# Patient Record
Sex: MALE | Race: ASIAN | NOT HISPANIC OR LATINO | URBAN - METROPOLITAN AREA
[De-identification: names, ages, dates, MRNs, and addresses within clinical notes are randomized per-mention and may not be internally consistent; named-entity substitution may affect disease eponyms.]

---

## 2024-04-27 VITALS
RESPIRATION RATE: 16 BRPM | OXYGEN SATURATION: 99 % | DIASTOLIC BLOOD PRESSURE: 79 MMHG | SYSTOLIC BLOOD PRESSURE: 148 MMHG | HEART RATE: 76 BPM | TEMPERATURE: 98 F | WEIGHT: 154.98 LBS

## 2024-04-27 LAB
ACANTHOCYTES BLD QL SMEAR: SLIGHT — SIGNIFICANT CHANGE UP
ALBUMIN SERPL ELPH-MCNC: 3.6 G/DL — SIGNIFICANT CHANGE UP (ref 3.4–5)
ALP SERPL-CCNC: 64 U/L — SIGNIFICANT CHANGE UP (ref 40–120)
ALT FLD-CCNC: 18 U/L — SIGNIFICANT CHANGE UP (ref 12–42)
ANION GAP SERPL CALC-SCNC: 10 MMOL/L — SIGNIFICANT CHANGE UP (ref 9–16)
APPEARANCE UR: CLEAR — SIGNIFICANT CHANGE UP
APTT BLD: 32.5 SEC — SIGNIFICANT CHANGE UP (ref 24.5–35.6)
AST SERPL-CCNC: 27 U/L — SIGNIFICANT CHANGE UP (ref 15–37)
BASOPHILS # BLD AUTO: 0.02 K/UL — SIGNIFICANT CHANGE UP (ref 0–0.2)
BASOPHILS NFR BLD AUTO: 0.3 % — SIGNIFICANT CHANGE UP (ref 0–2)
BILIRUB SERPL-MCNC: 0.3 MG/DL — SIGNIFICANT CHANGE UP (ref 0.2–1.2)
BILIRUB UR-MCNC: NEGATIVE — SIGNIFICANT CHANGE UP
BUN SERPL-MCNC: 25 MG/DL — HIGH (ref 7–23)
CALCIUM SERPL-MCNC: 8.6 MG/DL — SIGNIFICANT CHANGE UP (ref 8.5–10.5)
CHLORIDE SERPL-SCNC: 99 MMOL/L — SIGNIFICANT CHANGE UP (ref 96–108)
CO2 SERPL-SCNC: 24 MMOL/L — SIGNIFICANT CHANGE UP (ref 22–31)
COLOR SPEC: YELLOW — SIGNIFICANT CHANGE UP
CREAT SERPL-MCNC: 1.31 MG/DL — HIGH (ref 0.5–1.3)
DACRYOCYTES BLD QL SMEAR: SLIGHT — SIGNIFICANT CHANGE UP
DIFF PNL FLD: NEGATIVE — SIGNIFICANT CHANGE UP
EGFR: 53 ML/MIN/1.73M2 — LOW
ELLIPTOCYTES BLD QL SMEAR: SLIGHT — SIGNIFICANT CHANGE UP
EOSINOPHIL # BLD AUTO: 0.29 K/UL — SIGNIFICANT CHANGE UP (ref 0–0.5)
EOSINOPHIL NFR BLD AUTO: 4.6 % — SIGNIFICANT CHANGE UP (ref 0–6)
FLUAV AG NPH QL: SIGNIFICANT CHANGE UP
FLUBV AG NPH QL: SIGNIFICANT CHANGE UP
GLUCOSE SERPL-MCNC: 142 MG/DL — HIGH (ref 70–99)
GLUCOSE UR QL: NEGATIVE MG/DL — SIGNIFICANT CHANGE UP
HCT VFR BLD CALC: 32.3 % — LOW (ref 39–50)
HGB BLD-MCNC: 10.8 G/DL — LOW (ref 13–17)
HYPOCHROMIA BLD QL: SLIGHT — SIGNIFICANT CHANGE UP
IMM GRANULOCYTES NFR BLD AUTO: 0.3 % — SIGNIFICANT CHANGE UP (ref 0–0.9)
INR BLD: 1 — SIGNIFICANT CHANGE UP (ref 0.85–1.18)
KETONES UR-MCNC: NEGATIVE MG/DL — SIGNIFICANT CHANGE UP
LEUKOCYTE ESTERASE UR-ACNC: NEGATIVE — SIGNIFICANT CHANGE UP
LYMPHOCYTES # BLD AUTO: 1.77 K/UL — SIGNIFICANT CHANGE UP (ref 1–3.3)
LYMPHOCYTES # BLD AUTO: 28.1 % — SIGNIFICANT CHANGE UP (ref 13–44)
MANUAL SMEAR VERIFICATION: SIGNIFICANT CHANGE UP
MCHC RBC-ENTMCNC: 23.8 PG — LOW (ref 27–34)
MCHC RBC-ENTMCNC: 33.4 GM/DL — SIGNIFICANT CHANGE UP (ref 32–36)
MCV RBC AUTO: 71.3 FL — LOW (ref 80–100)
MICROCYTES BLD QL: SLIGHT — SIGNIFICANT CHANGE UP
MONOCYTES # BLD AUTO: 0.69 K/UL — SIGNIFICANT CHANGE UP (ref 0–0.9)
MONOCYTES NFR BLD AUTO: 11 % — SIGNIFICANT CHANGE UP (ref 2–14)
NEUTROPHILS # BLD AUTO: 3.5 K/UL — SIGNIFICANT CHANGE UP (ref 1.8–7.4)
NEUTROPHILS NFR BLD AUTO: 55.7 % — SIGNIFICANT CHANGE UP (ref 43–77)
NITRITE UR-MCNC: NEGATIVE — SIGNIFICANT CHANGE UP
NRBC # BLD: 0 /100 WBCS — SIGNIFICANT CHANGE UP (ref 0–0)
PH UR: 7.5 — SIGNIFICANT CHANGE UP (ref 5–8)
PLAT MORPH BLD: NORMAL — SIGNIFICANT CHANGE UP
PLATELET # BLD AUTO: 181 K/UL — SIGNIFICANT CHANGE UP (ref 150–400)
POTASSIUM SERPL-MCNC: 4.4 MMOL/L — SIGNIFICANT CHANGE UP (ref 3.5–5.3)
POTASSIUM SERPL-SCNC: 4.4 MMOL/L — SIGNIFICANT CHANGE UP (ref 3.5–5.3)
PROT SERPL-MCNC: 7.6 G/DL — SIGNIFICANT CHANGE UP (ref 6.4–8.2)
PROT UR-MCNC: NEGATIVE MG/DL — SIGNIFICANT CHANGE UP
PROTHROM AB SERPL-ACNC: 11.3 SEC — SIGNIFICANT CHANGE UP (ref 9.5–13)
RBC # BLD: 4.53 M/UL — SIGNIFICANT CHANGE UP (ref 4.2–5.8)
RBC # FLD: 15.9 % — HIGH (ref 10.3–14.5)
RBC BLD AUTO: ABNORMAL
RSV RNA NPH QL NAA+NON-PROBE: SIGNIFICANT CHANGE UP
SARS-COV-2 RNA SPEC QL NAA+PROBE: SIGNIFICANT CHANGE UP
SODIUM SERPL-SCNC: 133 MMOL/L — SIGNIFICANT CHANGE UP (ref 132–145)
SP GR SPEC: 1.05 — HIGH (ref 1–1.03)
TARGETS BLD QL SMEAR: SLIGHT — SIGNIFICANT CHANGE UP
TROPONIN I, HIGH SENSITIVITY RESULT: 51.8 NG/L — SIGNIFICANT CHANGE UP
UROBILINOGEN FLD QL: 0.2 MG/DL — SIGNIFICANT CHANGE UP (ref 0.2–1)
WBC # BLD: 6.29 K/UL — SIGNIFICANT CHANGE UP (ref 3.8–10.5)
WBC # FLD AUTO: 6.29 K/UL — SIGNIFICANT CHANGE UP (ref 3.8–10.5)

## 2024-04-27 PROCEDURE — 99291 CRITICAL CARE FIRST HOUR: CPT

## 2024-04-27 PROCEDURE — 70498 CT ANGIOGRAPHY NECK: CPT | Mod: 26,MC

## 2024-04-27 PROCEDURE — 70496 CT ANGIOGRAPHY HEAD: CPT | Mod: 26,MC

## 2024-04-27 PROCEDURE — 0042T: CPT | Mod: MC

## 2024-04-27 RX ORDER — ASPIRIN/CALCIUM CARB/MAGNESIUM 324 MG
325 TABLET ORAL ONCE
Refills: 0 | Status: COMPLETED | OUTPATIENT
Start: 2024-04-27 | End: 2024-04-27

## 2024-04-27 RX ORDER — ASPIRIN/CALCIUM CARB/MAGNESIUM 324 MG
300 TABLET ORAL ONCE
Refills: 0 | Status: COMPLETED | OUTPATIENT
Start: 2024-04-27 | End: 2024-04-27

## 2024-04-27 RX ADMIN — Medication 300 MILLIGRAM(S): at 22:29

## 2024-04-27 NOTE — ED PROVIDER NOTE - NSICDXPASTMEDICALHX_GEN_ALL_CORE_FT
PAST MEDICAL HISTORY:  BPH (benign prostatic hyperplasia)     Diabetes     HLD (hyperlipidemia)     JUSTYNA (iron deficiency anemia)     Stroke

## 2024-04-27 NOTE — ED PROVIDER NOTE - CRITICAL CARE ATTENDING CONTRIBUTION TO CARE
The patient was seen immediately upon arrival due to a high probability of imminent or life-threatening deterioration secondary to code stroke, which required my direct attention, intervention, and personal management at the bedside. I have personally provided critical care time exclusive of time spent on separately billable procedures. Time includes review of laboratory data, radiology results, discussion with consultants, discussion with the patient's family, and monitoring for potential decompensation.

## 2024-04-27 NOTE — ED ADULT TRIAGE NOTE - CHIEF COMPLAINT QUOTE
H/o CVA, DM, Dementia, c/o unsteady gait and right side weakness since yesterday at 11pm last night H/o CVA, DM, Dementia, c/o unsteady gait and right side weakness since yesterday at 11pm last night, STROKE CODE CALL

## 2024-04-27 NOTE — ED ADULT NURSE NOTE - OBJECTIVE STATEMENT
87y/o male presents to ED c/o unsteady gait, facial droop, and right sided weakness. last well known was last midnight 00:00.  Code stroke activated. patient taken to CT immediately. Dr. Teague at bedside presently. patient disoriented, hard of hearing, alert and oriented x0 at this time.  [id number] Cantonese  utilized.  pt with bilateral hearing aids and dentures. fall with harm precautions maintained. pt's wife at bedside. Patient and spouse at bedside educated on BE FAST using stroke education packet, patient able to teach back BE FAST to RN. Patient also states understanding of calling 911 when having positive BE FAST symptoms.

## 2024-04-27 NOTE — ED PROVIDER NOTE - PHYSICAL EXAMINATION
Constitutional: awake and alert, in no acute distress  HEENT: head normocephalic and atraumatic. moist mucous membranes  Eyes: extraocular movements intact, normal conjunctiva  Neck: supple, normal ROM  Cardiovascular: regular rate   Pulmonary: no respiratory distress  Gastrointestinal: abdomen flat and nondistended  Skin: warm, dry, normal for ethnicity  Musculoskeletal: no edema, no deformity  Neurological: difficulty with finger-to-nose and heel-to-shin with LUE and LLE.  +pronator drift with LUE  Psychiatric: calm and cooperative

## 2024-04-27 NOTE — ED ADULT NURSE NOTE - CHIEF COMPLAINT QUOTE
H/o CVA, DM, Dementia, c/o unsteady gait and right side weakness since yesterday at 11pm last night, STROKE CODE CALL

## 2024-04-27 NOTE — ED ADULT NURSE NOTE - NSFALLHARMRISKINTERV_ED_ALL_ED
Assistance OOB with selected safe patient handling equipment if applicable/Assistance with ambulation/Communicate risk of Fall with Harm to all staff, patient, and family/Monitor gait and stability/Monitor for mental status changes and reorient to person, place, and time, as needed/Provide visual cue: red socks, yellow wristband, yellow gown, etc/Reinforce activity limits and safety measures with patient and family/Toileting schedule using arm’s reach rule for commode and bathroom/Use of alarms - bed, stretcher, chair and/or video monitoring/Bed in lowest position, wheels locked, appropriate side rails in place/Call bell, personal items and telephone in reach/Instruct patient to call for assistance before getting out of bed/chair/stretcher/Non-slip footwear applied when patient is off stretcher/[place] to call system/Physically safe environment - no spills, clutter or unnecessary equipment/Purposeful Proactive Rounding/Room/bathroom lighting operational, light cord in reach

## 2024-04-27 NOTE — ED PROVIDER NOTE - OBJECTIVE STATEMENT
89yo M hx of CVA 20yrs ago w residual R sided weakness, HTN, HLD (not on meds-- states cholesterol was approx 230 two weeks ago but PMD did not start him on meds), hx of glaucoma, hx of hearing loss, depression, "prostate problems", anemia, presents with new L sided weakness which started last night at 12am.  Pt's wife states that she and pt went to [facility] yesterday prior to onset of symptoms for general check up regarding "neck arteries" and stomach, he was given new medication for osteoporosis, and then he and wife returned home.  Wife states pt then seemed unwilling to get up and walk at midnight, so she rubbed oil on his legs, but noticed his left side seemed weaker than usual and he was unable to walk at all.  States at baseline, pt has had trouble walking without assistance over the past few years but usually just needs mild assistance.    Pt states she thinks pt used to take blood thinners but was taken off them approx 4yrs ago, but she is not sure.  Has not been on baby ASA for approx the past 4yrs either. 87yo M hx of CVA 20yrs ago w residual R sided weakness, HTN, HLD (not on meds-- states cholesterol was approx 230 two weeks ago but PMD did not start him on meds), hx of glaucoma, hx of hearing loss, depression, "prostate problems", anemia, presents with new L sided weakness which started last night at 12am.  Pt's wife states that she and pt went to [facility] yesterday prior to onset of symptoms for general check up regarding "neck arteries" and stomach, he was given new medication for osteoporosis, and then he and wife returned home.  Wife states pt then seemed unwilling to get up and walk at midnight, so she rubbed oil on his legs, but noticed his left side seemed weaker than usual and he was unable to walk at all.  States at baseline, pt has had trouble walking without assistance over the past few years but usually just needs mild assistance.    Wife states she thinks pt used to take blood thinners but was taken off them approx 4yrs ago, but she is not sure.  Has not been on baby ASA for approx the past 4yrs either.

## 2024-04-27 NOTE — ED PROVIDER NOTE - CLINICAL SUMMARY MEDICAL DECISION MAKING FREE TEXT BOX
87yo M hx of CVA w residual R sided weakness, HLD, DM, BPH, glaucoma, hearing loss, presents with L sided weakness starting at midnight.  On exam afebrile, VSS, noted to have dysmetria with LUE and LLE and L pronator drift on exam.  Code stroke called on arrival to ED.  Plan for stroke workup, telestroke consult, reassess.    Imaging shows no acute bleed, no LVO.  Discussed w telestroke neurologist.  Plan to admit to neuro stroke at [facility].

## 2024-04-28 ENCOUNTER — INPATIENT (INPATIENT)
Facility: HOSPITAL | Age: 89
LOS: 18 days | Discharge: NURSING, REHAB OR LONG-TERM CARE | End: 2024-05-17
Attending: HOSPITALIST | Admitting: HOSPITALIST
Payer: MEDICARE

## 2024-04-28 LAB
A1C WITH ESTIMATED AVERAGE GLUCOSE RESULT: 6.3 % — HIGH (ref 4–5.6)
ANION GAP SERPL CALC-SCNC: 10 MMOL/L — SIGNIFICANT CHANGE UP (ref 5–17)
BASOPHILS # BLD AUTO: 0.02 K/UL — SIGNIFICANT CHANGE UP (ref 0–0.2)
BASOPHILS NFR BLD AUTO: 0.3 % — SIGNIFICANT CHANGE UP (ref 0–2)
BUN SERPL-MCNC: 20 MG/DL — SIGNIFICANT CHANGE UP (ref 7–23)
CALCIUM SERPL-MCNC: 8.9 MG/DL — SIGNIFICANT CHANGE UP (ref 8.4–10.5)
CHLORIDE SERPL-SCNC: 98 MMOL/L — SIGNIFICANT CHANGE UP (ref 96–108)
CHOLEST SERPL-MCNC: 227 MG/DL — HIGH
CO2 SERPL-SCNC: 23 MMOL/L — SIGNIFICANT CHANGE UP (ref 22–31)
CREAT SERPL-MCNC: 0.91 MG/DL — SIGNIFICANT CHANGE UP (ref 0.5–1.3)
EGFR: 82 ML/MIN/1.73M2 — SIGNIFICANT CHANGE UP
EOSINOPHIL # BLD AUTO: 0.22 K/UL — SIGNIFICANT CHANGE UP (ref 0–0.5)
EOSINOPHIL NFR BLD AUTO: 3.8 % — SIGNIFICANT CHANGE UP (ref 0–6)
ESTIMATED AVERAGE GLUCOSE: 134 MG/DL — HIGH (ref 68–114)
FERRITIN SERPL-MCNC: 410 NG/ML — HIGH (ref 30–400)
GLUCOSE BLDC GLUCOMTR-MCNC: 127 MG/DL — HIGH (ref 70–99)
GLUCOSE BLDC GLUCOMTR-MCNC: 133 MG/DL — HIGH (ref 70–99)
GLUCOSE BLDC GLUCOMTR-MCNC: 135 MG/DL — HIGH (ref 70–99)
GLUCOSE BLDC GLUCOMTR-MCNC: 142 MG/DL — HIGH (ref 70–99)
GLUCOSE SERPL-MCNC: 145 MG/DL — HIGH (ref 70–99)
HCT VFR BLD CALC: 33.9 % — LOW (ref 39–50)
HCV AB S/CO SERPL IA: 0.06 S/CO — SIGNIFICANT CHANGE UP
HCV AB SERPL-IMP: SIGNIFICANT CHANGE UP
HDLC SERPL-MCNC: 38 MG/DL — LOW
HGB BLD-MCNC: 11.2 G/DL — LOW (ref 13–17)
IMM GRANULOCYTES NFR BLD AUTO: 0.3 % — SIGNIFICANT CHANGE UP (ref 0–0.9)
IRON SATN MFR SERPL: 33 % — SIGNIFICANT CHANGE UP (ref 16–55)
IRON SATN MFR SERPL: 83 UG/DL — SIGNIFICANT CHANGE UP (ref 45–165)
LIPID PNL WITH DIRECT LDL SERPL: 170 MG/DL — HIGH
LYMPHOCYTES # BLD AUTO: 1.14 K/UL — SIGNIFICANT CHANGE UP (ref 1–3.3)
LYMPHOCYTES # BLD AUTO: 19.6 % — SIGNIFICANT CHANGE UP (ref 13–44)
MAGNESIUM SERPL-MCNC: 2 MG/DL — SIGNIFICANT CHANGE UP (ref 1.6–2.6)
MCHC RBC-ENTMCNC: 23.3 PG — LOW (ref 27–34)
MCHC RBC-ENTMCNC: 33 GM/DL — SIGNIFICANT CHANGE UP (ref 32–36)
MCV RBC AUTO: 70.6 FL — LOW (ref 80–100)
MONOCYTES # BLD AUTO: 0.51 K/UL — SIGNIFICANT CHANGE UP (ref 0–0.9)
MONOCYTES NFR BLD AUTO: 8.7 % — SIGNIFICANT CHANGE UP (ref 2–14)
NEUTROPHILS # BLD AUTO: 3.92 K/UL — SIGNIFICANT CHANGE UP (ref 1.8–7.4)
NEUTROPHILS NFR BLD AUTO: 67.3 % — SIGNIFICANT CHANGE UP (ref 43–77)
NON HDL CHOLESTEROL: 189 MG/DL — HIGH
NRBC # BLD: 0 /100 WBCS — SIGNIFICANT CHANGE UP (ref 0–0)
PHOSPHATE SERPL-MCNC: 2.6 MG/DL — SIGNIFICANT CHANGE UP (ref 2.5–4.5)
PLATELET # BLD AUTO: 177 K/UL — SIGNIFICANT CHANGE UP (ref 150–400)
POTASSIUM SERPL-MCNC: 3.6 MMOL/L — SIGNIFICANT CHANGE UP (ref 3.5–5.3)
POTASSIUM SERPL-SCNC: 3.6 MMOL/L — SIGNIFICANT CHANGE UP (ref 3.5–5.3)
RBC # BLD: 4.8 M/UL — SIGNIFICANT CHANGE UP (ref 4.2–5.8)
RBC # FLD: 15.9 % — HIGH (ref 10.3–14.5)
SODIUM SERPL-SCNC: 131 MMOL/L — LOW (ref 135–145)
TIBC SERPL-MCNC: 255 UG/DL — SIGNIFICANT CHANGE UP (ref 220–430)
TRANSFERRIN SERPL-MCNC: 202 MG/DL — SIGNIFICANT CHANGE UP (ref 200–360)
TRIGL SERPL-MCNC: 106 MG/DL — SIGNIFICANT CHANGE UP
TSH SERPL-MCNC: 2.08 UIU/ML — SIGNIFICANT CHANGE UP (ref 0.27–4.2)
UIBC SERPL-MCNC: 172 UG/DL — SIGNIFICANT CHANGE UP (ref 110–370)
WBC # BLD: 5.83 K/UL — SIGNIFICANT CHANGE UP (ref 3.8–10.5)
WBC # FLD AUTO: 5.83 K/UL — SIGNIFICANT CHANGE UP (ref 3.8–10.5)

## 2024-04-28 PROCEDURE — 99233 SBSQ HOSP IP/OBS HIGH 50: CPT

## 2024-04-28 PROCEDURE — 0042T: CPT

## 2024-04-28 PROCEDURE — 70551 MRI BRAIN STEM W/O DYE: CPT | Mod: 26

## 2024-04-28 PROCEDURE — 99222 1ST HOSP IP/OBS MODERATE 55: CPT

## 2024-04-28 PROCEDURE — 70496 CT ANGIOGRAPHY HEAD: CPT | Mod: 26

## 2024-04-28 PROCEDURE — 70450 CT HEAD/BRAIN W/O DYE: CPT | Mod: 26,XU

## 2024-04-28 PROCEDURE — 70498 CT ANGIOGRAPHY NECK: CPT | Mod: 26

## 2024-04-28 RX ORDER — ASPIRIN/CALCIUM CARB/MAGNESIUM 324 MG
81 TABLET ORAL DAILY
Refills: 0 | Status: DISCONTINUED | OUTPATIENT
Start: 2024-04-28 | End: 2024-04-29

## 2024-04-28 RX ORDER — SODIUM CHLORIDE 9 MG/ML
1000 INJECTION INTRAMUSCULAR; INTRAVENOUS; SUBCUTANEOUS ONCE
Refills: 0 | Status: COMPLETED | OUTPATIENT
Start: 2024-04-28 | End: 2024-04-28

## 2024-04-28 RX ORDER — DEXTROSE 50 % IN WATER 50 %
25 SYRINGE (ML) INTRAVENOUS ONCE
Refills: 0 | Status: DISCONTINUED | OUTPATIENT
Start: 2024-04-28 | End: 2024-05-17

## 2024-04-28 RX ORDER — POLYETHYLENE GLYCOL 3350 17 G/17G
17 POWDER, FOR SOLUTION ORAL DAILY
Refills: 0 | Status: DISCONTINUED | OUTPATIENT
Start: 2024-04-28 | End: 2024-04-29

## 2024-04-28 RX ORDER — DEXTROSE 10 % IN WATER 10 %
125 INTRAVENOUS SOLUTION INTRAVENOUS ONCE
Refills: 0 | Status: DISCONTINUED | OUTPATIENT
Start: 2024-04-28 | End: 2024-05-17

## 2024-04-28 RX ORDER — FERROUS SULFATE 325(65) MG
325 TABLET ORAL DAILY
Refills: 0 | Status: DISCONTINUED | OUTPATIENT
Start: 2024-04-28 | End: 2024-04-29

## 2024-04-28 RX ORDER — DEXTROSE 50 % IN WATER 50 %
12.5 SYRINGE (ML) INTRAVENOUS ONCE
Refills: 0 | Status: DISCONTINUED | OUTPATIENT
Start: 2024-04-28 | End: 2024-05-17

## 2024-04-28 RX ORDER — CLOPIDOGREL BISULFATE 75 MG/1
75 TABLET, FILM COATED ORAL DAILY
Refills: 0 | Status: DISCONTINUED | OUTPATIENT
Start: 2024-04-28 | End: 2024-04-29

## 2024-04-28 RX ORDER — SODIUM CHLORIDE 9 MG/ML
1000 INJECTION, SOLUTION INTRAVENOUS
Refills: 0 | Status: DISCONTINUED | OUTPATIENT
Start: 2024-04-28 | End: 2024-05-17

## 2024-04-28 RX ORDER — METFORMIN HYDROCHLORIDE 850 MG/1
1 TABLET ORAL
Refills: 0 | DISCHARGE

## 2024-04-28 RX ORDER — GLUCAGON INJECTION, SOLUTION 0.5 MG/.1ML
1 INJECTION, SOLUTION SUBCUTANEOUS ONCE
Refills: 0 | Status: DISCONTINUED | OUTPATIENT
Start: 2024-04-28 | End: 2024-05-17

## 2024-04-28 RX ORDER — ENOXAPARIN SODIUM 100 MG/ML
40 INJECTION SUBCUTANEOUS EVERY 24 HOURS
Refills: 0 | Status: DISCONTINUED | OUTPATIENT
Start: 2024-04-28 | End: 2024-05-06

## 2024-04-28 RX ORDER — SITAGLIPTIN 50 MG/1
1 TABLET, FILM COATED ORAL
Refills: 0 | DISCHARGE

## 2024-04-28 RX ORDER — SODIUM CHLORIDE 9 MG/ML
1000 INJECTION INTRAMUSCULAR; INTRAVENOUS; SUBCUTANEOUS ONCE
Refills: 0 | Status: DISCONTINUED | OUTPATIENT
Start: 2024-04-28 | End: 2024-04-29

## 2024-04-28 RX ORDER — MAGNESIUM OXIDE 400 MG ORAL TABLET 241.3 MG
1 TABLET ORAL
Refills: 0 | DISCHARGE

## 2024-04-28 RX ORDER — SENNA PLUS 8.6 MG/1
2 TABLET ORAL AT BEDTIME
Refills: 0 | Status: DISCONTINUED | OUTPATIENT
Start: 2024-04-28 | End: 2024-04-29

## 2024-04-28 RX ORDER — MAGNESIUM OXIDE 400 MG ORAL TABLET 241.3 MG
400 TABLET ORAL DAILY
Refills: 0 | Status: DISCONTINUED | OUTPATIENT
Start: 2024-04-28 | End: 2024-04-29

## 2024-04-28 RX ORDER — ATORVASTATIN CALCIUM 80 MG/1
80 TABLET, FILM COATED ORAL AT BEDTIME
Refills: 0 | Status: DISCONTINUED | OUTPATIENT
Start: 2024-04-28 | End: 2024-04-29

## 2024-04-28 RX ORDER — FOLIC ACID/VIT B COMPLEX AND C 400 MCG
1 TABLET ORAL
Refills: 0 | DISCHARGE

## 2024-04-28 RX ORDER — DEXTROSE 50 % IN WATER 50 %
15 SYRINGE (ML) INTRAVENOUS ONCE
Refills: 0 | Status: DISCONTINUED | OUTPATIENT
Start: 2024-04-28 | End: 2024-05-17

## 2024-04-28 RX ORDER — INSULIN LISPRO 100/ML
VIAL (ML) SUBCUTANEOUS
Refills: 0 | Status: DISCONTINUED | OUTPATIENT
Start: 2024-04-28 | End: 2024-05-17

## 2024-04-28 RX ORDER — FERROUS SULFATE 325(65) MG
1 TABLET ORAL
Refills: 0 | DISCHARGE

## 2024-04-28 RX ORDER — FOLIC ACID 0.8 MG
1 TABLET ORAL DAILY
Refills: 0 | Status: DISCONTINUED | OUTPATIENT
Start: 2024-04-28 | End: 2024-04-29

## 2024-04-28 RX ADMIN — Medication 81 MILLIGRAM(S): at 11:38

## 2024-04-28 RX ADMIN — CLOPIDOGREL BISULFATE 75 MILLIGRAM(S): 75 TABLET, FILM COATED ORAL at 10:45

## 2024-04-28 RX ADMIN — Medication 1 MILLIGRAM(S): at 11:39

## 2024-04-28 RX ADMIN — ENOXAPARIN SODIUM 40 MILLIGRAM(S): 100 INJECTION SUBCUTANEOUS at 05:11

## 2024-04-28 RX ADMIN — POLYETHYLENE GLYCOL 3350 17 GRAM(S): 17 POWDER, FOR SOLUTION ORAL at 11:38

## 2024-04-28 RX ADMIN — SODIUM CHLORIDE 1000 MILLILITER(S): 9 INJECTION INTRAMUSCULAR; INTRAVENOUS; SUBCUTANEOUS at 12:45

## 2024-04-28 RX ADMIN — SODIUM CHLORIDE 1000 MILLILITER(S): 9 INJECTION INTRAMUSCULAR; INTRAVENOUS; SUBCUTANEOUS at 17:00

## 2024-04-28 RX ADMIN — SODIUM CHLORIDE 1000 MILLILITER(S): 9 INJECTION INTRAMUSCULAR; INTRAVENOUS; SUBCUTANEOUS at 10:00

## 2024-04-28 RX ADMIN — Medication 325 MILLIGRAM(S): at 11:39

## 2024-04-28 RX ADMIN — MAGNESIUM OXIDE 400 MG ORAL TABLET 400 MILLIGRAM(S): 241.3 TABLET ORAL at 11:38

## 2024-04-28 NOTE — PHYSICAL THERAPY INITIAL EVALUATION ADULT - PERTINENT HX OF CURRENT PROBLEM, REHAB EVAL
Patient is a 88 [facility] side arm and leg weakness starting 4/26 pm. Wife noticed that patient was leaning against the wall with the left side of his body to support himself to walk and was unable to hold himself upright when sitting, slumping over to the left. Patient felt that his left arm and leg were weak. Called PCP who recommened ED visit. CTH/CTP/CTA head and neck with no acute findings. Loaded with aspirin 300mg x1. Transferred to [facility] for further stroke w/u. Stroke code called 4/28 AM as patient difficult to arouse, impaired speech, L facial droop and unable to move LUE. CT neg.

## 2024-04-28 NOTE — PHYSICAL THERAPY INITIAL EVALUATION ADULT - IMPAIRMENTS FOUND, PT EVAL
aerobic capacity/endurance/decreased midline orientation/gait, locomotion, and balance/muscle strength

## 2024-04-28 NOTE — PHYSICAL THERAPY INITIAL EVALUATION ADULT - GENERAL OBSERVATIONS, REHAB EVAL
ROHITH Padilla and [facility] cleared for OOB. Patient received semi-supine in NAD with +ECG +heplock +[place]

## 2024-04-28 NOTE — CONSULT NOTE ADULT - SUBJECTIVE AND OBJECTIVE BOX
HPI: 88y M Cantonese speaking, R hand dominant, b/l  hearing impairment ( b/l hearing aids) with PMHx of DM, Thalassemia minor/JUSTYNA, HLD, hx stroke with right sided weakness in 2004 (has a cane and walker at home but pt refuses using assisted device, off ASA since 2020 for unclear reason), depression, glaucoma, BPH, [facility] with new left side arm and leg weakness starting 4/26 pm. Wife noticed that patient was leaning against the wall with the left side of his body to support himself to walk and was unable to hold himself upright when sitting, slumping over to the left. Patient felt that his left arm and leg were weak. Called PCP who recommened ED visit. CTH/CTP/CTA head and neck with no acute findings. Loaded with aspirin 300mg x1. Transferred to [facility] for further stroke w/u.    PAST MEDICAL & SURGICAL HISTORY:  Diabetes      HLD (hyperlipidemia)      BPH (benign prostatic hyperplasia)      JUSTYNA (iron deficiency anemia)      Stroke        Home Medications:  ferrous sulfate 325 mg (65 mg elemental iron) oral tablet: 1 tab(s) orally once a day (28 Apr 2024 02:11)  Folic Acid 1mg: Take 1 pill once a day (28 Apr 2024 02:11)  Januvia 50 mg oral tablet: 1 tab(s) orally once a day (28 Apr 2024 02:14)  magnesium oxide 400 mg oral tablet: 1 tab(s) orally once a day (28 Apr 2024 02:10)  metFORMIN 500 mg oral tablet: 1 tab(s) orally 3 times a day (28 Apr 2024 02:10)  vitamin A 10,000 intl units oral capsule: 1 cap(s) orally once a day (28 Apr 2024 02:10)    Allergies    No Known Allergies    Intolerances      FAMILY HISTORY:    Social History:  Patient lives with wife  Smoking status: no  Drinking: no  Drug Use: no (28 Apr 2024 01:42)      REVIEW OF SYSTEMS:  CONSTITUTIONAL: No fever, weight loss  EYES: No eye pain, or discharge  ENMT:  No tinnitus, vertigo  NECK: No pain or stiffness  RESPIRATORY: No cough, No dyspnea  CARDIOVASCULAR: No chest pain, or leg swelling  GASTROINTESTINAL: No abdominal pain. No diarrhea ;No melena or hematochezia.  GENITOURINARY: No dysuria, frequency, or hematuria  NEUROLOGICAL: No numbness, or tremors  SKIN: No itching, burning, rashes, or lesions   ENDOCRINE: No heat or cold intolerance;  MUSCULOSKELETAL: No joint pain or swelling;   PSYCHIATRIC: No mood swings, or difficulty sleeping  HEME/LYMPH: No easy bruising, or bleeding gums  ALLERGY AND IMMUNOLOGIC: No hives or eczema    Diet, Regular:   Consistent Carbohydrate No Snacks (CSTCHO)  DASH/TLC Sodium & Cholesterol Restricted (DASH) (04-28-24 @ 01:30) [Active]      CURRENT MEDICATIONS:   aspirin enteric coated 81 milliGRAM(s) Oral daily  atorvastatin 80 milliGRAM(s) Oral at bedtime  clopidogrel Tablet 75 milliGRAM(s) Oral daily  dextrose 10% Bolus 125 milliLiter(s) IV Bolus once  dextrose 5%. 1000 milliLiter(s) IV Continuous <Continuous>  dextrose 5%. 1000 milliLiter(s) IV Continuous <Continuous>  dextrose 50% Injectable 25 Gram(s) IV Push once  dextrose 50% Injectable 12.5 Gram(s) IV Push once  dextrose Oral Gel 15 Gram(s) Oral once PRN  enoxaparin Injectable 40 milliGRAM(s) SubCutaneous every 24 hours  ferrous    sulfate 325 milliGRAM(s) Oral daily  folic acid 1 milliGRAM(s) Oral daily  glucagon  Injectable 1 milliGRAM(s) IntraMuscular once  insulin lispro (ADMELOG) corrective regimen sliding scale   SubCutaneous three times a day before meals  magnesium oxide 400 milliGRAM(s) Oral daily  polyethylene glycol 3350 17 Gram(s) Oral daily  senna 2 Tablet(s) Oral at bedtime      VITAL SIGNS, INS/OUTS (last 24 hours):  Vital Signs Last 24 Hrs  T(C): 37.2 (28 Apr 2024 09:00), Max: 37.2 (28 Apr 2024 09:00)  T(F): 98.9 (28 Apr 2024 09:00), Max: 98.9 (28 Apr 2024 09:00)  HR: 72 (28 Apr 2024 13:40) (66 - 81)  BP: 156/87 (28 Apr 2024 13:40) (109/64 - 164/90)  BP(mean): 115 (28 Apr 2024 13:40) (82 - 115)  RR: 16 (28 Apr 2024 13:40) (16 - 18)  SpO2: 99% (28 Apr 2024 13:40) (94% - 100%)    Parameters below as of 28 Apr 2024 13:40  Patient On (Oxygen Delivery Method): room air      I&O's Summary    28 Apr 2024 07:01  -  28 Apr 2024 14:05  --------------------------------------------------------  IN: 2000 mL / OUT: 250 mL / NET: 1750 mL        PHYSICAL EXAM:  Gen: Reclining in bed at time of exam, appears stated age  HEENT: NCAT, MMM, clear OP  Neck: supple, trachea at midline  CV: RRR, +S1/S2  Pulm: adequate respiratory effort, no increase in work of breathing  Abd: soft, NTND  Skin: warm and dry,  Ext: WWP, no LE edema  Neuro: AOx3, LUE plegic, LLE +3/5  Psych: affect and behavior appropriate, pleasant at time of interview    BASIC LABS:                        11.2   5.83  )-----------( 177      ( 28 Apr 2024 05:30 )             33.9     04-28    131<L>  |  98  |  20  ----------------------------<  145<H>  3.6   |  23  |  0.91    Ca    8.9      28 Apr 2024 05:30  Phos  2.6     04-28  Mg     2.0     04-28    TPro  7.6  /  Alb  3.6  /  TBili  0.3  /  DBili  x   /  AST  27  /  ALT  18  /  AlkPhos  64  04-27    PT/INR - ( 27 Apr 2024 20:11 )   PT: 11.3 sec;   INR: 1.00          PTT - ( 27 Apr 2024 20:11 )  PTT:32.5 sec  Urinalysis Basic - ( 28 Apr 2024 05:30 )    Color: x / Appearance: x / SG: x / pH: x  Gluc: 145 mg/dL / Ketone: x  / Bili: x / Urobili: x   Blood: x / Protein: x / Nitrite: x   Leuk Esterase: x / RBC: x / WBC x   Sq Epi: x / Non Sq Epi: x / Bacteria: x      CAPILLARY BLOOD GLUCOSE      POCT Blood Glucose.: 135 mg/dL (28 Apr 2024 13:25)  POCT Blood Glucose.: 133 mg/dL (28 Apr 2024 08:52)  POCT Blood Glucose.: 142 mg/dL (28 Apr 2024 06:45)  POCT Blood Glucose.: 144 mg/dL (27 Apr 2024 20:11)      OTHER LABS:        MICRODATA:      IMAGING:    EKG:    #Diet - Diet, Regular:   Consistent Carbohydrate No Snacks (CSTCHO)  DASH/TLC Sodium & Cholesterol Restricted (DASH) (04-28-24 @ 01:30) [Active]        #DVT PPx - enoxaparin Injectable: [Ordered as LOVENOX]  40 milliGRAM(s), SubCutaneous, every 24 hours  Administration Instructions: This is a High Alert Medication.  Provider's Contact #: [phone] (04-28-24 @ 01:42)

## 2024-04-28 NOTE — H&P ADULT - NSHPREVIEWOFSYSTEMS_GEN_ALL_CORE
Constitutional: No fever, weight loss or fatigue  Eyes: No eye pain, visual disturbances, or discharge  ENMT:  No difficulty hearing, tinnitus, vertigo; No sinus or throat pain  Neck: No pain or stiffness  Respiratory: +cough  Cardiovascular: No chest pain, palpitations, shortness of breath, dizziness or leg swelling  Gastrointestinal: No abdominal pain. No nausea, vomiting or hematemesis; No diarrhea or constipation. Nohematochezia.  Genitourinary: No dysuria, frequency, hematuria or incontinence  Neurological: As per HPI No

## 2024-04-28 NOTE — PROGRESS NOTE ADULT - ASSESSMENT
88y Cantonese speaking Male with PMHx of JUSTYNA, stroke (2004, residual right sided weakness), HLD, glaucoma, DM, BPH, depression, b/l hearing loss presents with [facility] with new left side arm and leg weakness starting 4/26 pm. Wife also noted truncal ataxia (slumping over to the left when sitting) and gait ataxia. CT imaging with no acute pathology. Transferred to [facility] for further stroke w/u.    Neuro  #hx of stroke 2004 with residual right sided weakness   #CVA workup  Patient previously on aspirin, but was stopped by doctor in 2020, unclear reason why.   - s/p aspirin load x1  - continue aspirin 81mg   - START PLAVIX 75MG  - continue atorvastatin 80mg daily   - q4hr stroke neuro checks and vitals  - obtain MRI Brain without contrast  - Stroke Code HCT Results: negative for acute ischemia   - Stroke Code CTA Results: negative for acute pathology  - Stroke education    Cards  - permissive hypertension, Goal -180  - obtain TTE     #HLD  - high dose statin as above in CVA  - LDL results:     Pulm  - call provider if SPO2 < 94%    GI  #Nutrition/Fluids/Electrolytes   - replete K<4 and Mg <2  - Diet: DASH, consistent carbs  - IVF: none    #constipation  - c/w bowel regimen w/ senna+miralax    Endocrine  #DM  home meds: metformin 500mg TID, Januvia 50mg daily   - A1C results:   - ISS    - TSH results:    Hematology  #JUSTYNA  - c/w home ferrous sulfate 325mg daily  - f/u iron panel    Psych  #depression  Per wife, prior to COVID, patient very active with friends/community, played sports. Since pandemic and worsening eyesight due to glaucoma and hearing, patient with no interest in daily activities. Was started on escitalopram 10mg daily, but stopped taking due to constipation  - consider restarting based on patient's mood and improvement of bowel movement after starting bowel regimen      DVT Prophylaxis  - lovenox sq for DVT prophylaxis   - SCDs for DVT prophylaxis     Dispo: pending PT/OT      Discussed daily hospital plans and goals with patient and family at bedside.     Discussed with Neurology Attending Dr. Rangel

## 2024-04-28 NOTE — PHYSICAL THERAPY INITIAL EVALUATION ADULT - ADDITIONAL COMMENTS
Social history obtained from wife. Patient lives in elevator apartment with few steps to enter. PTA patient was independent with mobility and ADLs. Owns SC and wheelchair. Has tub shower with shower chair and grab bars.

## 2024-04-28 NOTE — PHYSICAL THERAPY INITIAL EVALUATION ADULT - MODALITIES TREATMENT COMMENTS
Cranial Nerves II - XII: II: PERRLA; visual fields are full to confrontation III, IV, VI: unable to assess vision 2/2 lethargy V: facial sensation intact to light touch V1-V3 b/l VII: L eye ptosis, L facial droop VIII: hearing intact to finger rub b/l however patient hard of hearing XI: head turning and shoulder shrug intact b/l XII: tongue protrusion midline, unable to deviate toward L

## 2024-04-28 NOTE — H&P ADULT - ASSESSMENT
88y Cantonese speaking Male with PMHx of JUSTYNA, stroke (2004, residual right sided weakness), HLD, glaucoma, DM, BPH, depression, b/l hearing loss presents with GV with new left side arm and leg weakness starting 4/26 pm. Wife also noted truncal ataxia (slumping over to the left when sitting) and gait ataxia. CT imaging with no acute pathology. Transferred to [facility] for further stroke w/u.    Neuro  #hx of stroke 2004 with residual right sided weakness   #CVA workup  Patient previously on aspirin, but was stopped by doctor in 2020, unclear reason why.   - s/p aspirin load x1  - continue aspirin 81mg and *****  - continue atorvastatin 80mg daily   - q4hr stroke neuro checks and vitals  - obtain MRI Brain without contrast  - Stroke Code HCT Results: negative for acute ischemia   - Stroke Code CTA Results: negative for acute pathology  - Stroke education    Cards  - permissive hypertension, Goal -180  - obtain TTE     #HLD  - high dose statin as above in CVA  - LDL results:     Pulm  - call provider if SPO2 < 94%    GI  #Nutrition/Fluids/Electrolytes   - replete K<4 and Mg <2  - Diet: DASH, consistent carbs  - IVF: none    #constipation  - c/w bowel regimen w/ senna+miralax    Endocrine  #DM  home meds: metformin 500mg TID, Januvia 50mg daily   - A1C results:   - ISS    - TSH results:    Hematology  #JUSTYNA  - c/w home ferrous sulfate 325mg daily  - f/u iron panel    Psych  #depression  Per wife, prior to COVID, patient very active with friends/community, played sports. Since pandemic and worsening eyesight due to glaucoma and hearing, patient with no interest in daily activities. Was started on escitalopram 10mg daily, but stopped taking due to constipation  - consider restarting based on patient's mood and improvement of bowel movement after starting bowel regimen      DVT Prophylaxis  - lovenox sq for DVT prophylaxis   - SCDs for DVT prophylaxis     Dispo: pending PT/OT      Discussed daily hospital plans and goals with patient and family at bedside.     Discussed with Neurology Attending Dr. Rangel 88y Cantonese speaking Male with PMHx of JUSTYNA, stroke (2004, residual right sided weakness), HLD, glaucoma, DM, BPH, depression, b/l hearing loss [facility] new left side arm and leg weakness starting 4/26 pm. Wife also noted truncal ataxia (slumping over to the left when sitting) and gait ataxia. CT imaging with no acute pathology. Transferred to [facility] for further stroke w/u.    Neuro  #hx of stroke 2004 with residual right sided weakness   #CVA workup  Patient previously on aspirin, but was stopped by doctor in 2020, unclear reason why.   - s/p aspirin load x1  - continue aspirin 81mg   - continue atorvastatin 80mg daily   - q4hr stroke neuro checks and vitals  - obtain MRI Brain without contrast  - Stroke Code HCT Results: negative for acute ischemia   - Stroke Code CTA Results: negative for acute pathology  - Stroke education    Cards  - permissive hypertension, Goal -180  - obtain TTE     #HLD  - high dose statin as above in CVA  - LDL results:     Pulm  - call provider if SPO2 < 94%    GI  #Nutrition/Fluids/Electrolytes   - replete K<4 and Mg <2  - Diet: DASH, consistent carbs  - IVF: none    #constipation  - c/w bowel regimen w/ senna+miralax    Endocrine  #DM  home meds: metformin 500mg TID, Januvia 50mg daily   - A1C results:   - ISS    - TSH results:    Hematology  #JUSTYNA  - c/w home ferrous sulfate 325mg daily  - f/u iron panel    Psych  #depression  Per wife, prior to COVID, patient very active with friends/community, played sports. Since pandemic and worsening eyesight due to glaucoma and hearing, patient with no interest in daily activities. Was started on escitalopram 10mg daily, but stopped taking due to constipation  - consider restarting based on patient's mood and improvement of bowel movement after starting bowel regimen      DVT Prophylaxis  - lovenox sq for DVT prophylaxis   - SCDs for DVT prophylaxis     Dispo: pending PT/OT      Discussed daily hospital plans and goals with patient and family at bedside.     Discussed with Neurology Attending Dr. Rangel

## 2024-04-28 NOTE — H&P ADULT - HISTORY OF PRESENT ILLNESS
**STROKE HPI***  Blake  # [id number]  History obtained via wife as patient with signficant b/l hearing loss, hearing aids in place    HPI: 88y Cantonese speaking Male with PMHx of stroke (2004, residual right sided weakness), HLD, glaucoma, DM, BPH, depression, b/l hearing loss presents with [facility] with new left side arm and leg weakness starting 4/26 pm. Wife noticed that patient was leaning against the wall with the left side of his body to support himself to walk and was unable to hold himself upright when sitting, slumping over to the left. Patient felt that his left arm and leg were weak. Called PCP who recommened ED visit. CTH/CTP/CTA head and neck with no acute findings. Loaded with aspirin 300mg x1. Transferred to [facility] for further stroke w/u.    Patient currently with left sided arm and leg weakness, unchanged compared to onset. No dysarthria, changes in vision per wife. Noted with left leg spasms that may be chronic. Wife thinks he has had spasms before, but has not noticed leg spasms recently.     Patient's prior stroke presents as right sided weakness with aphasia. Since then, weakness has improved, but patient at baseline does not converse much due to difficulty hearing.     At baseline, patient able to amulate on his own, although very slowly and carefully. Wife says patient should be using assistive device, but patient refuses. He is independent of ADLs, but performs them slowly. He has falled in the bathroom in the past. Patient has HHA services (3 days/week, 7 hours per day).     **STROKE HPI***  Blake  # [id number]  History obtained via wife as patient with signficant b/l hearing loss, hearing aids in place    HPI: 88y Cantonese speaking Male with PMHx of JUSTYNA, stroke (2004, residual right sided weakness), HLD, glaucoma, DM, BPH, depression, b/l hearing loss presents with [facility] with new left side arm and leg weakness starting 4/26 pm. Wife noticed that patient was leaning against the wall with the left side of his body to support himself to walk and was unable to hold himself upright when sitting, slumping over to the left. Patient felt that his left arm and leg were weak. Called PCP who recommened ED visit. CTH/CTP/CTA head and neck with no acute findings. Loaded with aspirin 300mg x1. Transferred to [facility] for further stroke w/u.    Patient currently with left sided arm and leg weakness, unchanged compared to onset. No dysarthria, changes in vision per wife. Noted with left leg spasms that may be chronic. Wife thinks he has had spasms before, but has not noticed leg spasms recently.     Patient's prior stroke presents as right sided weakness with aphasia. Since then, weakness has improved, but patient at baseline does not converse much due to difficulty hearing.     At baseline, patient able to amulate on his own, although very slowly and carefully. Wife says patient should be using assistive device, but patient refuses. He is independent of ADLs, but performs them slowly. He has falled in the bathroom in the past. Patient has HHA services (3 days/week, 7 hours per day).

## 2024-04-28 NOTE — CONSULT NOTE ADULT - ASSESSMENT
88y M Cantonese speaking, R hand dominant, b/l  hearing impairment ( b/l hearing aids) with PMHx of DM, Thalassemia minor/JUSTYNA, HLD, hx stroke with right sided weakness in 2004 (has a cane and walker at home but pt refuses using assisted device, off ASA since 2020 for unclear reason), depression, glaucoma, BPH, presented with [facility] ( 4/27) with new left side arm and leg weakness starting 4/26 pm. Wife noticed that patient was leaning against the wall with the left side of his body to support himself to walk and was unable to hold himself upright when sitting, slumping over to the left. Patient felt that his left arm and leg were weak. Called PCP who recommened ED visit. CTH/CTP/CTA head and neck with no acute findings. Loaded with aspirin 300mg x1. Transferred to [facility] for further stroke w/u.    # New Left sided weakness r/u new stroke   #hx of stroke with  residual right sided weakness ( 2004)   - 8LA telemetry monitoring   -  q4hr stroke neuro checks and vitals  - active plan per Stroke team d/w resident   - s/p aspirin load 300mg x1( 4/27)  - continue aspirin 81mg po daily ( 4/27-)  - start Clopidogrel 75mg po daily ( 4/28-)   - continue Atorvastatin 80mg po qHS  ( 4/27-)   - permissive hypertension, Goal -180  - IVF   - obtain MRI Brain without contrast  - obtain TTE   - PT/OT eval    #HLD  - high dose statin as above in CVA  - LDL results: 170    # Anemia hx Thal minor  - Ferritin 410, Tsat 33% adequate iron store  - Hgb 11.2%     #constipation  - c/w bowel regimen w/ senna+miralax    #DM  - A1C 6.3%   home meds: metformin 500mg TID, Januvia 50mg daily   - ISS, goal -180     # Hyponatremia  - trend Na 131    #DVT Prophylaxis  - lovenox 40mg sq for DVT prophylaxis   - SCDs for DVT prophylaxis     Dispo: pending PT/OT, will benefit inpatient Rehab      Contact:  PMD Dr. Aron Soares   Wife: Farshad Pelayo # [phone]    POC as d/w Stroke resident Dr.Edwards Dr. Karen Dillon covering 4/29-5/1/24

## 2024-04-28 NOTE — PATIENT PROFILE ADULT - FALL HARM RISK - HARM RISK INTERVENTIONS

## 2024-04-28 NOTE — H&P ADULT - NSHPLABSRESULTS_GEN_ALL_CORE
Fingerstick Blood Glucose: CAPILLARY BLOOD GLUCOSE      POCT Blood Glucose.: 144 mg/dL (2024 20:11)    LABS:                        10.8   6.29  )-----------( 181      ( 2024 20:11 )             32.3         133  |  99  |  25<H>  ----------------------------<  142<H>  4.4   |  24  |  1.31<H>    Ca    8.6      2024 20:11    TPro  7.6  /  Alb  3.6  /  TBili  0.3  /  DBili  x   /  AST    /  ALT  18  /  AlkPhos  64      PT/INR - ( 2024 20:11 )   PT: 11.3 sec;   INR: 1.00          PTT - ( 2024 20:11 )  PTT:32.5 sec      Urinalysis Basic - ( 2024 21:14 )    Color: Yellow / Appearance: Clear / S.046 / pH: x  Gluc: x / Ketone: Negative mg/dL  / Bili: Negative / Urobili: 0.2 mg/dL   Blood: x / Protein: Negative mg/dL / Nitrite: Negative   Leuk Esterase: Negative / RBC: x / WBC x   Sq Epi: x / Non Sq Epi: x / Bacteria: x        RADIOLOGY:  < from: CT Brain Stroke Protocol (24 @ 20:44) >    IMPRESSION:  No acute intracranial hemorrhage, mass effect, or midline shift.    < end of copied text >    < from: CT Angio Brain Stroke Protocol  w/ IV Cont (24 @ 20:46) >    IMPRESSION:    CT PERFUSION:  Technical limitations: None.    Core infarction: 0 ml  Penumbra / tissue at risk for active ischemia: 0 ml    CTA NECK:  No evidence of significant stenosis or occlusion.    CTA HEAD:  Patent intracranial circulation without flow limiting stenosis.  No evidence of aneurysm. Tiny aneurysms can be beyond the resolution of   CTA technique.    < end of copied text >

## 2024-04-28 NOTE — H&P ADULT - NSHPPHYSICALEXAM_GEN_ALL_CORE
General: No acute distress, awake and alert  Cardiovascular: Regular rate and rhythm on monitor  Pulmonary: No use of accessory muscles  GI: Abdomen soft, non-tender, non-distended    Neurologic:  -Mental status: Awake, alert, oriented to person, place, and time. Speech is minmial, one word response, but answers appropriately to questions. Able to name glasses, pen. Intact repetition and comprehension, no dysarthria. Follows commands. Attention/concentration intact.   -Cranial nerves:   II: Visual fields are full to confrontation.  III, IV, VI: Extraocular movements are intact without nystagmus. Pupils equally round and reactive to light  V:  Facial sensation V1-V3 equal and intact   VII: Face appears symmetric  VIII: Decreased hearing bilaterally, hearing aids in place. Left ear worse than right.   Motor: Normal bulk and tone. Mild pronator drift in the LUE, does not hit bed, with finger curl, strength 4/5. Strength RUE 4/5, bilateral lower extremities 4/5.  Sensation: Intact to light touch bilaterally. No neglect or extinction on double simultaneous testing.  Coordination: Dysmetria LUE on finger to nose    NIHSS: 2

## 2024-04-28 NOTE — PROGRESS NOTE ADULT - SUBJECTIVE AND OBJECTIVE BOX
Events: stroke code in the AM.         VITALS  Vital Signs Last 24 Hrs  T(C): 37.2 (28 Apr 2024 09:00), Max: 37.2 (28 Apr 2024 09:00)  T(F): 98.9 (28 Apr 2024 09:00), Max: 98.9 (28 Apr 2024 09:00)  HR: 72 (28 Apr 2024 09:40) (66 - 81)  BP: 148/79 (28 Apr 2024 09:40) (109/64 - 164/90)  BP(mean): 107 (28 Apr 2024 09:40) (82 - 107)  RR: 16 (28 Apr 2024 09:40) (16 - 18)  SpO2: 97% (28 Apr 2024 09:40) (94% - 100%)    Parameters below as of 28 Apr 2024 09:40  Patient On (Oxygen Delivery Method): room air        CAPILLARY BLOOD GLUCOSE      POCT Blood Glucose.: 133 mg/dL (28 Apr 2024 08:52)  POCT Blood Glucose.: 142 mg/dL (28 Apr 2024 06:45)  POCT Blood Glucose.: 144 mg/dL (27 Apr 2024 20:11)      PHYSICAL EXAM  Neurological:   -Mental status: AFTER STROKE CODE Awake, alert, oriented to person, place, and time. Speech is minimal one word response, but answers appropriately to questions. Able to name objects Intact repetition and comprehension, no dysarthria. Follows commands. Attention/concentration intact.   DURING STROKE CODE  -Cranial nerves:   II: Visual fields are full to confrontation.  III, IV, VI: Extraocular movements are intact without nystagmus. Pupils equally round and reactive to light  V:  Facial sensation V1-V3 equal and intact   VII: Face appears asymmetric with left sided droop  VIII: Decreased hearing bilaterally, hearing aids in place. Left ear worse than right.   Motor: Normal bulk and tone. hemiplegia w/ strength 1/5 LLE, 0/5 LUE, RUE 4/5, RLE 4/5.  Sensation: Intact to light touch bilaterally. No neglect or extinction on double simultaneous testing.  Coordination: Dysmetria LUE on finger to nose    MEDICATIONS  (STANDING):  aspirin enteric coated 81 milliGRAM(s) Oral daily  atorvastatin 80 milliGRAM(s) Oral at bedtime  clopidogrel Tablet 75 milliGRAM(s) Oral daily  dextrose 10% Bolus 125 milliLiter(s) IV Bolus once  dextrose 5%. 1000 milliLiter(s) (50 mL/Hr) IV Continuous <Continuous>  dextrose 5%. 1000 milliLiter(s) (100 mL/Hr) IV Continuous <Continuous>  dextrose 50% Injectable 25 Gram(s) IV Push once  dextrose 50% Injectable 12.5 Gram(s) IV Push once  enoxaparin Injectable 40 milliGRAM(s) SubCutaneous every 24 hours  ferrous    sulfate 325 milliGRAM(s) Oral daily  folic acid 1 milliGRAM(s) Oral daily  glucagon  Injectable 1 milliGRAM(s) IntraMuscular once  insulin lispro (ADMELOG) corrective regimen sliding scale   SubCutaneous three times a day before meals  magnesium oxide 400 milliGRAM(s) Oral daily  polyethylene glycol 3350 17 Gram(s) Oral daily  senna 2 Tablet(s) Oral at bedtime  sodium chloride 0.9% Bolus 1000 milliLiter(s) IV Bolus once    MEDICATIONS  (PRN):  dextrose Oral Gel 15 Gram(s) Oral once PRN Blood Glucose LESS THAN 70 milliGRAM(s)/deciliter      No Known Allergies      LABS                        11.2   5.83  )-----------( 177      ( 28 Apr 2024 05:30 )             33.9     04-28    131<L>  |  98  |  20  ----------------------------<  145<H>  3.6   |  23  |  0.91    Ca    8.9      28 Apr 2024 05:30  Phos  2.6     04-28  Mg     2.0     04-28    TPro  7.6  /  Alb  3.6  /  TBili  0.3  /  DBili  x   /  AST  27  /  ALT  18  /  AlkPhos  64  04-27    PT/INR - ( 27 Apr 2024 20:11 )   PT: 11.3 sec;   INR: 1.00          PTT - ( 27 Apr 2024 20:11 )  PTT:32.5 sec  Urinalysis Basic - ( 28 Apr 2024 05:30 )    Color: x / Appearance: x / SG: x / pH: x  Gluc: 145 mg/dL / Ketone: x  / Bili: x / Urobili: x   Blood: x / Protein: x / Nitrite: x   Leuk Esterase: x / RBC: x / WBC x   Sq Epi: x / Non Sq Epi: x / Bacteria: x              IMAGING/EKG/ETC

## 2024-04-29 LAB
ANION GAP SERPL CALC-SCNC: 13 MMOL/L — SIGNIFICANT CHANGE UP (ref 5–17)
BUN SERPL-MCNC: 17 MG/DL — SIGNIFICANT CHANGE UP (ref 7–23)
CALCIUM SERPL-MCNC: 8.4 MG/DL — SIGNIFICANT CHANGE UP (ref 8.4–10.5)
CHLORIDE SERPL-SCNC: 103 MMOL/L — SIGNIFICANT CHANGE UP (ref 96–108)
CO2 SERPL-SCNC: 19 MMOL/L — LOW (ref 22–31)
CREAT SERPL-MCNC: 0.85 MG/DL — SIGNIFICANT CHANGE UP (ref 0.5–1.3)
CULTURE RESULTS: SIGNIFICANT CHANGE UP
EGFR: 85 ML/MIN/1.73M2 — SIGNIFICANT CHANGE UP
GLUCOSE BLDC GLUCOMTR-MCNC: 106 MG/DL — HIGH (ref 70–99)
GLUCOSE BLDC GLUCOMTR-MCNC: 124 MG/DL — HIGH (ref 70–99)
GLUCOSE BLDC GLUCOMTR-MCNC: 132 MG/DL — HIGH (ref 70–99)
GLUCOSE SERPL-MCNC: 120 MG/DL — HIGH (ref 70–99)
HCT VFR BLD CALC: 32.8 % — LOW (ref 39–50)
HGB BLD-MCNC: 11.1 G/DL — LOW (ref 13–17)
MAGNESIUM SERPL-MCNC: 2.1 MG/DL — SIGNIFICANT CHANGE UP (ref 1.6–2.6)
MCHC RBC-ENTMCNC: 23.3 PG — LOW (ref 27–34)
MCHC RBC-ENTMCNC: 33.8 GM/DL — SIGNIFICANT CHANGE UP (ref 32–36)
MCV RBC AUTO: 68.9 FL — LOW (ref 80–100)
NRBC # BLD: 0 /100 WBCS — SIGNIFICANT CHANGE UP (ref 0–0)
PHOSPHATE SERPL-MCNC: 2.2 MG/DL — LOW (ref 2.5–4.5)
PLATELET # BLD AUTO: 181 K/UL — SIGNIFICANT CHANGE UP (ref 150–400)
POTASSIUM SERPL-MCNC: 3.5 MMOL/L — SIGNIFICANT CHANGE UP (ref 3.5–5.3)
POTASSIUM SERPL-SCNC: 3.5 MMOL/L — SIGNIFICANT CHANGE UP (ref 3.5–5.3)
RBC # BLD: 4.76 M/UL — SIGNIFICANT CHANGE UP (ref 4.2–5.8)
RBC # FLD: 15.7 % — HIGH (ref 10.3–14.5)
SODIUM SERPL-SCNC: 135 MMOL/L — SIGNIFICANT CHANGE UP (ref 135–145)
SPECIMEN SOURCE: SIGNIFICANT CHANGE UP
WBC # BLD: 6.67 K/UL — SIGNIFICANT CHANGE UP (ref 3.8–10.5)
WBC # FLD AUTO: 6.67 K/UL — SIGNIFICANT CHANGE UP (ref 3.8–10.5)

## 2024-04-29 PROCEDURE — 99233 SBSQ HOSP IP/OBS HIGH 50: CPT

## 2024-04-29 PROCEDURE — 71045 X-RAY EXAM CHEST 1 VIEW: CPT | Mod: 26

## 2024-04-29 PROCEDURE — 99232 SBSQ HOSP IP/OBS MODERATE 35: CPT

## 2024-04-29 RX ORDER — MAGNESIUM OXIDE 400 MG ORAL TABLET 241.3 MG
400 TABLET ORAL DAILY
Refills: 0 | Status: DISCONTINUED | OUTPATIENT
Start: 2024-04-30 | End: 2024-05-07

## 2024-04-29 RX ORDER — SODIUM CHLORIDE 9 MG/ML
500 INJECTION INTRAMUSCULAR; INTRAVENOUS; SUBCUTANEOUS ONCE
Refills: 0 | Status: COMPLETED | OUTPATIENT
Start: 2024-04-29 | End: 2024-04-29

## 2024-04-29 RX ORDER — POTASSIUM CHLORIDE 20 MEQ
40 PACKET (EA) ORAL ONCE
Refills: 0 | Status: COMPLETED | OUTPATIENT
Start: 2024-04-29 | End: 2024-04-29

## 2024-04-29 RX ORDER — FOLIC ACID 0.8 MG
1 TABLET ORAL DAILY
Refills: 0 | Status: DISCONTINUED | OUTPATIENT
Start: 2024-04-29 | End: 2024-05-07

## 2024-04-29 RX ORDER — ATORVASTATIN CALCIUM 80 MG/1
80 TABLET, FILM COATED ORAL AT BEDTIME
Refills: 0 | Status: DISCONTINUED | OUTPATIENT
Start: 2024-04-29 | End: 2024-05-07

## 2024-04-29 RX ORDER — ASPIRIN/CALCIUM CARB/MAGNESIUM 324 MG
81 TABLET ORAL DAILY
Refills: 0 | Status: DISCONTINUED | OUTPATIENT
Start: 2024-04-30 | End: 2024-05-03

## 2024-04-29 RX ORDER — CLOPIDOGREL BISULFATE 75 MG/1
75 TABLET, FILM COATED ORAL DAILY
Refills: 0 | Status: DISCONTINUED | OUTPATIENT
Start: 2024-04-30 | End: 2024-05-03

## 2024-04-29 RX ORDER — SODIUM CHLORIDE 9 MG/ML
1000 INJECTION INTRAMUSCULAR; INTRAVENOUS; SUBCUTANEOUS
Refills: 0 | Status: DISCONTINUED | OUTPATIENT
Start: 2024-04-29 | End: 2024-04-30

## 2024-04-29 RX ORDER — POLYETHYLENE GLYCOL 3350 17 G/17G
17 POWDER, FOR SOLUTION ORAL DAILY
Refills: 0 | Status: DISCONTINUED | OUTPATIENT
Start: 2024-04-29 | End: 2024-05-03

## 2024-04-29 RX ORDER — FERROUS SULFATE 325(65) MG
325 TABLET ORAL DAILY
Refills: 0 | Status: DISCONTINUED | OUTPATIENT
Start: 2024-04-30 | End: 2024-05-07

## 2024-04-29 RX ADMIN — Medication 81 MILLIGRAM(S): at 22:58

## 2024-04-29 RX ADMIN — ATORVASTATIN CALCIUM 80 MILLIGRAM(S): 80 TABLET, FILM COATED ORAL at 22:59

## 2024-04-29 RX ADMIN — POLYETHYLENE GLYCOL 3350 17 GRAM(S): 17 POWDER, FOR SOLUTION ORAL at 22:58

## 2024-04-29 RX ADMIN — SENNA PLUS 2 TABLET(S): 8.6 TABLET ORAL at 22:58

## 2024-04-29 RX ADMIN — Medication 1 MILLIGRAM(S): at 22:58

## 2024-04-29 RX ADMIN — CLOPIDOGREL BISULFATE 75 MILLIGRAM(S): 75 TABLET, FILM COATED ORAL at 22:58

## 2024-04-29 RX ADMIN — ENOXAPARIN SODIUM 40 MILLIGRAM(S): 100 INJECTION SUBCUTANEOUS at 05:13

## 2024-04-29 RX ADMIN — SODIUM CHLORIDE 1000 MILLILITER(S): 9 INJECTION INTRAMUSCULAR; INTRAVENOUS; SUBCUTANEOUS at 12:22

## 2024-04-29 RX ADMIN — SODIUM CHLORIDE 50 MILLILITER(S): 9 INJECTION INTRAMUSCULAR; INTRAVENOUS; SUBCUTANEOUS at 12:41

## 2024-04-29 NOTE — SWALLOW BEDSIDE ASSESSMENT ADULT - SWALLOW EVAL: DIAGNOSIS
Pt clinically p/w overt s/s of oropharyngeal dysphagia in the setting of lethargy and acute stroke like symptoms. There is left labial spill of thin liquids but not seen with puree. Oral bolus transport is prolonged for all trials tested. Laryngeal rise is palpated during the swallow, strongly suspect a delay. Reduced hyolaryngeal excursion and overall pharyngeal swallow phase weakness is strongly suspected given multiple swallows per bolus and wet cough in between swallows. Initiation of a PO diet is not yet recommended. SLP d/w Pt wife and son (over the phone) with [employer] .

## 2024-04-29 NOTE — OCCUPATIONAL THERAPY INITIAL EVALUATION ADULT - DIAGNOSIS, OT EVAL
Pt presents to [facility] with new onset LUE/LLE weakness, MRI indicating acute/subacute infarct of posterior limb R internal capsule with associated cytotoxic edema. OT consulted secondary to decreased balance and strength impacting ADL and functional mob.

## 2024-04-29 NOTE — OCCUPATIONAL THERAPY INITIAL EVALUATION ADULT - MODIFIED CLINICAL TEST OF SENSORY INTEGRATION IN BALANCE TEST
Pt tolerated seated EOB for approx 20 mins with max A to support upright posture; pt presents with strong R posterior lean.

## 2024-04-29 NOTE — SWALLOW BEDSIDE ASSESSMENT ADULT - SLP PERTINENT HISTORY OF CURRENT PROBLEM
88y M Cantonese speaking, R hand dominant, b/l  hearing impairment ( b/l hearing aids) with PMHx of DM, Thalassemia minor/JUSTYNA, HLD, hx stroke with right sided weakness in 2004 (has a cane and walker at home but pt refuses using assisted device, off ASA since 2020 for unclear reason), depression, glaucoma, BPH, presented with [facility] ( 4/27) with new left side arm and leg weakness starting 4/26 pm. Wife noticed that patient was leaning against the wall with the left side of his body to support himself to walk and was unable to hold himself upright when sitting, slumping over to the left. Patient felt that his left arm and leg were weak. Called PCP who recommened ED visit. CTH/CTP/CTA head and neck with no acute findings. Loaded with aspirin 300mg x1. Transferred to [facility] for further stroke w/u.

## 2024-04-29 NOTE — SWALLOW BEDSIDE ASSESSMENT ADULT - CONSISTENCIES ADMINISTERED
Tsp water x3, qtr tsp puree x2. Pt seen to grimace and out hand on abdomen after swallowing. Pt wife states he has not had a BM in days./thin liquid/pureed

## 2024-04-29 NOTE — OCCUPATIONAL THERAPY INITIAL EVALUATION ADULT - SENSORY TESTS
Cranial Nerves II - XII: II: PERRLA; visual fields are full to confrontation III, IV, VI: EOMI, no nystagmus appreciated V: facial sensation intact to light touch V1-V3 b/l VII: L ptosis, L facial droop, asymmetric eyebrow raise and closure VIII: hearing diminished b/l  XI: head turning intact; no L shrug activation b/l XII: tongue protrusion to R; Vision H-Test: bilateral tracking and smooth pursuit intact;  Vision Quadrant Test: not tested this date

## 2024-04-29 NOTE — SWALLOW BEDSIDE ASSESSMENT ADULT - SWALLOW EVAL: ORAL MUSCULATURE
Pt does not follow commands for full assessment but SLP notes a left facial droop and left tongue deviation

## 2024-04-29 NOTE — OCCUPATIONAL THERAPY INITIAL EVALUATION ADULT - PERTINENT HX OF CURRENT PROBLEM, REHAB EVAL
88y Cantonese speaking Male with PMHx of JUSTYNA, stroke (2004, residual right sided weakness), HLD, glaucoma, DM, BPH, depression, b/l hearing loss presents with [facility] with new left side arm and leg weakness starting 4/26 pm. Wife also noted truncal ataxia (slumping over to the left when sitting) and gait ataxia. CT imaging with no acute pathology. Transferred to [facility] for further stroke w/u.

## 2024-04-29 NOTE — SWALLOW BEDSIDE ASSESSMENT ADULT - SLP GENERAL OBSERVATIONS
Pt received sleeping in bed. Wife at bedside one facetime with her son. Justineonese  # [id number] assisted in this session via video chat.

## 2024-04-29 NOTE — PROGRESS NOTE ADULT - SUBJECTIVE AND OBJECTIVE BOX
Neurology Stroke Progress Note    INTERVAL HPI/OVERNIGHT EVENTS:  Patient seen and examined.  number ______ used.    MEDICATIONS  (STANDING):  aspirin enteric coated 81 milliGRAM(s) Oral daily  atorvastatin 80 milliGRAM(s) Oral at bedtime  clopidogrel Tablet 75 milliGRAM(s) Oral daily  dextrose 10% Bolus 125 milliLiter(s) IV Bolus once  dextrose 5%. 1000 milliLiter(s) (100 mL/Hr) IV Continuous <Continuous>  dextrose 5%. 1000 milliLiter(s) (50 mL/Hr) IV Continuous <Continuous>  dextrose 50% Injectable 25 Gram(s) IV Push once  dextrose 50% Injectable 12.5 Gram(s) IV Push once  enoxaparin Injectable 40 milliGRAM(s) SubCutaneous every 24 hours  ferrous    sulfate 325 milliGRAM(s) Oral daily  folic acid 1 milliGRAM(s) Oral daily  glucagon  Injectable 1 milliGRAM(s) IntraMuscular once  insulin lispro (ADMELOG) corrective regimen sliding scale   SubCutaneous every 6 hours  magnesium oxide 400 milliGRAM(s) Oral daily  polyethylene glycol 3350 17 Gram(s) Oral daily  potassium chloride   Solution 40 milliEquivalent(s) Oral once  senna 2 Tablet(s) Oral at bedtime  sodium chloride 0.9% Bolus 500 milliLiter(s) IV Bolus once  sodium chloride 0.9%. 1000 milliLiter(s) (50 mL/Hr) IV Continuous <Continuous>  sodium chloride 0.9%. 1000 milliLiter(s) (100 mL/Hr) IV Continuous <Continuous>    MEDICATIONS  (PRN):  dextrose Oral Gel 15 Gram(s) Oral once PRN Blood Glucose LESS THAN 70 milliGRAM(s)/deciliter      Allergies    No Known Allergies    Intolerances        Vital Signs Last 24 Hrs  T(C): 36.8 (29 Apr 2024 13:59), Max: 37.3 (29 Apr 2024 04:24)  T(F): 98.3 (29 Apr 2024 13:59), Max: 99.2 (29 Apr 2024 04:24)  HR: 68 (29 Apr 2024 12:08) (62 - 78)  BP: 156/75 (29 Apr 2024 12:08) (128/65 - 164/77)  BP(mean): 108 (29 Apr 2024 12:08) (89 - 115)  RR: 17 (29 Apr 2024 12:08) (16 - 18)  SpO2: 96% (29 Apr 2024 12:08) (95% - 100%)    Parameters below as of 29 Apr 2024 12:08  Patient On (Oxygen Delivery Method): room air        Physical exam:  General: lethargic  Eyes: Anicteric sclerae  Neck: trachea midline  Cardiovascular: Regular rate and rhythm  Pulmonary:No use of accessory muscles  GI: Abdomen soft, non-distended  Extremities: no edema    Neurological:   -Mental status: lethargic, states he is in the hospital, minimal verbal output. earlier in the morning, able to follow simple commands, when rounding with attending, was more lethargic after working with PT and not able to  -Cranial nerves:   II: BTT  III, IV, VI: right gaze preference, able to cross midline (followed examiner with his eyes), may have a component of eyelid apraxia  VII: left sided facial droop  Motor: Right upper and lower extremities 4/5, left upper and lower extremities hypotonic, 1/5 only when noxious stimuli applied. Does not move when asking patient to perform strength testing.  Sensation: intact on right side, minimal withdrawal when noxious stimuli applied to left upper and lower extremities  Coordination: Cannot follow command    LABS:                        11.1   6.67  )-----------( 181      ( 29 Apr 2024 05:30 )             32.8     04-29    135  |  103  |  17  ----------------------------<  120<H>  3.5   |  19<L>  |  0.85    Ca    8.4      29 Apr 2024 05:30  Phos  2.2     04-29  Mg     2.1     04-29    TPro  7.6  /  Alb  3.6  /  TBili  0.3  /  DBili  x   /  AST  27  /  ALT  18  /  AlkPhos  64  04-27    PT/INR - ( 27 Apr 2024 20:11 )   PT: 11.3 sec;   INR: 1.00          PTT - ( 27 Apr 2024 20:11 )  PTT:32.5 sec  Urinalysis Basic - ( 29 Apr 2024 05:30 )    Color: x / Appearance: x / SG: x / pH: x  Gluc: 120 mg/dL / Ketone: x  / Bili: x / Urobili: x   Blood: x / Protein: x / Nitrite: x   Leuk Esterase: x / RBC: x / WBC x   Sq Epi: x / Non Sq Epi: x / Bacteria: x        RADIOLOGY & ADDITIONAL TESTS:    < from: MR Head No Cont (04.28.24 @ 16:40) >    IMPRESSION: Acute/subacute infarction within the posterior limb of the   right internal capsule with associated cytotoxic edema.    Additional chronic infarcts and similar-appearing mild chronic   microvascular type changes, as discussed.    < end of copied text >   Neurology Stroke Progress Note    INTERVAL HPI/OVERNIGHT EVENTS:  Patient seen and examined.  number [id number]    MEDICATIONS  (STANDING):  aspirin enteric coated 81 milliGRAM(s) Oral daily  atorvastatin 80 milliGRAM(s) Oral at bedtime  clopidogrel Tablet 75 milliGRAM(s) Oral daily  dextrose 10% Bolus 125 milliLiter(s) IV Bolus once  dextrose 5%. 1000 milliLiter(s) (100 mL/Hr) IV Continuous <Continuous>  dextrose 5%. 1000 milliLiter(s) (50 mL/Hr) IV Continuous <Continuous>  dextrose 50% Injectable 25 Gram(s) IV Push once  dextrose 50% Injectable 12.5 Gram(s) IV Push once  enoxaparin Injectable 40 milliGRAM(s) SubCutaneous every 24 hours  ferrous    sulfate 325 milliGRAM(s) Oral daily  folic acid 1 milliGRAM(s) Oral daily  glucagon  Injectable 1 milliGRAM(s) IntraMuscular once  insulin lispro (ADMELOG) corrective regimen sliding scale   SubCutaneous every 6 hours  magnesium oxide 400 milliGRAM(s) Oral daily  polyethylene glycol 3350 17 Gram(s) Oral daily  potassium chloride   Solution 40 milliEquivalent(s) Oral once  senna 2 Tablet(s) Oral at bedtime  sodium chloride 0.9% Bolus 500 milliLiter(s) IV Bolus once  sodium chloride 0.9%. 1000 milliLiter(s) (50 mL/Hr) IV Continuous <Continuous>  sodium chloride 0.9%. 1000 milliLiter(s) (100 mL/Hr) IV Continuous <Continuous>    MEDICATIONS  (PRN):  dextrose Oral Gel 15 Gram(s) Oral once PRN Blood Glucose LESS THAN 70 milliGRAM(s)/deciliter      Allergies    No Known Allergies    Intolerances        Vital Signs Last 24 Hrs  T(C): 36.8 (29 Apr 2024 13:59), Max: 37.3 (29 Apr 2024 04:24)  T(F): 98.3 (29 Apr 2024 13:59), Max: 99.2 (29 Apr 2024 04:24)  HR: 68 (29 Apr 2024 12:08) (62 - 78)  BP: 156/75 (29 Apr 2024 12:08) (128/65 - 164/77)  BP(mean): 108 (29 Apr 2024 12:08) (89 - 115)  RR: 17 (29 Apr 2024 12:08) (16 - 18)  SpO2: 96% (29 Apr 2024 12:08) (95% - 100%)    Parameters below as of 29 Apr 2024 12:08  Patient On (Oxygen Delivery Method): room air        Physical exam:  General: lethargic  Eyes: Anicteric sclerae  Neck: trachea midline  Cardiovascular: Regular rate and rhythm  Pulmonary:No use of accessory muscles  GI: Abdomen soft, non-distended  Extremities: no edema    Neurological:   -Mental status: lethargic, states he is in the hospital, minimal verbal output. earlier in the morning, able to follow simple commands, when rounding with attending, was more lethargic after working with PT and not able to  -Cranial nerves:   II: BTT  III, IV, VI: right gaze preference, able to cross midline (followed examiner with his eyes), may have a component of eyelid apraxia  VII: left sided facial droop  Motor: Right upper and lower extremities 4/5, left upper and lower extremities hypotonic, 1/5 only when noxious stimuli applied. Does not move when asking patient to perform strength testing.  Sensation: intact on right side, minimal withdrawal when noxious stimuli applied to left upper and lower extremities  Coordination: Cannot follow command    LABS:                        11.1   6.67  )-----------( 181      ( 29 Apr 2024 05:30 )             32.8     04-29    135  |  103  |  17  ----------------------------<  120<H>  3.5   |  19<L>  |  0.85    Ca    8.4      29 Apr 2024 05:30  Phos  2.2     04-29  Mg     2.1     04-29    TPro  7.6  /  Alb  3.6  /  TBili  0.3  /  DBili  x   /  AST  27  /  ALT  18  /  AlkPhos  64  04-27    PT/INR - ( 27 Apr 2024 20:11 )   PT: 11.3 sec;   INR: 1.00          PTT - ( 27 Apr 2024 20:11 )  PTT:32.5 sec  Urinalysis Basic - ( 29 Apr 2024 05:30 )    Color: x / Appearance: x / SG: x / pH: x  Gluc: 120 mg/dL / Ketone: x  / Bili: x / Urobili: x   Blood: x / Protein: x / Nitrite: x   Leuk Esterase: x / RBC: x / WBC x   Sq Epi: x / Non Sq Epi: x / Bacteria: x        RADIOLOGY & ADDITIONAL TESTS:    < from: MR Head No Cont (04.28.24 @ 16:40) >    IMPRESSION: Acute/subacute infarction within the posterior limb of the   right internal capsule with associated cytotoxic edema.    Additional chronic infarcts and similar-appearing mild chronic   microvascular type changes, as discussed.    < end of copied text >

## 2024-04-29 NOTE — OCCUPATIONAL THERAPY INITIAL EVALUATION ADULT - ADDITIONAL COMMENTS
Pt r handed and does not wear glasses but has cataracts. Per wife, pt lives with wife in elevator accessible apartment with a few steps to enter. Pt has tub/shower combo with grab bars and shower chair. Pt independent at baseline.

## 2024-04-29 NOTE — OCCUPATIONAL THERAPY INITIAL EVALUATION ADULT - GENERAL OBSERVATIONS, REHAB EVAL
MRS 5. Pt cleared by ROHITH Moseley for OOB with OT. Pt received semisupine +hep lock +NAD +tele +wife at bedside +PT Selvin present +cantonese  utilized. Pt left as found with neurology team at bedside with all needs met and lines intact, RN aware.

## 2024-04-29 NOTE — PROGRESS NOTE ADULT - ASSESSMENT
88y M Cantonese speaking, R hand dominant, b/l  hearing impairment ( b/l hearing aids) with PMHx of DM, Thalassemia minor/JUSTYNA, HLD, hx stroke with right sided weakness in 2004 (has a cane and walker at home but pt refuses using assisted device, off ASA since 2020 for unclear reason), depression, glaucoma, BPH, presented with [facility] ( 4/27) with new left side arm and leg weakness starting 4/26 pm. Wife noticed that patient was leaning against the wall with the left side of his body to support himself to walk and was unable to hold himself upright when sitting, slumping over to the left. Patient felt that his left arm and leg were weak. Called PCP who recommened ED visit. CTH/CTP/CTA head and neck with no acute findings. Loaded with aspirin 300mg x1. Transferred to [facility] for further stroke w/u.    MRI brain -acute/sub-acute infarct in right IC ( posterior limb with Cytotoxic edema )  lethargic this am -NPO now, IVF for maintainence and to help with blood pressure.  SLP evaluated-     - 8LA telemetry monitoring   -  q4hr stroke neuro checks and vitals  - active plan per Stroke team -discussed.- plan for repeat MRI   - s/p aspirin load 300mg x1( 4/27)  - continue aspirin 81mg po daily ( 4/27-)  - start Clopidogrel 75mg po daily ( 4/28-)   - continue Atorvastatin 80mg po qHS  ( 4/27-) -not able to give.   - permissive hypertension,   - IVF   - - statin ( not able to give 4/28- pt not able to swallow )     - obtain TTE   - PT/OT eval    # Anemia hx Thal minor  - Ferritin 410, Tsat 33% adequate iron store  - Hgb 11.2%     #constipation  - c/w bowel regimen w/ senna+miralax    #DM  - A1C 6.3%   home meds: metformin 500mg TID, Januvia 50mg daily   - ISS, goal -180     # Hyponatremia  - trend Na 131    #DVT Prophylaxis  as per neurology.    Dispo: pending PT/OT, will benefit inpatient Rehab      Contact:  PMD Dr. Aron Soares   Wife: Farshad Pelayo # [phone]  poc dw stroke , wife

## 2024-04-29 NOTE — PROGRESS NOTE ADULT - SUBJECTIVE AND OBJECTIVE BOX
LARRY BURNS , [id number],  [facility] [id number]    Time of encounter : 9 am  seen with wife at bedside  lethargic, would open eyes from request from wife  not moving left side of body.      T(C): 36.4 (04-29-24 @ 17:56), Max: 37.3 (04-29-24 @ 04:24)  HR: 70 (04-29-24 @ 16:10) (62 - 74)  BP: 158/75 (04-29-24 @ 16:10) (128/65 - 164/77)  RR: 19 (04-29-24 @ 16:10) (16 - 19)  SpO2: 98% (04-29-24 @ 16:10) (95% - 98%)    aspirin enteric coated 81 milliGRAM(s) Oral daily  atorvastatin 80 milliGRAM(s) Oral at bedtime  clopidogrel Tablet 75 milliGRAM(s) Oral daily  dextrose 10% Bolus 125 milliLiter(s) IV Bolus once  dextrose 5%. 1000 milliLiter(s) IV Continuous <Continuous>  dextrose 5%. 1000 milliLiter(s) IV Continuous <Continuous>  dextrose 50% Injectable 25 Gram(s) IV Push once  dextrose 50% Injectable 12.5 Gram(s) IV Push once  dextrose Oral Gel 15 Gram(s) Oral once PRN  enoxaparin Injectable 40 milliGRAM(s) SubCutaneous every 24 hours  ferrous    sulfate 325 milliGRAM(s) Oral daily  folic acid 1 milliGRAM(s) Oral daily  glucagon  Injectable 1 milliGRAM(s) IntraMuscular once  insulin lispro (ADMELOG) corrective regimen sliding scale   SubCutaneous every 6 hours  magnesium oxide 400 milliGRAM(s) Oral daily  polyethylene glycol 3350 17 Gram(s) Oral daily  senna 2 Tablet(s) Oral at bedtime  sodium chloride 0.9%. 1000 milliLiter(s) IV Continuous <Continuous>  sodium chloride 0.9%. 1000 milliLiter(s) IV Continuous <Continuous>      Physical Exam :    General exam : lethargic, flattening of left naso-labial fold  RRR   moving good air bilaterally  bs present  left hemiplegic-   right -moving both upper and lower ext against gravity.      CBC Full  -  ( 29 Apr 2024 05:30 )  WBC Count : 6.67 K/uL  RBC Count : 4.76 M/uL  Hemoglobin : 11.1 g/dL  Hematocrit : 32.8 %  Platelet Count - Automated : 181 K/uL  Mean Cell Volume : 68.9 fl  Mean Cell Hemoglobin : 23.3 pg  Mean Cell Hemoglobin Concentration : 33.8 gm/dL  Auto Neutrophil # : x  Auto Lymphocyte # : x  Auto Monocyte # : x  Auto Eosinophil # : x  Auto Basophil # : x  Auto Neutrophil % : x  Auto Lymphocyte % : x  Auto Monocyte % : x  Auto Eosinophil % : x  Auto Basophil % : x        04-29    135  |  103  |  17  ----------------------------<  120<H>  3.5   |  19<L>  |  0.85    Ca    8.4      29 Apr 2024 05:30  Phos  2.2     04-29  Mg     2.1     04-29    TPro  7.6  /  Alb  3.6  /  TBili  0.3  /  DBili  x   /  AST  27  /  ALT  18  /  AlkPhos  64  04-27    Daily     Daily   CAPILLARY BLOOD GLUCOSE      POCT Blood Glucose.: 106 mg/dL (29 Apr 2024 17:20)      Culture - Urine (collected 27 Apr 2024 21:14)  Source: Clean Catch Clean Catch (Midstream)  Final Report (29 Apr 2024 07:03):    <10,000 CFU/mL Normal Urogenital Priya      Urinalysis Basic - ( 29 Apr 2024 05:30 )    Color: x / Appearance: x / SG: x / pH: x  Gluc: 120 mg/dL / Ketone: x  / Bili: x / Urobili: x   Blood: x / Protein: x / Nitrite: x   Leuk Esterase: x / RBC: x / WBC x   Sq Epi: x / Non Sq Epi: x / Bacteria: x        PT/INR - ( 27 Apr 2024 20:11 )   PT: 11.3 sec;   INR: 1.00          PTT - ( 27 Apr 2024 20:11 )  PTT:32.5 sec

## 2024-04-30 ENCOUNTER — RESULT REVIEW (OUTPATIENT)
Age: 89
End: 2024-04-30

## 2024-04-30 LAB
ANION GAP SERPL CALC-SCNC: 15 MMOL/L — SIGNIFICANT CHANGE UP (ref 5–17)
BUN SERPL-MCNC: 15 MG/DL — SIGNIFICANT CHANGE UP (ref 7–23)
CALCIUM SERPL-MCNC: 8.1 MG/DL — LOW (ref 8.4–10.5)
CHLORIDE SERPL-SCNC: 102 MMOL/L — SIGNIFICANT CHANGE UP (ref 96–108)
CO2 SERPL-SCNC: 16 MMOL/L — LOW (ref 22–31)
CREAT SERPL-MCNC: 0.72 MG/DL — SIGNIFICANT CHANGE UP (ref 0.5–1.3)
EGFR: 89 ML/MIN/1.73M2 — SIGNIFICANT CHANGE UP
GLUCOSE BLDC GLUCOMTR-MCNC: 113 MG/DL — HIGH (ref 70–99)
GLUCOSE BLDC GLUCOMTR-MCNC: 115 MG/DL — HIGH (ref 70–99)
GLUCOSE BLDC GLUCOMTR-MCNC: 119 MG/DL — HIGH (ref 70–99)
GLUCOSE BLDC GLUCOMTR-MCNC: 123 MG/DL — HIGH (ref 70–99)
GLUCOSE BLDC GLUCOMTR-MCNC: 130 MG/DL — HIGH (ref 70–99)
GLUCOSE SERPL-MCNC: 114 MG/DL — HIGH (ref 70–99)
HCT VFR BLD CALC: 35.2 % — LOW (ref 39–50)
HGB BLD-MCNC: 11.3 G/DL — LOW (ref 13–17)
MAGNESIUM SERPL-MCNC: 2 MG/DL — SIGNIFICANT CHANGE UP (ref 1.6–2.6)
MCHC RBC-ENTMCNC: 23.2 PG — LOW (ref 27–34)
MCHC RBC-ENTMCNC: 32.1 GM/DL — SIGNIFICANT CHANGE UP (ref 32–36)
MCV RBC AUTO: 72.3 FL — LOW (ref 80–100)
NRBC # BLD: 0 /100 WBCS — SIGNIFICANT CHANGE UP (ref 0–0)
PHOSPHATE SERPL-MCNC: 2.4 MG/DL — LOW (ref 2.5–4.5)
PLATELET # BLD AUTO: 165 K/UL — SIGNIFICANT CHANGE UP (ref 150–400)
POTASSIUM SERPL-MCNC: 3.6 MMOL/L — SIGNIFICANT CHANGE UP (ref 3.5–5.3)
POTASSIUM SERPL-SCNC: 3.6 MMOL/L — SIGNIFICANT CHANGE UP (ref 3.5–5.3)
RBC # BLD: 4.87 M/UL — SIGNIFICANT CHANGE UP (ref 4.2–5.8)
RBC # FLD: 16.1 % — HIGH (ref 10.3–14.5)
SODIUM SERPL-SCNC: 133 MMOL/L — LOW (ref 135–145)
WBC # BLD: 7.02 K/UL — SIGNIFICANT CHANGE UP (ref 3.8–10.5)
WBC # FLD AUTO: 7.02 K/UL — SIGNIFICANT CHANGE UP (ref 3.8–10.5)

## 2024-04-30 PROCEDURE — 99233 SBSQ HOSP IP/OBS HIGH 50: CPT

## 2024-04-30 PROCEDURE — 93970 EXTREMITY STUDY: CPT | Mod: 26

## 2024-04-30 PROCEDURE — 93306 TTE W/DOPPLER COMPLETE: CPT | Mod: 26

## 2024-04-30 RX ORDER — MIDODRINE HYDROCHLORIDE 2.5 MG/1
2.5 TABLET ORAL
Refills: 0 | Status: DISCONTINUED | OUTPATIENT
Start: 2024-04-30 | End: 2024-05-01

## 2024-04-30 RX ORDER — MIDODRINE HYDROCHLORIDE 2.5 MG/1
2.5 TABLET ORAL ONCE
Refills: 0 | Status: COMPLETED | OUTPATIENT
Start: 2024-04-30 | End: 2024-04-30

## 2024-04-30 RX ORDER — SODIUM CHLORIDE 9 MG/ML
1000 INJECTION INTRAMUSCULAR; INTRAVENOUS; SUBCUTANEOUS
Refills: 0 | Status: DISCONTINUED | OUTPATIENT
Start: 2024-04-30 | End: 2024-05-05

## 2024-04-30 RX ADMIN — MIDODRINE HYDROCHLORIDE 2.5 MILLIGRAM(S): 2.5 TABLET ORAL at 12:28

## 2024-04-30 RX ADMIN — Medication 325 MILLIGRAM(S): at 11:09

## 2024-04-30 RX ADMIN — ATORVASTATIN CALCIUM 80 MILLIGRAM(S): 80 TABLET, FILM COATED ORAL at 22:46

## 2024-04-30 RX ADMIN — POLYETHYLENE GLYCOL 3350 17 GRAM(S): 17 POWDER, FOR SOLUTION ORAL at 11:09

## 2024-04-30 RX ADMIN — MIDODRINE HYDROCHLORIDE 2.5 MILLIGRAM(S): 2.5 TABLET ORAL at 23:34

## 2024-04-30 RX ADMIN — SODIUM CHLORIDE 100 MILLILITER(S): 9 INJECTION INTRAMUSCULAR; INTRAVENOUS; SUBCUTANEOUS at 05:52

## 2024-04-30 RX ADMIN — Medication 81 MILLIGRAM(S): at 11:08

## 2024-04-30 RX ADMIN — CLOPIDOGREL BISULFATE 75 MILLIGRAM(S): 75 TABLET, FILM COATED ORAL at 11:09

## 2024-04-30 RX ADMIN — ENOXAPARIN SODIUM 40 MILLIGRAM(S): 100 INJECTION SUBCUTANEOUS at 05:38

## 2024-04-30 RX ADMIN — Medication 62.5 MILLIMOLE(S): at 11:09

## 2024-04-30 RX ADMIN — Medication 1 MILLIGRAM(S): at 11:09

## 2024-04-30 RX ADMIN — SODIUM CHLORIDE 100 MILLILITER(S): 9 INJECTION INTRAMUSCULAR; INTRAVENOUS; SUBCUTANEOUS at 17:16

## 2024-04-30 RX ADMIN — MAGNESIUM OXIDE 400 MG ORAL TABLET 400 MILLIGRAM(S): 241.3 TABLET ORAL at 11:09

## 2024-04-30 NOTE — DIETITIAN INITIAL EVALUATION ADULT - ADD RECOMMEND
1. NPO  **follow up with speech pathology team to align with their recommendations for safest mode of nutrition/feeding**  **As medically feasible and indicated, recommend considering enteral nutrition via feasible route: Glucerna 1.2 @ 73mL/hr x 18 hours (0044-3000). This provides: 1314mL total volume, 1577 calories, 79g protein, 1058mL free water. Meetin.4 kcal/kg actual body weight, 1.12g protein/kg based on actual body weight (70.3kg).  Defer fluids to team. Maintain aspiration precautions. Monitor signs/symptoms of intolerance; adjust enteral nutrition regimen as needed.  2. Monitor GI function, chemistry/labs, weights (weekly), skin integrity  3. Pain and bowel regimen per medical team  4. Align nutrition with goals of care at all times

## 2024-04-30 NOTE — DIETITIAN INITIAL EVALUATION ADULT - NUTRITIONGOAL OUTCOME1
Pt to consistently meet at least 75% of EEE via tolerated route that is consistent with goals of care during hospital stay  Pt's nutrition to be initiated within the next 24-48 hours

## 2024-04-30 NOTE — DIETITIAN INITIAL EVALUATION ADULT - COLLABORATION WITH OTHER PROVIDERS
RD has communicated with team and provided diet/enteral/parenteral nutrition recommendations, oral nutrition supplement/nourishment recommendations, pt's overall nutritional status.

## 2024-04-30 NOTE — PROGRESS NOTE ADULT - ASSESSMENT
88y Cantonese speaking Male with PMHx of JUSTYNA, stroke (2004, residual right sided weakness), HLD, glaucoma, DM, BPH, depression, b/l hearing loss [facility] left side arm and leg weakness starting 4/26 pm. Wife also noted truncal ataxia (slumping over to the left when sitting) and gait ataxia. CT imaging with no acute pathology. Transferred to [facility] for further stroke w/u.    Neuro  #hx of stroke 2004 with residual right sided weakness     #CVA workup  Patient previously on aspirin, but was stopped by doctor in 2020, unclear reason why.   - continue ASA 81mg daily and Plavix 75mg daily  - continue atorvastatin 80mg daily   - q4hr stroke neuro checks and vitals  - MRI brain on 4/28 with R internal capsule infarct  - pending MR NOVA  - Stroke Code HCT Results: negative for acute ischemia   - Stroke Code CTA Results: stenosis of proximal bilateral A2 segments and R M2  - Stroke education    Cards  #HTN  - permissive hypertension, Goal -160, keep HOB at 30 degrees or less  - s/p 500cc bolus, now on 100cc/hr  - obtain TTE   - holding of on WALLY due to inability to swallow and to avoid hypotension    #HLD  - high dose statin as above in CVA  - LDL results: 170    Pulm  - call provider if SPO2 < 94%    GI  #Nutrition/Fluids/Electrolytes   - replete K<4 and Mg <2  - Diet: NPO for SLP recs; pending NGT  - IVF: 100cc/hr    #constipation  - c/w bowel regimen w/ senna+miralax    Renal  -     Infectious Disease  - Stroke Code CXR results:     Endocrine  #DM  home meds: metformin 500mg TID, Januvia 50mg daily   - A1C results: 6.3%  - ISS    - TSH results: 2.080    Hematology  #JUSTYNA  - c/w home ferrous sulfate 325mg daily  - Ferritin: 410, total iron: 83, TIBC: 255, % saturation: 33    Psych  #depression  Per wife, prior to COVID, patient very active with friends/community, played sports. Since pandemic and worsening eyesight due to glaucoma and hearing, patient with no interest in daily activities. Was started on escitalopram 10mg daily, but stopped taking due to constipation  - consider restarting based on patient's mood and improvement of bowel movement after starting bowel regimen      DVT Prophylaxis  - lovenox sq for DVT prophylaxis   - SCDs for DVT prophylaxis       IDR Goals: Goals reviewed at interdisciplinary rounds with case management, social work, physical therapy, occupational therapy, and speech language pathology.   Please see specific therapy  notes for in depth goals.  Dispo: **********(Acute rehab - can tolerate 3 hours of therapy)     Discussed daily hospital plans and goals with patient and family at bedside. (Called and updated family.)    Discussed with Neurology Attending 88y Cantonese speaking Male with PMHx of JUSTYNA, stroke (2004, residual right sided weakness), HLD, glaucoma, DM, BPH, depression, b/l hearing loss [facility] side arm and leg weakness starting 4/26 pm. Wife also noted truncal ataxia (slumping over to the left when sitting) and gait ataxia. CT imaging with no acute pathology. Transferred [facility] for [facility] stroke w/u.    Neuro  #hx of stroke 2004 with residual right sided weakness     #CVA workup  Patient previously on aspirin, but was stopped by doctor in 2020, unclear reason why.   - continue ASA 81mg daily and Plavix 75mg daily  - continue atorvastatin 80mg daily   - q4hr stroke neuro checks and vitals  - MRI brain on 4/28 with R internal capsule infarct  - pending MR NOVA  - Stroke Code HCT Results: negative for acute ischemia   - Stroke Code CTA Results: stenosis of proximal bilateral A2 segments and R M2  - Stroke education    Cards  #HTN  - permissive hypertension, Goal -160, keep HOB at 30 degrees or less  - s/p 500cc bolus, now on 100cc/hr  - Begin midodrine 2.5mg BID as SBP below target goal this morning (130s)  - obtain WALLY to evaluate for cardiac etiologies of ischemic stroke (eg thromboembolism secondary to atrial fibrillation)  - holding of on WALLY due to inability to swallow and to avoid hypotension    #HLD  - high dose statin as above in CVA  - LDL results: 170    Pulm  - call provider if SPO2 < 94%    GI  #Nutrition/Fluids/Electrolytes   - replete K<4 and Mg <2  - Diet: Receiving nutrition through NG tube  - IVF: 100cc/hr    #constipation  - c/w bowel regimen w/ senna+miralax      Endocrine  #DM  home meds: metformin 500mg TID, Januvia 50mg daily   - A1C results: 6.3%  - ISS  - TSH results: 2.080    Hematology  #JUSTYNA  - c/w home ferrous sulfate 325mg daily  - Ferritin: 410, total iron: 83, TIBC: 255, % saturation: 33    Psych  #depression  Per wife, prior to COVID, patient very active with friends/community, played sports. Since pandemic and worsening eyesight due to glaucoma and hearing, patient with no interest in daily activities. Was started on escitalopram 10mg daily, but stopped taking due to constipation  - consider restarting based on patient's mood and improvement of bowel movement after starting bowel regimen      DVT Prophylaxis  - lovenox sq for DVT prophylaxis   - SCDs for DVT prophylaxis       IDR Goals: Goals reviewed at interdisciplinary rounds with case management, social work, physical therapy, occupational therapy, and speech language pathology.   Please see specific therapy  notes for in depth goals.  Dispo: **********(Acute rehab - can tolerate 3 hours of therapy)     Discussed daily hospital plans and goals with patient and family at bedside. (Called and updated family.)    Discussed with Neurology Attending 88y Cantonese speaking Male with PMHx of JUSTYNA, stroke (2004, residual right sided weakness), HLD, glaucoma, DM, BPH, depression, b/l hearing loss [facility] new left side arm and leg weakness starting 4/26. Wife also noted truncal ataxia (slumping over to the left when sitting) and gait ataxia. CT imaging with no acute pathology. Transferred to [facility] for further stroke w/u.    Neuro  #hx of stroke 2004 with residual right sided weakness     #CVA workup  Patient previously on aspirin, but was stopped by doctor in 2020, unclear reason why.   - continue ASA 81mg daily and Plavix 75mg daily  - continue atorvastatin 80mg daily   - q4hr stroke neuro checks and vitals  - MRI brain on 4/28 with R internal capsule infarct  - pending MR NOVA  - Stroke Code HCT Results: negative for acute ischemia   - Stroke Code CTA Results: stenosis of proximal bilateral A2 segments and R M2  - Stroke education    Cards  #HTN  - permissive hypertension, Goal -160, keep HOB at 30 degrees or less  - s/p 500cc bolus, now on 100cc/hr  - Begin midodrine 2.5mg BID as SBP below target goal this morning (130s)  - obtain TTE to evaluate for cardiac etiologies of ischemic stroke (eg thromboembolism secondary to atrial fibrillation)  - holding of on TTE due to inability to swallow and to avoid hypotension    #HLD  - high dose statin as above in CVA  - LDL results: 170    Pulm  - call provider if SPO2 < 94%    GI  #Nutrition/Fluids/Electrolytes   - replete K<4 and Mg <2  - Diet: Receiving nutrition through NG tube  - IVF: 100cc/hr    #constipation  - c/w bowel regimen w/ senna+miralax      Endocrine  #DM  home meds: metformin 500mg TID, Januvia 50mg daily   - A1C results: 6.3%  - ISS  - TSH results: 2.080    Hematology  #JUSTYNA  - c/w home ferrous sulfate 325mg daily  - Ferritin: 410, total iron: 83, TIBC: 255, % saturation: 33    Psych  #depression  Per wife, prior to COVID, patient very active with friends/community, played sports. Since pandemic and worsening eyesight due to glaucoma and hearing, patient with no interest in daily activities. Was started on escitalopram 10mg daily, but stopped taking due to constipation  - consider restarting based on patient's mood and improvement of bowel movement after starting bowel regimen      DVT Prophylaxis  - lovenox sq for DVT prophylaxis   - SCDs for DVT prophylaxis       IDR Goals: Goals reviewed at interdisciplinary rounds with case management, social work, physical therapy, occupational therapy, and speech language pathology.   Please see specific therapy  notes for in depth goals.  Dispo: **********(Acute rehab - can tolerate 3 hours of therapy)     Discussed daily hospital plans and goals with patient and family at bedside. (Called and updated family.)    Discussed with Neurology Attending 88y Cantonese speaking Male with PMHx of JUSTYNA, stroke (2004, residual right sided weakness), HLD, glaucoma, DM, BPH, depression, b/l hearing loss [facility] arm and leg weakness starting 4/26 pm. Wife also noted truncal ataxia (slumping over to the left when sitting) and gait ataxia. CT imaging with no acute pathology. Transferred to [facility] for further stroke w/u. Course c/b periods of waxing/waning left sided hemiparesis likely due to hypoperfusion, s/p IVF boluses, now started on midodrine 2.5mg BID.      Neuro   #hx of stroke 2004 with residual right sided weakness      #CVA workup   Patient previously on aspirin, but was stopped by doctor in 2020, unclear reason why.    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   - pending MR NOVA   - Stroke Code HCT Results: negative for acute ischemia    - Stroke Code CTA Results: stenosis of proximal bilateral A2 segments and R M2   - Stroke education     Cards   #HTN   - permissive hypertension, Goal -160, keep HOB at 30 degrees or less   - s/p 500cc bolus, now on 100cc/hr   - Start midodrine 2.5mg BID today as left LE weakness is waxing/waning despite stable BPs   - holding of on WALLY due to inability to swallow and to avoid hypotension   - TTE pending      #HLD   - high dose statin as above in CVA   - LDL results: 170     Pulm   - call provider if SPO2 < 94%     GI   #Nutrition/Fluids/Electrolytes    - replete K<4 and Mg <2   - Diet: NPO with NGT pending tube feeds (currently HOB flat)    - IVF: 100cc/hr    #dysphagia    - failed SLP evaluation   - pending re-eval once HOB flat is not required      #constipation   - c/w bowel regimen w/ senna+miralax     Endocrine   #DM   home meds: metformin 500mg TID, Januvia 50mg daily      - A1C results: 6.3%   - ISS     - TSH results: 2.080     Hematology   #JUSTYNA   - c/w home ferrous sulfate 325mg daily   - Ferritin: 410, total iron: 83, TIBC: 255, % saturation: 33     MSK   # leg  cramps   - LE dopplers obtained, negative for DVT bilaterally      Psych   #depression   Per wife, prior to COVID, patient very active with friends/community, played sports. Since pandemic and worsening eyesight due to glaucoma and hearing, patient with no interest in daily activities. Was started on escitalopram 10mg daily, but stopped taking due to constipation   - consider restarting based on patient's mood and improvement of bowel movement after starting bowel regimen       DVT Prophylaxis   - lovenox sq for DVT prophylaxis    - SCDs for DVT prophylaxis      IDR Goals: Goals reviewed at interdisciplinary rounds with case management, social work, physical therapy, occupational therapy, and speech language pathology.    Please see specific therapy  notes for in depth goals.     Dispo: pending discontinuation of HOB flat order, pending PT/OT     Discussed daily hospital plans and goals with patient and wife at bedside via      Discussed with Neurology Attending Dr. Gonzalez

## 2024-04-30 NOTE — DIETITIAN INITIAL EVALUATION ADULT - OTHER INFO
88y Cantonese speaking Male with past medical history of JUSTYNA, stroke (2004, residual right sided weakness), HLD, glaucoma, DM, BPH, depression, bilateral hearing loss presents with [facility] with new left side arm and leg weakness starting 4/26 in the evening. Wife noticed that patient was leaning against the wall with the left side of his body to support himself to walk and was unable to hold himself upright when sitting, slumping over to the left. Patient felt that his left arm and leg were weak. Called PCP who recommened ED visit. CTH/CTP/CTA head and neck with no acute findings. Transferred to [facility] for further stroke workup.    Pt seen in room for nutrition assessment. RD spoke to [employer] , [place], [place] [id number]. Pt's wife was present, provided additional information for subjective data. Pt noted with low appetite PTA and during hospital stay. As per diet recall PTA: pt noted with PO intakes of bread, rice, in small portions, eggs, oatmeal, milk, sweet potatoes, vegetables, meats, things easy to eat/swallow, as pt has had difficulties swallowing. Currently NPO status; per most recent speech pathologist note, "patient is known to our service from clinical swallow evaluation on 4/29/24. NPO recommended. MRI revealed R internal capsule infarct. Case discussion in multidisciplinary neuro round- TF held due to need for flat HOB at this time. Will defer clinical swallow follow up until clearance is received for HOB elevation." No cultural, Congregational, or ethnic food preferences noted. No known food allergies. Noted with some wt changes, wife reported some weight loss ~10 pounds over the past year, but it has stabilized per pt's wife. Dosing wt: 155 pounds, Ideal body weight: 154 pounds, pt is 101% of ideal body weight. Denies nausea, vomiting, diarrhea, noted with some constipation, rounded abdomen per nursing documentation, last BM on 4/27/24. No edema. Skin: intact, no pressure ulcers noted. Leonel: 14. No issues chewing or swallowing noted. Denies pain. Labs reviewed: elevated POCT glucose (106-132), serum Glucose (114), A1c (6.3), low sodium (133), calcium (8.1), Phosphorus (2.4), monitor and replete**; RD to continue to monitor trends. Nutritionally pertinent medications/supplements: IV fluids, insulin, ferrous sulfate, folic acid, MIRALAX. Observed no signs/symptoms of malnutrition, however pt was covered in blankets and had restraints on him, RD unable to complete full nutrition focused physical exam at the time with pt, RD to follow up. Currently, unable and not appropriate to diagnose pt with malnutrition at this time. Pt's wife amenable to education; RD provided education in regards to the importance of adequate macro and micronutrients, as well as hydration to support ADLs, maintain energy levels and overall functional/nutritional status. Potential plan for feeding after speech re-evaluates and "position- lay flat head in bed" order is re-evaluated. Pt's wife appeared overall receptive and verbalized understanding. No additional nutrition-related concerns. RD spoke to the medical team about recommendations. Will continue to follow. Additional nutrition recommendations below to follow.

## 2024-04-30 NOTE — PROGRESS NOTE ADULT - ASSESSMENT
88y M Cantonese speaking, R hand dominant, b/l  hearing impairment ( b/l hearing aids) with PMHx of DM, Thalassemia minor/JUSTYNA, HLD, hx stroke with right sided weakness in 2004 (has a cane and walker at home but pt refuses using assisted device, off ASA since 2020 for unclear reason), depression, glaucoma, BPH, [facility] ( [facility] with new left side arm and leg weakness starting 4/26 pm. Wife noticed that patient was leaning against the wall with the left side of his body to support himself to walk and was unable to hold himself upright when sitting, slumping over to the left. Patient felt that his left arm and leg were weak. Called PCP who recommened ED visit. CTH/CTP/CTA head and neck with no acute findings. Loaded with aspirin 300mg x1. Transferred to [facility] for further stroke w/u.    MRI brain -acute/sub-acute infarct in right IC ( posterior limb with Cytotoxic edema )  - 8LA telemetry monitoring   -  q4hr stroke neuro checks and vitals    - active plan per Stroke team -discussed.- plan for repeat MRI   - ivf for hydration  - on midodrine to help with blood pressure for perfusion.   - s/p aspirin load 300mg x1( 4/27)  - continue aspirin 81mg po daily ( 4/27-)  - start Clopidogrel 75mg po daily ( 4/28-)   - continue Atorvastatin 80mg po qHS  ( 4/27-) via NGT  - permissive hypertension,   - IVF   - - statin ( not able to give 4/28- pt not able to swallow )     - obtain TTE   - PT/OT eval    # Anemia hx Thal minor  - Ferritin 410, Tsat 33% adequate iron store  - Hgb 11.2%     #constipation  - c/w bowel regimen w/ senna+miralax    #DM  - A1C 6.3%   home meds: metformin 500mg TID, Januvia 50mg daily - held for now.   - ISS, goal -180     # Hyponatremia  - trend Na 131--> 135--> 133.     #DVT Prophylaxis  as per neurology.    Dispo: fu PT/OT, will benefit inpatient Rehab      Contact:  PMD Dr. Aron Soares   Wife: Jennifermikki Pierce # [phone]  poc dw stroke , wife    88y M Cantonese speaking, R hand dominant, b/l  hearing impairment ( b/l hearing aids) with PMHx of DM, Thalassemia minor/JUSTYNA, HLD, hx stroke with right sided weakness in 2004 (has a cane and walker at home but pt refuses using assisted device, off ASA since 2020 for unclear reason), depression, glaucoma, BPH, [facility] with new left side arm and leg weakness starting 4/26 pm. Wife noticed that patient was leaning against the wall with the left side of his body to support himself to walk and was unable to hold himself upright when sitting, slumping over to the left. Patient felt that his left arm and leg were weak. Called PCP who recommened ED visit. CTH/CTP/CTA head and neck with no acute findings. Loaded with aspirin 300mg x1. Transferred to [facility] for further stroke w/u.    MRI brain -acute/sub-acute infarct in right IC ( posterior limb with Cytotoxic edema )  - 8LA telemetry monitoring   -  q4hr stroke neuro checks and vitals    - active plan per Stroke team -discussed.- plan for repeat MRI   - ivf for hydration  - on midodrine to help with blood pressure for perfusion.   - s/p aspirin load 300mg x1( 4/27)  - continue aspirin 81mg po daily ( 4/27-)  - start Clopidogrel 75mg po daily ( 4/28-)   - continue Atorvastatin 80mg po qHS  ( 4/27-) via NGT  - permissive hypertension,   - IVF   - - statin ( not able to give 4/28- pt not able to swallow )     - obtain TTE   - PT/OT eval    # Anemia hx Thal minor  - Ferritin 410, Tsat 33% adequate iron store  - Hgb 11.2%     #constipation  - c/w bowel regimen w/ senna+miralax    #DM  - A1C 6.3%   home meds: metformin 500mg TID, Januvia 50mg daily - held for now.   - ISS, goal -180     # Hyponatremia  - trend Na 131--> 135--> 133.   - Hypophos - replaced.     #DVT Prophylaxis  as per neurology.    Dispo: fu PT/OT, will benefit inpatient Rehab      Contact:  PMD Dr. Aron Soares   Wife: Farshad Pierce # [phone]  poc dw stroke , wife

## 2024-04-30 NOTE — PROGRESS NOTE ADULT - SUBJECTIVE AND OBJECTIVE BOX
LARRY BURNS , [id number],  [facility] [id number]    Time of encounter :  4 pm  wife at bedside   he is more awake today, answering question ( more active when BP is high)  hard of hearing ,   can read Mandarin.  reported left leg cramp and pain last night  now moving left leg -intermittent jerky movement noted.  not able to move left arm.   now with NGT in place  - flattening of left naso-labial fold.  - RRR, moving good air.  - left upper ext 0/5, left lower ext 2-3/5   - right 5/5  - dysarthria present.     T(C): 37.1 (04-30-24 @ 13:00), Max: 37.1 (04-30-24 @ 13:00)  HR: 70 (04-30-24 @ 15:33) (58 - 72)  BP: 166/97 (04-30-24 @ 15:33) (132/69 - 166/97)  RR: 18 (04-30-24 @ 15:33) (16 - 18)  SpO2: 97% (04-30-24 @ 15:33) (94% - 98%)    aspirin  chewable 81 milliGRAM(s) Oral daily  atorvastatin 80 milliGRAM(s) Oral at bedtime  clopidogrel Tablet 75 milliGRAM(s) Enteral Tube daily  dextrose 10% Bolus 125 milliLiter(s) IV Bolus once  dextrose 5%. 1000 milliLiter(s) IV Continuous <Continuous>  dextrose 5%. 1000 milliLiter(s) IV Continuous <Continuous>  dextrose 50% Injectable 25 Gram(s) IV Push once  dextrose 50% Injectable 12.5 Gram(s) IV Push once  dextrose Oral Gel 15 Gram(s) Oral once PRN  enoxaparin Injectable 40 milliGRAM(s) SubCutaneous every 24 hours  ferrous    sulfate 325 milliGRAM(s) Oral daily  folic acid 1 milliGRAM(s) Oral daily  glucagon  Injectable 1 milliGRAM(s) IntraMuscular once  insulin lispro (ADMELOG) corrective regimen sliding scale   SubCutaneous every 6 hours  magnesium oxide 400 milliGRAM(s) Oral daily  midodrine. 2.5 milliGRAM(s) Oral <User Schedule>  polyethylene glycol 3350 17 Gram(s) Oral daily  sodium chloride 0.9%. 1000 milliLiter(s) IV Continuous <Continuous>        CBC Full  -  ( 30 Apr 2024 05:30 )  WBC Count : 7.02 K/uL  RBC Count : 4.87 M/uL  Hemoglobin : 11.3 g/dL  Hematocrit : 35.2 %  Platelet Count - Automated : 165 K/uL  Mean Cell Volume : 72.3 fl  Mean Cell Hemoglobin : 23.2 pg  Mean Cell Hemoglobin Concentration : 32.1 gm/dL  Auto Neutrophil # : x  Auto Lymphocyte # : x  Auto Monocyte # : x  Auto Eosinophil # : x  Auto Basophil # : x  Auto Neutrophil % : x  Auto Lymphocyte % : x  Auto Monocyte % : x  Auto Eosinophil % : x  Auto Basophil % : x        04-30    133<L>  |  102  |  15  ----------------------------<  114<H>  3.6   |  16<L>  |  0.72    Ca    8.1<L>      30 Apr 2024 05:30  Phos  2.4     04-30  Mg     2.0     04-30      Daily     Daily   CAPILLARY BLOOD GLUCOSE      POCT Blood Glucose.: 119 mg/dL (30 Apr 2024 17:13)      Culture - Urine (collected 27 Apr 2024 21:14)  Source: Clean Catch Clean Catch (Midstream)  Final Report (29 Apr 2024 07:03):    <10,000 CFU/mL Normal Urogenital Priya      Urinalysis Basic - ( 30 Apr 2024 05:30 )    Color: x / Appearance: x / SG: x / pH: x  Gluc: 114 mg/dL / Ketone: x  / Bili: x / Urobili: x   Blood: x / Protein: x / Nitrite: x   Leuk Esterase: x / RBC: x / WBC x   Sq Epi: x / Non Sq Epi: x / Bacteria: x

## 2024-04-30 NOTE — PROVIDER CONTACT NOTE (CHANGE IN STATUS NOTIFICATION) - SITUATION
08:21 neuro exam pt was able to move LLE. 11:13 neuro exam pt was unable to move LLE. Neuro exam remains unchanged otherwise. /75, wife states team told her new SBP goal was >160

## 2024-04-30 NOTE — PROGRESS NOTE ADULT - SUBJECTIVE AND OBJECTIVE BOX
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   Best contact # [phone]  Thank you Neurology Stroke Progress Note    INTERVAL HPI/OVERNIGHT EVENTS:  Patient seen and examined.  number ______ used.    MEDICATIONS  (STANDING):  aspirin  chewable 81 milliGRAM(s) Oral daily  atorvastatin 80 milliGRAM(s) Oral at bedtime  clopidogrel Tablet 75 milliGRAM(s) Enteral Tube daily  dextrose 10% Bolus 125 milliLiter(s) IV Bolus once  dextrose 5%. 1000 milliLiter(s) (50 mL/Hr) IV Continuous <Continuous>  dextrose 5%. 1000 milliLiter(s) (100 mL/Hr) IV Continuous <Continuous>  dextrose 50% Injectable 25 Gram(s) IV Push once  dextrose 50% Injectable 12.5 Gram(s) IV Push once  enoxaparin Injectable 40 milliGRAM(s) SubCutaneous every 24 hours  ferrous    sulfate 325 milliGRAM(s) Oral daily  folic acid 1 milliGRAM(s) Oral daily  glucagon  Injectable 1 milliGRAM(s) IntraMuscular once  insulin lispro (ADMELOG) corrective regimen sliding scale   SubCutaneous every 6 hours  magnesium oxide 400 milliGRAM(s) Oral daily  midodrine. 2.5 milliGRAM(s) Oral <User Schedule>  polyethylene glycol 3350 17 Gram(s) Oral daily  sodium chloride 0.9%. 1000 milliLiter(s) (50 mL/Hr) IV Continuous <Continuous>  sodium chloride 0.9%. 1000 milliLiter(s) (100 mL/Hr) IV Continuous <Continuous>    MEDICATIONS  (PRN):  dextrose Oral Gel 15 Gram(s) Oral once PRN Blood Glucose LESS THAN 70 milliGRAM(s)/deciliter      Allergies    No Known Allergies    Intolerances        Vital Signs Last 24 Hrs  T(C): 36.4 (30 Apr 2024 09:00), Max: 36.8 (29 Apr 2024 13:59)  T(F): 97.5 (30 Apr 2024 09:00), Max: 98.3 (29 Apr 2024 13:59)  HR: 72 (30 Apr 2024 11:13) (58 - 72)  BP: 145/75 (30 Apr 2024 11:13) (132/69 - 158/75)  BP(mean): 103 (30 Apr 2024 11:13) (92 - 108)  RR: 16 (30 Apr 2024 11:13) (16 - 19)  SpO2: 97% (30 Apr 2024 11:13) (94% - 98%)    Parameters below as of 30 Apr 2024 11:13  Patient On (Oxygen Delivery Method): room air        Physical exam:  General: No acute distress, awake and alert  Eyes: Anicteric sclerae, moist conjunctivae, see below for CNs  Neck: trachea midline, FROM, supple, no thyromegaly or lymphadenopathy  Cardiovascular: Regular rate and rhythm, no murmurs, rubs, or gallops. No carotid bruits.   Pulmonary: Anterior breath sounds clear bilaterally, no crackles or wheezing. No use of accessory muscles  GI: Abdomen soft, non-distended, non-tender  Extremities: Radial and DP pulses +2, no edema    Neurologic:  -Mental status: Awake, alert, oriented to person, place, and time. Speech is fluent with intact naming, repetition, and comprehension, no dysarthria. Recent and remote memory intact. Follows commands. Attention/concentration intact. Fund of knowledge appropriate.  -Cranial nerves:   II: Visual fields are full to confrontation.  III, IV, VI: Extraocular movements are intact without nystagmus. Pupils equally round and reactive to light  V:  Facial sensation V1-V3 equal and intact   VII: Face is symmetric with normal eye closure and smile  VIII: Hearing is bilaterally intact to finger rub  IX, X: Uvula is midline and soft palate rises symmetrically  XI: Head turning and shoulder shrug are intact.  XII: Tongue protrudes midline  Motor: Normal bulk and tone. No pronator drift. Strength bilateral upper extremity 5/5, bilateral lower extremities 5/5.  Rapid alternating movements intact and symmetric  Sensation: Intact to light touch bilaterally. No neglect or extinction on double simultaneous testing.  Coordination: No dysmetria on finger-to-nose and heel-to-shin bilaterally  Reflexes: Downgoing toes bilaterally   Gait: Narrow gait and steady    LABS:                        11.3   7.02  )-----------( 165      ( 30 Apr 2024 05:30 )             35.2     04-30    133<L>  |  102  |  15  ----------------------------<  114<H>  3.6   |  16<L>  |  0.72    Ca    8.1<L>      30 Apr 2024 05:30  Phos  2.4     04-30  Mg     2.0     04-30        Urinalysis Basic - ( 30 Apr 2024 05:30 )    Color: x / Appearance: x / SG: x / pH: x  Gluc: 114 mg/dL / Ketone: x  / Bili: x / Urobili: x   Blood: x / Protein: x / Nitrite: x   Leuk Esterase: x / RBC: x / WBC x   Sq Epi: x / Non Sq Epi: x / Bacteria: x        RADIOLOGY & ADDITIONAL TESTS:       Neurology Stroke Progress Note    INTERVAL HPI/OVERNIGHT EVENTS:  Patient seen and examined.  number ______ used. He is awake and conversational.      MEDICATIONS  (STANDING):  aspirin  chewable 81 milliGRAM(s) Oral daily  atorvastatin 80 milliGRAM(s) Oral at bedtime  clopidogrel Tablet 75 milliGRAM(s) Enteral Tube daily  dextrose 10% Bolus 125 milliLiter(s) IV Bolus once  dextrose 5%. 1000 milliLiter(s) (50 mL/Hr) IV Continuous <Continuous>  dextrose 5%. 1000 milliLiter(s) (100 mL/Hr) IV Continuous <Continuous>  dextrose 50% Injectable 25 Gram(s) IV Push once  dextrose 50% Injectable 12.5 Gram(s) IV Push once  enoxaparin Injectable 40 milliGRAM(s) SubCutaneous every 24 hours  ferrous    sulfate 325 milliGRAM(s) Oral daily  folic acid 1 milliGRAM(s) Oral daily  glucagon  Injectable 1 milliGRAM(s) IntraMuscular once  insulin lispro (ADMELOG) corrective regimen sliding scale   SubCutaneous every 6 hours  magnesium oxide 400 milliGRAM(s) Oral daily  midodrine. 2.5 milliGRAM(s) Oral <User Schedule>  polyethylene glycol 3350 17 Gram(s) Oral daily  sodium chloride 0.9%. 1000 milliLiter(s) (50 mL/Hr) IV Continuous <Continuous>  sodium chloride 0.9%. 1000 milliLiter(s) (100 mL/Hr) IV Continuous <Continuous>    MEDICATIONS  (PRN):  dextrose Oral Gel 15 Gram(s) Oral once PRN Blood Glucose LESS THAN 70 milliGRAM(s)/deciliter      Allergies    No Known Allergies    Intolerances        Vital Signs Last 24 Hrs  T(C): 36.4 (30 Apr 2024 09:00), Max: 36.8 (29 Apr 2024 13:59)  T(F): 97.5 (30 Apr 2024 09:00), Max: 98.3 (29 Apr 2024 13:59)  HR: 72 (30 Apr 2024 11:13) (58 - 72)  BP: 145/75 (30 Apr 2024 11:13) (132/69 - 158/75)  BP(mean): 103 (30 Apr 2024 11:13) (92 - 108)  RR: 16 (30 Apr 2024 11:13) (16 - 19)  SpO2: 97% (30 Apr 2024 11:13) (94% - 98%)    Parameters below as of 30 Apr 2024 11:13  Patient On (Oxygen Delivery Method): room air        Physical exam:  General: No acute distress, awake and alert  Eyes: Anicteric sclerae, moist conjunctivae, see below for CNs  Neck: trachea midline, FROM, supple, no thyromegaly or lymphadenopathy  Cardiovascular: Regular rate and rhythm, no murmurs, rubs, or gallops. No carotid bruits.   Pulmonary: Anterior breath sounds clear bilaterally, no crackles or wheezing. No use of accessory muscles  GI: Abdomen soft, non-distended, non-tender  Extremities: Radial and DP pulses +2, no edema    Neurologic:  -Mental status: Awake, alert, oriented to person, place, and time. Speech is fluent with intact naming, repetition, and comprehension, no dysarthria. Recent and remote memory intact. Follows commands. Attention/concentration intact. Fund of knowledge appropriate.  -Cranial nerves:   II: Visual fields are full to confrontation.  III, IV, VI: Extraocular movements are intact without nystagmus. Pupils equally round and reactive to light  V:  Facial sensation V1-V3 equal and intact   VII: Face is symmetric with normal eye closure and smile  VIII: Hearing is bilaterally intact to finger rub  IX, X: Uvula is midline and soft palate rises symmetrically  XI: Head turning and shoulder shrug are intact.  XII: Tongue protrudes midline  Motor: Normal bulk and tone. No pronator drift. Strength bilateral upper extremity 5/5, bilateral lower extremities 5/5.  Rapid alternating movements intact and symmetric  Sensation: Intact to light touch bilaterally. No neglect or extinction on double simultaneous testing.  Coordination: No dysmetria on finger-to-nose and heel-to-shin bilaterally  Reflexes: Downgoing toes bilaterally   Gait: Narrow gait and steady    LABS:                        11.3   7.02  )-----------( 165      ( 30 Apr 2024 05:30 )             35.2     04-30    133<L>  |  102  |  15  ----------------------------<  114<H>  3.6   |  16<L>  |  0.72    Ca    8.1<L>      30 Apr 2024 05:30  Phos  2.4     04-30  Mg     2.0     04-30        Urinalysis Basic - ( 30 Apr 2024 05:30 )    Color: x / Appearance: x / SG: x / pH: x  Gluc: 114 mg/dL / Ketone: x  / Bili: x / Urobili: x   Blood: x / Protein: x / Nitrite: x   Leuk Esterase: x / RBC: x / WBC x   Sq Epi: x / Non Sq Epi: x / Bacteria: x        RADIOLOGY & ADDITIONAL TESTS:       Neurology Stroke Progress Note    INTERVAL HPI/OVERNIGHT EVENTS:  NGT advanced overnight. Patient seen and examined. Accredible  number [id number] used. He is awake and conversational. Following commands. All questions answered.     MEDICATIONS  (STANDING):  aspirin  chewable 81 milliGRAM(s) Oral daily  atorvastatin 80 milliGRAM(s) Oral at bedtime  clopidogrel Tablet 75 milliGRAM(s) Enteral Tube daily  dextrose 10% Bolus 125 milliLiter(s) IV Bolus once  dextrose 5%. 1000 milliLiter(s) (50 mL/Hr) IV Continuous <Continuous>  dextrose 5%. 1000 milliLiter(s) (100 mL/Hr) IV Continuous <Continuous>  dextrose 50% Injectable 25 Gram(s) IV Push once  dextrose 50% Injectable 12.5 Gram(s) IV Push once  enoxaparin Injectable 40 milliGRAM(s) SubCutaneous every 24 hours  ferrous    sulfate 325 milliGRAM(s) Oral daily  folic acid 1 milliGRAM(s) Oral daily  glucagon  Injectable 1 milliGRAM(s) IntraMuscular once  insulin lispro (ADMELOG) corrective regimen sliding scale   SubCutaneous every 6 hours  magnesium oxide 400 milliGRAM(s) Oral daily  midodrine. 2.5 milliGRAM(s) Oral <User Schedule>  polyethylene glycol 3350 17 Gram(s) Oral daily  sodium chloride 0.9%. 1000 milliLiter(s) (50 mL/Hr) IV Continuous <Continuous>  sodium chloride 0.9%. 1000 milliLiter(s) (100 mL/Hr) IV Continuous <Continuous>    MEDICATIONS  (PRN):  dextrose Oral Gel 15 Gram(s) Oral once PRN Blood Glucose LESS THAN 70 milliGRAM(s)/deciliter      Allergies    No Known Allergies    Intolerances        Vital Signs Last 24 Hrs  T(C): 36.4 (30 Apr 2024 09:00), Max: 36.8 (29 Apr 2024 13:59)  T(F): 97.5 (30 Apr 2024 09:00), Max: 98.3 (29 Apr 2024 13:59)  HR: 72 (30 Apr 2024 11:13) (58 - 72)  BP: 145/75 (30 Apr 2024 11:13) (132/69 - 158/75)  BP(mean): 103 (30 Apr 2024 11:13) (92 - 108)  RR: 16 (30 Apr 2024 11:13) (16 - 19)  SpO2: 97% (30 Apr 2024 11:13) (94% - 98%)    Parameters below as of 30 Apr 2024 11:13  Patient On (Oxygen Delivery Method): room air    Physical exam:  General: Lethargic  Eyes: Anicteric sclerae  Neck: trachea midline  Cardiovascular: Regular rate and rhythm  Pulmonary:No use of accessory muscles  GI: Abdomen soft, non-distended  Extremities: no edema    Neurological:   -Mental status: Lethargic, states he is in the hospital, minimal verbal output. Pt able to follow simple commands.   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 Pt noted to lift left LE antigravity in AM after repeated prompting, during rounds did not lift (pressures stable)   Sensation: intact on right side, minimal withdrawal when noxious stimuli applied to left upper and lower extremities  Coordination: Cannot follow command    LABS:                        11.3   7.02  )-----------( 165      ( 30 Apr 2024 05:30 )             35.2     04-30    133<L>  |  102  |  15  ----------------------------<  114<H>  3.6   |  16<L>  |  0.72    Ca    8.1<L>      30 Apr 2024 05:30  Phos  2.4     04-30  Mg     2.0     04-30    Urinalysis Basic - ( 30 Apr 2024 05:30 )    Color: x / Appearance: x / SG: x / pH: x  Gluc: 114 mg/dL / Ketone: x  / Bili: x / Urobili: x   Blood: x / Protein: x / Nitrite: x   Leuk Esterase: x / RBC: x / WBC x   Sq Epi: x / Non Sq Epi: x / Bacteria: x      RADIOLOGY & ADDITIONAL TESTS:    < from: US Duplex Venous Lower Ext Complete, Bilateral (04.30.24 @ 12:13) >  IMPRESSION:  No evidence of deep venous thrombosis in either lower extremity.    < end of copied text >

## 2024-04-30 NOTE — DIETITIAN INITIAL EVALUATION ADULT - PERSON TAUGHT/METHOD
Pt's wife amenable to education; RD provided education in regards to the importance of adequate macro and micronutrients, as well as hydration to support ADLs, maintain energy levels and overall functional/nutritional status. Potential plan for feeding after speech re-evaluates and "position- lay flat head in bed" order is re-evaluated. Pt's wife appeared overall receptive and verbalized understanding. No additional nutrition-related concerns. RD spoke to the medical team about recommendations./verbal instruction/patient instructed/spouse instructed

## 2024-04-30 NOTE — DIETITIAN INITIAL EVALUATION ADULT - PERTINENT LABORATORY DATA
04-30    133<L>  |  102  |  15  ----------------------------<  114<H>  3.6   |  16<L>  |  0.72    Ca    8.1<L>      30 Apr 2024 05:30  Phos  2.4     04-30  Mg     2.0     04-30    POCT Blood Glucose.: 123 mg/dL (04-30-24 @ 11:43)  A1C with Estimated Average Glucose Result: 6.3 % (04-28-24 @ 05:30)

## 2024-04-30 NOTE — DIETITIAN INITIAL EVALUATION ADULT - OTHER CALCULATIONS
Pt within % ideal body weight, thus actual body weight used for all calculations. Needs adjusted for advanced age, clinical status/condition.

## 2024-05-01 LAB
ANION GAP SERPL CALC-SCNC: 14 MMOL/L — SIGNIFICANT CHANGE UP (ref 5–17)
BUN SERPL-MCNC: 11 MG/DL — SIGNIFICANT CHANGE UP (ref 7–23)
CALCIUM SERPL-MCNC: 8.2 MG/DL — LOW (ref 8.4–10.5)
CHLORIDE SERPL-SCNC: 102 MMOL/L — SIGNIFICANT CHANGE UP (ref 96–108)
CO2 SERPL-SCNC: 19 MMOL/L — LOW (ref 22–31)
CREAT SERPL-MCNC: 0.73 MG/DL — SIGNIFICANT CHANGE UP (ref 0.5–1.3)
EGFR: 89 ML/MIN/1.73M2 — SIGNIFICANT CHANGE UP
GLUCOSE BLDC GLUCOMTR-MCNC: 102 MG/DL — HIGH (ref 70–99)
GLUCOSE BLDC GLUCOMTR-MCNC: 109 MG/DL — HIGH (ref 70–99)
GLUCOSE BLDC GLUCOMTR-MCNC: 131 MG/DL — HIGH (ref 70–99)
GLUCOSE BLDC GLUCOMTR-MCNC: 95 MG/DL — SIGNIFICANT CHANGE UP (ref 70–99)
GLUCOSE SERPL-MCNC: 109 MG/DL — HIGH (ref 70–99)
HCT VFR BLD CALC: 34.3 % — LOW (ref 39–50)
HGB BLD-MCNC: 11.4 G/DL — LOW (ref 13–17)
MAGNESIUM SERPL-MCNC: 2.2 MG/DL — SIGNIFICANT CHANGE UP (ref 1.6–2.6)
MCHC RBC-ENTMCNC: 23.3 PG — LOW (ref 27–34)
MCHC RBC-ENTMCNC: 33.2 GM/DL — SIGNIFICANT CHANGE UP (ref 32–36)
MCV RBC AUTO: 70.1 FL — LOW (ref 80–100)
NRBC # BLD: 0 /100 WBCS — SIGNIFICANT CHANGE UP (ref 0–0)
PHOSPHATE SERPL-MCNC: 2.2 MG/DL — LOW (ref 2.5–4.5)
PLATELET # BLD AUTO: 160 K/UL — SIGNIFICANT CHANGE UP (ref 150–400)
POTASSIUM SERPL-MCNC: 3.5 MMOL/L — SIGNIFICANT CHANGE UP (ref 3.5–5.3)
POTASSIUM SERPL-SCNC: 3.5 MMOL/L — SIGNIFICANT CHANGE UP (ref 3.5–5.3)
RBC # BLD: 4.89 M/UL — SIGNIFICANT CHANGE UP (ref 4.2–5.8)
RBC # FLD: 16.4 % — HIGH (ref 10.3–14.5)
SODIUM SERPL-SCNC: 135 MMOL/L — SIGNIFICANT CHANGE UP (ref 135–145)
WBC # BLD: 6.34 K/UL — SIGNIFICANT CHANGE UP (ref 3.8–10.5)
WBC # FLD AUTO: 6.34 K/UL — SIGNIFICANT CHANGE UP (ref 3.8–10.5)

## 2024-05-01 PROCEDURE — 99233 SBSQ HOSP IP/OBS HIGH 50: CPT

## 2024-05-01 RX ORDER — MIDODRINE HYDROCHLORIDE 2.5 MG/1
5 TABLET ORAL
Refills: 0 | Status: DISCONTINUED | OUTPATIENT
Start: 2024-05-01 | End: 2024-05-07

## 2024-05-01 RX ORDER — POTASSIUM PHOSPHATE, MONOBASIC POTASSIUM PHOSPHATE, DIBASIC 236; 224 MG/ML; MG/ML
30 INJECTION, SOLUTION INTRAVENOUS ONCE
Refills: 0 | Status: COMPLETED | OUTPATIENT
Start: 2024-05-01 | End: 2024-05-01

## 2024-05-01 RX ADMIN — POTASSIUM PHOSPHATE, MONOBASIC POTASSIUM PHOSPHATE, DIBASIC 83.33 MILLIMOLE(S): 236; 224 INJECTION, SOLUTION INTRAVENOUS at 17:12

## 2024-05-01 RX ADMIN — Medication 325 MILLIGRAM(S): at 12:43

## 2024-05-01 RX ADMIN — MIDODRINE HYDROCHLORIDE 2.5 MILLIGRAM(S): 2.5 TABLET ORAL at 12:43

## 2024-05-01 RX ADMIN — ENOXAPARIN SODIUM 40 MILLIGRAM(S): 100 INJECTION SUBCUTANEOUS at 06:35

## 2024-05-01 RX ADMIN — Medication 1 MILLIGRAM(S): at 12:43

## 2024-05-01 RX ADMIN — Medication 81 MILLIGRAM(S): at 12:43

## 2024-05-01 RX ADMIN — POLYETHYLENE GLYCOL 3350 17 GRAM(S): 17 POWDER, FOR SOLUTION ORAL at 12:44

## 2024-05-01 RX ADMIN — MAGNESIUM OXIDE 400 MG ORAL TABLET 400 MILLIGRAM(S): 241.3 TABLET ORAL at 12:43

## 2024-05-01 RX ADMIN — SODIUM CHLORIDE 100 MILLILITER(S): 9 INJECTION INTRAMUSCULAR; INTRAVENOUS; SUBCUTANEOUS at 17:11

## 2024-05-01 RX ADMIN — ATORVASTATIN CALCIUM 80 MILLIGRAM(S): 80 TABLET, FILM COATED ORAL at 23:09

## 2024-05-01 RX ADMIN — CLOPIDOGREL BISULFATE 75 MILLIGRAM(S): 75 TABLET, FILM COATED ORAL at 12:44

## 2024-05-01 RX ADMIN — MIDODRINE HYDROCHLORIDE 5 MILLIGRAM(S): 2.5 TABLET ORAL at 23:22

## 2024-05-01 NOTE — PROGRESS NOTE ADULT - ASSESSMENT
88y Cantonese speaking Male with PMHx of JUSTYNA, stroke (2004, residual right sided weakness), HLD, glaucoma, DM, BPH, depression, b/l hearing loss [facility] new left side arm and leg weakness starting 4/26 pm. Wife also noted truncal ataxia (slumping over to the left when sitting) and gait ataxia. CT imaging with no acute pathology. Transferred to [facility] for further stroke w/u. Course c/b periods of waxing/waning left sided hemiparesis likely due to hypoperfusion, s/p IVF boluses, now started on midodrine 2.5mg BID. Pending MRA NOVA    Neuro   #hx of stroke 2004 with residual right sided weakness    #CVA workup   Patient previously on aspirin, but was stopped by doctor in 2020, unclear reason why.    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   - pending MR NOVA   - Stroke Code HCT Results: negative for acute ischemia    - Stroke Code CTA Results: stenosis of proximal bilateral A2 segments and R M2   - Stroke education     Cards   #HTN   - permissive hypertension, Goal -160, okay to sit up in bed today 5/1  - s/p 500cc bolus, now on 100cc/hr   - Start midodrine 2.5mg BID on 4/30 as left LE weakness is waxing/waning despite stable BPs   - holding of on WALLY due to inability to swallow and to avoid hypotension   - wife refusing WALLY/ILR until she speaks to son in [facility] tomorrow 5/2  - TTE: EF 56%     #HLD   - high dose statin as above in CVA   - LDL results: 170     Pulm   - call provider if SPO2 < 94%     GI   #Nutrition/Fluids/Electrolytes    - replete K<4 and Mg <2   - Diet: NPO with NGT pending tube feeds, okay to sit up today 5/1, nutrition consult placed for tube feeds  - IVF: 100cc/hr    #dysphagia    - failed SLP evaluation   - pending repeat swallow eval 5/1    #constipation   - c/w bowel regimen w/ senna+miralax     Endocrine   #DM   home meds: metformin 500mg TID, Januvia 50mg daily      - A1C results: 6.3%   - ISS     - TSH results: 2.080     Hematology   #JUSTYNA   - c/w home ferrous sulfate 325mg daily   - Ferritin: 410, total iron: 83, TIBC: 255, % saturation: 33     MSK   # leg cramps   - LE dopplers obtained, negative for DVT bilaterally      Psych   #depression   Per wife, prior to COVID, patient very active with friends/community, played sports. Since pandemic and worsening eyesight due to glaucoma and hearing, patient with no interest in daily activities. Was started on escitalopram 10mg daily, but stopped taking due to constipation   - consider restarting based on patient's mood and improvement of bowel movement after starting bowel regimen       DVT Prophylaxis   - lovenox sq for DVT prophylaxis    - SCDs for DVT prophylaxis      IDR Goals: Goals reviewed at interdisciplinary rounds with case management, social work, physical therapy, occupational therapy, and speech language pathology.    Please see specific therapy  notes for in depth goals.     Dispo: okay for in-bed PT/OT today    Discussed daily hospital plans and goals with patient and wife at bedside via .     Discussed with Dr. Payton   88y Cantonese speaking Male with PMHx of JUSTYNA, stroke (2004, residual right sided weakness), HLD, glaucoma, DM, BPH, depression, b/l hearing loss [facility] new left side arm and leg weakness starting 4/26 pm. Wife also noted truncal ataxia (slumping over to the left when sitting) and gait ataxia. CT imaging with no acute pathology. Transferred to [facility] for [facility] stroke w/u. Course c/b periods of waxing/waning left sided hemiparesis likely due to hypoperfusion, s/p IVF boluses, now started on midodrine 2.5mg BID. Pending MRA NOVA    Neuro   #hx of stroke 2004 with residual right sided weakness    #CVA workup   Patient previously on aspirin, but was stopped by doctor in 2020, unclear reason why.    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   - pending MR NOVA   - Stroke Code HCT Results: negative for acute ischemia    - Stroke Code CTA Results: stenosis of proximal bilateral A2 segments and R M2   - Stroke education     Cards   #HTN   - permissive hypertension, Goal -160, okay to sit up in bed today 5/1  - s/p 500cc bolus, now on 100cc/hr   - Start midodrine 2.5mg BID on 4/30 as left LE weakness is waxing/waning despite stable BPs   - holding of on WALLY due to inability to swallow and to avoid hypotension   - wife refusing WALLY/ILR until she speaks to son in [facility] tomorrow 5/2  - TTE: EF 56%     #HLD   - high dose statin as above in CVA   - LDL results: 170     Pulm   - call provider if SPO2 < 94%     GI   #Nutrition/Fluids/Electrolytes    - replete K<4 and Mg <2   - Diet: NPO with NGT, okay to sit up today 5/1, started tube feeds per nutrition  - IVF: 100cc/hr    #dysphagia    - failed SLP evaluation   - pending repeat swallow eval 5/1    #constipation   - c/w bowel regimen w/ senna+miralax     Endocrine   #DM   home meds: metformin 500mg TID, Januvia 50mg daily      - A1C results: 6.3%   - ISS     - TSH results: 2.080     Hematology   #JUSTYNA   - c/w home ferrous sulfate 325mg daily   - Ferritin: 410, total iron: 83, TIBC: 255, % saturation: 33     MSK   # leg cramps   - LE dopplers obtained, negative for DVT bilaterally      Psych   #depression   Per wife, prior to COVID, patient very active with friends/community, played sports. Since pandemic and worsening eyesight due to glaucoma and hearing, patient with no interest in daily activities. Was started on escitalopram 10mg daily, but stopped taking due to constipation   - consider restarting based on patient's mood and improvement of bowel movement after starting bowel regimen       DVT Prophylaxis   - lovenox sq for DVT prophylaxis    - SCDs for DVT prophylaxis      IDR Goals: Goals reviewed at interdisciplinary rounds with case management, social work, physical therapy, occupational therapy, and speech language pathology.    Please see specific therapy  notes for in depth goals.     Dispo: okay for in-bed PT/OT today    Discussed daily hospital plans and goals with patient and wife at bedside via .     Discussed with Dr. Payton   88y Cantonese speaking Male with PMHx of JUSTYNA, stroke (2004, residual right sided weakness), HLD, glaucoma, DM, BPH, depression, b/l hearing loss [facility] side arm and leg weakness starting 4/26 pm. Wife also noted truncal ataxia (slumping over to the left when sitting) and gait ataxia. CT imaging with no acute pathology. Transferred to [facility] for further stroke w/u. Course c/b periods of waxing/waning left sided hemiparesis likely due to hypoperfusion, s/p IVF boluses, now started on midodrine 2.5mg BID. Pending MRA NOVA    Neuro   #hx of stroke 2004 with residual right sided weakness    #CVA workup   Patient previously on aspirin, but was stopped by doctor in 2020, unclear reason why.    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   - pending MR NOVA   - Stroke Code HCT Results: negative for acute ischemia    - Stroke Code CTA Results: stenosis of proximal bilateral A2 segments and R M2   - Stroke education     Cards   #HTN   - permissive hypertension, Goal -160, okay to sit up in bed today 5/1  - s/p 500cc bolus, now on 100cc/hr   - Start midodrine 2.5mg BID on 4/30 as left LE weakness is waxing/waning despite stable BPs --> midodrine increased to 5mg BID on 5/1  - holding off on WALLY due to inability to swallow and to avoid hypotension   - wife refusing WALLY/ILR until she speaks to son in [facility] tomorrow 5/2  - TTE: EF 56%     #HLD   - high dose statin as above in CVA   - LDL results: 170     Pulm   - call provider if SPO2 < 94%     GI   #Nutrition/Fluids/Electrolytes    - replete K<4 and Mg <2   - Diet: NPO with NGT, okay to sit up today 5/1, started tube feeds per nutrition  - IVF: 100cc/hr    #dysphagia    - failed SLP evaluation   - pending repeat swallow eval 5/1    #constipation   - c/w bowel regimen w/ senna+miralax     Endocrine   #DM   home meds: metformin 500mg TID, Januvia 50mg daily      - A1C results: 6.3%   - ISS     - TSH results: 2.080     Hematology   #JUSTYNA   - c/w home ferrous sulfate 325mg daily   - Ferritin: 410, total iron: 83, TIBC: 255, % saturation: 33     MSK   # leg cramps   - LE dopplers obtained, negative for DVT bilaterally      Psych   #depression   Per wife, prior to COVID, patient very active with friends/community, played sports. Since pandemic and worsening eyesight due to glaucoma and hearing, patient with no interest in daily activities. Was started on escitalopram 10mg daily, but stopped taking due to constipation   - consider restarting based on patient's mood and improvement of bowel movement after starting bowel regimen       DVT Prophylaxis   - lovenox sq for DVT prophylaxis    - SCDs for DVT prophylaxis      IDR Goals: Goals reviewed at interdisciplinary rounds with case management, social work, physical therapy, occupational therapy, and speech language pathology.    Please see specific therapy  notes for in depth goals.     Dispo: okay for in-bed PT/OT today    Discussed daily hospital plans and goals with patient and wife at bedside via .     Discussed with Dr. Payton   88y Cantonese speaking Male with PMHx of JUSTYNA, stroke (2004, residual right sided weakness), HLD, glaucoma, DM, BPH, depression, b/l hearing loss [facility] side arm and leg weakness starting 4/26 pm. Wife also noted truncal ataxia (slumping over to the left when sitting) and gait ataxia. CT imaging with no acute pathology. Transferred to [facility] for further stroke w/u. Course c/b periods of waxing/waning left sided hemiparesis likely due to hypoperfusion, s/p IVF boluses, now started on midodrine 2.5mg BID. Pending MRA NOVA    Neuro   #hx of stroke 2004 with residual right sided weakness    #CVA workup   Patient previously on aspirin, but was stopped by doctor in 2020, unclear reason why.    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   - pending MR NOVA   - Stroke Code HCT Results: negative for acute ischemia    - Stroke Code CTA Results: stenosis of proximal bilateral A2 segments and R M2   - Stroke education     Cards   #HTN   - permissive hypertension, Goal -160, okay to sit up in bed today 5/1  - s/p 500cc bolus, now on 100cc/hr   - Start midodrine 2.5mg BID on 4/30 as left LE weakness is waxing/waning despite stable BPs --> midodrine increased to 5mg BID on 5/1  - holding off on WALLY due to inability to swallow and to avoid hypotension   - wife refusing WALLY/ILR until she speaks to son in [facility] tomorrow 5/2  - TTE: EF 56%     #HLD   - high dose statin as above in CVA   - LDL results: 170     Pulm   - call provider if SPO2 < 94%     GI   #Nutrition/Fluids/Electrolytes    - replete K<4 and Mg <2   - Diet: NPO with NGT, okay to sit up today 5/1, started tube feeds per nutrition  - IVF: 100cc/hr    #dysphagia    - failed SLP evaluation   - repeat swallow eval- FEES 5/2    #constipation   - c/w bowel regimen w/ senna+miralax     Endocrine   #DM   home meds: metformin 500mg TID, Januvia 50mg daily      - A1C results: 6.3%   - ISS     - TSH results: 2.080     Hematology   #JUSTYNA   - c/w home ferrous sulfate 325mg daily   - Ferritin: 410, total iron: 83, TIBC: 255, % saturation: 33     MSK   # leg cramps   - LE dopplers obtained, negative for DVT bilaterally      Psych   #depression   Per wife, prior to COVID, patient very active with friends/community, played sports. Since pandemic and worsening eyesight due to glaucoma and hearing, patient with no interest in daily activities. Was started on escitalopram 10mg daily, but stopped taking due to constipation   - consider restarting based on patient's mood and improvement of bowel movement after starting bowel regimen       DVT Prophylaxis   - lovenox sq for DVT prophylaxis    - SCDs for DVT prophylaxis      IDR Goals: Goals reviewed at interdisciplinary rounds with case management, social work, physical therapy, occupational therapy, and speech language pathology.    Please see specific therapy  notes for in depth goals.     Dispo: okay for in-bed PT/OT today    Discussed daily hospital plans and goals with patient and wife at bedside via .     Discussed with Dr. Payton   88y Cantonese speaking Male with PMHx of JUSTYNA, stroke (2004, residual right sided weakness), HLD, glaucoma, DM, BPH, depression, b/l hearing loss [facility] new left side arm and leg weakness starting 4/26 pm. Wife also noted truncal ataxia (slumping over to the left when sitting) and gait ataxia. CT imaging with no acute pathology. Transferred to [facility] for further stroke w/u. Course c/b periods of waxing/waning left sided hemiparesis likely due to hypoperfusion, s/p IVF boluses, now started on midodrine 2.5mg BID. Pending MRA NOVA    Neuro   #hx of stroke 2004 with residual right sided weakness    #CVA workup   Patient previously on aspirin, but was stopped by doctor in 2020, unclear reason why.    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   - pending MR NOVA   - Stroke Code HCT Results: negative for acute ischemia    - Stroke Code CTA Results: stenosis of proximal bilateral A2 segments and R M2   - Stroke education     Cards   #HTN   - permissive hypertension, Goal -160, okay to sit up in bed today 5/1  - s/p 500cc bolus, now on 100cc/hr   - Start midodrine 2.5mg BID on 4/30 as left LE weakness is waxing/waning despite stable BPs --> midodrine increased to 5mg BID on 5/1  - holding off on WALLY due to inability to swallow and to avoid hypotension   - wife refusing WALLY/ILR until she speaks to son in [facility] tomorrow 5/2  - TTE: EF 56%     #HLD   - high dose statin as above in CVA   - LDL results: 170     Pulm   - call provider if SPO2 < 94%     GI   #Nutrition/Fluids/Electrolytes    - replete K<4 and Mg <2   - Diet: NPO with NGT, okay to sit up today 5/1, started tube feeds per nutrition  - IVF: 100cc/hr    #dysphagia    - failed SLP evaluation   - repeat swallow eval- FEES 5/2 @ bedside    #constipation   - c/w bowel regimen w/ senna+miralax     Endocrine   #DM   home meds: metformin 500mg TID, Januvia 50mg daily      - A1C results: 6.3%   - ISS     - TSH results: 2.080     Hematology   #JUSTYNA   - c/w home ferrous sulfate 325mg daily   - Ferritin: 410, total iron: 83, TIBC: 255, % saturation: 33     MSK   # leg cramps   - LE dopplers obtained, negative for DVT bilaterally      Psych   #depression   Per wife, prior to COVID, patient very active with friends/community, played sports. Since pandemic and worsening eyesight due to glaucoma and hearing, patient with no interest in daily activities. Was started on escitalopram 10mg daily, but stopped taking due to constipation   - consider restarting based on patient's mood and improvement of bowel movement after starting bowel regimen       DVT Prophylaxis   - lovenox sq for DVT prophylaxis    - SCDs for DVT prophylaxis      IDR Goals: Goals reviewed at interdisciplinary rounds with case management, social work, physical therapy, occupational therapy, and speech language pathology.    Please see specific therapy  notes for in depth goals.     Dispo: okay for in-bed PT/OT today    Discussed daily hospital plans and goals with patient and wife at bedside via .     Discussed with Dr. Payton   88y Cantonese speaking Male with PMHx of JUSTYNA, stroke (2004, residual right sided weakness), HLD, glaucoma, DM, BPH, depression, b/l hearing loss [facility] side arm and leg weakness starting 4/26 pm. Wife also noted truncal ataxia (slumping over to the left when sitting) and gait ataxia. CT imaging with no acute pathology. Transferred to [facility] for further stroke w/u. Course c/b periods of waxing/waning left sided hemiparesis likely due to hypoperfusion, s/p IVF boluses, now started on midodrine. Pending MRA NOVA    Neuro   #hx of stroke 2004 with residual right sided weakness    #CVA workup   Patient previously on aspirin, but was stopped by doctor in 2020, unclear reason why.    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   - pending MR NOVA   - Stroke Code HCT Results: negative for acute ischemia    - Stroke Code CTA Results: stenosis of proximal bilateral A2 segments and R M2   - Stroke education     Cards   #HTN   - permissive hypertension, Goal -160, okay to sit up in bed today 5/1  - s/p 500cc bolus, now on 100cc/hr   - Start midodrine 2.5mg BID on 4/30 as left LE weakness is waxing/waning despite stable BPs --> midodrine increased to 5mg BID on 5/1  - holding off on WALLY due to inability to swallow and to avoid hypotension   - wife refusing WALLY/ILR until she speaks to son in [facility] tomorrow 5/2  - TTE: EF 56%     #HLD   - high dose statin as above in CVA   - LDL results: 170     Pulm   - call provider if SPO2 < 94%     GI   #Nutrition/Fluids/Electrolytes    - replete K<4 and Mg <2   - Diet: NPO with NGT, okay to sit up today 5/1, started tube feeds per nutrition  - IVF: 100cc/hr    #dysphagia    - failed SLP evaluation   - repeat swallow eval- FEES 5/2 @ bedside    #constipation   - c/w bowel regimen w/ senna+miralax     Endocrine   #DM   home meds: metformin 500mg TID, Januvia 50mg daily      - A1C results: 6.3%   - ISS     - TSH results: 2.080     Hematology   #JUSTYNA   - c/w home ferrous sulfate 325mg daily   - Ferritin: 410, total iron: 83, TIBC: 255, % saturation: 33     MSK   # leg cramps   - LE dopplers obtained, negative for DVT bilaterally      Psych   #depression   Per wife, prior to COVID, patient very active with friends/community, played sports. Since pandemic and worsening eyesight due to glaucoma and hearing, patient with no interest in daily activities. Was started on escitalopram 10mg daily, but stopped taking due to constipation   - consider restarting based on patient's mood and improvement of bowel movement after starting bowel regimen       DVT Prophylaxis   - lovenox sq for DVT prophylaxis    - SCDs for DVT prophylaxis      IDR Goals: Goals reviewed at interdisciplinary rounds with case management, social work, physical therapy, occupational therapy, and speech language pathology.    Please see specific therapy  notes for in depth goals.     Dispo: okay for in-bed PT/OT today    Discussed daily hospital plans and goals with patient and wife at bedside via .     Discussed with Dr. Payton

## 2024-05-01 NOTE — PROGRESS NOTE ADULT - ASSESSMENT
88y M Cantonese speaking, R hand dominant, b/l  hearing impairment ( b/l hearing aids) with PMHx of DM, Thalassemia minor/JUSTYNA, HLD, hx stroke with right sided weakness in 2004 (has a cane and walker at home but pt refuses using assisted device, off ASA since 2020 for unclear reason), depression, glaucoma, BPH, [facility] ( [facility]) with new left side arm and leg weakness starting 4/26 pm. Wife noticed that patient was leaning against the wall with the left side of his body to support himself to walk and was unable to hold himself upright when sitting, slumping over to the left. Patient felt that his left arm and leg were weak. Called PCP who recommened ED visit. CTH/CTP/CTA head and neck with no acute findings. Loaded with aspirin 300mg x1. Transferred to [facility] for further stroke w/u.    MRI brain -acute/sub-acute infarct in right IC ( posterior limb with Cytotoxic edema )  - 8LA telemetry monitoring   -  q4hr stroke neuro checks and vitals    - active plan per Stroke team -discussed.-  - on midodrine to help with blood pressure for perfusion.   - plan to start nutrition via NGT.  - TTE reviewed, so far sinus - neuro rec for LINQ and WALLY to evaluate further per neuro wife was hesistant -  extensive discussion with wife at bedside, aim of the study and procedure explained ( showed her the picture of LINQ -minimally invasive, she expressed concern whether pt will be able to tolerate those- explained team concern afib /thrombus in heart , since he has prior stroke before, linq to detect proximal afib ( if present is equally risk as permanent afib for stroke and would determine need for AC )- WALLY to evaluate thrombus -being done by cardiology and will access pt to see his tolerance - medically he has no contraindication for now - after extensive discussion she reported she would discuss with their son in [facility] ( to follow up tomorrow )     - s/p aspirin load 300mg x1( 4/27)  - continue aspirin 81mg po daily ( 4/27-)  - start Clopidogrel 75mg po daily ( 4/28-)   - continue Atorvastatin 80mg po qHS  ( 4/27-) via NGT  - permissive hypertension,   - IVF   - - statin ( not able to give 4/28- pt not able to swallow )   - nutrition to start today as per neuro.   - PT/OT eval    # Anemia hx Thal minor  - Ferritin 410, Tsat 33% adequate iron store  - Hgb 11.2%     #constipation  - c/w bowel regimen w/ senna+miralax    #DM  - A1C 6.3%   home meds: metformin 500mg TID, Januvia 50mg daily - held for now.   - ISS, goal -180     # Hyponatremia- resolved.   - trend Na 131--> 135--> 133. --> 135   - Hypophos - replaced.     #DVT Prophylaxis  as per neurology.    Dispo: fu PT/OT, will benefit inpatient Rehab      Contact:  PMD Dr. Aron Soares   Wife: Farshad Pelayo # [phone]  poc dw stroke , wife     Thank you for allowing medicine to participate in the care  Dr. Rajesh Villalba covering 5/2-5/8/24 88y M Cantonese speaking, R hand dominant, b/l  hearing impairment ( b/l hearing aids) with PMHx of DM, Thalassemia minor/JUSTYNA, HLD, hx stroke with right sided weakness in 2004 (has a cane and walker at home but pt refuses using assisted device, off ASA since 2020 for unclear reason), depression, glaucoma, BPH, [facility] with new left side arm and leg weakness starting 4/26 pm. Wife noticed that patient was leaning against the wall with the left side of his body to support himself to walk and was unable to hold himself upright when sitting, slumping over to the left. Patient felt that his left arm and leg were weak. Called PCP who recommened ED visit. CTH/CTP/CTA head and neck with no acute findings. Loaded with aspirin 300mg x1. Transferred to [facility] for further stroke w/u.    MRI brain -acute/sub-acute infarct in right IC ( posterior limb with Cytotoxic edema )  - 8LA telemetry monitoring   -  q4hr stroke neuro checks and vitals    - active plan per Stroke team -discussed.-  - on midodrine to help with blood pressure for perfusion.   - plan to start nutrition via NGT.  - TTE reviewed, so far sinus - neuro rec for LINQ and WALLY to evaluate further per neuro wife was hesistant -  extensive discussion with wife at bedside, aim of the study and procedure explained ( showed her the picture of LINQ -minimally invasive, she expressed concern whether pt will be able to tolerate those- explained team concern afib /thrombus in heart , since he has prior stroke before, linq to detect proximal afib ( if present is equally risk as permanent afib for stroke and would determine need for AC )- WALLY to evaluate thrombus -being done by cardiology and will access pt to see his tolerance - medically he has no contraindication for now - after extensive discussion she reported she would discuss with their son in [facility] ( to follow up tomorrow )   - LE duplex, negative for DVT  - fu MRI with NOVA -ordered.     - s/p aspirin load 300mg x1( 4/27)  - continue aspirin 81mg po daily ( 4/27-)  - start Clopidogrel 75mg po daily ( 4/28-)   - continue Atorvastatin 80mg po qHS  ( 4/27-) via NGT  - permissive hypertension,   - IVF   - - statin ( not able to give 4/28- pt not able to swallow )   - nutrition to start today as per neuro.   - PT/OT eval    # Anemia hx Thal minor  - Ferritin 410, Tsat 33% adequate iron store  - Hgb 11.2%     #constipation  - c/w bowel regimen w/ senna+miralax    #DM  - A1C 6.3%   home meds: metformin 500mg TID, Januvia 50mg daily - held for now.   - ISS, goal -180     # Hyponatremia- resolved.   - trend Na 131--> 135--> 133. --> 135   - Hypophos - replaced.     #DVT Prophylaxis  as per neurology.    Dispo: fu PT/OT, will benefit inpatient Rehab      Contact:  PMD Dr. Aron Soares   Wife: Farshad Pierce # [phone]  poc dw stroke , wife     Thank you for allowing medicine to participate in the care  Dr. Rajesh Villalba covering 5/2-5/8/24

## 2024-05-01 NOTE — PROGRESS NOTE ADULT - SUBJECTIVE AND OBJECTIVE BOX
Neurology Stroke Progress Note    INTERVAL HPI/OVERNIGHT EVENTS:  Patient seen and examined. Lethargic, unable to raise LUE/LLE off bed. SBP 160s.    MEDICATIONS  (STANDING):  aspirin  chewable 81 milliGRAM(s) Oral daily  atorvastatin 80 milliGRAM(s) Oral at bedtime  clopidogrel Tablet 75 milliGRAM(s) Enteral Tube daily  dextrose 10% Bolus 125 milliLiter(s) IV Bolus once  dextrose 5%. 1000 milliLiter(s) (100 mL/Hr) IV Continuous <Continuous>  dextrose 5%. 1000 milliLiter(s) (50 mL/Hr) IV Continuous <Continuous>  dextrose 50% Injectable 25 Gram(s) IV Push once  dextrose 50% Injectable 12.5 Gram(s) IV Push once  enoxaparin Injectable 40 milliGRAM(s) SubCutaneous every 24 hours  ferrous    sulfate 325 milliGRAM(s) Oral daily  folic acid 1 milliGRAM(s) Oral daily  glucagon  Injectable 1 milliGRAM(s) IntraMuscular once  insulin lispro (ADMELOG) corrective regimen sliding scale   SubCutaneous every 6 hours  magnesium oxide 400 milliGRAM(s) Oral daily  midodrine. 2.5 milliGRAM(s) Oral <User Schedule>  polyethylene glycol 3350 17 Gram(s) Oral daily  sodium chloride 0.9%. 1000 milliLiter(s) (100 mL/Hr) IV Continuous <Continuous>    MEDICATIONS  (PRN):  dextrose Oral Gel 15 Gram(s) Oral once PRN Blood Glucose LESS THAN 70 milliGRAM(s)/deciliter      Allergies    No Known Allergies    Intolerances        Vital Signs Last 24 Hrs  T(C): 36.7 (01 May 2024 09:26), Max: 37.2 (01 May 2024 04:43)  T(F): 98 (01 May 2024 09:26), Max: 99 (01 May 2024 04:43)  HR: 66 (01 May 2024 09:00) (58 - 74)  BP: 163/76 (01 May 2024 09:00) (148/72 - 166/97)  BP(mean): 106 (01 May 2024 09:00) (101 - 123)  RR: 17 (01 May 2024 09:00) (17 - 18)  SpO2: 98% (01 May 2024 09:00) (96% - 99%)    Parameters below as of 01 May 2024 09:00  Patient On (Oxygen Delivery Method): room air      Neurological:   -Mental status: Lethargic, able to say name, states he is in the hospital, and year. Minimal verbal output. Pt able to follow simple commands.   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    Sensation: intact on right side, minimal withdrawal when noxious stimuli applied to left upper and lower extremities  Coordination: Cannot follow command    LABS:                        11.4   6.34  )-----------( 160      ( 01 May 2024 05:30 )             34.3     05-01    135  |  102  |  11  ----------------------------<  109<H>  3.5   |  19<L>  |  0.73    Ca    8.2<L>      01 May 2024 05:30  Phos  2.2     05-01  Mg     2.2     05-01        Urinalysis Basic - ( 01 May 2024 05:30 )    Color: x / Appearance: x / SG: x / pH: x  Gluc: 109 mg/dL / Ketone: x  / Bili: x / Urobili: x   Blood: x / Protein: x / Nitrite: x   Leuk Esterase: x / RBC: x / WBC x   Sq Epi: x / Non Sq Epi: x / Bacteria: x        RADIOLOGY & ADDITIONAL TESTS:  reviewed

## 2024-05-01 NOTE — PROGRESS NOTE ADULT - SUBJECTIVE AND OBJECTIVE BOX
LARRY BURNS , [id number],  [facility] [id number]    Time of encounter :  11 am   wife at bedside  he is more awake, alert, yet at time sleepy, listening to the conversation  stated he was aware about his BP high and worried about that ( explained that he is being given medication to elevate blood pressure to help with cerebral perfusion  wife reported -he tend to have low blood pressure, would be more awake during night and sleep during day        T(C): 37 (05-01-24 @ 14:07), Max: 37.2 (05-01-24 @ 04:43)  HR: 66 (05-01-24 @ 16:07) (58 - 74)  BP: 143/75 (05-01-24 @ 16:07) (143/75 - 163/76)  RR: 17 (05-01-24 @ 09:00) (17 - 18)  SpO2: 96% (05-01-24 @ 16:07) (96% - 99%)    aspirin  chewable 81 milliGRAM(s) Oral daily  atorvastatin 80 milliGRAM(s) Oral at bedtime  clopidogrel Tablet 75 milliGRAM(s) Enteral Tube daily  dextrose 10% Bolus 125 milliLiter(s) IV Bolus once  dextrose 5%. 1000 milliLiter(s) IV Continuous <Continuous>  dextrose 5%. 1000 milliLiter(s) IV Continuous <Continuous>  dextrose 50% Injectable 25 Gram(s) IV Push once  dextrose 50% Injectable 12.5 Gram(s) IV Push once  dextrose Oral Gel 15 Gram(s) Oral once PRN  enoxaparin Injectable 40 milliGRAM(s) SubCutaneous every 24 hours  ferrous    sulfate 325 milliGRAM(s) Oral daily  folic acid 1 milliGRAM(s) Oral daily  glucagon  Injectable 1 milliGRAM(s) IntraMuscular once  insulin lispro (ADMELOG) corrective regimen sliding scale   SubCutaneous every 6 hours  magnesium oxide 400 milliGRAM(s) Oral daily  midodrine. 5 milliGRAM(s) Oral <User Schedule>  polyethylene glycol 3350 17 Gram(s) Oral daily  sodium chloride 0.9%. 1000 milliLiter(s) IV Continuous <Continuous>      Physical Exam :    General exam :  awake, follow rec, verbalizing, less dysarthria.  RRR  moving good air   NGT in place   flattening of left naso-labial fold.  left upper ext 0/5 , left lower 3/5    moving right arm and leg with good strength  scd in place.         CBC Full  -  ( 01 May 2024 05:30 )  WBC Count : 6.34 K/uL  RBC Count : 4.89 M/uL  Hemoglobin : 11.4 g/dL  Hematocrit : 34.3 %  Platelet Count - Automated : 160 K/uL  Mean Cell Volume : 70.1 fl  Mean Cell Hemoglobin : 23.3 pg  Mean Cell Hemoglobin Concentration : 33.2 gm/dL  Auto Neutrophil # : x  Auto Lymphocyte # : x  Auto Monocyte # : x  Auto Eosinophil # : x  Auto Basophil # : x  Auto Neutrophil % : x  Auto Lymphocyte % : x  Auto Monocyte % : x  Auto Eosinophil % : x  Auto Basophil % : x        05-01    135  |  102  |  11  ----------------------------<  109<H>  3.5   |  19<L>  |  0.73    Ca    8.2<L>      01 May 2024 05:30  Phos  2.2     05-01  Mg     2.2     05-01      Daily     Daily   CAPILLARY BLOOD GLUCOSE      POCT Blood Glucose.: 102 mg/dL (01 May 2024 17:17)      Urinalysis Basic - ( 01 May 2024 05:30 )    Color: x / Appearance: x / SG: x / pH: x  Gluc: 109 mg/dL / Ketone: x  / Bili: x / Urobili: x   Blood: x / Protein: x / Nitrite: x   Leuk Esterase: x / RBC: x / WBC x   Sq Epi: x / Non Sq Epi: x / Bacteria: x

## 2024-05-02 LAB
ANION GAP SERPL CALC-SCNC: 13 MMOL/L — SIGNIFICANT CHANGE UP (ref 5–17)
BUN SERPL-MCNC: 13 MG/DL — SIGNIFICANT CHANGE UP (ref 7–23)
CALCIUM SERPL-MCNC: 7.7 MG/DL — LOW (ref 8.4–10.5)
CHLORIDE SERPL-SCNC: 102 MMOL/L — SIGNIFICANT CHANGE UP (ref 96–108)
CO2 SERPL-SCNC: 19 MMOL/L — LOW (ref 22–31)
CREAT SERPL-MCNC: 0.73 MG/DL — SIGNIFICANT CHANGE UP (ref 0.5–1.3)
EGFR: 89 ML/MIN/1.73M2 — SIGNIFICANT CHANGE UP
GLUCOSE BLDC GLUCOMTR-MCNC: 146 MG/DL — HIGH (ref 70–99)
GLUCOSE BLDC GLUCOMTR-MCNC: 176 MG/DL — HIGH (ref 70–99)
GLUCOSE BLDC GLUCOMTR-MCNC: 188 MG/DL — HIGH (ref 70–99)
GLUCOSE SERPL-MCNC: 172 MG/DL — HIGH (ref 70–99)
HCT VFR BLD CALC: 32.1 % — LOW (ref 39–50)
HGB BLD-MCNC: 11.1 G/DL — LOW (ref 13–17)
MAGNESIUM SERPL-MCNC: 2 MG/DL — SIGNIFICANT CHANGE UP (ref 1.6–2.6)
MCHC RBC-ENTMCNC: 23.5 PG — LOW (ref 27–34)
MCHC RBC-ENTMCNC: 34.6 GM/DL — SIGNIFICANT CHANGE UP (ref 32–36)
MCV RBC AUTO: 68 FL — LOW (ref 80–100)
NRBC # BLD: 0 /100 WBCS — SIGNIFICANT CHANGE UP (ref 0–0)
PHOSPHATE SERPL-MCNC: 1.7 MG/DL — LOW (ref 2.5–4.5)
PLATELET # BLD AUTO: 176 K/UL — SIGNIFICANT CHANGE UP (ref 150–400)
POTASSIUM SERPL-MCNC: 3.4 MMOL/L — LOW (ref 3.5–5.3)
POTASSIUM SERPL-SCNC: 3.4 MMOL/L — LOW (ref 3.5–5.3)
RBC # BLD: 4.72 M/UL — SIGNIFICANT CHANGE UP (ref 4.2–5.8)
RBC # FLD: 15.9 % — HIGH (ref 10.3–14.5)
SODIUM SERPL-SCNC: 134 MMOL/L — LOW (ref 135–145)
WBC # BLD: 8.35 K/UL — SIGNIFICANT CHANGE UP (ref 3.8–10.5)
WBC # FLD AUTO: 8.35 K/UL — SIGNIFICANT CHANGE UP (ref 3.8–10.5)

## 2024-05-02 PROCEDURE — 99233 SBSQ HOSP IP/OBS HIGH 50: CPT

## 2024-05-02 PROCEDURE — 75572 CT HRT W/3D IMAGE: CPT | Mod: 26

## 2024-05-02 RX ORDER — SODIUM,POTASSIUM PHOSPHATES 278-250MG
1 POWDER IN PACKET (EA) ORAL ONCE
Refills: 0 | Status: COMPLETED | OUTPATIENT
Start: 2024-05-02 | End: 2024-05-02

## 2024-05-02 RX ORDER — POTASSIUM CHLORIDE 20 MEQ
40 PACKET (EA) ORAL ONCE
Refills: 0 | Status: COMPLETED | OUTPATIENT
Start: 2024-05-02 | End: 2024-05-02

## 2024-05-02 RX ADMIN — Medication 40 MILLIEQUIVALENT(S): at 10:28

## 2024-05-02 RX ADMIN — ENOXAPARIN SODIUM 40 MILLIGRAM(S): 100 INJECTION SUBCUTANEOUS at 06:35

## 2024-05-02 RX ADMIN — ATORVASTATIN CALCIUM 80 MILLIGRAM(S): 80 TABLET, FILM COATED ORAL at 22:33

## 2024-05-02 RX ADMIN — MIDODRINE HYDROCHLORIDE 5 MILLIGRAM(S): 2.5 TABLET ORAL at 23:08

## 2024-05-02 RX ADMIN — Medication 1 PACKET(S): at 10:28

## 2024-05-02 RX ADMIN — Medication 1: at 06:33

## 2024-05-02 NOTE — PROGRESS NOTE ADULT - SUBJECTIVE AND OBJECTIVE BOX
Patient is a 88y old  Male who presents with a chief complaint of left sided weakness (01 May 2024 17:26)    INTERVAL EVENTS: NAEON    SUBJECTIVE:  Patient was seen and examined at bedside. Resting in bed. AAOX3. NGT in place on TF @ 40ml/hr. L sided hemiplegia.     Review of systems: No fever, chills, dizziness, HA, Changes in vision, CP, dyspnea, nausea or vomiting, dysuria, changes in bowel movements, LE edema. Rest of 12 point Review of systems negative unless otherwise documented elsewhere in note.     Diet, NPO with Tube Feed:   Tube Feeding Modality: Nasogastric  Glucerna 1.2 Nikolai (GLUCERNARTH)  Continuous  Starting Tube Feed Rate mL per Hour: 20  Increase Tube Feed Rate by (mL): 10     Every 6 hours  Until Goal Tube Feed Rate (mL per Hour): 73  Tube Feed Duration (in Hours): 24  Tube Feed Start Time: 11:00  Tube Feed Stop Time: 05:00 (05-01-24 @ 12:48) [Active]      MEDICATIONS:  MEDICATIONS  (STANDING):  aspirin  chewable 81 milliGRAM(s) Oral daily  atorvastatin 80 milliGRAM(s) Oral at bedtime  clopidogrel Tablet 75 milliGRAM(s) Enteral Tube daily  dextrose 10% Bolus 125 milliLiter(s) IV Bolus once  dextrose 5%. 1000 milliLiter(s) (50 mL/Hr) IV Continuous <Continuous>  dextrose 5%. 1000 milliLiter(s) (100 mL/Hr) IV Continuous <Continuous>  dextrose 50% Injectable 25 Gram(s) IV Push once  dextrose 50% Injectable 12.5 Gram(s) IV Push once  enoxaparin Injectable 40 milliGRAM(s) SubCutaneous every 24 hours  ferrous    sulfate 325 milliGRAM(s) Oral daily  folic acid 1 milliGRAM(s) Oral daily  glucagon  Injectable 1 milliGRAM(s) IntraMuscular once  insulin lispro (ADMELOG) corrective regimen sliding scale   SubCutaneous every 6 hours  magnesium oxide 400 milliGRAM(s) Oral daily  midodrine. 5 milliGRAM(s) Oral <User Schedule>  polyethylene glycol 3350 17 Gram(s) Oral daily  sodium chloride 0.9%. 1000 milliLiter(s) (100 mL/Hr) IV Continuous <Continuous>    MEDICATIONS  (PRN):  dextrose Oral Gel 15 Gram(s) Oral once PRN Blood Glucose LESS THAN 70 milliGRAM(s)/deciliter      Allergies    No Known Allergies    Intolerances        OBJECTIVE:  Vital Signs Last 24 Hrs  T(C): 36.7 (02 May 2024 09:15), Max: 37.7 (02 May 2024 05:07)  T(F): 98.1 (02 May 2024 09:15), Max: 99.8 (02 May 2024 05:07)  HR: 80 (02 May 2024 08:40) (66 - 80)  BP: 158/70 (02 May 2024 08:40) (122/60 - 160/86)  BP(mean): 101 (02 May 2024 08:40) (85 - 117)  RR: 17 (02 May 2024 08:40) (17 - 19)  SpO2: 94% (02 May 2024 08:40) (94% - 99%)    Parameters below as of 02 May 2024 08:40  Patient On (Oxygen Delivery Method): room air      I&O's Summary    01 May 2024 07:01  -  02 May 2024 07:00  --------------------------------------------------------  IN: 2200 mL / OUT: 1675 mL / NET: 525 mL    02 May 2024 07:01  -  02 May 2024 11:52  --------------------------------------------------------  IN: 300 mL / OUT: 850 mL / NET: -550 mL        PHYSICAL EXAM:  Gen: Reclining in bed at time of exam, appears stated age, NGT in place   HEENT: NCAT, MMM, clear OP  Neck: supple, trachea at midline  CV: RRR, +S1/S2  Pulm: adequate respiratory effort, no increase in work of breathing  Abd: soft, NTND  Skin: warm and dry,   Ext: WWP, no LE edema  Neuro: AOx3, LUE/LLE hemiplegia  Psych: affect and behavior appropriate, pleasant at time of interview  :     LABS:                        11.1   8.35  )-----------( 176      ( 02 May 2024 05:30 )             32.1     05-02    134<L>  |  102  |  13  ----------------------------<  172<H>  3.4<L>   |  19<L>  |  0.73    Ca    7.7<L>      02 May 2024 05:30  Phos  1.7     05-02  Mg     2.0     05-02          CAPILLARY BLOOD GLUCOSE      POCT Blood Glucose.: 176 mg/dL (02 May 2024 11:37)  POCT Blood Glucose.: 188 mg/dL (02 May 2024 06:22)  POCT Blood Glucose.: 131 mg/dL (01 May 2024 23:57)  POCT Blood Glucose.: 102 mg/dL (01 May 2024 17:17)  POCT Blood Glucose.: 95 mg/dL (01 May 2024 12:10)    Urinalysis Basic - ( 02 May 2024 05:30 )    Color: x / Appearance: x / SG: x / pH: x  Gluc: 172 mg/dL / Ketone: x  / Bili: x / Urobili: x   Blood: x / Protein: x / Nitrite: x   Leuk Esterase: x / RBC: x / WBC x   Sq Epi: x / Non Sq Epi: x / Bacteria: x        MICRODATA:      RADIOLOGY/OTHER STUDIES:

## 2024-05-02 NOTE — SWALLOW FEES ASSESSMENT ADULT - DIAGNOSTIC IMPRESSIONS
Severe pharyngeal dysphagia with impact to efficiency and safety of the swallow. Aspiration was primarily SILENT and resulted primarily from hypopharyngeal residual spillage through the interarytenoid space into the subglottis with thin and mildly thick liquids. Aspiration was deemed >trace, not gross. Cough was noted x 1 in response to aspiration and deemed weak. Prompted cough attempted, though patient did not complete; cognitive deficits likely contributed to following directives. Severe pharyngeal residual was most notable with increased viscosity; purees >66% and reduced with liquid wash. Reduced immediate aspiration visualized with purees and moderately thick liquids, though continued concern for increased aspiration risk due to severity of pharyngeal retention.  Swallow function c/w R hemisphere involvement. Anticipate longer term rehabilitation of swallow function given area of neurological involvement and age. Patient may benefit from LT alternate means of nutrition/hydration to allow for continued swallow rehabilitation/improvement in functional status. Encourage goal of care discussion to better delineate plan of care. Mild to moderate oral phase impairment characterized by reduced oral containment, prolonged bolus manipulation, and reduced oral clearance. Severe pharyngeal dysphagia with impact to efficiency and safety of the swallow. Aspiration was primarily SILENT and resulted primarily from hypopharyngeal residual spillage through the interarytenoid space into the subglottis with thin and mildly thick liquids. Aspiration was deemed >trace, not gross. Cough was noted x 1 in response to aspiration and deemed weak. Prompted cough attempted, though patient did not complete; cognitive deficits likely contributed to following directives. Severe pharyngeal residual was most notable with increased viscosity; purees >66% and reduced with liquid wash. No kristal aspiration visualized with purees and moderately thick liquids, though continued concern for increased aspiration risk due to severity of pharyngeal retention.  Swallow function c/w R hemisphere involvement. Anticipate longer term rehabilitation of swallow function given area of neurological involvement and age. Patient may benefit from LT alternate means of nutrition/hydration to allow for continued swallow rehabilitation/improvement in functional status. Encourage goal of care discussion to better delineate plan of care.

## 2024-05-02 NOTE — PROGRESS NOTE ADULT - ASSESSMENT
88y M Cantonese speaking, R hand dominant, b/l  hearing impairment ( b/l hearing aids) with PMHx of DM, Thalassemia minor/JUSTYNA, HLD, hx stroke with right sided weakness in 2004 (has a cane and walker at home but pt refuses using assisted device, off ASA since 2020 for unclear reason), depression, glaucoma, BPH, presented with [facility] ( 4/27) with new left side arm and leg weakness starting 4/26 pm. Wife noticed that patient was leaning against the wall with the left side of his body to support himself to walk and was unable to hold himself upright when sitting, slumping over to the left. Patient felt that his left arm and leg were weak. Called PCP who recommended ED visit. CTH/CTP/CTA head and neck with no acute findings. Loaded with aspirin 300mg x1. Transferred to [facility] for further stroke w/u.    # R internal capsule infarct  # hx CVA w/ residual R sided weakness   - 8LA telemetry monitoring   - q4hr stroke neuro checks and vitals  - active plan per Stroke team   - Permissive HTN goal -160  - midodrine 2.5mg bid ( 4/30)-> increased to 5mg bid( 5/1-)  to help with blood pressure for perfusion.   - IVF @100ml/hr   - TF via NGT @ 40ml/hr, goal rate 75ml/hr   - f/u wife decision on LINQ ( wife wants to d/w son from [facility])   - MRI with NOVA -ordered.   - s/p aspirin load 300mg x1( 4/27)  - continue aspirin 81mg po daily ( 4/27-)  - started Clopidogrel 75mg po daily ( 4/28-)   - continue Atorvastatin 80mg po qHS  ( 4/27-) via NGT  - - statin ( not able to give 4/28- pt not able to swallow )   - PT/OT eval    # Anemia hx Thal minor  - Ferritin 410, Tsat 33% adequate iron store  - Hgb 11.2%     #constipation  - c/w bowel regimen w/ senna+miralax    #DM  - A1C 6.3%   home meds: metformin 500mg TID, Januvia 50mg daily - held for now.   - ISS, goal -180     # Hyponatremia- resolved.   - trend Na 131--> 135--> 133. --> 135   - Hypophos - replaced.     #DVT Prophylaxis: Lovenox 40mg sq daily     Dispo: fu PT/OT, will benefit inpatient Rehab      Contact:  PMD Dr. Aron Soares   Wife: Farshad Pierce # [phone]    Thank you for allowing medicine to participate in the care

## 2024-05-02 NOTE — SWALLOW FEES ASSESSMENT ADULT - SLP PERTINENT HISTORY OF CURRENT PROBLEM
PMHx of JUSTYNA, stroke (2004, residual right sided weakness), HLD, glaucoma, DM, BPH, depression, b/l hearing loss presents with [facility] with new left side arm and leg weakness starting 4/26 pm. Wife also noted truncal ataxia (slumping over to the left when sitting) and gait ataxia. CT imaging with no acute pathology. Transferred to [facility] for further stroke w/u. Course c/b periods of waxing/waning left sided hemiparesis likely due to hypoperfusion. MR Head on 4/28/24: "Acute/subacute infarction within the posterior limb of the right internal capsule with associated cytotoxic edema. Additional chronic infarcts and similar-appearing mild chronic microvascular type changes, as discussed."

## 2024-05-02 NOTE — SWALLOW FEES ASSESSMENT ADULT - COMMENTS
Risks/benefits/alternatives of FEES were explained to the patient/patient's wife who provided verbal consent to participate. He tolerated the passing and presence of the scope without difficulty. Endoscope passed through the R nare without difficulty. This exam was performed in coordination with Amber White, SLP, who provided feeding assistance. Unable to complete phonation tasks- patient hard of hearing and did not consistently follow simple directives  Thick secretions visualized in the subglottis>hypopharynx/UES region- secretions were expectorated with prompted cough and suctioning via yankauer to facilitate retrieval   Bilateral VF mobility visualized, bilateral arytenoid edema visualized Risks/benefits/alternatives of FEES were explained to the patient/patient's wife who provided verbal consent to participate. He tolerated the passing and presence of the scope without difficulty. Endoscope passed through the R nare without difficulty. This exam was performed in coordination with Jennifer White, SLP, who provided feeding assistance.

## 2024-05-02 NOTE — SWALLOW FEES ASSESSMENT ADULT - CONSISTENCIES ADMINISTERED
thin liquid/moderately thick/mildly thick/pureed ice chips x 2, thin tsp x 1, thin liquids via cup x 2, thin liquids via straw x 2, mildly thick liquids via cup x 2, purees x 4, moderately thick liquids via tsp x 4/thin liquid/moderately thick/mildly thick/pureed

## 2024-05-02 NOTE — SWALLOW FEES ASSESSMENT ADULT - ORAL PHASE COMMENTS
Clinically, reduced oral containment with left sided labial loss, prolonged bolus manipulation with suspected reduced bolus cohesion, with left sided oral pocketing noted. Suctioning completed via yankauer at completion of assessment.

## 2024-05-02 NOTE — SWALLOW FEES ASSESSMENT ADULT - RECOMMENDED CONSISTENCY
NPO with alternate means of nutrition/hydration, allow ice chips as tolerated OR if oral intake is pursued, consider purees and moderately thick liquids via tsp as tolerated with compensatory strategies to maximize nutrition/hydration   **Diet recommendations are highly restrictive and will not eliminate risk of aspiration. Additionally, given nature of diet restrictions and severity of dysphagia, patient will likely still be at increased risk for malnutrition/dehydration. Trial of oral intake should be considered in accordance to medical status and full understanding of potential pulmonary complications. Encourage goals of care discussion between patient/family and medical team.

## 2024-05-02 NOTE — PROGRESS NOTE ADULT - SUBJECTIVE AND OBJECTIVE BOX
Neurology Stroke Progress Note    INTERVAL HPI/OVERNIGHT EVENTS:  Patient seen and examined.  number [id number] and [id number] used. Dr. Villalba at bedside this afternoon and had extensive conversation with wife regarding further testing (i.e. WALLY and ILR). She is resistant on sharing her son's information for us to explain the current medial situation and plan moving forward     MEDICATIONS  (STANDING):  aspirin  chewable 81 milliGRAM(s) Oral daily  atorvastatin 80 milliGRAM(s) Oral at bedtime  clopidogrel Tablet 75 milliGRAM(s) Enteral Tube daily  dextrose 10% Bolus 125 milliLiter(s) IV Bolus once  dextrose 5%. 1000 milliLiter(s) (50 mL/Hr) IV Continuous <Continuous>  dextrose 5%. 1000 milliLiter(s) (100 mL/Hr) IV Continuous <Continuous>  dextrose 50% Injectable 25 Gram(s) IV Push once  dextrose 50% Injectable 12.5 Gram(s) IV Push once  enoxaparin Injectable 40 milliGRAM(s) SubCutaneous every 24 hours  ferrous    sulfate 325 milliGRAM(s) Oral daily  folic acid 1 milliGRAM(s) Oral daily  glucagon  Injectable 1 milliGRAM(s) IntraMuscular once  insulin lispro (ADMELOG) corrective regimen sliding scale   SubCutaneous every 6 hours  magnesium oxide 400 milliGRAM(s) Oral daily  midodrine. 5 milliGRAM(s) Oral <User Schedule>  polyethylene glycol 3350 17 Gram(s) Oral daily  sodium chloride 0.9%. 1000 milliLiter(s) (100 mL/Hr) IV Continuous <Continuous>    MEDICATIONS  (PRN):  dextrose Oral Gel 15 Gram(s) Oral once PRN Blood Glucose LESS THAN 70 milliGRAM(s)/deciliter      Allergies    No Known Allergies    Intolerances        Vital Signs Last 24 Hrs  T(C): 36.7 (02 May 2024 09:15), Max: 37.7 (02 May 2024 05:07)  T(F): 98.1 (02 May 2024 09:15), Max: 99.8 (02 May 2024 05:07)  HR: 80 (02 May 2024 08:40) (66 - 80)  BP: 158/70 (02 May 2024 08:40) (122/60 - 160/86)  BP(mean): 101 (02 May 2024 08:40) (85 - 117)  RR: 17 (02 May 2024 08:40) (17 - 19)  SpO2: 94% (02 May 2024 08:40) (94% - 99%)    Parameters below as of 02 May 2024 08:40  Patient On (Oxygen Delivery Method): room air        Neurological:   -Mental status: Lethargic, able to say name, states he is in the hospital, and year. Minimal verbal output. Pt able to follow simple commands.   -Cranial nerves:   II: BTT  III, IV, VI: right gaze preference, able to cross midline (followed examiner with his eyes)  VII: Left sided facial droop  Motor: Right upper and lower extremities 4/5, left upper extremity 0/5, left lower extremity able to spontaneously lift antigravity distally but patient has difficulty doing this to command  Sensation: intact on right side, minimal withdrawal when noxious stimuli applied to left upper and lower extremities  Coordination: Cannot follow command    LABS:                        11.1   8.35  )-----------( 176      ( 02 May 2024 05:30 )             32.1     05-02    134<L>  |  102  |  13  ----------------------------<  172<H>  3.4<L>   |  19<L>  |  0.73    Ca    7.7<L>      02 May 2024 05:30  Phos  1.7     05-02  Mg     2.0     05-02        Urinalysis Basic - ( 02 May 2024 05:30 )    Color: x / Appearance: x / SG: x / pH: x  Gluc: 172 mg/dL / Ketone: x  / Bili: x / Urobili: x   Blood: x / Protein: x / Nitrite: x   Leuk Esterase: x / RBC: x / WBC x   Sq Epi: x / Non Sq Epi: x / Bacteria: x        RADIOLOGY & ADDITIONAL TESTS:

## 2024-05-02 NOTE — SWALLOW FEES ASSESSMENT ADULT - SUCCESSFUL STRATEGIES TRIALED DURING PROCEDURE
patient required frequent prompting to attend to task, occasional prompting to initiate the swallow, and flat affect

## 2024-05-02 NOTE — PROGRESS NOTE ADULT - ASSESSMENT
88y Cantonese speaking Male with PMHx of JUSTYNA, stroke (2004, residual right sided weakness), HLD, glaucoma, DM, BPH, depression, b/l hearing loss [facility] with new left side arm and leg weakness starting 4/26 pm. Wife also noted truncal ataxia (slumping over to the left when sitting) and gait ataxia. CT imaging with no acute pathology. Transferred to [facility] for further stroke w/u. Course c/b periods of waxing/waning left sided hemiparesis likely due to hypoperfusion, s/p IVF boluses, now started on midodrine. Pending MRA NOVA    Neuro   #hx of stroke 2004 with residual right sided weakness    #CVA workup   Patient previously on aspirin, but was stopped by doctor in 2020, unclear reason why.    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   - Stroke Code HCT Results: negative for acute ischemia    - Stroke Code CTA Results: stenosis of proximal bilateral A2 segments and R M2   - Stroke education     Cards   #HTN   - permissive hypertension, Goal -160, okay to sit up in bed with PT  - s/p 500cc bolus, now on 100cc/hr   - Start midodrine 2.5mg BID on 4/30 as left LE weakness is waxing/waning despite stable BPs --> midodrine increased to 5mg BID on 5/1  - wife refusing WALLY/ILR  - ordered for cardiac CT to rule out SANDRO thrombus  - TTE: EF 56%     #HLD   - high dose statin as above in CVA   - LDL results: 170     Pulm   - call provider if SPO2 < 94%     GI   #Nutrition/Fluids/Electrolytes    - replete K<4 and Mg <2   - Diet: continue tube feeds  - IVF: 100cc/hr    #dysphagia    - failed SLP evaluation   - FEES completed on 5/2 - recommended for PEG  - will give patient more time over the weekend to improve from a swallow standpoint and reeval early next week  - GI consulted for potential PEG - will f/u on when Plavix needs to be discontinued    #constipation   - c/w bowel regimen w/ senna+miralax     Endocrine   #DM   home meds: metformin 500mg TID, Januvia 50mg daily      - A1C results: 6.3%   - ISS     - TSH results: 2.080     Hematology   #JUSTYNA   - c/w home ferrous sulfate 325mg daily   - Ferritin: 410, total iron: 83, TIBC: 255, % saturation: 33     MSK   # leg cramps   - LE dopplers obtained, negative for DVT bilaterally      Psych   #depression   Per wife, prior to COVID, patient very active with friends/community, played sports. Since pandemic and worsening eyesight due to glaucoma and hearing, patient with no interest in daily activities. Was started on escitalopram 10mg daily, but stopped taking due to constipation   - consider restarting based on patient's mood and improvement of bowel movement after starting bowel regimen       DVT Prophylaxis   - lovenox sq for DVT prophylaxis    - SCDs for DVT prophylaxis      IDR Goals: Goals reviewed at interdisciplinary rounds with case management, social work, physical therapy, occupational therapy, and speech language pathology.    Please see specific therapy  notes for in depth goals.     Dispo: [place]    Discussed daily hospital plans and goals with patient and wife at bedside via .     Discussed with Dr. Payton who will discuss with Dr. Gonzalez

## 2024-05-02 NOTE — SWALLOW FEES ASSESSMENT ADULT - SLP GENERAL OBSERVATIONS
Patient was seen fully awake and alert, HOB fully elevated, on room air. DHT in place. Patient's wife at bedside. Jooce video  utilized #: [id number].

## 2024-05-02 NOTE — SWALLOW FEES ASSESSMENT ADULT - PHARYNGEAL PHASE COMMENTS
Detail Level: Zone Hide Additional Notes?: No Initiation of the pharyngeal swallow visualized primarily in the hypopharynx with ice chips and across liquid consistencies. Reduced hyolaryngeal complex movement with partial epiglottic inversion and delayed and incomplete laryngeal vestibule closure marked by incomplete white out resulted in infrequent trace shallow penetration, inferred to have occurred during the swallow, with thin and mildly thick liquids. However, severity of efficiency was primary contributor to aspiration. Increased viscosity was retained the most. Inferred reduced BOT retraction with partial epiglottic inversion and reduced pharyngeal stripping resulted in 10-33% pharyngeal retention across liquid consistencies and ice chips, which resulted in spillage of material through the interarytenoid space with subsequent aspiration (>trace, not gross) with thin and mildly thick liquids. Aspiration was primarily SILENT, though a delayed cough noted with aspiration of thin liquids. Cough was deemed weak. Similarly, deficits also resulted in moderate to severe (>66%) vallecular>posterior pharyngeal wall/hypopharynx retention with purees. Additionally, pharyngeal residual was also visualized on the epiglottic rim with purees and moderately thick liquids. Pharyngeal residual with purees was most effectively reduced with liquid wash (moderately thick liquids utilized). Similar retention imaged in the hypopharynx/post cricoid region with moderately thick liquids with trace spillage through the interarytenoid space visualized, though less than with thin and mildly thick liquids.

## 2024-05-03 LAB
ANION GAP SERPL CALC-SCNC: 10 MMOL/L — SIGNIFICANT CHANGE UP (ref 5–17)
BUN SERPL-MCNC: 13 MG/DL — SIGNIFICANT CHANGE UP (ref 7–23)
CALCIUM SERPL-MCNC: 8.1 MG/DL — LOW (ref 8.4–10.5)
CHLORIDE SERPL-SCNC: 104 MMOL/L — SIGNIFICANT CHANGE UP (ref 96–108)
CO2 SERPL-SCNC: 21 MMOL/L — LOW (ref 22–31)
CREAT SERPL-MCNC: 0.68 MG/DL — SIGNIFICANT CHANGE UP (ref 0.5–1.3)
EGFR: 91 ML/MIN/1.73M2 — SIGNIFICANT CHANGE UP
GLUCOSE BLDC GLUCOMTR-MCNC: 132 MG/DL — HIGH (ref 70–99)
GLUCOSE BLDC GLUCOMTR-MCNC: 159 MG/DL — HIGH (ref 70–99)
GLUCOSE BLDC GLUCOMTR-MCNC: 175 MG/DL — HIGH (ref 70–99)
GLUCOSE BLDC GLUCOMTR-MCNC: 196 MG/DL — HIGH (ref 70–99)
GLUCOSE SERPL-MCNC: 192 MG/DL — HIGH (ref 70–99)
HCT VFR BLD CALC: 32.1 % — LOW (ref 39–50)
HGB BLD-MCNC: 11.2 G/DL — LOW (ref 13–17)
MAGNESIUM SERPL-MCNC: 1.9 MG/DL — SIGNIFICANT CHANGE UP (ref 1.6–2.6)
MCHC RBC-ENTMCNC: 23.6 PG — LOW (ref 27–34)
MCHC RBC-ENTMCNC: 34.9 GM/DL — SIGNIFICANT CHANGE UP (ref 32–36)
MCV RBC AUTO: 67.7 FL — LOW (ref 80–100)
NRBC # BLD: 0 /100 WBCS — SIGNIFICANT CHANGE UP (ref 0–0)
PHOSPHATE SERPL-MCNC: 1.8 MG/DL — LOW (ref 2.5–4.5)
PLATELET # BLD AUTO: 179 K/UL — SIGNIFICANT CHANGE UP (ref 150–400)
POTASSIUM SERPL-MCNC: 3.8 MMOL/L — SIGNIFICANT CHANGE UP (ref 3.5–5.3)
POTASSIUM SERPL-SCNC: 3.8 MMOL/L — SIGNIFICANT CHANGE UP (ref 3.5–5.3)
RBC # BLD: 4.74 M/UL — SIGNIFICANT CHANGE UP (ref 4.2–5.8)
RBC # FLD: 16 % — HIGH (ref 10.3–14.5)
SODIUM SERPL-SCNC: 135 MMOL/L — SIGNIFICANT CHANGE UP (ref 135–145)
WBC # BLD: 6.26 K/UL — SIGNIFICANT CHANGE UP (ref 3.8–10.5)
WBC # FLD AUTO: 6.26 K/UL — SIGNIFICANT CHANGE UP (ref 3.8–10.5)

## 2024-05-03 PROCEDURE — 71045 X-RAY EXAM CHEST 1 VIEW: CPT | Mod: 26

## 2024-05-03 PROCEDURE — 71275 CT ANGIOGRAPHY CHEST: CPT | Mod: 26

## 2024-05-03 PROCEDURE — 99233 SBSQ HOSP IP/OBS HIGH 50: CPT

## 2024-05-03 PROCEDURE — 99222 1ST HOSP IP/OBS MODERATE 55: CPT

## 2024-05-03 RX ORDER — SODIUM CHLORIDE 9 MG/ML
500 INJECTION INTRAMUSCULAR; INTRAVENOUS; SUBCUTANEOUS ONCE
Refills: 0 | Status: COMPLETED | OUTPATIENT
Start: 2024-05-03 | End: 2024-05-03

## 2024-05-03 RX ORDER — ASPIRIN/CALCIUM CARB/MAGNESIUM 324 MG
300 TABLET ORAL DAILY
Refills: 0 | Status: DISCONTINUED | OUTPATIENT
Start: 2024-05-03 | End: 2024-05-03

## 2024-05-03 RX ORDER — ASPIRIN/CALCIUM CARB/MAGNESIUM 324 MG
81 TABLET ORAL DAILY
Refills: 0 | Status: DISCONTINUED | OUTPATIENT
Start: 2024-05-03 | End: 2024-05-17

## 2024-05-03 RX ORDER — POTASSIUM PHOSPHATE, MONOBASIC POTASSIUM PHOSPHATE, DIBASIC 236; 224 MG/ML; MG/ML
30 INJECTION, SOLUTION INTRAVENOUS ONCE
Refills: 0 | Status: COMPLETED | OUTPATIENT
Start: 2024-05-03 | End: 2024-05-03

## 2024-05-03 RX ADMIN — Medication 325 MILLIGRAM(S): at 14:33

## 2024-05-03 RX ADMIN — MIDODRINE HYDROCHLORIDE 5 MILLIGRAM(S): 2.5 TABLET ORAL at 23:01

## 2024-05-03 RX ADMIN — MAGNESIUM OXIDE 400 MG ORAL TABLET 400 MILLIGRAM(S): 241.3 TABLET ORAL at 14:32

## 2024-05-03 RX ADMIN — ENOXAPARIN SODIUM 40 MILLIGRAM(S): 100 INJECTION SUBCUTANEOUS at 06:34

## 2024-05-03 RX ADMIN — MIDODRINE HYDROCHLORIDE 5 MILLIGRAM(S): 2.5 TABLET ORAL at 14:33

## 2024-05-03 RX ADMIN — Medication 1: at 17:25

## 2024-05-03 RX ADMIN — Medication 1 MILLIGRAM(S): at 14:33

## 2024-05-03 RX ADMIN — Medication 81 MILLIGRAM(S): at 14:38

## 2024-05-03 RX ADMIN — Medication 1: at 07:17

## 2024-05-03 RX ADMIN — SODIUM CHLORIDE 2000 MILLILITER(S): 9 INJECTION INTRAMUSCULAR; INTRAVENOUS; SUBCUTANEOUS at 21:17

## 2024-05-03 RX ADMIN — Medication 1: at 00:44

## 2024-05-03 RX ADMIN — ATORVASTATIN CALCIUM 80 MILLIGRAM(S): 80 TABLET, FILM COATED ORAL at 22:36

## 2024-05-03 RX ADMIN — POTASSIUM PHOSPHATE, MONOBASIC POTASSIUM PHOSPHATE, DIBASIC 83.33 MILLIMOLE(S): 236; 224 INJECTION, SOLUTION INTRAVENOUS at 09:50

## 2024-05-03 NOTE — PROVIDER CONTACT NOTE (OTHER) - SITUATION
Pt is obtunded and difficult to arouse, unable to speak/garbled speech, unable to move left upper and lower extremities, left facial droop and left eye ptosis, unable to follow commands
Pt is retaining urine and had some bloody discharge upon straight catheterization

## 2024-05-03 NOTE — CHART NOTE - NSCHARTNOTEFT_GEN_A_CORE
On sign out, patient with 800cc bladder scan with 950cc output on straight cath with lowering of BP to SBP 110s (day BP range 150-160s).     During evening rounds, patient . Patient lethargic, difficult to arouse to both voice and noxious stimuli. Eyes closed, squeezing fingers and wiggles toes to noxious, no verbal output, no movement to command. 500cc NS bolus ordered in addition to current continuous IVF.     S/p 500cc NS bolus:  BP improved to 126/59. At first, exam remained unchanged compared to earlier in the evening. However, patient began to awaken a bit more.   Opens eyes spontaneously. Tracked examiner on both sides, no forced gaze deviation. Mimic (showed two fingers).   Follows commands (showed 3 fingers, lifted right leg, squeeze fingers)  No verbal output    A/P:  Patient know to have waxing/waning mental status, initially attributed to hypoperfusion, however, patient also with borderline thalamic infarcts on MRI that can also contribute to waxing/waning mental status  -Will continue with continuous fluids for -160. Avoid further hypotension  -Continue with bladder scan q6  -will plan for stability CTH in the moring On sign out, patient with 800cc bladder scan with 950cc output on straight cath with lowering of BP to SBP 110s (day BP range 150-160s).     During evening rounds, patient . Patient lethargic, difficult to arouse to both voice and noxious stimuli. Eyes closed, squeezing fingers and wiggles toes to noxious, no verbal output, no movement to command. 500cc NS bolus ordered in addition to current continuous IVF.     S/p 500cc NS bolus:  BP improved to 126/59. At first, exam remained unchanged compared to earlier in the evening. However, patient began to awaken a bit more.   Opens eyes spontaneously. Tracked examiner on both sides, no forced gaze deviation. Mimic (showed two fingers).   Follows commands (showed 3 fingers, lifted right leg, squeeze fingers)  No verbal output    A/P:  Patient know to have waxing/waning mental status, initially attributed to hypoperfusion, however, patient also with borderline thalamic infarcts on MRI that can also contribute to waxing/waning mental status  - c/w aspirin 81mg daily only for planned PEG 5/7 (last dose of plavix 5/2).   -Will continue with continuous fluids for -160. Avoid further hypotension  -Continue with bladder scan q6  -will plan for stability CTH in the moring On sign out, patient with 800cc bladder scan with 950cc output on straight cath with lowering of BP to SBP 110s (day BP range 150-160s).     During evening rounds, patient . Patient lethargic, difficult to arouse to both voice and noxious stimuli. Eyes closed, squeezing fingers and wiggles toes to noxious, no verbal output, no movement to command. 500cc NS bolus ordered in addition to current continuous IVF.     S/p 500cc NS bolus:  BP improved to 126/59. At first, exam remained unchanged compared to earlier in the evening. However, patient began to awaken a bit more.   Opens eyes spontaneously. Tracked examiner on both sides, no forced gaze deviation. Mimic (showed two fingers).   Follows commands (showed 3 fingers, lifted right leg, squeeze fingers)  Right arm and leg antigravity  No verbal output    A/P:  Patient know to have waxing/waning mental status, initially attributed to hypoperfusion, however, patient also with borderline thalamic infarcts on MRI that can also contribute to waxing/waning mental status  - c/w aspirin 81mg daily only for planned PEG 5/7 (last dose of plavix 5/2).   -Will continue with continuous fluids for -160. Avoid further hypotension  -Continue with bladder scan q6  -will plan for stability CTH in the morning On sign out, patient with 800cc bladder scan with 950cc output on straight cath with lowering of BP to SBP 110s (day BP range 150-160s).     During evening rounds, patient . Patient lethargic, difficult to arouse to both voice and noxious stimuli. Eyes closed, squeezing fingers and wiggles toes to noxious, no verbal output, no movement to command. 500cc NS bolus ordered in addition to current continuous IVF.     S/p 500cc NS bolus:  BP improved to 126/59. At first, exam remained unchanged compared to earlier in the evening. However, patient began to awaken a bit more.   Opens eyes spontaneously. Tracked examiner on both sides, no forced gaze deviation. Mimic (showed two fingers).   Follows commands (showed 3 fingers, lifted right leg, squeeze fingers)  Right arm and leg antigravity  No verbal output    A/P:  Patient know to have waxing/waning mental status, initially attributed to hypoperfusion, however, patient also with infarcts along border of thalamus as seen on MRI that can also contribute to waxing/waning mental status  - c/w aspirin 81mg daily only for planned PEG 5/7 (last dose of plavix 5/2).   -Will continue with continuous fluids for -160. Avoid further hypotension  -Continue with bladder scan q6  -will plan for stability CTH in the morning

## 2024-05-03 NOTE — CONSULT NOTE ADULT - ASSESSMENT
88 Cantonese-speaking Male with PMH of JUSTYNA, stroke (2004, residual right sided weakness), HLD, glaucoma, DM, BPH, depression, b/l hearing loss, who first presented to [facility] with new left side arm and leg weakness, found to have a stroke in the right internal capsule. Evaluated by speech and swallow and failed FEES, so GI consulted for peg tube placement.     Recommendations:   - discussed risks/benefits of peg tube placement with wife, including infection, bleeding, perforation, or a missed abnormality   - wife ultimately amenable to peg tube placement   - plan for peg tube placement next week, possibly Tuesday   - patient will need to be off of Plavix for at least 5 days   - okay to continue aspirin while off of Plavix     Case discussed with Dr. Lerma. GI Team will continue to follow.     Iman Curry D.O.   Gastroenterology Fellow  Weekday 7am-5pm Pager: [phone]  Weeknights/Weekend/Holiday Coverage: Please call the  for contact info

## 2024-05-03 NOTE — PROVIDER CONTACT NOTE (OTHER) - BACKGROUND
Change from baseline neurological status, was told on report patient is A&O X3 and only has left sided facial droop and ataxia of left upper arm
Pt admitted for stroke like symptoms and has a thalamic stroke

## 2024-05-03 NOTE — PROGRESS NOTE ADULT - SUBJECTIVE AND OBJECTIVE BOX
Neurology Stroke Progress Note    INTERVAL HPI/OVERNIGHT EVENTS:  Patient seen and examined.  number ______ used.    MEDICATIONS  (STANDING):  aspirin  chewable 81 milliGRAM(s) Oral daily  atorvastatin 80 milliGRAM(s) Oral at bedtime  dextrose 10% Bolus 125 milliLiter(s) IV Bolus once  dextrose 5%. 1000 milliLiter(s) (100 mL/Hr) IV Continuous <Continuous>  dextrose 5%. 1000 milliLiter(s) (50 mL/Hr) IV Continuous <Continuous>  dextrose 50% Injectable 25 Gram(s) IV Push once  dextrose 50% Injectable 12.5 Gram(s) IV Push once  enoxaparin Injectable 40 milliGRAM(s) SubCutaneous every 24 hours  ferrous    sulfate 325 milliGRAM(s) Oral daily  folic acid 1 milliGRAM(s) Oral daily  glucagon  Injectable 1 milliGRAM(s) IntraMuscular once  insulin lispro (ADMELOG) corrective regimen sliding scale   SubCutaneous every 6 hours  magnesium oxide 400 milliGRAM(s) Oral daily  midodrine. 5 milliGRAM(s) Oral <User Schedule>  polyethylene glycol 3350 17 Gram(s) Oral daily  sodium chloride 0.9%. 1000 milliLiter(s) (100 mL/Hr) IV Continuous <Continuous>    MEDICATIONS  (PRN):  dextrose Oral Gel 15 Gram(s) Oral once PRN Blood Glucose LESS THAN 70 milliGRAM(s)/deciliter      Allergies    No Known Allergies    Intolerances        Vital Signs Last 24 Hrs  T(C): 37.2 (03 May 2024 14:00), Max: 37.2 (03 May 2024 14:00)  T(F): 98.9 (03 May 2024 14:00), Max: 98.9 (03 May 2024 14:00)  HR: 80 (03 May 2024 12:33) (66 - 94)  BP: 159/85 (03 May 2024 12:33) (140/71 - 160/73)  BP(mean): 114 (03 May 2024 12:33) (100 - 114)  RR: 16 (03 May 2024 12:33) (16 - 18)  SpO2: 97% (03 May 2024 12:33) (94% - 97%)    Parameters below as of 03 May 2024 12:33  Patient On (Oxygen Delivery Method): room air        Physical exam:  General: No acute distress, awake and alert  Eyes: Anicteric sclerae, moist conjunctivae, see below for CNs  Neck: trachea midline, FROM, supple, no thyromegaly or lymphadenopathy  Cardiovascular: Regular rate and rhythm, no murmurs, rubs, or gallops. No carotid bruits.   Pulmonary: Anterior breath sounds clear bilaterally, no crackles or wheezing. No use of accessory muscles  GI: Abdomen soft, non-distended, non-tender  Extremities: Radial and DP pulses +2, no edema    Neurologic:  -Mental status: Awake, alert, oriented to person, place, and time. Speech is fluent with intact naming, repetition, and comprehension, no dysarthria. Recent and remote memory intact. Follows commands. Attention/concentration intact. Fund of knowledge appropriate.  -Cranial nerves:   II: Visual fields are full to confrontation.  III, IV, VI: Extraocular movements are intact without nystagmus. Pupils equally round and reactive to light  V:  Facial sensation V1-V3 equal and intact   VII: Face is symmetric with normal eye closure and smile  VIII: Hearing is bilaterally intact to finger rub  IX, X: Uvula is midline and soft palate rises symmetrically  XI: Head turning and shoulder shrug are intact.  XII: Tongue protrudes midline  Motor: Normal bulk and tone. No pronator drift. Strength bilateral upper extremity 5/5, bilateral lower extremities 5/5.  Rapid alternating movements intact and symmetric  Sensation: Intact to light touch bilaterally. No neglect or extinction on double simultaneous testing.  Coordination: No dysmetria on finger-to-nose and heel-to-shin bilaterally  Reflexes: Downgoing toes bilaterally   Gait: Narrow gait and steady    LABS:                        11.2   6.26  )-----------( 179      ( 03 May 2024 07:00 )             32.1     05-03    135  |  104  |  13  ----------------------------<  192<H>  3.8   |  21<L>  |  0.68    Ca    8.1<L>      03 May 2024 07:00  Phos  1.8     05-03  Mg     1.9     05-03        Urinalysis Basic - ( 03 May 2024 07:00 )    Color: x / Appearance: x / SG: x / pH: x  Gluc: 192 mg/dL / Ketone: x  / Bili: x / Urobili: x   Blood: x / Protein: x / Nitrite: x   Leuk Esterase: x / RBC: x / WBC x   Sq Epi: x / Non Sq Epi: x / Bacteria: x        RADIOLOGY & ADDITIONAL TESTS:    < from: CT Heart Left Atrium w/ IV Cont (05.02.24 @ 16:20) >  IMPRESSION:    Cardiac:  1.  There are 4 pulmonary veins; 2 on the left and 2 on the right.  2.  No left atrial appendage or left atrialthrombus.      Non cardiac:    Apparent filling defects in segmental branches of the right lower lobe   pulmonary artery. Recommend dedicated CT angiogram to differentiate   artifact from pulmonary embolism.    < end of copied text >    < from: CT Angio Chest PE Protocol w/ IV Cont (05.03.24 @ 11:00) >  IMPRESSION:  No pulmonary embolism. No filling defect in the right lower lobe seen on   the prior coronary CTA are not present, likely having represented motion   artifact.    < end of copied text >       Neurology Stroke Progress Note    INTERVAL HPI/OVERNIGHT EVENTS:  Patient seen and examined.  number [id number] used. NG tube began to slip out this morning and unsuccessful attempt at readvancing. New NG tube placed today without difficulty and patient tolerated well. Otherwise, patient denies any acute neurological events overnight.    MEDICATIONS  (STANDING):  aspirin  chewable 81 milliGRAM(s) Oral daily  atorvastatin 80 milliGRAM(s) Oral at bedtime  dextrose 10% Bolus 125 milliLiter(s) IV Bolus once  dextrose 5%. 1000 milliLiter(s) (100 mL/Hr) IV Continuous <Continuous>  dextrose 5%. 1000 milliLiter(s) (50 mL/Hr) IV Continuous <Continuous>  dextrose 50% Injectable 25 Gram(s) IV Push once  dextrose 50% Injectable 12.5 Gram(s) IV Push once  enoxaparin Injectable 40 milliGRAM(s) SubCutaneous every 24 hours  ferrous    sulfate 325 milliGRAM(s) Oral daily  folic acid 1 milliGRAM(s) Oral daily  glucagon  Injectable 1 milliGRAM(s) IntraMuscular once  insulin lispro (ADMELOG) corrective regimen sliding scale   SubCutaneous every 6 hours  magnesium oxide 400 milliGRAM(s) Oral daily  midodrine. 5 milliGRAM(s) Oral <User Schedule>  polyethylene glycol 3350 17 Gram(s) Oral daily  sodium chloride 0.9%. 1000 milliLiter(s) (100 mL/Hr) IV Continuous <Continuous>    MEDICATIONS  (PRN):  dextrose Oral Gel 15 Gram(s) Oral once PRN Blood Glucose LESS THAN 70 milliGRAM(s)/deciliter      Allergies    No Known Allergies    Intolerances        Vital Signs Last 24 Hrs  T(C): 37.2 (03 May 2024 14:00), Max: 37.2 (03 May 2024 14:00)  T(F): 98.9 (03 May 2024 14:00), Max: 98.9 (03 May 2024 14:00)  HR: 80 (03 May 2024 12:33) (66 - 94)  BP: 159/85 (03 May 2024 12:33) (140/71 - 160/73)  BP(mean): 114 (03 May 2024 12:33) (100 - 114)  RR: 16 (03 May 2024 12:33) (16 - 18)  SpO2: 97% (03 May 2024 12:33) (94% - 97%)    Parameters below as of 03 May 2024 12:33  Patient On (Oxygen Delivery Method): room air        Physical exam:  General: No acute distress, awake and alert  Eyes: Anicteric sclerae, moist conjunctivae, see below for CNs  Neck: trachea midline, FROM, supple, no thyromegaly or lymphadenopathy  Cardiovascular: Regular rate and rhythm, no murmurs, rubs, or gallops. No carotid bruits.   Pulmonary: Anterior breath sounds clear bilaterally, no crackles or wheezing. No use of accessory muscles  GI: Abdomen soft, non-distended, non-tender  Extremities: Radial and DP pulses +2, no edema    Neurologic:  -Mental status: Awake, alert, oriented to person, place, and time. Minimal verbal output. Follows commands. Attention/concentration intact.  -Cranial nerves:   II: Peripheral vision in tact but medial blink to threat inconsistent.  III, IV, VI: Right gaze preference but able to cross midline. Pupils equally round and reactive to light  V:  Facial sensation V1-V3 equal and intact   VII: Left facial droop  VIII: Hearing is intact  Motor: Right upper and lower extremities 4/5. Left upper extremity 0/5 and left lower extremity able to spontaneously lift antigravity but difficulty doing to command.  Sensation: Intact to light touch bilaterally. No neglect or extinction on double simultaneous testing.    LABS:                        11.2   6.26  )-----------( 179      ( 03 May 2024 07:00 )             32.1     05-03    135  |  104  |  13  ----------------------------<  192<H>  3.8   |  21<L>  |  0.68    Ca    8.1<L>      03 May 2024 07:00  Phos  1.8     05-03  Mg     1.9     05-03        Urinalysis Basic - ( 03 May 2024 07:00 )    Color: x / Appearance: x / SG: x / pH: x  Gluc: 192 mg/dL / Ketone: x  / Bili: x / Urobili: x   Blood: x / Protein: x / Nitrite: x   Leuk Esterase: x / RBC: x / WBC x   Sq Epi: x / Non Sq Epi: x / Bacteria: x        RADIOLOGY & ADDITIONAL TESTS:    < from: CT Heart Left Atrium w/ IV Cont (05.02.24 @ 16:20) >  IMPRESSION:    Cardiac:  1.  There are 4 pulmonary veins; 2 on the left and 2 on the right.  2.  No left atrial appendage or left atrialthrombus.      Non cardiac:    Apparent filling defects in segmental branches of the right lower lobe   pulmonary artery. Recommend dedicated CT angiogram to differentiate   artifact from pulmonary embolism.    < end of copied text >    < from: CT Angio Chest PE Protocol w/ IV Cont (05.03.24 @ 11:00) >  IMPRESSION:  No pulmonary embolism. No filling defect in the right lower lobe seen on   the prior coronary CTA are not present, likely having represented motion   artifact.    < end of copied text >       Neurology Stroke Progress Note    INTERVAL HPI/OVERNIGHT EVENTS:  Patient seen and examined.  number [id number] used. NG tube began to slip out this morning and unsuccessful attempt at readvancing. New NG tube placed today without difficulty and patient tolerated well. Otherwise, patient denies any acute neurological events overnight.    MEDICATIONS  (STANDING):  aspirin  chewable 81 milliGRAM(s) Oral daily  atorvastatin 80 milliGRAM(s) Oral at bedtime  dextrose 10% Bolus 125 milliLiter(s) IV Bolus once  dextrose 5%. 1000 milliLiter(s) (100 mL/Hr) IV Continuous <Continuous>  dextrose 5%. 1000 milliLiter(s) (50 mL/Hr) IV Continuous <Continuous>  dextrose 50% Injectable 25 Gram(s) IV Push once  dextrose 50% Injectable 12.5 Gram(s) IV Push once  enoxaparin Injectable 40 milliGRAM(s) SubCutaneous every 24 hours  ferrous    sulfate 325 milliGRAM(s) Oral daily  folic acid 1 milliGRAM(s) Oral daily  glucagon  Injectable 1 milliGRAM(s) IntraMuscular once  insulin lispro (ADMELOG) corrective regimen sliding scale   SubCutaneous every 6 hours  magnesium oxide 400 milliGRAM(s) Oral daily  midodrine. 5 milliGRAM(s) Oral <User Schedule>  polyethylene glycol 3350 17 Gram(s) Oral daily  sodium chloride 0.9%. 1000 milliLiter(s) (100 mL/Hr) IV Continuous <Continuous>    MEDICATIONS  (PRN):  dextrose Oral Gel 15 Gram(s) Oral once PRN Blood Glucose LESS THAN 70 milliGRAM(s)/deciliter      Allergies    No Known Allergies    Intolerances        Vital Signs Last 24 Hrs  T(C): 37.2 (03 May 2024 14:00), Max: 37.2 (03 May 2024 14:00)  T(F): 98.9 (03 May 2024 14:00), Max: 98.9 (03 May 2024 14:00)  HR: 80 (03 May 2024 12:33) (66 - 94)  BP: 159/85 (03 May 2024 12:33) (140/71 - 160/73)  BP(mean): 114 (03 May 2024 12:33) (100 - 114)  RR: 16 (03 May 2024 12:33) (16 - 18)  SpO2: 97% (03 May 2024 12:33) (94% - 97%)    Parameters below as of 03 May 2024 12:33  Patient On (Oxygen Delivery Method): room air        Physical exam:  General: No acute distress, awake and alert  Eyes: Anicteric sclerae, moist conjunctivae, see below for CNs  Neck: trachea midline, FROM, supple, no thyromegaly or lymphadenopathy  Cardiovascular: Regular rate and rhythm, no murmurs, rubs, or gallops. No carotid bruits.   Pulmonary: Anterior breath sounds clear bilaterally, no crackles or wheezing. No use of accessory muscles  GI: Abdomen soft, non-distended, non-tender  Extremities: Radial and DP pulses +2, no edema    Neurologic:  -Mental status: Awake, alert, oriented to person, place, and time. Minimal verbal output. Follows commands. Attention/concentration intact.  -Cranial nerves:   II: Peripheral vision in tact but medial blink to threat inconsistent.  III, IV, VI: Right gaze preference but able to cross midline. Pupils equally round and reactive to light  V:  Facial sensation V1-V3 equal and intact   VII: Left facial droop  VIII: Hearing is intact  Motor: Right upper and lower extremities 4/5. Left upper extremity 0/5 and left lower extremity able to spontaneously lift antigravity but difficulty doing to command.  Sensation: Intact to light touch bilaterally. No neglect or extinction on double simultaneous testing.    LABS:                        11.2   6.26  )-----------( 179      ( 03 May 2024 07:00 )             32.1     05-03    135  |  104  |  13  ----------------------------<  192<H>  3.8   |  21<L>  |  0.68    Ca    8.1<L>      03 May 2024 07:00  Phos  1.8     05-03  Mg     1.9     05-03        Urinalysis Basic - ( 03 May 2024 07:00 )    Color: x / Appearance: x / SG: x / pH: x  Gluc: 192 mg/dL / Ketone: x  / Bili: x / Urobili: x   Blood: x / Protein: x / Nitrite: x   Leuk Esterase: x / RBC: x / WBC x   Sq Epi: x / Non Sq Epi: x / Bacteria: x        RADIOLOGY & ADDITIONAL TESTS:    < from: CT Heart Left Atrium w/ IV Cont (05.02.24 @ 16:20) >  IMPRESSION:    Cardiac:  1.  There are 4 pulmonary veins; 2 on the left and 2 on the right.  2.  No left atrial appendage or left atrialthrombus.      Non cardiac:    Apparent filling defects in segmental branches of the right lower lobe   pulmonary artery. Recommend dedicated CT angiogram to differentiate   artifact from pulmonary embolism.    < end of copied text >    < from: CT Angio Chest PE Protocol w/ IV Cont (05.03.24 @ 11:00) >  IMPRESSION:  No pulmonary embolism. No filling defect in the right lower lobe seen on   the prior coronary CTA are not present, likely having represented motion   artifact.    < end of copied text >      < from: Xray Chest 1 View- PORTABLE-Routine (Xray Chest 1 View- PORTABLE-Routine .) (05.03.24 @ 12:32) >  IMPRESSION:    Nasogastric tube at distal stomach/proximal duodenum. Contrast noted in   kidneys and ureters. Possible small left pleural effusion. Only lung   bases included on this exam.    < end of copied text >   Neurology Stroke Progress Note    INTERVAL HPI/OVERNIGHT EVENTS:  Patient seen and examined.  number [id number] used. NG tube began to slip out this morning and unsuccessful attempt at readvancing. New NG tube placed today without difficulty and patient tolerated well. Otherwise, patient denies any acute neurological events overnight.    MEDICATIONS  (STANDING):  aspirin  chewable 81 milliGRAM(s) Oral daily  atorvastatin 80 milliGRAM(s) Oral at bedtime  dextrose 10% Bolus 125 milliLiter(s) IV Bolus once  dextrose 5%. 1000 milliLiter(s) (100 mL/Hr) IV Continuous <Continuous>  dextrose 5%. 1000 milliLiter(s) (50 mL/Hr) IV Continuous <Continuous>  dextrose 50% Injectable 25 Gram(s) IV Push once  dextrose 50% Injectable 12.5 Gram(s) IV Push once  enoxaparin Injectable 40 milliGRAM(s) SubCutaneous every 24 hours  ferrous    sulfate 325 milliGRAM(s) Oral daily  folic acid 1 milliGRAM(s) Oral daily  glucagon  Injectable 1 milliGRAM(s) IntraMuscular once  insulin lispro (ADMELOG) corrective regimen sliding scale   SubCutaneous every 6 hours  magnesium oxide 400 milliGRAM(s) Oral daily  midodrine. 5 milliGRAM(s) Oral <User Schedule>  polyethylene glycol 3350 17 Gram(s) Oral daily  sodium chloride 0.9%. 1000 milliLiter(s) (100 mL/Hr) IV Continuous <Continuous>    MEDICATIONS  (PRN):  dextrose Oral Gel 15 Gram(s) Oral once PRN Blood Glucose LESS THAN 70 milliGRAM(s)/deciliter      Allergies    No Known Allergies    Intolerances        Vital Signs Last 24 Hrs  T(C): 37.2 (03 May 2024 14:00), Max: 37.2 (03 May 2024 14:00)  T(F): 98.9 (03 May 2024 14:00), Max: 98.9 (03 May 2024 14:00)  HR: 80 (03 May 2024 12:33) (66 - 94)  BP: 159/85 (03 May 2024 12:33) (140/71 - 160/73)  BP(mean): 114 (03 May 2024 12:33) (100 - 114)  RR: 16 (03 May 2024 12:33) (16 - 18)  SpO2: 97% (03 May 2024 12:33) (94% - 97%)    Parameters below as of 03 May 2024 12:33  Patient On (Oxygen Delivery Method): room air        Physical exam:  General: No acute distress, awake and alert  Eyes: Anicteric sclerae, moist conjunctivae, see below for CNs  Neck: trachea midline, FROM, supple, no thyromegaly or lymphadenopathy  Cardiovascular: Regular rate and rhythm, no murmurs, rubs, or gallops. No carotid bruits.   Pulmonary: Anterior breath sounds clear bilaterally, no crackles or wheezing. No use of accessory muscles  GI: Abdomen soft, non-distended, non-tender  Extremities: Radial and DP pulses +2, no edema    Neurologic:  -Mental status: Awake, alert, oriented to person, place, and time. Minimal verbal output. Follows commands. Attention/concentration intact.  -Cranial nerves:   II: Peripheral vision in tact but medial blink to threat inconsistent.  III, IV, VI: Right gaze preference but able to cross midline. Pupils equally round and reactive to light  V:  Facial sensation V1-V3 equal and intact   VII: Left facial droop  VIII: Hearing is intact  Motor: Right upper and lower extremities 4/5. Left upper extremity 0/5 and left lower extremity able to spontaneously lift antigravity but difficulty doing to command.  Sensation: Intact to light touch bilaterally. No neglect or extinction on double simultaneous testing.  Coordination: Intact finger to nose on the right. Unable to assess on the left.    LABS:                        11.2   6.26  )-----------( 179      ( 03 May 2024 07:00 )             32.1     05-03    135  |  104  |  13  ----------------------------<  192<H>  3.8   |  21<L>  |  0.68    Ca    8.1<L>      03 May 2024 07:00  Phos  1.8     05-03  Mg     1.9     05-03        Urinalysis Basic - ( 03 May 2024 07:00 )    Color: x / Appearance: x / SG: x / pH: x  Gluc: 192 mg/dL / Ketone: x  / Bili: x / Urobili: x   Blood: x / Protein: x / Nitrite: x   Leuk Esterase: x / RBC: x / WBC x   Sq Epi: x / Non Sq Epi: x / Bacteria: x        RADIOLOGY & ADDITIONAL TESTS:    < from: CT Heart Left Atrium w/ IV Cont (05.02.24 @ 16:20) >  IMPRESSION:    Cardiac:  1.  There are 4 pulmonary veins; 2 on the left and 2 on the right.  2.  No left atrial appendage or left atrialthrombus.      Non cardiac:    Apparent filling defects in segmental branches of the right lower lobe   pulmonary artery. Recommend dedicated CT angiogram to differentiate   artifact from pulmonary embolism.    < end of copied text >    < from: CT Angio Chest PE Protocol w/ IV Cont (05.03.24 @ 11:00) >  IMPRESSION:  No pulmonary embolism. No filling defect in the right lower lobe seen on   the prior coronary CTA are not present, likely having represented motion   artifact.    < end of copied text >      < from: Xray Chest 1 View- PORTABLE-Routine (Xray Chest 1 View- PORTABLE-Routine .) (05.03.24 @ 12:32) >  IMPRESSION:    Nasogastric tube at distal stomach/proximal duodenum. Contrast noted in   kidneys and ureters. Possible small left pleural effusion. Only lung   bases included on this exam.    < end of copied text >

## 2024-05-03 NOTE — PROGRESS NOTE ADULT - ASSESSMENT
88y M Cantonese speaking, R hand dominant, b/l  hearing impairment ( b/l hearing aids) with PMHx of DM, Thalassemia minor/JUSTYNA, HLD, hx stroke with right sided weakness in 2004 (has a cane and walker at home but pt refuses using assisted device, off ASA since 2020 for unclear reason), depression, glaucoma, BPH, [facility] with new left side arm and leg weakness starting 4/26 pm. Wife noticed that patient was leaning against the wall with the left side of his body to support himself to walk and was unable to hold himself upright when sitting, slumping over to the left. Patient felt that his left arm and leg were weak. Called PCP who recommended ED visit. CTH/CTP/CTA head and neck with no acute findings. Loaded with aspirin 300mg x1. Transferred to [facility] for further stroke w/u.    # R internal capsule infarct  # hx CVA w/ residual R sided weakness   # Dysphagia  - 8LA telemetry monitoring   - q4hr stroke neuro checks and vitals  - active plan per Stroke team   - Permissive HTN goal -160  - midodrine 2.5mg bid ( 4/30)-> increased to 5mg bid( 5/1-)  to help with blood pressure for perfusion.   - IVF @100ml/hr   - TF via NGT @ 40ml/hr, goal rate 75ml/hr   - failed FEES ( 5/2), pt an dwife agreeable for PEG  - GI consult appreciated, as d/w GI fellow Dr. Curry, plan for PEG next Tues 5/7 ( need to hold Plavix for 5 days, last dose 5/2)   - f/u wife's decision on LINQ ( wife wants to d/w son from [facility])   - f/u cardiac CT result   - s/p aspirin load 300mg x1( 4/27)  - continue aspirin 81mg po daily ( 4/27-)  - started Clopidogrel 75mg po daily ( 4/28-5/2) hold for PEG placement   - continue Atorvastatin 80mg po qHS  ( 4/27-) via NGT  - - statin ( not able to give 4/28- pt not able to swallow )   - PT/OT eval    # Anemia hx Thal minor  - Ferritin 410, Tsat 33% adequate iron store  - Hgb 11.2%     #constipation  - c/w bowel regimen w/ senna+miralax    #DM  - A1C 6.3%   home meds: metformin 500mg TID, Januvia 50mg daily - held for now.   - ISS, goal -180     # Hyponatremia- resolved.   - trend Na 131--> 135--> 133. --> 135   - Hypophos - replace    #DVT Prophylaxis: Lovenox 40mg sq daily     Dispo: fu PT/OT, will benefit inpatient Rehab      Contact:  PMD Dr. Aron Soares   Wife: Farshad Pelayo # [phone]    POC as d/w Stroke ACP     Thank you for allowing medicine to participate in the care

## 2024-05-03 NOTE — PROGRESS NOTE ADULT - SUBJECTIVE AND OBJECTIVE BOX
Patient is a 88y old  Male who presents with a chief complaint of left sided weakness (03 May 2024 10:34)    INTERVAL EVENTS: NAEON     SUBJECTIVE:  Patient was seen and examined at bedside. Wife at bedside. As d/w pt and wife, now they're agreeable for PEG placement next week with holding Plavix for at least 5 days as d/w GI fellow Dr. Curry.    Review of systems: No fever, chills, dizziness, HA, Changes in vision, CP, dyspnea, nausea or vomiting, dysuria, changes in bowel movements, LE edema. Rest of 12 point Review of systems negative unless otherwise documented elsewhere in note.     Diet, NPO with Tube Feed:   Tube Feeding Modality: Nasogastric  Glucerna 1.2 Nikolai (GLUCERNARTH)  Continuous  Starting Tube Feed Rate mL per Hour: 20  Increase Tube Feed Rate by (mL): 10     Every 6 hours  Until Goal Tube Feed Rate (mL per Hour): 73  Tube Feed Duration (in Hours): 24  Tube Feed Start Time: 11:00  Tube Feed Stop Time: 05:00 (05-01-24 @ 12:48) [Active]      MEDICATIONS:  MEDICATIONS  (STANDING):  aspirin Suppository 300 milliGRAM(s) Rectal daily  atorvastatin 80 milliGRAM(s) Oral at bedtime  dextrose 10% Bolus 125 milliLiter(s) IV Bolus once  dextrose 5%. 1000 milliLiter(s) (50 mL/Hr) IV Continuous <Continuous>  dextrose 5%. 1000 milliLiter(s) (100 mL/Hr) IV Continuous <Continuous>  dextrose 50% Injectable 25 Gram(s) IV Push once  dextrose 50% Injectable 12.5 Gram(s) IV Push once  enoxaparin Injectable 40 milliGRAM(s) SubCutaneous every 24 hours  ferrous    sulfate 325 milliGRAM(s) Oral daily  folic acid 1 milliGRAM(s) Oral daily  glucagon  Injectable 1 milliGRAM(s) IntraMuscular once  insulin lispro (ADMELOG) corrective regimen sliding scale   SubCutaneous every 6 hours  magnesium oxide 400 milliGRAM(s) Oral daily  midodrine. 5 milliGRAM(s) Oral <User Schedule>  polyethylene glycol 3350 17 Gram(s) Oral daily  sodium chloride 0.9%. 1000 milliLiter(s) (100 mL/Hr) IV Continuous <Continuous>    MEDICATIONS  (PRN):  dextrose Oral Gel 15 Gram(s) Oral once PRN Blood Glucose LESS THAN 70 milliGRAM(s)/deciliter      Allergies    No Known Allergies    Intolerances        OBJECTIVE:  Vital Signs Last 24 Hrs  T(C): 36.9 (03 May 2024 10:00), Max: 37.3 (02 May 2024 14:08)  T(F): 98.4 (03 May 2024 10:00), Max: 99.2 (02 May 2024 14:08)  HR: 80 (03 May 2024 08:28) (66 - 94)  BP: 154/78 (03 May 2024 08:28) (122/66 - 160/73)  BP(mean): 104 (03 May 2024 08:28) (87 - 105)  RR: 16 (03 May 2024 08:28) (16 - 18)  SpO2: 95% (03 May 2024 08:28) (94% - 97%)    Parameters below as of 03 May 2024 08:28  Patient On (Oxygen Delivery Method): room air      I&O's Summary    02 May 2024 07:01  -  03 May 2024 07:00  --------------------------------------------------------  IN: 2100 mL / OUT: 2400 mL / NET: -300 mL    03 May 2024 07:01  -  03 May 2024 12:58  --------------------------------------------------------  IN: 200 mL / OUT: 0 mL / NET: 200 mL        PHYSICAL EXAM:  Gen: Reclining in bed at time of exam, appears stated age  HEENT: NCAT, MMM, clear OP  Neck: supple, trachea at midline  CV: RRR, +S1/S2  Pulm: adequate respiratory effort, no increase in work of breathing  Abd: soft, NTND  Skin: warm and dry,   Ext: WWP, no LE edema  Neuro: AOx3, L HP   Psych: affect and behavior appropriate, pleasant at time of interview  :     LABS:                        11.2   6.26  )-----------( 179      ( 03 May 2024 07:00 )             32.1     05-03    135  |  104  |  13  ----------------------------<  192<H>  3.8   |  21<L>  |  0.68    Ca    8.1<L>      03 May 2024 07:00  Phos  1.8     05-03  Mg     1.9     05-03          CAPILLARY BLOOD GLUCOSE      POCT Blood Glucose.: 132 mg/dL (03 May 2024 11:20)  POCT Blood Glucose.: 175 mg/dL (03 May 2024 07:00)  POCT Blood Glucose.: 159 mg/dL (03 May 2024 00:09)  POCT Blood Glucose.: 146 mg/dL (02 May 2024 17:22)    Urinalysis Basic - ( 03 May 2024 07:00 )    Color: x / Appearance: x / SG: x / pH: x  Gluc: 192 mg/dL / Ketone: x  / Bili: x / Urobili: x   Blood: x / Protein: x / Nitrite: x   Leuk Esterase: x / RBC: x / WBC x   Sq Epi: x / Non Sq Epi: x / Bacteria: x        MICRODATA:      RADIOLOGY/OTHER STUDIES:    PCP  Pharmacy:   Emergency contact:

## 2024-05-03 NOTE — CONSULT NOTE ADULT - SUBJECTIVE AND OBJECTIVE BOX
Initial GI Consult Note:     HPI:   88 Cantonese-speaking Male with PMH of JUSYTNA, stroke (2004, residual right sided weakness), HLD, glaucoma, DM, BPH, depression, b/l hearing loss, who first presented to [facility] with new left side arm and leg weakness starting 4/26 pm. Wife also noted truncal ataxia (slumping over to the left when sitting) and gait ataxia. CT imaging with no acute pathology. Transferred to [facility] for further stroke w/u. Course c/b periods of waxing/waning left sided hemiparesis likely due to hypoperfusion, s/p IVF boluses, now started on midodrine. Brain MRI revealing acute/subacute infarction within the posterior limb of the right internal capsule with associated cytotoxic edema as well as additional chronic infarcts and similar-appearing mild chronic microvascular type changes. Patient was started on aspirin and Plavix due to recent stroke. Was evaluated by speech and swallowed, who performed a FEES, which patient failed so recommended a peg tube placement GI consulted for peg tube placement. Patient seen and examined at bedside using [employer]  (Mike ID: [id number] [employer]). Discussed plan for possible peg tube placement with patient's wife, who while initially a bit hesitant does wish to move forward with peg tub placement given the nose trauma she noted form repeated NG tube placements. Discussed risks/benefits of procedure and need to be off of Plavix for peg tube placement next week.       VITAL SIGNS:  Vital Signs Last 24 Hrs  T(C): 36.4 (03 May 2024 04:49), Max: 37.3 (02 May 2024 14:08)  T(F): 97.6 (03 May 2024 04:49), Max: 99.2 (02 May 2024 14:08)  HR: 80 (03 May 2024 08:28) (66 - 94)  BP: 154/78 (03 May 2024 08:28) (122/66 - 160/73)  BP(mean): 104 (03 May 2024 08:28) (87 - 105)  RR: 16 (03 May 2024 08:28) (16 - 18)  SpO2: 95% (03 May 2024 08:28) (94% - 97%)    Parameters below as of 03 May 2024 08:28  Patient On (Oxygen Delivery Method): room air        05-02-24 @ 07:01  -  05-03-24 @ 07:00  --------------------------------------------------------  IN: 2100 mL / OUT: 2400 mL / NET: -300 mL    05-03-24 @ 07:01  - 05-03-24 @ 10:40  --------------------------------------------------------  IN: 200 mL / OUT: 0 mL / NET: 200 mL      PHYSICAL EXAM:  General: No acute distress, thin, elderly   Lungs: Normal respiratory effort and no intercostal retractions  Cardiovascular: RRR  Abdomen: Soft, non-tender, non-distended  Neurological: Lethargic, not participating in conversation   Skin: Warm and dry. No obvious rash      MEDICATIONS:  MEDICATIONS  (STANDING):  aspirin  chewable 81 milliGRAM(s) Oral daily  atorvastatin 80 milliGRAM(s) Oral at bedtime  clopidogrel Tablet 75 milliGRAM(s) Enteral Tube daily  dextrose 10% Bolus 125 milliLiter(s) IV Bolus once  dextrose 5%. 1000 milliLiter(s) (100 mL/Hr) IV Continuous <Continuous>  dextrose 5%. 1000 milliLiter(s) (50 mL/Hr) IV Continuous <Continuous>  dextrose 50% Injectable 25 Gram(s) IV Push once  dextrose 50% Injectable 12.5 Gram(s) IV Push once  enoxaparin Injectable 40 milliGRAM(s) SubCutaneous every 24 hours  ferrous    sulfate 325 milliGRAM(s) Oral daily  folic acid 1 milliGRAM(s) Oral daily  glucagon  Injectable 1 milliGRAM(s) IntraMuscular once  insulin lispro (ADMELOG) corrective regimen sliding scale   SubCutaneous every 6 hours  magnesium oxide 400 milliGRAM(s) Oral daily  midodrine. 5 milliGRAM(s) Oral <User Schedule>  polyethylene glycol 3350 17 Gram(s) Oral daily  sodium chloride 0.9%. 1000 milliLiter(s) (100 mL/Hr) IV Continuous <Continuous>    MEDICATIONS  (PRN):  dextrose Oral Gel 15 Gram(s) Oral once PRN Blood Glucose LESS THAN 70 milliGRAM(s)/deciliter      ALLERGIES:  Allergies    No Known Allergies    Intolerances        LABS:                        11.2   6.26  )-----------( 179      ( 03 May 2024 07:00 )             32.1     05-03    135  |  104  |  13  ----------------------------<  192<H>  3.8   |  21<L>  |  0.68    Ca    8.1<L>      03 May 2024 07:00  Phos  1.8     05-03  Mg     1.9     05-03        Urinalysis Basic - ( 03 May 2024 07:00 )    Color: x / Appearance: x / SG: x / pH: x  Gluc: 192 mg/dL / Ketone: x  / Bili: x / Urobili: x   Blood: x / Protein: x / Nitrite: x   Leuk Esterase: x / RBC: x / WBC x   Sq Epi: x / Non Sq Epi: x / Bacteria: x      CAPILLARY BLOOD GLUCOSE      POCT Blood Glucose.: 175 mg/dL (03 May 2024 07:00)  RADIOLOGY & ADDITIONAL TESTS: Reviewed.

## 2024-05-03 NOTE — PROGRESS NOTE ADULT - ASSESSMENT
88y Cantonese speaking Male with PMHx of JUSTYNA, stroke (2004, residual right sided weakness), HLD, glaucoma, DM, BPH, depression, b/l hearing loss [facility] new left side arm and leg weakness starting 4/26 pm. Wife also noted truncal ataxia (slumping over to the left when sitting) and gait ataxia. CT imaging with no acute pathology. Transferred to [facility] for further stroke w/u. Course c/b periods of waxing/waning left sided hemiparesis likely due to hypoperfusion, s/p IVF boluses, now started on midodrine.    Neuro   #hx of stroke 2004 with residual right sided weakness    #CVA workup   Patient previously on aspirin, but was stopped by doctor in 2020, unclear reason why.    - continue ASA 81mg daily  - hold Plavix 75mg daily for the next 5 days for PEG placement   - continue atorvastatin 80mg daily    - q4hr stroke neuro checks and vitals   - MRI brain on 4/28 with R internal capsule infarct   - Stroke Code HCT Results: negative for acute ischemia    - Stroke Code CTA Results: stenosis of proximal bilateral A2 segments and R M2   - Stroke education     Cards   #HTN   - permissive hypertension, Goal -160, okay to sit up in bed with PT  - s/p 500cc bolus, now on 100cc/hr   - Start midodrine 2.5mg BID on 4/30 as left LE weakness is waxing/waning despite stable BPs --> midodrine increased to 5mg BID on 5/1  - wife refusing WALLY/ILR  - Cardiac CT to rule out SANDRO thrombus: No SANDRO or left atrial thrombus. Non cardiac: Apparent filling defects in segmental branches of the right lower lobe pulmonary artery.  - TTE: EF 56%     #HLD   - high dose statin as above in CVA   - LDL results: 170     Pulm   - call provider if SPO2 < 94%   - CT Angio Chest PE Protocol: No pulmonary embolism. No filling defect in the right lower lobe as seen on prior coronary CTA, likely motion artifact.    GI   #Nutrition/Fluids/Electrolytes    - replete K<4 and Mg <2   - Diet: continue tube feeds  - IVF: 100cc/hr    #dysphagia    - failed SLP evaluation   - FEES completed on 5/2 - recommended for PEG  - will give patient more time over the weekend to improve from a swallow standpoint and reeval early next week  - Wife agreeable to PEG. GI recommending Plavix being held for 5 days and PEG tube placement on Tuesday.     #constipation   - c/w bowel regimen w/ senna+miralax     Endocrine   #DM   home meds: metformin 500mg TID, Januvia 50mg daily      - A1C results: 6.3%   - ISS     - TSH results: 2.080     Hematology   #JUSTYNA   - c/w home ferrous sulfate 325mg daily   - Ferritin: 410, total iron: 83, TIBC: 255, % saturation: 33     MSK   # leg cramps   - LE dopplers obtained, negative for DVT bilaterally      Psych   #depression   Per wife, prior to COVID, patient very active with friends/community, played sports. Since pandemic and worsening eyesight due to glaucoma and hearing, patient with no interest in daily activities. Was started on escitalopram 10mg daily, but stopped taking due to constipation   - consider restarting based on patient's mood and improvement of bowel movement after starting bowel regimen       DVT Prophylaxis   - lovenox sq for DVT prophylaxis    - SCDs for DVT prophylaxis      IDR Goals: Goals reviewed at interdisciplinary rounds with case management, social work, physical therapy, occupational therapy, and speech language pathology.    Please see specific therapy notes for in depth goals.     Dispo: [place]    Discussed daily hospital plans and goals with patient and wife at bedside via .     Discussed with Dr. Payton who will discuss with Dr. Gonzalez

## 2024-05-03 NOTE — CHART NOTE - NSCHARTNOTEFT_GEN_A_CORE
Admitting Diagnosis:   Patient is a 88y old  Male who presents with a chief complaint of left sided weakness (03 May 2024 15:38)  PAST MEDICAL & SURGICAL HISTORY:  Diabetes  HLD (hyperlipidemia)  BPH (benign prostatic hyperplasia)  JUSTYNA (iron deficiency anemia)  Stroke    Current Nutrition Order: NPO with tube feeding via nasogastric tube: Glucerna 1.2 goal of 73mL/hour x 24 hours, providing 1752mL total volume, 2102 calories, 105g protein, 1410mL free water     PO Intake: Good (%) [   ]  Fair (50-75%) [   ] Poor (<25%) [   ] *NPO with tube feeding [ x ]*    GI Issues: no nausea or emesis; distended abdomen per nursing; loose BM noted; BM on 24;     Pain: no pain/discomfort noted per nursing    Skin Integrity: no pressure ulcers noted; no edema; Leonel: 14    Labs:       135  |  104  |  13  ----------------------------<  192<H>  3.8   |  21<L>  |  0.68    Ca    8.1<L>      03 May 2024 07:00  Phos  1.8     05-03  Mg     1.9     05-03    CAPILLARY BLOOD GLUCOSE    POCT Blood Glucose.: 132 mg/dL (03 May 2024 11:20)  POCT Blood Glucose.: 175 mg/dL (03 May 2024 07:00)  POCT Blood Glucose.: 159 mg/dL (03 May 2024 00:09)  POCT Blood Glucose.: 146 mg/dL (02 May 2024 17:22)    Medications:  MEDICATIONS  (STANDING):  aspirin  chewable 81 milliGRAM(s) Oral daily  atorvastatin 80 milliGRAM(s) Oral at bedtime  dextrose 10% Bolus 125 milliLiter(s) IV Bolus once  dextrose 5%. 1000 milliLiter(s) (50 mL/Hr) IV Continuous <Continuous>  dextrose 5%. 1000 milliLiter(s) (100 mL/Hr) IV Continuous <Continuous>  dextrose 50% Injectable 25 Gram(s) IV Push once  dextrose 50% Injectable 12.5 Gram(s) IV Push once  enoxaparin Injectable 40 milliGRAM(s) SubCutaneous every 24 hours  ferrous    sulfate 325 milliGRAM(s) Oral daily  folic acid 1 milliGRAM(s) Oral daily  glucagon  Injectable 1 milliGRAM(s) IntraMuscular once  insulin lispro (ADMELOG) corrective regimen sliding scale   SubCutaneous every 6 hours  magnesium oxide 400 milliGRAM(s) Oral daily  midodrine. 5 milliGRAM(s) Oral <User Schedule>  polyethylene glycol 3350 17 Gram(s) Oral daily  sodium chloride 0.9%. 1000 milliLiter(s) (100 mL/Hr) IV Continuous <Continuous>    MEDICATIONS  (PRN):  dextrose Oral Gel 15 Gram(s) Oral once PRN Blood Glucose LESS THAN 70 milliGRAM(s)/deciliter    Dosing Anthropmetrics:   Height for BMI (FEET)	5 Feet  Height for BMI (INCHES)	8 Inch(s)  Height for BMI (CENTIMETERS)	172.72 Centimeter(s)  Weight for BMI (lbs)	155 lb  Weight for BMI (kg)	70.3 kg  Body Mass Index	23.5  ideal body weight: 154 pounds, pt is ~101% of ideal body weight    Weight Change: no new weights noted in electronic medical records; recommend that nursing obtain updated weights weekly; RD to monitor trends.     Nutrition Focused Physical Exam: pt out of the room when RD attempted to visit pt for nutrition follow up; RD to follow up prn.     Estimated energy needs:   Weight used for calculations	current weight  Estimated Energy Needs Weight (lbs)	155 lb  Estimated Energy Needs Weight (kg)	70.3 kg  Estimated Energy Needs From (latanya/kg)	20  Estimated Energy Needs To (latanya/kg)	25  Estimated Energy Needs Calculated From (latanya/kg)	1406  Estimated Energy Needs Calculated To (latanya/kg)	1757  Weight used for calculations	current weight  Estimated Protein Needs Weight (lbs)	155 lb  Estimated Protein Needs Weight (kg)	70.3 kg  Estimated Protein Needs From (g/kg)	1.1  Estimated Protein Needs To (g/kg)	1.3  Estimated Protein Needs Calculated From (g/kg)	77.33  Estimated Protein Needs Calculated To (g/kg)	91.39  Estimated Fluid Needs Weight (lbs)	155 lb  Estimated Fluid Needs Weight (kg)	70.3 kg  Estimated Fluid Needs From (ml/kg)	30  Estimated Fluid Needs To (ml/kg)	35  Estimated Fluid Needs Calculated From (ml/kg)	2109  Estimated Fluid Needs Calculated To (ml/kg)	2460  Other Calculations	Pt within % ideal body weight, thus actual body weight used for all calculations. Needs adjusted for advanced age, clinical status/condition.    Subjective: 88 year old Cantonese speaking Male with past medical history of JUSTYNA, stroke (2004, residual right sided weakness), HLD, glaucoma, DM, BPH, depression, bilateral hearing loss presents with LHGV with new left side arm and leg weakness starting  in the evening. Wife noticed that patient was leaning against the wall with the left side of his body to support himself to walk and was unable to hold himself upright when sitting, slumping over to the left. Patient felt that his left arm and leg were weak. Called PCP who recommened ED visit. CTH/CTP/CTA head and neck with no acute findings. Transferred to [facility] for further stroke workup.    RD attempted to visit pt 2x, pt was not in his room. RD spoke to nursing, medical team, referred to electronic medical records/documentation/chart information. Pt noted with no nausea or emesis; distended abdomen per nursing; loose BM noted; BM on 24. Tube was pulled by patient, per RN feeds were restarted at 2pm today (5/3/24), no pain/discomfort noted per nursing. No pressure ulcers noted; no edema. Labs reviewed: elevated serum Glucose (192), low Phosphorus (1.8), please replete**, elevated POCT glucose (146-188 range), RD to monitor trends. Education deferred at this time. RD to remain available. Please see recommendations below.     Previous Nutrition Diagnosis: Inadequate Energy Intake related to pt's inability to meet nutritional demands via PO as evidenced by NPO status    Active [   ]  Resolved [ x ]    If resolved, new PES: Inadequate oral intakes related to pt's inability to meet nutritional demands via PO as evidenced by NPO with tube feeding status     Goal: Pt to consistently meet at least 75% of EEE via tolerated route that is consistent with goals of care during hospital stay    Nutrition Recommendations:  1. NPO  **As medically feasible and indicated, considering continuing enteral nutrition via feasible route: Glucerna 1.2 @ 73mL/hr x 18 hours (7434-7539). This provides: 1314mL total volume, 1577 calories, 79g protein, 1058mL free water. Meetin.4 kcal/kg actual body weight, 1.12g protein/kg based on actual body weight (70.3kg). **provide Banatrol TF 2x/day to promote fecal bulk**  **IF GI upset continues, consider Vital 1.5 with goal of 60mL/hour x 18 hours (3030-5980). This provides: 1080mL total volume, 1620 calories, 73g protein, 825mL free water. Meetin.99kcal/kg actual body weight, 1.04g protein/kg based on actual body weight (70.3kg).**  Defer fluids to team. Maintain aspiration precautions. Monitor signs/symptoms of intolerance; adjust enteral nutrition regimen as needed.  2. Monitor GI function, chemistry/labs, weights (weekly), skin integrity  3. Continue insulin regimen to promote euglycemia; please replete phosphorus levels**  4. Pain and bowel regimen per medical team  5. Align nutrition with goals of care at all times  **paged stroke ACP team 2x, RD to follow up**    Education: deferred at this time.     Risk Level: High [ x ] Moderate [   ] Low [   ] Admitting Diagnosis:   Patient is a 88y old  Male who presents with a chief complaint of left sided weakness (03 May 2024 15:38)  PAST MEDICAL & SURGICAL HISTORY:  Diabetes  HLD (hyperlipidemia)  BPH (benign prostatic hyperplasia)  JUSTYNA (iron deficiency anemia)  Stroke    Current Nutrition Order: NPO with tube feeding via nasogastric tube: Glucerna 1.2 goal of 73mL/hour x 24 hours, providing 1752mL total volume, 2102 calories, 105g protein, 1410mL free water     PO Intake: Good (%) [   ]  Fair (50-75%) [   ] Poor (<25%) [   ] *NPO with tube feeding [ x ]*    GI Issues: no nausea or emesis; distended abdomen per nursing; loose BM noted; BM on 24;     Pain: no pain/discomfort noted per nursing    Skin Integrity: no pressure ulcers noted; no edema; Leonel: 14    Labs:       135  |  104  |  13  ----------------------------<  192<H>  3.8   |  21<L>  |  0.68    Ca    8.1<L>      03 May 2024 07:00  Phos  1.8     05-03  Mg     1.9     05-03    CAPILLARY BLOOD GLUCOSE    POCT Blood Glucose.: 132 mg/dL (03 May 2024 11:20)  POCT Blood Glucose.: 175 mg/dL (03 May 2024 07:00)  POCT Blood Glucose.: 159 mg/dL (03 May 2024 00:09)  POCT Blood Glucose.: 146 mg/dL (02 May 2024 17:22)    Medications:  MEDICATIONS  (STANDING):  aspirin  chewable 81 milliGRAM(s) Oral daily  atorvastatin 80 milliGRAM(s) Oral at bedtime  dextrose 10% Bolus 125 milliLiter(s) IV Bolus once  dextrose 5%. 1000 milliLiter(s) (50 mL/Hr) IV Continuous <Continuous>  dextrose 5%. 1000 milliLiter(s) (100 mL/Hr) IV Continuous <Continuous>  dextrose 50% Injectable 25 Gram(s) IV Push once  dextrose 50% Injectable 12.5 Gram(s) IV Push once  enoxaparin Injectable 40 milliGRAM(s) SubCutaneous every 24 hours  ferrous    sulfate 325 milliGRAM(s) Oral daily  folic acid 1 milliGRAM(s) Oral daily  glucagon  Injectable 1 milliGRAM(s) IntraMuscular once  insulin lispro (ADMELOG) corrective regimen sliding scale   SubCutaneous every 6 hours  magnesium oxide 400 milliGRAM(s) Oral daily  midodrine. 5 milliGRAM(s) Oral <User Schedule>  polyethylene glycol 3350 17 Gram(s) Oral daily  sodium chloride 0.9%. 1000 milliLiter(s) (100 mL/Hr) IV Continuous <Continuous>    MEDICATIONS  (PRN):  dextrose Oral Gel 15 Gram(s) Oral once PRN Blood Glucose LESS THAN 70 milliGRAM(s)/deciliter    Dosing Anthropmetrics:   Height for BMI (FEET)	5 Feet  Height for BMI (INCHES)	8 Inch(s)  Height for BMI (CENTIMETERS)	172.72 Centimeter(s)  Weight for BMI (lbs)	155 lb  Weight for BMI (kg)	70.3 kg  Body Mass Index	23.5  ideal body weight: 154 pounds, pt is ~101% of ideal body weight    Weight Change: no new weights noted in electronic medical records; recommend that nursing obtain updated weights weekly; RD to monitor trends.     Nutrition Focused Physical Exam: pt out of the room when RD attempted to visit pt for nutrition follow up; RD to follow up prn.     Estimated energy needs:   Weight used for calculations	current weight  Estimated Energy Needs Weight (lbs)	155 lb  Estimated Energy Needs Weight (kg)	70.3 kg  Estimated Energy Needs From (latanya/kg)	20  Estimated Energy Needs To (latanya/kg)	25  Estimated Energy Needs Calculated From (latanya/kg)	1406  Estimated Energy Needs Calculated To (latanya/kg)	1757  Weight used for calculations	current weight  Estimated Protein Needs Weight (lbs)	155 lb  Estimated Protein Needs Weight (kg)	70.3 kg  Estimated Protein Needs From (g/kg)	1.1  Estimated Protein Needs To (g/kg)	1.3  Estimated Protein Needs Calculated From (g/kg)	77.33  Estimated Protein Needs Calculated To (g/kg)	91.39  Estimated Fluid Needs Weight (lbs)	155 lb  Estimated Fluid Needs Weight (kg)	70.3 kg  Estimated Fluid Needs From (ml/kg)	30  Estimated Fluid Needs To (ml/kg)	35  Estimated Fluid Needs Calculated From (ml/kg)	2109  Estimated Fluid Needs Calculated To (ml/kg)	2460  Other Calculations	Pt within % ideal body weight, thus actual body weight used for all calculations. Needs adjusted for advanced age, clinical status/condition.    Subjective: 88 year old Cantonese speaking Male with past medical history of JUSTYNA, stroke (2004, residual right sided weakness), HLD, glaucoma, DM, BPH, depression, bilateral hearing loss presents with LHGV with new left side arm and leg weakness starting  in the evening. Wife noticed that patient was leaning against the wall with the left side of his body to support himself to walk and was unable to hold himself upright when sitting, slumping over to the left. Patient felt that his left arm and leg were weak. Called PCP who recommened ED visit. CTH/CTP/CTA head and neck with no acute findings. Transferred to [facility] for further stroke workup.    RD attempted to visit pt 2x, pt was not in his room. RD spoke to nursing, medical team, referred to electronic medical records/documentation/chart information. Pt noted with no nausea or emesis; distended abdomen per nursing; loose BM noted; BM on 24. Tube was pulled by patient, per RN feeds were restarted at 2pm today (5/3/24), no pain/discomfort noted per nursing. No pressure ulcers noted; no edema. Labs reviewed: elevated serum Glucose (192), low Phosphorus (1.8), please replete**, elevated POCT glucose (146-188 range), RD to monitor trends. Education deferred at this time. RD to remain available. Please see recommendations below.     Previous Nutrition Diagnosis: Inadequate Energy Intake related to pt's inability to meet nutritional demands via PO as evidenced by NPO status    Active [   ]  Resolved [ x ]    If resolved, new PES: Inadequate oral intakes related to pt's inability to meet nutritional demands via PO as evidenced by NPO with tube feeding status     Goal: Pt to consistently meet at least 75% of EEE via tolerated route that is consistent with goals of care during hospital stay    Nutrition Recommendations:  1. NPO  **As medically feasible and indicated, considering continuing enteral nutrition via feasible route: Glucerna 1.2 @ 73mL/hr x 18 hours (2924-8946). This provides: 1314mL total volume, 1577 calories, 79g protein, 1058mL free water. Meetin.4 kcal/kg actual body weight, 1.12g protein/kg based on actual body weight (70.3kg). **provide Banatrol TF 2x/day to promote fecal bulk**  **IF GI upset continues, consider Vital 1.5 with goal of 60mL/hour x 18 hours (6642-5176). This provides: 1080mL total volume, 1620 calories, 73g protein, 825mL free water. Meetin.99kcal/kg actual body weight, 1.04g protein/kg based on actual body weight (70.3kg).**  Defer fluids to team. Maintain aspiration precautions. Monitor signs/symptoms of intolerance; adjust enteral nutrition regimen as needed.  2. Monitor GI function, chemistry/labs, weights (weekly), skin integrity  3. Continue insulin regimen to promote euglycemia; please replete phosphorus levels**  4. Pain and bowel regimen per medical team  5. Align nutrition with goals of care at all times  **paged stroke ACP team, provided recommendations**    Education: deferred at this time.     Risk Level: High [ x ] Moderate [   ] Low [   ]

## 2024-05-04 LAB
ANION GAP SERPL CALC-SCNC: 9 MMOL/L — SIGNIFICANT CHANGE UP (ref 5–17)
APPEARANCE UR: ABNORMAL
BACTERIA # UR AUTO: NEGATIVE /HPF — SIGNIFICANT CHANGE UP
BILIRUB UR-MCNC: NEGATIVE — SIGNIFICANT CHANGE UP
BUN SERPL-MCNC: 20 MG/DL — SIGNIFICANT CHANGE UP (ref 7–23)
CALCIUM SERPL-MCNC: 8.2 MG/DL — LOW (ref 8.4–10.5)
CAST: 1 /LPF — SIGNIFICANT CHANGE UP (ref 0–4)
CHLORIDE SERPL-SCNC: 108 MMOL/L — SIGNIFICANT CHANGE UP (ref 96–108)
CO2 SERPL-SCNC: 22 MMOL/L — SIGNIFICANT CHANGE UP (ref 22–31)
COLOR SPEC: YELLOW — SIGNIFICANT CHANGE UP
CREAT SERPL-MCNC: 0.78 MG/DL — SIGNIFICANT CHANGE UP (ref 0.5–1.3)
DIFF PNL FLD: ABNORMAL
EGFR: 87 ML/MIN/1.73M2 — SIGNIFICANT CHANGE UP
GLUCOSE BLDC GLUCOMTR-MCNC: 132 MG/DL — HIGH (ref 70–99)
GLUCOSE BLDC GLUCOMTR-MCNC: 169 MG/DL — HIGH (ref 70–99)
GLUCOSE BLDC GLUCOMTR-MCNC: 174 MG/DL — HIGH (ref 70–99)
GLUCOSE BLDC GLUCOMTR-MCNC: 188 MG/DL — HIGH (ref 70–99)
GLUCOSE SERPL-MCNC: 175 MG/DL — HIGH (ref 70–99)
GLUCOSE UR QL: NEGATIVE MG/DL — SIGNIFICANT CHANGE UP
HCT VFR BLD CALC: 28.8 % — LOW (ref 39–50)
HGB BLD-MCNC: 9.7 G/DL — LOW (ref 13–17)
KETONES UR-MCNC: NEGATIVE MG/DL — SIGNIFICANT CHANGE UP
LEUKOCYTE ESTERASE UR-ACNC: ABNORMAL
MAGNESIUM SERPL-MCNC: 2 MG/DL — SIGNIFICANT CHANGE UP (ref 1.6–2.6)
MCHC RBC-ENTMCNC: 23.6 PG — LOW (ref 27–34)
MCHC RBC-ENTMCNC: 33.7 GM/DL — SIGNIFICANT CHANGE UP (ref 32–36)
MCV RBC AUTO: 70.1 FL — LOW (ref 80–100)
NITRITE UR-MCNC: NEGATIVE — SIGNIFICANT CHANGE UP
NRBC # BLD: 0 /100 WBCS — SIGNIFICANT CHANGE UP (ref 0–0)
PH UR: 7.5 — SIGNIFICANT CHANGE UP (ref 5–8)
PHOSPHATE SERPL-MCNC: 2.6 MG/DL — SIGNIFICANT CHANGE UP (ref 2.5–4.5)
PLATELET # BLD AUTO: 164 K/UL — SIGNIFICANT CHANGE UP (ref 150–400)
POTASSIUM SERPL-MCNC: 3.7 MMOL/L — SIGNIFICANT CHANGE UP (ref 3.5–5.3)
POTASSIUM SERPL-SCNC: 3.7 MMOL/L — SIGNIFICANT CHANGE UP (ref 3.5–5.3)
PROT UR-MCNC: SIGNIFICANT CHANGE UP MG/DL
RBC # BLD: 4.11 M/UL — LOW (ref 4.2–5.8)
RBC # FLD: 16.5 % — HIGH (ref 10.3–14.5)
RBC CASTS # UR COMP ASSIST: 1095 /HPF — HIGH (ref 0–4)
SODIUM SERPL-SCNC: 139 MMOL/L — SIGNIFICANT CHANGE UP (ref 135–145)
SP GR SPEC: 1.01 — SIGNIFICANT CHANGE UP (ref 1–1.03)
SQUAMOUS # UR AUTO: 1 /HPF — SIGNIFICANT CHANGE UP (ref 0–5)
UROBILINOGEN FLD QL: 1 MG/DL — SIGNIFICANT CHANGE UP (ref 0.2–1)
WBC # BLD: 5.97 K/UL — SIGNIFICANT CHANGE UP (ref 3.8–10.5)
WBC # FLD AUTO: 5.97 K/UL — SIGNIFICANT CHANGE UP (ref 3.8–10.5)
WBC UR QL: 7 /HPF — HIGH (ref 0–5)

## 2024-05-04 PROCEDURE — 99233 SBSQ HOSP IP/OBS HIGH 50: CPT

## 2024-05-04 PROCEDURE — 99223 1ST HOSP IP/OBS HIGH 75: CPT

## 2024-05-04 PROCEDURE — 70450 CT HEAD/BRAIN W/O DYE: CPT | Mod: 26

## 2024-05-04 RX ORDER — DOXAZOSIN MESYLATE 4 MG
1 TABLET ORAL AT BEDTIME
Refills: 0 | Status: DISCONTINUED | OUTPATIENT
Start: 2024-05-04 | End: 2024-05-07

## 2024-05-04 RX ORDER — TAMSULOSIN HYDROCHLORIDE 0.4 MG/1
0.4 CAPSULE ORAL AT BEDTIME
Refills: 0 | Status: DISCONTINUED | OUTPATIENT
Start: 2024-05-04 | End: 2024-05-04

## 2024-05-04 RX ADMIN — Medication 1: at 12:15

## 2024-05-04 RX ADMIN — ATORVASTATIN CALCIUM 80 MILLIGRAM(S): 80 TABLET, FILM COATED ORAL at 22:32

## 2024-05-04 RX ADMIN — Medication 1: at 00:19

## 2024-05-04 RX ADMIN — MAGNESIUM OXIDE 400 MG ORAL TABLET 400 MILLIGRAM(S): 241.3 TABLET ORAL at 11:51

## 2024-05-04 RX ADMIN — MIDODRINE HYDROCHLORIDE 5 MILLIGRAM(S): 2.5 TABLET ORAL at 11:52

## 2024-05-04 RX ADMIN — Medication 1 MILLIGRAM(S): at 11:52

## 2024-05-04 RX ADMIN — Medication 1: at 07:23

## 2024-05-04 RX ADMIN — MIDODRINE HYDROCHLORIDE 5 MILLIGRAM(S): 2.5 TABLET ORAL at 23:56

## 2024-05-04 RX ADMIN — ENOXAPARIN SODIUM 40 MILLIGRAM(S): 100 INJECTION SUBCUTANEOUS at 05:53

## 2024-05-04 RX ADMIN — Medication 325 MILLIGRAM(S): at 11:52

## 2024-05-04 RX ADMIN — Medication 1 MILLIGRAM(S): at 22:32

## 2024-05-04 RX ADMIN — Medication 81 MILLIGRAM(S): at 11:52

## 2024-05-04 RX ADMIN — SODIUM CHLORIDE 100 MILLILITER(S): 9 INJECTION INTRAMUSCULAR; INTRAVENOUS; SUBCUTANEOUS at 18:06

## 2024-05-04 NOTE — PROGRESS NOTE ADULT - ASSESSMENT
88y M Cantonese speaking, R hand dominant, b/l  hearing impairment ( b/l hearing aids) with PMHx of DM, Thalassemia minor/JUSTYNA, HLD, hx stroke with right sided weakness in 2004 (has a cane and walker at home but pt refuses using assisted device, off ASA since 2020 for unclear reason), depression, glaucoma, BPH, [facility] with new left side arm and leg weakness starting 4/26 pm. Wife noticed that patient was leaning against the wall with the left side of his body to support himself to walk and was unable to hold himself upright when sitting, slumping over to the left. Patient felt that his left arm and leg were weak. Called PCP who recommended ED visit. CTH/CTP/CTA head and neck with no acute findings. Loaded with aspirin 300mg x1. Transferred to [facility] for further stroke w/u.    # R internal capsule infarct  # hx CVA w/ residual R sided weakness   # Dysphagia  - 8LA telemetry monitoring   - q4hr stroke neuro checks and vitals  - active plan per Stroke team   - Permissive HTN goal -160  - midodrine 2.5mg bid ( 4/30)-> increased to 5mg bid( 5/1-)  to help with blood pressure for perfusion. /58 this am   - IVF @100ml/hr   - TF via NGT @ 40ml/hr, goal rate 75ml/hr   - failed FEES ( 5/2), pt and wife agreeable for PEG  - GI consult appreciated, as d/w GI fellow Dr. Curry, plan for PEG next Tues 5/7 ( need to hold Plavix for 5 days, last dose Plavix on 5/2 @1pm) and to hold Lovenox 24hr prior PEG,last dose on Monday 5/6    - f/u wife's decision on LINQ ( wife wants to d/w son from [facility])   - Cardiac CT: reviewed, no SANDRO taylor   - CT Chest: no PE   - s/p aspirin load 300mg x1( 4/27)  - continue aspirin 81mg po daily ( 4/27-)  - Clopidogrel 75mg po daily ( 4/28-5/2) hold for PEG placement , will resume when cleared by GI after PEG placement, pt will need Plavix for 21 days   - continue Atorvastatin 80mg po qHS  ( 4/27-) via NGT  - - statin ( not able to give 4/28- pt not able to swallow )   - PT/OT eval    # Acute urinary retention  # hx BPH  - resume tamsulosin 0.4mg via NGT qHS   - Bladder scan q6hr , straight cath as needed for PVR>250-300  - make sure pt has regular BMs ( + BMs on Fri 5/3)     # Anemia hx Thal minor  - Ferritin 410, Tsat 33% adequate iron store  - Hgb 11.2%     #constipation  - c/w bowel regimen w/ senna+miralax    #DM  - A1C 6.3%   home meds: metformin 500mg TID, Januvia 50mg daily - held for now.   - ISS, goal -180     # Hyponatremia- resolved.   - trend Na 131--> 135--> 133. --> 135   - Hypophos - repleted     #DVT Prophylaxis: SCDs and Lovenox 40mg sq daily     Dispo: fu PT/OT, will benefit inpatient Rehab      Contact:  PMD Dr. Aron Soares   Wife: Farshad Pelayo # [phone]    POC as d/w Stroke team     Thank you for allowing medicine to participate in the care

## 2024-05-04 NOTE — PROGRESS NOTE ADULT - SUBJECTIVE AND OBJECTIVE BOX
Patient is a 88y old  Male who presents with a chief complaint of left sided weakness (04 May 2024 07:14)    INTERVAL EVENTS: Pt was lethargic last night, 's given NS 500ml. repeat CTH no changes. Pt developed acute urinary retention, s/p straight cath w/ 1600ml urine this am. + BMs as d/w RN    SUBJECTIVE:  Patient was seen and examined at bedside Wife at bedside.Reports pt has urinary hesitency. He has hx BPH and stopped taking Flomax few months ago, + BMs yesterday.  AAOx3, on TF.     Review of systems: No fever, chills, dizziness, HA, Changes in vision, CP, dyspnea, nausea or vomiting, dysuria, changes in bowel movements, LE edema. Rest of 12 point Review of systems negative unless otherwise documented elsewhere in note.     Diet, NPO with Tube Feed:   Tube Feeding Modality: Nasogastric  Glucerna 1.2 Nikolai (GLUCERNARTH)  Continuous  Starting Tube Feed Rate mL per Hour: 20  Increase Tube Feed Rate by (mL): 10     Every 6 hours  Until Goal Tube Feed Rate (mL per Hour): 73  Tube Feed Duration (in Hours): 24  Tube Feed Start Time: 11:00  Tube Feed Stop Time: 05:00  Banatrol TF     Qty per Day:  2 (05-03-24 @ 18:24) [Active]      MEDICATIONS:  MEDICATIONS  (STANDING):  aspirin  chewable 81 milliGRAM(s) Oral daily  atorvastatin 80 milliGRAM(s) Oral at bedtime  dextrose 10% Bolus 125 milliLiter(s) IV Bolus once  dextrose 5%. 1000 milliLiter(s) (50 mL/Hr) IV Continuous <Continuous>  dextrose 5%. 1000 milliLiter(s) (100 mL/Hr) IV Continuous <Continuous>  dextrose 50% Injectable 25 Gram(s) IV Push once  dextrose 50% Injectable 12.5 Gram(s) IV Push once  enoxaparin Injectable 40 milliGRAM(s) SubCutaneous every 24 hours  ferrous    sulfate 325 milliGRAM(s) Oral daily  folic acid 1 milliGRAM(s) Oral daily  glucagon  Injectable 1 milliGRAM(s) IntraMuscular once  insulin lispro (ADMELOG) corrective regimen sliding scale   SubCutaneous every 6 hours  magnesium oxide 400 milliGRAM(s) Oral daily  midodrine. 5 milliGRAM(s) Oral <User Schedule>  sodium chloride 0.9%. 1000 milliLiter(s) (100 mL/Hr) IV Continuous <Continuous>    MEDICATIONS  (PRN):  dextrose Oral Gel 15 Gram(s) Oral once PRN Blood Glucose LESS THAN 70 milliGRAM(s)/deciliter      Allergies    No Known Allergies    Intolerances        OBJECTIVE:  Vital Signs Last 24 Hrs  T(C): 36.8 (04 May 2024 09:04), Max: 37.6 (04 May 2024 05:55)  T(F): 98.2 (04 May 2024 09:04), Max: 99.6 (04 May 2024 05:55)  HR: 72 (04 May 2024 09:15) (68 - 80)  BP: 127/58 (04 May 2024 09:15) (117/58 - 159/85)  BP(mean): 84 (04 May 2024 09:15) (80 - 114)  RR: 19 (04 May 2024 09:15) (16 - 19)  SpO2: 94% (04 May 2024 09:15) (92% - 97%)    Parameters below as of 04 May 2024 09:15  Patient On (Oxygen Delivery Method): room air      I&O's Summary    03 May 2024 07:01  -  04 May 2024 07:00  --------------------------------------------------------  IN: 3622 mL / OUT: 1875 mL / NET: 1747 mL    04 May 2024 07:01  -  04 May 2024 10:40  --------------------------------------------------------  IN: 519 mL / OUT: 0 mL / NET: 519 mL        PHYSICAL EXAM:  Gen: Reclining in bed at time of exam, appears stated age, NGT in place   HEENT: NCAT, MMM, clear OP  Neck: supple, trachea at midline  CV: RRR, +S1/S2  Pulm: adequate respiratory effort, no increase in work of breathing  Abd: soft, NTND  Skin: warm and dry,   Ext: WWP, no LE edema  Neuro: AOx3, L HP   Psych: affect and behavior appropriate, pleasant at time of interview  :     LABS:                        9.7    5.97  )-----------( 164      ( 04 May 2024 05:30 )             28.8     05-04    139  |  108  |  20  ----------------------------<  175<H>  3.7   |  22  |  0.78    Ca    8.2<L>      04 May 2024 05:30  Phos  2.6     05-04  Mg     2.0     05-04          CAPILLARY BLOOD GLUCOSE      POCT Blood Glucose.: 174 mg/dL (04 May 2024 07:19)  POCT Blood Glucose.: 188 mg/dL (04 May 2024 00:07)  POCT Blood Glucose.: 196 mg/dL (03 May 2024 17:17)  POCT Blood Glucose.: 132 mg/dL (03 May 2024 11:20)    Urinalysis Basic - ( 04 May 2024 05:30 )    Color: x / Appearance: x / SG: x / pH: x  Gluc: 175 mg/dL / Ketone: x  / Bili: x / Urobili: x   Blood: x / Protein: x / Nitrite: x   Leuk Esterase: x / RBC: x / WBC x   Sq Epi: x / Non Sq Epi: x / Bacteria: x        MICRODATA:      RADIOLOGY/OTHER STUDIES:

## 2024-05-04 NOTE — PROGRESS NOTE ADULT - SUBJECTIVE AND OBJECTIVE BOX
Neurology Stroke Progress Note    INTERVAL HPI/OVERNIGHT EVENTS:  9pm lethargic, , 500cc bolus, improved  with more alert exam. CTH stable.     MEDICATIONS  (STANDING):  aspirin  chewable 81 milliGRAM(s) Oral daily  atorvastatin 80 milliGRAM(s) Oral at bedtime  dextrose 10% Bolus 125 milliLiter(s) IV Bolus once  dextrose 5%. 1000 milliLiter(s) (100 mL/Hr) IV Continuous <Continuous>  dextrose 5%. 1000 milliLiter(s) (50 mL/Hr) IV Continuous <Continuous>  dextrose 50% Injectable 25 Gram(s) IV Push once  dextrose 50% Injectable 12.5 Gram(s) IV Push once  enoxaparin Injectable 40 milliGRAM(s) SubCutaneous every 24 hours  ferrous    sulfate 325 milliGRAM(s) Oral daily  folic acid 1 milliGRAM(s) Oral daily  glucagon  Injectable 1 milliGRAM(s) IntraMuscular once  insulin lispro (ADMELOG) corrective regimen sliding scale   SubCutaneous every 6 hours  magnesium oxide 400 milliGRAM(s) Oral daily  midodrine. 5 milliGRAM(s) Oral <User Schedule>  sodium chloride 0.9%. 1000 milliLiter(s) (100 mL/Hr) IV Continuous <Continuous>    MEDICATIONS  (PRN):  dextrose Oral Gel 15 Gram(s) Oral once PRN Blood Glucose LESS THAN 70 milliGRAM(s)/deciliter    Allergies  No Known Allergies    Vital Signs Last 24 Hrs  T(C): 37.6 (04 May 2024 05:55), Max: 37.6 (04 May 2024 05:55)  T(F): 99.6 (04 May 2024 05:55), Max: 99.6 (04 May 2024 05:55)  HR: 76 (04 May 2024 03:50) (68 - 80)  BP: 118/58 (04 May 2024 03:50) (117/58 - 159/85)  BP(mean): 84 (04 May 2024 03:50) (80 - 114)  RR: 17 (04 May 2024 03:50) (16 - 19)  SpO2: 92% (04 May 2024 03:50) (92% - 97%)    Parameters below as of 04 May 2024 03:50  Patient On (Oxygen Delivery Method): room air    Physical exam:  General: No acute distress, awake and alert  Eyes: Anicteric sclerae, moist conjunctivae, see below for CNs  Neck: trachea midline, FROM, supple, no thyromegaly or lymphadenopathy  Cardiovascular: Regular rate and rhythm, no murmurs, rubs, or gallops. No carotid bruits.   Pulmonary: Anterior breath sounds clear bilaterally, no crackles or wheezing. No use of accessory muscles  GI: Abdomen soft, non-distended, non-tender  Extremities: Radial and DP pulses +2, no edema  Neurologic:  -Mental status: Awake, alert, oriented to person, place, and time. Minimal verbal output. Follows commands. Attention/concentration intact.  -Cranial nerves:   II: Peripheral vision in tact but medial blink to threat inconsistent.  III, IV, VI: Right gaze preference but able to cross midline. Pupils equally round and reactive to light  V:  Facial sensation V1-V3 equal and intact   VII: Left facial droop  VIII: Hearing is intact  Motor: Right upper and lower extremities 4/5. Left upper extremity 0/5 and left lower extremity able to spontaneously lift antigravity but difficulty doing to command.  Sensation: Intact to light touch bilaterally. No neglect or extinction on double simultaneous testing.  Coordination: Intact finger to nose on the right. Unable to assess on the left.    LABS:                        9.7    5.97  )-----------( 164      ( 04 May 2024 05:30 )             28.8     05-04    139  |  108  |  20  ----------------------------<  175<H>  3.7   |  22  |  0.78    Ca    8.2<L>      04 May 2024 05:30  Phos  2.6     05-04  Mg     2.0     05-04    Urinalysis Basic - ( 04 May 2024 05:30 )    Color: x / Appearance: x / SG: x / pH: x  Gluc: 175 mg/dL / Ketone: x  / Bili: x / Urobili: x   Blood: x / Protein: x / Nitrite: x   Leuk Esterase: x / RBC: x / WBC x   Sq Epi: x / Non Sq Epi: x / Bacteria: x       Neurology Stroke Progress Note    INTERVAL HPI/OVERNIGHT EVENTS:  9pm lethargic, , 500cc bolus, improved  with more alert exam. He got consecutive 2 blader scans showing 900 cc and 300 cc of urinary retention with two traumatic straight caths. Some blood in the towel apparently after straight cath. CTH stable. Hospitalist contacted for BPH management with tamsulosin.     MEDICATIONS  (STANDING):  aspirin  chewable 81 milliGRAM(s) Oral daily  atorvastatin 80 milliGRAM(s) Oral at bedtime  dextrose 10% Bolus 125 milliLiter(s) IV Bolus once  dextrose 5%. 1000 milliLiter(s) (100 mL/Hr) IV Continuous <Continuous>  dextrose 5%. 1000 milliLiter(s) (50 mL/Hr) IV Continuous <Continuous>  dextrose 50% Injectable 25 Gram(s) IV Push once  dextrose 50% Injectable 12.5 Gram(s) IV Push once  enoxaparin Injectable 40 milliGRAM(s) SubCutaneous every 24 hours  ferrous    sulfate 325 milliGRAM(s) Oral daily  folic acid 1 milliGRAM(s) Oral daily  glucagon  Injectable 1 milliGRAM(s) IntraMuscular once  insulin lispro (ADMELOG) corrective regimen sliding scale   SubCutaneous every 6 hours  magnesium oxide 400 milliGRAM(s) Oral daily  midodrine. 5 milliGRAM(s) Oral <User Schedule>  sodium chloride 0.9%. 1000 milliLiter(s) (100 mL/Hr) IV Continuous <Continuous>    MEDICATIONS  (PRN):  dextrose Oral Gel 15 Gram(s) Oral once PRN Blood Glucose LESS THAN 70 milliGRAM(s)/deciliter    Allergies  No Known Allergies    Vital Signs Last 24 Hrs  T(C): 37.6 (04 May 2024 05:55), Max: 37.6 (04 May 2024 05:55)  T(F): 99.6 (04 May 2024 05:55), Max: 99.6 (04 May 2024 05:55)  HR: 76 (04 May 2024 03:50) (68 - 80)  BP: 118/58 (04 May 2024 03:50) (117/58 - 159/85)  BP(mean): 84 (04 May 2024 03:50) (80 - 114)  RR: 17 (04 May 2024 03:50) (16 - 19)  SpO2: 92% (04 May 2024 03:50) (92% - 97%)    Parameters below as of 04 May 2024 03:50  Patient On (Oxygen Delivery Method): room air    Physical exam:  General: No acute distress, awake and alert  Eyes: Anicteric sclerae, moist conjunctivae, see below for CNs  Neck: trachea midline, FROM, supple, no thyromegaly or lymphadenopathy  Cardiovascular: Regular rate and rhythm, no murmurs, rubs, or gallops. No carotid bruits.   Pulmonary: Anterior breath sounds clear bilaterally, no crackles or wheezing. No use of accessory muscles  GI: Abdomen soft, non-distended, non-tender  Extremities: Radial and DP pulses +2, no edema  Neurologic:  -Mental status: Somnolent, alert, oriented to person, place, and time. Minimal verbal output. Follows commands. Attention/concentration intact.  -Cranial nerves:   II: Peripheral vision in tact but medial blink to threat inconsistent.  III, IV, VI: Right gaze preference but able to cross midline. Pupils equally round and reactive to light  V:  Facial sensation V1-V3 equal and intact   VII: Left facial droop  VIII: Hearing is intact  Motor: Right upper and lower extremities 4/5. Left upper extremity 0/5 and left lower extremity able to spontaneously lift antigravity but difficulty doing to command.  Sensation: Intact to light touch bilaterally. No neglect or extinction on double simultaneous testing.  Coordination: Intact finger to nose on the right. Unable to assess on the left.    LABS:                        9.7    5.97  )-----------( 164      ( 04 May 2024 05:30 )             28.8     05-04    139  |  108  |  20  ----------------------------<  175<H>  3.7   |  22  |  0.78    Ca    8.2<L>      04 May 2024 05:30  Phos  2.6     05-04  Mg     2.0     05-04    Urinalysis Basic - ( 04 May 2024 05:30 )    Color: x / Appearance: x / SG: x / pH: x  Gluc: 175 mg/dL / Ketone: x  / Bili: x / Urobili: x   Blood: x / Protein: x / Nitrite: x   Leuk Esterase: x / RBC: x / WBC x   Sq Epi: x / Non Sq Epi: x / Bacteria: x

## 2024-05-04 NOTE — PROGRESS NOTE ADULT - ASSESSMENT
88y Cantonese speaking Male with PMHx of JUSTYNA, stroke (2004, residual right sided weakness), HLD, glaucoma, DM, BPH, depression, b/l hearing loss [facility] with new left side arm and leg weakness starting 4/26 pm. Wife also noted truncal ataxia (slumping over to the left when sitting) and gait ataxia. CT imaging with no acute pathology. Transferred to [facility] for further stroke w/u. Course c/b periods of waxing/waning left sided hemiparesis likely due to hypoperfusion, s/p IVF boluses, now started on midodrine.    Neuro   #hx of stroke 2004 with residual right sided weakness    #CVA workup   Patient previously on aspirin, but was stopped by doctor in 2020, unclear reason why.    - continue ASA 81mg daily  - hold Plavix 75mg daily for the next 5 days for PEG placement   - continue atorvastatin 80mg daily    - q4hr stroke neuro checks and vitals   - MRI brain on 4/28 with R internal capsule infarct   - Stroke Code HCT Results: negative for acute ischemia    - Stroke Code CTA Results: stenosis of proximal bilateral A2 segments and R M2   - Stroke education     Cards   #HTN   - permissive hypertension, Goal -160, okay to sit up in bed with PT  - s/p 500cc bolus, now on 100cc/hr   - Start midodrine 2.5mg BID on 4/30 as left LE weakness is waxing/waning despite stable BPs --> midodrine increased to 5mg BID on 5/1  - wife refusing WALLY/ILR  - Cardiac CT to rule out SANDRO thrombus: No SANDRO or left atrial thrombus. Non cardiac: Apparent filling defects in segmental branches of the right lower lobe pulmonary artery.  - TTE: EF 56%     #HLD   - high dose statin as above in CVA   - LDL results: 170     Pulm   - call provider if SPO2 < 94%   - CT Angio Chest PE Protocol: No pulmonary embolism. No filling defect in the right lower lobe as seen on prior coronary CTA, likely motion artifact.    GI   #Nutrition/Fluids/Electrolytes    - replete K<4 and Mg <2   - Diet: continue tube feeds  - IVF: 100cc/hr    #dysphagia    - failed SLP evaluation   - FEES completed on 5/2 - recommended for PEG  - will give patient more time over the weekend to improve from a swallow standpoint and reeval early next week  - Wife agreeable to PEG. GI recommending Plavix being held for 5 days and PEG tube placement on Tuesday.     #constipation   - c/w bowel regimen w/ senna+miralax     Endocrine   #DM   home meds: metformin 500mg TID, Januvia 50mg daily      - A1C results: 6.3%   - ISS     - TSH results: 2.080     Hematology   #JUSTYNA   - c/w home ferrous sulfate 325mg daily   - Ferritin: 410, total iron: 83, TIBC: 255, % saturation: 33     MSK   # leg cramps   - LE dopplers obtained, negative for DVT bilaterally      Psych   #depression   Per wife, prior to COVID, patient very active with friends/community, played sports. Since pandemic and worsening eyesight due to glaucoma and hearing, patient with no interest in daily activities. Was started on escitalopram 10mg daily, but stopped taking due to constipation   - consider restarting based on patient's mood and improvement of bowel movement after starting bowel regimen       DVT Prophylaxis   - lovenox sq for DVT prophylaxis    - SCDs for DVT prophylaxis      IDR Goals: Goals reviewed at interdisciplinary rounds with case management, social work, physical therapy, occupational therapy, and speech language pathology.    Please see specific therapy notes for in depth goals.     Dispo: [place]    Discussed daily hospital plans and goals with patient and wife at bedside via .     Discussed with Dr. Payton who will discuss with Dr. Gonzalez 88y Cantonese speaking Male with PMHx of JUSTYNA, stroke (2004, residual right sided weakness), HLD, glaucoma, DM, BPH, depression, b/l hearing loss [facility] new left side arm and leg weakness starting 4/26 pm. Wife also noted truncal ataxia (slumping over to the left when sitting) and gait ataxia. CT imaging with no acute pathology. Transferred to [facility] for further stroke w/u. Course c/b periods of waxing/waning left sided hemiparesis likely due to hypoperfusion, s/p IVF boluses, now started on midodrine. Patient with urinary retention with postraumatic hematuria after straight cath in pt with BPH. Pending PEG tube on 5/7.    Neuro   #hx of stroke 2004 with residual right sided weakness    #CVA workup   Patient previously on aspirin, but was stopped by doctor in 2020, unclear reason why.    - continue ASA 81mg daily  - hold Plavix 75mg daily for the next 5 days for PEG placement   - hold Lovenox after AM dose on 5/6 for PEG placement  - continue atorvastatin 80mg daily    - q4hr stroke neuro checks and vitals   - MRI brain on 4/28 with R internal capsule infarct   - Stroke Code HCT Results: negative for acute ischemia    - Stroke Code CTA Results: stenosis of proximal bilateral A2 segments and R M2   - Stroke education     Cards   #HTN   - permissive hypertension, Goal -160, okay to sit up in bed with PT  - s/p 500cc bolus, now on 100cc/hr   - Start midodrine 2.5mg BID on 4/30 as left LE weakness is waxing/waning despite stable BPs --> midodrine increased to 5mg BID on 5/1  - wife refusing WALLY/ILR  - Cardiac CT to rule out SANDRO thrombus: No SANDRO or left atrial thrombus. Non cardiac: Apparent filling defects in segmental branches of the right lower lobe pulmonary artery.  - TTE: EF 56%     #HLD   - high dose statin as above in CVA   - LDL results: 170     Pulm   - call provider if SPO2 < 94%   - CT Angio Chest PE Protocol: No pulmonary embolism. No filling defect in the right lower lobe as seen on prior coronary CTA, likely motion artifact.    GI   #Nutrition/Fluids/Electrolytes    - replete K<4 and Mg <2   - Diet: continue tube feeds  - IVF: 100cc/hr    #dysphagia    - failed SLP evaluation   - FEES completed on 5/2 - recommended for PEG  - will give patient more time over the weekend to improve from a swallow standpoint and reeval early next week  - Wife agreeable to PEG. GI recommending Plavix being held for 5 days and Lovenox after AM dose on 5/6 and PEG tube placement on Tuesday.     #constipation   - c/w bowel regimen w/ senna+miralax     Renal  #BPH  #Urinary retention with postraumatic hematuria after straight cath  On 5/4, 900 cc and 350cc in BS with postraumatic hematuria  - Started on tamsulosin 0.4 mg QHS  - Bladder scan q6h  - If new urinary retention in BS, order Urology consult to manage hematuria and consider a Galindo cath placement    Endocrine   #DM   home meds: metformin 500mg TID, Januvia 50mg daily      - A1C results: 6.3%   - ISS     - TSH results: 2.080     Hematology   #JUSTYNA   - c/w home ferrous sulfate 325mg daily   - Ferritin: 410, total iron: 83, TIBC: 255, % saturation: 33     MSK   # leg cramps   - LE dopplers obtained, negative for DVT bilaterally      Psych   #depression   Per wife, prior to COVID, patient very active with friends/community, played sports. Since pandemic and worsening eyesight due to glaucoma and hearing, patient with no interest in daily activities. Was started on escitalopram 10mg daily, but stopped taking due to constipation   - consider restarting based on patient's mood and improvement of bowel movement after starting bowel regimen       DVT Prophylaxis   - lovenox sq for DVT prophylaxis    - SCDs for DVT prophylaxis      IDR Goals: Goals reviewed at interdisciplinary rounds with case management, social work, physical therapy, occupational therapy, and speech language pathology.    Please see specific therapy notes for in depth goals.     Dispo: [place]    Discussed daily hospital plans and goals with patient and wife at bedside via .     Discussed with Dr. Payton who will discuss with Dr. Gonzalez

## 2024-05-05 DIAGNOSIS — R31.9 HEMATURIA, UNSPECIFIED: ICD-10-CM

## 2024-05-05 DIAGNOSIS — R33.9 RETENTION OF URINE, UNSPECIFIED: ICD-10-CM

## 2024-05-05 LAB
ANION GAP SERPL CALC-SCNC: 6 MMOL/L — SIGNIFICANT CHANGE UP (ref 5–17)
BUN SERPL-MCNC: 16 MG/DL — SIGNIFICANT CHANGE UP (ref 7–23)
CALCIUM SERPL-MCNC: 7.9 MG/DL — LOW (ref 8.4–10.5)
CHLORIDE SERPL-SCNC: 104 MMOL/L — SIGNIFICANT CHANGE UP (ref 96–108)
CO2 SERPL-SCNC: 26 MMOL/L — SIGNIFICANT CHANGE UP (ref 22–31)
CREAT SERPL-MCNC: 0.71 MG/DL — SIGNIFICANT CHANGE UP (ref 0.5–1.3)
EGFR: 89 ML/MIN/1.73M2 — SIGNIFICANT CHANGE UP
GLUCOSE BLDC GLUCOMTR-MCNC: 141 MG/DL — HIGH (ref 70–99)
GLUCOSE BLDC GLUCOMTR-MCNC: 141 MG/DL — HIGH (ref 70–99)
GLUCOSE BLDC GLUCOMTR-MCNC: 149 MG/DL — HIGH (ref 70–99)
GLUCOSE BLDC GLUCOMTR-MCNC: 150 MG/DL — HIGH (ref 70–99)
GLUCOSE BLDC GLUCOMTR-MCNC: 201 MG/DL — HIGH (ref 70–99)
GLUCOSE SERPL-MCNC: 130 MG/DL — HIGH (ref 70–99)
HCT VFR BLD CALC: 26.4 % — LOW (ref 39–50)
HGB BLD-MCNC: 9 G/DL — LOW (ref 13–17)
MAGNESIUM SERPL-MCNC: 1.9 MG/DL — SIGNIFICANT CHANGE UP (ref 1.6–2.6)
MCHC RBC-ENTMCNC: 23.4 PG — LOW (ref 27–34)
MCHC RBC-ENTMCNC: 34.1 GM/DL — SIGNIFICANT CHANGE UP (ref 32–36)
MCV RBC AUTO: 68.6 FL — LOW (ref 80–100)
NRBC # BLD: 0 /100 WBCS — SIGNIFICANT CHANGE UP (ref 0–0)
PHOSPHATE SERPL-MCNC: 3.4 MG/DL — SIGNIFICANT CHANGE UP (ref 2.5–4.5)
PLATELET # BLD AUTO: 143 K/UL — LOW (ref 150–400)
POTASSIUM SERPL-MCNC: 3.5 MMOL/L — SIGNIFICANT CHANGE UP (ref 3.5–5.3)
POTASSIUM SERPL-SCNC: 3.5 MMOL/L — SIGNIFICANT CHANGE UP (ref 3.5–5.3)
RBC # BLD: 3.85 M/UL — LOW (ref 4.2–5.8)
RBC # FLD: 15.8 % — HIGH (ref 10.3–14.5)
SODIUM SERPL-SCNC: 136 MMOL/L — SIGNIFICANT CHANGE UP (ref 135–145)
WBC # BLD: 5.94 K/UL — SIGNIFICANT CHANGE UP (ref 3.8–10.5)
WBC # FLD AUTO: 5.94 K/UL — SIGNIFICANT CHANGE UP (ref 3.8–10.5)

## 2024-05-05 PROCEDURE — 70450 CT HEAD/BRAIN W/O DYE: CPT | Mod: 26

## 2024-05-05 PROCEDURE — 99232 SBSQ HOSP IP/OBS MODERATE 35: CPT

## 2024-05-05 PROCEDURE — 99233 SBSQ HOSP IP/OBS HIGH 50: CPT

## 2024-05-05 PROCEDURE — 51703 INSERT BLADDER CATH COMPLEX: CPT

## 2024-05-05 RX ORDER — ACETAMINOPHEN 500 MG
650 TABLET ORAL EVERY 6 HOURS
Refills: 0 | Status: DISCONTINUED | OUTPATIENT
Start: 2024-05-05 | End: 2024-05-07

## 2024-05-05 RX ORDER — SODIUM CHLORIDE 9 MG/ML
1000 INJECTION INTRAMUSCULAR; INTRAVENOUS; SUBCUTANEOUS
Refills: 0 | Status: DISCONTINUED | OUTPATIENT
Start: 2024-05-05 | End: 2024-05-11

## 2024-05-05 RX ORDER — ACETAMINOPHEN 500 MG
1000 TABLET ORAL ONCE
Refills: 0 | Status: COMPLETED | OUTPATIENT
Start: 2024-05-05 | End: 2024-05-05

## 2024-05-05 RX ADMIN — Medication 1 MILLIGRAM(S): at 10:43

## 2024-05-05 RX ADMIN — Medication 2: at 16:34

## 2024-05-05 RX ADMIN — Medication 81 MILLIGRAM(S): at 10:43

## 2024-05-05 RX ADMIN — MIDODRINE HYDROCHLORIDE 5 MILLIGRAM(S): 2.5 TABLET ORAL at 10:43

## 2024-05-05 RX ADMIN — Medication 1000 MILLIGRAM(S): at 10:59

## 2024-05-05 RX ADMIN — ENOXAPARIN SODIUM 40 MILLIGRAM(S): 100 INJECTION SUBCUTANEOUS at 06:12

## 2024-05-05 RX ADMIN — Medication 1 MILLIGRAM(S): at 23:04

## 2024-05-05 RX ADMIN — ATORVASTATIN CALCIUM 80 MILLIGRAM(S): 80 TABLET, FILM COATED ORAL at 23:04

## 2024-05-05 RX ADMIN — MAGNESIUM OXIDE 400 MG ORAL TABLET 400 MILLIGRAM(S): 241.3 TABLET ORAL at 10:43

## 2024-05-05 RX ADMIN — MIDODRINE HYDROCHLORIDE 5 MILLIGRAM(S): 2.5 TABLET ORAL at 23:52

## 2024-05-05 RX ADMIN — SODIUM CHLORIDE 75 MILLILITER(S): 9 INJECTION INTRAMUSCULAR; INTRAVENOUS; SUBCUTANEOUS at 10:44

## 2024-05-05 RX ADMIN — Medication 650 MILLIGRAM(S): at 01:11

## 2024-05-05 RX ADMIN — Medication 400 MILLIGRAM(S): at 10:44

## 2024-05-05 RX ADMIN — Medication 325 MILLIGRAM(S): at 10:43

## 2024-05-05 NOTE — CONSULT NOTE ADULT - SUBJECTIVE AND OBJECTIVE BOX
HPI:   **STROKE HPI***  Blake  # [id number]  History obtained via wife as patient with signficant b/l hearing loss, hearing aids in place    HPI: 88y Cantonese speaking Male with PMHx of JUSTYNA, stroke (2004, residual right sided weakness), HLD, glaucoma, DM, BPH, depression, b/l hearing loss presents with [facility] with new left side arm and leg weakness starting 4/26 pm. Wife noticed that patient was leaning against the wall with the left side of his body to support himself to walk and was unable to hold himself upright when sitting, slumping over to the left. Patient felt that his left arm and leg were weak. Called PCP who recommened ED visit. CTH/CTP/CTA head and neck with no acute findings. Loaded with aspirin 300mg x1. Transferred to [facility] for further stroke w/u.    Patient currently with left sided arm and leg weakness, unchanged compared to onset. No dysarthria, changes in vision per wife. Noted with left leg spasms that may be chronic. Wife thinks he has had spasms before, but has not noticed leg spasms recently.     Patient's prior stroke presents as right sided weakness with aphasia. Since then, weakness has improved, but patient at baseline does not converse much due to difficulty hearing.     At baseline, patient able to amulate on his own, although very slowly and carefully. Wife says patient should be using assistive device, but patient refuses. He is independent of ADLs, but performs them slowly. He has falled in the bathroom in the past. Patient has HHA services (3 days/week, 7 hours per day).    (28 Apr 2024 01:42)    : above noted. Called to evaluate hematuria. Patient had hudson placed overnight for retention and removed this am. Noted with +heme.       PAST MEDICAL & SURGICAL HISTORY:  Diabetes      HLD (hyperlipidemia)      BPH (benign prostatic hyperplasia)      JUSTYNA (iron deficiency anemia)      Stroke          MEDICATIONS  (STANDING):  acetaminophen   IVPB .. 1000 milliGRAM(s) IV Intermittent once  aspirin  chewable 81 milliGRAM(s) Oral daily  atorvastatin 80 milliGRAM(s) Oral at bedtime  dextrose 10% Bolus 125 milliLiter(s) IV Bolus once  dextrose 5%. 1000 milliLiter(s) (50 mL/Hr) IV Continuous <Continuous>  dextrose 5%. 1000 milliLiter(s) (100 mL/Hr) IV Continuous <Continuous>  dextrose 50% Injectable 25 Gram(s) IV Push once  dextrose 50% Injectable 12.5 Gram(s) IV Push once  doxazosin 1 milliGRAM(s) Oral at bedtime  enoxaparin Injectable 40 milliGRAM(s) SubCutaneous every 24 hours  ferrous    sulfate 325 milliGRAM(s) Oral daily  folic acid 1 milliGRAM(s) Oral daily  glucagon  Injectable 1 milliGRAM(s) IntraMuscular once  insulin lispro (ADMELOG) corrective regimen sliding scale   SubCutaneous every 6 hours  magnesium oxide 400 milliGRAM(s) Oral daily  midodrine. 5 milliGRAM(s) Oral <User Schedule>  sodium chloride 0.9%. 1000 milliLiter(s) (75 mL/Hr) IV Continuous <Continuous>    MEDICATIONS  (PRN):  acetaminophen     Tablet .. 650 milliGRAM(s) Oral every 6 hours PRN Temp greater or equal to 38C (100.4F), Mild Pain (1 - 3), Moderate Pain (4 - 6)  dextrose Oral Gel 15 Gram(s) Oral once PRN Blood Glucose LESS THAN 70 milliGRAM(s)/deciliter      Allergies    No Known Allergies    Intolerances        SOCIAL HISTORY:    FAMILY HISTORY:      Vital Signs Last 24 Hrs  T(C): 36.7 (05 May 2024 09:08), Max: 37.1 (04 May 2024 21:53)  T(F): 98.1 (05 May 2024 09:08), Max: 98.8 (05 May 2024 05:01)  HR: 69 (05 May 2024 09:05) (62 - 82)  BP: 152/72 (05 May 2024 09:05) (108/59 - 152/72)  BP(mean): 103 (05 May 2024 09:05) (77 - 103)  RR: 20 (05 May 2024 09:05) (18 - 22)  SpO2: 96% (05 May 2024 09:05) (94% - 97%)    Parameters below as of 05 May 2024 09:05  Patient On (Oxygen Delivery Method): room air        On PE:  General:  Abdomen:  :  EXT:    LABS:                        9.0    5.94  )-----------( 143      ( 05 May 2024 05:30 )             26.4     05-05    136  |  104  |  16  ----------------------------<  130<H>  3.5   |  26  |  0.71    Ca    7.9<L>      05 May 2024 05:30  Phos  3.4     05-05  Mg     1.9     05-05        Urinalysis Basic - ( 05 May 2024 05:30 )    Color: x / Appearance: x / SG: x / pH: x  Gluc: 130 mg/dL / Ketone: x  / Bili: x / Urobili: x   Blood: x / Protein: x / Nitrite: x   Leuk Esterase: x / RBC: x / WBC x   Sq Epi: x / Non Sq Epi: x / Bacteria: x        RADIOLOGY & ADDITIONAL STUDIES: HPI:   **STROKE HPI***  Blake  # [id number]  History obtained via wife as patient with signficant b/l hearing loss, hearing aids in place    HPI: 88y Cantonese speaking Male with PMHx of JUSTYNA, stroke (2004, residual right sided weakness), HLD, glaucoma, DM, BPH, depression, b/l hearing loss [facility] with new left side arm and leg weakness starting 4/26 pm. Wife noticed that patient was leaning against the wall with the left side of his body to support himself to walk and was unable to hold himself upright when sitting, slumping over to the left. Patient felt that his left arm and leg were weak. Called PCP who recommened ED visit. CTH/CTP/CTA head and neck with no acute findings. Loaded with aspirin 300mg x1. Transferred to [facility] for further stroke w/u.    Patient currently with left sided arm and leg weakness, unchanged compared to onset. No dysarthria, changes in vision per wife. Noted with left leg spasms that may be chronic. Wife thinks he has had spasms before, but has not noticed leg spasms recently.     Patient's prior stroke presents as right sided weakness with aphasia. Since then, weakness has improved, but patient at baseline does not converse much due to difficulty hearing.     At baseline, patient able to amulate on his own, although very slowly and carefully. Wife says patient should be using assistive device, but patient refuses. He is independent of ADLs, but performs them slowly. He has falled in the bathroom in the past. Patient has HHA services (3 days/week, 7 hours per day).    (28 Apr 2024 01:42)    : above noted. Paitent with h/o CVA and admitted for R sided weakness with aphasia. Called to evaluate hematuria. Patient had hudson placed overnight for retention and removed this am. Noted with +heme. Since admission patient has been retaining urine high amounts requiring straight catheterization and had hudson placed last night which was removed this morning as patient c/o discomfort tip penis. Noted with hudson that urine was dark punch color.  used for obtaining history with patien'ts wife at bedside as well. Patient with h/o BPH and [place] urologist [place]. Patient was placed on medication (?name) to help with symptoms but patient stopped taking as it was not helping him. Last saw urologist couple months ago. Prior to hospitalization patient with urinary frequency, decrease stream of urine with occasional dribbling urine and nocturia. No dysuria/hematuria. Since hospitalization patient noted difficulty voiding and after straight catheterization noted some blood. +BM's. Since hudson removed patient says pain tip penis has resolved.       PAST MEDICAL & SURGICAL HISTORY:  Diabetes      HLD (hyperlipidemia)      BPH (benign prostatic hyperplasia)      JUSTYNA (iron deficiency anemia)      Stroke          MEDICATIONS  (STANDING):  acetaminophen   IVPB .. 1000 milliGRAM(s) IV Intermittent once  aspirin  chewable 81 milliGRAM(s) Oral daily  atorvastatin 80 milliGRAM(s) Oral at bedtime  dextrose 10% Bolus 125 milliLiter(s) IV Bolus once  dextrose 5%. 1000 milliLiter(s) (50 mL/Hr) IV Continuous <Continuous>  dextrose 5%. 1000 milliLiter(s) (100 mL/Hr) IV Continuous <Continuous>  dextrose 50% Injectable 25 Gram(s) IV Push once  dextrose 50% Injectable 12.5 Gram(s) IV Push once  doxazosin 1 milliGRAM(s) Oral at bedtime  enoxaparin Injectable 40 milliGRAM(s) SubCutaneous every 24 hours  ferrous    sulfate 325 milliGRAM(s) Oral daily  folic acid 1 milliGRAM(s) Oral daily  glucagon  Injectable 1 milliGRAM(s) IntraMuscular once  insulin lispro (ADMELOG) corrective regimen sliding scale   SubCutaneous every 6 hours  magnesium oxide 400 milliGRAM(s) Oral daily  midodrine. 5 milliGRAM(s) Oral <User Schedule>  sodium chloride 0.9%. 1000 milliLiter(s) (75 mL/Hr) IV Continuous <Continuous>    MEDICATIONS  (PRN):  acetaminophen     Tablet .. 650 milliGRAM(s) Oral every 6 hours PRN Temp greater or equal to 38C (100.4F), Mild Pain (1 - 3), Moderate Pain (4 - 6)  dextrose Oral Gel 15 Gram(s) Oral once PRN Blood Glucose LESS THAN 70 milliGRAM(s)/deciliter      Allergies    No Known Allergies    Intolerances        SOCIAL HISTORY:    FAMILY HISTORY:      Vital Signs Last 24 Hrs  T(C): 36.7 (05 May 2024 09:08), Max: 37.1 (04 May 2024 21:53)  T(F): 98.1 (05 May 2024 09:08), Max: 98.8 (05 May 2024 05:01)  HR: 69 (05 May 2024 09:05) (62 - 82)  BP: 152/72 (05 May 2024 09:05) (108/59 - 152/72)  BP(mean): 103 (05 May 2024 09:05) (77 - 103)  RR: 20 (05 May 2024 09:05) (18 - 22)  SpO2: 96% (05 May 2024 09:05) (94% - 97%)    Parameters below as of 05 May 2024 09:05  Patient On (Oxygen Delivery Method): room air        On PE:  General: awake and responsive.   Abdomen: soft, NT, ND  : circumcised phallus, bilat testes descended, NT  EXT: no calf tenderness    LABS:                        9.0    5.94  )-----------( 143      ( 05 May 2024 05:30 )             26.4     05-05    136  |  104  |  16  ----------------------------<  130<H>  3.5   |  26  |  0.71    Ca    7.9<L>      05 May 2024 05:30  Phos  3.4     05-05  Mg     1.9     05-05        Urinalysis Basic - ( 05 May 2024 05:30 )    Color: x / Appearance: x / SG: x / pH: x  Gluc: 130 mg/dL / Ketone: x  / Bili: x / Urobili: x   Blood: x / Protein: x / Nitrite: x   Leuk Esterase: x / RBC: x / WBC x   Sq Epi: x / Non Sq Epi: x / Bacteria: x        RADIOLOGY & ADDITIONAL STUDIES: HPI:   **STROKE HPI***  Blake  # [id number]  History obtained via wife as patient with signficant b/l hearing loss, hearing aids in place    HPI: 88y Cantonese speaking Male with PMHx of JUSTYNA, stroke (2004, residual right sided weakness), HLD, glaucoma, DM, BPH, depression, b/l hearing loss [facility] new left side arm and leg weakness starting 4/26 pm. Wife noticed that patient was leaning against the wall with the left side of his body to support himself to walk and was unable to hold himself upright when sitting, slumping over to the left. Patient felt that his left arm and leg were weak. Called PCP who recommened ED visit. CTH/CTP/CTA head and neck with no acute findings. Loaded with aspirin 300mg x1. Transferred to [facility] for further stroke w/u.    Patient currently with left sided arm and leg weakness, unchanged compared to onset. No dysarthria, changes in vision per wife. Noted with left leg spasms that may be chronic. Wife thinks he has had spasms before, but has not noticed leg spasms recently.     Patient's prior stroke presents as right sided weakness with aphasia. Since then, weakness has improved, but patient at baseline does not converse much due to difficulty hearing.     At baseline, patient able to amulate on his own, although very slowly and carefully. Wife says patient should be using assistive device, but patient refuses. He is independent of ADLs, but performs them slowly. He has fallen in the bathroom in the past. Patient has HHA services (3 days/week, 7 hours per day).    (28 Apr 2024 01:42)    : above noted. Paitent with h/o CVA and admitted for R sided weakness with aphasia. Called to evaluate hematuria. Patient had hudson placed overnight for retention and removed this am. Noted with +heme. Since admission patient has been retaining urine high amounts requiring straight catheterization and had hudson placed last night which was removed this morning as patient c/o discomfort tip penis. Noted with hudson that urine was dark punch color.  used for obtaining history with patien'ts wife at bedside as well. Patient with h/o BPH and [place] urologist [place]. Patient was placed on medication (?name) to help with symptoms but patient stopped taking as it was not helping him. Last saw urologist couple months ago. Prior to hospitalization patient with urinary frequency, decrease stream of urine with occasional dribbling urine and nocturia. No dysuria/hematuria. Since hospitalization patient noted difficulty voiding and after straight catheterization noted some blood. +BM's. Since hudson removed patient says pain tip penis has resolved.       PAST MEDICAL & SURGICAL HISTORY:  Diabetes      HLD (hyperlipidemia)      BPH (benign prostatic hyperplasia)      JUSTYNA (iron deficiency anemia)      Stroke          MEDICATIONS  (STANDING):  acetaminophen   IVPB .. 1000 milliGRAM(s) IV Intermittent once  aspirin  chewable 81 milliGRAM(s) Oral daily  atorvastatin 80 milliGRAM(s) Oral at bedtime  dextrose 10% Bolus 125 milliLiter(s) IV Bolus once  dextrose 5%. 1000 milliLiter(s) (50 mL/Hr) IV Continuous <Continuous>  dextrose 5%. 1000 milliLiter(s) (100 mL/Hr) IV Continuous <Continuous>  dextrose 50% Injectable 25 Gram(s) IV Push once  dextrose 50% Injectable 12.5 Gram(s) IV Push once  doxazosin 1 milliGRAM(s) Oral at bedtime  enoxaparin Injectable 40 milliGRAM(s) SubCutaneous every 24 hours  ferrous    sulfate 325 milliGRAM(s) Oral daily  folic acid 1 milliGRAM(s) Oral daily  glucagon  Injectable 1 milliGRAM(s) IntraMuscular once  insulin lispro (ADMELOG) corrective regimen sliding scale   SubCutaneous every 6 hours  magnesium oxide 400 milliGRAM(s) Oral daily  midodrine. 5 milliGRAM(s) Oral <User Schedule>  sodium chloride 0.9%. 1000 milliLiter(s) (75 mL/Hr) IV Continuous <Continuous>    MEDICATIONS  (PRN):  acetaminophen     Tablet .. 650 milliGRAM(s) Oral every 6 hours PRN Temp greater or equal to 38C (100.4F), Mild Pain (1 - 3), Moderate Pain (4 - 6)  dextrose Oral Gel 15 Gram(s) Oral once PRN Blood Glucose LESS THAN 70 milliGRAM(s)/deciliter      Allergies    No Known Allergies    Intolerances        SOCIAL HISTORY:    FAMILY HISTORY:      Vital Signs Last 24 Hrs  T(C): 36.7 (05 May 2024 09:08), Max: 37.1 (04 May 2024 21:53)  T(F): 98.1 (05 May 2024 09:08), Max: 98.8 (05 May 2024 05:01)  HR: 69 (05 May 2024 09:05) (62 - 82)  BP: 152/72 (05 May 2024 09:05) (108/59 - 152/72)  BP(mean): 103 (05 May 2024 09:05) (77 - 103)  RR: 20 (05 May 2024 09:05) (18 - 22)  SpO2: 96% (05 May 2024 09:05) (94% - 97%)    Parameters below as of 05 May 2024 09:05  Patient On (Oxygen Delivery Method): room air        On PE:  General: awake and responsive.   Abdomen: soft, NT, ND  : circumcised phallus, bilat testes descended, NT  EXT: no calf tenderness    LABS:                        9.0    5.94  )-----------( 143      ( 05 May 2024 05:30 )             26.4     05-05    136  |  104  |  16  ----------------------------<  130<H>  3.5   |  26  |  0.71    Ca    7.9<L>      05 May 2024 05:30  Phos  3.4     05-05  Mg     1.9     05-05        Urinalysis Basic - ( 05 May 2024 05:30 )    Color: x / Appearance: x / SG: x / pH: x  Gluc: 130 mg/dL / Ketone: x  / Bili: x / Urobili: x   Blood: x / Protein: x / Nitrite: x   Leuk Esterase: x / RBC: x / WBC x   Sq Epi: x / Non Sq Epi: x / Bacteria: x        RADIOLOGY & ADDITIONAL STUDIES:

## 2024-05-05 NOTE — CONSULT NOTE ADULT - PROBLEM SELECTOR RECOMMENDATION 9
-#20fr 6 eye catheter placed with 300cc urine output- punch color with 1cc clots irrigated till light punch and then #22fr 3 way hudson placed. Urine light pink  -continue hudson and monitor urine color. Irrigate as needed  -start flomax when able to take po.  -will follow -#20fr 6 eye catheter placed with 300cc urine output- punch color with 1cc clots irrigated till light punch and then #22fr 3 way hudson placed. Urine remains light pink after 1 hour observation  -continue hudson and monitor urine color. Irrigate PRN  -start flomax when able to take po  -Recommend maintain Hudson to gravity throughout admission until patient ambulatory, having regular bowel movements, and taking tamsulosin 0.4mg QD; if unable to reach goal recommend patient follow-up with prior urologist in outpatient setting

## 2024-05-05 NOTE — PROGRESS NOTE ADULT - SUBJECTIVE AND OBJECTIVE BOX
Neurology Progress Note  LARRY BURNS  MRN-[id number]  [id number]    Vital Signs Last 24 Hrs  T(C): 37.1 (05 May 2024 05:01), Max: 37.1 (04 May 2024 21:53)  T(F): 98.8 (05 May 2024 05:01), Max: 98.8 (05 May 2024 05:01)  HR: 62 (05 May 2024 06:13) (62 - 82)  BP: 130/60 (05 May 2024 06:13) (108/59 - 151/71)  BP(mean): 85 (05 May 2024 06:13) (77 - 102)  RR: 20 (05 May 2024 06:13) (18 - 22)  SpO2: 95% (05 May 2024 06:13) (94% - 97%)    Parameters below as of 05 May 2024 06:13  Patient On (Oxygen Delivery Method): room air        VIDEO/EEG MONITORING LAST 24HRS:     BACKGROUND:    INTERICTAL ACTIVITY:    SEIZURES:    EVENTS:    AED LEVELS:    Physical exam:  General: No acute distress, awake and alert  Eyes: Anicteric sclerae, moist conjunctivae, see below for CNs  Neck: trachea midline, FROM, supple, no thyromegaly or lymphadenopathy  Cardiovascular: Regular rate and rhythm, no murmurs, rubs, or gallops. No carotid bruits.   Pulmonary: Anterior breath sounds clear bilaterally, no crackles or wheezing. No use of accessory muscles  GI: Abdomen soft, non-distended, non-tender  Extremities: Radial and DP pulses +2, no edema  Neurologic:  -Mental status: Somnolent, alert, oriented to person, place, and time. Minimal verbal output. Follows commands. Attention/concentration intact.  -Cranial nerves:   II: Peripheral vision in tact but medial blink to threat inconsistent.  III, IV, VI: Right gaze preference but able to cross midline. Pupils equally round and reactive to light  V:  Facial sensation V1-V3 equal and intact   VII: Left facial droop  VIII: Hearing is intact  Motor: Right upper and lower extremities 4/5. Left upper extremity 0/5 and left lower extremity able to spontaneously lift antigravity but difficulty doing to command.  Sensation: Intact to light touch bilaterally. No neglect or extinction on double simultaneous testing.  Coordination: Intact finger to nose on the right. Unable to assess on the left.    Medications:      Labs:  05-05    136  |  104  |  16  ----------------------------<  130<H>  3.5   |  26  |  0.71    Ca    7.9<L>      05 May 2024 05:30  Phos  3.4     05-05  Mg     1.9     05-05          Urinalysis Basic - ( 05 May 2024 05:30 )    Color: x / Appearance: x / SG: x / pH: x  Gluc: 130 mg/dL / Ketone: x  / Bili: x / Urobili: x   Blood: x / Protein: x / Nitrite: x   Leuk Esterase: x / RBC: x / WBC x   Sq Epi: x / Non Sq Epi: x / Bacteria: x

## 2024-05-05 NOTE — PROCEDURE NOTE - NSICDXPROBLEMMLMBOX_GEN
IPSTART~^~1~^~[id number]~^~~^~IPEND  PKSTART~^~[id number]~^~PKEND  IPSTART~^~2~^~[id number]~^~~^~IPEND  PKSTART~^~[id number]~^~PKEND

## 2024-05-05 NOTE — CONSULT NOTE ADULT - ASSESSMENT
89 yo male with urinary retention and hematuria s/p hudson catheter insertion and requiring straight catheterizations prior;  h/o BPH- not taking medications.  87 yo male with PMHx of JUSTYNA, stroke (2004, residual right sided weakness), HLD, glaucoma, DM, BPH, depression, b/l hearing loss currently admitted for CVA (R internal capsular). Urology consulted for urinary retention (requiring multiple straight catheterizations throughout admission - prior history of BPH) and hematuria. Of note, patient is non-ambulatory 2/2 CVA and current clinical status.     Plan:

## 2024-05-05 NOTE — CONSULT NOTE ADULT - ATTENDING COMMENTS
Recommendations:   - discussed risks/benefits of peg tube placement with wife, including infection, bleeding, perforation, or a missed abnormality   - wife ultimately amenable to peg tube placement   - plan for peg tube placement next week, possibly Tuesday   - patient will need to be off of Plavix for at least 5 days   - okay to continue aspirin while off of Plavix
89 yo M likely traumatic hematuria    hudson to remain in place at least 3 days  if functionally improved can TOV prior to discharge, otherwise follow-up with his urologist on discharge for optimization and TOV  please call with further questions

## 2024-05-05 NOTE — CONSULT NOTE ADULT - PROBLEM SELECTOR RECOMMENDATION 2
-stable  -s/p insertion #22fr 3 way hudson  -continue to monitor color -likely 2/2 traumatic catheterization either from initial Galindo insertion or prior straight catheterization  -Low concern for further intervention needed, current Galindo in place and draining clear pink urine

## 2024-05-05 NOTE — PROGRESS NOTE ADULT - ASSESSMENT
88y Cantonese speaking Male with PMHx of JUSTYNA, stroke (2004, residual right sided weakness), HLD, glaucoma, DM, BPH, depression, b/l hearing loss [facility] new left side arm and leg weakness starting 4/26 pm. Wife also noted truncal ataxia (slumping over to the left when sitting) and gait ataxia. CT imaging with no acute pathology. Transferred to [facility] for further stroke w/u. Course c/b periods of waxing/waning left sided hemiparesis likely due to hypoperfusion, s/p IVF boluses, now started on midodrine. Patient with urinary retention with postraumatic hematuria after straight cath in pt with BPH. Pending PEG tube on 5/7.    Neuro   #hx of stroke 2004 with residual right sided weakness    #CVA workup   Patient previously on aspirin, but was stopped by doctor in 2020, unclear reason why.    - continue ASA 81mg daily  - hold Plavix 75mg daily for the next 5 days for PEG placement   - hold Lovenox after AM dose on 5/6 for PEG placement  - continue atorvastatin 80mg daily    - q4hr stroke neuro checks and vitals   - MRI brain on 4/28 with R internal capsule infarct   - Stroke Code HCT Results: negative for acute ischemia    - Stroke Code CTA Results: stenosis of proximal bilateral A2 segments and R M2   - Stroke education     Cards   #HTN   - permissive hypertension, Goal -160, okay to sit up in bed with PT  - s/p 500cc bolus, now on 100cc/hr   - Start midodrine 2.5mg BID on 4/30 as left LE weakness is waxing/waning despite stable BPs --> midodrine increased to 5mg BID on 5/1  - wife refusing WALLY/ILR  - Cardiac CT to rule out SANDRO thrombus: No SANDRO or left atrial thrombus. Non cardiac: Apparent filling defects in segmental branches of the right lower lobe pulmonary artery.  - TTE: EF 56%     #HLD   - high dose statin as above in CVA   - LDL results: 170     Pulm   - call provider if SPO2 < 94%   - CT Angio Chest PE Protocol: No pulmonary embolism. No filling defect in the right lower lobe as seen on prior coronary CTA, likely motion artifact.    GI   #Nutrition/Fluids/Electrolytes    - replete K<4 and Mg <2   - Diet: continue tube feeds  - IVF: 100cc/hr    #dysphagia    - failed SLP evaluation   - FEES completed on 5/2 - recommended for PEG  - will give patient more time over the weekend to improve from a swallow standpoint and reeval early next week  - Wife agreeable to PEG. GI recommending Plavix being held for 5 days and Lovenox after AM dose on 5/6 and PEG tube placement on Tuesday.     #constipation   - c/w bowel regimen w/ senna+miralax     Renal  #BPH  #Urinary retention with postraumatic hematuria after straight cath  On 5/4, 900 cc and 350cc in BS with postraumatic hematuria  - Started on doxazosin 1mg/night (tamsulosin cannot be administered through PEG tube)     Endocrine   #DM   home meds: metformin 500mg TID, Januvia 50mg daily      - A1C results: 6.3%   - ISS     - TSH results: 2.080     Hematology   #JUSTYNA   - c/w home ferrous sulfate 325mg daily   - Ferritin: 410, total iron: 83, TIBC: 255, % saturation: 33     MSK   # leg cramps   - LE dopplers obtained, negative for DVT bilaterally      Psych   #depression   Per wife, prior to COVID, patient very active with friends/community, played sports. Since pandemic and worsening eyesight due to glaucoma and hearing, patient with no interest in daily activities. Was started on escitalopram 10mg daily, but stopped taking due to constipation   - consider restarting based on patient's mood and improvement of bowel movement after starting bowel regimen       DVT Prophylaxis   - lovenox sq for DVT prophylaxis    - SCDs for DVT prophylaxis      IDR Goals: Goals reviewed at interdisciplinary rounds with case management, social work, physical therapy, occupational therapy, and speech language pathology.    Please see specific therapy notes for in depth goals.     Dispo: [place]    Discussed daily hospital plans and goals with patient and wife at bedside via .     Discussed with Dr. Payton who will discuss with Dr. Gonzalez 88y Cantonese speaking Male with PMHx of JUSTYNA, stroke (2004, residual right sided weakness), HLD, glaucoma, DM, BPH, depression, b/l hearing loss [facility] new left side arm and leg weakness starting 4/26 pm. Wife also noted truncal ataxia (slumping over to the left when sitting) and gait ataxia. CT imaging with no acute pathology. Transferred to [facility] for further stroke w/u. Course c/b periods of waxing/waning left sided hemiparesis likely due to hypoperfusion, s/p IVF boluses, now started on midodrine. Patient with urinary retention with postraumatic hematuria after straight cath in pt with BPH. Pending PEG tube on 5/7.    Neuro   #hx of stroke 2004 with residual right sided weakness    #CVA workup   Patient previously on aspirin, but was stopped by doctor in 2020, unclear reason why.    - continue ASA 81mg daily  - hold Plavix 75mg daily for the next 5 days for PEG placement   - hold Lovenox after AM dose on 5/6 for PEG placement  - continue atorvastatin 80mg daily    - q4hr stroke neuro checks and vitals   - MRI brain on 4/28 with R internal capsule infarct   - Stroke Code HCT Results: negative for acute ischemia    - Stroke Code CTA Results: stenosis of proximal bilateral A2 segments and R M2   - Stroke education   - follow repeat CTH, place EEG    Cards   #HTN   - permissive hypertension, Goal -160, okay to sit up in bed with PT  - s/p 500cc bolus, now on 75cc/hr   - Start midodrine 2.5mg BID on 4/30 as left LE weakness is waxing/waning despite stable BPs --> midodrine increased to 5mg BID on 5/1  - wife refusing WALLY/ILR  - Cardiac CT to rule out SANDRO thrombus: No SANDRO or left atrial thrombus. Non cardiac: Apparent filling defects in segmental branches of the right lower lobe pulmonary artery.  - TTE: EF 56%     #HLD   - high dose statin as above in CVA   - LDL results: 170     Pulm   - call provider if SPO2 < 94%   - CT Angio Chest PE Protocol: No pulmonary embolism. No filling defect in the right lower lobe as seen on prior coronary CTA, likely motion artifact.    GI   #Nutrition/Fluids/Electrolytes    - replete K<4 and Mg <2   - Diet: continue tube feeds  - IVF: 100cc/hr    #dysphagia    - failed SLP evaluation   - FEES completed on 5/2 - recommended for PEG  - will give patient more time over the weekend to improve from a swallow standpoint and reeval early next week  - Wife agreeable to PEG. GI recommending Plavix being held for 5 days and Lovenox after AM dose on 5/6 and PEG tube placement on Tuesday.     #constipation   - c/w bowel regimen w/ senna+miralax     Renal  #BPH  #Urinary retention with postraumatic hematuria after straight cath  On 5/4, 900 cc and 350cc in BS with postraumatic hematuria  - Started on doxazosin 1mg/night (tamsulosin cannot be administered through PEG tube)   - Urology consulted, follow recs (after hudson was removed)     Endocrine   #DM   home meds: metformin 500mg TID, Januvia 50mg daily      - A1C results: 6.3%   - ISS     - TSH results: 2.080     Hematology   #JUSTYNA   - c/w home ferrous sulfate 325mg daily   - Ferritin: 410, total iron: 83, TIBC: 255, % saturation: 33     MSK   # leg cramps   - LE dopplers obtained, negative for DVT bilaterally      Psych   #depression   Per wife, prior to COVID, patient very active with friends/community, played sports. Since pandemic and worsening eyesight due to glaucoma and hearing, patient with no interest in daily activities. Was started on escitalopram 10mg daily, but stopped taking due to constipation   - consider restarting based on patient's mood and improvement of bowel movement after starting bowel regimen       DVT Prophylaxis   - lovenox sq for DVT prophylaxis    - SCDs for DVT prophylaxis      IDR Goals: Goals reviewed at interdisciplinary rounds with case management, social work, physical therapy, occupational therapy, and speech language pathology.    Please see specific therapy notes for in depth goals.     Dispo: [place]    Discussed daily hospital plans and goals with patient and wife at bedside via .     Discussed with Dr. Payton who will discuss with Dr. Gonzalez

## 2024-05-05 NOTE — PROGRESS NOTE ADULT - ASSESSMENT
88y M Cantonese speaking, R hand dominant, b/l  hearing impairment ( b/l hearing aids) with PMHx of DM, Thalassemia minor/JUSTYNA, HLD, hx stroke with right sided weakness in 2004 (has a cane and walker at home but pt refuses using assisted device, off ASA since 2020 for unclear reason), depression, glaucoma, BPH, [facility] with new left side arm and leg weakness starting 4/26 pm. Wife noticed that patient was leaning against the wall with the left side of his body to support himself to walk and was unable to hold himself upright when sitting, slumping over to the left. Patient felt that his left arm and leg were weak. Called PCP who recommended ED visit. CTH/CTP/CTA head and neck with no acute findings. Loaded with aspirin 300mg x1. Transferred to [facility] for [facility] stroke w/u.    # R internal capsule infarct  # hx CVA w/ residual R sided weakness   # Dysphagia  - 8LA telemetry monitoring   - q4hr stroke neuro checks and vitals  - active plan per Stroke team   - Permissive HTN goal -160  - midodrine 2.5mg bid ( 4/30)-> increased to 5mg bid( 5/1-)  to help with blood pressure for perfusion.   - IVF w/ NS @100ml/hr   - TF via NGT with Glucerna 1.2 @ 73ml/hr ( 11:00am-5:00pm)   - failed FEES ( 5/2), pt and wife agreeable for PEG  - GI consult appreciated, as d/w GI fellow Dr. Curry, plan for PEG on Tues 5/7 ( need to hold Plavix for 5 days, last dose Plavix on 5/2 @1pm) and to hold Lovenox 24hr prior PEG,last dose on Monday 5/6    - f/u wife's decision on LINQ ( wife spoke with son from [place] and wants to hold off LINQ)   - Cardiac CT: reviewed, anastacia taylor   - CT Chest: no PE   - s/p aspirin load 300mg x1( 4/27)  - continue aspirin 81mg po daily ( 4/27-)  - Clopidogrel 75mg po daily ( 4/28-5/2) hold for PEG placement , will resume when cleared by GI after PEG placement, pt will need Plavix for 21 days   - continue Atorvastatin 80mg po qHS  ( 4/27-) via NGT  - - statin ( not able to give 4/28- pt not able to swallow )   - PT/OT eval    # Acute urinary retention  # hx BPH  - resumed tamsulosin 0.4mg via NGT qHS ( 5/4-) , consider TOV after 3 doses of Tamsulosin, , may consider removing hudson at [place] on Tues 5/7, and /fu TOV in am Wed   -- make sure pt has regular BMs ( + BMs on Fri 5/3)     # Anemia hx Thal minor  - Ferritin 410, Tsat 33% adequate iron store  - Hgb 11.2%     #constipation  - c/w bowel regimen w/ senna+miralax    #DM  - A1C 6.3%   home meds: metformin 500mg TID, Januvia 50mg daily - held for now.   - ISS, goal -180     # Hyponatremia- resolved.   - trend Na 131--> 135--> 133. --> 135   - Hypophos - repleted     #DVT Prophylaxis: SCDs and Lovenox 40mg sq daily     Dispo: fu PT/OT, will benefit inpatient Rehab      Contact:  PMD Dr. Aron Soares   Wife: Farshad Pelayo # [phone]    POC as d/w Stroke team     Thank you for allowing medicine to participate in the care    Rajesh Villalba M.D. cellphone [phone]   Services

## 2024-05-05 NOTE — PROCEDURE NOTE - NSICDXPROCEDURE_GEN_ALL_CORE_FT
PROCEDURES:  Insertion, Galindo catheter, complex 05-May-2024 12:36:31  Bharti Brown  Bladder irrigation 05-May-2024 12:36:37  Bharti Brown

## 2024-05-05 NOTE — PROGRESS NOTE ADULT - SUBJECTIVE AND OBJECTIVE BOX
Patient is a 88y old  Male who presents with a chief complaint of left sided weakness (05 May 2024 10:30)    INTERVAL EVENTS: s/p hudson cath placement for acute urinary retention w/ hx BPH     SUBJECTIVE:  Patient was seen and examined at bedside.Wife at bedside. no new complaints. Feels better with resolution of urinary retention since hudson cath placement     Review of systems: No fever, chills, dizziness, HA, Changes in vision, CP, dyspnea, nausea or vomiting, dysuria, changes in bowel movements, LE edema. Rest of 12 point Review of systems negative unless otherwise documented elsewhere in note.     Diet, NPO with Tube Feed:   Tube Feeding Modality: Nasogastric  Glucerna 1.2 Nikolai (GLUCERNARTH)  Continuous  Starting Tube Feed Rate mL per Hour: 20  Increase Tube Feed Rate by (mL): 10     Every 6 hours  Until Goal Tube Feed Rate (mL per Hour): 73  Tube Feed Duration (in Hours): 24  Tube Feed Start Time: 11:00  Tube Feed Stop Time: 05:00  Banatrol TF     Qty per Day:  2 (05-03-24 @ 18:24) [Active]      MEDICATIONS:  MEDICATIONS  (STANDING):  aspirin  chewable 81 milliGRAM(s) Oral daily  atorvastatin 80 milliGRAM(s) Oral at bedtime  dextrose 10% Bolus 125 milliLiter(s) IV Bolus once  dextrose 5%. 1000 milliLiter(s) (100 mL/Hr) IV Continuous <Continuous>  dextrose 5%. 1000 milliLiter(s) (50 mL/Hr) IV Continuous <Continuous>  dextrose 50% Injectable 25 Gram(s) IV Push once  dextrose 50% Injectable 12.5 Gram(s) IV Push once  doxazosin 1 milliGRAM(s) Oral at bedtime  enoxaparin Injectable 40 milliGRAM(s) SubCutaneous every 24 hours  ferrous    sulfate 325 milliGRAM(s) Oral daily  folic acid 1 milliGRAM(s) Oral daily  glucagon  Injectable 1 milliGRAM(s) IntraMuscular once  insulin lispro (ADMELOG) corrective regimen sliding scale   SubCutaneous every 6 hours  magnesium oxide 400 milliGRAM(s) Oral daily  midodrine. 5 milliGRAM(s) Oral <User Schedule>  sodium chloride 0.9%. 1000 milliLiter(s) (75 mL/Hr) IV Continuous <Continuous>    MEDICATIONS  (PRN):  acetaminophen     Tablet .. 650 milliGRAM(s) Oral every 6 hours PRN Temp greater or equal to 38C (100.4F), Mild Pain (1 - 3), Moderate Pain (4 - 6)  dextrose Oral Gel 15 Gram(s) Oral once PRN Blood Glucose LESS THAN 70 milliGRAM(s)/deciliter      Allergies    No Known Allergies    Intolerances        OBJECTIVE:  Vital Signs Last 24 Hrs  T(C): 36.7 (05 May 2024 09:08), Max: 37.1 (04 May 2024 21:53)  T(F): 98.1 (05 May 2024 09:08), Max: 98.8 (05 May 2024 05:01)  HR: 69 (05 May 2024 09:05) (62 - 82)  BP: 152/72 (05 May 2024 09:05) (108/59 - 152/72)  BP(mean): 103 (05 May 2024 09:05) (77 - 103)  RR: 20 (05 May 2024 09:05) (18 - 22)  SpO2: 96% (05 May 2024 09:05) (94% - 97%)    Parameters below as of 05 May 2024 09:05  Patient On (Oxygen Delivery Method): room air      I&O's Summary    04 May 2024 07:01  -  05 May 2024 07:00  --------------------------------------------------------  IN: 1930 mL / OUT: 1800 mL / NET: 130 mL        PHYSICAL EXAM:  Gen: Reclining in bed at time of exam, appears stated age  HEENT: NCAT, MMM, clear OP  Neck: supple, trachea at midline  CV: RRR, +S1/S2  Pulm: adequate respiratory effort, no increase in work of breathing  Abd: soft, NTND  Skin: warm and dry,   Ext: WWP, no LE edema  Neuro: AOx3, L HP   Psych: affect and behavior appropriate, pleasant at time of interview  : becca in place     LABS:                        9.0    5.94  )-----------( 143      ( 05 May 2024 05:30 )             26.4     05-05    136  |  104  |  16  ----------------------------<  130<H>  3.5   |  26  |  0.71    Ca    7.9<L>      05 May 2024 05:30  Phos  3.4     05-05  Mg     1.9     05-05          CAPILLARY BLOOD GLUCOSE      POCT Blood Glucose.: 141 mg/dL (05 May 2024 06:53)  POCT Blood Glucose.: 150 mg/dL (05 May 2024 00:08)  POCT Blood Glucose.: 132 mg/dL (04 May 2024 18:00)  POCT Blood Glucose.: 169 mg/dL (04 May 2024 11:48)    Urinalysis Basic - ( 05 May 2024 05:30 )    Color: x / Appearance: x / SG: x / pH: x  Gluc: 130 mg/dL / Ketone: x  / Bili: x / Urobili: x   Blood: x / Protein: x / Nitrite: x   Leuk Esterase: x / RBC: x / WBC x   Sq Epi: x / Non Sq Epi: x / Bacteria: x        MICRODATA:    Urinalysis with Rflx Culture (collected 04 May 2024 20:50)        RADIOLOGY/OTHER STUDIES:

## 2024-05-06 LAB
ALBUMIN SERPL ELPH-MCNC: 3.2 G/DL — LOW (ref 3.3–5)
ALP SERPL-CCNC: 77 U/L — SIGNIFICANT CHANGE UP (ref 40–120)
ALT FLD-CCNC: 42 U/L — SIGNIFICANT CHANGE UP (ref 10–45)
ANION GAP SERPL CALC-SCNC: 11 MMOL/L — SIGNIFICANT CHANGE UP (ref 5–17)
ANISOCYTOSIS BLD QL: SLIGHT — SIGNIFICANT CHANGE UP
AST SERPL-CCNC: 45 U/L — HIGH (ref 10–40)
BASOPHILS # BLD AUTO: 0 K/UL — SIGNIFICANT CHANGE UP (ref 0–0.2)
BASOPHILS NFR BLD AUTO: 0 % — SIGNIFICANT CHANGE UP (ref 0–2)
BILIRUB SERPL-MCNC: 0.7 MG/DL — SIGNIFICANT CHANGE UP (ref 0.2–1.2)
BUN SERPL-MCNC: 13 MG/DL — SIGNIFICANT CHANGE UP (ref 7–23)
BURR CELLS BLD QL SMEAR: PRESENT — SIGNIFICANT CHANGE UP
CALCIUM SERPL-MCNC: 8.9 MG/DL — SIGNIFICANT CHANGE UP (ref 8.4–10.5)
CHLORIDE SERPL-SCNC: 102 MMOL/L — SIGNIFICANT CHANGE UP (ref 96–108)
CO2 SERPL-SCNC: 22 MMOL/L — SIGNIFICANT CHANGE UP (ref 22–31)
CREAT SERPL-MCNC: 0.7 MG/DL — SIGNIFICANT CHANGE UP (ref 0.5–1.3)
EGFR: 90 ML/MIN/1.73M2 — SIGNIFICANT CHANGE UP
EOSINOPHIL # BLD AUTO: 0.11 K/UL — SIGNIFICANT CHANGE UP (ref 0–0.5)
EOSINOPHIL NFR BLD AUTO: 1.8 % — SIGNIFICANT CHANGE UP (ref 0–6)
GLUCOSE BLDC GLUCOMTR-MCNC: 133 MG/DL — HIGH (ref 70–99)
GLUCOSE BLDC GLUCOMTR-MCNC: 146 MG/DL — HIGH (ref 70–99)
GLUCOSE BLDC GLUCOMTR-MCNC: 147 MG/DL — HIGH (ref 70–99)
GLUCOSE BLDC GLUCOMTR-MCNC: 176 MG/DL — HIGH (ref 70–99)
GLUCOSE SERPL-MCNC: 132 MG/DL — HIGH (ref 70–99)
HCT VFR BLD CALC: 31.9 % — LOW (ref 39–50)
HGB BLD-MCNC: 10.5 G/DL — LOW (ref 13–17)
HYPOCHROMIA BLD QL: SIGNIFICANT CHANGE UP
LYMPHOCYTES # BLD AUTO: 1.18 K/UL — SIGNIFICANT CHANGE UP (ref 1–3.3)
LYMPHOCYTES # BLD AUTO: 18.6 % — SIGNIFICANT CHANGE UP (ref 13–44)
MACROCYTES BLD QL: SLIGHT — SIGNIFICANT CHANGE UP
MAGNESIUM SERPL-MCNC: 1.8 MG/DL — SIGNIFICANT CHANGE UP (ref 1.6–2.6)
MANUAL SMEAR VERIFICATION: SIGNIFICANT CHANGE UP
MCHC RBC-ENTMCNC: 23.1 PG — LOW (ref 27–34)
MCHC RBC-ENTMCNC: 32.9 GM/DL — SIGNIFICANT CHANGE UP (ref 32–36)
MCV RBC AUTO: 70.1 FL — LOW (ref 80–100)
MICROCYTES BLD QL: SLIGHT — SIGNIFICANT CHANGE UP
MONOCYTES # BLD AUTO: 0.34 K/UL — SIGNIFICANT CHANGE UP (ref 0–0.9)
MONOCYTES NFR BLD AUTO: 5.3 % — SIGNIFICANT CHANGE UP (ref 2–14)
NEUTROPHILS # BLD AUTO: 4.7 K/UL — SIGNIFICANT CHANGE UP (ref 1.8–7.4)
NEUTROPHILS NFR BLD AUTO: 74.3 % — SIGNIFICANT CHANGE UP (ref 43–77)
OVALOCYTES BLD QL SMEAR: SLIGHT — SIGNIFICANT CHANGE UP
PHOSPHATE SERPL-MCNC: 3.5 MG/DL — SIGNIFICANT CHANGE UP (ref 2.5–4.5)
PLAT MORPH BLD: ABNORMAL
PLATELET # BLD AUTO: 156 K/UL — SIGNIFICANT CHANGE UP (ref 150–400)
POIKILOCYTOSIS BLD QL AUTO: SIGNIFICANT CHANGE UP
POLYCHROMASIA BLD QL SMEAR: SLIGHT — SIGNIFICANT CHANGE UP
POTASSIUM SERPL-MCNC: 3.7 MMOL/L — SIGNIFICANT CHANGE UP (ref 3.5–5.3)
POTASSIUM SERPL-SCNC: 3.7 MMOL/L — SIGNIFICANT CHANGE UP (ref 3.5–5.3)
PROT SERPL-MCNC: 6.5 G/DL — SIGNIFICANT CHANGE UP (ref 6–8.3)
RBC # BLD: 4.55 M/UL — SIGNIFICANT CHANGE UP (ref 4.2–5.8)
RBC # FLD: 15.9 % — HIGH (ref 10.3–14.5)
RBC BLD AUTO: ABNORMAL
SCHISTOCYTES BLD QL AUTO: SLIGHT — SIGNIFICANT CHANGE UP
SMUDGE CELLS # BLD: PRESENT — SIGNIFICANT CHANGE UP
SODIUM SERPL-SCNC: 135 MMOL/L — SIGNIFICANT CHANGE UP (ref 135–145)
SPHEROCYTES BLD QL SMEAR: SLIGHT — SIGNIFICANT CHANGE UP
TARGETS BLD QL SMEAR: SLIGHT — SIGNIFICANT CHANGE UP
WBC # BLD: 6.33 K/UL — SIGNIFICANT CHANGE UP (ref 3.8–10.5)
WBC # FLD AUTO: 6.33 K/UL — SIGNIFICANT CHANGE UP (ref 3.8–10.5)

## 2024-05-06 PROCEDURE — 99233 SBSQ HOSP IP/OBS HIGH 50: CPT

## 2024-05-06 PROCEDURE — 95720 EEG PHY/QHP EA INCR W/VEEG: CPT

## 2024-05-06 PROCEDURE — 99232 SBSQ HOSP IP/OBS MODERATE 35: CPT | Mod: GC

## 2024-05-06 PROCEDURE — 71045 X-RAY EXAM CHEST 1 VIEW: CPT | Mod: 26

## 2024-05-06 RX ADMIN — Medication 81 MILLIGRAM(S): at 11:34

## 2024-05-06 RX ADMIN — MAGNESIUM OXIDE 400 MG ORAL TABLET 400 MILLIGRAM(S): 241.3 TABLET ORAL at 11:34

## 2024-05-06 RX ADMIN — Medication 1: at 17:14

## 2024-05-06 RX ADMIN — ENOXAPARIN SODIUM 40 MILLIGRAM(S): 100 INJECTION SUBCUTANEOUS at 06:32

## 2024-05-06 RX ADMIN — MIDODRINE HYDROCHLORIDE 5 MILLIGRAM(S): 2.5 TABLET ORAL at 11:34

## 2024-05-06 RX ADMIN — Medication 1 MILLIGRAM(S): at 11:34

## 2024-05-06 RX ADMIN — Medication 325 MILLIGRAM(S): at 11:34

## 2024-05-06 RX ADMIN — ATORVASTATIN CALCIUM 80 MILLIGRAM(S): 80 TABLET, FILM COATED ORAL at 21:37

## 2024-05-06 RX ADMIN — Medication 1 MILLIGRAM(S): at 21:36

## 2024-05-06 NOTE — PROGRESS NOTE ADULT - SUBJECTIVE AND OBJECTIVE BOX
GI Consult Progress Note:     OVERNIGHT EVENTS: PEDRO.    SUBJECTIVE / INTERVAL HPI:   Patient seen and examined at bedside. Planned for peg tube tomorrow.     VITAL SIGNS:  Vital Signs Last 24 Hrs  T(C): 37.2 (06 May 2024 05:03), Max: 37.2 (06 May 2024 05:03)  T(F): 98.9 (06 May 2024 05:03), Max: 98.9 (06 May 2024 05:03)  HR: 71 (06 May 2024 08:24) (71 - 81)  BP: 119/59 (06 May 2024 08:24) (119/59 - 165/72)  BP(mean): 82 (06 May 2024 08:24) (82 - 106)  RR: 20 (06 May 2024 08:24) (20 - 20)  SpO2: 96% (06 May 2024 08:24) (96% - 96%)    Parameters below as of 06 May 2024 08:24  Patient On (Oxygen Delivery Method): room air        05-05-24 @ 07:01  -  05-06-24 @ 07:00  --------------------------------------------------------  IN: 0 mL / OUT: 1300 mL / NET: -1300 mL      PHYSICAL EXAM:  General: No acute distress, thin, elderly   Lungs: Normal respiratory effort and no intercostal retractions  Cardiovascular: RRR  Abdomen: Soft, non-tender, non-distended  Skin: Warm and dry. No obvious rash      MEDICATIONS:  MEDICATIONS  (STANDING):  aspirin  chewable 81 milliGRAM(s) Oral daily  atorvastatin 80 milliGRAM(s) Oral at bedtime  dextrose 10% Bolus 125 milliLiter(s) IV Bolus once  dextrose 5%. 1000 milliLiter(s) (50 mL/Hr) IV Continuous <Continuous>  dextrose 5%. 1000 milliLiter(s) (100 mL/Hr) IV Continuous <Continuous>  dextrose 50% Injectable 25 Gram(s) IV Push once  dextrose 50% Injectable 12.5 Gram(s) IV Push once  doxazosin 1 milliGRAM(s) Oral at bedtime  ferrous    sulfate 325 milliGRAM(s) Oral daily  folic acid 1 milliGRAM(s) Oral daily  glucagon  Injectable 1 milliGRAM(s) IntraMuscular once  insulin lispro (ADMELOG) corrective regimen sliding scale   SubCutaneous every 6 hours  magnesium oxide 400 milliGRAM(s) Oral daily  midodrine. 5 milliGRAM(s) Oral <User Schedule>  sodium chloride 0.9%. 1000 milliLiter(s) (75 mL/Hr) IV Continuous <Continuous>    MEDICATIONS  (PRN):  acetaminophen     Tablet .. 650 milliGRAM(s) Oral every 6 hours PRN Temp greater or equal to 38C (100.4F), Mild Pain (1 - 3), Moderate Pain (4 - 6)  dextrose Oral Gel 15 Gram(s) Oral once PRN Blood Glucose LESS THAN 70 milliGRAM(s)/deciliter      ALLERGIES:  Allergies    No Known Allergies    Intolerances        LABS:                        10.5   6.33  )-----------( 156      ( 06 May 2024 05:30 )             31.9     05-06    135  |  102  |  13  ----------------------------<  132<H>  3.7   |  22  |  0.70    Ca    8.9      06 May 2024 05:30  Phos  3.5     05-06  Mg     1.8     05-06    TPro  6.5  /  Alb  3.2<L>  /  TBili  0.7  /  DBili  x   /  AST  45<H>  /  ALT  42  /  AlkPhos  77  05-06      Urinalysis Basic - ( 06 May 2024 05:30 )    Color: x / Appearance: x / SG: x / pH: x  Gluc: 132 mg/dL / Ketone: x  / Bili: x / Urobili: x   Blood: x / Protein: x / Nitrite: x   Leuk Esterase: x / RBC: x / WBC x   Sq Epi: x / Non Sq Epi: x / Bacteria: x      CAPILLARY BLOOD GLUCOSE      POCT Blood Glucose.: 146 mg/dL (06 May 2024 11:33)        RADIOLOGY & ADDITIONAL TESTS: Reviewed.

## 2024-05-06 NOTE — EEG REPORT - NS EEG TEXT BOX
[facility] Department of Neurology  Inpatient Continuous video-Electroencephalogram    Patient Name:	LARRY BURNS    :	1935  MRN:	[id number]    Study Start Date/Time:  2024, 11:06:01 AM  Study End Date/Time: in progress    Referred by: Dr. Griggs    Brief Clinical History:  LARRY BURNS is a 88 year old Male with left sided weakness; study performed to investigate for seizures or markers of epilepsy.    Diagnosis Code:   R56.9 convulsions/seizure  The live video was: unmonitored.    Pertinent Medications:  n/a    Acquisition Details:  Electroencephalography was acquired using a minimum of 21 channels on an [employer] system v 9.3.1 with electrode placement according to the standard International 10-20 system following ACNS (American Clinical Neurophysiology Society) guidelines for Long-Term Video EEG monitoring.  Anterior temporal T1 and T2 electrodes were utilized whenever possible.   The [employer] automated spike & seizure detections were all reviewed in detail, in addition to extensive portions of raw EEG.      Day 1: 2024 @ 11:06:01 AM to next morning @ 07:00 am  Background:  continuous, with predominantly alpha and beta frequencies.  Symmetry:  Frequent (10-49%)right frontotemporal theta-delta slowing.   Posterior Dominant Rhythm:  8 hz,  symmetric, well-organized, and well-modulated.  Organization: Appropriate anterior-posterior gradient.  Voltage:  Normal (20+ uV)  Variability: Yes. 		Reactivity: Yes.  N2 sleep: Symmetric, synchronous spindles and K complexes.  Spontaneous Activity:  No epileptiform discharges.  Periodic/rhythmic activity:  None  Events:  No electrographic seizures. Two button presses at 12:22 and 13:17 for right leg movements did not correlate with seizure activity.  Provocations:  Hyperventilation and Photic stimulation: was not performed.    Daily Summary:    Frequent focal slowing suggestive of a similar degree of focal cerebral dysfunction in the right frontotemporal area.   No epileptiform activity and no seizures occurred.  2 button presses for right leg movement did not correlate with seizure activity.    Reviewed above with Dr. Gonzalez, plan to D/C VEEG today.     Edilia Durán DO  Attending Neurologist, [facility]

## 2024-05-06 NOTE — CHART NOTE - NSCHARTNOTEFT_GEN_A_CORE
Electrophysiology PA note:    Pt is a 87 y/o M presented with stroke. EPS is asked to talk to him/wife about ILR / MCOT.  The wife is the HCP and after long discussion with her, she decides to not do ILR or MCOT at this time. I gave her EPS department information in case she would like him to have either ILR or MCOT in the future.   Spoke with neuro team.

## 2024-05-06 NOTE — PROGRESS NOTE ADULT - ASSESSMENT
88y M Cantonese speaking, R hand dominant, b/l  hearing impairment ( b/l hearing aids) with PMHx of DM, Thalassemia minor/JUSTYNA, HLD, hx stroke with right sided weakness in 2004 (has a cane and walker at home but pt refuses using assisted device, off ASA since 2020 for unclear reason), depression, glaucoma, BPH, [facility] with new left side arm and leg weakness starting 4/26 pm. Wife noticed that patient was leaning against the wall with the left side of his body to support himself to walk and was unable to hold himself upright when sitting, slumping over to the left. Patient felt that his left arm and leg were weak. Called PCP who recommended ED visit. CTH/CTP/CTA head and neck with no acute findings. Loaded with aspirin 300mg x1. Transferred to [facility] for further stroke w/u.    # R internal capsule infarct  # hx CVA w/ residual R sided weakness   # Dysphagia  - 8LA telemetry monitoring   - q4hr stroke neuro checks and vitals  - vEEG in progress   - active plan per Stroke team   - Permissive HTN goal -160  - midodrine 2.5mg bid ( 4/30)-> increased to 5mg bid( 5/1-)  to help with blood pressure for perfusion.   - IVF w/ NS @100ml/hr   - TF via NGT with Glucerna 1.2 @ 73ml/hr ( 11:00am-5:00pm), hold feeds after MN   - failed FEES ( 5/2), pt and wife agreeable for PEG  - GI f/u appreciated, as d/w GI fellow Dr. Curry, plan for PEG on Tues 5/7 ( need to hold Plavix for 5 days, last dose Plavix on 5/2 @1pm) and to hold Lovenox 24hr(last dose 5/6 @6am)  prior PEG  - f/u wife's decision on LINQ ( wife spoke with son from [place] and wants to hold off LINQ)   - Cardiac CT: reviewed, no SANDRO taylor   - CT Chest: no PE   - s/p aspirin load 300mg x1( 4/27)  - continue aspirin 81mg po daily ( 4/27-)  - Clopidogrel 75mg po daily ( 4/28-5/2) hold for PEG placement , will resume when cleared by GI after PEG placement, pt will need Plavix for 21 days   - continue Atorvastatin 80mg po qHS  ( 4/27-) via NGT  - - statin ( not able to give 4/28- pt not able to swallow )   - PT/OT eval    # Acute urinary retention  # Gross hematuria  # hx BPH  - resumed tamsulosin 0.4mg via NGT qHS ( 5/4-)   -  consult appreciated re: gross hematuria, placed 22Fr hudson cath, TOV only when pt is ambulatory  - make sure pt has regular BMs ( + BMs on Fri 5/3)     # Anemia hx Thallassemia minor  - Ferritin 410, Tsat 33% adequate iron store  - Hgb 11.2%     #constipation  - c/w bowel regimen w/ senna+miralax    #DM  - A1C 6.3%   home meds: metformin 500mg TID, Januvia 50mg daily - held for now.   - ISS, goal -180     # Hyponatremia- resolved.   - trend Na 131--> 135--> 133. --> 135   - Hypophos - repleted     #DVT Prophylaxis: SCDs and Lovenox 40mg sq daily ( held after dose @ 6am 5/6)     Dispo: fu PT/OT, will benefit inpatient Rehab      Contact:  PMD Dr. Aron Soares   Wife: Farshad Pelayo # [phone]    POC as d/w Stroke team this am     Thank you for allowing medicine to participate in the care    Rajesh Villalba M.D. cellphone [phone]   Services

## 2024-05-06 NOTE — PROGRESS NOTE ADULT - ASSESSMENT
88 Cantonese-speaking Male with PMH of JUSTYNA, stroke (2004, residual right sided weakness), HLD, glaucoma, DM, BPH, depression, b/l hearing loss, who first presented to [facility] with new left side arm and leg weakness, found to have a stroke in the right internal capsule. Evaluated by speech and swallow and failed FEES, so GI consulted for peg tube placement.     Recommendations:   - discussed risks/benefits of peg tube placement with wife, including infection, bleeding, perforation, or a missed abnormality   - wife ultimately amenable to peg tube placement   - plan for peg tube placement tomorrow  - hold tube feeds at midnight so patient is NPO except medications after midnight tonight   - patient will need to be off of Plavix for at least 5 days   - okay to continue aspirin while off of Plavix     Case discussed with Dr. King. GI Team will continue to follow.     Iman Curry D.O.   Gastroenterology Fellow  Weekday 7am-5pm Pager: [phone]  Weeknights/Weekend/Holiday Coverage: Please call the  for contact info

## 2024-05-06 NOTE — PROGRESS NOTE ADULT - SUBJECTIVE AND OBJECTIVE BOX
Neurology Progress Note    Interval History: Reported abnormal movements on the Rt side, but there was no correlation on EEG.     HPI:   **STROKE HPI***  Flowere  # [id number]  History obtained via wife as patient with signficant b/l hearing loss, hearing aids in place    HPI: 88y Cantonese speaking Male with PMHx of JUSTYNA, stroke (2004, residual right sided weakness), HLD, glaucoma, DM, BPH, depression, b/l hearing loss [facility] new left side arm and leg weakness starting 4/26 pm. Wife noticed that patient was leaning against the wall with the left side of his body to support himself to walk and was unable to hold himself upright when sitting, slumping over to the left. Patient felt that his left arm and leg were weak. Called PCP who recommened ED visit. CTH/CTP/CTA head and neck with no acute findings. Loaded with aspirin 300mg x1. Transferred to [facility] for further stroke w/u.    Patient currently with left sided arm and leg weakness, unchanged compared to onset. No dysarthria, changes in vision per wife. Noted with left leg spasms that may be chronic. Wife thinks he has had spasms before, but has not noticed leg spasms recently.     Patient's prior stroke presents as right sided weakness with aphasia. Since then, weakness has improved, but patient at baseline does not converse much due to difficulty hearing.     At baseline, patient able to amulate on his own, although very slowly and carefully. Wife says patient should be using assistive device, but patient refuses. He is independent of ADLs, but performs them slowly. He has falled in the bathroom in the past. Patient has HHA services (3 days/week, 7 hours per day).    (28 Apr 2024 01:42)      PAST MEDICAL & SURGICAL HISTORY:  Diabetes    HLD (hyperlipidemia)    BPH (benign prostatic hyperplasia)    JUSTYNA (iron deficiency anemia)    Stroke            Medications:  acetaminophen     Tablet .. 650 milliGRAM(s) Oral every 6 hours PRN  aspirin  chewable 81 milliGRAM(s) Oral daily  atorvastatin 80 milliGRAM(s) Oral at bedtime  dextrose 10% Bolus 125 milliLiter(s) IV Bolus once  dextrose 5%. 1000 milliLiter(s) IV Continuous <Continuous>  dextrose 5%. 1000 milliLiter(s) IV Continuous <Continuous>  dextrose 50% Injectable 25 Gram(s) IV Push once  dextrose 50% Injectable 12.5 Gram(s) IV Push once  dextrose Oral Gel 15 Gram(s) Oral once PRN  doxazosin 1 milliGRAM(s) Oral at bedtime  ferrous    sulfate 325 milliGRAM(s) Oral daily  folic acid 1 milliGRAM(s) Oral daily  glucagon  Injectable 1 milliGRAM(s) IntraMuscular once  insulin lispro (ADMELOG) corrective regimen sliding scale   SubCutaneous every 6 hours  magnesium oxide 400 milliGRAM(s) Oral daily  midodrine. 5 milliGRAM(s) Oral <User Schedule>  sodium chloride 0.9%. 1000 milliLiter(s) IV Continuous <Continuous>      Vital Signs Last 24 Hrs  T(C): 36.9 (06 May 2024 12:16), Max: 37.2 (06 May 2024 05:03)  T(F): 98.5 (06 May 2024 12:16), Max: 98.9 (06 May 2024 05:03)  HR: 76 (06 May 2024 12:44) (71 - 81)  BP: 141/80 (06 May 2024 12:44) (119/59 - 165/72)  BP(mean): 104 (06 May 2024 12:05) (82 - 106)  RR: 20 (06 May 2024 12:44) (20 - 20)  SpO2: 96% (06 May 2024 12:44) (96% - 96%)    Parameters below as of 06 May 2024 12:44  Patient On (Oxygen Delivery Method): room air        Neurological Exam:   Mental status: Awake, alert and oriented to self, and time.  Recent and remote memory intact. Sever dysarthria, no aphasia. He was able to answer simple questions, and follow simple commands.   Cranial nerves: Pupils equally round and reactive to light, visual fields full, no nystagmus, extraocular muscles intact, V1 through V3 intact bilaterally and symmetric, face symmetric, hearing intact to finger rub, palate elevation symmetric, tongue was midline.  Motor: No drift on the Rt side, 0/5 on the Lt side. No abnormal movements.    Sensation: Intact to light touch, proprioception, and pinprick.   Coordination: No dysmetria on finger-to-nose and heel-to-shin.  No dysdiadokinesia.  Reflexes: 2+ in bilateral UE/LE, downgoing toes bilaterally.   Gait: deferred     Labs:  CBC Full  -  ( 06 May 2024 05:30 )  WBC Count : 6.33 K/uL  RBC Count : 4.55 M/uL  Hemoglobin : 10.5 g/dL  Hematocrit : 31.9 %  Platelet Count - Automated : 156 K/uL  Mean Cell Volume : 70.1 fl  Mean Cell Hemoglobin : 23.1 pg  Mean Cell Hemoglobin Concentration : 32.9 gm/dL  Auto Neutrophil # : 4.70 K/uL  Auto Lymphocyte # : 1.18 K/uL  Auto Monocyte # : 0.34 K/uL  Auto Eosinophil # : 0.11 K/uL  Auto Basophil # : 0.00 K/uL  Auto Neutrophil % : 74.3 %  Auto Lymphocyte % : 18.6 %  Auto Monocyte % : 5.3 %  Auto Eosinophil % : 1.8 %  Auto Basophil % : 0.0 %    05-06    135  |  102  |  13  ----------------------------<  132<H>  3.7   |  22  |  0.70    Ca    8.9      06 May 2024 05:30  Phos  3.5     05-06  Mg     1.8     05-06    TPro  6.5  /  Alb  3.2<L>  /  TBili  0.7  /  DBili  x   /  AST  45<H>  /  ALT  42  /  AlkPhos  77  05-06    LIVER FUNCTIONS - ( 06 May 2024 05:30 )  Alb: 3.2 g/dL / Pro: 6.5 g/dL / ALK PHOS: 77 U/L / ALT: 42 U/L / AST: 45 U/L / GGT: x             Urinalysis Basic - ( 06 May 2024 05:30 )    Color: x / Appearance: x / SG: x / pH: x  Gluc: 132 mg/dL / Ketone: x  / Bili: x / Urobili: x   Blood: x / Protein: x / Nitrite: x   Leuk Esterase: x / RBC: x / WBC x   Sq Epi: x / Non Sq Epi: x / Bacteria: x        NIHSS:    1A: LOC 0   1B: LOC Questions 0   1C: Performs Tasks 0   2: Horizontal EOMs 1  3: Visual Fields 2  4: Facial Palsy 2  5A: LUE Drift 4  5B: RUE Drift 0   6A: LLE Drift 4  6B: RLE Drift 0   7: Limb Ataxia (heel-shin, FNF) 0   8: Sensation 0   9: Language/Aphasia 0   10: Dysarthria 2  11: Extinction/Inattention 0     NIHSS Total: 15

## 2024-05-06 NOTE — PROGRESS NOTE ADULT - SUBJECTIVE AND OBJECTIVE BOX
Patient is a 88y old  Male who presents with a chief complaint of left sided weakness (06 May 2024 11:51)    INTERVAL EVENTS: event noted re: gross hematuria.  consulted and 22Fr hudson placed last night    SUBJECTIVE:  Patient was seen and examined at bedside w. neuro resident. Wife at bedside. Reports pt did not sleep all night 2/2 gross hematuria and final went to sleep at 7:30am. PT arousable, oriented to place, month, and age. Follows commands. L sided hemiplegia and L sided visual field deficits. vEEG in place.     Review of systems: No fever, chills, dizziness, HA, Changes in vision, CP, dyspnea, nausea or vomiting, dysuria, changes in bowel movements, LE edema. Rest of 12 point Review of systems negative unless otherwise documented elsewhere in note.     Diet, NPO with Tube Feed:   Tube Feeding Modality: Nasogastric  Glucerna 1.2 Nikolai (GLUCERNARTH)  Continuous  Starting Tube Feed Rate mL per Hour: 20  Increase Tube Feed Rate by (mL): 10     Every 6 hours  Until Goal Tube Feed Rate (mL per Hour): 73  Tube Feed Duration (in Hours): 24  Tube Feed Start Time: 11:00  Tube Feed Stop Time: 05:00  Banatrol TF     Qty per Day:  2 (05-03-24 @ 18:24) [Active]      MEDICATIONS:  MEDICATIONS  (STANDING):  aspirin  chewable 81 milliGRAM(s) Oral daily  atorvastatin 80 milliGRAM(s) Oral at bedtime  dextrose 10% Bolus 125 milliLiter(s) IV Bolus once  dextrose 5%. 1000 milliLiter(s) (50 mL/Hr) IV Continuous <Continuous>  dextrose 5%. 1000 milliLiter(s) (100 mL/Hr) IV Continuous <Continuous>  dextrose 50% Injectable 25 Gram(s) IV Push once  dextrose 50% Injectable 12.5 Gram(s) IV Push once  doxazosin 1 milliGRAM(s) Oral at bedtime  ferrous    sulfate 325 milliGRAM(s) Oral daily  folic acid 1 milliGRAM(s) Oral daily  glucagon  Injectable 1 milliGRAM(s) IntraMuscular once  insulin lispro (ADMELOG) corrective regimen sliding scale   SubCutaneous every 6 hours  magnesium oxide 400 milliGRAM(s) Oral daily  midodrine. 5 milliGRAM(s) Oral <User Schedule>  sodium chloride 0.9%. 1000 milliLiter(s) (75 mL/Hr) IV Continuous <Continuous>    MEDICATIONS  (PRN):  acetaminophen     Tablet .. 650 milliGRAM(s) Oral every 6 hours PRN Temp greater or equal to 38C (100.4F), Mild Pain (1 - 3), Moderate Pain (4 - 6)  dextrose Oral Gel 15 Gram(s) Oral once PRN Blood Glucose LESS THAN 70 milliGRAM(s)/deciliter      Allergies    No Known Allergies    Intolerances        OBJECTIVE:  Vital Signs Last 24 Hrs  T(C): 36.9 (06 May 2024 12:16), Max: 37.2 (06 May 2024 05:03)  T(F): 98.5 (06 May 2024 12:16), Max: 98.9 (06 May 2024 05:03)  HR: 76 (06 May 2024 12:44) (71 - 81)  BP: 141/80 (06 May 2024 12:44) (119/59 - 165/72)  BP(mean): 82 (06 May 2024 08:24) (82 - 106)  RR: 20 (06 May 2024 12:44) (20 - 20)  SpO2: 96% (06 May 2024 12:44) (96% - 96%)    Parameters below as of 06 May 2024 12:44  Patient On (Oxygen Delivery Method): room air      I&O's Summary    05 May 2024 07:01  -  06 May 2024 07:00  --------------------------------------------------------  IN: 0 mL / OUT: 1300 mL / NET: -1300 mL    06 May 2024 07:01  -  06 May 2024 13:04  --------------------------------------------------------  IN: 0 mL / OUT: 300 mL / NET: -300 mL        PHYSICAL EXAM:  Gen: Reclining in bed at time of exam, appears stated age  HEENT: NCAT, MMM, clear OP  Neck: supple, trachea at midline  CV: RRR, +S1/S2  Pulm: adequate respiratory effort, no increase in work of breathing  Abd: soft, NTND  Skin: warm and dry,   Ext: WWP, no LE edema  Neuro: AOx3, L HP  Psych: affect and behavior appropriate, pleasant at time of interview  :     LABS:                        10.5   6.33  )-----------( 156      ( 06 May 2024 05:30 )             31.9     05-06    135  |  102  |  13  ----------------------------<  132<H>  3.7   |  22  |  0.70    Ca    8.9      06 May 2024 05:30  Phos  3.5     05-06  Mg     1.8     05-06    TPro  6.5  /  Alb  3.2<L>  /  TBili  0.7  /  DBili  x   /  AST  45<H>  /  ALT  42  /  AlkPhos  77  05-06    LIVER FUNCTIONS - ( 06 May 2024 05:30 )  Alb: 3.2 g/dL / Pro: 6.5 g/dL / ALK PHOS: 77 U/L / ALT: 42 U/L / AST: 45 U/L / GGT: x             CAPILLARY BLOOD GLUCOSE      POCT Blood Glucose.: 146 mg/dL (06 May 2024 11:33)  POCT Blood Glucose.: 133 mg/dL (06 May 2024 06:30)  POCT Blood Glucose.: 147 mg/dL (06 May 2024 00:12)  POCT Blood Glucose.: 141 mg/dL (05 May 2024 21:47)  POCT Blood Glucose.: 201 mg/dL (05 May 2024 16:21)    Urinalysis Basic - ( 06 May 2024 05:30 )    Color: x / Appearance: x / SG: x / pH: x  Gluc: 132 mg/dL / Ketone: x  / Bili: x / Urobili: x   Blood: x / Protein: x / Nitrite: x   Leuk Esterase: x / RBC: x / WBC x   Sq Epi: x / Non Sq Epi: x / Bacteria: x        MICRODATA:    Urinalysis with Rflx Culture (collected 04 May 2024 20:50)        RADIOLOGY/OTHER STUDIES:

## 2024-05-07 ENCOUNTER — TRANSCRIPTION ENCOUNTER (OUTPATIENT)
Age: 89
End: 2024-05-07

## 2024-05-07 DIAGNOSIS — K59.00 CONSTIPATION, UNSPECIFIED: ICD-10-CM

## 2024-05-07 DIAGNOSIS — R25.2 CRAMP AND SPASM: ICD-10-CM

## 2024-05-07 DIAGNOSIS — E11.9 TYPE 2 DIABETES MELLITUS WITHOUT COMPLICATIONS: ICD-10-CM

## 2024-05-07 DIAGNOSIS — E78.5 HYPERLIPIDEMIA, UNSPECIFIED: ICD-10-CM

## 2024-05-07 DIAGNOSIS — I10 ESSENTIAL (PRIMARY) HYPERTENSION: ICD-10-CM

## 2024-05-07 DIAGNOSIS — I63.9 CEREBRAL INFARCTION, UNSPECIFIED: ICD-10-CM

## 2024-05-07 DIAGNOSIS — D50.9 IRON DEFICIENCY ANEMIA, UNSPECIFIED: ICD-10-CM

## 2024-05-07 DIAGNOSIS — F32.9 MAJOR DEPRESSIVE DISORDER, SINGLE EPISODE, UNSPECIFIED: ICD-10-CM

## 2024-05-07 DIAGNOSIS — R13.10 DYSPHAGIA, UNSPECIFIED: ICD-10-CM

## 2024-05-07 DIAGNOSIS — N40.0 BENIGN PROSTATIC HYPERPLASIA WITHOUT LOWER URINARY TRACT SYMPTOMS: ICD-10-CM

## 2024-05-07 DIAGNOSIS — Z29.9 ENCOUNTER FOR PROPHYLACTIC MEASURES, UNSPECIFIED: ICD-10-CM

## 2024-05-07 LAB
ALBUMIN SERPL ELPH-MCNC: 3.3 G/DL — SIGNIFICANT CHANGE UP (ref 3.3–5)
ALP SERPL-CCNC: 71 U/L — SIGNIFICANT CHANGE UP (ref 40–120)
ALT FLD-CCNC: 39 U/L — SIGNIFICANT CHANGE UP (ref 10–45)
ANION GAP SERPL CALC-SCNC: 12 MMOL/L — SIGNIFICANT CHANGE UP (ref 5–17)
AST SERPL-CCNC: 40 U/L — SIGNIFICANT CHANGE UP (ref 10–40)
BILIRUB SERPL-MCNC: 0.7 MG/DL — SIGNIFICANT CHANGE UP (ref 0.2–1.2)
BUN SERPL-MCNC: 15 MG/DL — SIGNIFICANT CHANGE UP (ref 7–23)
CALCIUM SERPL-MCNC: 8.6 MG/DL — SIGNIFICANT CHANGE UP (ref 8.4–10.5)
CHLORIDE SERPL-SCNC: 100 MMOL/L — SIGNIFICANT CHANGE UP (ref 96–108)
CO2 SERPL-SCNC: 22 MMOL/L — SIGNIFICANT CHANGE UP (ref 22–31)
CREAT SERPL-MCNC: 0.72 MG/DL — SIGNIFICANT CHANGE UP (ref 0.5–1.3)
EGFR: 89 ML/MIN/1.73M2 — SIGNIFICANT CHANGE UP
GLUCOSE BLDC GLUCOMTR-MCNC: 139 MG/DL — HIGH (ref 70–99)
GLUCOSE BLDC GLUCOMTR-MCNC: 142 MG/DL — HIGH (ref 70–99)
GLUCOSE BLDC GLUCOMTR-MCNC: 145 MG/DL — HIGH (ref 70–99)
GLUCOSE BLDC GLUCOMTR-MCNC: 147 MG/DL — HIGH (ref 70–99)
GLUCOSE SERPL-MCNC: 143 MG/DL — HIGH (ref 70–99)
HCT VFR BLD CALC: 30.8 % — LOW (ref 39–50)
HGB BLD-MCNC: 10.3 G/DL — LOW (ref 13–17)
MAGNESIUM SERPL-MCNC: 1.8 MG/DL — SIGNIFICANT CHANGE UP (ref 1.6–2.6)
MCHC RBC-ENTMCNC: 23.5 PG — LOW (ref 27–34)
MCHC RBC-ENTMCNC: 33.4 GM/DL — SIGNIFICANT CHANGE UP (ref 32–36)
MCV RBC AUTO: 70.2 FL — LOW (ref 80–100)
NRBC # BLD: 0 /100 WBCS — SIGNIFICANT CHANGE UP (ref 0–0)
PHOSPHATE SERPL-MCNC: 3.2 MG/DL — SIGNIFICANT CHANGE UP (ref 2.5–4.5)
PLATELET # BLD AUTO: 177 K/UL — SIGNIFICANT CHANGE UP (ref 150–400)
POTASSIUM SERPL-MCNC: 3.3 MMOL/L — LOW (ref 3.5–5.3)
POTASSIUM SERPL-SCNC: 3.3 MMOL/L — LOW (ref 3.5–5.3)
PROT SERPL-MCNC: 6.3 G/DL — SIGNIFICANT CHANGE UP (ref 6–8.3)
RBC # BLD: 4.39 M/UL — SIGNIFICANT CHANGE UP (ref 4.2–5.8)
RBC # FLD: 15.6 % — HIGH (ref 10.3–14.5)
SODIUM SERPL-SCNC: 134 MMOL/L — LOW (ref 135–145)
WBC # BLD: 6.53 K/UL — SIGNIFICANT CHANGE UP (ref 3.8–10.5)
WBC # FLD AUTO: 6.53 K/UL — SIGNIFICANT CHANGE UP (ref 3.8–10.5)

## 2024-05-07 PROCEDURE — 99233 SBSQ HOSP IP/OBS HIGH 50: CPT

## 2024-05-07 PROCEDURE — 43246 EGD PLACE GASTROSTOMY TUBE: CPT | Mod: GC

## 2024-05-07 DEVICE — PEG KT SAFETY 20FR: Type: IMPLANTABLE DEVICE | Status: FUNCTIONAL

## 2024-05-07 RX ORDER — POTASSIUM CHLORIDE 20 MEQ
10 PACKET (EA) ORAL
Refills: 0 | Status: COMPLETED | OUTPATIENT
Start: 2024-05-07 | End: 2024-05-07

## 2024-05-07 RX ORDER — DOXAZOSIN MESYLATE 4 MG
1 TABLET ORAL AT BEDTIME
Refills: 0 | Status: DISCONTINUED | OUTPATIENT
Start: 2024-05-07 | End: 2024-05-17

## 2024-05-07 RX ORDER — ATORVASTATIN CALCIUM 80 MG/1
80 TABLET, FILM COATED ORAL AT BEDTIME
Refills: 0 | Status: DISCONTINUED | OUTPATIENT
Start: 2024-05-07 | End: 2024-05-17

## 2024-05-07 RX ORDER — ACETAMINOPHEN 500 MG
1000 TABLET ORAL ONCE
Refills: 0 | Status: COMPLETED | OUTPATIENT
Start: 2024-05-07 | End: 2024-05-07

## 2024-05-07 RX ORDER — MAGNESIUM OXIDE 400 MG ORAL TABLET 241.3 MG
400 TABLET ORAL DAILY
Refills: 0 | Status: DISCONTINUED | OUTPATIENT
Start: 2024-05-07 | End: 2024-05-17

## 2024-05-07 RX ORDER — MIDODRINE HYDROCHLORIDE 2.5 MG/1
5 TABLET ORAL
Refills: 0 | Status: DISCONTINUED | OUTPATIENT
Start: 2024-05-07 | End: 2024-05-17

## 2024-05-07 RX ORDER — FERROUS SULFATE 325(65) MG
325 TABLET ORAL DAILY
Refills: 0 | Status: DISCONTINUED | OUTPATIENT
Start: 2024-05-07 | End: 2024-05-17

## 2024-05-07 RX ORDER — FOLIC ACID 0.8 MG
1 TABLET ORAL DAILY
Refills: 0 | Status: DISCONTINUED | OUTPATIENT
Start: 2024-05-07 | End: 2024-05-17

## 2024-05-07 RX ADMIN — Medication 400 MILLIGRAM(S): at 15:08

## 2024-05-07 RX ADMIN — Medication 100 MILLIEQUIVALENT(S): at 08:41

## 2024-05-07 RX ADMIN — MIDODRINE HYDROCHLORIDE 5 MILLIGRAM(S): 2.5 TABLET ORAL at 16:56

## 2024-05-07 RX ADMIN — MIDODRINE HYDROCHLORIDE 5 MILLIGRAM(S): 2.5 TABLET ORAL at 01:03

## 2024-05-07 RX ADMIN — Medication 1000 MILLIGRAM(S): at 15:23

## 2024-05-07 RX ADMIN — ATORVASTATIN CALCIUM 80 MILLIGRAM(S): 80 TABLET, FILM COATED ORAL at 22:13

## 2024-05-07 RX ADMIN — Medication 1 MILLIGRAM(S): at 16:25

## 2024-05-07 RX ADMIN — Medication 100 MILLIEQUIVALENT(S): at 14:51

## 2024-05-07 RX ADMIN — Medication 100 MILLIEQUIVALENT(S): at 13:49

## 2024-05-07 RX ADMIN — Medication 1 MILLIGRAM(S): at 22:17

## 2024-05-07 RX ADMIN — Medication 325 MILLIGRAM(S): at 16:25

## 2024-05-07 RX ADMIN — MAGNESIUM OXIDE 400 MG ORAL TABLET 400 MILLIGRAM(S): 241.3 TABLET ORAL at 16:25

## 2024-05-07 NOTE — PROGRESS NOTE ADULT - ASSESSMENT
88y Cantonese speaking Male with PMHx of JUSTYNA, stroke (2004, residual right sided weakness), HLD, glaucoma, DM, BPH, depression, b/l hearing loss [facility] new left side arm and leg weakness starting 4/26 pm. Wife also noted truncal ataxia (slumping over to the left when sitting) and gait ataxia. CT imaging with no acute pathology. Transferred to [facility] for further stroke w/u. Course c/b periods of waxing/waning left sided hemiparesis likely due to hypoperfusion, s/p IVF boluses, now started on midodrine. Patient with urinary retention with postraumatic hematuria after straight cath in pt with BPH. Pending PEG tube on 5/7.    Neuro   #hx of stroke 2004 with residual right sided weakness    #CVA workup   Patient previously on aspirin, but was stopped by doctor in 2020, unclear reason why.    - continue ASA 81mg daily  - OK to restart plavix on 5/9 (needs to be help for 48 hours after PEG tube placement)  - continue atorvastatin 80mg daily    - q8h general neuro checks and vitals   - MRI brain on 4/28 with R internal capsule infarct   - Stroke Code HCT Results: negative for acute ischemia    - Stroke Code CTA Results: stenosis of proximal bilateral A2 segments and R M2   - Stroke education   - repeat HCT on 5/5 - subacute infarct centered in the genu and posterior limb of the right internal capsule is stable.  A chronic transcortical infarction in the left precentral gyrus and a small chronic infarct in theright cerebellar hemisphere are stable.      Cards   #HTN   - Goal -160, okay to work with PT  - Continue Midodrine 2.5mg BID on 4/30 as left LE weakness is waxing/waning despite stable BPs --> midodrine increased to 5mg BID on 5/1  - wife refusing WALLY/ILR, however she agreeable for MCOT, but after talking to EP team, she agreed to follow up with them after going to [facility] to consider monitoring.   - Cardiac CT to rule out SANDRO thrombus: No SANDRO or left atrial thrombus. Non cardiac: Apparent filling defects in segmental branches of the right lower lobe pulmonary artery.  - TTE: EF 56%     #HLD   - high dose statin as above in CVA   - LDL results: 170     Pulm   - call provider if SPO2 < 94%   - CT Angio Chest PE Protocol: No pulmonary embolism. No filling defect in the right lower lobe as seen on prior coronary CTA, likely motion artifact.    GI   #Nutrition/Fluids/Electrolytes    - replete K<4 and Mg <2   - Diet: can restart tube feeds on 5/8  - IVF: 75cc/hr while not receiving feeds    #dysphagia    - failed SLP evaluation   - FEES completed on 5/2 - recommended for PEG  - s/p PEG on 5/7    #constipation   - c/w bowel regimen w/ senna+miralax     Renal  #BPH  #Urinary retention with postraumatic hematuria after straight cath  On 5/4, 900 cc and 350cc in BS with postraumatic hematuria  - Started on doxazosin 1mg/night (tamsulosin cannot be administered through PEG tube)   - urology consulted  - hudson to remain in place until patient is ambulatory; should follow up with his outpatient urologist  - can attempt TOV on 5/8 if functional status improves, however, will likely require hudson to stay in place at discharge    Endocrine   #DM   home meds: metformin 500mg TID, Januvia 50mg daily      - A1C results: 6.3%   - ISS     - TSH results: 2.080     Hematology   #JUSTYNA   - c/w home ferrous sulfate 325mg daily   - Ferritin: 410, total iron: 83, TIBC: 255, % saturation: 33     MSK   # leg cramps   - LE dopplers obtained, negative for DVT bilaterally      Psych   #depression   Per wife, prior to COVID, patient very active with friends/community, played sports. Since pandemic and worsening eyesight due to glaucoma and hearing, patient with no interest in daily activities. Was started on escitalopram 10mg daily, but stopped taking due to constipation   - consider restarting based on patient's mood and improvement of bowel movement after starting bowel regimen       DVT Prophylaxis   - OK to restart lovenox for DVT ppx tomorrow (5/8 AM)  - SCDs for DVT prophylaxis      IDR Goals: Goals reviewed at interdisciplinary rounds with case management, social work, physical therapy, occupational therapy, and speech language pathology.    Please see specific therapy notes for in depth goals.     Dispo: [place]    Discussed daily hospital plans and goals with patient and wife at bedside via .     Discussed with Dr. Payton who will discuss with Dr. Griggs

## 2024-05-07 NOTE — CHART NOTE - NSCHARTNOTEFT_GEN_A_CORE
PEG placed in endo suite for dysphagia.    Impressions:  - Normal esophagus.  - Normal duodenum.  - Erythema in the stomach body compatible with non-erosive gastritis.    Plan:	  - Can give meds and flushes in 4 hours  - Can start tube feeds tomorrow  - OK to resume DVT ppx tomorrow AM  - Can continue ASA but hold Plavix for 48 hrs    New Tube Care:  1) OK to shower 24 hours after tube placement.  2) To remove drainage, crusts, or blood from the skin around the tube, use a solution of half hydrogen peroxide- half water. Swab once a day and as needed, followed by antibacterial soap (unless sensitive) and water.  3) Cleanse in a spiral pattern, beginning at the tube and moving outward. Rinse and pat dry.  4) Some drainage around the site is normal. It is important to keep your skin around the tube clean and dry.  5)Remove dressing 24 hours after surgery.    **Please contact GI fellow on call immediately or go to the emergency room if PEG tube falls out within the first 4 weeks after placement.**    Khadar Keller MD ([place])  Gastroenterology Fellow  Pager (M-F 7a-5p): [phone]  Pager (after hours): Please call  for on-call fellow

## 2024-05-07 NOTE — PROGRESS NOTE ADULT - SUBJECTIVE AND OBJECTIVE BOX
Patient is a 88y old  Male who presents with a chief complaint of left sided weakness (07 May 2024 15:25)    INTERVAL EVENTS: s/p PEG placement by GI this am     SUBJECTIVE:  Patient was seen and examined at bedside. Wife at bedside. aware of plan for acute rehab.    Review of systems: No fever, chills, dizziness, HA, Changes in vision, CP, dyspnea, nausea or vomiting, dysuria, changes in bowel movements, LE edema. Rest of 12 point Review of systems negative unless otherwise documented elsewhere in note.     Diet, NPO:   Except Medications (05-07-24 @ 15:01) [Active]      MEDICATIONS:  MEDICATIONS  (STANDING):  aspirin  chewable 81 milliGRAM(s) Oral daily  atorvastatin 80 milliGRAM(s) Oral at bedtime  dextrose 10% Bolus 125 milliLiter(s) IV Bolus once  dextrose 5%. 1000 milliLiter(s) (50 mL/Hr) IV Continuous <Continuous>  dextrose 5%. 1000 milliLiter(s) (100 mL/Hr) IV Continuous <Continuous>  dextrose 50% Injectable 25 Gram(s) IV Push once  dextrose 50% Injectable 12.5 Gram(s) IV Push once  doxazosin 1 milliGRAM(s) Oral at bedtime  ferrous    sulfate 325 milliGRAM(s) Oral daily  folic acid 1 milliGRAM(s) Oral daily  glucagon  Injectable 1 milliGRAM(s) IntraMuscular once  insulin lispro (ADMELOG) corrective regimen sliding scale   SubCutaneous every 6 hours  magnesium oxide 400 milliGRAM(s) Oral daily  midodrine. 5 milliGRAM(s) Oral <User Schedule>  sodium chloride 0.9%. 1000 milliLiter(s) (75 mL/Hr) IV Continuous <Continuous>    MEDICATIONS  (PRN):  dextrose Oral Gel 15 Gram(s) Oral once PRN Blood Glucose LESS THAN 70 milliGRAM(s)/deciliter      Allergies    No Known Allergies    Intolerances        OBJECTIVE:  Vital Signs Last 24 Hrs  T(C): 36.8 (07 May 2024 13:45), Max: 37.1 (07 May 2024 09:49)  T(F): 98.3 (07 May 2024 13:45), Max: 98.8 (07 May 2024 09:49)  HR: 70 (07 May 2024 12:25) (69 - 84)  BP: 142/70 (07 May 2024 12:25) (137/79 - 152/72)  BP(mean): 96 (07 May 2024 12:25) (96 - 104)  RR: 24 (07 May 2024 12:25) (20 - 24)  SpO2: 94% (07 May 2024 12:25) (93% - 96%)    Parameters below as of 07 May 2024 12:25  Patient On (Oxygen Delivery Method): room air      I&O's Summary    06 May 2024 07:01  -  07 May 2024 07:00  --------------------------------------------------------  IN: 825 mL / OUT: 1800 mL / NET: -975 mL    07 May 2024 07:01  -  07 May 2024 15:42  --------------------------------------------------------  IN: 0 mL / OUT: 920 mL / NET: -920 mL        PHYSICAL EXAM:  Gen: Reclining in bed at time of exam, appears stated age  HEENT: NCAT, MMM, clear OP  Neck: supple, trachea at midline  CV: RRR, +S1/S2  Pulm: adequate respiratory effort, no increase in work of breathing  Abd: soft, NTND, PEG in place   Skin: warm and dry,   Ext: WWP, no LE edema  Neuro: AOx3, L HP   Psych: affect and behavior appropriate, pleasant at time of interview  :     LABS:                        10.3   6.53  )-----------( 177      ( 07 May 2024 05:30 )             30.8     05-07    134<L>  |  100  |  15  ----------------------------<  143<H>  3.3<L>   |  22  |  0.72    Ca    8.6      07 May 2024 05:30  Phos  3.2     05-07  Mg     1.8     05-07    TPro  6.3  /  Alb  3.3  /  TBili  0.7  /  DBili  x   /  AST  40  /  ALT  39  /  AlkPhos  71  05-07    LIVER FUNCTIONS - ( 07 May 2024 05:30 )  Alb: 3.3 g/dL / Pro: 6.3 g/dL / ALK PHOS: 71 U/L / ALT: 39 U/L / AST: 40 U/L / GGT: x             CAPILLARY BLOOD GLUCOSE      POCT Blood Glucose.: 139 mg/dL (07 May 2024 12:38)  POCT Blood Glucose.: 142 mg/dL (07 May 2024 06:20)  POCT Blood Glucose.: 145 mg/dL (07 May 2024 00:25)  POCT Blood Glucose.: 176 mg/dL (06 May 2024 17:10)    Urinalysis Basic - ( 07 May 2024 05:30 )    Color: x / Appearance: x / SG: x / pH: x  Gluc: 143 mg/dL / Ketone: x  / Bili: x / Urobili: x   Blood: x / Protein: x / Nitrite: x   Leuk Esterase: x / RBC: x / WBC x   Sq Epi: x / Non Sq Epi: x / Bacteria: x        MICRODATA:    Urinalysis with Rflx Culture (collected 04 May 2024 20:50)        RADIOLOGY/OTHER STUDIES:

## 2024-05-07 NOTE — PRE-ANESTHESIA EVALUATION ADULT - NSANTHPMHFT_GEN_ALL_CORE
88y Cantonese speaking Male with PMHx of JUSTYNA, stroke (2004, residual right sided weakness), HLD, glaucoma, DM, BPH, depression, b/l hearing loss presents with [facility] with new left side arm and leg weakness starting 4/26 pm.

## 2024-05-07 NOTE — PROGRESS NOTE ADULT - NS ATTEND AMEND GEN_ALL_CORE FT
88M cantonese speaking, JUSTYNA, ischemic stroke (2004 w/ residual RHP), HLD presented w/ left sided weakness in setting of R. posterior limb of IC infarction.     On exam, fluent, follows crossed commands, R. gaze deviation able to overcome volitionally, ?decreased BTT on the left, moderate-severe dysarthria, moderate L. facial droop, LUE no movement, LLE no movement.     MR brain acute infarction in the R. posterior limb of IC, R. BG and ?R. thalamus. Chronic L. posterior frontal transcortical infarction and R. cerebellum.   CTA h/n R. A2 and ?M2 stenosis  TTE neg  Cardiac CT neg    Imp: R. gaze deviation, dysarthria, and L. hemiplegia secondary to R. posterior limb of IC/BG/thalamic infarction. Mechanism likely . Cannot rule out a central embolic source (particularly given the chronic R. cerebellar and L. cerebral infarction).     Plan:   ASA 81mg; Continue Plavix 75mg for 3 weeks from initial stroke once cleared by GI   -160   s/p PEG   Continue midodrine 5mg BID   Wife refusing ILR; Ameneable for MCOT  PT - BRENDA     50 minutes spent on total encounter. The necessity of the time spent during the encounter on this date of service was due to:     Review of imaging and chart; obtaining history; examination of pt; discussion and coordination of care, and discussion of lifestyle modification and risk factor control.

## 2024-05-07 NOTE — PRE-ANESTHESIA EVALUATION ADULT - NSANTHADDINFOFT_GEN_ALL_CORE
Risks, benefits and alternatives discussed; including but not limited to headache, nausea, bleeding, infection and local trauma/positioning related injury. All questions answered. The patient & HCP agree to proceed.

## 2024-05-07 NOTE — PROGRESS NOTE ADULT - PROBLEM SELECTOR PLAN 9
- Started on doxazosin 1mg/night (tamsulosin cannot be administered through PEG tube)   - urology consulted

## 2024-05-07 NOTE — PROGRESS NOTE ADULT - PROBLEM SELECTOR PLAN 11
Per wife, prior to COVID, patient very active with friends/community, played sports. Since pandemic and worsening eyesight due to glaucoma and hearing, patient with no interest in daily activities. Was started on escitalopram 10mg daily, but stopped taking due to constipation   - consider restarting based on patient's mood and improvement of bowel movement after starting bowel regimen

## 2024-05-07 NOTE — PROGRESS NOTE ADULT - PROBLEM SELECTOR PLAN 5
- failed SLP evaluation   - FEES completed on 5/2 - recommended for PEG  - s/p PEG on 5/7; can start tube feeds on 5/8

## 2024-05-07 NOTE — PROGRESS NOTE ADULT - PROBLEM SELECTOR PLAN 8
Home meds: metformin 500mg TID, Januvia 50mg daily    Plan:  - A1C results: 6.3%   - c/w mISS   - Tube feeds  - FSG q6 hours

## 2024-05-07 NOTE — PROGRESS NOTE ADULT - PROBLEM SELECTOR PLAN 3
Goal -160, okay to work with PT  - Continue Midodrine 2.5mg BID on 4/30 as left LE weakness is waxing/waning despite stable BPs --> midodrine increased to 5mg BID on 5/1  - wife refusing WALLY/ILR, however she agreeable for MCOT, but after talking to EP team, she agreed to follow up with them after going to [facility] to consider monitoring.   - Cardiac CT to rule out SANDRO thrombus: No SANDRO or left atrial thrombus. Non cardiac: Apparent filling defects in segmental branches of the right lower lobe pulmonary artery.  - TTE: EF 56%

## 2024-05-07 NOTE — PROGRESS NOTE ADULT - PROBLEM SELECTOR PLAN 12
F: 75cc/hr while not receiving feeds  E: Replete PRN  K<4.0, Mg<2.0, Phos<2.5  N: Can restart tube feeds on 5/8  GI ppx: None  DVT PPx: SCDs for now; OK to restart lovenox for DVT ppx tomorrow (5/8 AM)

## 2024-05-07 NOTE — PROGRESS NOTE ADULT - PROBLEM SELECTOR PLAN 1
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 Patient previously on aspirin, but was stopped by doctor in 2020, unclear reason why.    - continue ASA 81mg daily  - OK to restart plavix on 5/9 (needs to be help for 48 hours after PEG tube placement)  - continue atorvastatin 80mg daily    - q8h general neuro checks and vitals   - MRI brain on 4/28 with R internal capsule infarct   - Stroke Code HCT Results: negative for acute ischemia    - Stroke Code CTA Results: stenosis of proximal bilateral A2 segments and R M2   - Stroke education   - repeat HCT on 5/5 - subacute infarct centered in the genu and posterior limb of the right internal capsule is stable.  A chronic transcortical infarction in the left precentral gyrus and a small chronic infarct in the right cerebellar hemisphere are stable.

## 2024-05-07 NOTE — PRE-ANESTHESIA EVALUATION ADULT - WEIGHT IN KG
The patient is Stable - Low risk of patient condition declining or worsening    Shift Goals  Clinical Goals: monitor oxygen saturation, maintain oxygen saturation above 90%  Patient Goals: walking    Progress made toward(s) clinical / shift goals: Patient decreased his need for oxygen while ambulating compared to yesterday. Today only needed 6-8L compared to yesterdays 11-12L. Patient reports decreased SOB while ambulating and showed he could ambulate a longer distance.    Patient is not progressing towards the following goals: Encouraged oral fluids as has one episode of dizziness when family visit.        70.3

## 2024-05-07 NOTE — PROGRESS NOTE ADULT - ASSESSMENT
88y Cantonese speaking Male with PMHx of JUSTYNA, stroke (2004, residual right sided weakness), HLD, glaucoma, DM, BPH, depression, b/l hearing loss [facility] side arm and leg weakness starting 4/26 pm. Wife also noted truncal ataxia (slumping over to the left when sitting) and gait ataxia. CT imaging with no acute pathology. Transferred to [facility] for further stroke w/u. Course c/b periods of waxing/waning left sided hemiparesis likely due to hypoperfusion, s/p IVF boluses, now started on midodrine. Patient with urinary retention with postraumatic hematuria after straight cath in pt with BPH. Pending PEG tube on 5/7.    Neuro   #hx of stroke 2004 with residual right sided weakness    #CVA workup   Patient previously on aspirin, but was stopped by doctor in 2020, unclear reason why.    - continue ASA 81mg daily  - OK to restart plavix on 5/9 (needs to be help for 48 hours after PEG tube placement)  - continue atorvastatin 80mg daily    - q8h general neuro checks and vitals   - MRI brain on 4/28 with R internal capsule infarct   - Stroke Code HCT Results: negative for acute ischemia    - Stroke Code CTA Results: stenosis of proximal bilateral A2 segments and R M2   - Stroke education   - repeat HCT on 5/5 - subacute infarct centered in the genu and posterior limb of the right internal capsule is stable.  A chronic transcortical infarction in the left precentral gyrus and a small chronic infarct in theright cerebellar hemisphere are stable.      Cards   #HTN   - Goal -160, okay to work with PT  - Continue Midodrine 2.5mg BID on 4/30 as left LE weakness is waxing/waning despite stable BPs --> midodrine increased to 5mg BID on 5/1  - wife refusing WALLY/ILR, however she agreeable for MCOT, but after talking to EP team, she agreed to follow up with them after going to [facility] to consider monitoring.   - Cardiac CT to rule out SANDRO thrombus: No SANDRO or left atrial thrombus. Non cardiac: Apparent filling defects in segmental branches of the right lower lobe pulmonary artery.  - TTE: EF 56%     #HLD   - high dose statin as above in CVA   - LDL results: 170     Pulm   - call provider if SPO2 < 94%   - CT Angio Chest PE Protocol: No pulmonary embolism. No filling defect in the right lower lobe as seen on prior coronary CTA, likely motion artifact.    GI   #Nutrition/Fluids/Electrolytes    - replete K<4 and Mg <2   - Diet: can restart tube feeds on 5/8  - IVF: 75cc/hr while not receiving feeds    #dysphagia    - failed SLP evaluation   - FEES completed on 5/2 - recommended for PEG  - s/p PEG on 5/7    #constipation   - c/w bowel regimen w/ senna+miralax     Renal  #BPH  #Urinary retention with postraumatic hematuria after straight cath  On 5/4, 900 cc and 350cc in BS with postraumatic hematuria  - Started on doxazosin 1mg/night (tamsulosin cannot be administered through PEG tube)   - urology consulted  - hudson to remain in place until patient is ambulatory; should follow up with his outpatient urologist  - can attempt TOV on 5/8 if functional status improves, however, will likely require hudson to stay in place at discharge    Endocrine   #DM   home meds: metformin 500mg TID, Januvia 50mg daily      - A1C results: 6.3%   - ISS     - TSH results: 2.080     Hematology   #JUSTYNA   - c/w home ferrous sulfate 325mg daily   - Ferritin: 410, total iron: 83, TIBC: 255, % saturation: 33     MSK   # leg cramps   - LE dopplers obtained, negative for DVT bilaterally      Psych   #depression   Per wife, prior to COVID, patient very active with friends/community, played sports. Since pandemic and worsening eyesight due to glaucoma and hearing, patient with no interest in daily activities. Was started on escitalopram 10mg daily, but stopped taking due to constipation   - consider restarting based on patient's mood and improvement of bowel movement after starting bowel regimen       DVT Prophylaxis   - OK to restart lovenox for DVT ppx tomorrow (5/8 AM)  - SCDs for DVT prophylaxis      IDR Goals: Goals reviewed at interdisciplinary rounds with case management, social work, physical therapy, occupational therapy, and speech language pathology.    Please see specific therapy notes for in depth goals.     Dispo: AR    Discussed daily hospital plans and goals with patient and wife at bedside via .     Discussed with Dr. Payton who will discuss with Dr. Griggs    Mr. Bruce is an 88y Cantonese speaking Male with PMHx of JUSTYNA, stroke (2004, residual right sided weakness), HLD, glaucoma, DM, BPH, depression, and b/l hearing loss, who present to [facility] with new left-sided arm and leg weakness starting 4/26 pm, transferred to [facility] for further stroke w/u. Course c/b periods of waxing/waning left sided hemiparesis, likely due to hypoperfusion, s/p IVF boluses, now started on midodrine. Patient with urinary retention with postraumatic hematuria after straight cath in pt with BPH. Now s/p PEG placement on 5/7 and appropriate for transfer to [facility] while awaiting AR placement.

## 2024-05-07 NOTE — PROGRESS NOTE ADULT - SUBJECTIVE AND OBJECTIVE BOX
OVERNIGHT EVENTS:    SUBJECTIVE / INTERVAL HPI: Patient seen and examined at bedside.     ROS: negative unless otherwise stated above.    VITAL SIGNS:  Vital Signs Last 24 Hrs  T(C): 36.8 (07 May 2024 17:55), Max: 37.1 (07 May 2024 09:49)  T(F): 98.3 (07 May 2024 17:55), Max: 98.8 (07 May 2024 09:49)  HR: 67 (07 May 2024 17:55) (67 - 78)  BP: 110/61 (07 May 2024 17:55) (110/61 - 152/72)  BP(mean): 78 (07 May 2024 17:55) (78 - 104)  RR: 19 (07 May 2024 17:55) (19 - 24)  SpO2: 95% (07 May 2024 17:55) (93% - 95%)    Parameters below as of 07 May 2024 17:55  Patient On (Oxygen Delivery Method): room air        PHYSICAL EXAM:    General: In no apparent distress  HEENT: NC/AT; PERRL, anicteric sclera; MMM  Neck: supple  Cardiovascular: +S1/S2; RRR  Respiratory: CTA B/L; no W/R/R  Gastrointestinal: soft, NT/ND; +BSx4  Extremities: WWP; no edema, clubbing or cyanosis  Vascular: 2+ radial, DP/PT pulses B/L  Neurological: AAOx3; no focal deficits    MEDICATIONS:  MEDICATIONS  (STANDING):  aspirin  chewable 81 milliGRAM(s) Oral daily  atorvastatin 80 milliGRAM(s) Oral at bedtime  dextrose 10% Bolus 125 milliLiter(s) IV Bolus once  dextrose 5%. 1000 milliLiter(s) (50 mL/Hr) IV Continuous <Continuous>  dextrose 5%. 1000 milliLiter(s) (100 mL/Hr) IV Continuous <Continuous>  dextrose 50% Injectable 25 Gram(s) IV Push once  dextrose 50% Injectable 12.5 Gram(s) IV Push once  doxazosin 1 milliGRAM(s) Oral at bedtime  ferrous    sulfate 325 milliGRAM(s) Oral daily  folic acid 1 milliGRAM(s) Oral daily  glucagon  Injectable 1 milliGRAM(s) IntraMuscular once  insulin lispro (ADMELOG) corrective regimen sliding scale   SubCutaneous every 6 hours  magnesium oxide 400 milliGRAM(s) Oral daily  midodrine. 5 milliGRAM(s) Oral <User Schedule>  sodium chloride 0.9%. 1000 milliLiter(s) (75 mL/Hr) IV Continuous <Continuous>    MEDICATIONS  (PRN):  dextrose Oral Gel 15 Gram(s) Oral once PRN Blood Glucose LESS THAN 70 milliGRAM(s)/deciliter      ALLERGIES:  Allergies    No Known Allergies    Intolerances        LABS:                        10.3   6.53  )-----------( 177      ( 07 May 2024 05:30 )             30.8     05-07    134<L>  |  100  |  15  ----------------------------<  143<H>  3.3<L>   |  22  |  0.72    Ca    8.6      07 May 2024 05:30  Phos  3.2     05-07  Mg     1.8     05-07    TPro  6.3  /  Alb  3.3  /  TBili  0.7  /  DBili  x   /  AST  40  /  ALT  39  /  AlkPhos  71  05-07      Urinalysis Basic - ( 07 May 2024 05:30 )    Color: x / Appearance: x / SG: x / pH: x  Gluc: 143 mg/dL / Ketone: x  / Bili: x / Urobili: x   Blood: x / Protein: x / Nitrite: x   Leuk Esterase: x / RBC: x / WBC x   Sq Epi: x / Non Sq Epi: x / Bacteria: x      CAPILLARY BLOOD GLUCOSE      POCT Blood Glucose.: 145 mg/dL (07 May 2024 18:00)      RADIOLOGY & ADDITIONAL TESTS: Reviewed. ****** TRANSFER ACCEPTANCE STROKE/TELE TO [facility] ******    Hospital course:  Mr. Bruce is an 88y Cantonese speaking Male with PMHx of JUSTYNA, stroke (2004, residual right sided weakness), HLD, glaucoma, DM, BPH, depression, and b/l hearing loss, who present to [facility] with new left-sided arm and leg weakness starting 4/26 pm. His wife also noted truncal ataxia (slumping over to the left when sitting) and gait ataxia. CT imaging with no acute pathology. Transferred to [facility] for further stroke w/u. Course c/b periods of waxing/waning left sided hemiparesis, likely due to hypoperfusion, s/p IVF boluses, now started on midodrine. Patient with urinary retention with postraumatic hematuria after straight cath in pt with BPH. Now s/p PEG placement on 5/7 and awaiting AR placement. The patient is hemodynamically stable and appropriate for transfer to [facility].     SUBJECTIVE / INTERVAL HPI: Patient seen and examined at bedside.     ROS: negative unless otherwise stated above.    VITAL SIGNS:  Vital Signs Last 24 Hrs  T(C): 36.8 (07 May 2024 17:55), Max: 37.1 (07 May 2024 09:49)  T(F): 98.3 (07 May 2024 17:55), Max: 98.8 (07 May 2024 09:49)  HR: 67 (07 May 2024 17:55) (67 - 78)  BP: 110/61 (07 May 2024 17:55) (110/61 - 152/72)  BP(mean): 78 (07 May 2024 17:55) (78 - 104)  RR: 19 (07 May 2024 17:55) (19 - 24)  SpO2: 95% (07 May 2024 17:55) (93% - 95%)    Parameters below as of 07 May 2024 17:55  Patient On (Oxygen Delivery Method): room air        PHYSICAL EXAM:    General: In no apparent distress  HEENT: NC/AT; PERRL, anicteric sclera; MMM  Neck: supple  Cardiovascular: +S1/S2; RRR  Respiratory: CTA B/L; no W/R/R  Gastrointestinal: soft, NT/ND; +BSx4  Extremities: WWP; no edema, clubbing or cyanosis  Vascular: 2+ radial, DP/PT pulses B/L  Neurological: AAOx3; no focal deficits    MEDICATIONS:  MEDICATIONS  (STANDING):  aspirin  chewable 81 milliGRAM(s) Oral daily  atorvastatin 80 milliGRAM(s) Oral at bedtime  dextrose 10% Bolus 125 milliLiter(s) IV Bolus once  dextrose 5%. 1000 milliLiter(s) (50 mL/Hr) IV Continuous <Continuous>  dextrose 5%. 1000 milliLiter(s) (100 mL/Hr) IV Continuous <Continuous>  dextrose 50% Injectable 25 Gram(s) IV Push once  dextrose 50% Injectable 12.5 Gram(s) IV Push once  doxazosin 1 milliGRAM(s) Oral at bedtime  ferrous    sulfate 325 milliGRAM(s) Oral daily  folic acid 1 milliGRAM(s) Oral daily  glucagon  Injectable 1 milliGRAM(s) IntraMuscular once  insulin lispro (ADMELOG) corrective regimen sliding scale   SubCutaneous every 6 hours  magnesium oxide 400 milliGRAM(s) Oral daily  midodrine. 5 milliGRAM(s) Oral <User Schedule>  sodium chloride 0.9%. 1000 milliLiter(s) (75 mL/Hr) IV Continuous <Continuous>    MEDICATIONS  (PRN):  dextrose Oral Gel 15 Gram(s) Oral once PRN Blood Glucose LESS THAN 70 milliGRAM(s)/deciliter      ALLERGIES:  Allergies    No Known Allergies    Intolerances        LABS:                        10.3   6.53  )-----------( 177      ( 07 May 2024 05:30 )             30.8     05-07    134<L>  |  100  |  15  ----------------------------<  143<H>  3.3<L>   |  22  |  0.72    Ca    8.6      07 May 2024 05:30  Phos  3.2     05-07  Mg     1.8     05-07    TPro  6.3  /  Alb  3.3  /  TBili  0.7  /  DBili  x   /  AST  40  /  ALT  39  /  AlkPhos  71  05-07      Urinalysis Basic - ( 07 May 2024 05:30 )    Color: x / Appearance: x / SG: x / pH: x  Gluc: 143 mg/dL / Ketone: x  / Bili: x / Urobili: x   Blood: x / Protein: x / Nitrite: x   Leuk Esterase: x / RBC: x / WBC x   Sq Epi: x / Non Sq Epi: x / Bacteria: x      CAPILLARY BLOOD GLUCOSE      POCT Blood Glucose.: 145 mg/dL (07 May 2024 18:00)      RADIOLOGY & ADDITIONAL TESTS: Reviewed. ****** TRANSFER ACCEPTANCE STROKE/TELE TO [facility] ******    Hospital course:  Mr. Bruce is an 88y Cantonese speaking Male with PMHx of JUSTNYA, stroke (2004, residual right sided weakness), HLD, glaucoma, DM, BPH, depression, and b/l hearing loss, who present to [facility] with new left-sided arm and leg weakness starting 4/26 pm. His wife also noted truncal ataxia (slumping over to the left when sitting) and gait ataxia. CT imaging with no acute pathology. Transferred to [facility] for further stroke w/u. Course c/b periods of waxing/waning left sided hemiparesis, likely due to hypoperfusion, s/p IVF boluses, now started on midodrine. Patient with urinary retention with postraumatic hematuria after straight cath in pt with BPH. Now s/p PEG placement on 5/7 and awaiting AR placement. The patient is hemodynamically stable and appropriate for transfer to [facility].     SUBJECTIVE / INTERVAL HPI: The patient was encountered lying in bed, AAOx3, in no acute distress. He was resting comfortably, and interview facilitated by Cantonese . The pt denied n/v/d, and he denied SOB, chest pain, and palpitations. The pt was pleasant and cooperative and answered questions appropriately.    ROS: negative unless otherwise stated above.    VITAL SIGNS:  Vital Signs Last 24 Hrs  T(C): 36.8 (07 May 2024 17:55), Max: 37.1 (07 May 2024 09:49)  T(F): 98.3 (07 May 2024 17:55), Max: 98.8 (07 May 2024 09:49)  HR: 67 (07 May 2024 17:55) (67 - 78)  BP: 110/61 (07 May 2024 17:55) (110/61 - 152/72)  BP(mean): 78 (07 May 2024 17:55) (78 - 104)  RR: 19 (07 May 2024 17:55) (19 - 24)  SpO2: 95% (07 May 2024 17:55) (93% - 95%)    Parameters below as of 07 May 2024 17:55  Patient On (Oxygen Delivery Method): room air        PHYSICAL EXAM:  General: Older gentleman, resting comfortably in bed, NAD  HEENT: NCAT, MMM  Neck: supple, trachea at midline  Cardio: RRR, +S1/S2, no M/R/G  Pulmonary: No WRR; CTA b/l  Abd: soft, NTND, PEG in place   Skin: warm and dry,   Ext: WWP, no LE edema, no cyanosis or clubbing  Neuro: AAOx3; CNII-XII grossly intact; left hemiparesis   Psych: affect and behavior appropriate    MEDICATIONS:  MEDICATIONS  (STANDING):  aspirin  chewable 81 milliGRAM(s) Oral daily  atorvastatin 80 milliGRAM(s) Oral at bedtime  dextrose 10% Bolus 125 milliLiter(s) IV Bolus once  dextrose 5%. 1000 milliLiter(s) (50 mL/Hr) IV Continuous <Continuous>  dextrose 5%. 1000 milliLiter(s) (100 mL/Hr) IV Continuous <Continuous>  dextrose 50% Injectable 25 Gram(s) IV Push once  dextrose 50% Injectable 12.5 Gram(s) IV Push once  doxazosin 1 milliGRAM(s) Oral at bedtime  ferrous    sulfate 325 milliGRAM(s) Oral daily  folic acid 1 milliGRAM(s) Oral daily  glucagon  Injectable 1 milliGRAM(s) IntraMuscular once  insulin lispro (ADMELOG) corrective regimen sliding scale   SubCutaneous every 6 hours  magnesium oxide 400 milliGRAM(s) Oral daily  midodrine. 5 milliGRAM(s) Oral <User Schedule>  sodium chloride 0.9%. 1000 milliLiter(s) (75 mL/Hr) IV Continuous <Continuous>    MEDICATIONS  (PRN):  dextrose Oral Gel 15 Gram(s) Oral once PRN Blood Glucose LESS THAN 70 milliGRAM(s)/deciliter      ALLERGIES:  Allergies    No Known Allergies    Intolerances        LABS:                        10.3   6.53  )-----------( 177      ( 07 May 2024 05:30 )             30.8     05-07    134<L>  |  100  |  15  ----------------------------<  143<H>  3.3<L>   |  22  |  0.72    Ca    8.6      07 May 2024 05:30  Phos  3.2     05-07  Mg     1.8     05-07    TPro  6.3  /  Alb  3.3  /  TBili  0.7  /  DBili  x   /  AST  40  /  ALT  39  /  AlkPhos  71  05-07      Urinalysis Basic - ( 07 May 2024 05:30 )    Color: x / Appearance: x / SG: x / pH: x  Gluc: 143 mg/dL / Ketone: x  / Bili: x / Urobili: x   Blood: x / Protein: x / Nitrite: x   Leuk Esterase: x / RBC: x / WBC x   Sq Epi: x / Non Sq Epi: x / Bacteria: x      CAPILLARY BLOOD GLUCOSE      POCT Blood Glucose.: 145 mg/dL (07 May 2024 18:00)      RADIOLOGY & ADDITIONAL TESTS: Reviewed.

## 2024-05-07 NOTE — PROGRESS NOTE ADULT - ASSESSMENT
88y M Cantonese speaking, R hand dominant, b/l  hearing impairment ( b/l hearing aids) with PMHx of DM, Thalassemia minor/JUSTYNA, HLD, hx stroke with right sided weakness in 2004 (has a cane and walker at home but pt refuses using assisted device, off ASA since 2020 for unclear reason), depression, glaucoma, BPH, [facility] with new left side arm and leg weakness starting 4/26 pm. Wife noticed that patient was leaning against the wall with the left side of his body to support himself to walk and was unable to hold himself upright when sitting, slumping over to the left. Patient felt that his left arm and leg were weak. Called PCP who recommended ED visit. CTH/CTP/CTA head and neck with no acute findings. Loaded with aspirin 300mg x1. Transferred to [facility] for further stroke w/u.    # R internal capsule infarct  # hx CVA w/ residual R sided weakness   # Dysphagia  - 8LA telemetry monitoring   - q4hr stroke neuro checks and vitals  - vEEG in progress   - active plan per Stroke team   - Permissive HTN goal -160  - midodrine 2.5mg bid ( 4/30)-> increased to 5mg bid( 5/1-)  to help with blood pressure for perfusion.   - IVF w/ NS @100ml/hr   - failed FEES ( 5/2), pt and wife agreeable for PEG  - GI f/u appreciated, GI fellow Dr. Curry,s/p PEG on Tues 5/7, ok ASA, resume plavix 48hr after PEG, VTE ppx in am, to start TF tomorrow   - f/u wife's decision on LINQ ( wife spoke with son from [place] and wants to hold off LINQ)   - Cardiac CT: reviewed, no SANDRO taylor   - CT Chest: no PE   - s/p aspirin load 300mg x1( 4/27)  - continue aspirin 81mg po daily ( 4/27-)  - Clopidogrel 75mg po daily ( 4/28-5/2) hold until 48hr (5/9) after PEG placement(5/7), will need Clopidogrel for 21 days then stop   - continue Atorvastatin 80mg po qHS  ( 4/27-) via NGT  - - statin ( not able to give 4/28- pt not able to swallow )   - PT/OT eval    # Acute urinary retention  # Gross hematuria  # hx BPH  - resumed tamsulosin 0.4mg via NGT qHS ( 5/4-)   -  consult appreciated re: gross hematuria, placed 22Fr hudson cath, TOV only when pt is ambulatory  - make sure pt has regular BMs ( + BMs on Fri 5/3)     # Anemia hx Thallassemia minor  - Ferritin 410, Tsat 33% adequate iron store  - Hgb 11.2%     #constipation  - c/w bowel regimen w/ senna+miralax    #DM  - A1C 6.3%   home meds: metformin 500mg TID, Januvia 50mg daily - held for now.   - ISS, goal -180     # Hyponatremia- resolved.   - trend Na 131--> 135--> 133. --> 135   - Hypophos - repleted     #DVT Prophylaxis: SCDs and Lovenox 40mg sq daily ( held after dose @ 6am 5/6) , to resume in am 5/8    Dispo: fu PT/OT, will benefit inpatient Rehab ( Wife chose [facility] or [facility])      Contact:  PMD Dr. Aron Soares   Wife: Farshad Pelayo # [phone]    POC as d/w Stroke team this am     Thank you for allowing medicine to participate in the care    Rajesh Villalba M.D. cellphone [phone]   Services

## 2024-05-08 ENCOUNTER — TRANSCRIPTION ENCOUNTER (OUTPATIENT)
Age: 89
End: 2024-05-08

## 2024-05-08 LAB
ANION GAP SERPL CALC-SCNC: 11 MMOL/L — SIGNIFICANT CHANGE UP (ref 5–17)
BUN SERPL-MCNC: 22 MG/DL — SIGNIFICANT CHANGE UP (ref 7–23)
CALCIUM SERPL-MCNC: 8.4 MG/DL — SIGNIFICANT CHANGE UP (ref 8.4–10.5)
CHLORIDE SERPL-SCNC: 102 MMOL/L — SIGNIFICANT CHANGE UP (ref 96–108)
CO2 SERPL-SCNC: 22 MMOL/L — SIGNIFICANT CHANGE UP (ref 22–31)
CREAT SERPL-MCNC: 0.69 MG/DL — SIGNIFICANT CHANGE UP (ref 0.5–1.3)
EGFR: 90 ML/MIN/1.73M2 — SIGNIFICANT CHANGE UP
GLUCOSE BLDC GLUCOMTR-MCNC: 126 MG/DL — HIGH (ref 70–99)
GLUCOSE BLDC GLUCOMTR-MCNC: 128 MG/DL — HIGH (ref 70–99)
GLUCOSE BLDC GLUCOMTR-MCNC: 139 MG/DL — HIGH (ref 70–99)
GLUCOSE BLDC GLUCOMTR-MCNC: 143 MG/DL — HIGH (ref 70–99)
GLUCOSE SERPL-MCNC: 137 MG/DL — HIGH (ref 70–99)
HCT VFR BLD CALC: 32 % — LOW (ref 39–50)
HGB BLD-MCNC: 10.5 G/DL — LOW (ref 13–17)
MAGNESIUM SERPL-MCNC: 2 MG/DL — SIGNIFICANT CHANGE UP (ref 1.6–2.6)
MCHC RBC-ENTMCNC: 23.2 PG — LOW (ref 27–34)
MCHC RBC-ENTMCNC: 32.8 GM/DL — SIGNIFICANT CHANGE UP (ref 32–36)
MCV RBC AUTO: 70.8 FL — LOW (ref 80–100)
NRBC # BLD: 0 /100 WBCS — SIGNIFICANT CHANGE UP (ref 0–0)
PHOSPHATE SERPL-MCNC: 2.9 MG/DL — SIGNIFICANT CHANGE UP (ref 2.5–4.5)
PLATELET # BLD AUTO: 181 K/UL — SIGNIFICANT CHANGE UP (ref 150–400)
POTASSIUM SERPL-MCNC: 3.9 MMOL/L — SIGNIFICANT CHANGE UP (ref 3.5–5.3)
POTASSIUM SERPL-SCNC: 3.9 MMOL/L — SIGNIFICANT CHANGE UP (ref 3.5–5.3)
RBC # BLD: 4.52 M/UL — SIGNIFICANT CHANGE UP (ref 4.2–5.8)
RBC # FLD: 15.6 % — HIGH (ref 10.3–14.5)
SODIUM SERPL-SCNC: 135 MMOL/L — SIGNIFICANT CHANGE UP (ref 135–145)
WBC # BLD: 8.09 K/UL — SIGNIFICANT CHANGE UP (ref 3.8–10.5)
WBC # FLD AUTO: 8.09 K/UL — SIGNIFICANT CHANGE UP (ref 3.8–10.5)

## 2024-05-08 PROCEDURE — 99233 SBSQ HOSP IP/OBS HIGH 50: CPT

## 2024-05-08 PROCEDURE — 99232 SBSQ HOSP IP/OBS MODERATE 35: CPT | Mod: GC

## 2024-05-08 RX ORDER — ACETAMINOPHEN 500 MG
650 TABLET ORAL EVERY 6 HOURS
Refills: 0 | Status: DISCONTINUED | OUTPATIENT
Start: 2024-05-08 | End: 2024-05-17

## 2024-05-08 RX ORDER — ENOXAPARIN SODIUM 100 MG/ML
40 INJECTION SUBCUTANEOUS EVERY 24 HOURS
Refills: 0 | Status: DISCONTINUED | OUTPATIENT
Start: 2024-05-08 | End: 2024-05-17

## 2024-05-08 RX ADMIN — MIDODRINE HYDROCHLORIDE 5 MILLIGRAM(S): 2.5 TABLET ORAL at 12:49

## 2024-05-08 RX ADMIN — Medication 650 MILLIGRAM(S): at 09:50

## 2024-05-08 RX ADMIN — Medication 1 MILLIGRAM(S): at 12:49

## 2024-05-08 RX ADMIN — Medication 81 MILLIGRAM(S): at 12:49

## 2024-05-08 RX ADMIN — SODIUM CHLORIDE 75 MILLILITER(S): 9 INJECTION INTRAMUSCULAR; INTRAVENOUS; SUBCUTANEOUS at 00:02

## 2024-05-08 RX ADMIN — MIDODRINE HYDROCHLORIDE 5 MILLIGRAM(S): 2.5 TABLET ORAL at 23:46

## 2024-05-08 RX ADMIN — Medication 650 MILLIGRAM(S): at 10:50

## 2024-05-08 RX ADMIN — MIDODRINE HYDROCHLORIDE 5 MILLIGRAM(S): 2.5 TABLET ORAL at 01:06

## 2024-05-08 RX ADMIN — ATORVASTATIN CALCIUM 80 MILLIGRAM(S): 80 TABLET, FILM COATED ORAL at 21:35

## 2024-05-08 RX ADMIN — MAGNESIUM OXIDE 400 MG ORAL TABLET 400 MILLIGRAM(S): 241.3 TABLET ORAL at 12:49

## 2024-05-08 RX ADMIN — Medication 1 MILLIGRAM(S): at 21:50

## 2024-05-08 RX ADMIN — Medication 325 MILLIGRAM(S): at 12:49

## 2024-05-08 NOTE — PROGRESS NOTE ADULT - PROBLEM SELECTOR PLAN 9
- Started on doxazosin 1mg/night (tamsulosin cannot be administered through PEG tube)   - urology consulted, f/u recs

## 2024-05-08 NOTE — DISCHARGE NOTE PROVIDER - CARE PROVIDERS DIRECT ADDRESSES
,DirectAddress_Unknown,DirectAddress_Unknown ,DirectAddress_Unknown,[email or web address] ,[email or web address],DirectAddress_Unknown,[email or web address] ,[email or web address] ,[email or web address],DirectAddress_Unknown

## 2024-05-08 NOTE — PROGRESS NOTE ADULT - ASSESSMENT
Mr. Bruce is an 88y Cantonese speaking Male with PMHx of JUSTYNA, stroke (2004, residual right sided weakness), HLD, glaucoma, DM, BPH, depression, and b/l hearing loss, who present to [facility] with new left-sided arm and leg weakness starting 4/26 pm, transferred to [facility] for further stroke w/u. Course c/b periods of waxing/waning left sided hemiparesis, likely due to hypoperfusion, s/p IVF boluses, now started on midodrine. Patient with urinary retention with postraumatic hematuria after straight cath in pt with BPH. Now s/p PEG placement on 5/7 and stepped down from stroke tele to [facility] while awaiting AR placement.

## 2024-05-08 NOTE — PROGRESS NOTE ADULT - PROBLEM SELECTOR PLAN 3
Goal -160, okay to work with PT  - Continue Midodrine 2.5mg BID on 4/30 as left LE weakness is waxing/waning despite stable BPs --> midodrine increased to 5mg BID on 5/1  - wife refusing WALLY/ILR, however she agreeable for MCOT, but after talking to EP team, she agreed to follow up with them after going to acute rehab to consider monitoring.   - Cardiac CT to rule out SANDRO thrombus: No SANDRO or left atrial thrombus. Non cardiac: Apparent filling defects in segmental branches of the right lower lobe pulmonary artery.  - TTE: EF 56%

## 2024-05-08 NOTE — PROGRESS NOTE ADULT - SUBJECTIVE AND OBJECTIVE BOX
OVERNIGHT EVENTS/INTERVAL HPI: Stepped down from Stroke Tele to [facility].     SUBJECTIVE: Patient seen and examined at bedside using Cantonese . Much of history obtained from speaking with wife at bedside. Wife described presenting symptoms and largely left-sided arm and leg weakness. Patient is asleep in bed resting comfortably. Per wife, he is finally able to get some rest following the PEG tube procedure and had some difficulty sleeping before that. Patient wears bilateral hearing aids. This morning too drowsy to participate in answering many questions but able to follow commands.     OBJECTIVE:  Vital Signs Last 24 Hrs  T(C): 37 (08 May 2024 13:25), Max: 37 (08 May 2024 13:25)  T(F): 98.6 (08 May 2024 13:25), Max: 98.6 (08 May 2024 13:25)  HR: 65 (08 May 2024 13:25) (65 - 75)  BP: 112/64 (08 May 2024 13:25) (110/61 - 144/75)  BP(mean): 78 (07 May 2024 17:55) (78 - 94)  RR: 17 (08 May 2024 13:25) (17 - 20)  SpO2: 93% (08 May 2024 13:25) (93% - 95%)    Parameters below as of 08 May 2024 13:25  Patient On (Oxygen Delivery Method): room air      I&O's Detail    07 May 2024 07:01  -  08 May 2024 07:00  --------------------------------------------------------  IN:    IV PiggyBack: 300 mL    sodium chloride 0.9%: 450 mL  Total IN: 750 mL    OUT:    Indwelling Catheter - Urethral (mL): 1270 mL  Total OUT: 1270 mL    Total NET: -520 mL    Physical Exam:  General: Older gentleman, resting comfortably in bed, NAD  HEENT: NCAT, MMM  Neck: supple, trachea at midline  Cardio: RRR, +S1/S2, no M/R/G  Pulmonary: No WRR; CTA b/l  Abd: soft, NTND, PEG in place   Skin: warm and dry,   Ext: WWP, no LE edema, no cyanosis or clubbing  Neuro: AAOx3; CNII-XII grossly intact; left hemiparesis   Psych: affect and behavior appropriate      Medications:  MEDICATIONS  (STANDING):  aspirin  chewable 81 milliGRAM(s) Oral daily  atorvastatin 80 milliGRAM(s) Oral at bedtime  dextrose 10% Bolus 125 milliLiter(s) IV Bolus once  dextrose 5%. 1000 milliLiter(s) (100 mL/Hr) IV Continuous <Continuous>  dextrose 5%. 1000 milliLiter(s) (50 mL/Hr) IV Continuous <Continuous>  dextrose 50% Injectable 25 Gram(s) IV Push once  dextrose 50% Injectable 12.5 Gram(s) IV Push once  doxazosin 1 milliGRAM(s) Oral at bedtime  enoxaparin Injectable 40 milliGRAM(s) SubCutaneous every 24 hours  ferrous    sulfate 325 milliGRAM(s) Oral daily  folic acid 1 milliGRAM(s) Oral daily  glucagon  Injectable 1 milliGRAM(s) IntraMuscular once  insulin lispro (ADMELOG) corrective regimen sliding scale   SubCutaneous every 6 hours  magnesium oxide 400 milliGRAM(s) Oral daily  midodrine. 5 milliGRAM(s) Oral <User Schedule>  sodium chloride 0.9%. 1000 milliLiter(s) (75 mL/Hr) IV Continuous <Continuous>    MEDICATIONS  (PRN):  acetaminophen     Tablet .. 650 milliGRAM(s) Oral every 6 hours PRN Temp greater or equal to 38C (100.4F), Mild Pain (1 - 3), Moderate Pain (4 - 6)  dextrose Oral Gel 15 Gram(s) Oral once PRN Blood Glucose LESS THAN 70 milliGRAM(s)/deciliter      Labs:                        10.5   8.09  )-----------( 181      ( 08 May 2024 05:30 )             32.0     05-08    135  |  102  |  22  ----------------------------<  137<H>  3.9   |  22  |  0.69    Ca    8.4      08 May 2024 05:30  Phos  2.9     05-08  Mg     2.0     05-08    TPro  6.3  /  Alb  3.3  /  TBili  0.7  /  DBili  x   /  AST  40  /  ALT  39  /  AlkPhos  71  05-07        Urinalysis Basic - ( 08 May 2024 05:30 )    Color: x / Appearance: x / SG: x / pH: x  Gluc: 137 mg/dL / Ketone: x  / Bili: x / Urobili: x   Blood: x / Protein: x / Nitrite: x   Leuk Esterase: x / RBC: x / WBC x   Sq Epi: x / Non Sq Epi: x / Bacteria: x          Radiology: Reviewed

## 2024-05-08 NOTE — DISCHARGE NOTE PROVIDER - NSDCCPCAREPLAN_GEN_ALL_CORE_FT
PRINCIPAL DISCHARGE DIAGNOSIS  Diagnosis: Stroke  Assessment and Plan of Treatment: You were noted to have a stroke, also known as a cerebrovascular accident (CVA). This was found based on your symptom profile, and findings noted on imaging (MRI) of your brain. Please continue to take aspirin and clopidogrel 75mg daily as prescribed. You will need to take the clopidogrel (Plavix) for three weeks from 05/09-05/30. Please continue to take atorvastatin 80 mg daily. Please follow-up with your primary care physician, and with neurology, you have an appointment scheduled with Dr. Gonzalez noted on the disharge paperwork. Please continue to work with physical therapy if needed. Symptom improvement varies greatly, varying from minimal improvement to even possibly returning back to baseline with rehabilitation therapy. Should you start to experience symptoms such as but not limited to: changes in vision, changes in hearing, severe weakness/dizziness, fainting spells, or difficulties moving your eyelids or mouth, please return to the emergency department immediately for interval evaluation.      SECONDARY DISCHARGE DIAGNOSES  Diagnosis: Status post insertion of percutaneous endoscopic gastrostomy (PEG) tube  Assessment and Plan of Treatment:     Diagnosis: Galindo catheter in place  Assessment and Plan of Treatment: It’s important to thoroughly wash your hands with soap and water before and after handling a Galindo catheter and collection bag. Washing your hands helps prevent the spread of germs that can cause an infection. You should also clean the catheter tube at least twice a day with soapy water and a wet paper towel or washcloth. Gently pat the tube dry.  Empty your collection bag every two to three hours. If you have a larger collection bag, empty it every eight hours.  To empty the bag:  Wash your hands.  Remove the stopper or open the clamp that keeps your collection bag shut.  Empty the collection bag into a toilet. A healthcare provider may give you a container to measure how much pee you remove from your collection bag. If you have this container, empty your collection bag into the container, record the amount and empty the container into a toilet.  Clean the drainage port with soap and water. Wipe away (down) from the drainage port to push germs away.  Pat the drainage port dry.  Replace the stopper or clamp.  Wash your hands again.  To change the bag:  Wash your hands.  Empty the collection bag. If you have a measuring container, measure the amount of pee first.  Use soap and water to clean the connection between your catheter and the collection bag. Wipe away to push germs away.  Pinch the catheter tubing with your fingers just above the connection.  Disconnect the bag from the catheter.  Connect the new bag and release the tube.  If your Galindo catheter is going to be in longer than a week or if it smells, it’s a good idea to wash the used bag with soap and water. You can also rinse the bag with a solution of 1-and-a-quarter cups of white vinegar in 2 quarts of water to help reduce odor and prevent infection.     PRINCIPAL DISCHARGE DIAGNOSIS  Diagnosis: Stroke  Assessment and Plan of Treatment: You were noted to have a stroke, also known as a cerebrovascular accident (CVA). This was found based on your symptom profile, and findings noted on imaging (MRI) of your brain. Please continue to take aspirin and clopidogrel 75mg daily as prescribed. You will need to take the clopidogrel (Plavix) for three weeks from 05/12-06/01. Please continue to take atorvastatin 80 mg daily. You were also noted to have seizure-like activity when you had a test called EEG done. For this reason, please continue taking your medication to prevent seizures- Keppra 750 mg every 12 hours daily. Please follow-up with your primary care physician, and with neurology, you have an appointment scheduled with Dr. Gonzalez noted on the disharge paperwork. Please continue to work with physical therapy if needed. Symptom improvement varies greatly, varying from minimal improvement to even possibly returning back to baseline with rehabilitation therapy. Should you start to experience symptoms such as but not limited to: changes in vision, changes in hearing, severe weakness/dizziness, fainting spells, or difficulties moving your eyelids or mouth, please return to the emergency department immediately for interval evaluation.      SECONDARY DISCHARGE DIAGNOSES  Diagnosis: Cellulitis  Assessment and Plan of Treatment: You were found to have a skin infection around your PEG tube site called cellulitis. You were treated with intravenous antibiotics during your stay at [facility] and completed a 5 day course with improvement in your symptoms. Please follow-up with your primary care physician. Should start to notice symptoms such as but not limited to: high persisting fevers (>104 F or lasting more than 3 days), severe pain, increased swelling and/or skin color changes after finishing your antibiotics, please return to the emergency department for interval evaluation.    Diagnosis: Status post insertion of percutaneous endoscopic gastrostomy (PEG) tube  Assessment and Plan of Treatment: You had a PEG tube placed to help with providing you nutrition on May 7th. Your acute rehab facility will continue your feeds but will change them to cyclic timing so that you have a period during the day when you're off feeds to participate in rehab activities. You will need to continue taking your medication pantoprazole 40mg daily through the PEG tube for one month to protect your stomach lining. Please take the medication daily until 06/11.    Diagnosis: Galindo catheter in place  Assessment and Plan of Treatment: It’s important to thoroughly wash your hands with soap and water before and after handling a Galindo catheter and collection bag. Washing your hands helps prevent the spread of germs that can cause an infection. You should also clean the catheter tube at least twice a day with soapy water and a wet paper towel or washcloth. Gently pat the tube dry.  Empty your collection bag every two to three hours. If you have a larger collection bag, empty it every eight hours.  To empty the bag:  Wash your hands.  Remove the stopper or open the clamp that keeps your collection bag shut.  Empty the collection bag into a toilet. A healthcare provider may give you a container to measure how much pee you remove from your collection bag. If you have this container, empty your collection bag into the container, record the amount and empty the container into a toilet.  Clean the drainage port with soap and water. Wipe away (down) from the drainage port to push germs away.  Pat the drainage port dry.  Replace the stopper or clamp.  Wash your hands again.  To change the bag:  Wash your hands.  Empty the collection bag. If you have a measuring container, measure the amount of pee first.  Use soap and water to clean the connection between your catheter and the collection bag. Wipe away to push germs away.  Pinch the catheter tubing with your fingers just above the connection.  Disconnect the bag from the catheter.  Connect the new bag and release the tube.  If your Galindo catheter is going to be in longer than a week or if it smells, it’s a good idea to wash the used bag with soap and water. You can also rinse the bag with a solution of 1-and-a-quarter cups of white vinegar in 2 quarts of water to help reduce odor and prevent infection.

## 2024-05-08 NOTE — DISCHARGE NOTE PROVIDER - HOSPITAL COURSE
#Discharge: do not delete    LARRY BURNS is an 88 year-old Cantonese speaking Male with PMHx of JUSTYNA, stroke (2004, residual right sided weakness), HLD, glaucoma, DM, BPH, depression, and b/l hearing loss, who present to [facility] with new left-sided arm and leg weakness starting 4/26 pm, transferred to [facility] for further stroke workup. Course complicated periods of waxing/waning left sided hemiparesis, likely due to hypoperfusion, s/p IVF boluses, now started on midodrine. Patient with urinary retention with postraumatic hematuria after straight cath in pt with BPH. Now s/p PEG placement on 5/7 and planned for discharge to acute rehab facility.      Hospital course (by problem):     #Stroke  #Right internal capsule infarct   Pt with a hx of stroke 2004 with residual right sided weakness. Presenting with left sided arm and leg weakness at home. Patient previously on aspirin, but was stopped by doctor in 2020, unclear reason why. Stroke code HCT negative for acute ischemia. Stroke code CTA with stenosis of proximal bilateral A2 segments and R M2. MRI brain 04/28 with R internal capsule infarct. Repeat head CT 5/5 with a subacute infarct centered in the genu and posterior limb of the right internal capsule is stable.  A chronic transcortical infarction in the left precentral gyrus and a small chronic infarct in the right cerebellar hemisphere are stable.  - Continue ASA 81mg daily  - Continue plavix 75mg QD x3 weeks (05/09-05/30)  - Cotinue atorvastatin 80mg daily    - Stroke education   - Follow up with neuro outpatient     #Urinary retention  Urinary retention with postraumatic hematuria after straight cath. On 5/4, 900 cc and 350cc in BS with postraumatic hematuria. Urology consulted. Galindo in place draining clear yellow urine. Episode of fruit-punch colored hematuria on 05/10, evaluated by urology attributed to movement of Galindo with no further intervention required. S/p PEG placement and started on doxazosin 1mg/night (as tamsulosin cannot be administered through PEG tube)   - Continue doxazosin 1mg/night  - Galindo to remain in place until patient is ambulatory; should follow up with his outpatient urologist    #Orthostatic hypotension   Goal -160. Initiated midodrine 2.5mg BID to help with adequate perfusion. Uptitrated to 5mg BID. TTE with EF 56%. Cardiac CT to rule out SANDRO thrombus: No SANDRO or left atrial thrombus. Non cardiac: Apparent filling defects in segmental branches of the right lower lobe pulmonary artery.   - Continue Midodrine 5mg BID  - Wife declining WALLY/ILR, however she agreeable for [facility]OT, but after talking to EP team, she agreed to follow up with them after going to acute rehab to consider monitoring.     #HLD (hyperlipidemia)  Started on dose statin as above in CVA. LDL results: 170.  - Continue atorvastatin 80mg daily     #Dysphagia  Failed SLP evaluation. FEES completed on 5/2 - recommended for PEG. s/p PEG on 5/7. Nutrition consulted. Started continuous tube feeds on 05/08- Nimble Glucose Support 1.2 @10mL/hr x24hr and increase as tolerated to goal rate 63mL/hr x24hr to provide 1512mL total volume, 1814kcal (26kcal/kg dosing wt 70.3kg), 97g protein (1.4g/kg dosing wt 70.3kg), and 1149mL free water. Reached goal rate 05/09.   - Continue with tube feeds as above, currently at goal rate of 63mL/hr  - Continue with 100 cc free water flushes q4h   - Maintain aspiration precautions   - If pt starts having frequent loose BMs, add Banatrol x2/day to promote fecal bulk.     #Constipation  Not currently on a standing bowel regimen. Patient's wife states previously when given laxatives he had 6-7 episodes of bowel movements right after.   - Senna as needed     #JUSTYNA (iron deficiency anemia)  Ferritin: 410, total iron: 83, TIBC: 255, % saturation: 33   - Continue home ferrous sulfate 325mg daily.    #Diabetes  Home meds: metformin 500mg TID, Januvia 50mg daily. HbA1C results: 6.3%. Managed with corrective insulin sliding scale while inpatient.   - Continue with home medications     #BPH (benign prostatic hyperplasia)  - Started on doxazosin 1mg/night (tamsulosin cannot be administered through PEG tube)   - Follow-up with urology outpatient     #Leg cramps  Patient complaining of bilateral LE cramping. LE dopplers obtained, negative for DVT bilaterally.  - No further intervention     #Depression, major  Per wife, prior to COVID, patient very active with friends/community, played sports. Since pandemic and worsening eyesight due to glaucoma and hearing, patient with no interest in daily activities. Was started on escitalopram 10mg daily, but stopped taking due to constipation   - Consider restarting based on patient's mood and improvement of bowel movement after starting bowel regimen  - Follow-up with PCP     Patient was discharged to: Acute rehab     New medications: atorvastatin 80mg QD, midodrine 5mg BID, doxazosin 1mg QHS  Changes to old medications:  Medications that were stopped: tamsulosin     Items to follow up as outpatient: f/u neuro, f/u PCP, f/u GI, f/u urology     Physical exam at the time of discharge:  General: No acute distress  Eyes: Anicteric sclerae, moist conjunctivae, see below for CNs  Neck: trachea midline  Cardiovascular: Regular rate and rhythm  Pulmonary: No use of accessory muscles  GI: Abdomen soft  Extremities:no edema  Neuro: fluent, follows crossed commands, R. gaze deviation able to overcome volitionally, ?decreased BTT on the left, moderate-severe dysarthria, moderate L. facial droop, LUE no movement, LLE no movement.     #Discharge: do not delete    LARRY BURNS is an 88 year-old Cantonese speaking Male with PMHx of JUSTYNA, stroke (2004, residual right sided weakness), HLD, glaucoma, DM, BPH, depression, and b/l hearing loss, who present to [facility] with new left-sided arm and leg weakness starting 4/26 pm, transferred to [facility] for further stroke workup. Course complicated periods of waxing/waning left sided hemiparesis, likely due to hypoperfusion, s/p IVF boluses, now started on midodrine. Patient with urinary retention with postraumatic hematuria after straight cath in pt with BPH. Now s/p PEG placement on 5/7 course c/b sepsis secondary to abdominal cellulitis, now resolved, and planned for discharge to acute rehab facility.      Hospital course (by problem):     #Stroke  #Right internal capsule infarct   Pt with a hx of stroke 2004 with residual right sided weakness. Presenting with left sided arm and leg weakness at home. Patient previously on aspirin, but was stopped by doctor in 2020, unclear reason why. Stroke code HCT negative for acute ischemia. Stroke code CTA with stenosis of proximal bilateral A2 segments and R M2. MRI brain 04/28 with R internal capsule infarct. Repeat head CT 5/5 with a subacute infarct centered in the genu and posterior limb of the right internal capsule is stable.  A chronic transcortical infarction in the left precentral gyrus and a small chronic infarct in the right cerebellar hemisphere are stable.  - Continue ASA 81mg daily  - Continue Plavix 75mg QD x3 weeks (05/12-06/01)  - Cotinue atorvastatin 80mg daily    - Stroke education   - Follow up with neuro outpatient     #Seizure-like activity  vEEG placed on 05/11 to observe for seizure-like activity. Initial read with GRDA w/ R sided LRDA w/ fast activity and right sided spikes without evolution. S/p Keppra 2g on 05/11. 05/12 read with potentially epileptogenic foci over the left frontal and right frontal/frontotemporal regions; also evidence for moderate diffuse and R>L frontal focal cerebral dysfunction which is nonspecific in etiology. Started on Keppra 750mg BID.   - Continue Keppra 750mg BID PO via PEG tube     #Sepsis 2/2 Cellulitis of abdominal wall   Patient with abdominal pain s/p PEG tube placement 05/07 and on exam with erythema, induration, and mild purulence around PEG site. Meeting sepsis criteria on 05/12. Tmax 102 (rectal). Blood cultures, CXR, abdominal XR non-revealing.  Would culture sent from mild purulence, growing K. pneumoniae, E. cloacae, and E. faecalis, CTAP 05/13 with cellulitis around PEG site and surrounding soft tissue gas, partial erosion of gastrostomy balloon into abdominal wall. Gastroenterology following. PEG study performed, no extravasation. The bowel gas pattern is otherwise normal and no free air. Cellulitis may be due to patient moving around PEG tube. Completed 5 day course of IV vancomycin (dosed by trough) and IV zosyn (initially pseudomonal dosing later de-escalated to non-pseudomonal dosing). Surveillance cultures with NGTD. Patient afebrile for x5 days prior to discharge.   - Sepsis resolved  - Cellulitis resolving     #Orthostatic hypotension   Goal -160. Initiated midodrine 2.5mg BID to help with adequate perfusion. Uptitrated to 5mg BID. during admission. TTE with EF 56%. Cardiac CT to rule out SANDRO thrombus: No SANDRO or left atrial thrombus. Non cardiac: Apparent filling defects in segmental branches of the right lower lobe pulmonary artery. SBPs in 160s on 05/17, downtitrated to midodrine 2.5mg BID.   - Continue Midodrine 2.5mg BID x 2 days, then discontinue   - Wife declining WALLY/ILR, however she agreeable for [facility]OT, but after talking to EP team, she agreed to follow up with them after going to acute rehab to consider monitoring.     #Urinary retention  Urinary retention with postraumatic hematuria after straight cath. On 5/4, 900 cc and 350cc in BS with postraumatic hematuria. Urology consulted. Galindo in place draining clear yellow urine. Episode of fruit-punch colored hematuria on 05/10, evaluated by urology attributed to movement of Galindo with no further intervention required. S/p PEG placement and started on doxazosin 1mg/night (as tamsulosin cannot be administered through PEG tube)   - Continue doxazosin 1mg/night  - Galindo to remain in place until patient is ambulatory  - Follow up with outpatient urology     #HLD (hyperlipidemia)  Started on dose statin as above in CVA. LDL results: 170.  - Continue atorvastatin 80mg daily     #Dysphagia  Failed SLP evaluation. FEES completed on 5/2 - recommended for PEG. s/p PEG on 5/7. Nutrition consulted. Started continuous tube feeds on 05/08- Moondo Glucose Support 1.2 @10mL/hr x24hr and increase as tolerated to goal rate 63mL/hr x24hr to provide 1512mL total volume, 1814kcal (26kcal/kg dosing wt 70.3kg), 97g protein (1.4g/kg dosing wt 70.3kg), and 1149mL free water. Reached goal rate 05/09. However, course c/b by sepsis secondary to cellulitis around the PEG site, so feeds held for a period of time while patient underwent PEG study. PEG study revealed no extravasation of contrast and feeds resumed on 05/15.   - Upon discharge, transition to cyclic feeds: Cori Fanmode Glucose Support 1.2 @95mL/hr x16hr to provide 1520mL total volume, 1824kcal (26kcal/kg dosing wt 70.3kg), 97g protein (1.4g/kg dosing wt 70.3kg), and 1155mL free water. Run feeds from 17:00-09:00 and hold feeds from 09:00-17:00 so patient can move throughout the day.   - If tolerating, consider transitioning to bolus feeds at acute rehab   - Maintain aspiration precautions   - If pt starts having frequent loose BMs, add Banatrol x2/day to promote fecal bulk.   - Continue pantoprazole 40 mg PO QD until 06/11 to complete 4 weeks of PPI     #JUSTYNA (iron deficiency anemia)  Ferritin: 410, total iron: 83, TIBC: 255, % saturation: 33   - Continue home ferrous sulfate 325mg daily.    #Diabetes  Home meds: metformin 500mg TID, Januvia 50mg daily. HbA1C results: 6.3%. Managed with lantus 4 units at bedtime and corrective insulin sliding scale while inpatient.   - Continue with home medications     #BPH (benign prostatic hyperplasia)  - Started on doxazosin 1mg/night (tamsulosin cannot be administered through PEG tube)   - Follow-up with urology outpatient     Patient was discharged to: Acute rehab     New medications: atorvastatin 80mg QD, midodrine 2.5mg BID, doxazosin 1mg QHS, plavix 75mg qd   Changes to old medications: n/a   Medications that were stopped: tamsulosin     Items to follow up as outpatient: f/u neuro, f/u PCP, f/u urology     Physical exam at the time of discharge:  General: No acute distress  Eyes: Anicteric sclerae, moist conjunctivae, see below for CNs  Neck: trachea midline  Cardiovascular: Regular rate and rhythm  Pulmonary: No use of accessory muscles  GI: Abdomen soft; area of cellulitis around PEG tube site markedly improved with decreased erythema and swelling   : Galindo in place draining clear yellow urine   Extremities: no lower extremity edema  Neuro: fluent, follows crossed commands, R. gaze deviation able to overcome volitionally, ?decreased BTT on the left, moderate-severe dysarthria, moderate L. facial droop, LUE no movement, LLE no movement.     #Discharge: do not delete    LARRY BURNS is an 88 year-old Cantonese speaking Male with PMHx of JUSTYNA, stroke (2004, residual right sided weakness), HLD, glaucoma, DM, BPH, depression, and b/l hearing loss, who present to [facility] with new left-sided arm and leg weakness starting 4/26 pm, transferred to [facility] for further stroke workup. Course complicated periods of waxing/waning left sided hemiparesis, likely due to hypoperfusion, s/p IVF boluses, now started on midodrine. Patient with urinary retention with postraumatic hematuria after straight cath in pt with BPH. Now s/p PEG placement on 5/7 course c/b sepsis secondary to abdominal cellulitis, now resolved, and planned for discharge to acute rehab facility.      Hospital course (by problem):     #Stroke  #Right internal capsule infarct   Pt with a hx of stroke 2004 with residual right sided weakness. Presenting with left sided arm and leg weakness at home. Patient previously on aspirin, but was stopped by doctor in 2020, unclear reason why. Stroke code HCT negative for acute ischemia. Stroke code CTA with stenosis of proximal bilateral A2 segments and R M2. MRI brain 04/28 with R internal capsule infarct. Repeat head CT 5/5 with a subacute infarct centered in the genu and posterior limb of the right internal capsule is stable.  A chronic transcortical infarction in the left precentral gyrus and a small chronic infarct in the right cerebellar hemisphere are stable.  - Continue ASA 81mg daily  - Continue Plavix 75mg QD x3 weeks (05/12-06/01)  - Cotinue atorvastatin 80mg daily    - Stroke education   - Follow up with neuro outpatient   -discharge NIHSS 15    #Seizure-like activity  vEEG placed on 05/11 to observe for seizure-like activity. Initial read with GRDA w/ R sided LRDA w/ fast activity and right sided spikes without evolution. S/p Keppra 2g on 05/11. 05/12 read with potentially epileptogenic foci over the left frontal and right frontal/frontotemporal regions; also evidence for moderate diffuse and R>L frontal focal cerebral dysfunction which is nonspecific in etiology. Started on Keppra 750mg BID.   - Continue Keppra 750mg BID PO via PEG tube     #Sepsis 2/2 Cellulitis of abdominal wall   Patient with abdominal pain s/p PEG tube placement 05/07 and on exam with erythema, induration, and mild purulence around PEG site. Meeting sepsis criteria on 05/12. Tmax 102 (rectal). Blood cultures, CXR, abdominal XR non-revealing.  Would culture sent from mild purulence, growing K. pneumoniae, E. cloacae, and E. faecalis, CTAP 05/13 with cellulitis around PEG site and surrounding soft tissue gas, partial erosion of gastrostomy balloon into abdominal wall. Gastroenterology following. PEG study performed, no extravasation. The bowel gas pattern is otherwise normal and no free air. Cellulitis may be due to patient moving around PEG tube. Completed 5 day course of IV vancomycin (dosed by trough) and IV zosyn (initially pseudomonal dosing later de-escalated to non-pseudomonal dosing). Surveillance cultures with NGTD. Patient afebrile for x5 days prior to discharge.   - Sepsis resolved  - Cellulitis resolving     #Orthostatic hypotension   Goal -160. Initiated midodrine 2.5mg BID to help with adequate perfusion. Uptitrated to 5mg BID. during admission. TTE with EF 56%. Cardiac CT to rule out SANDRO thrombus: No SANDRO or left atrial thrombus. Non cardiac: Apparent filling defects in segmental branches of the right lower lobe pulmonary artery. SBPs in 160s on 05/17, downtitrated to midodrine 2.5mg BID.   - Continue Midodrine 2.5mg BID x 2 days, then discontinue   - Wife declining WALLY/ILR, however she agreeable for [facility]OT, but after talking to EP team, she agreed to follow up with them after going to acute rehab to consider monitoring.     #Urinary retention  Urinary retention with postraumatic hematuria after straight cath. On 5/4, 900 cc and 350cc in BS with postraumatic hematuria. Urology consulted. Galindo in place draining clear yellow urine. Episode of fruit-punch colored hematuria on 05/10, evaluated by urology attributed to movement of Galindo with no further intervention required. S/p PEG placement and started on doxazosin 1mg/night (as tamsulosin cannot be administered through PEG tube)   - Continue doxazosin 1mg/night  - Galindo to remain in place until patient is ambulatory  - Follow up with outpatient urology     #HLD (hyperlipidemia)  Started on dose statin as above in CVA. LDL results: 170.  - Continue atorvastatin 80mg daily     #Dysphagia  Failed SLP evaluation. FEES completed on 5/2 - recommended for PEG. s/p PEG on 5/7. Nutrition consulted. Started continuous tube feeds on 05/08- BO.LT Glucose Support 1.2 @10mL/hr x24hr and increase as tolerated to goal rate 63mL/hr x24hr to provide 1512mL total volume, 1814kcal (26kcal/kg dosing wt 70.3kg), 97g protein (1.4g/kg dosing wt 70.3kg), and 1149mL free water. Reached goal rate 05/09. However, course c/b by sepsis secondary to cellulitis around the PEG site, so feeds held for a period of time while patient underwent PEG study. PEG study revealed no extravasation of contrast and feeds resumed on 05/15.   - Upon discharge, transition to cyclic feeds: BO.LT Glucose Support 1.2 @95mL/hr x16hr to provide 1520mL total volume, 1824kcal (26kcal/kg dosing wt 70.3kg), 97g protein (1.4g/kg dosing wt 70.3kg), and 1155mL free water. Run feeds from 17:00-09:00 and hold feeds from 09:00-17:00 so patient can move throughout the day.   - If tolerating, consider transitioning to bolus feeds at acute rehab   - Maintain aspiration precautions   - If pt starts having frequent loose BMs, add Banatrol x2/day to promote fecal bulk.   - Continue pantoprazole 40 mg PO QD until 06/11 to complete 4 weeks of PPI     #JUSTYNA (iron deficiency anemia)  Ferritin: 410, total iron: 83, TIBC: 255, % saturation: 33   - Continue home ferrous sulfate 325mg daily.    #Diabetes  Home meds: metformin 500mg TID, Januvia 50mg daily. HbA1C results: 6.3%. Managed with lantus 4 units at bedtime and corrective insulin sliding scale while inpatient.   - Continue with home medications     #BPH (benign prostatic hyperplasia)  - Started on doxazosin 1mg/night (tamsulosin cannot be administered through PEG tube)   - Follow-up with urology outpatient     Patient was discharged to: Acute rehab     New medications: atorvastatin 80mg QD, midodrine 2.5mg BID, doxazosin 1mg QHS, plavix 75mg qd   Changes to old medications: n/a   Medications that were stopped: tamsulosin     Items to follow up as outpatient: f/u neuro, f/u PCP, f/u urology     Physical exam at the time of discharge:  General: No acute distress  Eyes: Anicteric sclerae, moist conjunctivae, see below for CNs  Neck: trachea midline  Cardiovascular: Regular rate and rhythm  Pulmonary: No use of accessory muscles  GI: Abdomen soft; area of cellulitis around PEG tube site markedly improved with decreased erythema and swelling   : Galindo in place draining clear yellow urine   Extremities: no lower extremity edema  Neuro: fluent, follows crossed commands, R. gaze deviation able to overcome volitionally, ?decreased BTT on the left, moderate-severe dysarthria, moderate L. facial droop, LUE no movement, LLE no movement.

## 2024-05-08 NOTE — DISCHARGE NOTE PROVIDER - NSDCCPTREATMENT_GEN_ALL_CORE_FT
PRINCIPAL PROCEDURE  Procedure: MRI head  Findings and Treatment: INTERPRETATION:  .  CLINICAL INFORMATION: Left upper extremity and left lower extremity   weakness with ataxic gait. Subtle ataxia.  < end of copied text >  TECHNIQUE: Gated stroke protocol MRI study was performed with axial   diffusion and axial T2 FLAIR, axial ADC maps.  COMPARISON: Prior CT code stroke series dated 4/20/2024.  FINDINGS: Restricted diffusion is seen within the posterior limb of the   right internal capsule with associated T2/FLAIR hyperintense signal,   compatible with cytotoxic edema.  A chronic transcortical infarct is seen within the high left posterior   frontal lobe. An additional chronic infarct is seen within the right   cerebellar hemisphere.  Multiple additional nonspecific T2/FLAIR hyperintense signal changes are   noted throughout the bihemispheric white matter without associated mass   effect or restricted diffusion.  No hydrocephalus is noted. Flow-voids are noted throughout the major   intracranial vessels, on the T2 weighted images, consistent with their   patency.  Scattered mucosal thickening is seen throughout the paranasal sinuses.   The bilateral tympanomastoid cavities are clear. The calvarium appears   intact. There is evidence of bilateral cataract removal.  IMPRESSION: Acute/subacute infarction within the posterior limb of the   right internal capsule with associated cytotoxic edema.  Additional chronic infarcts and similar-appearing mild chronic   microvascular type changes, as discussed.  --- End of Report ---< from: MR Head No Cont (04.28.24 @ 16:40) >        SECONDARY PROCEDURE  Procedure: CT angiogram head and neck vessels w contrast  Findings and Treatment:   < end of copied text >  IMPRESSION:  CT PERFUSION:  Technical limitations: None.  Core infarction: 0 ml  Penumbra / tissue at risk for active ischemia: 0 ml  CTA NECK:  No evidence of significant stenosis or occlusion.  CTA HEAD:  Patent intracranial circulation without flow limiting stenosis.  No evidence of aneurysm. Tiny aneurysms can be beyond the resolution of   CTA technique.< from: CT Angio Neck Stroke Protocol w/ IV Cont (04.27.24 @ 20:47) >      Procedure: US venous duplex scan extremity lower bilateral  Findings and Treatment:   < end of copied text >  PROCEDURE DATE:  04/30/2024    INTERPRETATION:  CLINICAL INFORMATION: Screening for deep vein thrombosis  COMPARISON: None available.  TECHNIQUE: Duplex sonography of the BILATERAL LOWER extremity veins with   color and spectral Doppler, with and without compression.  FINDINGS:  RIGHT:  Normal compressibility of the RIGHT common femoral, femoral and popliteal   veins.  Doppler examination shows normal spontaneous and phasic flow.  No RIGHT calf vein thrombosis is detected.  LEFT:  Normal compressibility of the LEFT common femoral, femoral and popliteal   veins.  Doppler examination shows normal spontaneous and phasic flow.  No LEFT calf vein thrombosis is detected.  IMPRESSION:  No evidence of deep venous thrombosis in either lower extremity.< from: US Duplex Venous Lower Ext Complete, Bilateral (04.30.24 @ 12:13) >      Procedure: CT heart wo calcium score  Findings and Treatment:   < end of copied text >  VI. Results:  Pulmonary Veins:  Left Upper PV:  Max 20.3 mm and Min 17.2 mm  Left Lower PV:  Max 17 mm and Min 15.4 mm  Right Upper PV:  Max 16.7 mm and Min 16.1 mm  Right Lower PV:  Max 16.7 mm and Min 8.4 mm  Accessory Pulmonary Vein: N  Left atrial thrombus: N  Left atrial appendage thrombus: N  Normal atrial septum: Yes  Left atrium is dilated  Coronary calcification noted.  Non Cardiac Findings:  PRIOR STUDIES: None.  FINDINGS:  Cardiovascular: Aortic root ectatic 4.5 cm. Ascending aorta 3.9 cm.   Apparent filling defect in a couple branches of the pulmonary artery to   the lower lobe on arterial phase of the study could be artifactual due to   respiratory motion. It is not seen on more delayed images.  Lungs: The visualized portions of the lungs are clear.  Pleural effusion: Trace pleural effusions bilaterally.  Pericardial effusion: No.  Lymphadenopathy: No enlarged nodes visualized.  Upper abdomen: Gastric tube extends to the stomach.  Soft tissues: Normal.  Bones: Degenerative changes.  IMPRESSION:  Cardiac:  1.  There are 4 pulmonary veins; 2 on the left and 2 on the right.  2.  No left atrial appendage or left atrialthrombus.  Non cardiac:  Apparent filling defects in segmental branches of the right lower lobe   pulmonary artery. Recommend dedicated CT angiogram to differentiate   artifact from pulmonary embolism.  Findings were reported to Tavo rodriguez NP at 4:52 PM 5/2/2024.  Cardiac findings independently dictated by the Cardiology Attending.  Non-cardiac findings independently dictated by the Radiology Attending.< from: CT Heart Left Atrium w/ IV Cont (05.02.24 @ 16:20) >      Procedure: CT head  Findings and Treatment:   < end of copied text >  PROCEDURE DATE:  05/05/2024    INTERPRETATION:  CLINICAL INFORMATION: Right-sided jerking movements.  COMPARISON STUDY: CT head from 05/04/2024.  MRI brain from 04/28/2024.  TECHNIQUE:  Noncontrast axial CT images were acquired through the head.  Sagittal and coronal reformats were performed.  FINDINGS:  A subacute infarct centered in the genu and posterior limb of the right   internal capsule with possible involvementof adjacent right basal   ganglia and right thalamus is stable in size from MRI of 04/28/2024, when   it was acute.  There is no CT evidence of acute intracranial hemorrhage, mass effect or   midline shift.  There is mild to moderate generalized cerebral volume loss and patchy   periventricular white matter hypoattenuation compatible with chronic   ischemic disease. There is a small to moderate chronic transcortical   infarct centered in the left precentral gyrus, within the left middle   cerebral artery vascular territory. There is a small to moderate chronic   infarct in the posterior inferior aspect of the right cerebellar   hemisphere, probably within the right posterior inferior cerebellar   artery vascular territory.  The visualized paranasal sinuses and the mastoids are grossly clear.  The patient is status post intraocular lens replacement bilaterally.  The calvarium and skull base appear within normal limits.  A nasogastric tube is partially visualized in the right nasal cavity and   nasopharynx.  IMPRESSION:  A subacute infarct centered in the genu and posterior limb of the right   internal capsule is stable.  A chronic transcortical infarction in the   left precentral gyrus and a small chronic infarct in theright cerebellar   hemisphere are stable.  No new intracranial findings< from: CT Head No Cont (05.05.24 @ 10:32) >

## 2024-05-08 NOTE — DISCHARGE NOTE PROVIDER - NSDCMRMEDTOKEN_GEN_ALL_CORE_FT
ferrous sulfate 325 mg (65 mg elemental iron) oral tablet: 1 tab(s) orally once a day  Folic Acid 1mg: Take 1 pill once a day  Januvia 50 mg oral tablet: 1 tab(s) orally once a day  magnesium oxide 400 mg oral tablet: 1 tab(s) orally once a day  metFORMIN 500 mg oral tablet: 1 tab(s) orally 3 times a day  vitamin A 10,000 intl units oral capsule: 1 cap(s) orally once a day   acetaminophen 325 mg oral tablet: 2 tab(s) orally every 6 hours As needed Temp greater or equal to 38C (100.4F), Mild Pain (1 - 3), Moderate Pain (4 - 6)  aspirin 81 mg oral tablet, chewable: 1 tab(s) orally once a day  atorvastatin 80 mg oral tablet: 1 tab(s) orally once a day (at bedtime)  clopidogrel 75 mg oral tablet: 1 tab(s) orally once a day  doxazosin 1 mg oral tablet: 1 tab(s) orally once a day (at bedtime)  ferrous sulfate 325 mg (65 mg elemental iron) oral tablet: 1 tab(s) orally once a day  folic acid 1 mg oral tablet: 1 tab(s) orally once a day  guaiFENesin 100 mg/5 mL oral liquid: 5 milliliter(s) orally every 6 hours As needed Cough  Januvia 50 mg oral tablet: 1 tab(s) orally once a day  magnesium oxide 400 mg oral tablet: 1 tab(s) orally once a day  metFORMIN 500 mg oral tablet: 1 tab(s) orally 3 times a day  midodrine 5 mg oral tablet: 1 tab(s) orally  senna leaf extract oral tablet: 2 tab(s) orally once a day (at bedtime) as needed for  constipation  vitamin A 10,000 intl units oral capsule: 1 cap(s) orally once a day   acetaminophen 325 mg oral tablet: 2 tab(s) orally every 6 hours As needed Temp greater or equal to 38C (100.4F), Mild Pain (1 - 3), Moderate Pain (4 - 6)  aspirin 81 mg oral tablet, chewable: 1 tab(s) orally once a day  atorvastatin 80 mg oral tablet: 1 tab(s) orally once a day (at bedtime)  clopidogrel 75 mg oral tablet: 1 tab(s) orally once a day  doxazosin 1 mg oral tablet: 1 tab(s) orally once a day (at bedtime)  ferrous sulfate 325 mg (65 mg elemental iron) oral tablet: 1 tab(s) orally once a day  folic acid 1 mg oral tablet: 1 tab(s) orally once a day  guaiFENesin 100 mg/5 mL oral liquid: 5 milliliter(s) orally every 6 hours As needed Cough  Januvia 50 mg oral tablet: 1 tab(s) orally once a day  levETIRAcetam 750 mg oral tablet: 1 tab(s) orally every 12 hours  magnesium oxide 400 mg oral tablet: 1 tab(s) orally once a day  metFORMIN 500 mg oral tablet: 1 tab(s) orally 3 times a day  midodrine 5 mg oral tablet: 0.5 tab(s) orally every 12 hours  pantoprazole 40 mg oral granule, delayed release: orally once a day  senna leaf extract oral tablet: 2 tab(s) orally once a day (at bedtime) as needed for  constipation  vitamin A 10,000 intl units oral capsule: 1 cap(s) orally once a day  zinc oxide 20% topical ointment: 1 Apply topically to affected area every 24 hours

## 2024-05-08 NOTE — DISCHARGE NOTE PROVIDER - NPI NUMBER (FOR SYSAD[employer] USE ONLY) :
[[id number]],[[id number]] [[id number]],[UNKNOWN] [UNKNOWN],[[id number]] [[id number]],[UNKNOWN],[[id number]] [[id number]],[[id number]] [[id number]],[[id number]],[UNKNOWN]

## 2024-05-08 NOTE — DISCHARGE NOTE PROVIDER - CARE PROVIDER_API CALL
Aron Soares  [address] [place] [address]  Phone: [phone]  Fax: (   )    -  Follow Up Time: 2 weeks    Johana Griggs  Neurology  [address] [address]  Phone: [phone]  Fax: [phone]  Follow Up Time: 2 weeks   Johana Griggs  Neurology  [address] [address]  Phone: [phone]  Fax: [phone]  Follow Up Time: 2 weeks    Aron Soares  Internal Medicine  [address] [place] [address]  Phone: [phone]  Fax: (   )    -  Established Patient  Scheduled Appointment: 05/17/2024 02:45 PM   Aron Soares  Internal Medicine  [address] [facility], [address]  [place] [address]  Phone: [phone]  Fax: (   )    -  Scheduled Appointment: 05/17/2024 02:45 PM    Kari Gonzalez  Neurology  [address] [address]  Phone: [phone]  Fax: [phone]  Scheduled Appointment: 08/09/2024 02:40 PM   Kari Gonzalez  Neurology  [address] [address]  Phone: [phone]  Fax: [phone]  Scheduled Appointment: 08/09/2024 02:40 PM    Aron Soares  Internal Medicine  [address] [facility], [address]  [place] [address]  Phone: [phone]  Fax: (   )    -  Scheduled Appointment: 05/17/2024 02:45 PM    Shannan Fan  NP in Family Health  [address]  [place] [address]  Phone: [phone]  Fax: [phone]  Scheduled Appointment: 05/30/2024 03:00 PM   Kari Gonzalez  Neurology  [address] [address]  Phone: [phone]  Fax: [phone]  Scheduled Appointment: 08/09/2024 02:40 PM    Shannan Fan  NP in Family Health  [address]  [place] [address]  Phone: [phone]  Fax: [phone]  Scheduled Appointment: 05/30/2024 03:00 PM   Kari Gonzalez  Neurology  [address] [address]  Phone: [phone]  Fax: [phone]  Scheduled Appointment: 08/09/2024 02:40 PM    Shannan Fan  NP in Family Health  [address]  [place] [address]  Phone: [phone]  Fax: [phone]  Scheduled Appointment: 05/30/2024 03:00 PM    Fabrizio Ascencio  [address]  [place], [place]  Phone: (   )    -  Fax: (   )    -  Scheduled Appointment: 05/30/2024 11:00 AM

## 2024-05-08 NOTE — PROGRESS NOTE ADULT - SUBJECTIVE AND OBJECTIVE BOX
GASTROENTEROLOGY PROGRESS NOTE    INTERVAL/SUBJECTIVE:  Pt s/p PEG placement, examined as below.    Allergies  No Known Allergies    Intolerances      MEDICATIONS:  MEDICATIONS  (STANDING):  aspirin  chewable 81 milliGRAM(s) Oral daily  atorvastatin 80 milliGRAM(s) Oral at bedtime  dextrose 10% Bolus 125 milliLiter(s) IV Bolus once  dextrose 5%. 1000 milliLiter(s) (50 mL/Hr) IV Continuous <Continuous>  dextrose 5%. 1000 milliLiter(s) (100 mL/Hr) IV Continuous <Continuous>  dextrose 50% Injectable 25 Gram(s) IV Push once  dextrose 50% Injectable 12.5 Gram(s) IV Push once  doxazosin 1 milliGRAM(s) Oral at bedtime  enoxaparin Injectable 40 milliGRAM(s) SubCutaneous every 24 hours  ferrous    sulfate 325 milliGRAM(s) Oral daily  folic acid 1 milliGRAM(s) Oral daily  glucagon  Injectable 1 milliGRAM(s) IntraMuscular once  insulin lispro (ADMELOG) corrective regimen sliding scale   SubCutaneous every 6 hours  magnesium oxide 400 milliGRAM(s) Oral daily  midodrine. 5 milliGRAM(s) Oral <User Schedule>  sodium chloride 0.9%. 1000 milliLiter(s) (75 mL/Hr) IV Continuous <Continuous>    MEDICATIONS  (PRN):  acetaminophen     Tablet .. 650 milliGRAM(s) Oral every 6 hours PRN Temp greater or equal to 38C (100.4F), Mild Pain (1 - 3), Moderate Pain (4 - 6)  dextrose Oral Gel 15 Gram(s) Oral once PRN Blood Glucose LESS THAN 70 milliGRAM(s)/deciliter    Vital Signs Last 24 Hrs  T(C): 37 (08 May 2024 13:25), Max: 37 (08 May 2024 13:25)  T(F): 98.6 (08 May 2024 13:25), Max: 98.6 (08 May 2024 13:25)  HR: 65 (08 May 2024 13:25) (65 - 75)  BP: 112/64 (08 May 2024 13:25) (110/61 - 144/75)  BP(mean): 78 (07 May 2024 17:55) (78 - 94)  RR: 17 (08 May 2024 13:25) (17 - 20)  SpO2: 93% (08 May 2024 13:25) (93% - 95%)    Parameters below as of 08 May 2024 13:25  Patient On (Oxygen Delivery Method): room air        05-07 @ 07:01  -  05-08 @ 07:00  --------------------------------------------------------  IN: 750 mL / OUT: 1270 mL / NET: -520 mL      PHYSICAL EXAM:  General: Alert, no acute distress  Lungs: Normal respiratory effort  Abdomen: Soft, nondistended, mildly tender diffusely, PEG dressing with small amounts of old clotted blood  Extremities: No edema  Neurological: Moving all extremities spontaneously    LABS:                        10.5   8.09  )-----------( 181      ( 08 May 2024 05:30 )             32.0     05-08    135  |  102  |  22  ----------------------------<  137<H>  3.9   |  22  |  0.69    Ca    8.4      08 May 2024 05:30  Phos  2.9     05-08  Mg     2.0     05-08    TPro  6.3  /  Alb  3.3  /  TBili  0.7  /  DBili  x   /  AST  40  /  ALT  39  /  AlkPhos  71  05-07        RADIOLOGY & ADDITIONAL STUDIES: Reviewed

## 2024-05-08 NOTE — PROGRESS NOTE ADULT - PROBLEM SELECTOR PLAN 7
Ferritin: 410, total iron: 83, TIBC: 255, % saturation: 33   - c/w home ferrous sulfate 325mg daily Opioid Counseling: I discussed with the patient the potential side effects of opioids including but not limited to addiction, altered mental status, and depression. I stressed avoiding alcohol, benzodiazepines, muscle relaxants and sleep aids unless specifically okayed by a physician. The patient verbalized understanding of the proper use and possible adverse effects of opioids. All of the patient's questions and concerns were addressed. They were instructed to flush the remaining pills down the toilet if they did not need them for pain.

## 2024-05-08 NOTE — CHART NOTE - NSCHARTNOTEFT_GEN_A_CORE
Admitting Diagnosis:   Patient is a 88y old  Male who presents with a chief complaint of left sided weakness (08 May 2024 07:09)    PAST MEDICAL & SURGICAL HISTORY:  Diabetes  HLD (hyperlipidemia)  BPH (benign prostatic hyperplasia)  JUSTYNA (iron deficiency anemia)  Stroke    Current Nutrition Order:  NPO    PO Intake: Good (%) [   ]  Fair (50-75%) [   ] Poor (<25%) [   ] - N/A NPO    GI Issues:   Wife denies pt with nausea/vomiting  Endorses pt with diarrhea during admission, last BM today   Abdominal distension noted  +PEG    Pain:  Mild surgical site pain reported - RD notified RN, pain regimen ordered    Skin Integrity:  No edema documented  Sacral stage I pressure injury noted  Leonel score 12    Labs:       135  |  102  |  22  ----------------------------<  137<H>  3.9   |  22  |  0.69    Ca    8.4      08 May 2024 05:30  Phos  2.9     -  Mg     2.0         TPro  6.3  /  Alb  3.3  /  TBili  0.7  /  DBili  x   /  AST  40  /  ALT  39  /  AlkPhos  71  -    CAPILLARY BLOOD GLUCOSE  POCT Blood Glucose.: 143 mg/dL (08 May 2024 08:49)  POCT Blood Glucose.: 145 mg/dL (07 May 2024 22:17)  POCT Blood Glucose.: 145 mg/dL (07 May 2024 18:00)  POCT Blood Glucose.: 147 mg/dL (07 May 2024 16:18)  POCT Blood Glucose.: 139 mg/dL (07 May 2024 12:38)    Medications:  MEDICATIONS  (STANDING):  aspirin  chewable 81 milliGRAM(s) Oral daily  atorvastatin 80 milliGRAM(s) Oral at bedtime  dextrose 10% Bolus 125 milliLiter(s) IV Bolus once  dextrose 5%. 1000 milliLiter(s) (100 mL/Hr) IV Continuous <Continuous>  dextrose 5%. 1000 milliLiter(s) (50 mL/Hr) IV Continuous <Continuous>  dextrose 50% Injectable 25 Gram(s) IV Push once  dextrose 50% Injectable 12.5 Gram(s) IV Push once  doxazosin 1 milliGRAM(s) Oral at bedtime  enoxaparin Injectable 40 milliGRAM(s) SubCutaneous every 24 hours  ferrous    sulfate 325 milliGRAM(s) Oral daily  folic acid 1 milliGRAM(s) Oral daily  glucagon  Injectable 1 milliGRAM(s) IntraMuscular once  insulin lispro (ADMELOG) corrective regimen sliding scale   SubCutaneous every 6 hours  magnesium oxide 400 milliGRAM(s) Oral daily  midodrine. 5 milliGRAM(s) Oral <User Schedule>  sodium chloride 0.9%. 1000 milliLiter(s) (75 mL/Hr) IV Continuous <Continuous>    MEDICATIONS  (PRN):  acetaminophen     Tablet .. 650 milliGRAM(s) Oral every 6 hours PRN Temp greater or equal to 38C (100.4F), Mild Pain (1 - 3), Moderate Pain (4 - 6)  dextrose Oral Gel 15 Gram(s) Oral once PRN Blood Glucose LESS THAN 70 milliGRAM(s)/deciliter    Anthropometrics:  Height: 5'8"  Weight: 155lb/70.3kg  BMI: 23.5  IBW: 154lb/70kg     101% IBW    Weight Change:   No new weights obtained since admission. Recommend nursing to trend weekly weights. RD to continue monitoring weights as able.     Estimated energy needs:   Calories: 25-30kcal/k-2109kcal/d  Protein: 1.2-1.5g/k-105g/d  Fluid: 30-35mL/k-2460mL/d  Estimated needs based on dosing wt as within % IBW. Needs adjusted for age, wound healing, and clinical status.     Subjective:   88M with PMH of bilateral hearing impairment with bilateral hearing aids, DM, thalassemia minor/JUSTYNA, HLD, CVA with right sided weakness (), depression, glaucoma, and BPH who presented with left sided weakness, admitted for stroke work-up. Course complicated by waxing/waning left sided hemiparesis likely secondary to hypoperfusion, status post IVF boluses, now on midodrine. Failed FEES (), now status post PEG placement ().     Pt seen on  for follow-up. Labs and medication orders reviewed. Ordered for IVF, ferrous sulfate, folic acid. Electrolytes WNL, POC blood glucose (-) 139-147, HgbA1c () 6.3%. NPO status post PEG placement yesterday, pending start of tube feeds today. Pt previously on Glucerna 1.2, abdominal distension and frequent loose BMs noted - recommend change to Wikia Glucose Support to promote GI tolerance. See nutrition recommendations. RD to remain available.     Previous Nutrition Diagnosis:  Inadequate Energy Intake related to pt's inability to meet nutritional demands via PO as evidenced by NPO status.    Active [ x ]  Resolved [   ]    Goal:  Pt to consistently meet >75% nutrient needs via most appropriate route for nutrition.     Recommendations:  1. As medically feasible, recommend initiate tube feeds via PEG: Wikia Glucose Support 1.2 @10mL/hr x24hr and increase as tolerated to goal rate 63mL/hr x24hr to provide 1512mL total volume, 1814kcal (26kcal/kg dosing wt 70.3kg), 97g protein (1.4g/kg dosing wt 70.3kg), and 1149mL free water.   >>Defer free water flushes to team.  >>Monitor GI tolerance & maintain aspiration precautions. RD to remain available to adjust EN regimen prn.   >>Pending GI tolerance, consider transition to cyclic feeds for discharge: Wikia Glucose Support 1.2 @95mL/hr x16hr.   2. Monitor weight trends, labs, skin integrity, & hydration status.  3. Pain and bowel regimens per team.  >>If pt frequent loose BMs, recommend add Banatrol x2/day to promote fecal bulk.   4. RD remains available for dietary education/intervention prn.     Education:   Educated on signs/symptoms of tube feed tolerance with pt's wife. RD answered all questions, pt's wife aware RD remains available for questions/concerns prn.     Risk Level: High [ x ] Moderate [   ] Low [   ]

## 2024-05-08 NOTE — DISCHARGE NOTE PROVIDER - NSDCFUADDAPPT_GEN_ALL_CORE_FT
Please bring your Insurance card, Photo ID and Discharge paperwork to the following appointment:    (1) Please follow up with your Primary Care Provider, Dr. Aron Soares at [facility] [place] [address] on 5/17/2024 at 2:45pm.    Appointment was scheduled by Ms. KANCHAN Eugene, Referral Coordinator.   Please bring your Insurance card, Photo ID and Discharge paperwork to the following appointments:    (1) Please follow up with your Primary Care Provider, Dr. Aron Soares at [address] [place] [address] on 5/17/2024 at 2:45pm.    Appointment was scheduled by Ms. KANCHAN Eugene, Referral Coordinator.    (2) Please follow up with your Neurology Provider, Dr. Kari Gonzalez at [address] [place] [address] on 8/9/2024 at 2:40pm.     Please note that the office will contact you for an earlier appointment once available.    Appointment was scheduled by Ms. KANCHAN Eugene, Referral Coordinator.   Please bring your Insurance card, Photo ID and Discharge paperwork to the following appointments:    (1) Please follow up with your Primary Care Provider, Dr. Aron Soares at [address] [place] [address] on 5/17/2024 at 2:45pm.    Appointment was scheduled by Ms. KANCHAN Eugene, Referral Coordinator.    (2) Please follow up with Neurology NP, Shannan Fan at [address] [place] [address] on 5/30/2024 at 3:00pm.     (3) Please follow up with your Neurology Provider, Dr. Kari Gonzalze at [address] [place] [address] on 8/9/2024 at 2:40pm.     Please note that the office will contact you for an earlier appointment once available.    Appointment was scheduled by Ms. KANCHAN Eugene, Referral Coordinator.   Please bring your Insurance card, Photo ID and Discharge paperwork to the following appointments:    (1) Please follow up with Neurology NP, Shannan Fan at [address] [place] [address] on 5/30/2024 at 3:00pm.     (2) Please follow up with your Neurology Provider, Dr. Kari Gonzalez at [address] [place] [address] on 8/9/2024 at 2:40pm.     Please note that the office will contact you for an earlier appointment once available.    Appointment was scheduled by Ms. KANCHAN Eugene, Referral Coordinator.    Additionally, please call your PCP Dr. Soares at [phone] to schedule a follow-up appointment once you leave your acute rehab facility.    Please bring your Insurance card, Photo ID and Discharge paperwork to the following appointments:    (1) Please follow up with Neurology NP, Shannan Fan at [address] [place] [address] on 5/30/2024 at 3:00pm.     (2) Please follow up with your Neurology Provider, Dr. Kari Gonzalez at [address] [place] [address] on 8/9/2024 at 2:40pm.    (3) Please follow up with your Urology Provider, Dr. Ascencio on May 30th, 2024 at 11:00am.      Please note that the office will contact you for an earlier appointment once available.    Appointment was scheduled by Ms. KANCHAN Eugene, Referral Coordinator.    Additionally, please call your PCP Dr. Soares at [phone] to schedule a follow-up appointment once you leave your acute rehab facility.

## 2024-05-08 NOTE — DISCHARGE NOTE PROVIDER - PROVIDER TOKENS
FREE:[LAST:[Soares],FIRST:[Aron],PHONE:[phone]:[(   )    -],ADDRESS:[[address] [place] [address]],FOLLOWUP:[2 weeks]],PROVIDER:[TOKEN:[[id number]],FOLLOWUP:[2 weeks]] PROVIDER:[TOKEN:[[id number]],FOLLOWUP:[2 weeks]],FREE:[LAST:[Fany],FIRST:[Aron],PHONE:[phone]:[(   )    -],ADDRESS:[Internal Medicine  [address] [place] [address]],SCHEDULEDAPPT:[05/17/2024],SCHEDULEDAPPTTIME:[02:45 PM],ESTABLISHEDPATIENT:[T]] FREE:[LAST:[Soares],FIRST:[Aron],PHONE:[phone]:[(   )    -],ADDRESS:[Internal Medicine  [address] [place] [address]],SCHEDULEDAPPT:[05/17/2024],SCHEDULEDAPPTTIME:[02:45 PM]],PROVIDER:[TOKEN:[[id number]],SCHEDULEDAPPT:[08/09/2024],SCHEDULEDAPPTTIME:[02:40 PM]] PROVIDER:[TOKEN:[[id number]],SCHEDULEDAPPT:[08/09/2024],SCHEDULEDAPPTTIME:[02:40 PM]],FREE:[LAST:[Fany],FIRST:[Aron],PHONE:[phone]:[(   )    -],ADDRESS:[Internal Medicine  [address] [place] [address]],SCHEDULEDAPPT:[05/17/2024],SCHEDULEDAPPTTIME:[02:45 PM]],PROVIDER:[[id number]],SCHEDULEDAPPT:[05/30/2024],SCHEDULEDAPPTTIME:[03:00 PM]] PROVIDER:[TOKEN:[[id number]],SCHEDULEDAPPT:[08/09/2024],SCHEDULEDAPPTTIME:[02:40 PM]],PROVIDER:[TOKEN:[[id number]],SCHEDULEDAPPT:[05/30/2024],SCHEDULEDAPPTTIME:[03:00 PM]] PROVIDER:[TOKEN:[[id number]],SCHEDULEDAPPT:[08/09/2024],SCHEDULEDAPPTTIME:[02:40 PM]],PROVIDER:[TOKEN:[[id number]],SCHEDULEDAPPT:[05/30/2024],SCHEDULEDAPPTTIME:[03:00 PM]],FREE:[LAST:[Silva],FIRST:[Fabrizio],PHONE:[(   )    -],FAX:[(   )    -],ADDRESS:[[address] [place] [address], [address] [place] [address]],SCHEDULEDAPPT:[05/30/2024],SCHEDULEDAPPTTIME:[11:00 AM]]

## 2024-05-08 NOTE — PROGRESS NOTE ADULT - PROBLEM SELECTOR PLAN 5
Failed SLP evaluation. FEES completed on 5/2 - recommended for PEG. s/p PEG on 5/7. Nutrition consulted. Started continuous tube feeds on 05/08- [employer] Glucose Support 1.2 @10mL/hr x24hr and increase as tolerated to goal rate 63mL/hr x24hr to provide 1512mL total volume, 1814kcal (26kcal/kg dosing wt 70.3kg), 97g protein (1.4g/kg dosing wt 70.3kg), and 1149mL free water.   - Continue with tube feeds as above  - Continue with 100 cc free water flushes q4h   - Maintain aspiration precautions   - If pt starts having frequent loose BMs, add Banatrol x2/day to promote fecal bulk.   - F/u nutrition recs

## 2024-05-08 NOTE — PROGRESS NOTE ADULT - PROBLEM SELECTOR PLAN 1
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 Patient previously on aspirin, but was stopped by doctor in 2020, unclear reason why.    - continue ASA 81mg daily  - plan to restart plavix on 5/9 (needs to be held for 48 hours after PEG tube placement)  - continue atorvastatin 80mg daily    - q8h general neuro checks and vitals   - MRI brain on 4/28 with R internal capsule infarct   - Stroke Code HCT Results: negative for acute ischemia    - Stroke Code CTA Results: stenosis of proximal bilateral A2 segments and R M2   - Stroke education   - repeat HCT on 5/5 - subacute infarct centered in the genu and posterior limb of the right internal capsule is stable.  A chronic transcortical infarction in the left precentral gyrus and a small chronic infarct in the right cerebellar hemisphere are stable.

## 2024-05-08 NOTE — PROGRESS NOTE ADULT - SUBJECTIVE AND OBJECTIVE BOX
Patient is a 88y old  Male who presents with a chief complaint of left sided weakness (08 May 2024 07:09)    INTERVAL EVENTS: NAEON    SUBJECTIVE:  Patient was seen and examined at bedside w. Neuro team     Review of systems: No fever, chills, dizziness, HA, Changes in vision, CP, dyspnea, nausea or vomiting, dysuria, changes in bowel movements, LE edema. Rest of 12 point Review of systems negative unless otherwise documented elsewhere in note.     Diet, NPO with Tube Feed:   Tube Feeding Modality: Gastrostomy  [employer] Glucose Support 1.2 ([id number])  Continuous  Starting Tube Feed Rate mL per Hour: 10  Until Goal Tube Feed Rate (mL per Hour): 63  Tube Feed Duration (in Hours): 24  Tube Feed Start Time: 13:30  Free Water Flush   Total Volume per Flush (mL): 100   Frequency: Every 4 Hours (05-08-24 @ 13:17) [Active]      MEDICATIONS:  MEDICATIONS  (STANDING):  aspirin  chewable 81 milliGRAM(s) Oral daily  atorvastatin 80 milliGRAM(s) Oral at bedtime  dextrose 10% Bolus 125 milliLiter(s) IV Bolus once  dextrose 5%. 1000 milliLiter(s) (50 mL/Hr) IV Continuous <Continuous>  dextrose 5%. 1000 milliLiter(s) (100 mL/Hr) IV Continuous <Continuous>  dextrose 50% Injectable 25 Gram(s) IV Push once  dextrose 50% Injectable 12.5 Gram(s) IV Push once  doxazosin 1 milliGRAM(s) Oral at bedtime  enoxaparin Injectable 40 milliGRAM(s) SubCutaneous every 24 hours  ferrous    sulfate 325 milliGRAM(s) Oral daily  folic acid 1 milliGRAM(s) Oral daily  glucagon  Injectable 1 milliGRAM(s) IntraMuscular once  insulin lispro (ADMELOG) corrective regimen sliding scale   SubCutaneous every 6 hours  magnesium oxide 400 milliGRAM(s) Oral daily  midodrine. 5 milliGRAM(s) Oral <User Schedule>  sodium chloride 0.9%. 1000 milliLiter(s) (75 mL/Hr) IV Continuous <Continuous>    MEDICATIONS  (PRN):  acetaminophen     Tablet .. 650 milliGRAM(s) Oral every 6 hours PRN Temp greater or equal to 38C (100.4F), Mild Pain (1 - 3), Moderate Pain (4 - 6)  dextrose Oral Gel 15 Gram(s) Oral once PRN Blood Glucose LESS THAN 70 milliGRAM(s)/deciliter      Allergies    No Known Allergies    Intolerances        OBJECTIVE:  Vital Signs Last 24 Hrs  T(C): 37 (08 May 2024 13:25), Max: 37 (08 May 2024 13:25)  T(F): 98.6 (08 May 2024 13:25), Max: 98.6 (08 May 2024 13:25)  HR: 65 (08 May 2024 13:25) (65 - 75)  BP: 112/64 (08 May 2024 13:25) (110/61 - 144/75)  BP(mean): 78 (07 May 2024 17:55) (78 - 94)  RR: 17 (08 May 2024 13:25) (17 - 20)  SpO2: 93% (08 May 2024 13:25) (93% - 95%)    Parameters below as of 08 May 2024 13:25  Patient On (Oxygen Delivery Method): room air      I&O's Summary    07 May 2024 07:01  -  08 May 2024 07:00  --------------------------------------------------------  IN: 750 mL / OUT: 1270 mL / NET: -520 mL        PHYSICAL EXAM:  Gen: Reclining in bed at time of exam, appears stated age  HEENT: NCAT, MMM, clear OP  Neck: supple, trachea at midline  CV: RRR, +S1/S2  Pulm: adequate respiratory effort, no increase in work of breathing  Abd: soft, NTND, PEG in place   Skin: warm and dry,   Ext: WWP, no LE edema  Neuro: AOx3, L HP, some movement LLE today   Psych: affect and behavior appropriate, pleasant at time of interview    LABS:                        10.5   8.09  )-----------( 181      ( 08 May 2024 05:30 )             32.0     05-08    135  |  102  |  22  ----------------------------<  137<H>  3.9   |  22  |  0.69    Ca    8.4      08 May 2024 05:30  Phos  2.9     05-08  Mg     2.0     05-08    TPro  6.3  /  Alb  3.3  /  TBili  0.7  /  DBili  x   /  AST  40  /  ALT  39  /  AlkPhos  71  05-07    LIVER FUNCTIONS - ( 07 May 2024 05:30 )  Alb: 3.3 g/dL / Pro: 6.3 g/dL / ALK PHOS: 71 U/L / ALT: 39 U/L / AST: 40 U/L / GGT: x             CAPILLARY BLOOD GLUCOSE      POCT Blood Glucose.: 139 mg/dL (08 May 2024 12:26)  POCT Blood Glucose.: 143 mg/dL (08 May 2024 08:49)  POCT Blood Glucose.: 145 mg/dL (07 May 2024 22:17)  POCT Blood Glucose.: 145 mg/dL (07 May 2024 18:00)  POCT Blood Glucose.: 147 mg/dL (07 May 2024 16:18)    Urinalysis Basic - ( 08 May 2024 05:30 )    Color: x / Appearance: x / SG: x / pH: x  Gluc: 137 mg/dL / Ketone: x  / Bili: x / Urobili: x   Blood: x / Protein: x / Nitrite: x   Leuk Esterase: x / RBC: x / WBC x   Sq Epi: x / Non Sq Epi: x / Bacteria: x        MICRODATA:      RADIOLOGY/OTHER STUDIES:    PCP  Pharmacy:   Emergency contact:

## 2024-05-08 NOTE — PROGRESS NOTE ADULT - PROBLEM SELECTOR PLAN 12
F: 100 cc free water flushes via PEG tube q4h   E: Replete PRN  K<4.0, Mg<2.0, Phos<2.5  N[facility]2   GI ppx: None  DVT PPx: Lovenox

## 2024-05-08 NOTE — PROGRESS NOTE ADULT - PROBLEM SELECTOR PLAN 2
Urinary retention with postraumatic hematuria after straight cath. On 5/4, 900 cc and 350cc in BS with postraumatic hematuria.  - Started on doxazosin 1mg/night (tamsulosin cannot be administered through PEG tube)   - urology consulted  - hudson to remain in place until patient is ambulatory; should follow up with his outpatient urologist  - can attempt TOV on 5/8 if functional status improves, however, will likely require hudson to stay in place at discharge

## 2024-05-08 NOTE — PROGRESS NOTE ADULT - ASSESSMENT
88 Cantonese-speaking Male with PMH of JUSTYNA, stroke (2004, residual right sided weakness), HLD, glaucoma, DM, BPH, depression, b/l hearing loss, who first presented to [facility] with new left side arm and leg weakness, found to have a stroke in the right internal capsule. Evaluated by speech and swallow and failed FEES, so GI consulted for peg tube placement.     PEG examined at bedside. Freely rotates/slides and dressing c/d/i. Outer bumper retracted to 4cm. OK to use for tube feeds now and can start Plavix 5/9 AM.    GI will sign off. Please call GI immediately if PEG falls out within the first 4 weeks.    Khadar (Adrienne) MD Krishna  Gastroenterology Fellow  Pager (M-F 7a-5p): [phone]  Pager (after hours): Please call  for on-call fellow

## 2024-05-08 NOTE — PROGRESS NOTE ADULT - ASSESSMENT
88y M Cantonese speaking, R hand dominant, b/l  hearing impairment ( b/l hearing aids) with PMHx of DM, Thalassemia minor/JUSTYNA, HLD, hx stroke with right sided weakness in 2004 (has a cane and walker at home but pt refuses using assisted device, off ASA since 2020 for unclear reason), depression, glaucoma, BPH, [facility] with new left side arm and leg weakness starting 4/26 pm. Wife noticed that patient was leaning against the wall with the left side of his body to support himself to walk and was unable to hold himself upright when sitting, slumping over to the left. Patient felt that his left arm and leg were weak. Called PCP who recommended ED visit. CTH/CTP/CTA head and neck with no acute findings. Loaded with aspirin 300mg x1. Transferred to [facility] for further stroke w/u.    # R internal capsule infarct  # hx CVA w/ residual R sided weakness   # Dysphagia  - 8LA telemetry monitoring   - q4hr stroke neuro checks and vitals  - vEEG in progress   - active plan per Stroke team   - Permissive HTN goal -160  - midodrine 2.5mg bid ( 4/30)-> increased to 5mg bid( 5/1-)  to help with blood pressure for perfusion.   - IVF w/ NS @100ml/hr   - failed FEES ( 5/2), pt and wife agreeable for PEG  - GI f/u appreciated, GI fellow Dr. Curry, s/p PEG on Tues 5/7, ok ASA, resume plavix 48hr after PEG ( 5/9), VTE ppx in am 5/8, to start TF today  - f/u wife's decision on LINQ ( wife spoke with son from [place] and wants to hold off LINQ)   - Cardiac CT: reviewed, no SANDRO taylor   - CT Chest: no PE   - s/p aspirin load 300mg x1( 4/27)  - continue aspirin 81mg po daily ( 4/27-)  - Clopidogrel 75mg po daily ( 4/28-5/2) hold until 48hr (5/9) after PEG placement(5/7), will need Clopidogrel for 21 days then stop   - continue Atorvastatin 80mg po qHS  ( 4/27-) via NGT  - - statin ( not able to give 4/28- pt not able to swallow )   - PT/OT eval    # Acute urinary retention  # Gross hematuria  # hx BPH  - resumed tamsulosin 0.4mg via NGT qHS ( 5/4-)   -  consult appreciated re: gross hematuria, placed 22Fr hudson cath, TOV only when pt is ambulatory  - make sure pt has regular BMs ( + BMs on Fri 5/3)     # Anemia hx Thallassemia minor  - Ferritin 410, Tsat 33% adequate iron store  - Hgb 11.2%     #constipation  - c/w bowel regimen w/ senna+miralax    #DM  - A1C 6.3%   home meds: metformin 500mg TID, Januvia 50mg daily - held for now.   - ISS, goal -180     # Hyponatremia- resolved.   - trend Na 131--> 135--> 133. --> 135   - Hypophos - repleted     #DVT Prophylaxis: SCDs and Lovenox 40mg sq daily ( held after dose @ 6am 5/6) , to resume in am 5/8    Dispo: fu PT/OT, will benefit inpatient Rehab ( Wife chose [facility] or [place])      Contact:  PMD Dr. Aron Soares   Wife: Farshad Pierce # [phone]    POC as d/w Neuro team    Thank you for allowing medicine to participate in the care    Rajesh Villalba M.D. cellphone [phone]  [facility]    88y M Cantonese speaking, R hand dominant, b/l  hearing impairment ( b/l hearing aids) with PMHx of DM, Thalassemia minor/JUSTYNA, HLD, hx stroke with right sided weakness in 2004 (has a cane and walker at home but pt refuses using assisted device, off ASA since 2020 for unclear reason), depression, glaucoma, BPH, [facility] with new left side arm and leg weakness starting 4/26 pm. Wife noticed that patient was leaning against the wall with the left side of his body to support himself to walk and was unable to hold himself upright when sitting, slumping over to the left. Patient felt that his left arm and leg were weak. Called PCP who recommended ED visit. CTH/CTP/CTA head and neck with no acute findings. Loaded with aspirin 300mg x1. Transferred to [facility] for further stroke w/u.    # R internal capsule infarct  # hx CVA w/ residual R sided weakness   # Dysphagia  - 8LA telemetry monitoring   - q4hr stroke neuro checks and vitals  - vEEG in progress   - active plan per Stroke team   - Permissive HTN goal -160  - midodrine 2.5mg bid ( 4/30)-> increased to 5mg bid( 5/1-)  to help with blood pressure for perfusion.   - IVF w/ NS @100ml/hr   - failed FEES ( 5/2), pt and wife agreeable for PEG  - GI f/u appreciated, GI fellow Dr. Curry, s/p PEG on Tues 5/7, ok ASA, resume plavix 48hr after PEG ( 5/9), VTE ppx in am 5/8, to start TF today  - f/u wife's decision on LINQ ( wife spoke with son from [place] and wants to hold off LINQ)   - Cardiac CT: reviewed, no SANDRO taylor   - CT Chest: no PE   - s/p aspirin load 300mg x1( 4/27)  - continue aspirin 81mg po daily ( 4/27-)  - Clopidogrel 75mg po daily ( 4/28-5/2) hold until 48hr (5/9) after PEG placement(5/7), will need Clopidogrel for 21 days then stop   - continue Atorvastatin 80mg po qHS  ( 4/27-) via NGT  - - statin ( not able to give 4/28- pt not able to swallow )   - PT/OT eval    # Acute urinary retention  # Gross hematuria  # hx BPH  - resumed tamsulosin 0.4mg via NGT qHS ( 5/4-)   -  consult appreciated re: gross hematuria, placed 22Fr hudson cath, TOV only when pt is ambulatory  - make sure pt has regular BMs ( + BMs on Fri 5/3)     # Anemia hx Thallassemia minor  - Ferritin 410, Tsat 33% adequate iron store  - Hgb 11.2%     #constipation  - c/w bowel regimen w/ senna+miralax    #DM  - A1C 6.3%   home meds: metformin 500mg TID, Januvia 50mg daily - held for now.   - ISS, goal -180     # Hyponatremia- resolved.   - trend Na 131--> 135--> 133. --> 135   - Hypophos - repleted     #DVT Prophylaxis: SCDs and Lovenox 40mg sq daily ( held after dose @ 6am 5/6) , to resume in am 5/8    Dispo: fu PT/OT, will benefit inpatient Rehab ( Wife chose [facility] or [place])      Contact:  PMD Dr. Aron Soares   Wife: Farshad Pelayo # [phone]    POC as d/w Neuro team    Thank you for allowing medicine to participate in the care    Dr.Thida Dillon covering 5/9-5/15/24   Services

## 2024-05-08 NOTE — DISCHARGE NOTE PROVIDER - NSDCFUSCHEDAPPT_GEN_ALL_CORE_FT
Kari Gonzalez  [facility]  NEUROLOGY [address]  Scheduled Appointment: 08/09/2024     Shannan Fan  [facility]  NEUROLOGY [address]  Scheduled Appointment: 05/30/2024    [facility]  NEUROLOGY [address]  Scheduled Appointment: 05/30/2024    [facility]  NEUROLOGY [address]  Scheduled Appointment: 05/30/2024    Kari Gonzalez  [facility]  NEUROLOGY [address]  Scheduled Appointment: 08/09/2024     Fabrizio Ascencio  [facility]  UROLOGY [address]  Scheduled Appointment: 05/30/2024    Shannan Fan  [facility]  NEUROLOGY [address]  Scheduled Appointment: 05/30/2024    [facility]  NEUROLOGY [address]  Scheduled Appointment: 05/30/2024    [facility]  NEUROLOGY [address]  Scheduled Appointment: 05/30/2024    Kari Gonzalez  [facility]  NEUROLOGY [address]  Scheduled Appointment: 08/09/2024

## 2024-05-09 PROBLEM — N40.0 BENIGN PROSTATIC HYPERPLASIA WITHOUT LOWER URINARY TRACT SYMPTOMS: Chronic | Status: ACTIVE | Noted: 2024-04-28

## 2024-05-09 PROBLEM — E78.5 HYPERLIPIDEMIA, UNSPECIFIED: Chronic | Status: ACTIVE | Noted: 2024-04-28

## 2024-05-09 PROBLEM — E11.9 TYPE 2 DIABETES MELLITUS WITHOUT COMPLICATIONS: Chronic | Status: ACTIVE | Noted: 2024-04-28

## 2024-05-09 PROBLEM — D50.9 IRON DEFICIENCY ANEMIA, UNSPECIFIED: Chronic | Status: ACTIVE | Noted: 2024-04-28

## 2024-05-09 PROBLEM — I63.9 CEREBRAL INFARCTION, UNSPECIFIED: Chronic | Status: ACTIVE | Noted: 2024-04-28

## 2024-05-09 LAB
ANION GAP SERPL CALC-SCNC: 13 MMOL/L — SIGNIFICANT CHANGE UP (ref 5–17)
BLD GP AB SCN SERPL QL: NEGATIVE — SIGNIFICANT CHANGE UP
BUN SERPL-MCNC: 18 MG/DL — SIGNIFICANT CHANGE UP (ref 7–23)
CALCIUM SERPL-MCNC: 8.3 MG/DL — LOW (ref 8.4–10.5)
CHLORIDE SERPL-SCNC: 101 MMOL/L — SIGNIFICANT CHANGE UP (ref 96–108)
CO2 SERPL-SCNC: 22 MMOL/L — SIGNIFICANT CHANGE UP (ref 22–31)
CREAT SERPL-MCNC: 0.64 MG/DL — SIGNIFICANT CHANGE UP (ref 0.5–1.3)
EGFR: 92 ML/MIN/1.73M2 — SIGNIFICANT CHANGE UP
GLUCOSE BLDC GLUCOMTR-MCNC: 142 MG/DL — HIGH (ref 70–99)
GLUCOSE BLDC GLUCOMTR-MCNC: 145 MG/DL — HIGH (ref 70–99)
GLUCOSE BLDC GLUCOMTR-MCNC: 162 MG/DL — HIGH (ref 70–99)
GLUCOSE BLDC GLUCOMTR-MCNC: 184 MG/DL — HIGH (ref 70–99)
GLUCOSE SERPL-MCNC: 156 MG/DL — HIGH (ref 70–99)
HCT VFR BLD CALC: 31.5 % — LOW (ref 39–50)
HGB BLD-MCNC: 10.4 G/DL — LOW (ref 13–17)
MAGNESIUM SERPL-MCNC: 2.1 MG/DL — SIGNIFICANT CHANGE UP (ref 1.6–2.6)
MCHC RBC-ENTMCNC: 23.5 PG — LOW (ref 27–34)
MCHC RBC-ENTMCNC: 33 GM/DL — SIGNIFICANT CHANGE UP (ref 32–36)
MCV RBC AUTO: 71.1 FL — LOW (ref 80–100)
NRBC # BLD: 0 /100 WBCS — SIGNIFICANT CHANGE UP (ref 0–0)
PHOSPHATE SERPL-MCNC: 2 MG/DL — LOW (ref 2.5–4.5)
PLATELET # BLD AUTO: 183 K/UL — SIGNIFICANT CHANGE UP (ref 150–400)
POTASSIUM SERPL-MCNC: 3.5 MMOL/L — SIGNIFICANT CHANGE UP (ref 3.5–5.3)
POTASSIUM SERPL-SCNC: 3.5 MMOL/L — SIGNIFICANT CHANGE UP (ref 3.5–5.3)
RBC # BLD: 4.43 M/UL — SIGNIFICANT CHANGE UP (ref 4.2–5.8)
RBC # FLD: 15.7 % — HIGH (ref 10.3–14.5)
RH IG SCN BLD-IMP: POSITIVE — SIGNIFICANT CHANGE UP
SODIUM SERPL-SCNC: 136 MMOL/L — SIGNIFICANT CHANGE UP (ref 135–145)
WBC # BLD: 7.35 K/UL — SIGNIFICANT CHANGE UP (ref 3.8–10.5)
WBC # FLD AUTO: 7.35 K/UL — SIGNIFICANT CHANGE UP (ref 3.8–10.5)

## 2024-05-09 PROCEDURE — 99232 SBSQ HOSP IP/OBS MODERATE 35: CPT

## 2024-05-09 RX ORDER — CLOPIDOGREL BISULFATE 75 MG/1
75 TABLET, FILM COATED ORAL DAILY
Refills: 0 | Status: DISCONTINUED | OUTPATIENT
Start: 2024-05-09 | End: 2024-05-17

## 2024-05-09 RX ORDER — POTASSIUM PHOSPHATE, MONOBASIC POTASSIUM PHOSPHATE, DIBASIC 236; 224 MG/ML; MG/ML
30 INJECTION, SOLUTION INTRAVENOUS ONCE
Refills: 0 | Status: COMPLETED | OUTPATIENT
Start: 2024-05-09 | End: 2024-05-09

## 2024-05-09 RX ADMIN — POTASSIUM PHOSPHATE, MONOBASIC POTASSIUM PHOSPHATE, DIBASIC 83.33 MILLIMOLE(S): 236; 224 INJECTION, SOLUTION INTRAVENOUS at 14:19

## 2024-05-09 RX ADMIN — Medication 650 MILLIGRAM(S): at 11:54

## 2024-05-09 RX ADMIN — Medication 1: at 18:58

## 2024-05-09 RX ADMIN — Medication 1 MILLIGRAM(S): at 21:15

## 2024-05-09 RX ADMIN — Medication 325 MILLIGRAM(S): at 11:48

## 2024-05-09 RX ADMIN — MAGNESIUM OXIDE 400 MG ORAL TABLET 400 MILLIGRAM(S): 241.3 TABLET ORAL at 11:48

## 2024-05-09 RX ADMIN — ENOXAPARIN SODIUM 40 MILLIGRAM(S): 100 INJECTION SUBCUTANEOUS at 11:47

## 2024-05-09 RX ADMIN — SODIUM CHLORIDE 75 MILLILITER(S): 9 INJECTION INTRAMUSCULAR; INTRAVENOUS; SUBCUTANEOUS at 05:32

## 2024-05-09 RX ADMIN — Medication 81 MILLIGRAM(S): at 11:48

## 2024-05-09 RX ADMIN — Medication 1 MILLIGRAM(S): at 11:48

## 2024-05-09 RX ADMIN — CLOPIDOGREL BISULFATE 75 MILLIGRAM(S): 75 TABLET, FILM COATED ORAL at 11:48

## 2024-05-09 RX ADMIN — MIDODRINE HYDROCHLORIDE 5 MILLIGRAM(S): 2.5 TABLET ORAL at 23:50

## 2024-05-09 RX ADMIN — ATORVASTATIN CALCIUM 80 MILLIGRAM(S): 80 TABLET, FILM COATED ORAL at 21:15

## 2024-05-09 RX ADMIN — MIDODRINE HYDROCHLORIDE 5 MILLIGRAM(S): 2.5 TABLET ORAL at 11:48

## 2024-05-09 NOTE — PROGRESS NOTE ADULT - PROBLEM SELECTOR PLAN 1
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 Patient previously on aspirin, but was stopped by doctor in 2020, unclear reason why.    - continue ASA 81mg daily  - continue plavix 75mg QD x3 weeks   - continue atorvastatin 80mg daily    - q8h general neuro checks and vitals   - MRI brain on 4/28 with R internal capsule infarct   - Stroke Code HCT Results: negative for acute ischemia    - Stroke Code CTA Results: stenosis of proximal bilateral A2 segments and R M2   - Stroke education   - repeat HCT on 5/5 - subacute infarct centered in the genu and posterior limb of the right internal capsule is stable.  A chronic transcortical infarction in the left precentral gyrus and a small chronic infarct in the right cerebellar hemisphere are stable.

## 2024-05-09 NOTE — PROGRESS NOTE ADULT - SUBJECTIVE AND OBJECTIVE BOX
OVERNIGHT EVENTS/INTERVAL HPI: NAEON     SUBJECTIVE: Patient seen and examined at bedside. Sleeping comfortably in bed. No acute complaints.     OBJECTIVE:  Vital Signs Last 24 Hrs  T(C): 36.4 (09 May 2024 12:00), Max: 37.1 (09 May 2024 06:13)  T(F): 97.6 (09 May 2024 12:00), Max: 98.7 (09 May 2024 06:13)  HR: 70 (09 May 2024 12:00) (70 - 77)  BP: 145/69 (09 May 2024 12:00) (139/73 - 147/68)  BP(mean): --  RR: 17 (09 May 2024 12:00) (17 - 18)  SpO2: 95% (09 May 2024 12:00) (95% - 95%)    Parameters below as of 09 May 2024 12:00  Patient On (Oxygen Delivery Method): room air      I&O's Detail    08 May 2024 07:01  -  09 May 2024 07:00  --------------------------------------------------------  IN:  Total IN: 0 mL    OUT:    Indwelling Catheter - Urethral (mL): 2100 mL  Total OUT: 2100 mL    Total NET: -2100 mL    Physical Exam:  General: Older gentleman, resting comfortably in bed, NAD  HEENT: NCAT, MMM  Neck: supple, trachea at midline  Cardio: RRR, +S1/S2, no M/R/G  Pulmonary: No WRR; CTA b/l  Abd: soft, NTND, PEG in place   Skin: warm and dry,   Ext: WWP, no LE edema, no cyanosis or clubbing  Neuro: AAOx3; CNII-XII grossly intact; left hemiparesis   Psych: affect and behavior appropriate      Medications:  MEDICATIONS  (STANDING):  aspirin  chewable 81 milliGRAM(s) Oral daily  atorvastatin 80 milliGRAM(s) Oral at bedtime  clopidogrel Tablet 75 milliGRAM(s) Oral daily  dextrose 10% Bolus 125 milliLiter(s) IV Bolus once  dextrose 5%. 1000 milliLiter(s) (50 mL/Hr) IV Continuous <Continuous>  dextrose 5%. 1000 milliLiter(s) (100 mL/Hr) IV Continuous <Continuous>  dextrose 50% Injectable 25 Gram(s) IV Push once  dextrose 50% Injectable 12.5 Gram(s) IV Push once  doxazosin 1 milliGRAM(s) Oral at bedtime  enoxaparin Injectable 40 milliGRAM(s) SubCutaneous every 24 hours  ferrous    sulfate 325 milliGRAM(s) Oral daily  folic acid 1 milliGRAM(s) Oral daily  glucagon  Injectable 1 milliGRAM(s) IntraMuscular once  insulin lispro (ADMELOG) corrective regimen sliding scale   SubCutaneous every 6 hours  magnesium oxide 400 milliGRAM(s) Oral daily  midodrine. 5 milliGRAM(s) Oral <User Schedule>  potassium phosphate IVPB 30 milliMole(s) IV Intermittent once  sodium chloride 0.9%. 1000 milliLiter(s) (75 mL/Hr) IV Continuous <Continuous>    MEDICATIONS  (PRN):  acetaminophen     Tablet .. 650 milliGRAM(s) Oral every 6 hours PRN Temp greater or equal to 38C (100.4F), Mild Pain (1 - 3), Moderate Pain (4 - 6)  dextrose Oral Gel 15 Gram(s) Oral once PRN Blood Glucose LESS THAN 70 milliGRAM(s)/deciliter      Labs:                        10.4   7.35  )-----------( 183      ( 09 May 2024 05:30 )             31.5     05-09    136  |  101  |  18  ----------------------------<  156<H>  3.5   |  22  |  0.64    Ca    8.3<L>      09 May 2024 05:30  Phos  2.0     05-09  Mg     2.1     05-09          Urinalysis Basic - ( 09 May 2024 05:30 )    Color: x / Appearance: x / SG: x / pH: x  Gluc: 156 mg/dL / Ketone: x  / Bili: x / Urobili: x   Blood: x / Protein: x / Nitrite: x   Leuk Esterase: x / RBC: x / WBC x   Sq Epi: x / Non Sq Epi: x / Bacteria: x          Radiology: Reviewed

## 2024-05-09 NOTE — PROGRESS NOTE ADULT - SUBJECTIVE AND OBJECTIVE BOX
LARRY BURNS , [id number],  [facility] 07UR [id number]    Time of encounter : 12:30 pm  care giver at bedside   sleeping, stated feeling tired.  peg in place- tolerating feeding  reported he tolerate physical therapy   moving right side of body, not moving left side.       T(C): 36.4 (05-09-24 @ 12:00), Max: 37.1 (05-09-24 @ 06:13)  HR: 70 (05-09-24 @ 12:00) (70 - 77)  BP: 145/69 (05-09-24 @ 12:00) (139/73 - 147/68)  RR: 17 (05-09-24 @ 12:00) (17 - 18)  SpO2: 95% (05-09-24 @ 12:00) (95% - 95%)    acetaminophen     Tablet .. 650 milliGRAM(s) Oral every 6 hours PRN  aspirin  chewable 81 milliGRAM(s) Oral daily  atorvastatin 80 milliGRAM(s) Oral at bedtime  clopidogrel Tablet 75 milliGRAM(s) Oral daily  dextrose 10% Bolus 125 milliLiter(s) IV Bolus once  dextrose 5%. 1000 milliLiter(s) IV Continuous <Continuous>  dextrose 5%. 1000 milliLiter(s) IV Continuous <Continuous>  dextrose 50% Injectable 25 Gram(s) IV Push once  dextrose 50% Injectable 12.5 Gram(s) IV Push once  dextrose Oral Gel 15 Gram(s) Oral once PRN  doxazosin 1 milliGRAM(s) Oral at bedtime  enoxaparin Injectable 40 milliGRAM(s) SubCutaneous every 24 hours  ferrous    sulfate 325 milliGRAM(s) Oral daily  folic acid 1 milliGRAM(s) Oral daily  glucagon  Injectable 1 milliGRAM(s) IntraMuscular once  insulin lispro (ADMELOG) corrective regimen sliding scale   SubCutaneous every 6 hours  magnesium oxide 400 milliGRAM(s) Oral daily  midodrine. 5 milliGRAM(s) Oral <User Schedule>  sodium chloride 0.9%. 1000 milliLiter(s) IV Continuous <Continuous>      Physical Exam :    General exam :  resting, saturating well on room air.  moving right side of body, not left side  peg in place with nutrition on going.     CBC Full  -  ( 09 May 2024 05:30 )  WBC Count : 7.35 K/uL  RBC Count : 4.43 M/uL  Hemoglobin : 10.4 g/dL  Hematocrit : 31.5 %  Platelet Count - Automated : 183 K/uL  Mean Cell Volume : 71.1 fl  Mean Cell Hemoglobin : 23.5 pg  Mean Cell Hemoglobin Concentration : 33.0 gm/dL  Auto Neutrophil # : x  Auto Lymphocyte # : x  Auto Monocyte # : x  Auto Eosinophil # : x  Auto Basophil # : x  Auto Neutrophil % : x  Auto Lymphocyte % : x  Auto Monocyte % : x  Auto Eosinophil % : x  Auto Basophil % : x        05-09    136  |  101  |  18  ----------------------------<  156<H>  3.5   |  22  |  0.64    Ca    8.3<L>      09 May 2024 05:30  Phos  2.0     05-09  Mg     2.1     05-09      Daily     Daily   CAPILLARY BLOOD GLUCOSE      POCT Blood Glucose.: 142 mg/dL (09 May 2024 12:39)      Urinalysis Basic - ( 09 May 2024 05:30 )    Color: x / Appearance: x / SG: x / pH: x  Gluc: 156 mg/dL / Ketone: x  / Bili: x / Urobili: x   Blood: x / Protein: x / Nitrite: x   Leuk Esterase: x / RBC: x / WBC x   Sq Epi: x / Non Sq Epi: x / Bacteria: x

## 2024-05-09 NOTE — PROGRESS NOTE ADULT - PROBLEM SELECTOR PLAN 3
Goal -160, okay to work with PT  - Continue Midodrine 5mg BID  - wife refusing WALLY/ILR, however she agreeable for MCOT, but after talking to EP team, she agreed to follow up with them after going to acute rehab to consider monitoring.   - Cardiac CT to rule out SANDRO thrombus: No SANDRO or left atrial thrombus. Non cardiac: Apparent filling defects in segmental branches of the right lower lobe pulmonary artery.  - TTE: EF 56%

## 2024-05-09 NOTE — PROGRESS NOTE ADULT - PROBLEM SELECTOR PLAN 12
F: 100 cc free water flushes via PEG tube q4h   E: Replete PRN  K<4.0, Mg<2.0, Phos<2.5  [facility]   GI ppx: None  DVT PPx: Lovenox

## 2024-05-09 NOTE — PROGRESS NOTE ADULT - NS ATTEND AMEND GEN_ALL_CORE FT
88M cantonese speaking, JUSTYNA, ischemic stroke (2004 w/ residual RHP), HLD presented w/ left sided weakness in setting of R. posterior limb of IC infarction.     On exam, fluent, follows crossed commands, R. gaze deviation able to overcome volitionally, ?decreased BTT on the left, moderate-severe dysarthria, moderate L. facial droop, LUE no movement, LLE no movement.     MR brain acute infarction in the R. posterior limb of IC, R. BG and ?R. thalamus. Chronic L. posterior frontal transcortical infarction and R. cerebellum.   CTA h/n R. A2 and ?M2 stenosis  TTE neg  Cardiac CT neg    Imp: R. gaze deviation, dysarthria, and L. hemiplegia secondary to R. posterior limb of IC/BG/thalamic infarction. Mechanism likely . Cannot rule out a central embolic source (particularly given the chronic R. cerebellar and L. cerebral infarction).     Plan:   ASA 81mg; Continue Plavix 75mg for 3 weeks from initial stroke once cleared by GI   -160   s/p PEG   Continue midodrine 5mg BID   Wife refusing ILR; Ameneable for MCOT  PT - BRENDA     40 minutes spent on total encounter. The necessity of the time spent during the encounter on this date of service was due to:     Review of imaging and chart; obtaining history; examination of pt; discussion and coordination of care, and discussion of lifestyle modification and risk factor control.

## 2024-05-09 NOTE — PROGRESS NOTE ADULT - PROBLEM SELECTOR PLAN 5
Failed SLP evaluation. FEES completed on 5/2 - recommended for PEG. s/p PEG on 5/7. Nutrition consulted. Started continuous tube feeds on 05/08- [employer] Glucose Support 1.2 @10mL/hr x24hr and increase as tolerated to goal rate 63mL/hr x24hr to provide 1512mL total volume, 1814kcal (26kcal/kg dosing wt 70.3kg), 97g protein (1.4g/kg dosing wt 70.3kg), and 1149mL free water.   - Plan to reach goal rate tonight  - Continue with tube feeds as above  - Continue with 100 cc free water flushes q4h   - Maintain aspiration precautions   - If pt starts having frequent loose BMs, add Banatrol x2/day to promote fecal bulk.   - F/u nutrition recs

## 2024-05-09 NOTE — PROGRESS NOTE ADULT - ASSESSMENT
Mr. Bruce is an 88y Cantonese speaking Male with PMHx of JUSTYNA, stroke (2004, residual right sided weakness), HLD, glaucoma, DM, BPH, depression, and b/l hearing loss, who present to [facility] with new left-sided arm and leg weakness starting 4/26 pm, transferred to [facility] for further stroke w/u. Course c/b periods of waxing/waning left sided hemiparesis, likely due to hypoperfusion, s/p IVF boluses, now started on midodrine. Patient with urinary retention with postraumatic hematuria after straight cath in pt with BPH. Now s/p PEG placement on 5/7 and stepped down from stroke tele to [facility] while awaiting AR placement.

## 2024-05-09 NOTE — PROGRESS NOTE ADULT - ASSESSMENT
88y M Cantonese speaking, R hand dominant, b/l  hearing impairment ( b/l hearing aids) with PMHx of DM, Thalassemia minor/JUSTYNA, HLD, hx stroke with right sided weakness in 2004 (has a cane and walker at home but pt refuses using assisted device, off ASA since 2020 for unclear reason), depression, glaucoma, BPH, [facility] with new left side arm and leg weakness starting 4/26 pm. Wife noticed that patient was leaning against the wall with the left side of his body to support himself to walk and was unable to hold himself upright when sitting, slumping over to the left. Patient felt that his left arm and leg were weak. Called PCP who recommended ED visit. CTH/CTP/CTA head and neck with no acute findings. Loaded with aspirin 300mg x1. Transferred to [facility] for further stroke w/u.    # R internal capsule infarct  # hx CVA w/ residual R sided weakness   # Dysphagia    - midodrine 2.5mg bid ( 4/30)-> increased to 5mg bid( 5/1-)  to help with blood pressure for perfusion.    s/p PEG on Tues 5/7, ok ASA, resume plavix 48hr after PEG ( 5/9), VTE ppx in am 5/8, tolerating tube feed   - f/u wife's decision on LINQ ( wife spoke with son from [place] and wants to hold off LINQ)   - Cardiac CT: reviewed, no SANDRO taylor   - CT Chest: no PE   - s/p aspirin load 300mg x1( 4/27)  - continue aspirin 81mg po daily ( 4/27-)  - Clopidogrel 75mg po daily ( 4/28-5/2) hold until 48hr (5/9) after PEG placement(5/7), will need Clopidogrel for 21 days then stop   - continue Atorvastatin 80mg po qHS  ( 4/27-) via NGT  - - cw statin.   - PT/OT eval    # Acute urinary retention  # Gross hematuria  # hx BPH  - resumed tamsulosin 0.4mg  -  consult appreciated re: gross hematuria, placed 22Fr hudson cath, TOV only when pt is ambulatory  - make sure pt has regular BMs ( + BMs on Fri 5/3)     # Anemia hx Thallassemia minor  - Ferritin 410, Tsat 33% adequate iron store  - Hgb 11.2%     #constipation  - c/w bowel regimen w/ senna+miralax    #DM  - A1C 6.3%   home meds: metformin 500mg TID, Januvia 50mg daily - held for now.   - ISS, goal -180     #DVT Prophylaxis: Lovenox 40mg sq daily     Dispo: fu PT/OT, will benefit inpatient Rehab ( Wife chose [facility] or [place])      Contact:  PMD Dr. Aron Soares   Wife: Farshad Pelayo # [phone]    POC as d/w Neuro team    Thank you for allowing medicine to participate in the care

## 2024-05-10 ENCOUNTER — TRANSCRIPTION ENCOUNTER (OUTPATIENT)
Age: 89
End: 2024-05-10

## 2024-05-10 LAB
ANION GAP SERPL CALC-SCNC: 10 MMOL/L — SIGNIFICANT CHANGE UP (ref 5–17)
BUN SERPL-MCNC: 18 MG/DL — SIGNIFICANT CHANGE UP (ref 7–23)
CALCIUM SERPL-MCNC: 7.9 MG/DL — LOW (ref 8.4–10.5)
CHLORIDE SERPL-SCNC: 102 MMOL/L — SIGNIFICANT CHANGE UP (ref 96–108)
CO2 SERPL-SCNC: 21 MMOL/L — LOW (ref 22–31)
CREAT SERPL-MCNC: 0.68 MG/DL — SIGNIFICANT CHANGE UP (ref 0.5–1.3)
EGFR: 91 ML/MIN/1.73M2 — SIGNIFICANT CHANGE UP
GLUCOSE BLDC GLUCOMTR-MCNC: 174 MG/DL — HIGH (ref 70–99)
GLUCOSE BLDC GLUCOMTR-MCNC: 177 MG/DL — HIGH (ref 70–99)
GLUCOSE BLDC GLUCOMTR-MCNC: 190 MG/DL — HIGH (ref 70–99)
GLUCOSE BLDC GLUCOMTR-MCNC: 200 MG/DL — HIGH (ref 70–99)
GLUCOSE BLDC GLUCOMTR-MCNC: 207 MG/DL — HIGH (ref 70–99)
GLUCOSE BLDC GLUCOMTR-MCNC: 211 MG/DL — HIGH (ref 70–99)
GLUCOSE SERPL-MCNC: 215 MG/DL — HIGH (ref 70–99)
HCT VFR BLD CALC: 31.4 % — LOW (ref 39–50)
HGB BLD-MCNC: 10.5 G/DL — LOW (ref 13–17)
MAGNESIUM SERPL-MCNC: 1.9 MG/DL — SIGNIFICANT CHANGE UP (ref 1.6–2.6)
MCHC RBC-ENTMCNC: 23.5 PG — LOW (ref 27–34)
MCHC RBC-ENTMCNC: 33.4 GM/DL — SIGNIFICANT CHANGE UP (ref 32–36)
MCV RBC AUTO: 70.2 FL — LOW (ref 80–100)
NRBC # BLD: 0 /100 WBCS — SIGNIFICANT CHANGE UP (ref 0–0)
PHOSPHATE SERPL-MCNC: 1.7 MG/DL — LOW (ref 2.5–4.5)
PLATELET # BLD AUTO: 191 K/UL — SIGNIFICANT CHANGE UP (ref 150–400)
POTASSIUM SERPL-MCNC: 3.7 MMOL/L — SIGNIFICANT CHANGE UP (ref 3.5–5.3)
POTASSIUM SERPL-SCNC: 3.7 MMOL/L — SIGNIFICANT CHANGE UP (ref 3.5–5.3)
RBC # BLD: 4.47 M/UL — SIGNIFICANT CHANGE UP (ref 4.2–5.8)
RBC # FLD: 15.4 % — HIGH (ref 10.3–14.5)
SODIUM SERPL-SCNC: 133 MMOL/L — LOW (ref 135–145)
WBC # BLD: 8.05 K/UL — SIGNIFICANT CHANGE UP (ref 3.8–10.5)
WBC # FLD AUTO: 8.05 K/UL — SIGNIFICANT CHANGE UP (ref 3.8–10.5)

## 2024-05-10 PROCEDURE — 71045 X-RAY EXAM CHEST 1 VIEW: CPT | Mod: 26

## 2024-05-10 PROCEDURE — 99233 SBSQ HOSP IP/OBS HIGH 50: CPT

## 2024-05-10 RX ORDER — SENNA PLUS 8.6 MG/1
1 TABLET ORAL ONCE
Refills: 0 | Status: COMPLETED | OUTPATIENT
Start: 2024-05-10 | End: 2024-05-10

## 2024-05-10 RX ORDER — POTASSIUM CHLORIDE 20 MEQ
10 PACKET (EA) ORAL
Refills: 0 | Status: DISCONTINUED | OUTPATIENT
Start: 2024-05-10 | End: 2024-05-14

## 2024-05-10 RX ORDER — MAGNESIUM OXIDE 400 MG ORAL TABLET 241.3 MG
400 TABLET ORAL ONCE
Refills: 0 | Status: DISCONTINUED | OUTPATIENT
Start: 2024-05-10 | End: 2024-05-10

## 2024-05-10 RX ADMIN — Medication 85 MILLIMOLE(S): at 10:14

## 2024-05-10 RX ADMIN — Medication 1 MILLIGRAM(S): at 13:33

## 2024-05-10 RX ADMIN — ATORVASTATIN CALCIUM 80 MILLIGRAM(S): 80 TABLET, FILM COATED ORAL at 21:35

## 2024-05-10 RX ADMIN — MAGNESIUM OXIDE 400 MG ORAL TABLET 400 MILLIGRAM(S): 241.3 TABLET ORAL at 13:22

## 2024-05-10 RX ADMIN — Medication 1 MILLIGRAM(S): at 21:35

## 2024-05-10 RX ADMIN — Medication 100 MILLIEQUIVALENT(S): at 14:36

## 2024-05-10 RX ADMIN — SODIUM CHLORIDE 75 MILLILITER(S): 9 INJECTION INTRAMUSCULAR; INTRAVENOUS; SUBCUTANEOUS at 20:45

## 2024-05-10 RX ADMIN — Medication 2: at 13:12

## 2024-05-10 RX ADMIN — Medication 2: at 06:24

## 2024-05-10 RX ADMIN — CLOPIDOGREL BISULFATE 75 MILLIGRAM(S): 75 TABLET, FILM COATED ORAL at 13:23

## 2024-05-10 RX ADMIN — Medication 325 MILLIGRAM(S): at 13:24

## 2024-05-10 RX ADMIN — Medication 1: at 00:16

## 2024-05-10 RX ADMIN — MIDODRINE HYDROCHLORIDE 5 MILLIGRAM(S): 2.5 TABLET ORAL at 13:23

## 2024-05-10 RX ADMIN — Medication 1: at 18:51

## 2024-05-10 RX ADMIN — SENNA PLUS 1 TABLET(S): 8.6 TABLET ORAL at 13:23

## 2024-05-10 RX ADMIN — ENOXAPARIN SODIUM 40 MILLIGRAM(S): 100 INJECTION SUBCUTANEOUS at 13:24

## 2024-05-10 RX ADMIN — Medication 81 MILLIGRAM(S): at 13:23

## 2024-05-10 NOTE — PROGRESS NOTE ADULT - PROBLEM SELECTOR PLAN 12
F: 100 cc free water flushes via PEG tube q4h   E: Replete PRN  K<4.0, Mg<2.0, Phos<2.5  [facility]   GI ppx: None  DVT PPx: Lovenox F: 100 cc free water flushes via PEG tube q4h   E: Replete PRN  K<4.0, Mg<2.0, Phos<2.5  N: Tube feeds- [facility] Glucose Controlled 1.2 63 mL/hr   GI ppx: None  DVT PPx: Lovenox

## 2024-05-10 NOTE — PROGRESS NOTE ADULT - PROBLEM SELECTOR PLAN 2
Urinary retention with postraumatic hematuria after straight cath. On 5/4, 900 cc and 350cc in BS with postraumatic hematuria.  - Started on doxazosin 1mg/night (tamsulosin cannot be administered through PEG tube)   - urology consulted  - hudson to remain in place until patient is ambulatory; should follow up with his outpatient urologist  - can attempt TOV on 5/8 if functional status improves, however, will likely require hudson to stay in place at discharge Urinary retention with postraumatic hematuria after straight cath. On 5/4, 900 cc and 350cc in BS with postraumatic hematuria. Urology consulted. Hudson in place draining clear yellow urine.   - Started on doxazosin 1mg/night (tamsulosin cannot be administered through PEG tube)   - hudson to remain in place until patient is ambulatory; should follow up with his outpatient urologist  - can attempt TOV on 5/8 if functional status improves, however, will likely require hudson to stay in place at discharge

## 2024-05-10 NOTE — PROGRESS NOTE ADULT - ASSESSMENT
88y M Cantonese speaking, R hand dominant, b/l  hearing impairment ( b/l hearing aids) with PMHx of DM, Thalassemia minor/JUSTYNA, HLD, hx stroke with right sided weakness in 2004 (has a cane and walker at home but pt refuses using assisted device, off ASA since 2020 for unclear reason), depression, glaucoma, BPH, [facility] with new left side arm and leg weakness starting 4/26 pm. Wife noticed that patient was leaning against the wall with the left side of his body to support himself to walk and was unable to hold himself upright when sitting, slumping over to the left. Patient felt that his left arm and leg were weak. Called PCP who recommended ED visit. CTH/CTP/CTA head and neck with no acute findings. Loaded with aspirin 300mg x1. Transferred to [facility] for further stroke w/u.    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  # hx CVA w/ residual R sided weakness   # Dysphagia    - midodrine 2.5mg bid ( 4/30)-> increased to 5mg bid( 5/1-)  to help with blood pressure for perfusion.    s/p PEG on Tues 5/7, ok ASA, resume plavix 48hr after PEG ( 5/9), VTE ppx in am 5/8, tolerating tube feed   - f/u wife's decision on LINQ ( wife spoke with son from [place] and wants to hold off LINQ)   - Cardiac CT: reviewed, no SANDRO taylor   - CT Chest: no PE   - s/p aspirin load 300mg x1( 4/27)  - continue aspirin 81mg po daily ( 4/27-)  - Clopidogrel 75mg po daily ( 4/28-5/2) hold until 48hr (5/9) after PEG placement(5/7), will need Clopidogrel for 21 days then stop   - continue Atorvastatin 80mg po qHS  ( 4/27-) via NGT  - - cw statin.   - PT/OT eval    # Acute urinary retention  # Gross hematuria- resolved,   # hx BPH  - resumed tamsulosin 0.4mg  -  consult appreciated re: gross hematuria, placed 22Fr hudson cath, TOV only when pt is ambulatory  punch color urine likely from pulling hudson ( encouraged not to pull )  - make sure pt has regular BMs ( + BMs on Fri 5/3)     # Anemia hx Thallassemia minor  - Ferritin 410, Tsat 33% adequate iron store  - Hgb 11.2%     #constipation  - c/w bowel regimen w/ senna+miralax    #DM  - A1C 6.3%   home meds: metformin 500mg TID, Januvia 50mg daily - held for now.   - ISS, goal -180     #DVT Prophylaxis: Lovenox 40mg sq daily     # cough at time, chest x ray look ok  can give robitussin liquid formula 5 mg tid via peg to help.     Dispo: fu PT/OT, will benefit inpatient Rehab ( Wife chose [facility] or [place])      Contact:  PMD Dr. Aron Soares   Wife: Farshad Pelayo # [phone]    POC as d/w Neuro team    Thank you for allowing medicine to participate in the care, questions from wife answered.

## 2024-05-10 NOTE — PROGRESS NOTE ADULT - PROBLEM SELECTOR PLAN 5
Failed SLP evaluation. FEES completed on 5/2 - recommended for PEG. s/p PEG on 5/7. Nutrition consulted. Started continuous tube feeds on 05/08- [employer] Glucose Support 1.2 @10mL/hr x24hr and increase as tolerated to goal rate 63mL/hr x24hr to provide 1512mL total volume, 1814kcal (26kcal/kg dosing wt 70.3kg), 97g protein (1.4g/kg dosing wt 70.3kg), and 1149mL free water.   - Continue with tube feeds as above  - Continue with 100 cc free water flushes q4h   - Maintain aspiration precautions   - If pt starts having frequent loose BMs, add Banatrol x2/day to promote fecal bulk.   - F/u nutrition recs Failed SLP evaluation. FEES completed on 5/2 - recommended for PEG. s/p PEG on 5/7. Nutrition consulted. Started continuous tube feeds on 05/08- [employer] Glucose Support 1.2 @10mL/hr x24hr and increase as tolerated to goal rate 63mL/hr x24hr to provide 1512mL total volume, 1814kcal (26kcal/kg dosing wt 70.3kg), 97g protein (1.4g/kg dosing wt 70.3kg), and 1149mL free water.   - Continue with tube feeds as above, currently at goal rate of 63mL/hr  - Continue with 100 cc free water flushes q4h   - Maintain aspiration precautions   - If pt starts having frequent loose BMs, add Banatrol x2/day to promote fecal bulk.   - F/u nutrition recs

## 2024-05-10 NOTE — PROGRESS NOTE ADULT - PROBLEM SELECTOR PLAN 3
Goal -160, okay to work with PT  - Continue Midodrine 5mg BID  - wife refusing WALLY/ILR, however she agreeable for MCOT, but after talking to EP team, she agreed to follow up with them after going to acute rehab to consider monitoring.   - Cardiac CT to rule out SANDRO thrombus: No SANDRO or left atrial thrombus. Non cardiac: Apparent filling defects in segmental branches of the right lower lobe pulmonary artery.  - TTE: EF 56% Goal -160, okay to work with PT  - Continue Midodrine 5mg BID to maintain adequate perfusion   - wife refusing WALLY/ILR, however she agreeable for MCOT, but after talking to EP team, she agreed to follow up with them after going to acute rehab to consider monitoring.   - Cardiac CT to rule out SANDRO thrombus: No SANDRO or left atrial thrombus. Non cardiac: Apparent filling defects in segmental branches of the right lower lobe pulmonary artery.  - TTE: EF 56%

## 2024-05-10 NOTE — PROGRESS NOTE ADULT - ASSESSMENT
Mr. Bruce is an 88y Cantonese speaking Male with PMHx of JUSTYNA, stroke (2004, residual right sided weakness), HLD, glaucoma, DM, BPH, depression, and b/l hearing loss, who present to [facility] with new left-sided arm and leg weakness starting 4/26 pm, transferred to [facility] for further stroke w/u. Course c/b periods of waxing/waning left sided hemiparesis, likely due to hypoperfusion, s/p IVF boluses, now started on midodrine. Patient with urinary retention with postraumatic hematuria after straight cath in pt with BPH. Now s/p PEG placement on 5/7 and stepped down from stroke tele to [facility] while awaiting AR placement.

## 2024-05-10 NOTE — PROGRESS NOTE ADULT - SUBJECTIVE AND OBJECTIVE BOX
******INCOMPLETE******    OVERNIGHT EVENTS/INTERVAL HPI: NAEON.     SUBJECTIVE: Patient seen and examined at bedside.     OBJECTIVE:  Vital Signs Last 24 Hrs  T(C): 36.4 (09 May 2024 20:15), Max: 37.1 (09 May 2024 06:13)  T(F): 97.6 (09 May 2024 20:15), Max: 98.7 (09 May 2024 06:13)  HR: 74 (09 May 2024 20:15) (70 - 77)  BP: 144/78 (09 May 2024 20:15) (144/78 - 147/68)  BP(mean): --  RR: 17 (09 May 2024 20:15) (17 - 18)  SpO2: 96% (09 May 2024 20:15) (95% - 96%)    Parameters below as of 09 May 2024 20:15  Patient On (Oxygen Delivery Method): room air      I&O's Detail    08 May 2024 07:01  -  09 May 2024 07:00  --------------------------------------------------------  IN:  Total IN: 0 mL    OUT:    Indwelling Catheter - Urethral (mL): 2100 mL  Total OUT: 2100 mL    Total NET: -2100 mL      09 May 2024 07:01  -  10 May 2024 06:05  --------------------------------------------------------  IN:  Total IN: 0 mL    OUT:    Indwelling Catheter - Urethral (mL): 1000 mL  Total OUT: 1000 mL    Total NET: -1000 mL    Physical Exam:  General: No acute distress  Eyes: Anicteric sclerae, moist conjunctivae, see below for CNs  Neck: trachea midline  Cardiovascular: Regular rate and rhythm  Pulmonary: No use of accessory muscles  GI: Abdomen soft  Extremities:no edema    Neurological Exam:   Mental status: Awake, alert and oriented to self, and place. Severe dysarthria, minimal verbal output. Able to follow simple commands  Cranial nerves: Pupils equally round and reactive to light, visual fields full, no nystagmus, extraocular muscles intact, V1 through V3 intact bilaterally and symmetric, Left facial droop, tongue was midline.  Motor: Right upper and lower extremity 4/5 without drift. Left upper extremity 0/5, left lower extremity with trace movement, 1/5    Sensation: Grossly intact, inconsistent response when testing sensation of bilateral lower extremities  Coordination: No dysmetria appreciated with right upper extremity    Medications:  MEDICATIONS  (STANDING):  aspirin  chewable 81 milliGRAM(s) Oral daily  atorvastatin 80 milliGRAM(s) Oral at bedtime  clopidogrel Tablet 75 milliGRAM(s) Oral daily  dextrose 10% Bolus 125 milliLiter(s) IV Bolus once  dextrose 5%. 1000 milliLiter(s) (100 mL/Hr) IV Continuous <Continuous>  dextrose 5%. 1000 milliLiter(s) (50 mL/Hr) IV Continuous <Continuous>  dextrose 50% Injectable 25 Gram(s) IV Push once  dextrose 50% Injectable 12.5 Gram(s) IV Push once  doxazosin 1 milliGRAM(s) Oral at bedtime  enoxaparin Injectable 40 milliGRAM(s) SubCutaneous every 24 hours  ferrous    sulfate 325 milliGRAM(s) Oral daily  folic acid 1 milliGRAM(s) Oral daily  glucagon  Injectable 1 milliGRAM(s) IntraMuscular once  insulin lispro (ADMELOG) corrective regimen sliding scale   SubCutaneous every 6 hours  magnesium oxide 400 milliGRAM(s) Oral daily  midodrine. 5 milliGRAM(s) Oral <User Schedule>  sodium chloride 0.9%. 1000 milliLiter(s) (75 mL/Hr) IV Continuous <Continuous>    MEDICATIONS  (PRN):  acetaminophen     Tablet .. 650 milliGRAM(s) Oral every 6 hours PRN Temp greater or equal to 38C (100.4F), Mild Pain (1 - 3), Moderate Pain (4 - 6)  dextrose Oral Gel 15 Gram(s) Oral once PRN Blood Glucose LESS THAN 70 milliGRAM(s)/deciliter      Labs:              OVERNIGHT EVENTS/INTERVAL HPI: NAEON.     SUBJECTIVE: Patient seen and examined at bedside. Spoke to patient and wife using Cantonese . Wife states patient had some difficulty sleeping overnight and complained of mild abdominal pain near PEG site responsive to tylenol. No pain since last night. He has no had any bowel movements since before the surgery. His feeds are at the goal rate of 63 mL/hr. Unable to assess further ROS from patient as he has difficulty speaking. He was clearly able to state his name.     OBJECTIVE:  Vital Signs Last 24 Hrs  T(C): 36.4 (09 May 2024 20:15), Max: 37.1 (09 May 2024 06:13)  T(F): 97.6 (09 May 2024 20:15), Max: 98.7 (09 May 2024 06:13)  HR: 74 (09 May 2024 20:15) (70 - 77)  BP: 144/78 (09 May 2024 20:15) (144/78 - 147/68)  BP(mean): --  RR: 17 (09 May 2024 20:15) (17 - 18)  SpO2: 96% (09 May 2024 20:15) (95% - 96%)    Parameters below as of 09 May 2024 20:15  Patient On (Oxygen Delivery Method): room air      I&O's Detail    08 May 2024 07:01  -  09 May 2024 07:00  --------------------------------------------------------  IN:  Total IN: 0 mL    OUT:    Indwelling Catheter - Urethral (mL): 2100 mL  Total OUT: 2100 mL    Total NET: -2100 mL      09 May 2024 07:01  -  10 May 2024 06:05  --------------------------------------------------------  IN:  Total IN: 0 mL    OUT:    Indwelling Catheter - Urethral (mL): 1000 mL  Total OUT: 1000 mL    Total NET: -1000 mL    Physical Exam:  General: No acute distress  Eyes: Anicteric sclerae, moist conjunctivae, see below for CNs  Neck: trachea midline  Cardiovascular: Regular rate and rhythm  Pulmonary: No use of accessory muscles  GI: Abdomen soft  Extremities:no edema    Neurological Exam:   Mental status: Awake, alert and oriented to self, and place. Severe dysarthria, minimal verbal output. Able to follow simple commands  Cranial nerves: Pupils equally round and reactive to light, visual fields full, no nystagmus, extraocular muscles intact, V1 through V3 intact bilaterally and symmetric, Left facial droop, tongue was midline.  Motor: Right upper and lower extremity 4/5 without drift. Left upper extremity 0/5, left lower extremity with trace movement, 1/5    Sensation: Grossly intact, inconsistent response when testing sensation of bilateral lower extremities  Coordination: No dysmetria appreciated with right upper extremity    Medications:  MEDICATIONS  (STANDING):  aspirin  chewable 81 milliGRAM(s) Oral daily  atorvastatin 80 milliGRAM(s) Oral at bedtime  clopidogrel Tablet 75 milliGRAM(s) Oral daily  dextrose 10% Bolus 125 milliLiter(s) IV Bolus once  dextrose 5%. 1000 milliLiter(s) (100 mL/Hr) IV Continuous <Continuous>  dextrose 5%. 1000 milliLiter(s) (50 mL/Hr) IV Continuous <Continuous>  dextrose 50% Injectable 25 Gram(s) IV Push once  dextrose 50% Injectable 12.5 Gram(s) IV Push once  doxazosin 1 milliGRAM(s) Oral at bedtime  enoxaparin Injectable 40 milliGRAM(s) SubCutaneous every 24 hours  ferrous    sulfate 325 milliGRAM(s) Oral daily  folic acid 1 milliGRAM(s) Oral daily  glucagon  Injectable 1 milliGRAM(s) IntraMuscular once  insulin lispro (ADMELOG) corrective regimen sliding scale   SubCutaneous every 6 hours  magnesium oxide 400 milliGRAM(s) Oral daily  midodrine. 5 milliGRAM(s) Oral <User Schedule>  sodium chloride 0.9%. 1000 milliLiter(s) (75 mL/Hr) IV Continuous <Continuous>    MEDICATIONS  (PRN):  acetaminophen     Tablet .. 650 milliGRAM(s) Oral every 6 hours PRN Temp greater or equal to 38C (100.4F), Mild Pain (1 - 3), Moderate Pain (4 - 6)  dextrose Oral Gel 15 Gram(s) Oral once PRN Blood Glucose LESS THAN 70 milliGRAM(s)/deciliter      Labs:                          10.5   8.05  )-----------( 191      ( 10 May 2024 07:14 )             31.4     05-10    133<L>  |  102  |  18  ----------------------------<  215<H>  3.7   |  21<L>  |  0.68    Ca    7.9<L>      10 May 2024 07:14  Phos  1.7     05-10  Mg     1.9     05-10        Urinalysis Basic - ( 10 May 2024 07:14 )    Color: x / Appearance: x / SG: x / pH: x  Gluc: 215 mg/dL / Ketone: x  / Bili: x / Urobili: x   Blood: x / Protein: x / Nitrite: x   Leuk Esterase: x / RBC: x / WBC x   Sq Epi: x / Non Sq Epi: x / Bacteria: x        RADIOLOGY, EKG & ADDITIONAL TESTS: Reviewed.

## 2024-05-10 NOTE — PROGRESS NOTE ADULT - SUBJECTIVE AND OBJECTIVE BOX
LARRY BURNS , [id number],  [facility] [id number] [id number]    Time of encounter : 11 am   wife at bedside   reported did not sleep until early am  urine is slightly pinkish ( he is pulling on hudson at times  cough   tolerating tube feeding.     T(C): 36.9 (05-10-24 @ 06:12), Max: 36.9 (05-10-24 @ 06:12)  HR: 96 (05-10-24 @ 06:12) (74 - 96)  BP: 135/67 (05-10-24 @ 06:12) (135/67 - 144/78)  RR: 18 (05-10-24 @ 06:12) (17 - 18)  SpO2: 94% (05-10-24 @ 06:12) (94% - 96%)    acetaminophen     Tablet .. 650 milliGRAM(s) Oral every 6 hours PRN  aspirin  chewable 81 milliGRAM(s) Oral daily  atorvastatin 80 milliGRAM(s) Oral at bedtime  clopidogrel Tablet 75 milliGRAM(s) Oral daily  dextrose 10% Bolus 125 milliLiter(s) IV Bolus once  dextrose 5%. 1000 milliLiter(s) IV Continuous <Continuous>  dextrose 5%. 1000 milliLiter(s) IV Continuous <Continuous>  dextrose 50% Injectable 25 Gram(s) IV Push once  dextrose 50% Injectable 12.5 Gram(s) IV Push once  dextrose Oral Gel 15 Gram(s) Oral once PRN  doxazosin 1 milliGRAM(s) Oral at bedtime  enoxaparin Injectable 40 milliGRAM(s) SubCutaneous every 24 hours  ferrous    sulfate 325 milliGRAM(s) Oral daily  folic acid 1 milliGRAM(s) Oral daily  glucagon  Injectable 1 milliGRAM(s) IntraMuscular once  guaiFENesin Oral Liquid (Sugar-Free) 100 milliGRAM(s) Oral every 6 hours PRN  insulin lispro (ADMELOG) corrective regimen sliding scale   SubCutaneous every 6 hours  magnesium oxide 400 milliGRAM(s) Oral daily  midodrine. 5 milliGRAM(s) Oral <User Schedule>  potassium chloride  10 mEq/100 mL IVPB 10 milliEquivalent(s) IV Intermittent every 1 hour  sodium chloride 0.9%. 1000 milliLiter(s) IV Continuous <Continuous>      Physical Exam :    General exam : awake, dysphona and slurr speech  RRR moving air  intermittent cough  peg in place  moving right upper and right lower leg  not moving left side of body ( left hong )  hudson in place, punch color  no edema noted.     CBC Full  -  ( 10 May 2024 07:14 )  WBC Count : 8.05 K/uL  RBC Count : 4.47 M/uL  Hemoglobin : 10.5 g/dL  Hematocrit : 31.4 %  Platelet Count - Automated : 191 K/uL  Mean Cell Volume : 70.2 fl  Mean Cell Hemoglobin : 23.5 pg  Mean Cell Hemoglobin Concentration : 33.4 gm/dL  Auto Neutrophil # : x  Auto Lymphocyte # : x  Auto Monocyte # : x  Auto Eosinophil # : x  Auto Basophil # : x  Auto Neutrophil % : x  Auto Lymphocyte % : x  Auto Monocyte % : x  Auto Eosinophil % : x  Auto Basophil % : x        05-10    133<L>  |  102  |  18  ----------------------------<  215<H>  3.7   |  21<L>  |  0.68    Ca    7.9<L>      10 May 2024 07:14  Phos  1.7     05-10  Mg     1.9     05-10      Daily     Daily   CAPILLARY BLOOD GLUCOSE      POCT Blood Glucose.: 207 mg/dL (10 May 2024 12:08)      Urinalysis Basic - ( 10 May 2024 07:14 )    Color: x / Appearance: x / SG: x / pH: x  Gluc: 215 mg/dL / Ketone: x  / Bili: x / Urobili: x   Blood: x / Protein: x / Nitrite: x   Leuk Esterase: x / RBC: x / WBC x   Sq Epi: x / Non Sq Epi: x / Bacteria: x

## 2024-05-10 NOTE — PROGRESS NOTE ADULT - PROBLEM SELECTOR PLAN 6
- c/w bowel regimen w/ senna+miralax Not currently on a standing bowel regimen. Patient's wife states previously when given laxatives he had 6-7 episodes of bowel movements right after.   - Give senna 1 tablet once and assess response; can give additional if needed

## 2024-05-10 NOTE — DISCHARGE NOTE NURSING/CASE MANAGEMENT/SOCIAL WORK - PATIENT PORTAL LINK FT
You can access the FollowMyHealth Patient Portal offered by [facility] by registering at the following website: [email or web address]. By joining [facility]’s FollowMyHealth portal, you will also be able to view your health information using other applications (apps) compatible with our system.

## 2024-05-10 NOTE — DISCHARGE NOTE NURSING/CASE MANAGEMENT/SOCIAL WORK - NSDCFUADDAPPT_GEN_ALL_CORE_FT
Please bring your Insurance card, Photo ID and Discharge paperwork to the following appointments:    (1) Please follow up with your Primary Care Provider, Dr. Aron Soares at [phone] [place] [address] on 5/17/2024 at 2:45pm.    Appointment was scheduled by Ms. KANCHAN Eugene, Referral Coordinator.    (2) Please follow up with your Neurology Provider, Dr. Kari Gonzalez at [address] [place] [address] on 8/9/2024 at 2:40pm.     Please note that the office will contact you for an earlier appointment once available.    Appointment was scheduled by Ms. KANCHAN Eugene, Referral Coordinator.

## 2024-05-11 LAB
ANION GAP SERPL CALC-SCNC: 11 MMOL/L — SIGNIFICANT CHANGE UP (ref 5–17)
BUN SERPL-MCNC: 16 MG/DL — SIGNIFICANT CHANGE UP (ref 7–23)
CALCIUM SERPL-MCNC: 8.2 MG/DL — LOW (ref 8.4–10.5)
CHLORIDE SERPL-SCNC: 102 MMOL/L — SIGNIFICANT CHANGE UP (ref 96–108)
CO2 SERPL-SCNC: 20 MMOL/L — LOW (ref 22–31)
CREAT ?TM UR-MCNC: 52 MG/DL — SIGNIFICANT CHANGE UP
CREAT SERPL-MCNC: 0.68 MG/DL — SIGNIFICANT CHANGE UP (ref 0.5–1.3)
EGFR: 91 ML/MIN/1.73M2 — SIGNIFICANT CHANGE UP
GLUCOSE BLDC GLUCOMTR-MCNC: 181 MG/DL — HIGH (ref 70–99)
GLUCOSE BLDC GLUCOMTR-MCNC: 206 MG/DL — HIGH (ref 70–99)
GLUCOSE BLDC GLUCOMTR-MCNC: 209 MG/DL — HIGH (ref 70–99)
GLUCOSE BLDC GLUCOMTR-MCNC: 218 MG/DL — HIGH (ref 70–99)
GLUCOSE BLDC GLUCOMTR-MCNC: 237 MG/DL — HIGH (ref 70–99)
GLUCOSE BLDC GLUCOMTR-MCNC: 252 MG/DL — HIGH (ref 70–99)
GLUCOSE SERPL-MCNC: 211 MG/DL — HIGH (ref 70–99)
HCT VFR BLD CALC: 31 % — LOW (ref 39–50)
HGB BLD-MCNC: 10.3 G/DL — LOW (ref 13–17)
MAGNESIUM SERPL-MCNC: 2 MG/DL — SIGNIFICANT CHANGE UP (ref 1.6–2.6)
MCHC RBC-ENTMCNC: 23.5 PG — LOW (ref 27–34)
MCHC RBC-ENTMCNC: 33.2 GM/DL — SIGNIFICANT CHANGE UP (ref 32–36)
MCV RBC AUTO: 70.6 FL — LOW (ref 80–100)
NRBC # BLD: 0 /100 WBCS — SIGNIFICANT CHANGE UP (ref 0–0)
OSMOLALITY UR: 545 MOSM/KG — SIGNIFICANT CHANGE UP (ref 300–900)
PHOSPHATE SERPL-MCNC: 2.3 MG/DL — LOW (ref 2.5–4.5)
PLATELET # BLD AUTO: 202 K/UL — SIGNIFICANT CHANGE UP (ref 150–400)
POTASSIUM SERPL-MCNC: 3.6 MMOL/L — SIGNIFICANT CHANGE UP (ref 3.5–5.3)
POTASSIUM SERPL-SCNC: 3.6 MMOL/L — SIGNIFICANT CHANGE UP (ref 3.5–5.3)
POTASSIUM UR-SCNC: 26 MMOL/L — SIGNIFICANT CHANGE UP
PROT ?TM UR-MCNC: 43 MG/DL — HIGH (ref 0–12)
PROT/CREAT UR-RTO: 0.8 RATIO — HIGH (ref 0–0.2)
RBC # BLD: 4.39 M/UL — SIGNIFICANT CHANGE UP (ref 4.2–5.8)
RBC # FLD: 15.3 % — HIGH (ref 10.3–14.5)
SODIUM SERPL-SCNC: 133 MMOL/L — LOW (ref 135–145)
SODIUM UR-SCNC: 141 MMOL/L — SIGNIFICANT CHANGE UP
UUN UR-MCNC: 544 MG/DL — SIGNIFICANT CHANGE UP
WBC # BLD: 9.43 K/UL — SIGNIFICANT CHANGE UP (ref 3.8–10.5)
WBC # FLD AUTO: 9.43 K/UL — SIGNIFICANT CHANGE UP (ref 3.8–10.5)

## 2024-05-11 PROCEDURE — 99233 SBSQ HOSP IP/OBS HIGH 50: CPT

## 2024-05-11 PROCEDURE — 99232 SBSQ HOSP IP/OBS MODERATE 35: CPT

## 2024-05-11 PROCEDURE — 70450 CT HEAD/BRAIN W/O DYE: CPT | Mod: 26

## 2024-05-11 RX ORDER — SENNA PLUS 8.6 MG/1
2 TABLET ORAL
Qty: 0 | Refills: 0 | DISCHARGE
Start: 2024-05-11

## 2024-05-11 RX ORDER — FOLIC ACID 0.8 MG
1 TABLET ORAL
Qty: 0 | Refills: 0 | DISCHARGE
Start: 2024-05-11

## 2024-05-11 RX ORDER — LEVETIRACETAM 250 MG/1
2000 TABLET, FILM COATED ORAL ONCE
Refills: 0 | Status: DISCONTINUED | OUTPATIENT
Start: 2024-05-11 | End: 2024-05-11

## 2024-05-11 RX ORDER — ASPIRIN/CALCIUM CARB/MAGNESIUM 324 MG
1 TABLET ORAL
Qty: 0 | Refills: 0 | DISCHARGE
Start: 2024-05-11

## 2024-05-11 RX ORDER — INSULIN GLARGINE 100 [IU]/ML
4 INJECTION, SOLUTION SUBCUTANEOUS AT BEDTIME
Refills: 0 | Status: DISCONTINUED | OUTPATIENT
Start: 2024-05-11 | End: 2024-05-14

## 2024-05-11 RX ORDER — LEVETIRACETAM 250 MG/1
750 TABLET, FILM COATED ORAL EVERY 12 HOURS
Refills: 0 | Status: DISCONTINUED | OUTPATIENT
Start: 2024-05-12 | End: 2024-05-12

## 2024-05-11 RX ORDER — FOLIC ACID 0.8 MG
0 TABLET ORAL
Refills: 0 | DISCHARGE

## 2024-05-11 RX ORDER — POLYETHYLENE GLYCOL 3350 17 G/17G
17 POWDER, FOR SOLUTION ORAL EVERY 24 HOURS
Refills: 0 | Status: DISCONTINUED | OUTPATIENT
Start: 2024-05-11 | End: 2024-05-12

## 2024-05-11 RX ORDER — DOXAZOSIN MESYLATE 4 MG
1 TABLET ORAL
Qty: 0 | Refills: 0 | DISCHARGE
Start: 2024-05-11

## 2024-05-11 RX ORDER — ACETAMINOPHEN 500 MG
2 TABLET ORAL
Qty: 0 | Refills: 0 | DISCHARGE
Start: 2024-05-11

## 2024-05-11 RX ORDER — ATORVASTATIN CALCIUM 80 MG/1
1 TABLET, FILM COATED ORAL
Qty: 0 | Refills: 0 | DISCHARGE
Start: 2024-05-11

## 2024-05-11 RX ORDER — SENNA PLUS 8.6 MG/1
2 TABLET ORAL AT BEDTIME
Refills: 0 | Status: DISCONTINUED | OUTPATIENT
Start: 2024-05-11 | End: 2024-05-17

## 2024-05-11 RX ORDER — POTASSIUM CHLORIDE 20 MEQ
20 PACKET (EA) ORAL EVERY 4 HOURS
Refills: 0 | Status: COMPLETED | OUTPATIENT
Start: 2024-05-11 | End: 2024-05-11

## 2024-05-11 RX ADMIN — Medication 1 MILLIGRAM(S): at 11:36

## 2024-05-11 RX ADMIN — MIDODRINE HYDROCHLORIDE 5 MILLIGRAM(S): 2.5 TABLET ORAL at 11:36

## 2024-05-11 RX ADMIN — MIDODRINE HYDROCHLORIDE 5 MILLIGRAM(S): 2.5 TABLET ORAL at 00:08

## 2024-05-11 RX ADMIN — ATORVASTATIN CALCIUM 80 MILLIGRAM(S): 80 TABLET, FILM COATED ORAL at 21:09

## 2024-05-11 RX ADMIN — Medication 85 MILLIMOLE(S): at 16:30

## 2024-05-11 RX ADMIN — Medication 1: at 00:08

## 2024-05-11 RX ADMIN — Medication 325 MILLIGRAM(S): at 11:36

## 2024-05-11 RX ADMIN — Medication 2: at 07:16

## 2024-05-11 RX ADMIN — Medication 650 MILLIGRAM(S): at 22:30

## 2024-05-11 RX ADMIN — Medication 50 MILLIEQUIVALENT(S): at 13:02

## 2024-05-11 RX ADMIN — CLOPIDOGREL BISULFATE 75 MILLIGRAM(S): 75 TABLET, FILM COATED ORAL at 11:36

## 2024-05-11 RX ADMIN — Medication 1 MILLIGRAM(S): at 21:09

## 2024-05-11 RX ADMIN — Medication 50 MILLIEQUIVALENT(S): at 17:27

## 2024-05-11 RX ADMIN — SENNA PLUS 2 TABLET(S): 8.6 TABLET ORAL at 21:09

## 2024-05-11 RX ADMIN — Medication 2: at 13:09

## 2024-05-11 RX ADMIN — MAGNESIUM OXIDE 400 MG ORAL TABLET 400 MILLIGRAM(S): 241.3 TABLET ORAL at 11:36

## 2024-05-11 RX ADMIN — LEVETIRACETAM 2000 MILLIGRAM(S): 250 TABLET, FILM COATED ORAL at 21:08

## 2024-05-11 RX ADMIN — ENOXAPARIN SODIUM 40 MILLIGRAM(S): 100 INJECTION SUBCUTANEOUS at 11:37

## 2024-05-11 RX ADMIN — Medication 81 MILLIGRAM(S): at 11:36

## 2024-05-11 RX ADMIN — MIDODRINE HYDROCHLORIDE 5 MILLIGRAM(S): 2.5 TABLET ORAL at 23:17

## 2024-05-11 RX ADMIN — Medication 2: at 18:30

## 2024-05-11 RX ADMIN — Medication 3: at 23:16

## 2024-05-11 RX ADMIN — POLYETHYLENE GLYCOL 3350 17 GRAM(S): 17 POWDER, FOR SOLUTION ORAL at 12:19

## 2024-05-11 RX ADMIN — INSULIN GLARGINE 4 UNIT(S): 100 INJECTION, SOLUTION SUBCUTANEOUS at 23:16

## 2024-05-11 NOTE — PROGRESS NOTE ADULT - PROBLEM SELECTOR PLAN 2
Urinary retention with postraumatic hematuria after straight cath. On 5/4, 900 cc and 350cc in BS with postraumatic hematuria. Urology consulted. Hudson in place draining clear yellow urine.   - Started on doxazosin 1mg/night (tamsulosin cannot be administered through PEG tube)   - hudson to remain in place until patient is ambulatory; should follow up with his outpatient urologist  - can attempt TOV on 5/8 if functional status improves, however, will likely require hudson to stay in place at discharge Urinary retention with postraumatic hematuria after straight cath. On 5/4, 900 cc and 350cc in BS with postraumatic hematuria. Urology consulted. Hudson in place draining clear yellow urine.   - Started on doxazosin 1mg/night (tamsulosin cannot be administered through PEG tube)   - hudson to remain in place until patient is ambulatory; should follow up with his outpatient urologist for TOV when patient is more ambulatory and having regular BM's

## 2024-05-11 NOTE — PROGRESS NOTE ADULT - PROBLEM SELECTOR PLAN 5
Failed SLP evaluation. FEES completed on 5/2 - recommended for PEG. s/p PEG on 5/7. Nutrition consulted. Started continuous tube feeds on 05/08- [facility] Glucose Support 1.2 @10mL/hr x24hr and increase as tolerated to goal rate 63mL/hr x24hr to provide 1512mL total volume, 1814kcal (26kcal/kg dosing wt 70.3kg), 97g protein (1.4g/kg dosing wt 70.3kg), and 1149mL free water.   - Continue with tube feeds as above, currently at goal rate of 63mL/hr  - Continue with 100 cc free water flushes q4h   - Maintain aspiration precautions   - If pt starts having frequent loose BMs, add Banatrol x2/day to promote fecal bulk.   - F/u nutrition recs Failed SLP evaluation. FEES completed on 5/2 - recommended for PEG. s/p PEG on 5/7. Nutrition consulted. Started continuous tube feeds on 05/08- [employer] Glucose Support 1.2 @10mL/hr x24hr and increase as tolerated to goal rate 63mL/hr x24hr to provide 1512mL total volume, 1814kcal (26kcal/kg dosing wt 70.3kg), 97g protein (1.4g/kg dosing wt 70.3kg), and 1149mL free water.   - Adjusted tube feeds to cyclic feeds on 5/11- [employer] at 95mL/hr for 16 hours    - Decreased FWF to 100cc every 6 hours    - Started lantus 4u qhs due to elevated FGS in the 200's   - Maintain aspiration precautions   - If pt starts having frequent loose BMs, add Banatrol x2/day to promote fecal bulk.   - F/u nutrition recs  - high dose statin as above in CVA   - LDL results: 170

## 2024-05-11 NOTE — PROGRESS NOTE ADULT - SUBJECTIVE AND OBJECTIVE BOX
incomplete...........    O/N Events:    Subjective/ROS: Patient seen and examined at bedside.     Denies Fever/Chills, HA, CP, SOB, n/v, changes in bowel/urinary habits.  12pt ROS otherwise negative.    VITALS  Vital Signs Last 24 Hrs  T(C): 36.3 (11 May 2024 06:17), Max: 36.8 (10 May 2024 20:38)  T(F): 97.3 (11 May 2024 06:17), Max: 98.3 (10 May 2024 20:38)  HR: 96 (11 May 2024 06:17) (80 - 96)  BP: 134/73 (11 May 2024 06:17) (110/63 - 134/73)  BP(mean): 90 (10 May 2024 21:33) (90 - 90)  RR: 19 (11 May 2024 06:17) (16 - 19)  SpO2: 94% (11 May 2024 06:17) (93% - 94%)    Parameters below as of 11 May 2024 06:17  Patient On (Oxygen Delivery Method): room air        CAPILLARY BLOOD GLUCOSE      POCT Blood Glucose.: 218 mg/dL (11 May 2024 12:33)  POCT Blood Glucose.: 181 mg/dL (11 May 2024 08:36)  POCT Blood Glucose.: 209 mg/dL (11 May 2024 07:11)  POCT Blood Glucose.: 206 mg/dL (11 May 2024 06:02)  POCT Blood Glucose.: 190 mg/dL (10 May 2024 23:54)  POCT Blood Glucose.: 174 mg/dL (10 May 2024 18:09)      PHYSICAL EXAM  General: NAD  Head: NC/AT; MMM; PERRL; EOMI;  Neck: Supple; no JVD  Respiratory: CTAB; no wheezes/rales/rhonchi  Cardiovascular: Regular rhythm/rate; S1/S2+, no murmurs, rubs gallops   Gastrointestinal: Soft; NTND; bowel sounds normal and present  Extremities: WWP; no edema/cyanosis  Neurological: A&Ox3, CNII-XII grossly intact; no obvious focal deficits    MEDICATIONS  (STANDING):  aspirin  chewable 81 milliGRAM(s) Oral daily  atorvastatin 80 milliGRAM(s) Oral at bedtime  clopidogrel Tablet 75 milliGRAM(s) Oral daily  dextrose 10% Bolus 125 milliLiter(s) IV Bolus once  dextrose 5%. 1000 milliLiter(s) (100 mL/Hr) IV Continuous <Continuous>  dextrose 5%. 1000 milliLiter(s) (50 mL/Hr) IV Continuous <Continuous>  dextrose 50% Injectable 25 Gram(s) IV Push once  dextrose 50% Injectable 12.5 Gram(s) IV Push once  doxazosin 1 milliGRAM(s) Oral at bedtime  enoxaparin Injectable 40 milliGRAM(s) SubCutaneous every 24 hours  ferrous    sulfate 325 milliGRAM(s) Oral daily  folic acid 1 milliGRAM(s) Oral daily  glucagon  Injectable 1 milliGRAM(s) IntraMuscular once  insulin lispro (ADMELOG) corrective regimen sliding scale   SubCutaneous every 6 hours  magnesium oxide 400 milliGRAM(s) Oral daily  midodrine. 5 milliGRAM(s) Oral <User Schedule>  polyethylene glycol 3350 17 Gram(s) Oral every 24 hours  potassium chloride  10 mEq/100 mL IVPB 10 milliEquivalent(s) IV Intermittent every 1 hour  potassium chloride  20 mEq/100 mL IVPB 20 milliEquivalent(s) IV Intermittent every 4 hours  senna 2 Tablet(s) Oral at bedtime  sodium phosphate 30 milliMole(s)/500 mL IVPB 30 milliMole(s) IV Intermittent once    MEDICATIONS  (PRN):  acetaminophen     Tablet .. 650 milliGRAM(s) Oral every 6 hours PRN Temp greater or equal to 38C (100.4F), Mild Pain (1 - 3), Moderate Pain (4 - 6)  dextrose Oral Gel 15 Gram(s) Oral once PRN Blood Glucose LESS THAN 70 milliGRAM(s)/deciliter  guaiFENesin Oral Liquid (Sugar-Free) 100 milliGRAM(s) Oral every 6 hours PRN Cough      No Known Allergies      LABS                        10.3   9.43  )-----------( 202      ( 11 May 2024 06:44 )             31.0     05-11    133<L>  |  102  |  16  ----------------------------<  211<H>  3.6   |  20<L>  |  0.68    Ca    8.2<L>      11 May 2024 06:44  Phos  2.3     05-11  Mg     2.0     05-11          Urinalysis Basic - ( 11 May 2024 06:44 )    Color: x / Appearance: x / SG: x / pH: x  Gluc: 211 mg/dL / Ketone: x  / Bili: x / Urobili: x   Blood: x / Protein: x / Nitrite: x   Leuk Esterase: x / RBC: x / WBC x   Sq Epi: x / Non Sq Epi: x / Bacteria: x              Urinalysis Basic - ( 11 May 2024 06:44 )    Color: x / Appearance: x / SG: x / pH: x  Gluc: 211 mg/dL / Ketone: x  / Bili: x / Urobili: x   Blood: x / Protein: x / Nitrite: x   Leuk Esterase: x / RBC: x / WBC x   Sq Epi: x / Non Sq Epi: x / Bacteria: x          IMAGING/EKG/ETC   O/N Events: As per wife patient did not have any BM's.     Subjective/ROS: Patient seen and examined at bedside.     Denies Fever/Chills, HA, CP, SOB, n/v, changes in bowel/urinary habits.  12pt ROS otherwise negative.    VITALS  Vital Signs Last 24 Hrs  T(C): 36.3 (11 May 2024 06:17), Max: 36.8 (10 May 2024 20:38)  T(F): 97.3 (11 May 2024 06:17), Max: 98.3 (10 May 2024 20:38)  HR: 96 (11 May 2024 06:17) (80 - 96)  BP: 134/73 (11 May 2024 06:17) (110/63 - 134/73)  BP(mean): 90 (10 May 2024 21:33) (90 - 90)  RR: 19 (11 May 2024 06:17) (16 - 19)  SpO2: 94% (11 May 2024 06:17) (93% - 94%)    Parameters below as of 11 May 2024 06:17  Patient On (Oxygen Delivery Method): room air    CAPILLARY BLOOD GLUCOSE    POCT Blood Glucose.: 218 mg/dL (11 May 2024 12:33)  POCT Blood Glucose.: 181 mg/dL (11 May 2024 08:36)  POCT Blood Glucose.: 209 mg/dL (11 May 2024 07:11)  POCT Blood Glucose.: 206 mg/dL (11 May 2024 06:02)  POCT Blood Glucose.: 190 mg/dL (10 May 2024 23:54)  POCT Blood Glucose.: 174 mg/dL (10 May 2024 18:09)    PHYSICAL EXAM  General: NAD  Head: NC/AT; MMM; PERRL; EOMI;  Neck: Supple; no JVD  Respiratory: CTAB; no wheezes/rales/rhonchi  Cardiovascular: Regular rhythm/rate; S1/S2+, no murmurs, rubs gallops   Gastrointestinal: Soft; NTND; bowel sounds normal and present  Extremities: WWP; no edema/cyanosis  Neurological: A&Ox3, CNII-XII grossly intact; no obvious focal deficits    MEDICATIONS  (STANDING):  aspirin  chewable 81 milliGRAM(s) Oral daily  atorvastatin 80 milliGRAM(s) Oral at bedtime  clopidogrel Tablet 75 milliGRAM(s) Oral daily  dextrose 10% Bolus 125 milliLiter(s) IV Bolus once  dextrose 5%. 1000 milliLiter(s) (100 mL/Hr) IV Continuous <Continuous>  dextrose 5%. 1000 milliLiter(s) (50 mL/Hr) IV Continuous <Continuous>  dextrose 50% Injectable 25 Gram(s) IV Push once  dextrose 50% Injectable 12.5 Gram(s) IV Push once  doxazosin 1 milliGRAM(s) Oral at bedtime  enoxaparin Injectable 40 milliGRAM(s) SubCutaneous every 24 hours  ferrous    sulfate 325 milliGRAM(s) Oral daily  folic acid 1 milliGRAM(s) Oral daily  glucagon  Injectable 1 milliGRAM(s) IntraMuscular once  insulin lispro (ADMELOG) corrective regimen sliding scale   SubCutaneous every 6 hours  magnesium oxide 400 milliGRAM(s) Oral daily  midodrine. 5 milliGRAM(s) Oral <User Schedule>  polyethylene glycol 3350 17 Gram(s) Oral every 24 hours  potassium chloride  10 mEq/100 mL IVPB 10 milliEquivalent(s) IV Intermittent every 1 hour  potassium chloride  20 mEq/100 mL IVPB 20 milliEquivalent(s) IV Intermittent every 4 hours  senna 2 Tablet(s) Oral at bedtime  sodium phosphate 30 milliMole(s)/500 mL IVPB 30 milliMole(s) IV Intermittent once    MEDICATIONS  (PRN):  acetaminophen     Tablet .. 650 milliGRAM(s) Oral every 6 hours PRN Temp greater or equal to 38C (100.4F), Mild Pain (1 - 3), Moderate Pain (4 - 6)  dextrose Oral Gel 15 Gram(s) Oral once PRN Blood Glucose LESS THAN 70 milliGRAM(s)/deciliter  guaiFENesin Oral Liquid (Sugar-Free) 100 milliGRAM(s) Oral every 6 hours PRN Cough      No Known Allergies      LABS                        10.3   9.43  )-----------( 202      ( 11 May 2024 06:44 )             31.0     05-11    133<L>  |  102  |  16  ----------------------------<  211<H>  3.6   |  20<L>  |  0.68    Ca    8.2<L>      11 May 2024 06:44  Phos  2.3     05-11  Mg     2.0     05-11          Urinalysis Basic - ( 11 May 2024 06:44 )    Color: x / Appearance: x / SG: x / pH: x  Gluc: 211 mg/dL / Ketone: x  / Bili: x / Urobili: x   Blood: x / Protein: x / Nitrite: x   Leuk Esterase: x / RBC: x / WBC x   Sq Epi: x / Non Sq Epi: x / Bacteria: x              Urinalysis Basic - ( 11 May 2024 06:44 )    Color: x / Appearance: x / SG: x / pH: x  Gluc: 211 mg/dL / Ketone: x  / Bili: x / Urobili: x   Blood: x / Protein: x / Nitrite: x   Leuk Esterase: x / RBC: x / WBC x   Sq Epi: x / Non Sq Epi: x / Bacteria: x          IMAGING/EKG/ETC   O/N Events: As per wife patient did not have any BM's.     Subjective/ROS: Patient seen and examined at bedside.     Denies Fever/Chills, HA, CP, SOB, n/v, changes in bowel/urinary habits.  12pt ROS otherwise negative.    VITALS  Vital Signs Last 24 Hrs  T(C): 36.3 (11 May 2024 06:17), Max: 36.8 (10 May 2024 20:38)  T(F): 97.3 (11 May 2024 06:17), Max: 98.3 (10 May 2024 20:38)  HR: 96 (11 May 2024 06:17) (80 - 96)  BP: 134/73 (11 May 2024 06:17) (110/63 - 134/73)  BP(mean): 90 (10 May 2024 21:33) (90 - 90)  RR: 19 (11 May 2024 06:17) (16 - 19)  SpO2: 94% (11 May 2024 06:17) (93% - 94%)    Parameters below as of 11 May 2024 06:17  Patient On (Oxygen Delivery Method): room air    CAPILLARY BLOOD GLUCOSE    POCT Blood Glucose.: 218 mg/dL (11 May 2024 12:33)  POCT Blood Glucose.: 181 mg/dL (11 May 2024 08:36)  POCT Blood Glucose.: 209 mg/dL (11 May 2024 07:11)  POCT Blood Glucose.: 206 mg/dL (11 May 2024 06:02)  POCT Blood Glucose.: 190 mg/dL (10 May 2024 23:54)  POCT Blood Glucose.: 174 mg/dL (10 May 2024 18:09)    Physical Exam:  General: NAD  Eyes: Anicteric sclerae, moist conjunctivae   Cardiovascular: RRR  Pulmonary: CTAB, no increased WOB  GI: Abdomen soft but distended   : hudson draining clear urine   Extremities: no edema    Neurological Exam:   Mental status: Awake, alert and oriented to self, and place. Severe dysarthria, minimal verbal output. Able to follow simple commands  Cranial nerves: Pupils equally round and reactive to light, visual fields full, no nystagmus, extraocular muscles intact, V1 through V3 intact bilaterally and symmetric, Left facial droop, tongue was midline.  Motor: Right upper and lower extremity 4/5 without drift. Left upper extremity 0/5, left lower extremity with trace movement, 1/5    Sensation: Grossly intact, inconsistent response when testing sensation of bilateral lower extremities  Coordination: No dysmetria appreciated with right upper extremity    MEDICATIONS  (STANDING):  aspirin  chewable 81 milliGRAM(s) Oral daily  atorvastatin 80 milliGRAM(s) Oral at bedtime  clopidogrel Tablet 75 milliGRAM(s) Oral daily  dextrose 10% Bolus 125 milliLiter(s) IV Bolus once  dextrose 5%. 1000 milliLiter(s) (100 mL/Hr) IV Continuous <Continuous>  dextrose 5%. 1000 milliLiter(s) (50 mL/Hr) IV Continuous <Continuous>  dextrose 50% Injectable 25 Gram(s) IV Push once  dextrose 50% Injectable 12.5 Gram(s) IV Push once  doxazosin 1 milliGRAM(s) Oral at bedtime  enoxaparin Injectable 40 milliGRAM(s) SubCutaneous every 24 hours  ferrous    sulfate 325 milliGRAM(s) Oral daily  folic acid 1 milliGRAM(s) Oral daily  glucagon  Injectable 1 milliGRAM(s) IntraMuscular once  insulin lispro (ADMELOG) corrective regimen sliding scale   SubCutaneous every 6 hours  magnesium oxide 400 milliGRAM(s) Oral daily  midodrine. 5 milliGRAM(s) Oral <User Schedule>  polyethylene glycol 3350 17 Gram(s) Oral every 24 hours  potassium chloride  10 mEq/100 mL IVPB 10 milliEquivalent(s) IV Intermittent every 1 hour  potassium chloride  20 mEq/100 mL IVPB 20 milliEquivalent(s) IV Intermittent every 4 hours  senna 2 Tablet(s) Oral at bedtime  sodium phosphate 30 milliMole(s)/500 mL IVPB 30 milliMole(s) IV Intermittent once    MEDICATIONS  (PRN):  acetaminophen     Tablet .. 650 milliGRAM(s) Oral every 6 hours PRN Temp greater or equal to 38C (100.4F), Mild Pain (1 - 3), Moderate Pain (4 - 6)  dextrose Oral Gel 15 Gram(s) Oral once PRN Blood Glucose LESS THAN 70 milliGRAM(s)/deciliter  guaiFENesin Oral Liquid (Sugar-Free) 100 milliGRAM(s) Oral every 6 hours PRN Cough      No Known Allergies      LABS                        10.3   9.43  )-----------( 202      ( 11 May 2024 06:44 )             31.0     05-11    133<L>  |  102  |  16  ----------------------------<  211<H>  3.6   |  20<L>  |  0.68    Ca    8.2<L>      11 May 2024 06:44  Phos  2.3     05-11  Mg     2.0     05-11    Urinalysis Basic - ( 11 May 2024 06:44 )    Color: x / Appearance: x / SG: x / pH: x  Gluc: 211 mg/dL / Ketone: x  / Bili: x / Urobili: x   Blood: x / Protein: x / Nitrite: x   Leuk Esterase: x / RBC: x / WBC x   Sq Epi: x / Non Sq Epi: x / Bacteria: x    Urinalysis Basic - ( 11 May 2024 06:44 )    Color: x / Appearance: x / SG: x / pH: x  Gluc: 211 mg/dL / Ketone: x  / Bili: x / Urobili: x   Blood: x / Protein: x / Nitrite: x   Leuk Esterase: x / RBC: x / WBC x   Sq Epi: x / Non Sq Epi: x / Bacteria: x    IMAGING/EKG/ETC

## 2024-05-11 NOTE — PROGRESS NOTE ADULT - PROBLEM SELECTOR PLAN 8
Home meds: metformin 500mg TID, Januvia 50mg daily    Plan:  - A1C results: 6.3%   - c/w mISS   - Tube feeds  - FSG q6 hours Home meds: metformin 500mg TID, Januvia 50mg daily    Plan:  - A1C results: 6.3%   - c/w mISS   - Tube feeds  - FSG q6 hours  - started Lantus 4u qhs due to elevated FGS in the 200's

## 2024-05-11 NOTE — PROGRESS NOTE ADULT - PROBLEM SELECTOR PLAN 3
Goal -160, okay to work with PT  - Continue Midodrine 5mg BID to maintain adequate perfusion   - wife refusing WALLY/ILR, however she agreeable for MCOT, but after talking to EP team, she agreed to follow up with them after going to acute rehab to consider monitoring.   - Cardiac CT to rule out SANDRO thrombus: No SANDRO or left atrial thrombus. Non cardiac: Apparent filling defects in segmental branches of the right lower lobe pulmonary artery.  - TTE: EF 56%

## 2024-05-11 NOTE — PROGRESS NOTE ADULT - ASSESSMENT
Mr. Bruce is an 88y Cantonese speaking Male with PMHx of JUSTYNA, stroke (2004, residual right sided weakness), HLD, glaucoma, DM, BPH, depression, and b/l hearing loss, who present to [facility] with new left-sided arm and leg weakness starting 4/26 pm, transferred to [facility] for further stroke w/u. Course c/b periods of waxing/waning left sided hemiparesis, likely due to hypoperfusion, s/p IVF boluses, now started on midodrine. Patient with urinary retention with postraumatic hematuria after straight cath in pt with BPH. Now s/p PEG placement on 5/7 and stepped down from stroke tele to [facility] while awaiting AR placement.

## 2024-05-11 NOTE — PROGRESS NOTE ADULT - PROBLEM SELECTOR PLAN 1
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 Patient previously on aspirin, but was stopped by doctor in 2020, unclear reason why.    - continue ASA 81mg daily  - continue plavix 75mg QD x3 weeks   - continue atorvastatin 80mg daily    - q8h general neuro checks and vitals   - MRI brain on 4/28 with R internal capsule infarct   - Stroke Code HCT Results: negative for acute ischemia    - Stroke Code CTA Results: stenosis of proximal bilateral A2 segments and R M2   - Stroke education   - repeat HCT on 5/5 - subacute infarct centered in the genu and posterior limb of the right internal capsule is stable.  A chronic transcortical infarction in the left precentral gyrus and a small chronic infarct in the right cerebellar hemisphere are stable. 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 Patient previously on aspirin, but was stopped by doctor in 2020, unclear reason why.    - continue ASA 81mg daily  - continue plavix 75mg QD x3 weeks   - continue atorvastatin 80mg daily    - q8h general neuro checks and vitals   - MRI brain on 4/28 with R internal capsule infarct   - Stroke Code HCT Results: negative for acute ischemia    - Stroke Code CTA Results: stenosis of proximal bilateral A2 segments and R M2   - Stroke education   - repeat HCT on 5/5 - subacute infarct centered in the genu and posterior limb of the right internal capsule is stable.  A chronic transcortical infarction in the left precentral gyrus and a small chronic infarct in the right cerebellar hemisphere are stable.  - repeat HCT on 5/11 - stable follow up CT, showing redemonstration of an evolving subacute infarct involving the posterior limb and the right internal capsule. There is no hemorrhagic transformation.  - vEEG placed for 4 hours to rule out seizure activity, f/u vEEG read

## 2024-05-11 NOTE — PROGRESS NOTE ADULT - PROBLEM SELECTOR PLAN 12
F: 100 cc free water flushes via PEG tube q4h   E: Replete PRN  K<4.0, Mg<2.0, Phos<2.5  N: Tube feeds- [facility] Glucose Controlled 1.2 63 mL/hr   GI ppx: None  DVT PPx: Lovenox F: 100 cc free water flushes via PEG tube q6h   E: Replete PRN  K<4.0, Mg<2.0, Phos<2.5  N: Tube feeds- [facility] Glucose Controlled 1.2 95mL/hr for 16 hours   GI ppx: None  DVT PPx: Lovenox

## 2024-05-11 NOTE — PROGRESS NOTE ADULT - SUBJECTIVE AND OBJECTIVE BOX
LARRY BURNS , [id number],  [facility] [id number]    Time of encounter :  seen with wife at bedside and neuro-resident Dr. Malika Stone   pt would move right side of body, not moving left  he did not communicate much, wife communicate with team- reported no bm   sleeping a lot.   during exam -he is awake.   neuro team would like to repeat CTH and video EEG for 4 hours ( wife reported eeg is sticky to hair, and would need help to shampoo afterwards )  tolerating tube feed  urine is clear now.    T(C): 36.8 (05-11-24 @ 14:27), Max: 36.8 (05-10-24 @ 20:38)  HR: 85 (05-11-24 @ 14:27) (80 - 96)  BP: 136/68 (05-11-24 @ 14:27) (110/63 - 136/68)  RR: 18 (05-11-24 @ 14:27) (16 - 19)  SpO2: 96% (05-11-24 @ 14:27) (93% - 96%)    acetaminophen     Tablet .. 650 milliGRAM(s) Oral every 6 hours PRN  aspirin  chewable 81 milliGRAM(s) Oral daily  atorvastatin 80 milliGRAM(s) Oral at bedtime  clopidogrel Tablet 75 milliGRAM(s) Oral daily  dextrose 10% Bolus 125 milliLiter(s) IV Bolus once  dextrose 5%. 1000 milliLiter(s) IV Continuous <Continuous>  dextrose 5%. 1000 milliLiter(s) IV Continuous <Continuous>  dextrose 50% Injectable 25 Gram(s) IV Push once  dextrose 50% Injectable 12.5 Gram(s) IV Push once  dextrose Oral Gel 15 Gram(s) Oral once PRN  doxazosin 1 milliGRAM(s) Oral at bedtime  enoxaparin Injectable 40 milliGRAM(s) SubCutaneous every 24 hours  ferrous    sulfate 325 milliGRAM(s) Oral daily  folic acid 1 milliGRAM(s) Oral daily  glucagon  Injectable 1 milliGRAM(s) IntraMuscular once  guaiFENesin Oral Liquid (Sugar-Free) 100 milliGRAM(s) Oral every 6 hours PRN  insulin glargine Injectable (LANTUS) 4 Unit(s) SubCutaneous at bedtime  insulin lispro (ADMELOG) corrective regimen sliding scale   SubCutaneous every 6 hours  magnesium oxide 400 milliGRAM(s) Oral daily  midodrine. 5 milliGRAM(s) Oral <User Schedule>  polyethylene glycol 3350 17 Gram(s) Oral every 24 hours  potassium chloride  10 mEq/100 mL IVPB 10 milliEquivalent(s) IV Intermittent every 1 hour  potassium chloride  20 mEq/100 mL IVPB 20 milliEquivalent(s) IV Intermittent every 4 hours  senna 2 Tablet(s) Oral at bedtime      Physical Exam :    General exam :lying in bed, awake, open eyes prefer right gaze, flattening of left naso-labial fold.  RRR   moving good air.  peg in place  left hemiparesis  right -moving against gravity.       CBC Full  -  ( 11 May 2024 06:44 )  WBC Count : 9.43 K/uL  RBC Count : 4.39 M/uL  Hemoglobin : 10.3 g/dL  Hematocrit : 31.0 %  Platelet Count - Automated : 202 K/uL  Mean Cell Volume : 70.6 fl  Mean Cell Hemoglobin : 23.5 pg  Mean Cell Hemoglobin Concentration : 33.2 gm/dL  Auto Neutrophil # : x  Auto Lymphocyte # : x  Auto Monocyte # : x  Auto Eosinophil # : x  Auto Basophil # : x  Auto Neutrophil % : x  Auto Lymphocyte % : x  Auto Monocyte % : x  Auto Eosinophil % : x  Auto Basophil % : x        05-11    133<L>  |  102  |  16  ----------------------------<  211<H>  3.6   |  20<L>  |  0.68    Ca    8.2<L>      11 May 2024 06:44  Phos  2.3     05-11  Mg     2.0     05-11      Daily     Daily   CAPILLARY BLOOD GLUCOSE      POCT Blood Glucose.: 218 mg/dL (11 May 2024 12:33)      Urinalysis Basic - ( 11 May 2024 06:44 )    Color: x / Appearance: x / SG: x / pH: x  Gluc: 211 mg/dL / Ketone: x  / Bili: x / Urobili: x   Blood: x / Protein: x / Nitrite: x   Leuk Esterase: x / RBC: x / WBC x   Sq Epi: x / Non Sq Epi: x / Bacteria: x

## 2024-05-11 NOTE — PROGRESS NOTE ADULT - PROBLEM SELECTOR PLAN 9
- Started on doxazosin 1mg/night (tamsulosin cannot be administered through PEG tube)   - urology consulted, f/u recs - Started on doxazosin 1mg/night (tamsulosin cannot be administered through PEG tube)   - urology consulted, f/u recs  - hudson was draining blood-tinged urine, however urology not concerned and attributed to pulling on hudson. Urine on 5/11 improved and more clear.

## 2024-05-11 NOTE — PROGRESS NOTE ADULT - PROBLEM SELECTOR PLAN 6
Not currently on a standing bowel regimen. Patient's wife states previously when given laxatives he had 6-7 episodes of bowel movements right after.   - Give senna 1 tablet once and assess response; can give additional if needed Not currently on a standing bowel regimen. Patient's wife states previously when given laxatives he had 6-7 episodes of bowel movements right after.   - patient still without any BM's  - started standing senna and miralax via PEG  - f/u stool count

## 2024-05-11 NOTE — CHART NOTE - NSCHARTNOTEFT_GEN_A_CORE
EEG reviewed by Dr. White. Prelim read with GRDA w/ R sided LRDA w/ fast activity and right sided spikes without evolution. Pt seen and examined at bedside. History per Cantonese  and wife at bedside. No complaints. Wife at bedside has not noticed any shaking episodes, says he has been sleepy since the PEG tube. On exam he is lethargic but arousable to voice, answering orientation questions (name, hospital, date per wife says May 6 2024), following simple commands. Dysarthric with L hemiplegia and R gaze deviation (overcomes spontaneously).    Recommendations:  - Give Keppra 2g now  - Start Keppra 750mg BID tomorrow  - Continue vEEG for now  - Ativan 2mg IV for GTCs > 2 min only  - Neurological assessment Q8hrs  - Seizure & Fall precautions  - Maintain Mg> 2 mmol/l    Recommendations communicated to primary team

## 2024-05-11 NOTE — PROGRESS NOTE ADULT - ASSESSMENT
88y M Cantonese speaking, R hand dominant, b/l  hearing impairment ( b/l hearing aids) with PMHx of DM, Thalassemia minor/JUSTYNA, HLD, hx stroke with right sided weakness in 2004 (has a cane and walker at home but pt refuses using assisted device, off ASA since 2020 for unclear reason), depression, glaucoma, BPH, [facility] ( [facility] with new left side arm and leg weakness starting 4/26 pm. Wife noticed that patient was leaning against the wall with the left side of his body to support himself to walk and was unable to hold himself upright when sitting, slumping over to the left. Patient felt that his left arm and leg were weak. Called PCP who recommended ED visit. CTH/CTP/CTA head and neck with no acute findings. Loaded with aspirin 300mg x1. Transferred to [facility] for further stroke w/u.    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  # hx CVA w/ residual R sided weakness   # Dysphagia    - midodrine 2.5mg bid ( 4/30)-> increased to 5mg bid( 5/1-)  to help with blood pressure for perfusion.   - now that he is tolerating peg feeding - can stop IVF   s/p PEG on Tues 5/7, ok ASA, resume plavix 48hr after PEG ( 5/9), VTE ppx in am 5/8,  - f/u wife's decision on LINQ ( wife spoke with son from [place] and wants to hold off LINQ)   - Cardiac CT: reviewed, no SANDRO taylor   - CT Chest: no PE   - s/p aspirin load 300mg x1( 4/27)  - continue aspirin 81mg po daily ( 4/27-)  - Clopidogrel 75mg po daily ( 4/28-5/2) hold until 48hr (5/9) after PEG placement(5/7), will need Clopidogrel for 21 days then stop   - continue Atorvastatin 80mg po qHS  ( 4/27-) via NGT  - - cw statin.   - PT/OT eval    # Acute urinary retention- hudson in place.  # Gross hematuria- resolved,   # hx BPH  - resumed tamsulosin 0.4mg  -  consult appreciated re: gross hematuria, placed 22Fr hudson cath, TOV only when pt is ambulatory  punch color urine likely from pulling hudson ( encouraged not to pull ) on 5/10- urology checked hudson in place, clear yellow urine on 5/11-     # Anemia hx Thallassemia minor  - Ferritin 410, Tsat 33% adequate iron store  - Hgb 11.2%     #constipation  - c/w bowel regimen w/ senna+miralax    #DM  - A1C 6.3%   home meds: metformin 500mg TID, Januvia 50mg daily - held for now.   - ISS, goal -180   - can give lantus 4-6 units per day ( now that he is tolerating tube feeding well )    #DVT Prophylaxis: Lovenox 40mg sq daily     # cough at time, chest x ray look ok  can give robitussin liquid formula 5 mg tid via peg to help.     Dispo: fu PT/OT, will benefit inpatient Rehab ( Wife chose [facility] or [facility])      Contact:  PMD Dr. Aron Soares   Wife: Farshad Pelayo # [phone]    POC as d/w Neuro team  - fu CTH and video EEG     Thank you for allowing medicine to participate in the care, questions from wife answered.

## 2024-05-12 DIAGNOSIS — A41.9 SEPSIS, UNSPECIFIED ORGANISM: ICD-10-CM

## 2024-05-12 DIAGNOSIS — R56.9 UNSPECIFIED CONVULSIONS: ICD-10-CM

## 2024-05-12 LAB
ADD ON TEST-SPECIMEN IN LAB: SIGNIFICANT CHANGE UP
ANION GAP SERPL CALC-SCNC: 11 MMOL/L — SIGNIFICANT CHANGE UP (ref 5–17)
ANISOCYTOSIS BLD QL: SLIGHT — SIGNIFICANT CHANGE UP
BASOPHILS # BLD AUTO: 0 K/UL — SIGNIFICANT CHANGE UP (ref 0–0.2)
BASOPHILS NFR BLD AUTO: 0 % — SIGNIFICANT CHANGE UP (ref 0–2)
BUN SERPL-MCNC: 20 MG/DL — SIGNIFICANT CHANGE UP (ref 7–23)
BURR CELLS BLD QL SMEAR: PRESENT — SIGNIFICANT CHANGE UP
CALCIUM SERPL-MCNC: 8.4 MG/DL — SIGNIFICANT CHANGE UP (ref 8.4–10.5)
CHLORIDE SERPL-SCNC: 101 MMOL/L — SIGNIFICANT CHANGE UP (ref 96–108)
CO2 SERPL-SCNC: 22 MMOL/L — SIGNIFICANT CHANGE UP (ref 22–31)
CREAT SERPL-MCNC: 0.65 MG/DL — SIGNIFICANT CHANGE UP (ref 0.5–1.3)
DACRYOCYTES BLD QL SMEAR: SLIGHT — SIGNIFICANT CHANGE UP
EGFR: 92 ML/MIN/1.73M2 — SIGNIFICANT CHANGE UP
EOSINOPHIL # BLD AUTO: 0.3 K/UL — SIGNIFICANT CHANGE UP (ref 0–0.5)
EOSINOPHIL NFR BLD AUTO: 2.6 % — SIGNIFICANT CHANGE UP (ref 0–6)
GLUCOSE BLDC GLUCOMTR-MCNC: 153 MG/DL — HIGH (ref 70–99)
GLUCOSE BLDC GLUCOMTR-MCNC: 181 MG/DL — HIGH (ref 70–99)
GLUCOSE BLDC GLUCOMTR-MCNC: 194 MG/DL — HIGH (ref 70–99)
GLUCOSE BLDC GLUCOMTR-MCNC: 232 MG/DL — HIGH (ref 70–99)
GLUCOSE BLDC GLUCOMTR-MCNC: 248 MG/DL — HIGH (ref 70–99)
GLUCOSE SERPL-MCNC: 247 MG/DL — HIGH (ref 70–99)
HCT VFR BLD CALC: 30.4 % — LOW (ref 39–50)
HGB BLD-MCNC: 10.5 G/DL — LOW (ref 13–17)
HYPOCHROMIA BLD QL: SLIGHT — SIGNIFICANT CHANGE UP
LYMPHOCYTES # BLD AUTO: 1.12 K/UL — SIGNIFICANT CHANGE UP (ref 1–3.3)
LYMPHOCYTES # BLD AUTO: 9.6 % — LOW (ref 13–44)
MACROCYTES BLD QL: SLIGHT — SIGNIFICANT CHANGE UP
MAGNESIUM SERPL-MCNC: 2 MG/DL — SIGNIFICANT CHANGE UP (ref 1.6–2.6)
MANUAL SMEAR VERIFICATION: SIGNIFICANT CHANGE UP
MCHC RBC-ENTMCNC: 23.4 PG — LOW (ref 27–34)
MCHC RBC-ENTMCNC: 34.5 GM/DL — SIGNIFICANT CHANGE UP (ref 32–36)
MCV RBC AUTO: 67.7 FL — LOW (ref 80–100)
MICROCYTES BLD QL: SLIGHT — SIGNIFICANT CHANGE UP
MONOCYTES # BLD AUTO: 0.2 K/UL — SIGNIFICANT CHANGE UP (ref 0–0.9)
MONOCYTES NFR BLD AUTO: 1.7 % — LOW (ref 2–14)
MRSA PCR RESULT.: NEGATIVE — SIGNIFICANT CHANGE UP
NEUTROPHILS # BLD AUTO: 10.05 K/UL — HIGH (ref 1.8–7.4)
NEUTROPHILS NFR BLD AUTO: 86.1 % — HIGH (ref 43–77)
OVALOCYTES BLD QL SMEAR: SLIGHT — SIGNIFICANT CHANGE UP
PHOSPHATE SERPL-MCNC: 2.8 MG/DL — SIGNIFICANT CHANGE UP (ref 2.5–4.5)
PLAT MORPH BLD: ABNORMAL
PLATELET # BLD AUTO: 234 K/UL — SIGNIFICANT CHANGE UP (ref 150–400)
POIKILOCYTOSIS BLD QL AUTO: SIGNIFICANT CHANGE UP
POLYCHROMASIA BLD QL SMEAR: SLIGHT — SIGNIFICANT CHANGE UP
POTASSIUM SERPL-MCNC: 3.9 MMOL/L — SIGNIFICANT CHANGE UP (ref 3.5–5.3)
POTASSIUM SERPL-SCNC: 3.9 MMOL/L — SIGNIFICANT CHANGE UP (ref 3.5–5.3)
PROCALCITONIN SERPL-MCNC: 0.15 NG/ML — HIGH (ref 0.02–0.1)
RAPID RVP RESULT: SIGNIFICANT CHANGE UP
RBC # BLD: 4.49 M/UL — SIGNIFICANT CHANGE UP (ref 4.2–5.8)
RBC # FLD: 15.6 % — HIGH (ref 10.3–14.5)
RBC BLD AUTO: ABNORMAL
S AUREUS DNA NOSE QL NAA+PROBE: NEGATIVE — SIGNIFICANT CHANGE UP
SARS-COV-2 RNA SPEC QL NAA+PROBE: SIGNIFICANT CHANGE UP
SCHISTOCYTES BLD QL AUTO: SLIGHT — SIGNIFICANT CHANGE UP
SMUDGE CELLS # BLD: PRESENT — SIGNIFICANT CHANGE UP
SODIUM SERPL-SCNC: 134 MMOL/L — LOW (ref 135–145)
SPHEROCYTES BLD QL SMEAR: SLIGHT — SIGNIFICANT CHANGE UP
TARGETS BLD QL SMEAR: SLIGHT — SIGNIFICANT CHANGE UP
WBC # BLD: 11.67 K/UL — HIGH (ref 3.8–10.5)
WBC # FLD AUTO: 11.67 K/UL — HIGH (ref 3.8–10.5)

## 2024-05-12 PROCEDURE — 93010 ELECTROCARDIOGRAM REPORT: CPT

## 2024-05-12 PROCEDURE — 99233 SBSQ HOSP IP/OBS HIGH 50: CPT

## 2024-05-12 PROCEDURE — 71045 X-RAY EXAM CHEST 1 VIEW: CPT | Mod: 26

## 2024-05-12 PROCEDURE — 95720 EEG PHY/QHP EA INCR W/VEEG: CPT

## 2024-05-12 PROCEDURE — 74018 RADEX ABDOMEN 1 VIEW: CPT | Mod: 26

## 2024-05-12 RX ORDER — PIPERACILLIN AND TAZOBACTAM 4; .5 G/20ML; G/20ML
4.5 INJECTION, POWDER, LYOPHILIZED, FOR SOLUTION INTRAVENOUS EVERY 8 HOURS
Refills: 0 | Status: DISCONTINUED | OUTPATIENT
Start: 2024-05-12 | End: 2024-05-16

## 2024-05-12 RX ORDER — LEVETIRACETAM 250 MG/1
750 TABLET, FILM COATED ORAL EVERY 12 HOURS
Refills: 0 | Status: ACTIVE | OUTPATIENT
Start: 2024-05-12 | End: 2025-04-10

## 2024-05-12 RX ORDER — POLYETHYLENE GLYCOL 3350 17 G/17G
17 POWDER, FOR SOLUTION ORAL EVERY 12 HOURS
Refills: 0 | Status: DISCONTINUED | OUTPATIENT
Start: 2024-05-12 | End: 2024-05-17

## 2024-05-12 RX ORDER — SODIUM CHLORIDE 9 MG/ML
500 INJECTION, SOLUTION INTRAVENOUS ONCE
Refills: 0 | Status: COMPLETED | OUTPATIENT
Start: 2024-05-12 | End: 2024-05-12

## 2024-05-12 RX ORDER — METOCLOPRAMIDE HCL 10 MG
5 TABLET ORAL EVERY 8 HOURS
Refills: 0 | Status: COMPLETED | OUTPATIENT
Start: 2024-05-12 | End: 2024-05-13

## 2024-05-12 RX ORDER — SODIUM CHLORIDE 9 MG/ML
500 INJECTION INTRAMUSCULAR; INTRAVENOUS; SUBCUTANEOUS
Refills: 0 | Status: DISCONTINUED | OUTPATIENT
Start: 2024-05-12 | End: 2024-05-13

## 2024-05-12 RX ORDER — CLOPIDOGREL BISULFATE 75 MG/1
1 TABLET, FILM COATED ORAL
Qty: 0 | Refills: 0 | DISCHARGE
Start: 2024-05-12 | End: 2024-06-01

## 2024-05-12 RX ORDER — PIPERACILLIN AND TAZOBACTAM 4; .5 G/20ML; G/20ML
4.5 INJECTION, POWDER, LYOPHILIZED, FOR SOLUTION INTRAVENOUS ONCE
Refills: 0 | Status: COMPLETED | OUTPATIENT
Start: 2024-05-12 | End: 2024-05-12

## 2024-05-12 RX ADMIN — Medication 650 MILLIGRAM(S): at 07:18

## 2024-05-12 RX ADMIN — LEVETIRACETAM 750 MILLIGRAM(S): 250 TABLET, FILM COATED ORAL at 07:18

## 2024-05-12 RX ADMIN — Medication 1 MILLIGRAM(S): at 11:15

## 2024-05-12 RX ADMIN — CLOPIDOGREL BISULFATE 75 MILLIGRAM(S): 75 TABLET, FILM COATED ORAL at 11:15

## 2024-05-12 RX ADMIN — Medication 1: at 18:38

## 2024-05-12 RX ADMIN — Medication 2: at 06:52

## 2024-05-12 RX ADMIN — LEVETIRACETAM 750 MILLIGRAM(S): 250 TABLET, FILM COATED ORAL at 19:09

## 2024-05-12 RX ADMIN — POLYETHYLENE GLYCOL 3350 17 GRAM(S): 17 POWDER, FOR SOLUTION ORAL at 21:34

## 2024-05-12 RX ADMIN — ATORVASTATIN CALCIUM 80 MILLIGRAM(S): 80 TABLET, FILM COATED ORAL at 21:33

## 2024-05-12 RX ADMIN — Medication 5 MILLIGRAM(S): at 13:30

## 2024-05-12 RX ADMIN — Medication 5 MILLIGRAM(S): at 21:33

## 2024-05-12 RX ADMIN — Medication 650 MILLIGRAM(S): at 21:33

## 2024-05-12 RX ADMIN — INSULIN GLARGINE 4 UNIT(S): 100 INJECTION, SOLUTION SUBCUTANEOUS at 21:33

## 2024-05-12 RX ADMIN — SENNA PLUS 2 TABLET(S): 8.6 TABLET ORAL at 21:33

## 2024-05-12 RX ADMIN — PIPERACILLIN AND TAZOBACTAM 25 GRAM(S): 4; .5 INJECTION, POWDER, LYOPHILIZED, FOR SOLUTION INTRAVENOUS at 21:32

## 2024-05-12 RX ADMIN — PIPERACILLIN AND TAZOBACTAM 200 GRAM(S): 4; .5 INJECTION, POWDER, LYOPHILIZED, FOR SOLUTION INTRAVENOUS at 09:09

## 2024-05-12 RX ADMIN — Medication 650 MILLIGRAM(S): at 07:48

## 2024-05-12 RX ADMIN — Medication 325 MILLIGRAM(S): at 11:15

## 2024-05-12 RX ADMIN — Medication 1: at 12:47

## 2024-05-12 RX ADMIN — ENOXAPARIN SODIUM 40 MILLIGRAM(S): 100 INJECTION SUBCUTANEOUS at 11:14

## 2024-05-12 RX ADMIN — SODIUM CHLORIDE 500 MILLILITER(S): 9 INJECTION, SOLUTION INTRAVENOUS at 06:56

## 2024-05-12 RX ADMIN — PIPERACILLIN AND TAZOBACTAM 25 GRAM(S): 4; .5 INJECTION, POWDER, LYOPHILIZED, FOR SOLUTION INTRAVENOUS at 12:50

## 2024-05-12 RX ADMIN — POLYETHYLENE GLYCOL 3350 17 GRAM(S): 17 POWDER, FOR SOLUTION ORAL at 09:11

## 2024-05-12 RX ADMIN — MIDODRINE HYDROCHLORIDE 5 MILLIGRAM(S): 2.5 TABLET ORAL at 11:14

## 2024-05-12 RX ADMIN — Medication 5 MILLIGRAM(S): at 09:11

## 2024-05-12 RX ADMIN — Medication 81 MILLIGRAM(S): at 11:15

## 2024-05-12 RX ADMIN — MAGNESIUM OXIDE 400 MG ORAL TABLET 400 MILLIGRAM(S): 241.3 TABLET ORAL at 11:16

## 2024-05-12 RX ADMIN — SODIUM CHLORIDE 200 MILLILITER(S): 9 INJECTION INTRAMUSCULAR; INTRAVENOUS; SUBCUTANEOUS at 11:13

## 2024-05-12 RX ADMIN — Medication 1 MILLIGRAM(S): at 21:33

## 2024-05-12 NOTE — PROGRESS NOTE ADULT - PROBLEM SELECTOR PLAN 1
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 Patient previously on aspirin, but was stopped by doctor in 2020, unclear reason why.    - continue ASA 81mg daily  - continue plavix 75mg QD x3 weeks   - continue atorvastatin 80mg daily    - q8h general neuro checks and vitals   - MRI brain on 4/28 with R internal capsule infarct   - Stroke Code HCT Results: negative for acute ischemia    - Stroke Code CTA Results: stenosis of proximal bilateral A2 segments and R M2   - Stroke education   - repeat HCT on 5/5 - subacute infarct centered in the genu and posterior limb of the right internal capsule is stable.  A chronic transcortical infarction in the left precentral gyrus and a small chronic infarct in the right cerebellar hemisphere are stable.  - repeat HCT on 5/11 - stable follow up CT, showing redemonstration of an evolving subacute infarct involving the posterior limb and the right internal capsule. There is no hemorrhagic transformation.  - vEEG placed for 4 hours to rule out seizure activity, f/u vEEG read Patient meeting 2/4 SIRS criteria today (HR 98, T 102) and increasing leukocytosis (9k -> 11k). Blood cultures drawn. CXR with left-sided effusion.   - Start empiric IV zosyn 4.5g q8h   - F/u RVP  - F/u MRSA  - F/u abdominal x-ray  - F/u blood cultures

## 2024-05-12 NOTE — PROGRESS NOTE ADULT - PROBLEM SELECTOR PLAN 12
Per wife, prior to COVID, patient very active with friends/community, played sports. Since pandemic and worsening eyesight due to glaucoma and hearing, patient with no interest in daily activities. Was started on escitalopram 10mg daily, but stopped taking due to constipation   - consider restarting based on patient's mood and improvement of bowel movement after starting bowel regimen Pt c/o b/l LE cramping.  - LE dopplers obtained, negative for DVT bilaterally

## 2024-05-12 NOTE — PROGRESS NOTE ADULT - PROBLEM SELECTOR PLAN 7
Not currently on a standing bowel regimen. Patient's wife states previously when given laxatives he had 6-7 episodes of bowel movements right after.   - patient still without any BM's  - started standing senna and miralax via PEG  - f/u stool count Failed SLP evaluation. FEES completed on 5/2 - recommended for PEG. s/p PEG on 5/7. Nutrition consulted. Started continuous tube feeds on 05/08- [employer] Glucose Support 1.2 @10mL/hr x24hr and increase as tolerated to goal rate 63mL/hr x24hr to provide 1512mL total volume, 1814kcal (26kcal/kg dosing wt 70.3kg), 97g protein (1.4g/kg dosing wt 70.3kg), and 1149mL free water.   - Adjusted tube feeds to cyclic feeds on 5/11- [employer] at 95mL/hr for 16 hours -> high residuals today, hold feeds   - Obtain abxominal xray  - Continue FWF to 100cc every 6 hours    - Started lantus 4u qhs due to elevated FGS in the 200's   - Maintain aspiration precautions   - If pt starts having frequent loose BMs, add Banatrol x2/day to promote fecal bulk.   - F/u nutrition recs  - high dose statin as above in CVA   - LDL results: 170

## 2024-05-12 NOTE — PROGRESS NOTE ADULT - PROBLEM SELECTOR PLAN 4
Goal -160, okay to work with PT  - Continue Midodrine 5mg BID to maintain adequate perfusion   - wife refusing WALLY/ILR, however she agreeable for [facility]OT, but after talking to EP team, she agreed to follow up with them after going to acute rehab to consider monitoring.   - Cardiac CT to rule out SANDRO thrombus: No SANDRO or left atrial thrombus. Non cardiac: Apparent filling defects in segmental branches of the right lower lobe pulmonary artery.  - TTE: EF 56% Urinary retention with postraumatic hematuria after straight cath. On 5/4, 900 cc and 350cc in BS with postraumatic hematuria. Urology consulted. Hudson in place draining clear yellow urine.   - Started on doxazosin 1mg/night (tamsulosin cannot be administered through PEG tube)   - hudson to remain in place until patient is ambulatory; should follow up with his outpatient urologist for TOV when patient is more ambulatory and having regular BM's

## 2024-05-12 NOTE — PROGRESS NOTE ADULT - SUBJECTIVE AND OBJECTIVE BOX
******INCOMPLETE******    OVERNIGHT EVENTS/INTERVAL HPI:    SUBJECTIVE:     OBJECTIVE:  Vital Signs Last 24 Hrs  T(C): 37.7 (11 May 2024 20:38), Max: 37.7 (11 May 2024 20:38)  T(F): 99.8 (11 May 2024 20:38), Max: 99.8 (11 May 2024 20:38)  HR: 96 (11 May 2024 20:38) (85 - 96)  BP: 138/76 (11 May 2024 20:38) (134/73 - 138/76)  BP(mean): --  RR: 18 (11 May 2024 20:38) (18 - 19)  SpO2: 94% (11 May 2024 20:38) (94% - 96%)    Parameters below as of 11 May 2024 20:38  Patient On (Oxygen Delivery Method): room air      I&O's Detail    10 May 2024 07:01  -  11 May 2024 07:00  --------------------------------------------------------  IN:  Total IN: 0 mL    OUT:    Indwelling Catheter - Urethral (mL): 1700 mL  Total OUT: 1700 mL    Total NET: -1700 mL        Physical Exam:  GENERAL: Awake, alert and interactive, no acute distress, appears comfortable  NEURO: A&Ox4, no focal deficits, 5/5 strength in all ext, reflexes 2+ throughout, CN 2-12 intact  HEENT: Normocephalic, atraumatic, no conjunctivitis or scleral icterus, oral mucosa moist, no oral lesions noted  NECK: Supple, no LAD, no JVD, thyroid not palpable  CARDIAC: Regular rate and rhythm, +S1/S2, no murmurs/rubs/gallops  PULM: Breathing comfortably on RA, clear to auscultation bilaterally, no wheezes/rales/rhonchi  ABDOMEN: Soft, nontender, nondistended, +bs, no hepatosplenomegaly, no rebound tenderness or fluid wave, no CVA tenderness  : Deferred  MSK: Range of motion grossly intact, no back tenderness  SKIN: Warm and dry, no rashes, lesions  VASC: Cap refil < 2 sec, 2+ peripheral pulses, no edema, no LE tenderness  Psych: Appropriate affect    Medications:  MEDICATIONS  (STANDING):  aspirin  chewable 81 milliGRAM(s) Oral daily  atorvastatin 80 milliGRAM(s) Oral at bedtime  clopidogrel Tablet 75 milliGRAM(s) Oral daily  dextrose 10% Bolus 125 milliLiter(s) IV Bolus once  dextrose 5%. 1000 milliLiter(s) (100 mL/Hr) IV Continuous <Continuous>  dextrose 5%. 1000 milliLiter(s) (50 mL/Hr) IV Continuous <Continuous>  dextrose 50% Injectable 25 Gram(s) IV Push once  dextrose 50% Injectable 12.5 Gram(s) IV Push once  doxazosin 1 milliGRAM(s) Oral at bedtime  enoxaparin Injectable 40 milliGRAM(s) SubCutaneous every 24 hours  ferrous    sulfate 325 milliGRAM(s) Oral daily  folic acid 1 milliGRAM(s) Oral daily  glucagon  Injectable 1 milliGRAM(s) IntraMuscular once  insulin glargine Injectable (LANTUS) 4 Unit(s) SubCutaneous at bedtime  insulin lispro (ADMELOG) corrective regimen sliding scale   SubCutaneous every 6 hours  levETIRAcetam  Solution 750 milliGRAM(s) Oral every 12 hours  magnesium oxide 400 milliGRAM(s) Oral daily  midodrine. 5 milliGRAM(s) Oral <User Schedule>  polyethylene glycol 3350 17 Gram(s) Oral every 24 hours  potassium chloride  10 mEq/100 mL IVPB 10 milliEquivalent(s) IV Intermittent every 1 hour  senna 2 Tablet(s) Oral at bedtime    MEDICATIONS  (PRN):  acetaminophen     Tablet .. 650 milliGRAM(s) Oral every 6 hours PRN Temp greater or equal to 38C (100.4F), Mild Pain (1 - 3), Moderate Pain (4 - 6)  dextrose Oral Gel 15 Gram(s) Oral once PRN Blood Glucose LESS THAN 70 milliGRAM(s)/deciliter  guaiFENesin Oral Liquid (Sugar-Free) 100 milliGRAM(s) Oral every 6 hours PRN Cough      Labs:                        10.3   9.43  )-----------( 202      ( 11 May 2024 06:44 )             31.0     05-11    133<L>  |  102  |  16  ----------------------------<  211<H>  3.6   |  20<L>  |  0.68    Ca    8.2<L>      11 May 2024 06:44  Phos  2.3     05-11  Mg     2.0     05-11      Urinalysis Basic - ( 11 May 2024 06:44 )    Color: x / Appearance: x / SG: x / pH: x  Gluc: 211 mg/dL / Ketone: x  / Bili: x / Urobili: x   Blood: x / Protein: x / Nitrite: x   Leuk Esterase: x / RBC: x / WBC x   Sq Epi: x / Non Sq Epi: x / Bacteria: x          Radiology: Reviewed ******INCOMPLETE******    OVERNIGHT EVENTS/INTERVAL HPI:    SUBJECTIVE:     OBJECTIVE:  Vital Signs Last 24 Hrs  T(C): 37.7 (11 May 2024 20:38), Max: 37.7 (11 May 2024 20:38)  T(F): 99.8 (11 May 2024 20:38), Max: 99.8 (11 May 2024 20:38)  HR: 96 (11 May 2024 20:38) (85 - 96)  BP: 138/76 (11 May 2024 20:38) (134/73 - 138/76)  BP(mean): --  RR: 18 (11 May 2024 20:38) (18 - 19)  SpO2: 94% (11 May 2024 20:38) (94% - 96%)    Parameters below as of 11 May 2024 20:38  Patient On (Oxygen Delivery Method): room air      I&O's Detail    10 May 2024 07:01  -  11 May 2024 07:00  --------------------------------------------------------  IN:  Total IN: 0 mL    OUT:    Indwelling Catheter - Urethral (mL): 1700 mL  Total OUT: 1700 mL    Total NET: -1700 mL    Physical Exam:  General: NAD  Eyes: Anicteric sclerae, moist conjunctivae   Cardiovascular: RRR  Pulmonary: CTAB, no increased WOB  GI: Abdomen soft but distended   : hudson draining clear urine   Extremities: no edema    Neurological Exam:   Mental status: Awake, alert and oriented to self, and place. Severe dysarthria, minimal verbal output. Able to follow simple commands  Cranial nerves: Pupils equally round and reactive to light, visual fields full, no nystagmus, extraocular muscles intact, V1 through V3 intact bilaterally and symmetric, Left facial droop, tongue was midline.  Motor: Right upper and lower extremity 4/5 without drift. Left upper extremity 0/5, left lower extremity with trace movement, 1/5    Sensation: Grossly intact, inconsistent response when testing sensation of bilateral lower extremities  Coordination: No dysmetria appreciated with right upper extremity    Medications:  MEDICATIONS  (STANDING):  aspirin  chewable 81 milliGRAM(s) Oral daily  atorvastatin 80 milliGRAM(s) Oral at bedtime  clopidogrel Tablet 75 milliGRAM(s) Oral daily  dextrose 10% Bolus 125 milliLiter(s) IV Bolus once  dextrose 5%. 1000 milliLiter(s) (100 mL/Hr) IV Continuous <Continuous>  dextrose 5%. 1000 milliLiter(s) (50 mL/Hr) IV Continuous <Continuous>  dextrose 50% Injectable 25 Gram(s) IV Push once  dextrose 50% Injectable 12.5 Gram(s) IV Push once  doxazosin 1 milliGRAM(s) Oral at bedtime  enoxaparin Injectable 40 milliGRAM(s) SubCutaneous every 24 hours  ferrous    sulfate 325 milliGRAM(s) Oral daily  folic acid 1 milliGRAM(s) Oral daily  glucagon  Injectable 1 milliGRAM(s) IntraMuscular once  insulin glargine Injectable (LANTUS) 4 Unit(s) SubCutaneous at bedtime  insulin lispro (ADMELOG) corrective regimen sliding scale   SubCutaneous every 6 hours  levETIRAcetam  Solution 750 milliGRAM(s) Oral every 12 hours  magnesium oxide 400 milliGRAM(s) Oral daily  midodrine. 5 milliGRAM(s) Oral <User Schedule>  polyethylene glycol 3350 17 Gram(s) Oral every 24 hours  potassium chloride  10 mEq/100 mL IVPB 10 milliEquivalent(s) IV Intermittent every 1 hour  senna 2 Tablet(s) Oral at bedtime    MEDICATIONS  (PRN):  acetaminophen     Tablet .. 650 milliGRAM(s) Oral every 6 hours PRN Temp greater or equal to 38C (100.4F), Mild Pain (1 - 3), Moderate Pain (4 - 6)  dextrose Oral Gel 15 Gram(s) Oral once PRN Blood Glucose LESS THAN 70 milliGRAM(s)/deciliter  guaiFENesin Oral Liquid (Sugar-Free) 100 milliGRAM(s) Oral every 6 hours PRN Cough      Labs:              OVERNIGHT EVENTS/INTERVAL HPI: Tachycardic to , EKG sinus tach, QTc 400. One ep of dark green BM, unlikely melena. Urine clear but wife reports decreased UOP. 500cc LR bolus given. Patient felt warm, ordered a rectal temp this morning with T 102. Rajiv blood cultures, ordered CXR which revealed left sided effusion (noted on previous reports). Sent MRSA swab, RVP. Started IV zosyn. GOC had at bedside with wife, patient is full code. Held tube feeds. Ordered abdominal x-ray.     SUBJECTIVE: Patient seen and examined at bedside. Appears lethargic, unable to assess ROS. vEEG on. Infectious work-up performed as above.     OBJECTIVE:  Vital Signs Last 24 Hrs  T(C): 37.7 (11 May 2024 20:38), Max: 37.7 (11 May 2024 20:38)  T(F): 99.8 (11 May 2024 20:38), Max: 99.8 (11 May 2024 20:38)  HR: 96 (11 May 2024 20:38) (85 - 96)  BP: 138/76 (11 May 2024 20:38) (134/73 - 138/76)  BP(mean): --  RR: 18 (11 May 2024 20:38) (18 - 19)  SpO2: 94% (11 May 2024 20:38) (94% - 96%)    Parameters below as of 11 May 2024 20:38  Patient On (Oxygen Delivery Method): room air      I&O's Detail    10 May 2024 07:01  -  11 May 2024 07:00  --------------------------------------------------------  IN:  Total IN: 0 mL    OUT:    Indwelling Catheter - Urethral (mL): 1700 mL  Total OUT: 1700 mL    Total NET: -1700 mL    Physical Exam:  General: NAD; lethargic;   Head: vEEG in place   Eyes: Anicteric sclerae, moist conjunctivae   Cardiovascular: RRR  Pulmonary: CTAB, no increased WOB  GI: Abdomen soft but distended   : hudson draining clear yellow urine   Extremities: no edema    Neurological Exam:   Mental status: Severe dysarthria, minimal verbal output. Able to follow simple commands  Cranial nerves: Pupils equally round and reactive to light, visual fields full, no nystagmus, extraocular muscles intact, V1 through V3 intact bilaterally and symmetric, Left facial droop, tongue was midline.  Motor: Right upper and lower extremity 4/5 without drift. Left upper extremity 0/5, left lower extremity with trace movement, 1/5    Sensation: Grossly intact, inconsistent response when testing sensation of bilateral lower extremities  Coordination: No dysmetria appreciated with right upper extremity    Medications:  MEDICATIONS  (STANDING):  aspirin  chewable 81 milliGRAM(s) Oral daily  atorvastatin 80 milliGRAM(s) Oral at bedtime  clopidogrel Tablet 75 milliGRAM(s) Oral daily  dextrose 10% Bolus 125 milliLiter(s) IV Bolus once  dextrose 5%. 1000 milliLiter(s) (100 mL/Hr) IV Continuous <Continuous>  dextrose 5%. 1000 milliLiter(s) (50 mL/Hr) IV Continuous <Continuous>  dextrose 50% Injectable 25 Gram(s) IV Push once  dextrose 50% Injectable 12.5 Gram(s) IV Push once  doxazosin 1 milliGRAM(s) Oral at bedtime  enoxaparin Injectable 40 milliGRAM(s) SubCutaneous every 24 hours  ferrous    sulfate 325 milliGRAM(s) Oral daily  folic acid 1 milliGRAM(s) Oral daily  glucagon  Injectable 1 milliGRAM(s) IntraMuscular once  insulin glargine Injectable (LANTUS) 4 Unit(s) SubCutaneous at bedtime  insulin lispro (ADMELOG) corrective regimen sliding scale   SubCutaneous every 6 hours  levETIRAcetam  Solution 750 milliGRAM(s) Oral every 12 hours  magnesium oxide 400 milliGRAM(s) Oral daily  midodrine. 5 milliGRAM(s) Oral <User Schedule>  polyethylene glycol 3350 17 Gram(s) Oral every 24 hours  potassium chloride  10 mEq/100 mL IVPB 10 milliEquivalent(s) IV Intermittent every 1 hour  senna 2 Tablet(s) Oral at bedtime    MEDICATIONS  (PRN):  acetaminophen     Tablet .. 650 milliGRAM(s) Oral every 6 hours PRN Temp greater or equal to 38C (100.4F), Mild Pain (1 - 3), Moderate Pain (4 - 6)  dextrose Oral Gel 15 Gram(s) Oral once PRN Blood Glucose LESS THAN 70 milliGRAM(s)/deciliter  guaiFENesin Oral Liquid (Sugar-Free) 100 milliGRAM(s) Oral every 6 hours PRN Cough    LABS:                         10.5   11.67 )-----------( 234      ( 12 May 2024 05:30 )             30.4     05-12    134<L>  |  101  |  20  ----------------------------<  247<H>  3.9   |  22  |  0.65    Ca    8.4      12 May 2024 05:30  Phos  2.8     05-12  Mg     2.0     05-12        Urinalysis Basic - ( 12 May 2024 05:30 )    Color: x / Appearance: x / SG: x / pH: x  Gluc: 247 mg/dL / Ketone: x  / Bili: x / Urobili: x   Blood: x / Protein: x / Nitrite: x   Leuk Esterase: x / RBC: x / WBC x   Sq Epi: x / Non Sq Epi: x / Bacteria: x                RADIOLOGY, EKG & ADDITIONAL TESTS: Reviewed.

## 2024-05-12 NOTE — PROGRESS NOTE ADULT - PROBLEM SELECTOR PLAN 6
Failed SLP evaluation. FEES completed on 5/2 - recommended for PEG. s/p PEG on 5/7. Nutrition consulted. Started continuous tube feeds on 05/08- [employer] Glucose Support 1.2 @10mL/hr x24hr and increase as tolerated to goal rate 63mL/hr x24hr to provide 1512mL total volume, 1814kcal (26kcal/kg dosing wt 70.3kg), 97g protein (1.4g/kg dosing wt 70.3kg), and 1149mL free water.   - Adjusted tube feeds to cyclic feeds on 5/11- [employer] at 95mL/hr for 16 hours    - Continue FWF to 100cc every 6 hours    - Started lantus 4u qhs due to elevated FGS in the 200's   - Maintain aspiration precautions   - If pt starts having frequent loose BMs, add Banatrol x2/day to promote fecal bulk.   - F/u nutrition recs  - high dose statin as above in CVA   - LDL results: 170 - high dose statin as above in CVA   - LDL results: 170

## 2024-05-12 NOTE — PROGRESS NOTE ADULT - PROBLEM SELECTOR PLAN 8
Ferritin: 410, total iron: 83, TIBC: 255, % saturation: 33   - c/w home ferrous sulfate 325mg daily Not currently on a standing bowel regimen. Patient's wife states previously when given laxatives he had 6-7 episodes of bowel movements right after. Patient had one bowel movement overnight, dark green in appearance.   - continue standing senna and miralax via PEG  - f/u stool count

## 2024-05-12 NOTE — PROGRESS NOTE ADULT - PROBLEM SELECTOR PLAN 9
Home meds: metformin 500mg TID, Januvia 50mg daily    Plan:  - A1C results: 6.3%   - c/w mISS   - Tube feeds  - FSG q6 hours  - started Lantus 4u qhs due to elevated FGS in the 200's Ferritin: 410, total iron: 83, TIBC: 255, % saturation: 33   - c/w home ferrous sulfate 325mg daily

## 2024-05-12 NOTE — PROGRESS NOTE ADULT - ASSESSMENT
Mr. Bruce is an 88y Cantonese speaking Male with PMHx of JUSTYNA, stroke (2004, residual right sided weakness), HLD, glaucoma, DM, BPH, depression, and b/l hearing loss, who present to [facility] with new left-sided arm and leg weakness starting 4/26 pm, transferred to [facility] for further stroke w/u. Course c/b periods of waxing/waning left sided hemiparesis, likely due to hypoperfusion, s/p IVF boluses, now started on midodrine. Patient with urinary retention with postraumatic hematuria after straight cath in pt with BPH. Now s/p PEG placement on 5/7 and stepped down from stroke tele to [facility] while awaiting AR placement.

## 2024-05-12 NOTE — PROGRESS NOTE ADULT - PROBLEM SELECTOR PLAN 10
- Started on doxazosin 1mg/night (tamsulosin cannot be administered through PEG tube)   - urology consulted, f/u recs  - hudson was draining blood-tinged urine, however urology not concerned and attributed to pulling on hudson. Urine on 5/11 improved and more clear. Home meds: metformin 500mg TID, Januvia 50mg daily    Plan:  - A1C results: 6.3%   - c/w mISS   - Tube feeds  - FSG q6 hours  - started Lantus 4u qhs due to elevated FGS in the 200's

## 2024-05-12 NOTE — PROGRESS NOTE ADULT - SUBJECTIVE AND OBJECTIVE BOX
LARRY BURNS , [id number],  [facility] [id number]    Time of encounter : 12 pm  wife at bedside  intervally - fever/leucocytosis,   video EEG on going =concern seizure- keppra started 5/11-  high residual thus feeding held.      T(C): 37.3 (05-12-24 @ 08:55), Max: 38.9 (05-12-24 @ 07:00)  HR: 98 (05-12-24 @ 08:55) (96 - 110)  BP: 138/76 (05-12-24 @ 06:09) (138/76 - 138/76)  RR: 18 (05-12-24 @ 06:09) (18 - 18)  SpO2: 94% (05-12-24 @ 06:09) (94% - 94%)    acetaminophen     Tablet .. 650 milliGRAM(s) Oral every 6 hours PRN  aspirin  chewable 81 milliGRAM(s) Oral daily  atorvastatin 80 milliGRAM(s) Oral at bedtime  clopidogrel Tablet 75 milliGRAM(s) Oral daily  dextrose 10% Bolus 125 milliLiter(s) IV Bolus once  dextrose 5%. 1000 milliLiter(s) IV Continuous <Continuous>  dextrose 5%. 1000 milliLiter(s) IV Continuous <Continuous>  dextrose 50% Injectable 25 Gram(s) IV Push once  dextrose 50% Injectable 12.5 Gram(s) IV Push once  dextrose Oral Gel 15 Gram(s) Oral once PRN  doxazosin 1 milliGRAM(s) Oral at bedtime  enoxaparin Injectable 40 milliGRAM(s) SubCutaneous every 24 hours  ferrous    sulfate 325 milliGRAM(s) Oral daily  folic acid 1 milliGRAM(s) Oral daily  glucagon  Injectable 1 milliGRAM(s) IntraMuscular once  guaiFENesin Oral Liquid (Sugar-Free) 100 milliGRAM(s) Oral every 6 hours PRN  insulin glargine Injectable (LANTUS) 4 Unit(s) SubCutaneous at bedtime  insulin lispro (ADMELOG) corrective regimen sliding scale   SubCutaneous every 6 hours  levETIRAcetam   Injectable 750 milliGRAM(s) IV Push every 12 hours  magnesium oxide 400 milliGRAM(s) Oral daily  metoclopramide Injectable 5 milliGRAM(s) IV Push every 8 hours  midodrine. 5 milliGRAM(s) Oral <User Schedule>  piperacillin/tazobactam IVPB.. 4.5 Gram(s) IV Intermittent every 8 hours  polyethylene glycol 3350 17 Gram(s) Oral every 12 hours  potassium chloride  10 mEq/100 mL IVPB 10 milliEquivalent(s) IV Intermittent every 1 hour  senna 2 Tablet(s) Oral at bedtime  sodium chloride 0.9%. 500 milliLiter(s) IV Continuous <Continuous>      Physical Exam :    General exam : lethargic, prefer right gaze though at time could move eyes to midline.  RRR   moving good air  peg in place  no tenderness in supra-pubic area  hudson in place with clear urine.  neuro - not verbalizing, moving right hand ( would hold on to his wife ) at times would move right leg  not moving left side       CBC Full  -  ( 12 May 2024 05:30 )  WBC Count : 11.67 K/uL  RBC Count : 4.49 M/uL  Hemoglobin : 10.5 g/dL  Hematocrit : 30.4 %  Platelet Count - Automated : 234 K/uL  Mean Cell Volume : 67.7 fl  Mean Cell Hemoglobin : 23.4 pg  Mean Cell Hemoglobin Concentration : 34.5 gm/dL  Auto Neutrophil # : 10.05 K/uL  Auto Lymphocyte # : 1.12 K/uL  Auto Monocyte # : 0.20 K/uL  Auto Eosinophil # : 0.30 K/uL  Auto Basophil # : 0.00 K/uL  Auto Neutrophil % : 86.1 %  Auto Lymphocyte % : 9.6 %  Auto Monocyte % : 1.7 %  Auto Eosinophil % : 2.6 %  Auto Basophil % : 0.0 %        05-12    134<L>  |  101  |  20  ----------------------------<  247<H>  3.9   |  22  |  0.65    Ca    8.4      12 May 2024 05:30  Phos  2.8     05-12  Mg     2.0     05-12      Daily     Daily   CAPILLARY BLOOD GLUCOSE      POCT Blood Glucose.: 194 mg/dL (12 May 2024 12:21)      Urinalysis Basic - ( 12 May 2024 05:30 )    Color: x / Appearance: x / SG: x / pH: x  Gluc: 247 mg/dL / Ketone: x  / Bili: x / Urobili: x   Blood: x / Protein: x / Nitrite: x   Leuk Esterase: x / RBC: x / WBC x   Sq Epi: x / Non Sq Epi: x / Bacteria: x

## 2024-05-12 NOTE — EEG REPORT - NS EEG TEXT BOX
[facility] Department of Neurology  Inpatient Continuous video-Electroencephalography Report    Acquisition Details:  Electroencephalography was acquired using a minimum of 21 channels on an [employer] Neurology system v 9.3.1 with electrode placement according to the standard International 10-20 system following ACNS (American Clinical Neurophysiology Society) guidelines for Long-Term Video EEG monitoring.  Anterior temporal T1 and T2 electrodes were utilized whenever possible.   The [employer] automated spike & seizure detections were all reviewed in detail, in addition to extensive portions of raw EEG.      Day 1:  5/11/2024, 3:29:02 PM to next morning at 07:00 AM   Background:  continuous, with predominantly sharply contoured alpha/theta with some intermittent diffuse delta frequencies.  Generalized Slowing:  Mild diffuse slowing. Initially some occasional generalized rhythmic delta activity with overriding fast activity, rarely with some embedded r frontal spikes, resolving by end of study.   Symmetry/Focality: Frequent right frontotemporal polymorphic delta slowing, initially rarely rhythmic (LRDA) then resolving.   Voltage:  Normal (20+ uV)  Organization:  Appropriate anterior-posterior gradient  Posterior Dominant Rhythm:  9 Hz symmetric, well-organized, and well-modulated  Sleep:  Symmetric, synchronous spindles and K complexes.  Variability:   Yes		Reactivity:  Yes    Spontaneous Activity:    1.	Rare left frontal (F3) spikes/sharp waves  2.	Occasional right frontal/frontotemporal (F4>F8/T8) spikes/sharp waves  3.	Rare generalized slow waves with triphasic morphology  Events:  •	No electrographic seizures or significant clinical events occurred during this study.  Provocations:  •	Hyperventilation: was not performed.  •	Photic stimulation: was not performed.  Daily Summary:    •	These findings are indicative of potentially epileptogenic foci over the left frontal and right frontal/frontotemporal regions.   •	There is also evidence for moderate diffuse and R>L frontal focal cerebral dysfunction which is nonspecific in etiology.       Angela White DO  Attending Neurologist, [facility] Epilepsy Program

## 2024-05-12 NOTE — PROGRESS NOTE ADULT - PROBLEM SELECTOR PLAN 2
vEEG placed on 05/11 to observe for seizure-like activity. Prelim read with GRDA w/ R sided LRDA w/ fast activity and right sided spikes without evolution. S/p Keppra 2g on 05/11.   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 Patient previously on aspirin, but was stopped by doctor in 2020, unclear reason why.    - continue ASA 81mg daily  - continue plavix 75mg QD x3 weeks   - continue atorvastatin 80mg daily    - q8h general neuro checks and vitals   - MRI brain on 4/28 with R internal capsule infarct   - Stroke Code HCT Results: negative for acute ischemia    - Stroke Code CTA Results: stenosis of proximal bilateral A2 segments and R M2   - Stroke education   - repeat HCT on 5/5 - subacute infarct centered in the genu and posterior limb of the right internal capsule is stable.  A chronic transcortical infarction in the left precentral gyrus and a small chronic infarct in the right cerebellar hemisphere are stable.  - repeat HCT on 5/11 - stable follow up CT, showing redemonstration of an evolving subacute infarct involving the posterior limb and the right internal capsule. There is no hemorrhagic transformation.  - vEEG placed for 4 hours to rule out seizure activity, f/u vEEG read

## 2024-05-12 NOTE — PROGRESS NOTE ADULT - PROBLEM SELECTOR PLAN 3
Urinary retention with postraumatic hematuria after straight cath. On 5/4, 900 cc and 350cc in BS with postraumatic hematuria. Urology consulted. Hudson in place draining clear yellow urine.   - Started on doxazosin 1mg/night (tamsulosin cannot be administered through PEG tube)   - hudson to remain in place until patient is ambulatory; should follow up with his outpatient urologist for TOV when patient is more ambulatory and having regular BM's vEEG placed on 05/11 to observe for seizure-like activity. Prelim read with GRDA w/ R sided LRDA w/ fast activity and right sided spikes without evolution. S/p Keppra 2g on 05/11. 05/12 read with potentially epileptogenic foci over the left frontal and right frontal/frontotemporal regions; also evidence for moderate diffuse and R>L frontal focal cerebral dysfunction which is nonspecific in etiology.   - Start Keppra 750mg BID today 05/12   - Continue vEEG for now  - Ativan 2mg IV for GTCs > 2 min only  - Neurological assessment Q8hrs  - Seizure & Fall precautions  - Maintain Mg> 2 mmol/l

## 2024-05-12 NOTE — PROGRESS NOTE ADULT - PROBLEM SELECTOR PLAN 11
Pt c/o b/l LE cramping.  - LE dopplers obtained, negative for DVT bilaterally - Started on doxazosin 1mg/night (tamsulosin cannot be administered through PEG tube)   - urology consulted, f/u recs  - hudosn was draining blood-tinged urine, however urology not concerned and attributed to pulling on hudson. Urine on 5/11 improved and more clear.

## 2024-05-12 NOTE — PROGRESS NOTE ADULT - PROBLEM SELECTOR PLAN 5
- high dose statin as above in CVA   - LDL results: 170 Goal -160, okay to work with PT  - Continue Midodrine 5mg BID to maintain adequate perfusion   - wife refusing WALLY/ILR, however she agreeable for MCOT, but after talking to EP team, she agreed to follow up with them after going to acute rehab to consider monitoring.   - Cardiac CT to rule out SANDRO thrombus: No SANDRO or left atrial thrombus. Non cardiac: Apparent filling defects in segmental branches of the right lower lobe pulmonary artery.  - TTE: EF 56%

## 2024-05-12 NOTE — GOALS OF CARE CONVERSATION - ADVANCED CARE PLANNING - CONVERSATION DETAILS
Spent extensive time (40+ minutes) at the bedside with wife and patient using Cantonese  Spent extensive time (40+ minutes) at the bedside with wife and patient using Cantonese . Explained to patients wife about his stroke and neurological deficits and that they are extensive. Explained that unfortunately there are no medications that can reverse his stroke and that the treatment would be extensive physical and occupational rehabilitation. Given that his deficits are extensive and his advanced age, I explained that this would be a long process and that she should expect that he will be in a semi-similar state to what he is in currently. We also spoke about complications of stroke that we have already began to see in her husbands case such as aspiration and infections. Patient failed multiple feeding trials earlier in his course and is s/p PEG on 5/7. Since then he has increased abdominal distention, constipation, and now fevers and worsening mental status c/f aspiration PNA. Explained that we are going to treat these individual problems with bowel rest, aggressive bowel regimen and antibiotics. However, I explained that given he will continue to be in a similar neurological state for sometime that these complications will likely continue to arise and may result in more severe issues in the future. Broached the concept of code status to the wife. She feels overwhelmed by this whole situation. Currently its just the two of them and they rely heavily on each other. They have a son in [place] who cannot travel here to the  and she wants her son to be able to see her . Patient to remain full code at this time.    For constipation and likely aspiration PNA: Zosyn, MRSA and RVP swabs, Abdominal XR, IV reglan and aggressive bowel regimen ordered. Patient to remain NPO for high residuals at this time and will reevaluate. Palliative care consulted Spent extensive time (40+ minutes) at the bedside with wife and patient using Cantonese . Explained to patients wife about his stroke and neurological deficits and that they are extensive. Explained that unfortunately there are no medications that can reverse his stroke and that the treatment would be extensive physical and occupational rehabilitation. Given that his deficits are extensive and his advanced age, I explained that this would be a long process and that she should expect that he will be in a semi-similar state to what he is in currently for some time. We also spoke about complications of stroke that we have already began to see in her husbands case such as aspiration and infections. Patient failed multiple feeding trials earlier in his course and is s/p PEG on 5/7. Since then he has increased abdominal distention, constipation, and now fevers and worsening mental status c/f aspiration PNA. Explained that we are going to treat these individual problems with bowel rest, aggressive bowel regimen and antibiotics. However, I explained that given he will continue to be in a similar neurological state for sometime that these complications will likely continue to arise and may result in more severe issues in the future. Broached the concept of code status to the wife. She feels overwhelmed by this whole situation. Currently its just the two of them and they rely heavily on each other. They have a son in [place] who cannot travel here to the  and she wants her son to be able to see her . Patient to remain full code at this time.    For constipation and likely aspiration PNA: Zosyn, MRSA and RVP swabs, Abdominal XR, IV reglan and aggressive bowel regimen ordered. Patient to remain NPO for high residuals at this time and will reevaluate. Palliative care consulted. Will provide letter to wife in attempts to allow her son to visit

## 2024-05-12 NOTE — PROGRESS NOTE ADULT - ASSESSMENT
88y M Cantonese speaking, R hand dominant, b/l  hearing impairment ( b/l hearing aids) with PMHx of DM, Thalassemia minor/JUSTYNA, HLD, hx stroke with right sided weakness in 2004 (has a cane and walker at home but pt refuses using assisted device, off ASA since 2020 for unclear reason), depression, glaucoma, BPH, [facility] with new left side arm and leg weakness starting 4/26 pm. Wife noticed that patient was leaning against the wall with the left side of his body to support himself to walk and was unable to hold himself upright when sitting, slumping over to the left. Patient felt that his left arm and leg were weak. Called PCP who recommended ED visit. CTH/CTP/CTA head and neck with no acute findings. Loaded with aspirin 300mg x1. Transferred to [facility] for further stroke w/u.    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  # hx CVA w/ residual R sided weakness   # Dysphagia    - midodrine 2.5mg bid ( 4/30)-> increased to 5mg bid( 5/1-)  to help with blood pressure for perfusion.   - fever/leucocytosis- concern aspiration pneumonia =- on zosyn, fu chest x ray, cultures. has hudson in place, no tenderness in supra-pubic area ( zosyn should cover -currently no sign of infection in urine  - high residual -thus peg feeding held, was started on reglan   - stool softener.   - video EEG = started Keppra by neuro team 5/11- due to concern for seizure, repeat CTH stable- now that peg feeding held= to give keppra via IV route ( eqivalent dose )   s/p PEG on Tues 5/7, ok ASA, resume plavix 48hr after PEG ( 5/9), VTE ppx in am 5/8,  - f/u wife's decision on LINQ ( wife spoke with son from [place] and wants to hold off LINQ)   - Cardiac CT: reviewed, no SANDRO taylor   - CT Chest: no PE   - s/p aspirin load 300mg x1( 4/27)  - continue aspirin 81mg po daily ( 4/27-)  - Clopidogrel 75mg po daily ( 4/28-5/2) hold until 48hr (5/9) after PEG placement(5/7), will need Clopidogrel for 21 days then stop   - continue Atorvastatin 80mg po qHS  ( 4/27-) via peg  - - cw statin.   - PT/OT eval    # Acute urinary retention- hudson in place.  # Gross hematuria- resolved,   # hx BPH  - resumed tamsulosin 0.4mg  -  consult appreciated re: gross hematuria, placed 22Fr hudson cath, TOV only when pt is ambulatory  punch color urine likely from pulling hudson ( encouraged not to pull ) on 5/10- urology checked hudson in place, clear yellow urine on 5/11-     # Anemia hx Thallassemia minor  - Ferritin 410, Tsat 33% adequate iron store    #constipation  - c/w bowel regimen w/ senna+miralax    #DM  - A1C 6.3%   home meds: metformin 500mg TID, Januvia 50mg daily - held for now.   - ISS, goal -180   - can give lantus 4-6 units per day ( hypoglycemia precaution )- would not go up on lantus when peg feeding is held,     #DVT Prophylaxis: Lovenox 40mg sq daily     Dispo: fu PT/OT, will benefit inpatient Rehab ( Wife chose [facility] or [facility])      Contact:  PMD Dr. Aron Soares   Wife: Farshad Pelayo # [phone]    POC as d/w Neuro team     Thank you for allowing medicine to participate in the care, questions from wife answered.

## 2024-05-13 LAB
ADD ON TEST-SPECIMEN IN LAB: SIGNIFICANT CHANGE UP
ANION GAP SERPL CALC-SCNC: 13 MMOL/L — SIGNIFICANT CHANGE UP (ref 5–17)
ANISOCYTOSIS BLD QL: SLIGHT — SIGNIFICANT CHANGE UP
BASOPHILS # BLD AUTO: 0 K/UL — SIGNIFICANT CHANGE UP (ref 0–0.2)
BASOPHILS NFR BLD AUTO: 0 % — SIGNIFICANT CHANGE UP (ref 0–2)
BLD GP AB SCN SERPL QL: NEGATIVE — SIGNIFICANT CHANGE UP
BUN SERPL-MCNC: 22 MG/DL — SIGNIFICANT CHANGE UP (ref 7–23)
BURR CELLS BLD QL SMEAR: PRESENT — SIGNIFICANT CHANGE UP
CALCIUM SERPL-MCNC: 8.8 MG/DL — SIGNIFICANT CHANGE UP (ref 8.4–10.5)
CHLORIDE SERPL-SCNC: 100 MMOL/L — SIGNIFICANT CHANGE UP (ref 96–108)
CO2 SERPL-SCNC: 22 MMOL/L — SIGNIFICANT CHANGE UP (ref 22–31)
CREAT SERPL-MCNC: 0.79 MG/DL — SIGNIFICANT CHANGE UP (ref 0.5–1.3)
DACRYOCYTES BLD QL SMEAR: SLIGHT — SIGNIFICANT CHANGE UP
EGFR: 87 ML/MIN/1.73M2 — SIGNIFICANT CHANGE UP
ELLIPTOCYTES BLD QL SMEAR: SLIGHT — SIGNIFICANT CHANGE UP
EOSINOPHIL # BLD AUTO: 0.23 K/UL — SIGNIFICANT CHANGE UP (ref 0–0.5)
EOSINOPHIL NFR BLD AUTO: 1.7 % — SIGNIFICANT CHANGE UP (ref 0–6)
GIANT PLATELETS BLD QL SMEAR: PRESENT — SIGNIFICANT CHANGE UP
GLUCOSE BLDC GLUCOMTR-MCNC: 127 MG/DL — HIGH (ref 70–99)
GLUCOSE BLDC GLUCOMTR-MCNC: 158 MG/DL — HIGH (ref 70–99)
GLUCOSE BLDC GLUCOMTR-MCNC: 168 MG/DL — HIGH (ref 70–99)
GLUCOSE BLDC GLUCOMTR-MCNC: 179 MG/DL — HIGH (ref 70–99)
GLUCOSE SERPL-MCNC: 148 MG/DL — HIGH (ref 70–99)
HCT VFR BLD CALC: 34.3 % — LOW (ref 39–50)
HGB BLD-MCNC: 11.2 G/DL — LOW (ref 13–17)
HYPOCHROMIA BLD QL: SLIGHT — SIGNIFICANT CHANGE UP
LYMPHOCYTES # BLD AUTO: 1.16 K/UL — SIGNIFICANT CHANGE UP (ref 1–3.3)
LYMPHOCYTES # BLD AUTO: 8.6 % — LOW (ref 13–44)
MAGNESIUM SERPL-MCNC: 2.2 MG/DL — SIGNIFICANT CHANGE UP (ref 1.6–2.6)
MANUAL SMEAR VERIFICATION: SIGNIFICANT CHANGE UP
MCHC RBC-ENTMCNC: 22.9 PG — LOW (ref 27–34)
MCHC RBC-ENTMCNC: 32.7 GM/DL — SIGNIFICANT CHANGE UP (ref 32–36)
MCV RBC AUTO: 70.1 FL — LOW (ref 80–100)
MICROCYTES BLD QL: SLIGHT — SIGNIFICANT CHANGE UP
MONOCYTES # BLD AUTO: 1.05 K/UL — HIGH (ref 0–0.9)
MONOCYTES NFR BLD AUTO: 7.8 % — SIGNIFICANT CHANGE UP (ref 2–14)
NEUTROPHILS # BLD AUTO: 11.02 K/UL — HIGH (ref 1.8–7.4)
NEUTROPHILS NFR BLD AUTO: 81.9 % — HIGH (ref 43–77)
OVALOCYTES BLD QL SMEAR: SIGNIFICANT CHANGE UP
PHOSPHATE SERPL-MCNC: 4.1 MG/DL — SIGNIFICANT CHANGE UP (ref 2.5–4.5)
PLAT MORPH BLD: ABNORMAL
PLATELET # BLD AUTO: 245 K/UL — SIGNIFICANT CHANGE UP (ref 150–400)
POIKILOCYTOSIS BLD QL AUTO: SIGNIFICANT CHANGE UP
POLYCHROMASIA BLD QL SMEAR: SLIGHT — SIGNIFICANT CHANGE UP
POTASSIUM SERPL-MCNC: 4.1 MMOL/L — SIGNIFICANT CHANGE UP (ref 3.5–5.3)
POTASSIUM SERPL-SCNC: 4.1 MMOL/L — SIGNIFICANT CHANGE UP (ref 3.5–5.3)
RBC # BLD: 4.89 M/UL — SIGNIFICANT CHANGE UP (ref 4.2–5.8)
RBC # FLD: 15.6 % — HIGH (ref 10.3–14.5)
RBC BLD AUTO: ABNORMAL
RH IG SCN BLD-IMP: POSITIVE — SIGNIFICANT CHANGE UP
SODIUM SERPL-SCNC: 135 MMOL/L — SIGNIFICANT CHANGE UP (ref 135–145)
SPHEROCYTES BLD QL SMEAR: SLIGHT — SIGNIFICANT CHANGE UP
TARGETS BLD QL SMEAR: SLIGHT — SIGNIFICANT CHANGE UP
WBC # BLD: 13.46 K/UL — HIGH (ref 3.8–10.5)
WBC # FLD AUTO: 13.46 K/UL — HIGH (ref 3.8–10.5)

## 2024-05-13 PROCEDURE — 99233 SBSQ HOSP IP/OBS HIGH 50: CPT

## 2024-05-13 PROCEDURE — 95720 EEG PHY/QHP EA INCR W/VEEG: CPT

## 2024-05-13 PROCEDURE — 74177 CT ABD & PELVIS W/CONTRAST: CPT | Mod: 26

## 2024-05-13 PROCEDURE — 71260 CT THORAX DX C+: CPT | Mod: 26

## 2024-05-13 RX ORDER — VANCOMYCIN HCL 1 G
1000 VIAL (EA) INTRAVENOUS EVERY 12 HOURS
Refills: 0 | Status: COMPLETED | OUTPATIENT
Start: 2024-05-13 | End: 2024-05-13

## 2024-05-13 RX ORDER — IOHEXOL 300 MG/ML
30 INJECTION, SOLUTION INTRAVENOUS ONCE
Refills: 0 | Status: COMPLETED | OUTPATIENT
Start: 2024-05-13 | End: 2024-05-13

## 2024-05-13 RX ORDER — SODIUM CHLORIDE 9 MG/ML
1000 INJECTION INTRAMUSCULAR; INTRAVENOUS; SUBCUTANEOUS
Refills: 0 | Status: DISCONTINUED | OUTPATIENT
Start: 2024-05-13 | End: 2024-05-17

## 2024-05-13 RX ADMIN — Medication 1 MILLIGRAM(S): at 21:39

## 2024-05-13 RX ADMIN — Medication 325 MILLIGRAM(S): at 11:18

## 2024-05-13 RX ADMIN — ENOXAPARIN SODIUM 40 MILLIGRAM(S): 100 INJECTION SUBCUTANEOUS at 11:16

## 2024-05-13 RX ADMIN — CLOPIDOGREL BISULFATE 75 MILLIGRAM(S): 75 TABLET, FILM COATED ORAL at 11:18

## 2024-05-13 RX ADMIN — PIPERACILLIN AND TAZOBACTAM 25 GRAM(S): 4; .5 INJECTION, POWDER, LYOPHILIZED, FOR SOLUTION INTRAVENOUS at 06:26

## 2024-05-13 RX ADMIN — MIDODRINE HYDROCHLORIDE 5 MILLIGRAM(S): 2.5 TABLET ORAL at 11:18

## 2024-05-13 RX ADMIN — PIPERACILLIN AND TAZOBACTAM 25 GRAM(S): 4; .5 INJECTION, POWDER, LYOPHILIZED, FOR SOLUTION INTRAVENOUS at 21:46

## 2024-05-13 RX ADMIN — Medication 250 MILLIGRAM(S): at 12:44

## 2024-05-13 RX ADMIN — Medication 1: at 00:37

## 2024-05-13 RX ADMIN — ATORVASTATIN CALCIUM 80 MILLIGRAM(S): 80 TABLET, FILM COATED ORAL at 21:39

## 2024-05-13 RX ADMIN — MAGNESIUM OXIDE 400 MG ORAL TABLET 400 MILLIGRAM(S): 241.3 TABLET ORAL at 11:19

## 2024-05-13 RX ADMIN — MIDODRINE HYDROCHLORIDE 5 MILLIGRAM(S): 2.5 TABLET ORAL at 00:37

## 2024-05-13 RX ADMIN — Medication 1 MILLIGRAM(S): at 11:19

## 2024-05-13 RX ADMIN — LEVETIRACETAM 750 MILLIGRAM(S): 250 TABLET, FILM COATED ORAL at 19:20

## 2024-05-13 RX ADMIN — IOHEXOL 30 MILLILITER(S): 300 INJECTION, SOLUTION INTRAVENOUS at 14:51

## 2024-05-13 RX ADMIN — PIPERACILLIN AND TAZOBACTAM 25 GRAM(S): 4; .5 INJECTION, POWDER, LYOPHILIZED, FOR SOLUTION INTRAVENOUS at 13:15

## 2024-05-13 RX ADMIN — SODIUM CHLORIDE 50 MILLILITER(S): 9 INJECTION INTRAMUSCULAR; INTRAVENOUS; SUBCUTANEOUS at 20:30

## 2024-05-13 RX ADMIN — LEVETIRACETAM 750 MILLIGRAM(S): 250 TABLET, FILM COATED ORAL at 08:43

## 2024-05-13 RX ADMIN — POLYETHYLENE GLYCOL 3350 17 GRAM(S): 17 POWDER, FOR SOLUTION ORAL at 08:45

## 2024-05-13 RX ADMIN — Medication 81 MILLIGRAM(S): at 11:18

## 2024-05-13 RX ADMIN — Medication 1: at 18:31

## 2024-05-13 RX ADMIN — SODIUM CHLORIDE 50 MILLILITER(S): 9 INJECTION INTRAMUSCULAR; INTRAVENOUS; SUBCUTANEOUS at 12:44

## 2024-05-13 RX ADMIN — INSULIN GLARGINE 4 UNIT(S): 100 INJECTION, SOLUTION SUBCUTANEOUS at 23:28

## 2024-05-13 RX ADMIN — Medication 5 MILLIGRAM(S): at 06:26

## 2024-05-13 RX ADMIN — Medication 5 MILLIGRAM(S): at 13:14

## 2024-05-13 RX ADMIN — Medication 5 MILLIGRAM(S): at 21:39

## 2024-05-13 NOTE — PROGRESS NOTE ADULT - SUBJECTIVE AND OBJECTIVE BOX
Neurology Stroke Progress Note    INTERVAL HPI/OVERNIGHT EVENTS:  Patient seen and examined.     MEDICATIONS  (STANDING):  aspirin  chewable 81 milliGRAM(s) Oral daily  atorvastatin 80 milliGRAM(s) Oral at bedtime  clopidogrel Tablet 75 milliGRAM(s) Oral daily  dextrose 10% Bolus 125 milliLiter(s) IV Bolus once  dextrose 5%. 1000 milliLiter(s) (100 mL/Hr) IV Continuous <Continuous>  dextrose 5%. 1000 milliLiter(s) (50 mL/Hr) IV Continuous <Continuous>  dextrose 50% Injectable 25 Gram(s) IV Push once  dextrose 50% Injectable 12.5 Gram(s) IV Push once  doxazosin 1 milliGRAM(s) Oral at bedtime  enoxaparin Injectable 40 milliGRAM(s) SubCutaneous every 24 hours  ferrous    sulfate 325 milliGRAM(s) Oral daily  folic acid 1 milliGRAM(s) Oral daily  glucagon  Injectable 1 milliGRAM(s) IntraMuscular once  insulin glargine Injectable (LANTUS) 4 Unit(s) SubCutaneous at bedtime  insulin lispro (ADMELOG) corrective regimen sliding scale   SubCutaneous every 6 hours  levETIRAcetam   Injectable 750 milliGRAM(s) IV Push every 12 hours  magnesium oxide 400 milliGRAM(s) Oral daily  metoclopramide Injectable 5 milliGRAM(s) IV Push every 8 hours  midodrine. 5 milliGRAM(s) Oral <User Schedule>  piperacillin/tazobactam IVPB.. 4.5 Gram(s) IV Intermittent every 8 hours  polyethylene glycol 3350 17 Gram(s) Oral every 12 hours  potassium chloride  10 mEq/100 mL IVPB 10 milliEquivalent(s) IV Intermittent every 1 hour  senna 2 Tablet(s) Oral at bedtime    MEDICATIONS  (PRN):  acetaminophen     Tablet .. 650 milliGRAM(s) Oral every 6 hours PRN Temp greater or equal to 38C (100.4F), Mild Pain (1 - 3), Moderate Pain (4 - 6)  dextrose Oral Gel 15 Gram(s) Oral once PRN Blood Glucose LESS THAN 70 milliGRAM(s)/deciliter  guaiFENesin Oral Liquid (Sugar-Free) 100 milliGRAM(s) Oral every 6 hours PRN Cough      Allergies    No Known Allergies    Intolerances        Vital Signs Last 24 Hrs  T(C): 36.8 (13 May 2024 06:00), Max: 38.9 (12 May 2024 21:11)  T(F): 98.2 (13 May 2024 06:00), Max: 102.1 (12 May 2024 21:11)  HR: 87 (13 May 2024 06:00) (87 - 99)  BP: 114/63 (13 May 2024 06:00) (114/63 - 133/74)  BP(mean): --  RR: 18 (13 May 2024 06:00) (17 - 18)  SpO2: 96% (13 May 2024 06:00) (95% - 96%)    Parameters below as of 13 May 2024 06:00  Patient On (Oxygen Delivery Method): room air        Physical exam:  General: No acute distress, awake and alert  Eyes: Anicteric sclerae, moist conjunctivae, see below for CNs  Neck: FROM  Cardiovascular: Regular rate and rhythm  Pulmonary: No use of accessory muscles  Extremities: no edema    ******Neurologic:  -Mental status: Awake, alert, oriented to person and place but not time. Severe dysarthria, minimal verbal output. Able to follow simple commands.   -Cranial nerves:   II: Visual fields are full to confrontation.  III, IV, VI: Extraocular movements are intact without nystagmus. Pupils equally round and reactive to light  V:  Facial sensation V1-V3 equal and intact   VII: LT facial droop   VIII: Hearing is bilaterally intact to finger rub  Motor: Normal bulk and tone.  Right upper and lower extremity 4/5 without drift. Left upper extremity 0/5, left lower extremity with trace movement, 1/5    Sensation: Grossly intact, inconsistent response when testing sensation of bilateral lower extremities (will say examiner is touching right side when examiner is only touching left, which is the affected side of the stroke)  Coordination: No dysmetria on finger-to-nose on RUE.         LABS:                        11.2   13.46 )-----------( 245      ( 13 May 2024 05:30 )             34.3     05-13    135  |  100  |  22  ----------------------------<  148<H>  4.1   |  22  |  0.79    Ca    8.8      13 May 2024 05:30  Phos  4.1     05-13  Mg     2.2     05-13        Urinalysis Basic - ( 13 May 2024 05:30 )    Color: x / Appearance: x / SG: x / pH: x  Gluc: 148 mg/dL / Ketone: x  / Bili: x / Urobili: x   Blood: x / Protein: x / Nitrite: x   Leuk Esterase: x / RBC: x / WBC x   Sq Epi: x / Non Sq Epi: x / Bacteria: x        RADIOLOGY & ADDITIONAL TESTS:

## 2024-05-13 NOTE — PROGRESS NOTE ADULT - ASSESSMENT
88y M Cantonese speaking, R hand dominant, b/l  hearing impairment ( b/l hearing aids) with PMHx of DM, Thalassemia minor/JUSTYNA, HLD, hx stroke with right sided weakness in 2004 (has a cane and walker at home but pt refuses using assisted device, off ASA since 2020 for unclear reason), depression, glaucoma, BPH, [facility] with new left side arm and leg weakness starting 4/26 pm. Wife noticed that patient was leaning against the wall with the left side of his body to support himself to walk and was unable to hold himself upright when sitting, slumping over to the left. Patient felt that his left arm and leg were weak. Called PCP who recommended ED visit. CTH/CTP/CTA head and neck with no acute findings. Loaded with aspirin 300mg x1. Transferred to [facility] for further stroke w/u.    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  # hx CVA w/ residual R sided weakness   # Dysphagia  # fever       - fever/leucocytosis- concern aspiration pneumonia =- on zosyn, fu chest x ray,- non revealing , blood culture- thus far negative  s. has hudson in place, no tenderness in supra-pubic area ( zosyn should cover -currently no sign of infection in urine  tmax 38.9- would give a dose of vancomycin ( mrsa swab negative  - aspiration precaution  - low residual reported - plan to start trickle feeding  - to give ivf for maintainence since npo and feeding held -could give at rate 50 cc per hour, to use isontonic fluid.  - currently on reglan Q 8 hourly .  - stool softener.   - midodrine 2.5mg bid ( 4/30)-> increased to 5mg bid( 5/1-)  to help with blood pressure for perfusion.   - video EEG = started Keppra by neuro team 5/11- due to concern for seizure, repeat CTH stable- now that peg feeding held= to give keppra via IV route ( eqivalent dose )   s/p PEG on Tues 5/7, ok ASA, resume plavix 48hr after PEG ( 5/9), VTE ppx in am 5/8,  - f/u wife's decision on LINQ ( wife spoke with son from [place] and wants to hold off LINQ)   - Cardiac CT: reviewed, no SANDRO taylor   - CT Chest: no PE   - s/p aspirin load 300mg x1( 4/27)  - continue aspirin 81mg po daily ( 4/27-)  - Clopidogrel 75mg po daily ( 4/28-5/2) hold until 48hr (5/9) after PEG placement(5/7), will need Clopidogrel for 21 days then stop   - continue Atorvastatin 80mg po qHS  ( 4/27-) via peg  - - cw statin.   - PT/OT eval    # Acute urinary retention- hudson in place.  # Gross hematuria- resolved,   # hx BPH  - resumed tamsulosin 0.4mg  -  consult appreciated re: gross hematuria, placed 22Fr hudson cath, TOV only when pt is ambulatory  punch color urine likely from pulling hudson ( encouraged not to pull ) on 5/10- urology checked hudson in place, clear yellow urine on 5/11-     # Anemia hx Thallassemia minor  - Ferritin 410, Tsat 33% adequate iron store    #constipation  - c/w bowel regimen w/ senna+miralax    #DM  - A1C 6.3%   home meds: metformin 500mg TID, Januvia 50mg daily - held for now.   - ISS, goal -180   - can give lantus 4-6 units per day ( hypoglycemia precaution )- would not go up on lantus when peg feeding is held,     #DVT Prophylaxis: Lovenox 40mg sq daily     Dispo: fu PT/OT, will benefit inpatient Rehab ( Wife chose [facility] or [place])      Contact:  PMD Dr. Aron Soares   Wife: Farshad Pierce # [phone]    POC as d/w Neuro resident.   agree with CT C/a?p for source of fever ( pt could not provide history, RVP negative )     Thank you for allowing medicine to participate in the care,

## 2024-05-13 NOTE — PROGRESS NOTE ADULT - PROBLEM SELECTOR PLAN 8
Not currently on a standing bowel regimen. Patient's wife states previously when given laxatives he had 6-7 episodes of bowel movements right after. Patient had one bowel movement overnight, dark green in appearance.   - continue standing senna and miralax via PEG  - f/u stool count

## 2024-05-13 NOTE — PROGRESS NOTE ADULT - SUBJECTIVE AND OBJECTIVE BOX
******INCOMPLETE******    OVERNIGHT EVENTS/INTERVAL HPI:    SUBJECTIVE:     OBJECTIVE:  Vital Signs Last 24 Hrs  T(C): 36.8 (13 May 2024 06:00), Max: 38.9 (12 May 2024 21:11)  T(F): 98.2 (13 May 2024 06:00), Max: 102.1 (12 May 2024 21:11)  HR: 87 (13 May 2024 06:00) (87 - 99)  BP: 114/63 (13 May 2024 06:00) (114/63 - 133/74)  BP(mean): --  RR: 18 (13 May 2024 06:00) (17 - 18)  SpO2: 96% (13 May 2024 06:00) (95% - 96%)    Parameters below as of 13 May 2024 06:00  Patient On (Oxygen Delivery Method): room air      I&O's Detail    12 May 2024 07:01  -  13 May 2024 07:00  --------------------------------------------------------  IN:  Total IN: 0 mL    OUT:    Indwelling Catheter - Urethral (mL): 900 mL  Total OUT: 900 mL    Total NET: -900 mL    Physical Exam:      Medications:  MEDICATIONS  (STANDING):  aspirin  chewable 81 milliGRAM(s) Oral daily  atorvastatin 80 milliGRAM(s) Oral at bedtime  clopidogrel Tablet 75 milliGRAM(s) Oral daily  dextrose 10% Bolus 125 milliLiter(s) IV Bolus once  dextrose 5%. 1000 milliLiter(s) (100 mL/Hr) IV Continuous <Continuous>  dextrose 5%. 1000 milliLiter(s) (50 mL/Hr) IV Continuous <Continuous>  dextrose 50% Injectable 25 Gram(s) IV Push once  dextrose 50% Injectable 12.5 Gram(s) IV Push once  doxazosin 1 milliGRAM(s) Oral at bedtime  enoxaparin Injectable 40 milliGRAM(s) SubCutaneous every 24 hours  ferrous    sulfate 325 milliGRAM(s) Oral daily  folic acid 1 milliGRAM(s) Oral daily  glucagon  Injectable 1 milliGRAM(s) IntraMuscular once  insulin glargine Injectable (LANTUS) 4 Unit(s) SubCutaneous at bedtime  insulin lispro (ADMELOG) corrective regimen sliding scale   SubCutaneous every 6 hours  levETIRAcetam   Injectable 750 milliGRAM(s) IV Push every 12 hours  magnesium oxide 400 milliGRAM(s) Oral daily  metoclopramide Injectable 5 milliGRAM(s) IV Push every 8 hours  midodrine. 5 milliGRAM(s) Oral <User Schedule>  piperacillin/tazobactam IVPB.. 4.5 Gram(s) IV Intermittent every 8 hours  polyethylene glycol 3350 17 Gram(s) Oral every 12 hours  potassium chloride  10 mEq/100 mL IVPB 10 milliEquivalent(s) IV Intermittent every 1 hour  senna 2 Tablet(s) Oral at bedtime    MEDICATIONS  (PRN):  acetaminophen     Tablet .. 650 milliGRAM(s) Oral every 6 hours PRN Temp greater or equal to 38C (100.4F), Mild Pain (1 - 3), Moderate Pain (4 - 6)  dextrose Oral Gel 15 Gram(s) Oral once PRN Blood Glucose LESS THAN 70 milliGRAM(s)/deciliter  guaiFENesin Oral Liquid (Sugar-Free) 100 milliGRAM(s) Oral every 6 hours PRN Cough      Labs:

## 2024-05-13 NOTE — PROVIDER CONTACT NOTE (OTHER) - ASSESSMENT
Upon assessing patients Hudson catheter Bright red blood noted in hudson bag. MD Gregory made aware and awaiting MD to assess patient.
Pt is obtunded and difficult to arouse, unable to speak/garbled speech, unable to move left upper and lower extremities, left facial droop and left eye ptosis, unable to follow commands. Neuro assessment charted in flow sheet.
Bladder scan 878cc urine, called provider for straight cath order, non traumatic entry, urine started coming out clear yellow but turned sanguinous about 15-30 seconds into catheterization but then cleared up again to clear yellow. 950cc emptied from bladder.

## 2024-05-13 NOTE — PROGRESS NOTE ADULT - PROBLEM SELECTOR PLAN 1
Patient meeting 2/4 SIRS criteria 05/12 (HR 98, T 102) and increasing leukocytosis (9k -> 11k). Blood cultures drawn. CXR with left-sided effusion. Unclear source of infection. MRSA swab negative. Abdominal x-ray without obstruction.   - Continue empiric IV zosyn 4.5g q8h and start IV vancomycin   - Obtain CT chest/abdomen/pelvis with contrast   - F/u blood cultures Patient meeting 2/4 SIRS criteria 05/12 (HR 98, T 102) and increasing leukocytosis (11k -> 13k today). Blood cultures drawn. CXR with left-sided effusion. Unclear source of infection. MRSA swab negative. Abdominal x-ray without obstruction.   - Trend CBC + diff   - Continue empiric IV zosyn 4.5g q8h and start IV vancomycin   - Obtain CT chest/abdomen/pelvis with contrast   - F/u blood cultures

## 2024-05-13 NOTE — PROGRESS NOTE ADULT - SUBJECTIVE AND OBJECTIVE BOX
OVERNIGHT EVENTS/INTERVAL HPI: Axillary temp 100.3, rectal 102.1. RN c/f lethargy, able to follow commands (squeeze R hand), wife says tired looking but not far from baseline. . Tylenol. Fruit punch hematuria, but hudson still draining. Mittens bc patient pulling at EEG.     SUBJECTIVE: Patient seen and examined at bedside. Still lethargic this morning. Occasionally following commands, moving arm. Per wife at bedside this is how he was overnight as well.     OBJECTIVE:  Vital Signs Last 24 Hrs  T(C): 36.8 (13 May 2024 06:00), Max: 38.9 (12 May 2024 21:11)  T(F): 98.2 (13 May 2024 06:00), Max: 102.1 (12 May 2024 21:11)  HR: 87 (13 May 2024 06:00) (87 - 99)  BP: 114/63 (13 May 2024 06:00) (114/63 - 128/71)  BP(mean): --  RR: 18 (13 May 2024 06:00) (17 - 18)  SpO2: 96% (13 May 2024 06:00) (95% - 96%)    Parameters below as of 13 May 2024 06:00  Patient On (Oxygen Delivery Method): room air      I&O's Detail    12 May 2024 07:01  -  13 May 2024 07:00  --------------------------------------------------------  IN:  Total IN: 0 mL    OUT:    Indwelling Catheter - Urethral (mL): 900 mL  Total OUT: 900 mL    Total NET: -900 mL    Physical Exam:  General: NAD; lethargic;   Head: vEEG in place   Eyes: Anicteric sclerae, moist conjunctivae   Cardiovascular: RRR  Pulmonary: CTAB, no increased WOB  GI: Abdomen soft but distended   : hudson draining fruit punch urine   Extremities: no edema    Neurological Exam:   Mental status: Severe dysarthria, minimal verbal output. Able to follow simple commands  Cranial nerves: Pupils equally round and reactive to light, visual fields full, no nystagmus, extraocular muscles intact, V1 through V3 intact bilaterally and symmetric, Left facial droop, tongue was midline.  Motor: Right upper and lower extremity 4/5 without drift. Left upper extremity 0/5, left lower extremity with trace movement, 1/5    Sensation: Grossly intact, inconsistent response when testing sensation of bilateral lower extremities  Coordination: No dysmetria appreciated with right upper extremity      Medications:  MEDICATIONS  (STANDING):  aspirin  chewable 81 milliGRAM(s) Oral daily  atorvastatin 80 milliGRAM(s) Oral at bedtime  clopidogrel Tablet 75 milliGRAM(s) Oral daily  dextrose 10% Bolus 125 milliLiter(s) IV Bolus once  dextrose 5%. 1000 milliLiter(s) (100 mL/Hr) IV Continuous <Continuous>  dextrose 5%. 1000 milliLiter(s) (50 mL/Hr) IV Continuous <Continuous>  dextrose 50% Injectable 25 Gram(s) IV Push once  dextrose 50% Injectable 12.5 Gram(s) IV Push once  doxazosin 1 milliGRAM(s) Oral at bedtime  enoxaparin Injectable 40 milliGRAM(s) SubCutaneous every 24 hours  ferrous    sulfate 325 milliGRAM(s) Oral daily  folic acid 1 milliGRAM(s) Oral daily  glucagon  Injectable 1 milliGRAM(s) IntraMuscular once  insulin glargine Injectable (LANTUS) 4 Unit(s) SubCutaneous at bedtime  insulin lispro (ADMELOG) corrective regimen sliding scale   SubCutaneous every 6 hours  levETIRAcetam   Injectable 750 milliGRAM(s) IV Push every 12 hours  magnesium oxide 400 milliGRAM(s) Oral daily  metoclopramide Injectable 5 milliGRAM(s) IV Push every 8 hours  midodrine. 5 milliGRAM(s) Oral <User Schedule>  piperacillin/tazobactam IVPB.. 4.5 Gram(s) IV Intermittent every 8 hours  polyethylene glycol 3350 17 Gram(s) Oral every 12 hours  potassium chloride  10 mEq/100 mL IVPB 10 milliEquivalent(s) IV Intermittent every 1 hour  senna 2 Tablet(s) Oral at bedtime  sodium chloride 0.9%. 1000 milliLiter(s) (50 mL/Hr) IV Continuous <Continuous>    MEDICATIONS  (PRN):  acetaminophen     Tablet .. 650 milliGRAM(s) Oral every 6 hours PRN Temp greater or equal to 38C (100.4F), Mild Pain (1 - 3), Moderate Pain (4 - 6)  dextrose Oral Gel 15 Gram(s) Oral once PRN Blood Glucose LESS THAN 70 milliGRAM(s)/deciliter  guaiFENesin Oral Liquid (Sugar-Free) 100 milliGRAM(s) Oral every 6 hours PRN Cough      Labs:                        11.2   13.46 )-----------( 245      ( 13 May 2024 05:30 )             34.3     05-13    135  |  100  |  22  ----------------------------<  148<H>  4.1   |  22  |  0.79    Ca    8.8      13 May 2024 05:30  Phos  4.1     05-13  Mg     2.2     05-13          Urinalysis Basic - ( 13 May 2024 05:30 )    Color: x / Appearance: x / SG: x / pH: x  Gluc: 148 mg/dL / Ketone: x  / Bili: x / Urobili: x   Blood: x / Protein: x / Nitrite: x   Leuk Esterase: x / RBC: x / WBC x   Sq Epi: x / Non Sq Epi: x / Bacteria: x      SARS-CoV-2: NotDetec (12 May 2024 07:55)      Radiology: Reviewed

## 2024-05-13 NOTE — PROGRESS NOTE ADULT - PROBLEM SELECTOR PLAN 4
Urinary retention with postraumatic hematuria after straight cath. On 5/4, 900 cc and 350cc in BS with postraumatic hematuria. Urology consulted. Hudson in place draining clear yellow urine.   - Started on doxazosin 1mg/night (tamsulosin cannot be administered through PEG tube)   - hudson to remain in place until patient is ambulatory; should follow up with his outpatient urologist for TOV when patient is more ambulatory and having regular BM's

## 2024-05-13 NOTE — PROGRESS NOTE ADULT - PROBLEM SELECTOR PLAN 10
Home meds: metformin 500mg TID, Januvia 50mg daily    Plan:  - A1C results: 6.3%   - c/w mISS   - Tube feeds  - FSG q6 hours  - started Lantus 4u qhs due to elevated FGS in the 200's

## 2024-05-13 NOTE — PROGRESS NOTE ADULT - ASSESSMENT
Mr. Bruce is an 88y Cantonese speaking Male with PMHx of JUSTYNA, stroke (2004, residual right sided weakness), HLD, glaucoma, DM, BPH, depression, and b/l hearing loss, who present to [facility] with new left-sided arm and leg weakness starting 4/26 pm, transferred to [facility] for further stroke w/u. Course c/b periods of waxing/waning left sided hemiparesis, likely due to hypoperfusion, s/p IVF boluses, now started on midodrine. Patient with urinary retention with postraumatic hematuria after straight cath in pt with BPH. Now s/p PEG placement on 5/7 and stepped down from stroke tele to [facility] while awaiting AR placement.

## 2024-05-13 NOTE — PROVIDER CONTACT NOTE (OTHER) - REASON
Pt is retaining urine and had some bloody discharge upon straight catheterization
Bucky red blood noted in Galindo catheter
Change in neurological status

## 2024-05-13 NOTE — PROGRESS NOTE ADULT - PROBLEM SELECTOR PLAN 2
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 Patient previously on aspirin, but was stopped by doctor in 2020, unclear reason why.    - continue ASA 81mg daily  - continue plavix 75mg QD x3 weeks   - continue atorvastatin 80mg daily    - q8h general neuro checks and vitals   - MRI brain on 4/28 with R internal capsule infarct   - Stroke Code HCT Results: negative for acute ischemia    - Stroke Code CTA Results: stenosis of proximal bilateral A2 segments and R M2   - Stroke education   - repeat HCT on 5/5 - subacute infarct centered in the genu and posterior limb of the right internal capsule is stable.  A chronic transcortical infarction in the left precentral gyrus and a small chronic infarct in the right cerebellar hemisphere are stable.  - repeat HCT on 5/11 - stable follow up CT, showing redemonstration of an evolving subacute infarct involving the posterior limb and the right internal capsule. There is no hemorrhagic transformation.  - vEEG placed for 4 hours to rule out seizure activity, f/u vEEG read

## 2024-05-13 NOTE — CHART NOTE - NSCHARTNOTEFT_GEN_A_CORE
Admitting Diagnosis:   Patient is a 88y old  Male who presents with a chief complaint of left sided weakness (13 May 2024 07:29)    PAST MEDICAL & SURGICAL HISTORY:  Diabetes  HLD (hyperlipidemia)  BPH (benign prostatic hyperplasia)  JUSTYNA (iron deficiency anemia)  Stroke    Current Nutrition Order:  NPO    PO Intake: Good (%) [   ]  Fair (50-75%) [   ] Poor (<25%) [   ] - N/A NPO    GI Issues:   Pt's wife denies pt with nausea/vomiting  Per wife and RN pt with x1 BM yesterday, no constipation/diarrhea noted  Abdominal distension ongoing  +PEG    Pain:  No pain reported or nonverbal indicators noted    Skin Integrity:  No edema documented  Stage I sacral pressure injury noted  Leonel score 12    Labs:       135  |  100  |  22  ----------------------------<  148<H>  4.1   |  22  |  0.79    Ca    8.8      13 May 2024 05:30  Phos  4.1       Mg     2.2         CAPILLARY BLOOD GLUCOSE  POCT Blood Glucose.: 127 mg/dL (13 May 2024 06:19)  POCT Blood Glucose.: 168 mg/dL (13 May 2024 00:32)  POCT Blood Glucose.: 153 mg/dL (12 May 2024 21:16)  POCT Blood Glucose.: 181 mg/dL (12 May 2024 18:22)  POCT Blood Glucose.: 194 mg/dL (12 May 2024 12:21)    Medications:  MEDICATIONS  (STANDING):  aspirin  chewable 81 milliGRAM(s) Oral daily  atorvastatin 80 milliGRAM(s) Oral at bedtime  clopidogrel Tablet 75 milliGRAM(s) Oral daily  dextrose 10% Bolus 125 milliLiter(s) IV Bolus once  dextrose 5%. 1000 milliLiter(s) (100 mL/Hr) IV Continuous <Continuous>  dextrose 5%. 1000 milliLiter(s) (50 mL/Hr) IV Continuous <Continuous>  dextrose 50% Injectable 25 Gram(s) IV Push once  dextrose 50% Injectable 12.5 Gram(s) IV Push once  doxazosin 1 milliGRAM(s) Oral at bedtime  enoxaparin Injectable 40 milliGRAM(s) SubCutaneous every 24 hours  ferrous    sulfate 325 milliGRAM(s) Oral daily  folic acid 1 milliGRAM(s) Oral daily  glucagon  Injectable 1 milliGRAM(s) IntraMuscular once  insulin glargine Injectable (LANTUS) 4 Unit(s) SubCutaneous at bedtime  insulin lispro (ADMELOG) corrective regimen sliding scale   SubCutaneous every 6 hours  levETIRAcetam   Injectable 750 milliGRAM(s) IV Push every 12 hours  magnesium oxide 400 milliGRAM(s) Oral daily  metoclopramide Injectable 5 milliGRAM(s) IV Push every 8 hours  midodrine. 5 milliGRAM(s) Oral <User Schedule>  piperacillin/tazobactam IVPB.. 4.5 Gram(s) IV Intermittent every 8 hours  polyethylene glycol 3350 17 Gram(s) Oral every 12 hours  potassium chloride  10 mEq/100 mL IVPB 10 milliEquivalent(s) IV Intermittent every 1 hour  senna 2 Tablet(s) Oral at bedtime    MEDICATIONS  (PRN):  acetaminophen     Tablet .. 650 milliGRAM(s) Oral every 6 hours PRN Temp greater or equal to 38C (100.4F), Mild Pain (1 - 3), Moderate Pain (4 - 6)  dextrose Oral Gel 15 Gram(s) Oral once PRN Blood Glucose LESS THAN 70 milliGRAM(s)/deciliter  guaiFENesin Oral Liquid (Sugar-Free) 100 milliGRAM(s) Oral every 6 hours PRN Cough    Anthropometrics:  Height: 5'8"  Weight: 155lb/70.3kg  BMI: 23.6  IBW: 154lb/70kg     101% IBW    Weight Change:   No new weights obtained since admission. Recommend nursing to trend weekly weights. RD to continue monitoring weights as able.     Estimated energy needs:   Calories: 25-30kcal/k-2109kcal/d  Protein: 1.2-1.5g/k-105g/d  Fluid: 30-35mL/k-2460mL/d  Estimated needs based on dosing wt as within % IBW. Needs adjusted for age, wound healing, and clinical status.     Subjective:   88M with PMH of bilateral hearing impairment with bilateral hearing aids, DM, thalassemia minor/JUSTYNA, HLD, CVA with right sided weakness (), depression, glaucoma, and BPH who presented with left sided weakness, admitted for stroke work-up. Course complicated by waxing/waning left sided hemiparesis likely secondary to hypoperfusion, status post IVF boluses, now on midodrine. Failed FEES (), now status post PEG placement (). Started on vEEG () to ruled out seizure activity. Course now complicated by sepsis (), started on antibiotics, abdominal x-ray with nonspecific gas pattern/no evidence of obstruction. Feeds held  due to "high residuals," started on reglan.     Pt seen on  for follow-up. Labs and medication orders reviewed. Ordered for 4U lantus, potassium chloride, ferous sulfate, folic acid, magnesium oxide. Electrolytes WNL, POC blood glucose (-) 127-248. MAPs >70, TMax 102.1F. Pt resting comfortably in bed this AM, pt's spouse at bedside and speaks Cantonese,  used (#[id number]). Pt NPO in setting of reported "high residuals," per RN total gastric residuals = 10ccs - note per ASPEN guidelines, tube feeds not to be held for GRV <500mL. Pt's spouse reports pt with x1 BM  and x1 BM , consistent with RN report. Spouse denies pt with discomfort or nausea/vomiting over the past few days. Recommend resume EN trickle feeds, continuous feeds with strict aspiration precautions. See nutrition recommendations. RD to remain available.     Previous Nutrition Diagnosis:  Inadequate Energy Intake related to pt's inability to meet nutritional demands via PO as evidenced by NPO status.    Active [ x ]  Resolved [   ]    Goal:  Pt to consistently meet >75% nutrient needs via most appropriate route for nutrition.     Recommendations:  1. As medically feasible, recommend resume PEG tricks feeds: [employer] Glucose 1.2 @10mL/hr x24hr. Pending GI tolerance and hemodynamic stability, recommend increase as tolerated to goal rate    >>Defer free water flushes to team.  >>Recommend continuous feeds at this time in setting of ongoing abdominal distension and aspiration risk. Monitor GI tolerance & maintain aspiration precautions (HOB >/=45 degrees). RD to remain available to adjust EN regimen prn.   2. Monitor weight trends, labs, skin integrity, & hydration status.  3. Pain and bowel regimens per team.  4. RD remains available for dietary education/intervention prn.     Education:   Discussed with pt's wife indication for hold on tube feeds at this time per team and RD answered all questions. Pt's aware RD remains available for additional questions/concerns.     Risk Level: High [ x ] Moderate [   ] Low [   ] Admitting Diagnosis:   Patient is a 88y old  Male who presents with a chief complaint of left sided weakness (13 May 2024 07:29)    PAST MEDICAL & SURGICAL HISTORY:  Diabetes  HLD (hyperlipidemia)  BPH (benign prostatic hyperplasia)  JUSTYNA (iron deficiency anemia)  Stroke    Current Nutrition Order:  NPO    PO Intake: Good (%) [   ]  Fair (50-75%) [   ] Poor (<25%) [   ] - N/A NPO    GI Issues:   Pt's wife denies pt with nausea/vomiting  Per wife and RN pt with x1 BM yesterday, no constipation/diarrhea noted  Abdominal distension ongoing  +PEG    Pain:  No pain reported or nonverbal indicators noted    Skin Integrity:  No edema documented  Stage I sacral pressure injury noted  Leonel score 12    Labs:       135  |  100  |  22  ----------------------------<  148<H>  4.1   |  22  |  0.79    Ca    8.8      13 May 2024 05:30  Phos  4.1       Mg     2.2         CAPILLARY BLOOD GLUCOSE  POCT Blood Glucose.: 127 mg/dL (13 May 2024 06:19)  POCT Blood Glucose.: 168 mg/dL (13 May 2024 00:32)  POCT Blood Glucose.: 153 mg/dL (12 May 2024 21:16)  POCT Blood Glucose.: 181 mg/dL (12 May 2024 18:22)  POCT Blood Glucose.: 194 mg/dL (12 May 2024 12:21)    Medications:  MEDICATIONS  (STANDING):  aspirin  chewable 81 milliGRAM(s) Oral daily  atorvastatin 80 milliGRAM(s) Oral at bedtime  clopidogrel Tablet 75 milliGRAM(s) Oral daily  dextrose 10% Bolus 125 milliLiter(s) IV Bolus once  dextrose 5%. 1000 milliLiter(s) (100 mL/Hr) IV Continuous <Continuous>  dextrose 5%. 1000 milliLiter(s) (50 mL/Hr) IV Continuous <Continuous>  dextrose 50% Injectable 25 Gram(s) IV Push once  dextrose 50% Injectable 12.5 Gram(s) IV Push once  doxazosin 1 milliGRAM(s) Oral at bedtime  enoxaparin Injectable 40 milliGRAM(s) SubCutaneous every 24 hours  ferrous    sulfate 325 milliGRAM(s) Oral daily  folic acid 1 milliGRAM(s) Oral daily  glucagon  Injectable 1 milliGRAM(s) IntraMuscular once  insulin glargine Injectable (LANTUS) 4 Unit(s) SubCutaneous at bedtime  insulin lispro (ADMELOG) corrective regimen sliding scale   SubCutaneous every 6 hours  levETIRAcetam   Injectable 750 milliGRAM(s) IV Push every 12 hours  magnesium oxide 400 milliGRAM(s) Oral daily  metoclopramide Injectable 5 milliGRAM(s) IV Push every 8 hours  midodrine. 5 milliGRAM(s) Oral <User Schedule>  piperacillin/tazobactam IVPB.. 4.5 Gram(s) IV Intermittent every 8 hours  polyethylene glycol 3350 17 Gram(s) Oral every 12 hours  potassium chloride  10 mEq/100 mL IVPB 10 milliEquivalent(s) IV Intermittent every 1 hour  senna 2 Tablet(s) Oral at bedtime    MEDICATIONS  (PRN):  acetaminophen     Tablet .. 650 milliGRAM(s) Oral every 6 hours PRN Temp greater or equal to 38C (100.4F), Mild Pain (1 - 3), Moderate Pain (4 - 6)  dextrose Oral Gel 15 Gram(s) Oral once PRN Blood Glucose LESS THAN 70 milliGRAM(s)/deciliter  guaiFENesin Oral Liquid (Sugar-Free) 100 milliGRAM(s) Oral every 6 hours PRN Cough    Anthropometrics:  Height: 5'8"  Weight: 155lb/70.3kg  BMI: 23.6  IBW: 154lb/70kg     101% IBW    Weight Change:   No new weights obtained since admission. Recommend nursing to trend weekly weights. RD to continue monitoring weights as able.     Estimated energy needs:   Calories: 25-30kcal/k-2109kcal/d  Protein: 1.2-1.5g/k-105g/d  Fluid: 30-35mL/k-2460mL/d  Estimated needs based on dosing wt as within % IBW. Needs adjusted for age, wound healing, and clinical status.     Subjective:   88M with PMH of bilateral hearing impairment with bilateral hearing aids, DM, thalassemia minor/JUSTYNA, HLD, CVA with right sided weakness (), depression, glaucoma, and BPH who presented with left sided weakness, admitted for stroke work-up. Course complicated by waxing/waning left sided hemiparesis likely secondary to hypoperfusion, status post IVF boluses, now on midodrine. Failed FEES (), now status post PEG placement (). Started on vEEG () to ruled out seizure activity. Course now complicated by sepsis (), started on antibiotics, abdominal x-ray with nonspecific gas pattern/no evidence of obstruction. Feeds held  due to "high residuals," started on reglan.     Pt seen on  for follow-up. Labs and medication orders reviewed. Ordered for 4U lantus, potassium chloride, ferous sulfate, folic acid, magnesium oxide. Electrolytes WNL, POC blood glucose (-) 127-248. MAPs >70, TMax 102.1F. Pt resting comfortably in bed this AM, pt's spouse at bedside who speaks Cantonese,  used (#[id number]). Pt NPO in setting of "high residuals" per team, per RN total gastric residuals = 10cc - note per ASPEN guidelines tube feeds not to be held for GRV <500mL, RD communicated to team. Pt's spouse reports pt with x1 BM  and x1 BM , consistent with RN report. Spouse denies pt with discomfort or nausea/vomiting over the past few days. Pt discussed in interdisciplinary team rounds, plan to resume tube feeds today . See nutrition recommendations. RD to remain available.     Previous Nutrition Diagnosis:  Inadequate Energy Intake related to pt's inability to meet nutritional demands via PO as evidenced by NPO status.    Active [ x ]  Resolved [   ]    Goal:  Pt to consistently meet >75% nutrient needs via most appropriate route for nutrition.     Recommendations:  1. As medically feasible, recommend resume PEG feeds: [employer] Glucose 1.2 trickle feeds @20mL/hr x24hr. Pending GI tolerance and hemodynamic stability, recommend increase as tolerated to goal rate 63mL/hr x24hr to provide 1512mL total volume, 1814kcal (26kcal/kg dosing wt 70.3kg), 97g protein (1.4g/kg dosing wt 70.3kg), and 1149mL free water.   >>Defer free water flushes to team.  >>Recommend continuous feeds at this time in setting of ongoing abdominal distension and aspiration risk. Monitor GI tolerance & maintain aspiration precautions, HOB >/=45 degrees. RD to remain available to adjust EN regimen prn.   2. Monitor weight trends, labs, skin integrity, & hydration status.  3. Pain and bowel regimens per team.  4. RD remains available for dietary education/intervention prn.     Education:   Discussed with pt's wife indication for hold on tube feeds at this time per team and RD answered all questions. Pt's aware RD remains available for additional questions/concerns.     Risk Level: High [ x ] Moderate [   ] Low [   ]

## 2024-05-13 NOTE — PROGRESS NOTE ADULT - PROBLEM SELECTOR PLAN 7
Failed SLP evaluation. FEES completed on 5/2 - recommended for PEG. s/p PEG on 5/7. Nutrition consulted. Started continuous tube feeds on 05/08- [employer] Glucose Support 1.2 @10mL/hr x24hr and increase as tolerated to goal rate 63mL/hr x24hr to provide 1512mL total volume, 1814kcal (26kcal/kg dosing wt 70.3kg), 97g protein (1.4g/kg dosing wt 70.3kg), and 1149mL free water.   - Adjusted tube feeds to cyclic feeds on 5/11- [employer] at 95mL/hr for 16 hours -> high residuals today, hold feeds   - Obtain abxominal xray  - Continue FWF to 100cc every 6 hours    - Started lantus 4u qhs due to elevated FGS in the 200's   - Maintain aspiration precautions   - If pt starts having frequent loose BMs, add Banatrol x2/day to promote fecal bulk.   - F/u nutrition recs  - high dose statin as above in CVA   - LDL results: 170

## 2024-05-13 NOTE — PROGRESS NOTE ADULT - PROBLEM SELECTOR PLAN 11
- Started on doxazosin 1mg/night (tamsulosin cannot be administered through PEG tube)   - urology consulted, f/u recs  - hudson was draining blood-tinged urine, however urology not concerned and attributed to pulling on hudson. Urine on 5/11 improved and more clear.

## 2024-05-13 NOTE — PROGRESS NOTE ADULT - ASSESSMENT
*************************PENDING UPDATED PLAN************************    89 y/o Cantonese speaking Male with PMHx of JUSTYNA, stroke (2004, residual right sided weakness), HLD, glaucoma, DM, BPH, depression, b/l hearing loss who presented to [facility] with new left-sided arm and leg weakness starting 4/26 pm, transferred to [facility] for further stroke w/u. Course c/b periods of waxing/waning left sided hemiparesis, likely due to hypoperfusion, s/p IVF boluses, now started on midodrine. Patient with urinary retention with postraumatic hematuria after straight cath in pt with BPH. Now s/p PEG placement on 5/7 and stepped down from stroke tele to [facility] while awaiting AR placement.     Neuro   #hx of stroke 2004 with residual right sided weakness    #CVA workup   Patient previously on aspirin, but was stopped by doctor in 2020, unclear reason why.    - continue ASA 81mg daily  - OK to restart plavix on 5/9 (needed to be held for 48 hours after PEG tube placement)  - continue atorvastatin 80mg daily    - q8h general neuro checks and vitals   - MRI brain on 4/28 with R internal capsule infarct   - Stroke Code HCT Results: negative for acute ischemia    - Stroke Code CTA Results: stenosis of proximal bilateral A2 segments and R M2   - Stroke education   - repeat HCT on 5/5 - subacute infarct centered in the genu and posterior limb of the right internal capsule is stable.  A chronic transcortical infarction in the left precentral gyrus and a small chronic infarct in theright cerebellar hemisphere are stable.      Cards   #HTN   - Goal -160, okay to work with PT  - Continue Midodrine 2.5mg BID on 4/30 as left LE weakness is waxing/waning despite stable BPs --> midodrine increased to 5mg BID on 5/1  - wife refusing WALLY/ILR, however she agreeable for MCOT, but after talking to EP team, she agreed to follow up with them after going to [facility] to consider monitoring.   - Cardiac CT to rule out SANDRO thrombus: No SANDRO or left atrial thrombus. Non cardiac: Apparent filling defects in segmental branches of the right lower lobe pulmonary artery.  - TTE: EF 56%     #HLD   - high dose statin as above in CVA   - LDL results: 170     Pulm   - call provider if SPO2 < 94%   - CT Angio Chest PE Protocol: No pulmonary embolism. No filling defect in the right lower lobe as seen on prior coronary CTA, likely motion artifact.    GI   #Nutrition/Fluids/Electrolytes    - replete K<4 and Mg <2   - Diet: can restart tube feeds on 5/8  - IVF: 75cc/hr while not receiving feeds    #dysphagia    - failed SLP evaluation   - FEES completed on 5/2 - recommended for PEG  - s/p PEG on 5/7    #constipation   - c/w bowel regimen w/ senna+miralax     Renal  #BPH  #Urinary retention with postraumatic hematuria after straight cath  On 5/4, 900 cc and 350cc in BS with postraumatic hematuria  - Started on doxazosin 1mg/night (tamsulosin cannot be administered through PEG tube)   - urology consulted  - hudson to remain in place until patient is ambulatory; should follow up with his outpatient urologist  - can attempt TOV on 5/8 if functional status improves, however, will likely require hudson to stay in place at discharge    Endocrine   #DM   home meds: metformin 500mg TID, Januvia 50mg daily      - A1C results: 6.3%   - ISS     - TSH results: 2.080     Hematology   #JUSTYNA   - c/w home ferrous sulfate 325mg daily   - Ferritin: 410, total iron: 83, TIBC: 255, % saturation: 33     MSK   # leg cramps   - LE dopplers obtained, negative for DVT bilaterally      Psych   #depression   Per wife, prior to COVID, patient very active with friends/community, played sports. Since pandemic and worsening eyesight due to glaucoma and hearing, patient with no interest in daily activities. Was started on escitalopram 10mg daily, but stopped taking due to constipation   - consider restarting based on patient's mood and improvement of bowel movement after starting bowel regimen       DVT Prophylaxis   - OK to restart lovenox for DVT ppx tomorrow (5/8 AM)  - SCDs for DVT prophylaxis      IDR Goals: Goals reviewed at interdisciplinary rounds with case management, social work, physical therapy, occupational therapy, and speech language pathology.    Please see specific therapy notes for in depth goals.     Dispo: AR - pt is able to tolerate at least 3 hours of therapy.     Discussed daily hospital plans and goals with patient and wife at bedside via .     Discussed with Dr. Payton who will discuss with Dr. Duarte.

## 2024-05-13 NOTE — PROGRESS NOTE ADULT - PROBLEM SELECTOR PLAN 3
vEEG placed on 05/11 to observe for seizure-like activity. Prelim read with GRDA w/ R sided LRDA w/ fast activity and right sided spikes without evolution. S/p Keppra 2g on 05/11. 05/12 read with potentially epileptogenic foci over the left frontal and right frontal/frontotemporal regions; also evidence for moderate diffuse and R>L frontal focal cerebral dysfunction which is nonspecific in etiology.   - Start Keppra 750mg BID today 05/12   - Continue vEEG for now  - Ativan 2mg IV for GTCs > 2 min only  - Neurological assessment Q8hrs  - Seizure & Fall precautions  - Maintain Mg> 2 mmol/l

## 2024-05-13 NOTE — PROGRESS NOTE ADULT - PROBLEM SELECTOR PLAN 7
Failed SLP evaluation. FEES completed on 5/2 - recommended for PEG. s/p PEG on 5/7. Nutrition consulted. Started continuous tube feeds on 05/08- [email or web address] Glucose Support 1.2 @10mL/hr x24hr and increase as tolerated to goal rate 63mL/hr x24hr to provide 1512mL total volume, 1814kcal (26kcal/kg dosing wt 70.3kg), 97g protein (1.4g/kg dosing wt 70.3kg), and 1149mL free water.   - Start trickle feeds today   - Continue lantus 4u qhs due to elevated FGS in the 200's   - Maintain aspiration precautions   - If pt starts having frequent loose BMs, add Banatrol x2/day to promote fecal bulk.   - F/u nutrition recs  - high dose statin as above in CVA   - LDL results: 170

## 2024-05-13 NOTE — PROGRESS NOTE ADULT - NS ATTEND AMEND GEN_ALL_CORE FT
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  TTE fairly unrevealing.  WALLY/ILR refused.  Will do outpatient MCOT. Cardiac CT without SANDRO thrombus. Status post PEG.  Continue DAPT, high intensity statin.   Course complicated by waxing/waning left hemiparesis, now on midodrine with improvement.  Course further complicated by AMS- HCT stable. EEG +; will start Keppra 750 BID. Infections workup pending given recurrent fevers. Continue abx for possible PNA; CT CAP pending per IM. 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 Previously, stroke code called at [facility] for fluctuating dysarthria and R facial droop of unknown onset in setting of severe illness. CT imaging unrevealing (poor quality of CTA but not obvious aneurysm); MRI not performed. Repeat HCT today without large infarct/ICH. Patient at baseline. Cleared for OR tomorrow.

## 2024-05-13 NOTE — PROGRESS NOTE ADULT - ASSESSMENT
Mr. Bruce is an 88y Cantonese speaking Male with PMHx of JUSTYNA, stroke (2004, residual right sided weakness), HLD, glaucoma, DM, BPH, depression, and b/l hearing loss, who present to [facility] with new left-sided arm and leg weakness starting 4/26 pm, transferred to [facility] for further stroke w/u. Course c/b periods of waxing/waning left sided hemiparesis, likely due to hypoperfusion, s/p IVF boluses, now started on midodrine. Patient with urinary retention with postraumatic hematuria after straight cath in pt with BPH. Now s/p PEG placement on 5/7 and stepped down from stroke tele to [facility] while awaiting AR placement.

## 2024-05-13 NOTE — PROVIDER CONTACT NOTE (OTHER) - RECOMMENDATIONS
Contact provider. Place order for Q6 bladder scan and catheterization.
MD to assess patient
Stroke code called.

## 2024-05-13 NOTE — PROGRESS NOTE ADULT - PROBLEM SELECTOR PLAN 3
vEEG placed on 05/11 to observe for seizure-like activity. Prelim read with GRDA w/ R sided LRDA w/ fast activity and right sided spikes without evolution. S/p Keppra 2g on 05/11. 05/12 read with potentially epileptogenic foci over the left frontal and right frontal/frontotemporal regions; also evidence for moderate diffuse and R>L frontal focal cerebral dysfunction which is nonspecific in etiology.   - Continue Keppra 750mg BID  - Continue vEEG for now  - Ativan 2mg IV for GTCs > 2 min only  - Neurological assessment Q8hrs  - Seizure & Fall precautions  - Maintain Mg> 2 mmol/l

## 2024-05-13 NOTE — PROGRESS NOTE ADULT - PROBLEM SELECTOR PLAN 1
Patient meeting 2/4 SIRS criteria today (HR 98, T 102) and increasing leukocytosis (9k -> 11k). Blood cultures drawn. CXR with left-sided effusion.   - Start empiric IV zosyn 4.5g q8h   - F/u RVP  - F/u MRSA  - F/u abdominal x-ray  - F/u blood cultures

## 2024-05-14 DIAGNOSIS — L03.311 CELLULITIS OF ABDOMINAL WALL: ICD-10-CM

## 2024-05-14 LAB
ANION GAP SERPL CALC-SCNC: 11 MMOL/L — SIGNIFICANT CHANGE UP (ref 5–17)
ANISOCYTOSIS BLD QL: SLIGHT — SIGNIFICANT CHANGE UP
BASOPHILS # BLD AUTO: 0.02 K/UL — SIGNIFICANT CHANGE UP (ref 0–0.2)
BASOPHILS # BLD AUTO: 0.17 K/UL — SIGNIFICANT CHANGE UP (ref 0–0.2)
BASOPHILS NFR BLD AUTO: 0.2 % — SIGNIFICANT CHANGE UP (ref 0–2)
BASOPHILS NFR BLD AUTO: 1.7 % — SIGNIFICANT CHANGE UP (ref 0–2)
BUN SERPL-MCNC: 18 MG/DL — SIGNIFICANT CHANGE UP (ref 7–23)
BURR CELLS BLD QL SMEAR: PRESENT — SIGNIFICANT CHANGE UP
CALCIUM SERPL-MCNC: 8.2 MG/DL — LOW (ref 8.4–10.5)
CHLORIDE SERPL-SCNC: 100 MMOL/L — SIGNIFICANT CHANGE UP (ref 96–108)
CO2 SERPL-SCNC: 21 MMOL/L — LOW (ref 22–31)
CREAT SERPL-MCNC: 0.74 MG/DL — SIGNIFICANT CHANGE UP (ref 0.5–1.3)
DACRYOCYTES BLD QL SMEAR: SLIGHT — SIGNIFICANT CHANGE UP
EGFR: 88 ML/MIN/1.73M2 — SIGNIFICANT CHANGE UP
EOSINOPHIL # BLD AUTO: 0.26 K/UL — SIGNIFICANT CHANGE UP (ref 0–0.5)
EOSINOPHIL # BLD AUTO: 0.27 K/UL — SIGNIFICANT CHANGE UP (ref 0–0.5)
EOSINOPHIL NFR BLD AUTO: 2.6 % — SIGNIFICANT CHANGE UP (ref 0–6)
EOSINOPHIL NFR BLD AUTO: 2.6 % — SIGNIFICANT CHANGE UP (ref 0–6)
GLUCOSE BLDC GLUCOMTR-MCNC: 125 MG/DL — HIGH (ref 70–99)
GLUCOSE BLDC GLUCOMTR-MCNC: 126 MG/DL — HIGH (ref 70–99)
GLUCOSE BLDC GLUCOMTR-MCNC: 150 MG/DL — HIGH (ref 70–99)
GLUCOSE BLDC GLUCOMTR-MCNC: 157 MG/DL — HIGH (ref 70–99)
GLUCOSE BLDC GLUCOMTR-MCNC: 189 MG/DL — HIGH (ref 70–99)
GLUCOSE SERPL-MCNC: 172 MG/DL — HIGH (ref 70–99)
GRAM STN FLD: ABNORMAL
HCT VFR BLD CALC: 28.5 % — LOW (ref 39–50)
HCT VFR BLD CALC: 29.5 % — LOW (ref 39–50)
HGB BLD-MCNC: 9.5 G/DL — LOW (ref 13–17)
HGB BLD-MCNC: 9.7 G/DL — LOW (ref 13–17)
HYPOCHROMIA BLD QL: SLIGHT — SIGNIFICANT CHANGE UP
IMM GRANULOCYTES NFR BLD AUTO: 0.7 % — SIGNIFICANT CHANGE UP (ref 0–0.9)
LYMPHOCYTES # BLD AUTO: 0.96 K/UL — LOW (ref 1–3.3)
LYMPHOCYTES # BLD AUTO: 1.06 K/UL — SIGNIFICANT CHANGE UP (ref 1–3.3)
LYMPHOCYTES # BLD AUTO: 10 % — LOW (ref 13–44)
LYMPHOCYTES # BLD AUTO: 9.6 % — LOW (ref 13–44)
MAGNESIUM SERPL-MCNC: 2.1 MG/DL — SIGNIFICANT CHANGE UP (ref 1.6–2.6)
MANUAL SMEAR VERIFICATION: SIGNIFICANT CHANGE UP
MCHC RBC-ENTMCNC: 23 PG — LOW (ref 27–34)
MCHC RBC-ENTMCNC: 23.2 PG — LOW (ref 27–34)
MCHC RBC-ENTMCNC: 32.9 GM/DL — SIGNIFICANT CHANGE UP (ref 32–36)
MCHC RBC-ENTMCNC: 33.3 GM/DL — SIGNIFICANT CHANGE UP (ref 32–36)
MCV RBC AUTO: 69 FL — LOW (ref 80–100)
MCV RBC AUTO: 70.6 FL — LOW (ref 80–100)
MICROCYTES BLD QL: SLIGHT — SIGNIFICANT CHANGE UP
MONOCYTES # BLD AUTO: 0.35 K/UL — SIGNIFICANT CHANGE UP (ref 0–0.9)
MONOCYTES # BLD AUTO: 0.78 K/UL — SIGNIFICANT CHANGE UP (ref 0–0.9)
MONOCYTES NFR BLD AUTO: 3.5 % — SIGNIFICANT CHANGE UP (ref 2–14)
MONOCYTES NFR BLD AUTO: 7.4 % — SIGNIFICANT CHANGE UP (ref 2–14)
NEUTROPHILS # BLD AUTO: 8.24 K/UL — HIGH (ref 1.8–7.4)
NEUTROPHILS # BLD AUTO: 8.36 K/UL — HIGH (ref 1.8–7.4)
NEUTROPHILS NFR BLD AUTO: 79.1 % — HIGH (ref 43–77)
NEUTROPHILS NFR BLD AUTO: 82.6 % — HIGH (ref 43–77)
NRBC # BLD: 0 /100 WBCS — SIGNIFICANT CHANGE UP (ref 0–0)
OVALOCYTES BLD QL SMEAR: SIGNIFICANT CHANGE UP
PHOSPHATE SERPL-MCNC: 2.8 MG/DL — SIGNIFICANT CHANGE UP (ref 2.5–4.5)
PLAT MORPH BLD: NORMAL — SIGNIFICANT CHANGE UP
PLATELET # BLD AUTO: 252 K/UL — SIGNIFICANT CHANGE UP (ref 150–400)
PLATELET # BLD AUTO: 253 K/UL — SIGNIFICANT CHANGE UP (ref 150–400)
POIKILOCYTOSIS BLD QL AUTO: SIGNIFICANT CHANGE UP
POLYCHROMASIA BLD QL SMEAR: SLIGHT — SIGNIFICANT CHANGE UP
POTASSIUM SERPL-MCNC: 3.4 MMOL/L — LOW (ref 3.5–5.3)
POTASSIUM SERPL-SCNC: 3.4 MMOL/L — LOW (ref 3.5–5.3)
RBC # BLD: 4.13 M/UL — LOW (ref 4.2–5.8)
RBC # BLD: 4.18 M/UL — LOW (ref 4.2–5.8)
RBC # FLD: 15.1 % — HIGH (ref 10.3–14.5)
RBC # FLD: 15.3 % — HIGH (ref 10.3–14.5)
RBC BLD AUTO: ABNORMAL
SCHISTOCYTES BLD QL AUTO: SLIGHT — SIGNIFICANT CHANGE UP
SODIUM SERPL-SCNC: 132 MMOL/L — LOW (ref 135–145)
SPECIMEN SOURCE: SIGNIFICANT CHANGE UP
TARGETS BLD QL SMEAR: SLIGHT — SIGNIFICANT CHANGE UP
VANCOMYCIN TROUGH SERPL-MCNC: 17.5 UG/ML — SIGNIFICANT CHANGE UP (ref 10–20)
WBC # BLD: 10.56 K/UL — HIGH (ref 3.8–10.5)
WBC # BLD: 9.98 K/UL — SIGNIFICANT CHANGE UP (ref 3.8–10.5)
WBC # FLD AUTO: 10.56 K/UL — HIGH (ref 3.8–10.5)
WBC # FLD AUTO: 9.98 K/UL — SIGNIFICANT CHANGE UP (ref 3.8–10.5)

## 2024-05-14 PROCEDURE — 99233 SBSQ HOSP IP/OBS HIGH 50: CPT

## 2024-05-14 PROCEDURE — 99222 1ST HOSP IP/OBS MODERATE 55: CPT

## 2024-05-14 PROCEDURE — 99232 SBSQ HOSP IP/OBS MODERATE 35: CPT

## 2024-05-14 PROCEDURE — 99497 ADVNCD CARE PLAN 30 MIN: CPT | Mod: 25

## 2024-05-14 PROCEDURE — 95720 EEG PHY/QHP EA INCR W/VEEG: CPT

## 2024-05-14 RX ORDER — VANCOMYCIN HCL 1 G
1000 VIAL (EA) INTRAVENOUS EVERY 12 HOURS
Refills: 0 | Status: COMPLETED | OUTPATIENT
Start: 2024-05-14 | End: 2024-05-14

## 2024-05-14 RX ORDER — PANTOPRAZOLE SODIUM 20 MG/1
40 TABLET, DELAYED RELEASE ORAL EVERY 12 HOURS
Refills: 0 | Status: DISCONTINUED | OUTPATIENT
Start: 2024-05-15 | End: 2024-05-15

## 2024-05-14 RX ORDER — PANTOPRAZOLE SODIUM 20 MG/1
0 TABLET, DELAYED RELEASE ORAL
Qty: 0 | Refills: 0 | DISCHARGE
Start: 2024-05-14 | End: 2024-06-11

## 2024-05-14 RX ORDER — ZINC OXIDE 200 MG/G
1 OINTMENT TOPICAL EVERY 24 HOURS
Refills: 0 | Status: DISCONTINUED | OUTPATIENT
Start: 2024-05-14 | End: 2024-05-17

## 2024-05-14 RX ORDER — POTASSIUM CHLORIDE 20 MEQ
20 PACKET (EA) ORAL
Refills: 0 | Status: COMPLETED | OUTPATIENT
Start: 2024-05-14 | End: 2024-05-14

## 2024-05-14 RX ORDER — PANTOPRAZOLE SODIUM 20 MG/1
40 TABLET, DELAYED RELEASE ORAL EVERY 12 HOURS
Refills: 0 | Status: DISCONTINUED | OUTPATIENT
Start: 2024-05-14 | End: 2024-05-14

## 2024-05-14 RX ADMIN — PANTOPRAZOLE SODIUM 40 MILLIGRAM(S): 20 TABLET, DELAYED RELEASE ORAL at 23:24

## 2024-05-14 RX ADMIN — LEVETIRACETAM 750 MILLIGRAM(S): 250 TABLET, FILM COATED ORAL at 19:41

## 2024-05-14 RX ADMIN — ATORVASTATIN CALCIUM 80 MILLIGRAM(S): 80 TABLET, FILM COATED ORAL at 21:41

## 2024-05-14 RX ADMIN — Medication 1 MILLIGRAM(S): at 11:13

## 2024-05-14 RX ADMIN — MIDODRINE HYDROCHLORIDE 5 MILLIGRAM(S): 2.5 TABLET ORAL at 23:24

## 2024-05-14 RX ADMIN — PANTOPRAZOLE SODIUM 40 MILLIGRAM(S): 20 TABLET, DELAYED RELEASE ORAL at 13:05

## 2024-05-14 RX ADMIN — Medication 325 MILLIGRAM(S): at 11:13

## 2024-05-14 RX ADMIN — SODIUM CHLORIDE 50 MILLILITER(S): 9 INJECTION INTRAMUSCULAR; INTRAVENOUS; SUBCUTANEOUS at 21:58

## 2024-05-14 RX ADMIN — PIPERACILLIN AND TAZOBACTAM 25 GRAM(S): 4; .5 INJECTION, POWDER, LYOPHILIZED, FOR SOLUTION INTRAVENOUS at 21:40

## 2024-05-14 RX ADMIN — MAGNESIUM OXIDE 400 MG ORAL TABLET 400 MILLIGRAM(S): 241.3 TABLET ORAL at 11:13

## 2024-05-14 RX ADMIN — LEVETIRACETAM 750 MILLIGRAM(S): 250 TABLET, FILM COATED ORAL at 07:31

## 2024-05-14 RX ADMIN — Medication 50 MILLIEQUIVALENT(S): at 09:04

## 2024-05-14 RX ADMIN — Medication 1: at 06:44

## 2024-05-14 RX ADMIN — Medication 50 MILLIEQUIVALENT(S): at 11:13

## 2024-05-14 RX ADMIN — CLOPIDOGREL BISULFATE 75 MILLIGRAM(S): 75 TABLET, FILM COATED ORAL at 11:14

## 2024-05-14 RX ADMIN — Medication 81 MILLIGRAM(S): at 11:13

## 2024-05-14 RX ADMIN — ZINC OXIDE 1 APPLICATION(S): 200 OINTMENT TOPICAL at 15:22

## 2024-05-14 RX ADMIN — PIPERACILLIN AND TAZOBACTAM 25 GRAM(S): 4; .5 INJECTION, POWDER, LYOPHILIZED, FOR SOLUTION INTRAVENOUS at 14:30

## 2024-05-14 RX ADMIN — PIPERACILLIN AND TAZOBACTAM 25 GRAM(S): 4; .5 INJECTION, POWDER, LYOPHILIZED, FOR SOLUTION INTRAVENOUS at 06:42

## 2024-05-14 RX ADMIN — MIDODRINE HYDROCHLORIDE 5 MILLIGRAM(S): 2.5 TABLET ORAL at 11:14

## 2024-05-14 RX ADMIN — Medication 50 MILLIEQUIVALENT(S): at 13:04

## 2024-05-14 RX ADMIN — POLYETHYLENE GLYCOL 3350 17 GRAM(S): 17 POWDER, FOR SOLUTION ORAL at 09:07

## 2024-05-14 RX ADMIN — Medication 250 MILLIGRAM(S): at 01:46

## 2024-05-14 RX ADMIN — Medication 250 MILLIGRAM(S): at 13:05

## 2024-05-14 RX ADMIN — ENOXAPARIN SODIUM 40 MILLIGRAM(S): 100 INJECTION SUBCUTANEOUS at 11:13

## 2024-05-14 RX ADMIN — Medication 1 MILLIGRAM(S): at 21:41

## 2024-05-14 RX ADMIN — Medication 100 MILLIGRAM(S): at 03:04

## 2024-05-14 RX ADMIN — MIDODRINE HYDROCHLORIDE 5 MILLIGRAM(S): 2.5 TABLET ORAL at 00:37

## 2024-05-14 RX ADMIN — Medication 1: at 00:37

## 2024-05-14 NOTE — PROGRESS NOTE ADULT - PROBLEM SELECTOR PLAN 4
vEEG placed on 05/11 to observe for seizure-like activity. Prelim read with GRDA w/ R sided LRDA w/ fast activity and right sided spikes without evolution. S/p Keppra 2g on 05/11. 05/12 read with potentially epileptogenic foci over the left frontal and right frontal/frontotemporal regions; also evidence for moderate diffuse and R>L frontal focal cerebral dysfunction which is nonspecific in etiology.     PLAN  - Continue Keppra 750mg BID  - Continue vEEG for now  - Ativan 2mg IV for GTCs > 2 min only  - Neurological assessment Q8hrs  - Seizure & Fall precautions  - Maintain Mg> 2 mmol/l vEEG placed on 05/11 to observe for seizure-like activity. Prelim read with GRDA w/ R sided LRDA w/ fast activity and right sided spikes without evolution. S/p Keppra 2g on 05/11. 05/12 read with potentially epileptogenic foci over the left frontal and right frontal/frontotemporal regions; also evidence for moderate diffuse and R>L frontal focal cerebral dysfunction which is nonspecific in etiology.     PLAN  - Continue Keppra 750mg BID  - Discontinue vEEG   - Ativan 2mg IV for GTCs > 2 min only  - Neurological assessment Q8hrs  - Seizure & Fall precautions  - Maintain Mg> 2 mmol/l

## 2024-05-14 NOTE — CONSULT NOTE ADULT - SUBJECTIVE AND OBJECTIVE BOX
HPI:   **STROKE HPI***  Blake  # [id number]  History obtained via wife as patient with signficant b/l hearing loss, hearing aids in place    HPI: 88y Cantonese speaking Male with PMHx of JUSTYNA, stroke (2004, residual right sided weakness), HLD, glaucoma, DM, BPH, depression, b/l hearing loss presents with [facility] with new left side arm and leg weakness starting 4/26 pm. Wife noticed that patient was leaning against the wall with the left side of his body to support himself to walk and was unable to hold himself upright when sitting, slumping over to the left. Patient felt that his left arm and leg were weak. Called PCP who recommened ED visit. CTH/CTP/CTA head and neck with no acute findings. Loaded with aspirin 300mg x1. Transferred to [facility] for further stroke w/u.    Patient currently with left sided arm and leg weakness, unchanged compared to onset. No dysarthria, changes in vision per wife. Noted with left leg spasms that may be chronic. Wife thinks he has had spasms before, but has not noticed leg spasms recently.     Patient's prior stroke presents as right sided weakness with aphasia. Since then, weakness has improved, but patient at baseline does not converse much due to difficulty hearing.     At baseline, patient able to amulate on his own, although very slowly and carefully. Wife says patient should be using assistive device, but patient refuses. He is independent of ADLs, but performs them slowly. He has falled in the bathroom in the past. Patient has HHA services (3 days/week, 7 hours per day).    (28 Apr 2024 01:42)    Hospital course, primary team, and consultant notes reviewed. Patient seen and examined, lying in bed, eyes closed, does not follow commands. overtly comfortable in mittens. Mostly calm, intermittently lifting Right leg up/down.  Wife, at bedside. Blake  used ID [id number]. Comprehensive ROS as noted below, collateral obtained from chart/team/family. Exploration of GOC documented as below.    PAST MEDICAL & SURGICAL HISTORY:  Diabetes  HLD (hyperlipidemia)  BPH (benign prostatic hyperplasia)  JUSTYNA (iron deficiency anemia)  Stroke    FAMILY HISTORY:   Reviewed; no history of     in mother or father    Opiate Naive (Y/N):  Yes  iStop reviewed (Y/N): Yes.   No Rx found on iStop review (Ref#:     [id number]      Items that are not checked are not present  PSYCHOSOCIAL ASSESSMENT:  - Significant other/partner: [x  ]  Children: [x  ]  - Living Situation: Home [ x ] Long term care [  ]  Rehab[  ]  Other[  ]  - Support system: strong[ x ] adequate[  ] inadequate[  ]  - Yazdanism/Spiritual practice:  - Role of organized Confucianism important [  ] some [  ] unable to assess dt pt mentation [  x]  - Coping: well[  ]  with difficulty[  ]  poor coping[  ]  unable to assess dt pt mentation [x  ]    ADVANCE DIRECTIVES:    - MOLST[  ]     - Living Will [  ]     DECISION MAKER(s):  - Health Care Proxy(s) [  ]  Surrogate(s)[  ] Guardian[  ]           Name(s)/Phone Number(s):     BASELINE (I)ADLs (prior to admission):  - [place]:  Total[ x ] Moderate[  ] Dependent[  ]    Allergies    No Known Allergies    Intolerances        Medications:    REVIEWED    24 hour PRN use: REVIEWED    PRESENT SYMPTOMS:   [x ]Unable to obtain due to poor mentation   Source if other than patient:  [x ]Family   [x ]Team     Pain [  ]    If [  ], pt denies symptom.   Dyspnea:         [  ]  Anxiety:           [x  ] restlessness, intermittent agitation pulling at lines   Difficulty sleeping: [  ]  Fatigue:           [  ]  Nausea:           [  ]  Loss of appetite:     [  ]  Dysphagia: [  ]  Constipation:   [  ]        Other Symptoms: withdrawn, not following commands    All other review of systems negative [ x ]    ECOG Performance:     4  Current Palliative Performance Scale/Karnofsky Score:  30  %  Preadmit Karnofsky:  80  %          PEx:  General exam : resting, would open eyes, dose not verbalized, non verbal  flattening of left naso-labial fold  RRR, No tachypnea   functionally incontinent peg in place- erythema around the peg-site, dressing is milk color ( pus vs milk )  hudson with hematuria ( yellow urine in the tube )   moves right side of body, not moving left,  non verbal.     VS: REVIEWED    Labs:   REVIEWED    RADIOLOGY & ADDITIONAL STUDIES: REVIEWED  < from: CT Chest w/ IV Cont (05.13.24 @ 18:01) >  PROCEDURE:  CT of the Chest, Abdomen and Pelvis was performed.  Sagittal and coronal reformats were performed.    FINDINGS:  CHEST:  LUNGS AND LARGE AIRWAYS: The central airways are patent. The lungs are   clear aside from atelectasis.  PLEURA: Trace bilateral effusions.  VESSELS: Aortic atherosclerosis without aneurysm. Main pulmonary arteries   are patent.  HEART: No cardiomegaly. No pericardial effusion.  MEDIASTINUM AND MATHIEU: No adenopathy.  CHEST WALL AND LOWER NECK: No masses.    ABDOMEN AND PELVIS:  LIVER: Heterogeneous attenuation.  BILE DUCTS: Nondilated.  GALLBLADDER: Normal.  SPLEEN: Normal.  PANCREAS: Normal.  ADRENALS: Normal.  KIDNEYS/URETERS: No calculi, hydronephrosis, or soft tissue attenuating   mass.    BLADDER: Collapsed around a Hudson catheter balloon.  REPRODUCTIVE ORGANS: Markedly enlarged prostate.    BOWEL: No bowel obstruction or bowel inflammation. Percutaneous   gastrostomy tube appears partially eroded into the abdominal wall with   surrounding soft tissue swelling of the abdominal wall musculature in the   region as well as small droplets of subcutaneous air, likely cellulitis.   No drainable collection.  PERITONEUM: No free air or ascites. No collection.  VESSELS: Aortoiliac atherosclerosis without aneurysm.  RETROPERITONEUM/LYMPH NODES: No adenopathy.  ABDOMINAL WALL: As above.  BONES: No aggressive lesion.    IMPRESSION:  *  No acute chest pathology.  *  Cellulitis at the percutaneous gastrostomy entry site with soft tissue   swelling and surrounding soft tissue gas. No drainable collection.   Partial erosion of the gastrostomy balloon into the abdominal wall is   suspected.        [facility] discussion:

## 2024-05-14 NOTE — CHART NOTE - NSCHARTNOTEFT_GEN_A_CORE
Seen and evaluated pt at bedside as urology was notified for hematuria. At bedside, urine was yellow no clots appreciated with good UOP (1.2L overnight). Per bedside nurse, it remained clear, two pea sized clots yesterday that since resolved, continues to drain well. Hematuria likely secondary to traumatic hudson. Discussed with family at bedside to please notify team if urine color darkens or sees clots again.

## 2024-05-14 NOTE — PROGRESS NOTE ADULT - PROBLEM SELECTOR PLAN 2
Patient with abdominal pain s/p PEG tube placement 05/07. Meeting sepsis criteria on 05/12. Tmax 102 (rectal). Blood cultures, CXR, abdominal XR non-revealing. CTAP 05/13 with cellulitis around PEG site and surrounding soft tissue gas, partial erosion of gastrostomy balloon into abdominal wall. Gastroenterology following.     PLAN  - Continue antibiotic management as above  - F/u blood cultures  - F/u GI recs Patient with abdominal pain s/p PEG tube placement 05/07. Meeting sepsis criteria on 05/12. Tmax 102 (rectal). Blood cultures, CXR, abdominal XR non-revealing. CTAP 05/13 with cellulitis around PEG site and surrounding soft tissue gas, partial erosion of gastrostomy balloon into abdominal wall. Gastroenterology following.     PLAN  - Continue antibiotic management as above  - Keep patient NPO except meds, hold tube feeds  - Obtain tube check PEG xray study 05/15 AM   - Start IV pantoprazole 40mg BID   - F/u blood cultures  - F/u GI recs Patient with abdominal pain s/p PEG tube placement 05/07. Meeting sepsis criteria on 05/12. Tmax 102 (rectal). Blood cultures, CXR, abdominal XR non-revealing. CTAP 05/13 with cellulitis around PEG site and surrounding soft tissue gas, partial erosion of gastrostomy balloon into abdominal wall. Gastroenterology following.     PLAN  - Continue antibiotic management as above  - Start zinc oxic cream for topical therapy affected site   - Keep patient NPO except meds, hold tube feeds.   - Cellulitis may be due to patient moving around PEG tube, maintain bilateral wrist restraints for now   - Obtain tube check PEG xray study 05/15 AM   - Start IV pantoprazole 40mg BID   - F/u blood cultures  - F/u GI recs

## 2024-05-14 NOTE — PROGRESS NOTE ADULT - SUBJECTIVE AND OBJECTIVE BOX
GASTROENTEROLOGY PROGRESS NOTE    INTERVAL/SUBJECTIVE:  - GI reconsulted for cellulitis around PEG. CT shows cellulitis at the PEG entry site w/o drainable collection and suspected partial erosion of PEG balloon into abdominal wall.   - Per RN PEG continues to flush well    Allergies  No Known Allergies    Intolerances      MEDICATIONS:  MEDICATIONS  (STANDING):  aspirin  chewable 81 milliGRAM(s) Oral daily  atorvastatin 80 milliGRAM(s) Oral at bedtime  clopidogrel Tablet 75 milliGRAM(s) Oral daily  dextrose 10% Bolus 125 milliLiter(s) IV Bolus once  dextrose 5%. 1000 milliLiter(s) (50 mL/Hr) IV Continuous <Continuous>  dextrose 5%. 1000 milliLiter(s) (100 mL/Hr) IV Continuous <Continuous>  dextrose 50% Injectable 25 Gram(s) IV Push once  dextrose 50% Injectable 12.5 Gram(s) IV Push once  doxazosin 1 milliGRAM(s) Oral at bedtime  enoxaparin Injectable 40 milliGRAM(s) SubCutaneous every 24 hours  ferrous    sulfate 325 milliGRAM(s) Oral daily  folic acid 1 milliGRAM(s) Oral daily  glucagon  Injectable 1 milliGRAM(s) IntraMuscular once  insulin glargine Injectable (LANTUS) 4 Unit(s) SubCutaneous at bedtime  insulin lispro (ADMELOG) corrective regimen sliding scale   SubCutaneous every 6 hours  levETIRAcetam   Injectable 750 milliGRAM(s) IV Push every 12 hours  magnesium oxide 400 milliGRAM(s) Oral daily  midodrine. 5 milliGRAM(s) Oral <User Schedule>  piperacillin/tazobactam IVPB.. 4.5 Gram(s) IV Intermittent every 8 hours  polyethylene glycol 3350 17 Gram(s) Oral every 12 hours  potassium chloride  20 mEq/100 mL IVPB 20 milliEquivalent(s) IV Intermittent every 2 hours  senna 2 Tablet(s) Oral at bedtime  sodium chloride 0.9%. 1000 milliLiter(s) (50 mL/Hr) IV Continuous <Continuous>  vancomycin  IVPB 1000 milliGRAM(s) IV Intermittent every 12 hours    MEDICATIONS  (PRN):  acetaminophen     Tablet .. 650 milliGRAM(s) Oral every 6 hours PRN Temp greater or equal to 38C (100.4F), Mild Pain (1 - 3), Moderate Pain (4 - 6)  dextrose Oral Gel 15 Gram(s) Oral once PRN Blood Glucose LESS THAN 70 milliGRAM(s)/deciliter  guaiFENesin Oral Liquid (Sugar-Free) 100 milliGRAM(s) Oral every 6 hours PRN Cough    Vital Signs Last 24 Hrs  T(C): 36.8 (14 May 2024 11:57), Max: 37.3 (13 May 2024 14:45)  T(F): 98.2 (14 May 2024 11:57), Max: 99.2 (13 May 2024 14:45)  HR: 73 (14 May 2024 11:57) (73 - 92)  BP: 124/77 (14 May 2024 11:57) (117/68 - 137/79)  BP(mean): --  RR: 18 (14 May 2024 11:57) (17 - 18)  SpO2: 95% (14 May 2024 11:57) (94% - 95%)    Parameters below as of 14 May 2024 11:57  Patient On (Oxygen Delivery Method): room air        05-13 @ 07:01  -  05-14 @ 07:00  --------------------------------------------------------  IN: 0 mL / OUT: 1200 mL / NET: -1200 mL      PHYSICAL EXAM:  General: Sleepy but NAD  Lungs: Normal respiratory effort  Abdomen: Soft, nondistended, nontender. Approximately 5 x 3 cm of erythema/warmth around PEG insertion site with scant purulent fluid drainage around insertion  Extremities: No edema    LABS:                        9.5    10.56 )-----------( 253      ( 14 May 2024 05:30 )             28.5     05-14    132<L>  |  100  |  18  ----------------------------<  172<H>  3.4<L>   |  21<L>  |  0.74    Ca    8.2<L>      14 May 2024 05:30  Phos  2.8     05-14  Mg     2.1     05-14      RADIOLOGY & ADDITIONAL STUDIES: Reviewed

## 2024-05-14 NOTE — CONSULT NOTE ADULT - PROBLEM SELECTOR RECOMMENDATION 4
.  hx of CVA 2004, cb r sided weakness. presented [facility] (4/27) with L arm and leg weakness. Admitted for management of stroke, subacute infarct involving the posterior limb and the right internal capsule, and stenosis of proximal bilateral A2 segments and R M2.   - neuro recs noted, appreciated   - Patient is only eligible for enrollment in home hospice if family elects to transition to symptom directed approach AND forgo further TF

## 2024-05-14 NOTE — PROGRESS NOTE ADULT - ASSESSMENT
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     Appearance consistent with cellulitis at PEG site. The outer bumper of the PEG was previously retracted to 4cm and had rotated freely, but perhaps due to interval soft tissue swelling the outer bumper was now flush against the skin. The outer bumper was loosened to 5.5cm and afterwards the PEG was able to rotate/slide freely. PEG appears to be in the right position but given concern for erosion on CT would start PPI and obtain tube check study tomorrow prior to feeds to r/o any leak.     Recommendations:  - Can continue to give meds by PEG now but hold tube feeds  - Obtain tube check study tomorrow AM, and if study looks OK can resume feeds tomorrow  - IV PPI BID  - Agree with broad-spectrum abx for now, pending clinical response can consider de-escalation    We will continue to follow. Please see attending addendum for final recommendations.     Khadar (Adrienne) MD [facility]  Gastroenterology Fellow  Pager (M-F 7a-5p): [phone]  Pager (after hours): Please call  for on-call fellow

## 2024-05-14 NOTE — PROGRESS NOTE ADULT - PROBLEM SELECTOR PLAN 10
Ferritin: 410, total iron: 83, TIBC: 255, % saturation: 33     PLAN  - c/w home ferrous sulfate 325mg daily

## 2024-05-14 NOTE — PROGRESS NOTE ADULT - SUBJECTIVE AND OBJECTIVE BOX
******INCOMPLETE******    OVERNIGHT EVENTS/INTERVAL HPI: Cellulitis at the percutaneous gastrostomy entry site with soft tissue swelling and surrounding soft tissue gas. No drainable collection. Partial erosion of the gastrostomy balloon into the abdominal wall is suspected. Erythematous, marked off with marker, purulence sent for culture. Continued vanc and gave 1x clinda 900 IV. Urine light fruit punch, +clots in hudson bag. Tube feeds held.     SUBJECTIVE: Patient seen and examined at bedside.   OBJECTIVE:  Vital Signs Last 24 Hrs  T(C): 36.7 (13 May 2024 20:35), Max: 37.3 (13 May 2024 14:45)  T(F): 98.1 (13 May 2024 20:35), Max: 99.2 (13 May 2024 14:45)  HR: 86 (13 May 2024 20:35) (86 - 92)  BP: 137/79 (13 May 2024 20:35) (118/70 - 137/79)  BP(mean): --  RR: 18 (13 May 2024 20:35) (17 - 18)  SpO2: 94% (13 May 2024 20:35) (94% - 95%)    Parameters below as of 13 May 2024 20:35  Patient On (Oxygen Delivery Method): room air      I&O's Detail    12 May 2024 07:01  -  13 May 2024 07:00  --------------------------------------------------------  IN:  Total IN: 0 mL    OUT:    Indwelling Catheter - Urethral (mL): 900 mL  Total OUT: 900 mL    Total NET: -900 mL      13 May 2024 07:01  -  14 May 2024 06:01  --------------------------------------------------------  IN:  Total IN: 0 mL    OUT:    Indwelling Catheter - Urethral (mL): 700 mL  Total OUT: 700 mL    Total NET: -700 mL    Physical Exam:      Medications:  MEDICATIONS  (STANDING):  aspirin  chewable 81 milliGRAM(s) Oral daily  atorvastatin 80 milliGRAM(s) Oral at bedtime  clopidogrel Tablet 75 milliGRAM(s) Oral daily  dextrose 10% Bolus 125 milliLiter(s) IV Bolus once  dextrose 5%. 1000 milliLiter(s) (50 mL/Hr) IV Continuous <Continuous>  dextrose 5%. 1000 milliLiter(s) (100 mL/Hr) IV Continuous <Continuous>  dextrose 50% Injectable 25 Gram(s) IV Push once  dextrose 50% Injectable 12.5 Gram(s) IV Push once  doxazosin 1 milliGRAM(s) Oral at bedtime  enoxaparin Injectable 40 milliGRAM(s) SubCutaneous every 24 hours  ferrous    sulfate 325 milliGRAM(s) Oral daily  folic acid 1 milliGRAM(s) Oral daily  glucagon  Injectable 1 milliGRAM(s) IntraMuscular once  insulin glargine Injectable (LANTUS) 4 Unit(s) SubCutaneous at bedtime  insulin lispro (ADMELOG) corrective regimen sliding scale   SubCutaneous every 6 hours  levETIRAcetam   Injectable 750 milliGRAM(s) IV Push every 12 hours  magnesium oxide 400 milliGRAM(s) Oral daily  midodrine. 5 milliGRAM(s) Oral <User Schedule>  piperacillin/tazobactam IVPB.. 4.5 Gram(s) IV Intermittent every 8 hours  polyethylene glycol 3350 17 Gram(s) Oral every 12 hours  potassium chloride  10 mEq/100 mL IVPB 10 milliEquivalent(s) IV Intermittent every 1 hour  senna 2 Tablet(s) Oral at bedtime  sodium chloride 0.9%. 1000 milliLiter(s) (50 mL/Hr) IV Continuous <Continuous>    MEDICATIONS  (PRN):  acetaminophen     Tablet .. 650 milliGRAM(s) Oral every 6 hours PRN Temp greater or equal to 38C (100.4F), Mild Pain (1 - 3), Moderate Pain (4 - 6)  dextrose Oral Gel 15 Gram(s) Oral once PRN Blood Glucose LESS THAN 70 milliGRAM(s)/deciliter  guaiFENesin Oral Liquid (Sugar-Free) 100 milliGRAM(s) Oral every 6 hours PRN Cough      Labs:                 ******INCOMPLETE******    OVERNIGHT EVENTS/INTERVAL HPI: Cellulitis at the percutaneous gastrostomy entry site with soft tissue swelling and surrounding soft tissue gas. No drainable collection. Partial erosion of the gastrostomy balloon into the abdominal wall is suspected. Erythematous, marked off with marker, purulence sent for culture. Continued vanc and gave 1x clinda 900 IV. Urine light fruit punch, +clots in hudson bag. Tube feeds held.     SUBJECTIVE: Patient seen and examined at bedside. Similar mental status to yesterday, lethargic and not responding to questions but moving right upper and lower extremity.     OBJECTIVE:  Vital Signs Last 24 Hrs  T(C): 36.7 (13 May 2024 20:35), Max: 37.3 (13 May 2024 14:45)  T(F): 98.1 (13 May 2024 20:35), Max: 99.2 (13 May 2024 14:45)  HR: 86 (13 May 2024 20:35) (86 - 92)  BP: 137/79 (13 May 2024 20:35) (118/70 - 137/79)  BP(mean): --  RR: 18 (13 May 2024 20:35) (17 - 18)  SpO2: 94% (13 May 2024 20:35) (94% - 95%)    Parameters below as of 13 May 2024 20:35  Patient On (Oxygen Delivery Method): room air      I&O's Detail    12 May 2024 07:01  -  13 May 2024 07:00  --------------------------------------------------------  IN:  Total IN: 0 mL    OUT:    Indwelling Catheter - Urethral (mL): 900 mL  Total OUT: 900 mL    Total NET: -900 mL      13 May 2024 07:01  -  14 May 2024 06:01  --------------------------------------------------------  IN:  Total IN: 0 mL    OUT:    Indwelling Catheter - Urethral (mL): 700 mL  Total OUT: 700 mL    Total NET: -700 mL    Physical Exam:  General: NAD; lethargic;   Head: vEEG in place   Eyes: Anicteric sclerae, moist conjunctivae   Cardiovascular: RRR  Pulmonary: CTAB, no increased WOB  GI: Abdomen soft but distended; area of erythema, edema and mild purulence around PEG site, demarcated   : hudson draining fruit punch urine   Extremities: no edema    Neurological Exam:   Mental status: Severe dysarthria, minimal verbal output. Able to follow simple commands  Cranial nerves: Pupils equally round and reactive to light, visual fields full, no nystagmus, extraocular muscles intact, V1 through V3 intact bilaterally and symmetric, Left facial droop, tongue was midline.  Motor: Right upper and lower extremity 4/5 without drift. Left upper extremity 0/5, left lower extremity with trace movement, 1/5    Sensation: Grossly intact, inconsistent response when testing sensation of bilateral lower extremities  Coordination: No dysmetria appreciated with right upper extremity    Medications:  MEDICATIONS  (STANDING):  aspirin  chewable 81 milliGRAM(s) Oral daily  atorvastatin 80 milliGRAM(s) Oral at bedtime  clopidogrel Tablet 75 milliGRAM(s) Oral daily  dextrose 10% Bolus 125 milliLiter(s) IV Bolus once  dextrose 5%. 1000 milliLiter(s) (50 mL/Hr) IV Continuous <Continuous>  dextrose 5%. 1000 milliLiter(s) (100 mL/Hr) IV Continuous <Continuous>  dextrose 50% Injectable 25 Gram(s) IV Push once  dextrose 50% Injectable 12.5 Gram(s) IV Push once  doxazosin 1 milliGRAM(s) Oral at bedtime  enoxaparin Injectable 40 milliGRAM(s) SubCutaneous every 24 hours  ferrous    sulfate 325 milliGRAM(s) Oral daily  folic acid 1 milliGRAM(s) Oral daily  glucagon  Injectable 1 milliGRAM(s) IntraMuscular once  insulin glargine Injectable (LANTUS) 4 Unit(s) SubCutaneous at bedtime  insulin lispro (ADMELOG) corrective regimen sliding scale   SubCutaneous every 6 hours  levETIRAcetam   Injectable 750 milliGRAM(s) IV Push every 12 hours  magnesium oxide 400 milliGRAM(s) Oral daily  midodrine. 5 milliGRAM(s) Oral <User Schedule>  piperacillin/tazobactam IVPB.. 4.5 Gram(s) IV Intermittent every 8 hours  polyethylene glycol 3350 17 Gram(s) Oral every 12 hours  potassium chloride  10 mEq/100 mL IVPB 10 milliEquivalent(s) IV Intermittent every 1 hour  senna 2 Tablet(s) Oral at bedtime  sodium chloride 0.9%. 1000 milliLiter(s) (50 mL/Hr) IV Continuous <Continuous>    MEDICATIONS  (PRN):  acetaminophen     Tablet .. 650 milliGRAM(s) Oral every 6 hours PRN Temp greater or equal to 38C (100.4F), Mild Pain (1 - 3), Moderate Pain (4 - 6)  dextrose Oral Gel 15 Gram(s) Oral once PRN Blood Glucose LESS THAN 70 milliGRAM(s)/deciliter  guaiFENesin Oral Liquid (Sugar-Free) 100 milliGRAM(s) Oral every 6 hours PRN Cough      Labs:                          9.7    9.98  )-----------( 252      ( 14 May 2024 12:00 )             29.5     05-14    132<L>  |  100  |  18  ----------------------------<  172<H>  3.4<L>   |  21<L>  |  0.74    Ca    8.2<L>      14 May 2024 05:30  Phos  2.8     05-14  Mg     2.1     05-14        Urinalysis Basic - ( 14 May 2024 05:30 )    Color: x / Appearance: x / SG: x / pH: x  Gluc: 172 mg/dL / Ketone: x  / Bili: x / Urobili: x   Blood: x / Protein: x / Nitrite: x   Leuk Esterase: x / RBC: x / WBC x   Sq Epi: x / Non Sq Epi: x / Bacteria: x                RADIOLOGY, EKG & ADDITIONAL TESTS: Reviewed.       OVERNIGHT EVENTS/INTERVAL HPI: Cellulitis at the percutaneous gastrostomy entry site with soft tissue swelling and surrounding soft tissue gas. No drainable collection. Partial erosion of the gastrostomy balloon into the abdominal wall is suspected. Erythematous, marked off with marker, purulence sent for culture. Continued vanc and gave 1x clinda 900 IV. Urine light fruit punch, +clots in hudson bag. Tube feeds held.     SUBJECTIVE: Patient seen and examined at bedside. Similar mental status to yesterday, lethargic and not responding to questions but moving right upper and lower extremity.     OBJECTIVE:  Vital Signs Last 24 Hrs  T(C): 36.7 (13 May 2024 20:35), Max: 37.3 (13 May 2024 14:45)  T(F): 98.1 (13 May 2024 20:35), Max: 99.2 (13 May 2024 14:45)  HR: 86 (13 May 2024 20:35) (86 - 92)  BP: 137/79 (13 May 2024 20:35) (118/70 - 137/79)  BP(mean): --  RR: 18 (13 May 2024 20:35) (17 - 18)  SpO2: 94% (13 May 2024 20:35) (94% - 95%)    Parameters below as of 13 May 2024 20:35  Patient On (Oxygen Delivery Method): room air      I&O's Detail    12 May 2024 07:01  -  13 May 2024 07:00  --------------------------------------------------------  IN:  Total IN: 0 mL    OUT:    Indwelling Catheter - Urethral (mL): 900 mL  Total OUT: 900 mL    Total NET: -900 mL      13 May 2024 07:01  -  14 May 2024 06:01  --------------------------------------------------------  IN:  Total IN: 0 mL    OUT:    Indwelling Catheter - Urethral (mL): 700 mL  Total OUT: 700 mL    Total NET: -700 mL    Physical Exam:  General: NAD; lethargic;   Head: vEEG in place   Eyes: Anicteric sclerae, moist conjunctivae   Cardiovascular: RRR  Pulmonary: CTAB, no increased WOB  GI: Abdomen soft but distended; area of erythema, edema and mild purulence around PEG site, demarcated   : hudson draining fruit punch urine   Extremities: no edema    Neurological Exam:   Mental status: Severe dysarthria, minimal verbal output. Able to follow simple commands  Cranial nerves: Pupils equally round and reactive to light, visual fields full, no nystagmus, extraocular muscles intact, V1 through V3 intact bilaterally and symmetric, Left facial droop, tongue was midline.  Motor: Right upper and lower extremity 4/5 without drift. Left upper extremity 0/5, left lower extremity with trace movement, 1/5    Sensation: Grossly intact, inconsistent response when testing sensation of bilateral lower extremities  Coordination: No dysmetria appreciated with right upper extremity    Medications:  MEDICATIONS  (STANDING):  aspirin  chewable 81 milliGRAM(s) Oral daily  atorvastatin 80 milliGRAM(s) Oral at bedtime  clopidogrel Tablet 75 milliGRAM(s) Oral daily  dextrose 10% Bolus 125 milliLiter(s) IV Bolus once  dextrose 5%. 1000 milliLiter(s) (50 mL/Hr) IV Continuous <Continuous>  dextrose 5%. 1000 milliLiter(s) (100 mL/Hr) IV Continuous <Continuous>  dextrose 50% Injectable 25 Gram(s) IV Push once  dextrose 50% Injectable 12.5 Gram(s) IV Push once  doxazosin 1 milliGRAM(s) Oral at bedtime  enoxaparin Injectable 40 milliGRAM(s) SubCutaneous every 24 hours  ferrous    sulfate 325 milliGRAM(s) Oral daily  folic acid 1 milliGRAM(s) Oral daily  glucagon  Injectable 1 milliGRAM(s) IntraMuscular once  insulin glargine Injectable (LANTUS) 4 Unit(s) SubCutaneous at bedtime  insulin lispro (ADMELOG) corrective regimen sliding scale   SubCutaneous every 6 hours  levETIRAcetam   Injectable 750 milliGRAM(s) IV Push every 12 hours  magnesium oxide 400 milliGRAM(s) Oral daily  midodrine. 5 milliGRAM(s) Oral <User Schedule>  piperacillin/tazobactam IVPB.. 4.5 Gram(s) IV Intermittent every 8 hours  polyethylene glycol 3350 17 Gram(s) Oral every 12 hours  potassium chloride  10 mEq/100 mL IVPB 10 milliEquivalent(s) IV Intermittent every 1 hour  senna 2 Tablet(s) Oral at bedtime  sodium chloride 0.9%. 1000 milliLiter(s) (50 mL/Hr) IV Continuous <Continuous>    MEDICATIONS  (PRN):  acetaminophen     Tablet .. 650 milliGRAM(s) Oral every 6 hours PRN Temp greater or equal to 38C (100.4F), Mild Pain (1 - 3), Moderate Pain (4 - 6)  dextrose Oral Gel 15 Gram(s) Oral once PRN Blood Glucose LESS THAN 70 milliGRAM(s)/deciliter  guaiFENesin Oral Liquid (Sugar-Free) 100 milliGRAM(s) Oral every 6 hours PRN Cough      Labs:                          9.7    9.98  )-----------( 252      ( 14 May 2024 12:00 )             29.5     05-14    132<L>  |  100  |  18  ----------------------------<  172<H>  3.4<L>   |  21<L>  |  0.74    Ca    8.2<L>      14 May 2024 05:30  Phos  2.8     05-14  Mg     2.1     05-14        Urinalysis Basic - ( 14 May 2024 05:30 )    Color: x / Appearance: x / SG: x / pH: x  Gluc: 172 mg/dL / Ketone: x  / Bili: x / Urobili: x   Blood: x / Protein: x / Nitrite: x   Leuk Esterase: x / RBC: x / WBC x   Sq Epi: x / Non Sq Epi: x / Bacteria: x                RADIOLOGY, EKG & ADDITIONAL TESTS: Reviewed.

## 2024-05-14 NOTE — PROGRESS NOTE ADULT - PROBLEM SELECTOR PLAN 5
History of BPH. Urinary retention with postraumatic hematuria after straight cath. On 5/4, 900 cc and 350cc in BS with postraumatic hematuria. Urology consulted. Hudson in place draining clear yellow urine initially but intermittently draining fruit-punch colored urine for which urology informed and stated no further intervention at this time, likely secondary to Hudson movement. Today with clots in Hudson back.     PLAN  - Continue doxazosin 1mg/night (tamsulosin cannot be administered through PEG tube)   - F/u urology recs   - hudson to remain in place until patient is ambulatory; should follow up with his outpatient urologist for TOV when patient is more ambulatory and having regular BM's History of BPH. Urinary retention with postraumatic hematuria after straight cath. On 5/4, 900 cc and 350cc in BS with postraumatic hematuria. Urology consulted. Hudson in place draining clear yellow urine initially but intermittently draining fruit-punch colored urine for which urology informed and stated no further intervention at this time, likely secondary to traumatic Hudson.     PLAN  - Continue doxazosin 1mg/night (tamsulosin cannot be administered through PEG tube)   - F/u urology recs   - hudson to remain in place until patient is ambulatory; should follow up with his outpatient urologist for TOV when patient is more ambulatory and having regular BM's

## 2024-05-14 NOTE — PROGRESS NOTE ADULT - NSPROGADDITIONALINFOA_GEN_ALL_CORE
F: NS @ 50 cc/hr while NPO   E: Replete PRN  K<4.0, Mg<2.0, Phos<2.5  N: NPO i/s/o PEG tube dysfunction   GI ppx: None  DVT PPx: Lovenox.
F: mIVF NS @ 50 cc/hr   E: Replete PRN  K<4.0, Mg<2.0, Phos<2.5  N: Trickle feeds via PEG tube   GI ppx: None  DVT PPx: Lovenox.
F: 100 cc free water flushes via PEG tube q6h   E: Replete PRN  K<4.0, Mg<2.0, Phos<2.5  N: Hold tube feeds i/s/o high residuals   GI ppx: None  DVT PPx: Lovenox.

## 2024-05-14 NOTE — CONSULT NOTE ADULT - PROBLEM SELECTOR RECOMMENDATION 2
.  hx of CVAs, new baseline withdrawn, non verbal, unable to follow commands   - Delirium precautions: bedside window with blinds open; frequent re-orientation; pictures of family if possible; minimize lines, physical restraints, nighttime awakenings as medically feasible; ensure bowel movements daily or every other day, monitor for urinary retention; avoid anticholinergic medications or benzodiazepines as clinically feasible.  - For acute anxiety not responsive to verbal redirection, can use zyprexa 2.5mg via PEG BID PRN for acute anxiety/panic. Please order EKG to check Qtc prior to first administration.  - Monitor for possible qTC prolongation daily. If >500ms, recommend discontinuing Zyprexa  - Do not co-administer IM zyprexa with IM/IV benzos within 4 hours of each other, due to risk of autonomic suppression

## 2024-05-14 NOTE — PROGRESS NOTE ADULT - PROBLEM SELECTOR PLAN 9
Not currently on a standing bowel regimen. Patient's wife states previously when given laxatives he had 6-7 episodes of bowel movements right after. Patient had one bowel movement overnight, dark green in appearance.     PLAN  - continue standing senna and miralax via PEG  - f/u stool count

## 2024-05-14 NOTE — CONSULT NOTE ADULT - ASSESSMENT
88y M PMHx DM, Thalassemia minor/JUSTYNA, bl hearing impairment, hx stroke w R weakness (2004) depression, glaucoma, BPH, who presented [facility]  4/27) with L arm and leg weakness. Admitted for management of stroke. Hospital Course c/b waxing/waning L hemiparesis, dysphagia s/p PEG 5/7, new sepsis i/s/o cellulitis and gas around PEG site. Palliative consulted for GOC in the setting of advanced illness.

## 2024-05-14 NOTE — PROGRESS NOTE ADULT - PROBLEM SELECTOR PLAN 8
Failed SLP evaluation. FEES completed on 5/2 - recommended for PEG. s/p PEG on 5/7. Nutrition consulted. Started continuous tube feeds on 05/08- Cori [employer] Glucose Support 1.2 @10mL/hr x24hr and increase as tolerated to goal rate 63mL/hr x24hr to provide 1512mL total volume, 1814kcal (26kcal/kg dosing wt 70.3kg), 97g protein (1.4g/kg dosing wt 70.3kg), and 1149mL free water.     PLAN  - Hold tube feeds i/s/o cellulitis and gas around PEG tube site and partial erosion of gastrostomy balloon into abdominal wall   - Continue lantus 4u qhs due to elevated FGS in the 200's   - Maintain aspiration precautions   - If pt starts having frequent loose BMs, add Banatrol x2/day to promote fecal bulk.   - F/u nutrition recs  - high dose statin as above in CVA   - LDL results: 170 Failed SLP evaluation. FEES completed on 5/2 - recommended for PEG. s/p PEG on 5/7. Nutrition consulted. Started continuous tube feeds on 05/08- Cori [employer] Glucose Support 1.2 @10mL/hr x24hr and increase as tolerated to goal rate 63mL/hr x24hr to provide 1512mL total volume, 1814kcal (26kcal/kg dosing wt 70.3kg), 97g protein (1.4g/kg dosing wt 70.3kg), and 1149mL free water.     PLAN  - Hold tube feeds i/s/o cellulitis and gas around PEG tube site and partial erosion of gastrostomy balloon into abdominal wall   - Start IV PPI BID as above   - Continue lantus 4u qhs due to elevated FGS   - Maintain aspiration precautions   - If pt starts having frequent loose BMs, add Banatrol x2/day to promote fecal bulk.   - F/u nutrition recs  - high dose statin as above in CVA   - LDL results: 170

## 2024-05-14 NOTE — PROGRESS NOTE ADULT - ASSESSMENT
88y M Cantonese speaking, R hand dominant, b/l  hearing impairment ( b/l hearing aids) with PMHx of DM, Thalassemia minor/JUSTYNA, HLD, hx stroke with right sided weakness in 2004 (has a cane and walker at home but pt refuses using assisted device, off ASA since 2020 for unclear reason), depression, glaucoma, BPH, [facility] with new left side arm and leg weakness starting 4/26 pm. Wife noticed that patient was leaning against the wall with the left side of his body to support himself to walk and was unable to hold himself upright when sitting, slumping over to the left. Patient felt that his left arm and leg were weak. Called PCP who recommended ED visit. CTH/CTP/CTA head and neck with no acute findings. Loaded with aspirin 300mg x1. Transferred to [facility] for further stroke w/u.    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  # hx CVA w/ residual R sided weakness   # Dysphagia  # fever   # cellulitis around peg site / no abscess collection , partial erosion of peg     - fever/leucocytosis- concern aspiration pneumonia =- on zosyn, fu chest x ray,- non revealing , blood culture- thus far negative  CT with  cellulitis around peg site / no abscess collection , partial erosion of peg - GI evaluated patient, appreciated, hold tube feed now, only medicine and water   to have tube study tomorrow am  - to give zinc containing lotion around the peg site for healing   - afebrile and wbc down trending with both Vanc/zosyn - to continue with BS antibiotics for now )-      hudson in place, no tenderness in supra-pubic area ( zosyn should cover -currently no sign of infection in urine  - to give ivf for maintainence since npo and feeding held -could give at rate 50 cc per hour, to use isontonic fluid/ glucose in 120s,   - off reglan  - stool softener.   - midodrine 2.5mg bid ( 4/30)-> increased to 5mg bid( 5/1-)  to help with blood pressure for perfusion.     - video EEG = started Keppra by neuro team 5/11- due to concern for seizure, repeat CTH stable- now that peg feeding held= to give keppra via IV route ( eqivalent dose )  on keppra 750 mg iv bid.   s/p PEG on Tues 5/7, ok ASA, resume plavix 48hr after PEG ( 5/9),- will need clopidogrel for 21 days then stop.   VTE ppx in am 5/8,    - f/u wife's decision on LINQ ( wife spoke with son from [place] and wants to hold off LINQ)   - Cardiac CT: reviewed, no SANDRO jovanibus   - CT Chest: no PE   - s/p aspirin load 300mg x1( 4/27)  - continue aspirin 81mg po daily ( 4/27-)  - Clopidogrel 75mg po daily ( 4/28-5/2) hold until 48hr (5/9) after PEG placement(5/7), will need Clopidogrel for 21 days then stop   - continue Atorvastatin 80mg po qHS  ( 4/27-) via peg  - - cw statin.   - PT/OT eval    # Acute urinary retention- hudson in place.  # Gross hematuria- resolved,   # hx BPH  - resumed tamsulosin 0.4mg  -  consult appreciated re: gross hematuria, placed 22Fr hudson cath, TOV only when pt is ambulatory  punch color urine likely from pulling hudson ( encouraged not to pull ) on 5/10- urology checked hudson in place, clear yellow urine on 5/11-     # Anemia hx Thallassemia minor  - Ferritin 410, Tsat 33% adequate iron store  - hb now at 9.5 likely from intermittent hematuria, to transfuse prn.     #constipation  - c/w bowel regimen w/ senna+miralax    #DM  - A1C 6.3%   home meds: metformin 500mg TID, Januvia 50mg daily - held for now.   - ISS, goal -180   -to stop lantus -now feeding held    - mild hyponatremia and mild hypokalemia- being replaced.      #DVT Prophylaxis: Lovenox 40mg sq daily     Dispo: fu PT/OT, will benefit inpatient Rehab ( Wife chose [facility] or [facility])      Contact:  PMD Dr. Aron Soares   Wife: Farshad Pelayo # [phone]    POC as neuro   pt to be transferred to medicine- for fever/complication with peg   - would need to notify neuro on day of discharge,  NIHSS score.

## 2024-05-14 NOTE — PROGRESS NOTE ADULT - NS ATTEND AMEND GEN_ALL_CORE FT
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  TTE fairly unrevealing.  WALLY/ILR refused.  Can do outpatient MCOT. Cardiac CT without SANDRO thrombus. Status post PEG.  Continue DAPT, high intensity statin.   Course complicated by waxing/waning left hemiparesis, now on midodrine with improvement.  Course further complicated by AMS- HCT stable. EEG +, improved on Keppra 750 BID; anemia; infection- eval/treatment ongoing. Appreciate all services involved. Will transfer to medical service

## 2024-05-14 NOTE — PROGRESS NOTE ADULT - TIME BILLING
review of objective data, interview of patient, and discussion of assessment plan with patient/family/multidisciplinary team. I agree with ACP, Fellow documentation except where indicated above.
reviewing case, discussion with primary team and with family member
Time spent includes direct patient care  (interview and examination of patient), discussion with other providers, support staff and/or patient's family members, review of medical records, ordering diagnostic tests and analyzing results, and documentation.
Time spent includes direct patient care  (interview and examination of patient), discussion with other providers, support staff and/or patient's family members, review of medical records, ordering diagnostic tests and analyzing results, and documentation.
review of objective data, interview of patient, and discussion of assessment plan with patient/family/multidisciplinary team. I agree with ACP, Fellow documentation except where indicated above.

## 2024-05-14 NOTE — CONSULT NOTE ADULT - PROBLEM SELECTOR RECOMMENDATION 5
,  - GOC as above. Wife appears to understand most likely trajectory, but is unable to accept it in her grief. Also appears unable and/or burdened by providers asking her preferences on LST. Seems to express her desire to have providers make decisions "that are best for the patient" ?and/or have decisions made by son, who lives in [place]  - Full code, DNR DNI is recommended and appropriate give poor cognitive/functional baseline statuses post stroke; heroic measures may provide quantity of life, but quality will be poor   - surrogate is Farshad Pelayo # [phone]    In addition to the E/M visit, an advance care planning meeting was performed. Start time:1200 ; End time:1245; Total time: 45min. A in-person meeting to discuss advance care planning was held today regarding:   Jennifer Bruce  Primary decision maker:  Patient is unable to participate in decision making;  Alternate/surrogate:    Farshad. Discussed advance directives including, but not limited to, healthcare proxy and code status, as well as disease trajectory, patient's values/goals, and health care options that are available for end of life care. Decision regarding code status:  FULL CODE; Documentation completed today: [facility] note

## 2024-05-14 NOTE — CONSULT NOTE ADULT - PROBLEM SELECTOR RECOMMENDATION 9
.  sp stroke, cb dysphagia s/p PEG placement on 5/7  - aspiration precautions  - Decisions regarding long term feeding GOC should be respectful of and responsive to individual patient preferences, needs, and values. However the evidence against PEG tube placement for artificial feeding in patients with advanced dementia is substantial and will not enhance comfort.   - Artificial nutrition may lead to an increase in pulmonary secretions and urine and fecal incontinence; incontinence is associated with increased risk of pressure ulcers  - Patients may need physical and/or chemical restraints to avoid self-extubation; restraints further increase patient's risk for pressure ulcers and may be seen as a violation of the patient's right to dignity  - Instead would recommend pleasure feeds as tolerated with strict aspiration precautions, careful hand-feeding, and ongoing teaching to caregivers

## 2024-05-14 NOTE — PROGRESS NOTE ADULT - PROBLEM SELECTOR PLAN 3
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 Patient previously on aspirin, but was stopped by doctor in 2020, unclear reason why.    > MRI brain on 4/28 with R internal capsule infarct   > Stroke Code HCT Results: negative for acute ischemia    > Stroke Code CTA Results: stenosis of proximal bilateral A2 segments and R M2   > Repeat HCT on 5/5 - subacute infarct centered in the genu and posterior limb of the right internal capsule is stable.  A chronic transcortical infarction in the left precentral gyrus and a small chronic infarct in the right cerebellar hemisphere are stable.  > Repeat HCT on 5/11 - stable follow up CT, showing redemonstration of an evolving subacute infarct involving the posterior limb and the right internal capsule. There is no hemorrhagic transformation.    PLAN  - continue ASA 81mg daily  - continue plavix 75mg QD x3 weeks   - continue atorvastatin 80mg daily    - q8h general neuro checks and vitals Pt with a hx of stroke 2004 with residual right sided weakness. Patient previously on aspirin, but was stopped by doctor in 2020, unclear reason why.    > MRI brain on 4/28 with R internal capsule infarct   > Stroke Code HCT Results: negative for acute ischemia    > Stroke Code CTA Results: stenosis of proximal bilateral A2 segments and R M2   > Repeat HCT on 5/5 - subacute infarct centered in the genu and posterior limb of the right internal capsule is stable.  A chronic transcortical infarction in the left precentral gyrus and a small chronic infarct in the right cerebellar hemisphere are stable.  > Repeat HCT on 5/11 - stable follow up CT, showing redemonstration of an evolving subacute infarct involving the posterior limb and the right internal capsule. There is no hemorrhagic transformation.    PLAN  - continue ASA 81mg daily  - continue Plavix 75mg QD x3 weeks   - continue atorvastatin 80mg daily    - q8h general neuro checks and vitals

## 2024-05-14 NOTE — CONSULT NOTE ADULT - PROBLEM SELECTOR RECOMMENDATION 3
.  pps 30-40%  - cw supportive care including repositioning and skin/wound care  - Oral care q6 ATC  - PT cs, recommending acute rehab  - Patient seems to be a poor candidate for rehabilitation given what appears to be baseline poor, possibly bedbound, functional status and limited ability to follow commands

## 2024-05-14 NOTE — PROGRESS NOTE ADULT - SUBJECTIVE AND OBJECTIVE BOX
LARRY BURNS , [id number],  [facility] [id number]    Time of encounter : 9 am   wife at bedside, video eeg on going.  Tmax 102 at May 12   afebrile overnight   CT result reviewed, cellulitis with partial erosion of the peg into abdominal wall, no collection   feeding held after CT Finding  GI evaluated patient , ? pus vs milk soaked the gauge around peg  currently on vanc/zosyn  hematuria ( intermittent in hudson - the most recent urine is yellow color )  he could not provide history.    T(C): 36.8 (05-14-24 @ 11:57), Max: 36.8 (05-14-24 @ 11:57)  HR: 73 (05-14-24 @ 11:57) (73 - 86)  BP: 124/77 (05-14-24 @ 11:57) (117/68 - 137/79)  RR: 18 (05-14-24 @ 11:57) (18 - 18)  SpO2: 95% (05-14-24 @ 11:57) (94% - 95%)    acetaminophen     Tablet .. 650 milliGRAM(s) Oral every 6 hours PRN  aspirin  chewable 81 milliGRAM(s) Oral daily  atorvastatin 80 milliGRAM(s) Oral at bedtime  clopidogrel Tablet 75 milliGRAM(s) Oral daily  dextrose 10% Bolus 125 milliLiter(s) IV Bolus once  dextrose 5%. 1000 milliLiter(s) IV Continuous <Continuous>  dextrose 5%. 1000 milliLiter(s) IV Continuous <Continuous>  dextrose 50% Injectable 25 Gram(s) IV Push once  dextrose 50% Injectable 12.5 Gram(s) IV Push once  dextrose Oral Gel 15 Gram(s) Oral once PRN  doxazosin 1 milliGRAM(s) Oral at bedtime  enoxaparin Injectable 40 milliGRAM(s) SubCutaneous every 24 hours  ferrous    sulfate 325 milliGRAM(s) Oral daily  folic acid 1 milliGRAM(s) Oral daily  glucagon  Injectable 1 milliGRAM(s) IntraMuscular once  guaiFENesin Oral Liquid (Sugar-Free) 100 milliGRAM(s) Oral every 6 hours PRN  insulin glargine Injectable (LANTUS) 4 Unit(s) SubCutaneous at bedtime  insulin lispro (ADMELOG) corrective regimen sliding scale   SubCutaneous every 6 hours  levETIRAcetam   Injectable 750 milliGRAM(s) IV Push every 12 hours  magnesium oxide 400 milliGRAM(s) Oral daily  midodrine. 5 milliGRAM(s) Oral <User Schedule>  piperacillin/tazobactam IVPB.. 4.5 Gram(s) IV Intermittent every 8 hours  polyethylene glycol 3350 17 Gram(s) Oral every 12 hours  senna 2 Tablet(s) Oral at bedtime  sodium chloride 0.9%. 1000 milliLiter(s) IV Continuous <Continuous>  zinc oxide 20% Ointment 1 Application(s) Topical every 24 hours      Physical Exam :    General exam : resting, would open eyes, dose not verbalized  flattening of left naso-labial fold  RRR, moving good air  peg in place- erythema around the peg-site, dressing is milk color ( pus vs milk )  hudson with hematuria ( yellow urine in the tube )   moves right side of body, not moving left,  non verbal.     CBC Full  -  ( 14 May 2024 12:00 )  WBC Count : 9.98 K/uL  RBC Count : 4.18 M/uL  Hemoglobin : 9.7 g/dL  Hematocrit : 29.5 %  Platelet Count - Automated : 252 K/uL  Mean Cell Volume : 70.6 fl  Mean Cell Hemoglobin : 23.2 pg  Mean Cell Hemoglobin Concentration : 32.9 gm/dL  Auto Neutrophil # : 8.24 K/uL  Auto Lymphocyte # : 0.96 K/uL  Auto Monocyte # : 0.35 K/uL  Auto Eosinophil # : 0.26 K/uL  Auto Basophil # : 0.17 K/uL  Auto Neutrophil % : 82.6 %  Auto Lymphocyte % : 9.6 %  Auto Monocyte % : 3.5 %  Auto Eosinophil % : 2.6 %  Auto Basophil % : 1.7 %        05-14    132<L>  |  100  |  18  ----------------------------<  172<H>  3.4<L>   |  21<L>  |  0.74    Ca    8.2<L>      14 May 2024 05:30  Phos  2.8     05-14  Mg     2.1     05-14      Daily     Daily   CAPILLARY BLOOD GLUCOSE      POCT Blood Glucose.: 126 mg/dL (14 May 2024 12:34)      Culture - Blood (collected 12 May 2024 07:45)  Source: .Blood Blood  Preliminary Report (14 May 2024 13:01):    No growth at 48 Hours      Urinalysis Basic - ( 14 May 2024 05:30 )    Color: x / Appearance: x / SG: x / pH: x  Gluc: 172 mg/dL / Ketone: x  / Bili: x / Urobili: x   Blood: x / Protein: x / Nitrite: x   Leuk Esterase: x / RBC: x / WBC x   Sq Epi: x / Non Sq Epi: x / Bacteria: x

## 2024-05-14 NOTE — CONSULT NOTE ADULT - PROBLEM SELECTOR RECOMMENDATION 6
.  Complex medical decision making / goals of care in the setting of stroke.    GOC as above    Coping:  well[  ] with difficulty[  ] poor coping[  ] unable to assess[  x]   Social support: strong[ x ] adequate[  ] inadequate[  ]  [facility] support system at home: strong[  ] adequate[  ] inadequate[ x ]    Active Psychosocial Referrals:  SW/CM[ x ] PT/OT[  ] Hospice[  ]  [place][  ]  KIERSTEN Grover Patient/Family Support[  ] Chaplaincy[   ]  Massage Therapy[  ] Music Therapy [x  ] Holistic RN[  ]    - Palliative will sign off an follow peripherally for support

## 2024-05-14 NOTE — CONSULT NOTE ADULT - CONSULT REASON
Peg tube placement
hematuria
co-management
Palliative consulted for complex medical decision making / goc in the setting of advanced illness.

## 2024-05-14 NOTE — PROGRESS NOTE ADULT - PROBLEM SELECTOR PLAN 6
Goal -160, okay to work with PT    PLAN  - Continue Midodrine 5mg BID to maintain adequate perfusion   - wife refusing WALLY/ILR, however she agreeable for [facility]OT, but after talking to EP team, she agreed to follow up with them after going to acute rehab to consider monitoring.   - Cardiac CT to rule out SANDRO thrombus: No SANDRO or left atrial thrombus. Non cardiac: Apparent filling defects in segmental branches of the right lower lobe pulmonary artery.  - TTE: EF 56%

## 2024-05-14 NOTE — PROGRESS NOTE ADULT - PROBLEM SELECTOR PLAN 1
Patient meeting 2/4 SIRS criteria 05/12 (HR 98, T 102) and increasing leukocytosis (11k -> 13k). Blood cultures drawn. CXR with left-sided effusion. Unclear source of infection. MRSA swab negative. Abdominal x-ray without obstruction.  CTAP 05/13 with cellulitis at the percutaneous gastrostomy entry site with soft tissue swelling and surrounding soft tissue gas. No drainable collection. Partial erosion of the gastrostomy balloon into the abdominal wall is   suspected. S/p IV zosyn 4.5g and IV vancomycin 1g, IV clindamycin 900mg.     PLAN  - Trend CBC + diff   - Continue IV vancomycin 1g q12h, ______  - F/u blood cultures Patient meeting 2/4 SIRS criteria 05/12 (HR 98, T 102) and increasing leukocytosis (11k -> 13k). Blood cultures drawn. CXR with left-sided effusion. Unclear source of infection. MRSA swab negative. Abdominal x-ray without obstruction.  CTAP 05/13 with cellulitis at the percutaneous gastrostomy entry site with soft tissue swelling and surrounding soft tissue gas. No drainable collection. Partial erosion of the gastrostomy balloon into the abdominal wall is   suspected. Mild amount of purulence at S/p IV zosyn 4.5g and IV vancomycin 1g, IV clindamycin 900mg.     PLAN  - Trend CBC + diff   - Continue IV vancomycin 1g q12h, check trough before 4th dose (due for vanc trough 05/15  @ 12am)   - F/u wound culture   - F/u blood cultures Patient meeting 2/4 SIRS criteria 05/12 (HR 98, T 102) and increasing leukocytosis (11k -> 13k). Blood cultures drawn. CXR with left-sided effusion. Unclear source of infection. MRSA swab negative. Abdominal x-ray without obstruction.  CTAP 05/13 with cellulitis at the percutaneous gastrostomy entry site with soft tissue swelling and surrounding soft tissue gas. No drainable collection. Partial erosion of the gastrostomy balloon into the abdominal wall is   suspected. Mild amount of purulence at S/p IV zosyn 4.5g and IV vancomycin 1g, IV clindamycin 900mg.     PLAN  - Trend CBC + diff   - Continue IV zosyn 4.5g q8h   - Continue IV vancomycin 1g q12h, check trough before 4th dose (due for vanc trough 05/15  @ 12am)   - F/u wound culture   - F/u blood cultures

## 2024-05-14 NOTE — CONSULT NOTE ADULT - TIME BILLING
Emotional Support/Supportive Care and Clarification of Potential Disease Trajectory related to CVA  Assessment of Symptom [place] and Palliative regimen  Exploration of GOC/Advanced Directives including HCP designation, code status, and hospice eligibility    Time exclusive of ACP discussion  Time inclusive of chart review, medication ordering, discussion with primary team, clinical documentation, and communication with family/caregiver

## 2024-05-14 NOTE — PROVIDER CONTACT NOTE (CRITICAL VALUE NOTIFICATION) - DATE AND TIME:
Pt free of falls and injury. Pt AAOx4. Fall precautions remain in place. Pt remains on room air. Sats 93-94%. NSR on telemetry. Plan of care reviewed with pt. Continued to give IV lasix. Intake and output monitored. Pt complained of incision pain. Controlled with scheduled robaxin, tylenol and prn medication. Pt denies chest pain, headache, and SOB. No acute distress noted,  plan of care continues.      Problem: Adult Inpatient Plan of Care  Goal: Plan of Care Review  Outcome: Ongoing, Progressing  Goal: Patient-Specific Goal (Individualized)  Outcome: Ongoing, Progressing  Goal: Absence of Hospital-Acquired Illness or Injury  Outcome: Ongoing, Progressing  Goal: Optimal Comfort and Wellbeing  Outcome: Ongoing, Progressing  Goal: Readiness for Transition of Care  Outcome: Ongoing, Progressing     Problem: Activity Intolerance (Cardiovascular Surgery)  Goal: Improved Activity Tolerance  Outcome: Ongoing, Progressing     Problem: Adjustment to Surgery (Cardiovascular Surgery)  Goal: Optimal Coping with Heart Surgery  Outcome: Ongoing, Progressing     Problem: Bleeding (Cardiovascular Surgery)  Goal: Bleeding (Cardiovascular Surgery)  Outcome: Ongoing, Progressing     Problem: Bowel Motility Impaired (Cardiovascular Surgery)  Goal: Effective Bowel Elimination (Cardiovascular Surgery)  Outcome: Ongoing, Progressing     Problem: Cardiac Function Impaired (Cardiovascular Surgery)  Goal: Effective Cardiac Function  Outcome: Ongoing, Progressing     Problem: Cerebral Tissue Perfusion (Cardiovascular Surgery)  Goal: Optimal Cerebral Tissue Perfusion (Cardiovascular Surgery)  Outcome: Ongoing, Progressing     Problem: Fluid and Electrolyte Imbalance (Cardiovascular Surgery)  Goal: Fluid and Electrolyte Balance (Cardiovascular Surgery)  Outcome: Ongoing, Progressing     Problem: Glycemic Control Impaired (Cardiovascular Surgery)  Goal: Blood Glucose Level Within Targeted Range (Cardiovascular Surgery)  Outcome: Ongoing,  Progressing     Problem: Infection (Cardiovascular Surgery)  Goal: Absence of Infection Signs and Symptoms  Outcome: Ongoing, Progressing     Problem: Ongoing Anesthesia Effects (Cardiovascular Surgery)  Goal: Anesthesia/Sedation Recovery  Outcome: Ongoing, Progressing     Problem: Pain (Cardiovascular Surgery)  Goal: Acceptable Pain Control  Outcome: Ongoing, Progressing     Problem: Postoperative Nausea and Vomiting (Cardiovascular Surgery)  Goal: Nausea and Vomiting Relief (Cardiovascular Surgery)  Outcome: Ongoing, Progressing     Problem: Postoperative Urinary Retention (Cardiovascular Surgery)  Goal: Effective Urinary Elimination (Cardiovascular Surgery)  Outcome: Ongoing, Progressing     Problem: Respiratory Compromise (Cardiovascular Surgery)  Goal: Effective Oxygenation and Ventilation (Cardiovascular Surgery)  Outcome: Ongoing, Progressing     Problem: Infection  Goal: Absence of Infection Signs and Symptoms  Outcome: Ongoing, Progressing     Problem: Skin Injury Risk Increased  Goal: Skin Health and Integrity  Outcome: Ongoing, Progressing     Problem: Fall Injury Risk  Goal: Absence of Fall and Fall-Related Injury  Outcome: Ongoing, Progressing      14-May-2024 15:10 14-May-2024 15:03

## 2024-05-14 NOTE — PROGRESS NOTE ADULT - PROBLEM SELECTOR PLAN 11
Home meds: metformin 500mg TID, Januvia 50mg daily      PLAN  - A1C results: 6.3%   - c/w mISS   - Tube feeds  - FSG q6 hours  - started Lantus 4u qhs due to elevated FGS in the 200's

## 2024-05-14 NOTE — CONSULT NOTE ADULT - CONVERSATION DETAILS
Met with patient's wife/surrogate at bedside to discuss diagnosis and treatment preferences. Introduced the role of palliative medicine for ACP, GOC, and support.    Wife shared that prior to stroke, patient had been a very active man who played table tennis and soccer even in his advanced age ("I told him he needed to stop soccer because his body was too old.") He valued his functional independence, ability to play sports, and the community it provided.    Wife understands that patient has had stroke, was deemed unable to safely swallow and thus required a PEG feeding tube. She also understands that patient is "having complications because he has a pneumonia because of the feeding tube."     She is hopeful that he "gets better," but is unable to elaborate on what that means. She asks if he is going to be able to walk again or if he is "going to be in bed most of the time."     Shared that there may be a possibility of some improvement in mental status, but he will not return to cognitive baseline prior to stroke.  I expressed concern that even with maximum rehabilitation, patient will not return to his functional status prior to stroke. Explained that he will likely be confined to bed/chair for most of the day and will indefinitely need assistance with all ADLs. Restated his risk for future aspiration PNA and life threatening infections given new, medically fragile baseline.    Wife seems to understand this, but in her extreme grief, she is unable to accept it at this time.     Gently attempted to explore wishes regarding cpr/intubation, however wife deflects and instead explained that she is working hard with [place] immigration (?embassy) to have her son, who lives in [place], be able to come to the [place]. Wife seemed to affirm that it is too burdensome for her to make decisions regarding life sustaining treatment and seemed to express that she would prefer doctors and providers to make the "decision that is best."    Time spent providing support.

## 2024-05-14 NOTE — EEG REPORT - NS EEG TEXT BOX
Health system Department of Neurology  Inpatient Continuous video-Electroencephalogram  [address], [address] [place] [id number], T: [phone]    Patient Name:	LARRY BURNS    :	1935  MRN:	[id number]    Study Start Date/Time:	2024, 3:29:02 PM  Study End Date/Time:    Referred by:  Johana Griggs MD    Brief Clinical History:  LARRY BURNS is a 88 year old Male with left hemip….aresis and confusion; study performed to investigate for seizures or markers of epilepsy.     Diagnosis Code:  R56.9 convulsions/seizure    Pertinent Medication:  Keppra initiated     Acquisition Details:  Electroencephalography was acquired using a minimum of 21 channels on an [employer] Neurology system v 9.3.1 with electrode placement according to the standard International 10-20 system following ACNS (American Clinical Neurophysiology Society) guidelines.  Anterior temporal T1 and T2 electrodes were utilized whenever possible.  The [employer] automated spike & seizure detections were all reviewed in detail, in addition to the entire raw EEG.    Day 3:  2024, 7 MA to next morning at 07:00 AM 2024    Background:  continuous, with predominantly sharply contoured alpha/theta with some intermittent diffuse delta frequencies.  Generalized Slowing:  Mild diffuse slowing. Initially some occasional generalized rhythmic delta activity with overriding fast activity, rarely with some embedded r frontal spikes, resolving by end of study.   Symmetry/Focality: Persistent right frontotemporal polymorphic delta slowing, initially rarely rhythmic (LRDA) then resolving.   Voltage:  Normal (20+ uV)  Organization:  Appropriate anterior-posterior gradient  Posterior Dominant Rhythm:  9 Hz symmetric, well-organized, and well-modulated  Sleep:  Symmetric, synchronous spindles and K complexes.  Variability:   Yes		Reactivity:  Yes    Spontaneous Activity:    6.	Rare left frontal (F3) spikes/sharp waves  7.	Occasional right frontal/frontotemporal (F4>F8/T8) /sharp waves    Events:  •	No electrographic seizures or significant clinical events occurred during this study.  Provocations:  •	Hyperventilation: was not performed.  •	Photic stimulation: was not performed.  Daily Summary:    •	These findings are indicative of potentially epileptogenic foci over the left frontal and right frontal/frontotemporal regions.   •	There is also evidence for moderate diffuse and R>L frontal focal cerebral dysfunction which is nonspecific in etiology.         Tim Toth MD  Attending Neurologist, [facility] Epilepsy [facility]

## 2024-05-15 DIAGNOSIS — G93.49 OTHER ENCEPHALOPATHY: ICD-10-CM

## 2024-05-15 DIAGNOSIS — Z71.89 OTHER SPECIFIED COUNSELING: ICD-10-CM

## 2024-05-15 DIAGNOSIS — Z51.5 ENCOUNTER FOR PALLIATIVE CARE: ICD-10-CM

## 2024-05-15 DIAGNOSIS — R53.81 OTHER MALAISE: ICD-10-CM

## 2024-05-15 LAB
ANION GAP SERPL CALC-SCNC: 13 MMOL/L — SIGNIFICANT CHANGE UP (ref 5–17)
BUN SERPL-MCNC: 14 MG/DL — SIGNIFICANT CHANGE UP (ref 7–23)
CALCIUM SERPL-MCNC: 8 MG/DL — LOW (ref 8.4–10.5)
CHLORIDE SERPL-SCNC: 102 MMOL/L — SIGNIFICANT CHANGE UP (ref 96–108)
CO2 SERPL-SCNC: 18 MMOL/L — LOW (ref 22–31)
CREAT SERPL-MCNC: 0.8 MG/DL — SIGNIFICANT CHANGE UP (ref 0.5–1.3)
EGFR: 86 ML/MIN/1.73M2 — SIGNIFICANT CHANGE UP
GLUCOSE BLDC GLUCOMTR-MCNC: 109 MG/DL — HIGH (ref 70–99)
GLUCOSE BLDC GLUCOMTR-MCNC: 118 MG/DL — HIGH (ref 70–99)
GLUCOSE BLDC GLUCOMTR-MCNC: 173 MG/DL — HIGH (ref 70–99)
GLUCOSE SERPL-MCNC: 111 MG/DL — HIGH (ref 70–99)
HCT VFR BLD CALC: 28.3 % — LOW (ref 39–50)
HGB BLD-MCNC: 9.3 G/DL — LOW (ref 13–17)
MAGNESIUM SERPL-MCNC: 2.2 MG/DL — SIGNIFICANT CHANGE UP (ref 1.6–2.6)
MCHC RBC-ENTMCNC: 23.3 PG — LOW (ref 27–34)
MCHC RBC-ENTMCNC: 32.9 GM/DL — SIGNIFICANT CHANGE UP (ref 32–36)
MCV RBC AUTO: 70.8 FL — LOW (ref 80–100)
NRBC # BLD: 0 /100 WBCS — SIGNIFICANT CHANGE UP (ref 0–0)
PHOSPHATE SERPL-MCNC: 2.8 MG/DL — SIGNIFICANT CHANGE UP (ref 2.5–4.5)
PLATELET # BLD AUTO: 249 K/UL — SIGNIFICANT CHANGE UP (ref 150–400)
POTASSIUM SERPL-MCNC: 3.6 MMOL/L — SIGNIFICANT CHANGE UP (ref 3.5–5.3)
POTASSIUM SERPL-SCNC: 3.6 MMOL/L — SIGNIFICANT CHANGE UP (ref 3.5–5.3)
RBC # BLD: 4 M/UL — LOW (ref 4.2–5.8)
RBC # FLD: 15.3 % — HIGH (ref 10.3–14.5)
SODIUM SERPL-SCNC: 133 MMOL/L — LOW (ref 135–145)
WBC # BLD: 7.57 K/UL — SIGNIFICANT CHANGE UP (ref 3.8–10.5)
WBC # FLD AUTO: 7.57 K/UL — SIGNIFICANT CHANGE UP (ref 3.8–10.5)

## 2024-05-15 PROCEDURE — 49465 FLUORO EXAM OF G/COLON TUBE: CPT

## 2024-05-15 PROCEDURE — 99233 SBSQ HOSP IP/OBS HIGH 50: CPT

## 2024-05-15 PROCEDURE — 99232 SBSQ HOSP IP/OBS MODERATE 35: CPT

## 2024-05-15 RX ORDER — PANTOPRAZOLE SODIUM 20 MG/1
40 TABLET, DELAYED RELEASE ORAL
Refills: 0 | Status: DISCONTINUED | OUTPATIENT
Start: 2024-05-15 | End: 2024-05-16

## 2024-05-15 RX ORDER — VANCOMYCIN HCL 1 G
1000 VIAL (EA) INTRAVENOUS EVERY 12 HOURS
Refills: 0 | Status: COMPLETED | OUTPATIENT
Start: 2024-05-15 | End: 2024-05-16

## 2024-05-15 RX ORDER — POTASSIUM CHLORIDE 20 MEQ
20 PACKET (EA) ORAL
Refills: 0 | Status: COMPLETED | OUTPATIENT
Start: 2024-05-15 | End: 2024-05-15

## 2024-05-15 RX ADMIN — CLOPIDOGREL BISULFATE 75 MILLIGRAM(S): 75 TABLET, FILM COATED ORAL at 11:45

## 2024-05-15 RX ADMIN — MIDODRINE HYDROCHLORIDE 5 MILLIGRAM(S): 2.5 TABLET ORAL at 11:44

## 2024-05-15 RX ADMIN — ENOXAPARIN SODIUM 40 MILLIGRAM(S): 100 INJECTION SUBCUTANEOUS at 12:25

## 2024-05-15 RX ADMIN — POLYETHYLENE GLYCOL 3350 17 GRAM(S): 17 POWDER, FOR SOLUTION ORAL at 11:45

## 2024-05-15 RX ADMIN — PIPERACILLIN AND TAZOBACTAM 25 GRAM(S): 4; .5 INJECTION, POWDER, LYOPHILIZED, FOR SOLUTION INTRAVENOUS at 15:02

## 2024-05-15 RX ADMIN — LEVETIRACETAM 750 MILLIGRAM(S): 250 TABLET, FILM COATED ORAL at 07:16

## 2024-05-15 RX ADMIN — PIPERACILLIN AND TAZOBACTAM 25 GRAM(S): 4; .5 INJECTION, POWDER, LYOPHILIZED, FOR SOLUTION INTRAVENOUS at 06:26

## 2024-05-15 RX ADMIN — PANTOPRAZOLE SODIUM 40 MILLIGRAM(S): 20 TABLET, DELAYED RELEASE ORAL at 11:44

## 2024-05-15 RX ADMIN — Medication 1: at 13:51

## 2024-05-15 RX ADMIN — Medication 1 MILLIGRAM(S): at 21:27

## 2024-05-15 RX ADMIN — ZINC OXIDE 1 APPLICATION(S): 200 OINTMENT TOPICAL at 15:05

## 2024-05-15 RX ADMIN — ATORVASTATIN CALCIUM 80 MILLIGRAM(S): 80 TABLET, FILM COATED ORAL at 21:27

## 2024-05-15 RX ADMIN — PIPERACILLIN AND TAZOBACTAM 25 GRAM(S): 4; .5 INJECTION, POWDER, LYOPHILIZED, FOR SOLUTION INTRAVENOUS at 21:27

## 2024-05-15 RX ADMIN — Medication 50 MILLIEQUIVALENT(S): at 09:59

## 2024-05-15 RX ADMIN — LEVETIRACETAM 750 MILLIGRAM(S): 250 TABLET, FILM COATED ORAL at 18:56

## 2024-05-15 RX ADMIN — Medication 50 MILLIEQUIVALENT(S): at 12:25

## 2024-05-15 RX ADMIN — MAGNESIUM OXIDE 400 MG ORAL TABLET 400 MILLIGRAM(S): 241.3 TABLET ORAL at 11:44

## 2024-05-15 RX ADMIN — Medication 1 MILLIGRAM(S): at 11:44

## 2024-05-15 RX ADMIN — Medication 325 MILLIGRAM(S): at 11:44

## 2024-05-15 RX ADMIN — Medication 250 MILLIGRAM(S): at 01:19

## 2024-05-15 RX ADMIN — Medication 250 MILLIGRAM(S): at 12:25

## 2024-05-15 RX ADMIN — Medication 81 MILLIGRAM(S): at 11:44

## 2024-05-15 RX ADMIN — SODIUM CHLORIDE 50 MILLILITER(S): 9 INJECTION INTRAMUSCULAR; INTRAVENOUS; SUBCUTANEOUS at 12:25

## 2024-05-15 NOTE — PROGRESS NOTE ADULT - SUBJECTIVE AND OBJECTIVE BOX
******INCOMPLETE******    OVERNIGHT EVENTS/INTERVAL HPI:    OBJECTIVE:  Vital Signs Last 24 Hrs  T(C): 36.6 (15 May 2024 05:37), Max: 36.8 (14 May 2024 11:57)  T(F): 97.8 (15 May 2024 05:37), Max: 98.2 (14 May 2024 11:57)  HR: 71 (15 May 2024 05:37) (71 - 76)  BP: 127/71 (15 May 2024 05:37) (124/77 - 127/75)  BP(mean): 90 (15 May 2024 05:37) (90 - 90)  RR: 18 (15 May 2024 05:37) (18 - 18)  SpO2: 93% (15 May 2024 05:37) (93% - 95%)    Parameters below as of 15 May 2024 05:37  Patient On (Oxygen Delivery Method): room air      I&O's Detail    13 May 2024 07:01  -  14 May 2024 07:00  --------------------------------------------------------  IN:  Total IN: 0 mL    OUT:    Indwelling Catheter - Urethral (mL): 1200 mL  Total OUT: 1200 mL    Total NET: -1200 mL        Physical Exam:      Medications:  MEDICATIONS  (STANDING):  aspirin  chewable 81 milliGRAM(s) Oral daily  atorvastatin 80 milliGRAM(s) Oral at bedtime  clopidogrel Tablet 75 milliGRAM(s) Oral daily  dextrose 10% Bolus 125 milliLiter(s) IV Bolus once  dextrose 5%. 1000 milliLiter(s) (50 mL/Hr) IV Continuous <Continuous>  dextrose 5%. 1000 milliLiter(s) (100 mL/Hr) IV Continuous <Continuous>  dextrose 50% Injectable 25 Gram(s) IV Push once  dextrose 50% Injectable 12.5 Gram(s) IV Push once  doxazosin 1 milliGRAM(s) Oral at bedtime  enoxaparin Injectable 40 milliGRAM(s) SubCutaneous every 24 hours  ferrous    sulfate 325 milliGRAM(s) Oral daily  folic acid 1 milliGRAM(s) Oral daily  glucagon  Injectable 1 milliGRAM(s) IntraMuscular once  insulin lispro (ADMELOG) corrective regimen sliding scale   SubCutaneous every 6 hours  levETIRAcetam   Injectable 750 milliGRAM(s) IV Push every 12 hours  magnesium oxide 400 milliGRAM(s) Oral daily  midodrine. 5 milliGRAM(s) Oral <User Schedule>  pantoprazole  Injectable 40 milliGRAM(s) IV Push every 12 hours  piperacillin/tazobactam IVPB.. 4.5 Gram(s) IV Intermittent every 8 hours  polyethylene glycol 3350 17 Gram(s) Oral every 12 hours  senna 2 Tablet(s) Oral at bedtime  sodium chloride 0.9%. 1000 milliLiter(s) (50 mL/Hr) IV Continuous <Continuous>  vancomycin  IVPB 1000 milliGRAM(s) IV Intermittent every 12 hours  zinc oxide 20% Ointment 1 Application(s) Topical every 24 hours    MEDICATIONS  (PRN):  acetaminophen     Tablet .. 650 milliGRAM(s) Oral every 6 hours PRN Temp greater or equal to 38C (100.4F), Mild Pain (1 - 3), Moderate Pain (4 - 6)  dextrose Oral Gel 15 Gram(s) Oral once PRN Blood Glucose LESS THAN 70 milliGRAM(s)/deciliter  guaiFENesin Oral Liquid (Sugar-Free) 100 milliGRAM(s) Oral every 6 hours PRN Cough      Labs:  OVERNIGHT EVENTS/INTERVAL HPI: Vanc therapeutic @ 10pm; re-dosed for same x 3; trough ordered for 5/16 12pm.    SUBJECTIVE: Patient seen and examined at bedside.     OBJECTIVE:  Vital Signs Last 24 Hrs  T(C): 36.6 (15 May 2024 05:37), Max: 36.8 (14 May 2024 11:57)  T(F): 97.8 (15 May 2024 05:37), Max: 98.2 (14 May 2024 11:57)  HR: 71 (15 May 2024 05:37) (71 - 76)  BP: 127/71 (15 May 2024 05:37) (124/77 - 127/75)  BP(mean): 90 (15 May 2024 05:37) (90 - 90)  RR: 18 (15 May 2024 05:37) (18 - 18)  SpO2: 93% (15 May 2024 05:37) (93% - 95%)    Parameters below as of 15 May 2024 05:37  Patient On (Oxygen Delivery Method): room air      I&O's Detail    13 May 2024 07:01  -  14 May 2024 07:00  --------------------------------------------------------  IN:  Total IN: 0 mL    OUT:    Indwelling Catheter - Urethral (mL): 1200 mL  Total OUT: 1200 mL    Total NET: -1200 mL    Physical Exam:  General: NAD; lethargic;   Head: vEEG in place   Eyes: Anicteric sclerae, moist conjunctivae   Cardiovascular: RRR  Pulmonary: CTAB, no increased WOB  GI: Abdomen soft but distended; area of erythema, edema and mild purulence around PEG site, demarcated   : hudson draining fruit punch urine   Neuro: AAOx0, severe dysarthria, minimal verbal output; Right upper and lower extremity 4/5 without drift. Left upper extremity 0/5, left lower extremity with trace movement, 1/5      Extremities: no edema    Medications:  MEDICATIONS  (STANDING):  aspirin  chewable 81 milliGRAM(s) Oral daily  atorvastatin 80 milliGRAM(s) Oral at bedtime  clopidogrel Tablet 75 milliGRAM(s) Oral daily  dextrose 10% Bolus 125 milliLiter(s) IV Bolus once  dextrose 5%. 1000 milliLiter(s) (50 mL/Hr) IV Continuous <Continuous>  dextrose 5%. 1000 milliLiter(s) (100 mL/Hr) IV Continuous <Continuous>  dextrose 50% Injectable 25 Gram(s) IV Push once  dextrose 50% Injectable 12.5 Gram(s) IV Push once  doxazosin 1 milliGRAM(s) Oral at bedtime  enoxaparin Injectable 40 milliGRAM(s) SubCutaneous every 24 hours  ferrous    sulfate 325 milliGRAM(s) Oral daily  folic acid 1 milliGRAM(s) Oral daily  glucagon  Injectable 1 milliGRAM(s) IntraMuscular once  insulin lispro (ADMELOG) corrective regimen sliding scale   SubCutaneous every 6 hours  levETIRAcetam   Injectable 750 milliGRAM(s) IV Push every 12 hours  magnesium oxide 400 milliGRAM(s) Oral daily  midodrine. 5 milliGRAM(s) Oral <User Schedule>  pantoprazole  Injectable 40 milliGRAM(s) IV Push every 12 hours  piperacillin/tazobactam IVPB.. 4.5 Gram(s) IV Intermittent every 8 hours  polyethylene glycol 3350 17 Gram(s) Oral every 12 hours  senna 2 Tablet(s) Oral at bedtime  sodium chloride 0.9%. 1000 milliLiter(s) (50 mL/Hr) IV Continuous <Continuous>  vancomycin  IVPB 1000 milliGRAM(s) IV Intermittent every 12 hours  zinc oxide 20% Ointment 1 Application(s) Topical every 24 hours    MEDICATIONS  (PRN):  acetaminophen     Tablet .. 650 milliGRAM(s) Oral every 6 hours PRN Temp greater or equal to 38C (100.4F), Mild Pain (1 - 3), Moderate Pain (4 - 6)  dextrose Oral Gel 15 Gram(s) Oral once PRN Blood Glucose LESS THAN 70 milliGRAM(s)/deciliter  guaiFENesin Oral Liquid (Sugar-Free) 100 milliGRAM(s) Oral every 6 hours PRN Cough      Labs:                           9.3    7.57  )-----------( 249      ( 15 May 2024 05:30 )             28.3     05-15    133<L>  |  102  |  14  ----------------------------<  111<H>  3.6   |  18<L>  |  0.80    Ca    8.0<L>      15 May 2024 05:30  Phos  2.8     05-15  Mg     2.2     05-15        Urinalysis Basic - ( 15 May 2024 05:30 )    Color: x / Appearance: x / SG: x / pH: x  Gluc: 111 mg/dL / Ketone: x  / Bili: x / Urobili: x   Blood: x / Protein: x / Nitrite: x   Leuk Esterase: x / RBC: x / WBC x   Sq Epi: x / Non Sq Epi: x / Bacteria: x                RADIOLOGY, EKG & ADDITIONAL TESTS: Reviewed.

## 2024-05-15 NOTE — PROGRESS NOTE ADULT - PROBLEM SELECTOR PLAN 8
Failed SLP evaluation. FEES completed on 5/2 - recommended for PEG. s/p PEG on 5/7. Nutrition consulted. Started continuous tube feeds on 05/08- [employer] Glucose Support 1.2 @10mL/hr x24hr and increase as tolerated to goal rate 63mL/hr x24hr to provide 1512mL total volume, 1814kcal (26kcal/kg dosing wt 70.3kg), 97g protein (1.4g/kg dosing wt 70.3kg), and 1149mL free water.     PLAN  - Hold tube feeds i/s/o cellulitis and gas around PEG tube site and partial erosion of gastrostomy balloon into abdominal wall   - Start IV PPI BID as above   - Continue lantus 4u qhs due to elevated FGS   - Maintain aspiration precautions   - If pt starts having frequent loose BMs, add Banatrol x2/day to promote fecal bulk.   - F/u nutrition recs  - high dose statin as above in CVA   - LDL results: 170 Failed SLP evaluation. FEES completed on 5/2 - recommended for PEG. s/p PEG on 5/7. Nutrition consulted. Started continuous tube feeds on 05/08- [employer] Glucose Support 1.2 @10mL/hr x24hr and increase as tolerated to goal rate 63mL/hr x24hr to provide 1512mL total volume, 1814kcal (26kcal/kg dosing wt 70.3kg), 97g protein (1.4g/kg dosing wt 70.3kg), and 1149mL free water.     PLAN  - Hold tube feeds i/s/o cellulitis and gas around PEG tube site and partial erosion of gastrostomy balloon into abdominal wall   - Continue PPI as above   - Continue lantus 4u qhs due to elevated FGS   - Maintain aspiration precautions   - If pt starts having frequent loose BMs, add Banatrol x2/day to promote fecal bulk.   - F/u nutrition recs  - high dose statin as above in CVA   - LDL results: 170

## 2024-05-15 NOTE — PROGRESS NOTE ADULT - SUBJECTIVE AND OBJECTIVE BOX
GASTROENTEROLOGY PROGRESS NOTE    INTERVAL/SUBJECTIVE:  - Tube check performed but not read    Allergies  No Known Allergies    Intolerances      MEDICATIONS:  MEDICATIONS  (STANDING):  aspirin  chewable 81 milliGRAM(s) Oral daily  atorvastatin 80 milliGRAM(s) Oral at bedtime  clopidogrel Tablet 75 milliGRAM(s) Oral daily  dextrose 10% Bolus 125 milliLiter(s) IV Bolus once  dextrose 5%. 1000 milliLiter(s) (100 mL/Hr) IV Continuous <Continuous>  dextrose 5%. 1000 milliLiter(s) (50 mL/Hr) IV Continuous <Continuous>  dextrose 50% Injectable 25 Gram(s) IV Push once  dextrose 50% Injectable 12.5 Gram(s) IV Push once  doxazosin 1 milliGRAM(s) Oral at bedtime  enoxaparin Injectable 40 milliGRAM(s) SubCutaneous every 24 hours  ferrous    sulfate 325 milliGRAM(s) Oral daily  folic acid 1 milliGRAM(s) Oral daily  glucagon  Injectable 1 milliGRAM(s) IntraMuscular once  insulin lispro (ADMELOG) corrective regimen sliding scale   SubCutaneous every 6 hours  levETIRAcetam   Injectable 750 milliGRAM(s) IV Push every 12 hours  magnesium oxide 400 milliGRAM(s) Oral daily  midodrine. 5 milliGRAM(s) Oral <User Schedule>  pantoprazole  Injectable 40 milliGRAM(s) IV Push every 12 hours  piperacillin/tazobactam IVPB.. 4.5 Gram(s) IV Intermittent every 8 hours  polyethylene glycol 3350 17 Gram(s) Oral every 12 hours  senna 2 Tablet(s) Oral at bedtime  sodium chloride 0.9%. 1000 milliLiter(s) (50 mL/Hr) IV Continuous <Continuous>  vancomycin  IVPB 1000 milliGRAM(s) IV Intermittent every 12 hours  zinc oxide 20% Ointment 1 Application(s) Topical every 24 hours    MEDICATIONS  (PRN):  acetaminophen     Tablet .. 650 milliGRAM(s) Oral every 6 hours PRN Temp greater or equal to 38C (100.4F), Mild Pain (1 - 3), Moderate Pain (4 - 6)  dextrose Oral Gel 15 Gram(s) Oral once PRN Blood Glucose LESS THAN 70 milliGRAM(s)/deciliter  guaiFENesin Oral Liquid (Sugar-Free) 100 milliGRAM(s) Oral every 6 hours PRN Cough    Vital Signs Last 24 Hrs  T(C): 36.6 (15 May 2024 05:37), Max: 36.6 (15 May 2024 05:37)  T(F): 97.8 (15 May 2024 05:37), Max: 97.8 (15 May 2024 05:37)  HR: 71 (15 May 2024 05:37) (71 - 76)  BP: 127/71 (15 May 2024 05:37) (127/71 - 127/75)  BP(mean): 90 (15 May 2024 05:37) (90 - 90)  RR: 18 (15 May 2024 05:37) (18 - 18)  SpO2: 93% (15 May 2024 05:37) (93% - 95%)    Parameters below as of 15 May 2024 05:37  Patient On (Oxygen Delivery Method): room air        PHYSICAL EXAM:  General: Sleepy but NAD  Lungs: Normal respiratory effort  Abdomen: Soft, nondistended, nontender. Decreasing erythema around PEG insertion site  Extremities: No edema    LABS:                        9.3    7.57  )-----------( 249      ( 15 May 2024 05:30 )             28.3     05-15    133<L>  |  102  |  14  ----------------------------<  111<H>  3.6   |  18<L>  |  0.80    Ca    8.0<L>      15 May 2024 05:30  Phos  2.8     05-15  Mg     2.2     05-15          RADIOLOGY & ADDITIONAL STUDIES: Reviewed

## 2024-05-15 NOTE — PROGRESS NOTE ADULT - ASSESSMENT
Mr. Bruce is an 88y Cantonese speaking Male with PMHx of JUSTYNA, stroke (2004, residual right sided weakness), HLD, glaucoma, DM, BPH, depression, and b/l hearing loss, who present to [facility] with new left-sided arm and leg weakness starting 4/26 pm, transferred to [facility] for further stroke w/u. Course c/b periods of waxing/waning left sided hemiparesis, likely due to hypoperfusion, s/p IVF boluses, now started on midodrine. Patient with urinary retention with postraumatic hematuria after straight cath in pt with BPH. Now s/p PEG placement on 5/7 and stepped down from stroke tele to [facility] while awaiting AR placement but course c/b new onset sepsis i/s/o cellulitis and gas around PEG tube site.

## 2024-05-15 NOTE — PROGRESS NOTE ADULT - PROBLEM SELECTOR PLAN 11
Home meds: metformin 500mg TID, Januvia 50mg daily      PLAN  - A1C results: 6.3%   - c/w mISS   - Tube feeds  - FSG q6 hours  - started Lantus 4u qhs due to elevated FGS in the 200's Home meds: metformin 500mg TID, Januvia 50mg daily      PLAN  - A1C results: 6.3%   - c/w mISS   - Tube feeds  - FSG q6 hrs while NPO

## 2024-05-15 NOTE — PROGRESS NOTE ADULT - PROBLEM SELECTOR PLAN 5
History of BPH. Urinary retention with postraumatic hematuria after straight cath. On 5/4, 900 cc and 350cc in BS with postraumatic hematuria. Urology consulted. Hudson in place draining clear yellow urine initially but intermittently draining fruit-punch colored urine for which urology informed and stated no further intervention at this time, likely secondary to traumatic Hudson.     PLAN  - Continue doxazosin 1mg/night (tamsulosin cannot be administered through PEG tube)   - F/u urology recs   - hudson to remain in place until patient is ambulatory; should follow up with his outpatient urologist for TOV when patient is more ambulatory and having regular BM's

## 2024-05-15 NOTE — PROGRESS NOTE ADULT - PROBLEM SELECTOR PLAN 3
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 Patient previously on aspirin, but was stopped by doctor in 2020, unclear reason why.    > MRI brain on 4/28 with R internal capsule infarct   > Stroke Code HCT Results: negative for acute ischemia    > Stroke Code CTA Results: stenosis of proximal bilateral A2 segments and R M2   > Repeat HCT on 5/5 - subacute infarct centered in the genu and posterior limb of the right internal capsule is stable.  A chronic transcortical infarction in the left precentral gyrus and a small chronic infarct in the right cerebellar hemisphere are stable.  > Repeat HCT on 5/11 - stable follow up CT, showing redemonstration of an evolving subacute infarct involving the posterior limb and the right internal capsule. There is no hemorrhagic transformation.    PLAN  - continue ASA 81mg daily  - continue Plavix 75mg QD x3 weeks   - continue atorvastatin 80mg daily    - q8h general neuro checks and vitals

## 2024-05-15 NOTE — PROGRESS NOTE ADULT - PROBLEM SELECTOR PLAN 1
Patient meeting 2/4 SIRS criteria 05/12 (HR 98, T 102) and increasing leukocytosis (11k -> 13k). Blood cultures drawn. CXR with left-sided effusion. Unclear source of infection. MRSA swab negative. Abdominal x-ray without obstruction.  CTAP 05/13 with cellulitis at the percutaneous gastrostomy entry site with soft tissue swelling and surrounding soft tissue gas. No drainable collection. Partial erosion of the gastrostomy balloon into the abdominal wall is   suspected. Mild amount of purulence at S/p IV zosyn 4.5g and IV vancomycin 1g, IV clindamycin 900mg.     PLAN  - Trend CBC + diff   - Continue IV zosyn 4.5g q8h   - Continue IV vancomycin 1g q12h, check trough before 4th dose (due for vanc trough 05/15  @ 12am)   - F/u wound culture   - F/u blood cultures Patient meeting 2/4 SIRS criteria 05/12 (HR 98, T 102) and increasing leukocytosis (11k -> 13k). Blood cultures drawn. CXR with left-sided effusion. Unclear source of infection. MRSA swab negative. Abdominal x-ray without obstruction.  CTAP 05/13 with cellulitis at the percutaneous gastrostomy entry site with soft tissue swelling and surrounding soft tissue gas. No drainable collection. Partial erosion of the gastrostomy balloon into the abdominal wall is   suspected. Mild amount of purulence at S/p IV zosyn 4.5g and IV vancomycin 1g, IV clindamycin 900mg.     PLAN  - Trend CBC + diff   - Continue IV zosyn 4.5g q8h   - Continue IV vancomycin 1g q12h, check trough before 4th dose  - F/u wound culture   - F/u blood cultures

## 2024-05-15 NOTE — PROGRESS NOTE ADULT - ASSESSMENT
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     Appearance consistent with cellulitis at PEG site now improving. MRSA swab neg 5/12.     Recommendations:  - F/u tube-check study, if no leak can resume tube feeds  - Continue PPI daily for 4 wk course (can be PO)  - F/u cultures  - Would treat for cellulitis, abx choice per primary team but could consider stopping vanc    We will continue to follow. Please see attending addendum for final recommendations.     Khadar (Stevesanjana) MD Krishna  Gastroenterology Fellow  Pager (M-F 7a-5p): [phone]  Pager (after hours): Please call  for on-call fellow   88M h/o CVA (2004 c/b R-side weakness), DM, JUSTYNA, glaucoma p/w L arm/leg weakness, found with R internal capsule CVA and c/b dysphagia (s/p PEG placement 5/7/24).     Appearance consistent with cellulitis at PEG site now improving. MRSA swab neg 5/12.     Recommendations:  - F/u tube-check study, if no leak can resume tube feeds  - Continue PPI daily for 4 wk course (can be PO)  - F/u cultures  - Would treat for cellulitis, abx choice per primary team but could consider stopping vanc in 1-2 days if wound improving and no resistant organisms seen    We will continue to follow. Please see attending addendum for final recommendations.     Khadar ([facility]) MD Krishna  Gastroenterology Fellow  Pager (M-F 7a-5p): [phone]  Pager (after hours): Please call  for on-call fellow

## 2024-05-15 NOTE — PROGRESS NOTE ADULT - PROBLEM SELECTOR PLAN 2
Patient with abdominal pain s/p PEG tube placement 05/07. Meeting sepsis criteria on 05/12. Tmax 102 (rectal). Blood cultures, CXR, abdominal XR non-revealing. CTAP 05/13 with cellulitis around PEG site and surrounding soft tissue gas, partial erosion of gastrostomy balloon into abdominal wall. Gastroenterology following.     PLAN  - Continue antibiotic management as above  - Start zinc oxic cream for topical therapy affected site   - Keep patient NPO except meds, hold tube feeds.   - Cellulitis may be due to patient moving around PEG tube, maintain bilateral wrist restraints for now   - Obtain tube check PEG xray study 05/15 AM   - Start IV pantoprazole 40mg BID   - F/u blood cultures  - F/u GI recs Patient with abdominal pain s/p PEG tube placement 05/07. Meeting sepsis criteria on 05/12. Tmax 102 (rectal). Blood cultures, CXR, abdominal XR non-revealing. CTAP 05/13 with cellulitis around PEG site and surrounding soft tissue gas, partial erosion of gastrostomy balloon into abdominal wall. Gastroenterology following.     PLAN  - Continue antibiotic management as above  - Continue zinc oxic cream for topical therapy affected site   - Keep patient NPO except meds, hold tube feeds.   - Cellulitis may be due to patient moving around PEG tube, maintain bilateral wrist restraints for now   - Follow-up PEG xray study   - Switch to pantoprazole 40mg PO qd    - F/u blood cultures  - F/u GI recs

## 2024-05-15 NOTE — PROGRESS NOTE ADULT - PROBLEM SELECTOR PLAN 4
vEEG placed on 05/11 to observe for seizure-like activity. Prelim read with GRDA w/ R sided LRDA w/ fast activity and right sided spikes without evolution. S/p Keppra 2g on 05/11. 05/12 read with potentially epileptogenic foci over the left frontal and right frontal/frontotemporal regions; also evidence for moderate diffuse and R>L frontal focal cerebral dysfunction which is nonspecific in etiology.     PLAN  - Continue Keppra 750mg BID  - Discontinue vEEG   - Ativan 2mg IV for GTCs > 2 min only  - Neurological assessment Q8hrs  - Seizure & Fall precautions  - Maintain Mg> 2 mmol/l

## 2024-05-16 LAB
-  AMOXICILLIN/CLAVULANIC ACID: SIGNIFICANT CHANGE UP
-  AMOXICILLIN/CLAVULANIC ACID: SIGNIFICANT CHANGE UP
-  AMPICILLIN/SULBACTAM: SIGNIFICANT CHANGE UP
-  AMPICILLIN/SULBACTAM: SIGNIFICANT CHANGE UP
-  AMPICILLIN: SIGNIFICANT CHANGE UP
-  CEFAZOLIN: SIGNIFICANT CHANGE UP
-  CEFAZOLIN: SIGNIFICANT CHANGE UP
-  CEFEPIME: SIGNIFICANT CHANGE UP
-  CEFEPIME: SIGNIFICANT CHANGE UP
-  CEFOXITIN: SIGNIFICANT CHANGE UP
-  CEFOXITIN: SIGNIFICANT CHANGE UP
-  CEFTRIAXONE: SIGNIFICANT CHANGE UP
-  CEFTRIAXONE: SIGNIFICANT CHANGE UP
-  CIPROFLOXACIN: SIGNIFICANT CHANGE UP
-  CIPROFLOXACIN: SIGNIFICANT CHANGE UP
-  GENTAMICIN: SIGNIFICANT CHANGE UP
-  GENTAMICIN: SIGNIFICANT CHANGE UP
-  LEVOFLOXACIN: SIGNIFICANT CHANGE UP
-  LEVOFLOXACIN: SIGNIFICANT CHANGE UP
-  PIPERACILLIN/TAZOBACTAM: SIGNIFICANT CHANGE UP
-  PIPERACILLIN/TAZOBACTAM: SIGNIFICANT CHANGE UP
-  TOBRAMYCIN: SIGNIFICANT CHANGE UP
-  TOBRAMYCIN: SIGNIFICANT CHANGE UP
-  TRIMETHOPRIM/SULFAMETHOXAZOLE: SIGNIFICANT CHANGE UP
-  TRIMETHOPRIM/SULFAMETHOXAZOLE: SIGNIFICANT CHANGE UP
-  VANCOMYCIN: SIGNIFICANT CHANGE UP
ANION GAP SERPL CALC-SCNC: 11 MMOL/L — SIGNIFICANT CHANGE UP (ref 5–17)
ANISOCYTOSIS BLD QL: SLIGHT — SIGNIFICANT CHANGE UP
BASOPHILS # BLD AUTO: 0.05 K/UL — SIGNIFICANT CHANGE UP (ref 0–0.2)
BASOPHILS NFR BLD AUTO: 0.9 % — SIGNIFICANT CHANGE UP (ref 0–2)
BLD GP AB SCN SERPL QL: NEGATIVE — SIGNIFICANT CHANGE UP
BUN SERPL-MCNC: 13 MG/DL — SIGNIFICANT CHANGE UP (ref 7–23)
CALCIUM SERPL-MCNC: 8 MG/DL — LOW (ref 8.4–10.5)
CHLORIDE SERPL-SCNC: 105 MMOL/L — SIGNIFICANT CHANGE UP (ref 96–108)
CO2 SERPL-SCNC: 19 MMOL/L — LOW (ref 22–31)
CREAT SERPL-MCNC: 0.8 MG/DL — SIGNIFICANT CHANGE UP (ref 0.5–1.3)
CULTURE RESULTS: ABNORMAL
DACRYOCYTES BLD QL SMEAR: SLIGHT — SIGNIFICANT CHANGE UP
EGFR: 86 ML/MIN/1.73M2 — SIGNIFICANT CHANGE UP
EOSINOPHIL # BLD AUTO: 0 K/UL — SIGNIFICANT CHANGE UP (ref 0–0.5)
EOSINOPHIL NFR BLD AUTO: 0 % — SIGNIFICANT CHANGE UP (ref 0–6)
GIANT PLATELETS BLD QL SMEAR: PRESENT — SIGNIFICANT CHANGE UP
GLUCOSE BLDC GLUCOMTR-MCNC: 126 MG/DL — HIGH (ref 70–99)
GLUCOSE BLDC GLUCOMTR-MCNC: 142 MG/DL — HIGH (ref 70–99)
GLUCOSE BLDC GLUCOMTR-MCNC: 158 MG/DL — HIGH (ref 70–99)
GLUCOSE BLDC GLUCOMTR-MCNC: 165 MG/DL — HIGH (ref 70–99)
GLUCOSE BLDC GLUCOMTR-MCNC: 177 MG/DL — HIGH (ref 70–99)
GLUCOSE SERPL-MCNC: 142 MG/DL — HIGH (ref 70–99)
HCT VFR BLD CALC: 32.7 % — LOW (ref 39–50)
HGB BLD-MCNC: 10.1 G/DL — LOW (ref 13–17)
HYPOCHROMIA BLD QL: SLIGHT — SIGNIFICANT CHANGE UP
LYMPHOCYTES # BLD AUTO: 0.81 K/UL — LOW (ref 1–3.3)
LYMPHOCYTES # BLD AUTO: 13.5 % — SIGNIFICANT CHANGE UP (ref 13–44)
MAGNESIUM SERPL-MCNC: 2.1 MG/DL — SIGNIFICANT CHANGE UP (ref 1.6–2.6)
MANUAL SMEAR VERIFICATION: SIGNIFICANT CHANGE UP
MCHC RBC-ENTMCNC: 23.1 PG — LOW (ref 27–34)
MCHC RBC-ENTMCNC: 30.9 GM/DL — LOW (ref 32–36)
MCV RBC AUTO: 74.8 FL — LOW (ref 80–100)
METHOD TYPE: SIGNIFICANT CHANGE UP
MICROCYTES BLD QL: SLIGHT — SIGNIFICANT CHANGE UP
MONOCYTES # BLD AUTO: 0.32 K/UL — SIGNIFICANT CHANGE UP (ref 0–0.9)
MONOCYTES NFR BLD AUTO: 5.4 % — SIGNIFICANT CHANGE UP (ref 2–14)
NEUTROPHILS # BLD AUTO: 4.79 K/UL — SIGNIFICANT CHANGE UP (ref 1.8–7.4)
NEUTROPHILS NFR BLD AUTO: 80.2 % — HIGH (ref 43–77)
ORGANISM # SPEC MICROSCOPIC CNT: ABNORMAL
ORGANISM # SPEC MICROSCOPIC CNT: SIGNIFICANT CHANGE UP
OVALOCYTES BLD QL SMEAR: SIGNIFICANT CHANGE UP
PHOSPHATE SERPL-MCNC: 2.3 MG/DL — LOW (ref 2.5–4.5)
PLAT MORPH BLD: ABNORMAL
PLATELET # BLD AUTO: 263 K/UL — SIGNIFICANT CHANGE UP (ref 150–400)
POIKILOCYTOSIS BLD QL AUTO: SIGNIFICANT CHANGE UP
POLYCHROMASIA BLD QL SMEAR: SLIGHT — SIGNIFICANT CHANGE UP
POTASSIUM SERPL-MCNC: 3.8 MMOL/L — SIGNIFICANT CHANGE UP (ref 3.5–5.3)
POTASSIUM SERPL-SCNC: 3.8 MMOL/L — SIGNIFICANT CHANGE UP (ref 3.5–5.3)
RBC # BLD: 4.37 M/UL — SIGNIFICANT CHANGE UP (ref 4.2–5.8)
RBC # FLD: 16.8 % — HIGH (ref 10.3–14.5)
RBC BLD AUTO: ABNORMAL
RH IG SCN BLD-IMP: POSITIVE — SIGNIFICANT CHANGE UP
SCHISTOCYTES BLD QL AUTO: SLIGHT — SIGNIFICANT CHANGE UP
SMUDGE CELLS # BLD: PRESENT — SIGNIFICANT CHANGE UP
SODIUM SERPL-SCNC: 135 MMOL/L — SIGNIFICANT CHANGE UP (ref 135–145)
SPECIMEN SOURCE: SIGNIFICANT CHANGE UP
TARGETS BLD QL SMEAR: SLIGHT — SIGNIFICANT CHANGE UP
VANCOMYCIN TROUGH SERPL-MCNC: 17.4 UG/ML — SIGNIFICANT CHANGE UP (ref 10–20)
WBC # BLD: 5.97 K/UL — SIGNIFICANT CHANGE UP (ref 3.8–10.5)
WBC # FLD AUTO: 5.97 K/UL — SIGNIFICANT CHANGE UP (ref 3.8–10.5)

## 2024-05-16 PROCEDURE — 99233 SBSQ HOSP IP/OBS HIGH 50: CPT | Mod: GC

## 2024-05-16 PROCEDURE — 99232 SBSQ HOSP IP/OBS MODERATE 35: CPT

## 2024-05-16 RX ORDER — PANTOPRAZOLE SODIUM 20 MG/1
40 TABLET, DELAYED RELEASE ORAL EVERY 24 HOURS
Refills: 0 | Status: DISCONTINUED | OUTPATIENT
Start: 2024-05-16 | End: 2024-05-17

## 2024-05-16 RX ORDER — LEVETIRACETAM 250 MG/1
750 TABLET, FILM COATED ORAL EVERY 12 HOURS
Refills: 0 | Status: DISCONTINUED | OUTPATIENT
Start: 2024-05-16 | End: 2024-05-17

## 2024-05-16 RX ORDER — INSULIN GLARGINE 100 [IU]/ML
4 INJECTION, SOLUTION SUBCUTANEOUS AT BEDTIME
Refills: 0 | Status: DISCONTINUED | OUTPATIENT
Start: 2024-05-16 | End: 2024-05-17

## 2024-05-16 RX ORDER — POTASSIUM PHOSPHATE, MONOBASIC POTASSIUM PHOSPHATE, DIBASIC 236; 224 MG/ML; MG/ML
30 INJECTION, SOLUTION INTRAVENOUS ONCE
Refills: 0 | Status: COMPLETED | OUTPATIENT
Start: 2024-05-16 | End: 2024-05-16

## 2024-05-16 RX ORDER — VANCOMYCIN HCL 1 G
750 VIAL (EA) INTRAVENOUS EVERY 12 HOURS
Refills: 0 | Status: DISCONTINUED | OUTPATIENT
Start: 2024-05-16 | End: 2024-05-17

## 2024-05-16 RX ORDER — PIPERACILLIN AND TAZOBACTAM 4; .5 G/20ML; G/20ML
3.38 INJECTION, POWDER, LYOPHILIZED, FOR SOLUTION INTRAVENOUS EVERY 8 HOURS
Refills: 0 | Status: DISCONTINUED | OUTPATIENT
Start: 2024-05-16 | End: 2024-05-17

## 2024-05-16 RX ORDER — VANCOMYCIN HCL 1 G
1000 VIAL (EA) INTRAVENOUS EVERY 12 HOURS
Refills: 0 | Status: DISCONTINUED | OUTPATIENT
Start: 2024-05-16 | End: 2024-05-16

## 2024-05-16 RX ADMIN — MIDODRINE HYDROCHLORIDE 5 MILLIGRAM(S): 2.5 TABLET ORAL at 23:48

## 2024-05-16 RX ADMIN — Medication 325 MILLIGRAM(S): at 11:35

## 2024-05-16 RX ADMIN — ZINC OXIDE 1 APPLICATION(S): 200 OINTMENT TOPICAL at 15:04

## 2024-05-16 RX ADMIN — Medication 250 MILLIGRAM(S): at 15:04

## 2024-05-16 RX ADMIN — PIPERACILLIN AND TAZOBACTAM 25 GRAM(S): 4; .5 INJECTION, POWDER, LYOPHILIZED, FOR SOLUTION INTRAVENOUS at 13:36

## 2024-05-16 RX ADMIN — Medication 1: at 23:37

## 2024-05-16 RX ADMIN — LEVETIRACETAM 750 MILLIGRAM(S): 250 TABLET, FILM COATED ORAL at 19:15

## 2024-05-16 RX ADMIN — Medication 250 MILLIGRAM(S): at 00:20

## 2024-05-16 RX ADMIN — PIPERACILLIN AND TAZOBACTAM 25 GRAM(S): 4; .5 INJECTION, POWDER, LYOPHILIZED, FOR SOLUTION INTRAVENOUS at 06:11

## 2024-05-16 RX ADMIN — INSULIN GLARGINE 4 UNIT(S): 100 INJECTION, SOLUTION SUBCUTANEOUS at 23:03

## 2024-05-16 RX ADMIN — Medication 1 MILLIGRAM(S): at 21:54

## 2024-05-16 RX ADMIN — LEVETIRACETAM 750 MILLIGRAM(S): 250 TABLET, FILM COATED ORAL at 07:20

## 2024-05-16 RX ADMIN — POTASSIUM PHOSPHATE, MONOBASIC POTASSIUM PHOSPHATE, DIBASIC 83.33 MILLIMOLE(S): 236; 224 INJECTION, SOLUTION INTRAVENOUS at 10:31

## 2024-05-16 RX ADMIN — MAGNESIUM OXIDE 400 MG ORAL TABLET 400 MILLIGRAM(S): 241.3 TABLET ORAL at 11:35

## 2024-05-16 RX ADMIN — Medication 1: at 12:55

## 2024-05-16 RX ADMIN — POLYETHYLENE GLYCOL 3350 17 GRAM(S): 17 POWDER, FOR SOLUTION ORAL at 21:54

## 2024-05-16 RX ADMIN — SENNA PLUS 2 TABLET(S): 8.6 TABLET ORAL at 21:54

## 2024-05-16 RX ADMIN — Medication 1 MILLIGRAM(S): at 11:35

## 2024-05-16 RX ADMIN — PANTOPRAZOLE SODIUM 40 MILLIGRAM(S): 20 TABLET, DELAYED RELEASE ORAL at 06:14

## 2024-05-16 RX ADMIN — MIDODRINE HYDROCHLORIDE 5 MILLIGRAM(S): 2.5 TABLET ORAL at 00:19

## 2024-05-16 RX ADMIN — ATORVASTATIN CALCIUM 80 MILLIGRAM(S): 80 TABLET, FILM COATED ORAL at 21:54

## 2024-05-16 RX ADMIN — CLOPIDOGREL BISULFATE 75 MILLIGRAM(S): 75 TABLET, FILM COATED ORAL at 11:35

## 2024-05-16 RX ADMIN — ENOXAPARIN SODIUM 40 MILLIGRAM(S): 100 INJECTION SUBCUTANEOUS at 11:34

## 2024-05-16 RX ADMIN — PIPERACILLIN AND TAZOBACTAM 25 GRAM(S): 4; .5 INJECTION, POWDER, LYOPHILIZED, FOR SOLUTION INTRAVENOUS at 21:54

## 2024-05-16 RX ADMIN — SODIUM CHLORIDE 50 MILLILITER(S): 9 INJECTION INTRAMUSCULAR; INTRAVENOUS; SUBCUTANEOUS at 07:32

## 2024-05-16 RX ADMIN — Medication 81 MILLIGRAM(S): at 11:35

## 2024-05-16 RX ADMIN — MIDODRINE HYDROCHLORIDE 5 MILLIGRAM(S): 2.5 TABLET ORAL at 11:35

## 2024-05-16 RX ADMIN — Medication 1: at 18:18

## 2024-05-16 NOTE — PROGRESS NOTE ADULT - ATTENDING COMMENTS
87yo M with PMH of CVA (2004 c/b R-side weakness), DM, JUSTYNA, glaucoma who presented with stroke that was further c/b dysphagia s/p PEG placement 5/7/24.     The patient has developed erythema surrounding the gastrostomy tube site and CT also demonstrates the cellulitis as well as soft tissue gas. The tube itself rotates freely and flushes easily per RN at bedside. There was what appeared like purulent discharge from the insertion site. Agree with broad-spectrum coverage for skin/soft tissue infection with possible gas forming organism. Would also attempt to obtain culture from site for sensitivity testing. Agree with plan to obtain tube study prior to resuming tube feeds.     Agree with plan as outlined above.    MALU Harkins MD  GI Attending
88M cantonese speaking, JUSTYNA, ischemic stroke (2004 w/ residual RHP), HLD presented w/ left sided weakness in setting of R. posterior limb of IC infarction.     On exam, fluent, follows crossed commands, R. gaze deviation able to overcome volitionally, ?decreased BTT on the left, moderate-severe dysarthria, moderate L. facial droop, LUE no movement, LLE no movement.     MR brain acute infarction in the R. posterior limb of IC, R. BG and ?R. thalamus. Chronic L. posterior frontal transcortical infarction and R. cerebellum.   CTA h/n R. A2 and ?M2 stenosis  TTE neg  Cardiac CT neg    Imp: R. gaze deviation, dysarthria, and L. hemiplegia secondary to R. posterior limb of IC/BG/thalamic infarction. Mechanism likely . Cannot rule out a central embolic source (particularly given the chronic R. cerebellar and L. cerebral infarction).     Plan:   ASA 81mg; Continue Plavix 75mg for 3 weeks from initial stroke once cleared by GI   -160   PEG tomorrow  Continue midodrine 5mg BID   Wife refusing ILR; Ameneable for MCOT  PT - BRENDA     50 minutes spent on total encounter. The necessity of the time spent during the encounter on this date of service was due to:     Review of imaging and chart; obtaining history; examination of pt; discussion and coordination of care, and discussion of lifestyle modification and risk factor control.
89yo M with PMH of CVA (2004 c/b R-side weakness), DM, JUSTYNA, glaucoma who presented with stroke that was further c/b dysphagia s/p PEG placement 5/7/24.     The patient has developed erythema surrounding the gastrostomy tube site and CT also demonstrates the cellulitis as well as soft tissue gas. The tube itself rotates freely and flushes easily. If peristomal leakage persists and is not thought to be due to cellulitis, recommend applying silver nitrate to promote healing of granulation tissue. Do not recommend increasing Gambian size of tube for this issue.    Cellulitis appears to be responding to treatment, cultures growing multiple organisms. Treatment of cellulitis per primary team.    Agree with plan as outlined above.    MALU Harkins MD  GI Attending
87yo M with PMH of CVA (2004 c/b R-side weakness), DM, JUSTYNA, glaucoma who presented with stroke that was further c/b dysphagia s/p PEG placement 5/7/24.     The patient has developed erythema surrounding the gastrostomy tube site and CT also demonstrates the cellulitis as well as soft tissue gas. The tube itself rotates freely and flushes easily per RN at bedside. There was what appeared like purulent discharge from the insertion site. Agree with broad-spectrum coverage for skin/soft tissue infection with possible gas forming organism. Would also attempt to obtain culture from site for sensitivity testing.     The gastrostomy tube study was normal. Cellulitis appears to be improving.    Agree with plan as outlined above.    MALU Harkins MD  GI Attending
Patient seen, examined, and discussed with Dr. Curry. Agree with above. 88 Cantonese-speaking Male with a h/o JUSTYNA, stroke (2004, residual right sided weakness), HLD, glaucoma, DM, BPH, depression, b/l hearing loss, who first presented to [facility] with new left side arm and leg weakness, found to have a stroke in the right internal capsule. Evaluated by speech and swallow and failed FEES, so GI consulted for peg tube placement. Hold TF's at MN for PEG placement tomorrow. Continue to hold plavix.     Arden King MD  Gastroenterology
Patient seen, examined, and discussed with Dr. Keller. Agree with above. 88 Cantonese-speaking Male with a h/o JUSTYNA, stroke (2004, residual right sided weakness), HLD, glaucoma, DM, BPH, depression, b/l hearing loss, who first presented to [facility] with new left side arm and leg weakness, found to have a stroke in the right internal capsule. Evaluated by speech and swallow and failed FEES, so GI consulted for peg tube placement. S/p PEG placement 5/7/24. Bumper loosened. Can use for feeds. Will sign off, please call back with questions or concerns.     Arden King MD  Gastroenterology
88M cantonese speaking, JUSTYNA, ischemic stroke (2004 w/ residual RHP), HLD presented w/ left sided weakness in setting of R. posterior limb of IC infarction.     On exam, fluent, follows crossed commands, R. gaze deviation able to overcome volitionally, ?decreased BTT on the left, moderate-severe dysarthria, moderate L. facial droop, LUE no movement, LLE no movement.     MR brain acute infarction in the R. posterior limb of IC, R. BG and ?R. thalamus. Chronic L. posterior frontal transcortical infarction and R. cerebellum.   CTA h/n R. A2 and ?M2 stenosis  TTE neg  Cardiac CT neg    Imp: R. gaze deviation, dysarthria, and L. hemiplegia secondary to R. posterior limb of IC/BG/thalamic infarction. Mechanism likely . Cannot rule out a central embolic source (particularly given the chronic R. cerebellar and L. cerebral infarction).     5/10 - more lethargic today. will obtain infectious workup    - lethargic in AM, repeat CTH stable. Mentation improving in PM. Will r/o seizures w/ EEG.     Plan:   VEEG for 4 hours  ASA 81mg; Continue Plavix 75mg for 3 weeks from initial stroke once cleared by GI   -160   s/p PEG   Continue midodrine 5mg BID   Wife refusing ILR; Ameneable for MCOT  PT - BRENDA     50 minutes spent on total encounter. The necessity of the time spent during the encounter on this date of service was due to:     Review of imaging and chart; obtaining history; examination of pt; discussion and coordination of care, and discussion of lifestyle modification and risk factor control.
88M cantonese speaking, JUSTYNA, ischemic stroke (2004 w/ residual RHP), HLD presented w/ left sided weakness in setting of R. posterior limb of IC infarction.     On exam, fluent, follows crossed commands, R. gaze deviation able to overcome volitionally, ?decreased BTT on the left, moderate-severe dysarthria, moderate L. facial droop, LUE no movement, LLE no movement.     MR brain acute infarction in the R. posterior limb of IC, R. BG and ?R. thalamus. Chronic L. posterior frontal transcortical infarction and R. cerebellum.   CTA h/n R. A2 and ?M2 stenosis  TTE neg  Cardiac CT neg    Imp: R. gaze deviation, dysarthria, and L. hemiplegia secondary to R. posterior limb of IC/BG/thalamic infarction. Mechanism likely . Cannot rule out a central embolic source (particularly given the chronic R. cerebellar and L. cerebral infarction).     Plan:   ASA 81mg; Continue Plavix 75mg for 3 weeks from initial stroke once cleared by GI   -160   s/p PEG   Continue midodrine 5mg BID   Wife refusing ILR; Ameneable for MCOT  PT - BRENDA     50 minutes spent on total encounter. The necessity of the time spent during the encounter on this date of service was due to:     Review of imaging and chart; obtaining history; examination of pt; discussion and coordination of care, and discussion of lifestyle modification and risk factor control.
88y M Cantonese speaking, R hand dominant, b/l  hearing impairment ( b/l hearing aids) with PMHx of DM, Thalassemia minor/JUSTYNA, HLD, hx stroke with right sided weakness in 2004 (has a cane and walker at home but pt refuses using assisted device, off ASA since 2020 for unclear reason), depression, glaucoma, BPH, presented with [facility] ( 4/27) with new left side arm and leg weakness starting 4/26 pm. Wife noticed that patient was leaning against the wall with the left side of his body to support himself to walk and was unable to hold himself upright when sitting, slumping over to the left. Patient felt that his left arm and leg were weak. Called PCP who recommended ED visit. CTH/CTP/CTA head and neck with no acute findings. Loaded with aspirin 300mg x1. Transferred to [facility] for further stroke w/u.    pt seen with wife, afebrile now, he could not provide history.  left hong, peg site -less red per wife, tolerating TF @ 30ml/hr. Pt reports some diarrhea 3-4 times yesterday    # Fever/cellulitis around PEG site   # possible aspiration PNA   # Dysphagia  - c/w PIP/TAZO change to regular dose at 3.375g iv q8h EI to complete tomorrow 5/17   - Local wound culture: growing klebsiella, enterobacter cloacae an dE. 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  # hx CVA w/ residual R sided weakness   # gait abnormality   # Seizure   - c/w ASA 81mg daily   - Clopidogrel resumed (5/9-) total 21 days   - statin 80mg qHS   - Keppra 750mg iv bid   - midodrine 5mg bid  to help with blood pressure for perfusion.   - PT/OT eval    # Acute urinary retention  # BPH   # Gross hematuria   - resumed tamsulosin 0.4mg qHS   -  placed Galindo cath 22Fr placed, for TOV when regular BMs/ambulatory    # Anemia hx Thallassemia minor  - Ferritin 410, Tsat 33% adequate iron store  - hb now at 9.5 likely from intermittent hematuria, to transfuse prn.     #constipation  - c/w bowel regimen w/ senna+miralax    #DM  - A1C 6.3%   home meds: metformin 500mg TID, Januvia 50mg daily - held for now.   - ISS, goal -180   -to stop lantus -now feeding held -to reintroduce when he is tolerating full feeding.    - mild hypokalemia- being replaced.      #DVT Prophylaxis: Lovenox 40mg sq daily     Dispo: fu PT/OT, will benefit inpatient Rehab- [facility]      Contact:  PMD Dr. Aron Soares   Wife: Farshad Pelayo # [phone]    poc dw medical team , wife  - monitor tolerating for TF tomorrow   - to notify neuro team on day of discharge to check NIHSS scale.
88y M Cantonese speaking, R hand dominant, b/l  hearing impairment ( b/l hearing aids) with PMHx of DM, Thalassemia minor/JUSTYNA, HLD, hx stroke with right sided weakness in 2004 (has a cane and walker at home but pt refuses using assisted device, off ASA since 2020 for unclear reason), depression, glaucoma, BPH, presented with [facility] ( 4/27) with new left side arm and leg weakness starting 4/26 pm. Wife noticed that patient was leaning against the wall with the left side of his body to support himself to walk and was unable to hold himself upright when sitting, slumping over to the left. Patient felt that his left arm and leg were weak. Called PCP who recommended ED visit. CTH/CTP/CTA head and neck with no acute findings. Loaded with aspirin 300mg x1. Transferred to [facility] for further stroke w/u.    pt seen with wife, afebrile now, he could not provide history.  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  # hx CVA w/ residual R sided weakness   # Dysphagia  # fever   # cellulitis around peg site / no abscess collection , partial erosion of peg   -culture from the fluid around peg - growing klebsiella, enterobacter cloacae (5/14- fu sensitivity )    - fever/leucocytosis- concern aspiration pneumonia =- on zosyn, fu chest x ray,- non revealing , blood culture- thus far negative  CT with  cellulitis around peg site / no abscess collection , partial erosion of peg - GI evaluated patient, appreciated, hold tube feed now, only medicine and water --culture from the fluid around peg - growing klebsiella, enterobacter cloacae (5/14- fu sensitivity )  tube study this am -no sign of leak   to restart trickle feed with 10-20 cc per hour for 24 hours, if no residual in 24 hours, can advance slowly to goal 63 cc per hour.     - to give zinc containing lotion around the peg site for healing   -continue Vanc/zosyn for now -to follow up cultures and sensitivity      hudson in place, no tenderness in supra-pubic area ( zosyn should cover -currently no sign of infection in urine  - to give ivf for maintainence since npo and feeding held -could give at rate 50 cc per hour, to use isontonic fluid  - stool softener.   - midodrine 2.5mg bid ( 4/30)-> increased to 5mg bid( 5/1-)  to help with blood pressure for perfusion.     - video EEG = started Keppra by neuro team 5/11- due to concern for seizure, repeat CTH stable- now that peg feeding held= to give keppra via IV route ( eqivalent dose )  on keppra 750 mg iv bid.   s/p PEG on Tues 5/7, ok ASA, resume plavix 48hr after PEG ( 5/9),- will need clopidogrel for 21 days then stop.   VTE ppx in am 5/8,    - f/u wife's decision on LINQ ( wife spoke with son from [place] and wants to hold off LINQ)   - Cardiac CT: reviewed, no SANDRO taylor   - CT Chest: no PE   - s/p aspirin load 300mg x1( 4/27)  - continue aspirin 81mg po daily ( 4/27-)  - Clopidogrel 75mg po daily ( 4/28-5/2) hold until 48hr (5/9) after PEG placement(5/7), will need Clopidogrel for 21 days then stop   - continue Atorvastatin 80mg po qHS  ( 4/27-) via peg  - - cw statin.   - PT/OT eval    # Acute urinary retention- hudson in place.  # Gross hematuria- resolved,   # hx BPH  - resumed tamsulosin 0.4mg  -  consult appreciated re: gross hematuria, placed 22Fr hudson cath, TOV only when pt is ambulatory  punch color urine likely from pulling hudson ( encouraged not to pull ) on 5/10- urology checked hudson in place, clear yellow urine on 5/11-     # Anemia hx Thallassemia minor  - Ferritin 410, Tsat 33% adequate iron store  - hb now at 9.5 likely from intermittent hematuria, to transfuse prn.     #constipation  - c/w bowel regimen w/ senna+miralax    #DM  - A1C 6.3%   home meds: metformin 500mg TID, Januvia 50mg daily - held for now.   - ISS, goal -180   -to stop lantus -now feeding held -to reintroduce when he is tolerating full feeding.    - mild hypokalemia- being replaced.      #DVT Prophylaxis: Lovenox 40mg sq daily     Dispo: fu PT/OT, will benefit inpatient Rehab ( Wife chose [facility] or [place])      Contact:  PMD Dr. Aron Soares   Wife: Farshad Pelayo # [phone]    poc dw medical team , wife  - fu cultures result, wound and need to decide on duration of antibiotics  - to notify neuro team on day of discharge to check NIHSS scale.     Dr. Rajesh Villalba covering 5/16-5/22/24
88M cantonese speaking, JUSTYNA, ischemic stroke (2004 w/ residual RHP), HLD presented w/ left sided weakness in setting of R. posterior limb of IC infarction.     On exam, fluent, follows crossed commands, R. gaze deviation able to overcome volitionally, ?decreased BTT on the left, moderate-severe dysarthria, moderate L. facial droop, LUE no movement, LLE no movement.     MR brain acute infarction in the R. posterior limb of IC, R. BG and ?R. thalamus. Chronic L. posterior frontal transcortical infarction and R. cerebellum.   CTA h/n R. A2 and ?M2 stenosis  TTE neg  Cardiac CT neg    Imp: R. gaze deviation, dysarthria, and L. hemiplegia secondary to R. posterior limb of IC/BG/thalamic infarction. Mechanism likely . Cannot rule out a central embolic source (particularly given the chronic R. cerebellar and L. cerebral infarction).     5/10 - more lethargic today. will obtain infectious workup     Plan:   CXR, adjust feeds given elevated glucose  ASA 81mg; Continue Plavix 75mg for 3 weeks from initial stroke once cleared by GI   -160   s/p PEG   Continue midodrine 5mg BID   Wife refusing ILR; Ameneable for MCOT  PT - BRENDA     50 minutes spent on total encounter. The necessity of the time spent during the encounter on this date of service was due to:     Review of imaging and chart; obtaining history; examination of pt; discussion and coordination of care, and discussion of lifestyle modification and risk factor control.
88M cantonese speaking, JUSTYNA, ischemic stroke (2004 w/ residual RHP), HLD presented w/ left sided weakness in setting of R. posterior limb of IC infarction.     MR brain acute infarction in the R. posterior limb of IC, R. BG and ?R. thalamus. Chronic L. posterior frontal transcortical infarction and R. cerebellum.   CTA h/n R. A2 and ?M2 stenosis  TTE neg  Cardiac CT neg    Imp: R. gaze deviation, dysarthria, and L. hemiplegia secondary to R. posterior limb of IC/BG/thalamic infarction. Mechanism likely . Cannot rule out a central embolic source (particularly given the chronic R. cerebellar and L. cerebral infarction).     5/10 - more lethargic today. will obtain infectious workup    - lethargic in AM, repeat CTH stable. Mentation improving in PM. Will r/o seizures w/ EEG.    - remains more lethargic when compared to several days ago. Spiked a fever overnight, started on Abx. EEG demonstrated no seizures, but epileptiform activity. On exam, requires repeated verbal stimuli to arouse, little verbal output, following minimal simple commands, LUE trace movement (improved), LLE moving within plane of bed (improved).     Plan:   Continue VEEG for now as per Epilepsy team; On Keppra 750mg BID given epileptiform activity on EEG   ASA 81mg + Plavix 75mg for 3w followed by ASA 81mg indefinitely   Continue Abx - likely aspiration PNA; F/u abdominal xray, blood cultures, RVP   -160   s/p PEG   Continue midodrine 5mg BID   Wife refusing ILR; Ameneable for MCOT  PT - BRENDA     50 minutes spent on total encounter. The necessity of the time spent during the encounter on this date of service was due to:     Review of imaging and chart; obtaining history; examination of pt; discussion and coordination of care, and discussion of lifestyle modification and risk factor control.

## 2024-05-16 NOTE — PROGRESS NOTE ADULT - PROBLEM SELECTOR PLAN 1
Patient meeting 2/4 SIRS criteria 05/12 (HR 98, T 102) and increasing leukocytosis (11k -> 13k). Blood cultures drawn. CXR with left-sided effusion. Unclear source of infection. MRSA swab negative. Abdominal x-ray without obstruction.  CTAP 05/13 with cellulitis at the percutaneous gastrostomy entry site with soft tissue swelling and surrounding soft tissue gas. No drainable collection. Partial erosion of the gastrostomy balloon into the abdominal wall is   suspected. Mild amount of purulence at S/p IV zosyn 4.5g and IV vancomycin 1g, IV clindamycin 900mg.     PLAN  - Trend CBC + diff   - Continue IV zosyn 4.5g q8h   - Continue IV vancomycin 1g q12h, check trough before 4th dose  - F/u wound culture   - F/u blood cultures

## 2024-05-16 NOTE — PROGRESS NOTE ADULT - SUBJECTIVE AND OBJECTIVE BOX
******INCOMPLETE******    OVERNIGHT EVENTS/INTERVAL HPI: NAEON    SUBJECTIVE; Patient seen and examined at bedside.     OBJECTIVE:  Vital Signs Last 24 Hrs  T(C): 36.3 (15 May 2024 20:38), Max: 36.6 (15 May 2024 14:00)  T(F): 97.4 (15 May 2024 20:38), Max: 97.8 (15 May 2024 14:00)  HR: 65 (15 May 2024 20:38) (65 - 79)  BP: 148/77 (15 May 2024 20:38) (118/72 - 148/77)  BP(mean): --  RR: 18 (15 May 2024 20:38) (17 - 18)  SpO2: 96% (15 May 2024 20:38) (95% - 96%)    Parameters below as of 15 May 2024 20:38  Patient On (Oxygen Delivery Method): room air      I&O's Detail    Physical Exam:  General: NAD; lethargic;   Head: vEEG in place   Eyes: Anicteric sclerae, moist conjunctivae   Cardiovascular: RRR  Pulmonary: CTAB, no increased WOB  GI: Abdomen soft but distended; area of erythema, edema and mild purulence around PEG site, demarcated   : hudson draining fruit punch urine   Neuro: AAOx0, severe dysarthria, minimal verbal output; Right upper and lower extremity 4/5 without drift. Left upper extremity 0/5, left lower extremity with trace movement, 1/5      Medications:  MEDICATIONS  (STANDING):  aspirin  chewable 81 milliGRAM(s) Oral daily  atorvastatin 80 milliGRAM(s) Oral at bedtime  clopidogrel Tablet 75 milliGRAM(s) Oral daily  dextrose 10% Bolus 125 milliLiter(s) IV Bolus once  dextrose 5%. 1000 milliLiter(s) (50 mL/Hr) IV Continuous <Continuous>  dextrose 5%. 1000 milliLiter(s) (100 mL/Hr) IV Continuous <Continuous>  dextrose 50% Injectable 25 Gram(s) IV Push once  dextrose 50% Injectable 12.5 Gram(s) IV Push once  doxazosin 1 milliGRAM(s) Oral at bedtime  enoxaparin Injectable 40 milliGRAM(s) SubCutaneous every 24 hours  ferrous    sulfate 325 milliGRAM(s) Oral daily  folic acid 1 milliGRAM(s) Oral daily  glucagon  Injectable 1 milliGRAM(s) IntraMuscular once  insulin lispro (ADMELOG) corrective regimen sliding scale   SubCutaneous every 6 hours  levETIRAcetam   Injectable 750 milliGRAM(s) IV Push every 12 hours  magnesium oxide 400 milliGRAM(s) Oral daily  midodrine. 5 milliGRAM(s) Oral <User Schedule>  pantoprazole    Tablet 40 milliGRAM(s) Oral before breakfast  piperacillin/tazobactam IVPB.. 4.5 Gram(s) IV Intermittent every 8 hours  polyethylene glycol 3350 17 Gram(s) Oral every 12 hours  senna 2 Tablet(s) Oral at bedtime  sodium chloride 0.9%. 1000 milliLiter(s) (50 mL/Hr) IV Continuous <Continuous>  zinc oxide 20% Ointment 1 Application(s) Topical every 24 hours    MEDICATIONS  (PRN):  acetaminophen     Tablet .. 650 milliGRAM(s) Oral every 6 hours PRN Temp greater or equal to 38C (100.4F), Mild Pain (1 - 3), Moderate Pain (4 - 6)  dextrose Oral Gel 15 Gram(s) Oral once PRN Blood Glucose LESS THAN 70 milliGRAM(s)/deciliter  guaiFENesin Oral Liquid (Sugar-Free) 100 milliGRAM(s) Oral every 6 hours PRN Cough      Labs:   OVERNIGHT EVENTS/INTERVAL HPI: NAEON. Changed oral pantoprazole to suspension.     SUBJECTIVE; Patient seen and examined at bedside. Similar mental status to before, not responding to questions/unable to assess ROS.     OBJECTIVE:  Vital Signs Last 24 Hrs  T(C): 36.3 (15 May 2024 20:38), Max: 36.6 (15 May 2024 14:00)  T(F): 97.4 (15 May 2024 20:38), Max: 97.8 (15 May 2024 14:00)  HR: 65 (15 May 2024 20:38) (65 - 79)  BP: 148/77 (15 May 2024 20:38) (118/72 - 148/77)  BP(mean): --  RR: 18 (15 May 2024 20:38) (17 - 18)  SpO2: 96% (15 May 2024 20:38) (95% - 96%)    Parameters below as of 15 May 2024 20:38  Patient On (Oxygen Delivery Method): room air      I&O's Detail    Physical Exam:  General: NAD; lethargic;   Head: normocephalic, atraumatic   Eyes: Anicteric sclerae, moist conjunctivae   Cardiovascular: RRR  Pulmonary: CTAB, no increased WOB  GI: Abdomen soft but distended; area of erythema, edema improving   : hudson draining fruit punch urine   Neuro: AAOx0, severe dysarthria, minimal verbal output; Right upper and lower extremity 4/5 without drift. Left upper extremity 0/5, left lower extremity with trace movement, 1/5      Medications:  MEDICATIONS  (STANDING):  aspirin  chewable 81 milliGRAM(s) Oral daily  atorvastatin 80 milliGRAM(s) Oral at bedtime  clopidogrel Tablet 75 milliGRAM(s) Oral daily  dextrose 10% Bolus 125 milliLiter(s) IV Bolus once  dextrose 5%. 1000 milliLiter(s) (50 mL/Hr) IV Continuous <Continuous>  dextrose 5%. 1000 milliLiter(s) (100 mL/Hr) IV Continuous <Continuous>  dextrose 50% Injectable 25 Gram(s) IV Push once  dextrose 50% Injectable 12.5 Gram(s) IV Push once  doxazosin 1 milliGRAM(s) Oral at bedtime  enoxaparin Injectable 40 milliGRAM(s) SubCutaneous every 24 hours  ferrous    sulfate 325 milliGRAM(s) Oral daily  folic acid 1 milliGRAM(s) Oral daily  glucagon  Injectable 1 milliGRAM(s) IntraMuscular once  insulin lispro (ADMELOG) corrective regimen sliding scale   SubCutaneous every 6 hours  levETIRAcetam   Injectable 750 milliGRAM(s) IV Push every 12 hours  magnesium oxide 400 milliGRAM(s) Oral daily  midodrine. 5 milliGRAM(s) Oral <User Schedule>  pantoprazole    Tablet 40 milliGRAM(s) Oral before breakfast  piperacillin/tazobactam IVPB.. 4.5 Gram(s) IV Intermittent every 8 hours  polyethylene glycol 3350 17 Gram(s) Oral every 12 hours  senna 2 Tablet(s) Oral at bedtime  sodium chloride 0.9%. 1000 milliLiter(s) (50 mL/Hr) IV Continuous <Continuous>  zinc oxide 20% Ointment 1 Application(s) Topical every 24 hours    MEDICATIONS  (PRN):  acetaminophen     Tablet .. 650 milliGRAM(s) Oral every 6 hours PRN Temp greater or equal to 38C (100.4F), Mild Pain (1 - 3), Moderate Pain (4 - 6)  dextrose Oral Gel 15 Gram(s) Oral once PRN Blood Glucose LESS THAN 70 milliGRAM(s)/deciliter  guaiFENesin Oral Liquid (Sugar-Free) 100 milliGRAM(s) Oral every 6 hours PRN Cough      Labs:                          10.1   5.97  )-----------( 263      ( 16 May 2024 05:30 )             32.7     05-16    135  |  105  |  13  ----------------------------<  142<H>  3.8   |  19<L>  |  0.80    Ca    8.0<L>      16 May 2024 05:30  Phos  2.3     05-16  Mg     2.1     05-16        Urinalysis Basic - ( 16 May 2024 05:30 )    Color: x / Appearance: x / SG: x / pH: x  Gluc: 142 mg/dL / Ketone: x  / Bili: x / Urobili: x   Blood: x / Protein: x / Nitrite: x   Leuk Esterase: x / RBC: x / WBC x   Sq Epi: x / Non Sq Epi: x / Bacteria: x                RADIOLOGY, EKG & ADDITIONAL TESTS: Reviewed.

## 2024-05-16 NOTE — CHART NOTE - NSCHARTNOTEFT_GEN_A_CORE
Nutrition Brief Note: Cyclic Tube Feed Recommendations    Team request for recommendations on nocturnal cyclic feeds for discharge. See nutrition recommendations. RD to remain available.     As medically feasible and pending GI tolerance, recommend: Luminary Micro Glucose Support 1.2 @95mL/hr x16hr to provide 1520mL total volume, 1824kcal (26kcal/kg dosing wt 70.3kg), 97g protein (1.4g/kg dosing wt 70.3kg), and 1155mL free water.   >>Defer free water flushes to team.  >>Monitor GI tolerance & maintain aspiration precautions. RD to remain available to adjust EN regimen prn. Nutrition Brief Note: Cyclic Tube Feed Recommendations    Team request for recommendations on nocturnal cyclic feeds for discharge. Pt currently on wikifolio Glucose Support 1.2, RD observed feeds running at 30mL/hr, tolerating at this time and advancing to goal 63mL/hr. See nutrition recommendations. RD to remain available.     1. Continue to advance wikifolio Glucose Support 1.2 as tolerated to goal rate 63mL/hr x24hr to provide 1512mL total volume, 1814kcal (26kcal/kg dosing wt 70.3kg), 97g protein (1.4g/kg dosing wt 70.3kg), and 1149mL free water.     2. Pending tolerance to continuous feeds and as medically feasible, recommend transition to cyclic feeds: wikifolio Glucose Support 1.2 @95mL/hr x16hr to provide 1520mL total volume, 1824kcal (26kcal/kg dosing wt 70.3kg), 97g protein (1.4g/kg dosing wt 70.3kg), and 1155mL free water.   >>Defer free water flushes to team.  >>Monitor GI tolerance & maintain aspiration precautions. RD to remain available to adjust EN regimen prn. Admitting Diagnosis:   Patient is a 88y old  Male who presents with a chief complaint of left sided weakness (16 May 2024 12:17)    PAST MEDICAL & SURGICAL HISTORY:  Diabetes  HLD (hyperlipidemia)  BPH (benign prostatic hyperplasia)  JUSTYNA (iron deficiency anemia)  Stroke    Current Nutrition Order:  NPO with Tube Feed via PEG: Cori Sloan Glucose Support 1.2 @63mL/hr x24hr to provide 1512mL total volume, 1814kcal, 97g protein, and 1149mL free water.     PO Intake: Good (%) [   ]  Fair (50-75%) [   ] Poor (<25%) [   ] - N/A NPO    GI Issues:   Wife reports pt with x2-3 loose BMs yesterday 5/15, last BM today   Ongoing abdominal distension noted  Wife denies pt with nausea/vomiting    Pain:  No pain reported or nonverbal indicators noted    Skin Integrity:  No edema documented  PEG site cellulitis and stage I sacral pressure injury documented  Leonel score 12    Labs:       135  |  105  |  13  ----------------------------<  142<H>  3.8   |  19<L>  |  0.80    Ca    8.0<L>      16 May 2024 05:30  Phos  2.3     -  Mg     2.1     -    CAPILLARY BLOOD GLUCOSE  POCT Blood Glucose.: 177 mg/dL (16 May 2024 12:09)  POCT Blood Glucose.: 142 mg/dL (16 May 2024 06:09)  POCT Blood Glucose.: 126 mg/dL (16 May 2024 00:12)  POCT Blood Glucose.: 118 mg/dL (15 May 2024 17:41)  POCT Blood Glucose.: 173 mg/dL (15 May 2024 13:47)    Medications:  MEDICATIONS  (STANDING):  aspirin  chewable 81 milliGRAM(s) Oral daily  atorvastatin 80 milliGRAM(s) Oral at bedtime  clopidogrel Tablet 75 milliGRAM(s) Oral daily  dextrose 10% Bolus 125 milliLiter(s) IV Bolus once  dextrose 5%. 1000 milliLiter(s) (100 mL/Hr) IV Continuous <Continuous>  dextrose 5%. 1000 milliLiter(s) (50 mL/Hr) IV Continuous <Continuous>  dextrose 50% Injectable 25 Gram(s) IV Push once  dextrose 50% Injectable 12.5 Gram(s) IV Push once  doxazosin 1 milliGRAM(s) Oral at bedtime  enoxaparin Injectable 40 milliGRAM(s) SubCutaneous every 24 hours  ferrous    sulfate 325 milliGRAM(s) Oral daily  folic acid 1 milliGRAM(s) Oral daily  glucagon  Injectable 1 milliGRAM(s) IntraMuscular once  insulin lispro (ADMELOG) corrective regimen sliding scale   SubCutaneous every 6 hours  levETIRAcetam   Injectable 750 milliGRAM(s) IV Push every 12 hours  magnesium oxide 400 milliGRAM(s) Oral daily  midodrine. 5 milliGRAM(s) Oral <User Schedule>  pantoprazole   Suspension 40 milliGRAM(s) Oral every 24 hours  piperacillin/tazobactam IVPB.. 3.375 Gram(s) IV Intermittent every 8 hours  polyethylene glycol 3350 17 Gram(s) Oral every 12 hours  senna 2 Tablet(s) Oral at bedtime  sodium chloride 0.9%. 1000 milliLiter(s) (50 mL/Hr) IV Continuous <Continuous>  zinc oxide 20% Ointment 1 Application(s) Topical every 24 hours    MEDICATIONS  (PRN):  acetaminophen     Tablet .. 650 milliGRAM(s) Oral every 6 hours PRN Temp greater or equal to 38C (100.4F), Mild Pain (1 - 3), Moderate Pain (4 - 6)  dextrose Oral Gel 15 Gram(s) Oral once PRN Blood Glucose LESS THAN 70 milliGRAM(s)/deciliter  guaiFENesin Oral Liquid (Sugar-Free) 100 milliGRAM(s) Oral every 6 hours PRN Cough    Anthropometrics:  Height: 5'8"  Weight: 155lb/70.3kg  BMI: 23.6  IBW: 154lb/70kg     101% IBW    Weight Change:   No new weights obtained since admission. Recommend nursing to trend weekly weights. RD to continue monitoring weights as able.     Estimated energy needs:   Calories: 25-30kcal/k-2109kcal/d  Protein: 1.2-1.5g/k-105g/d  Fluid: 30-35mL/k-2460mL/d  Estimated needs based on dosing wt as within % IBW. Needs adjusted for age, wound healing, and clinical status.     Subjective:   88M with PMH of bilateral hearing impairment with bilateral hearing aids, DM, thalassemia minor/JUSTYNA, HLD, CVA with right sided weakness (), depression, glaucoma, and BPH who presented with left sided weakness, admitted for stroke work-up. Course complicated by waxing/waning left sided hemiparesis likely secondary to hypoperfusion, status post IVF boluses, now on midodrine. Failed FEES (), now status post PEG placement (). Started on vEEG () to ruled out seizure activity. Course complicated by sepsis (), started on antibiotics, abdominal x-ray with nonspecific gas pattern/no evidence of obstruction. Feeds held  due to "high residuals," started on reglan and feeds restarted. Course also complicated by PEG site cellulitis, tube feeds held , trickle feeds resumed 5/15, pending read of tube study.     Pt seen on  for follow-up. Labs and medication orders reviewed. Ordered for ferrous sulfate, folic acid, magnesium oxide, ISS. Na/K/Mg WNL, phosphorus 2.3 <L>, POC blood glucose (5/15-) 109-177. NPO with PEG feeds, feeds now advancing to goal +10mL q4hr, RD observed feeds running @30mL/hr. Pt's wife at bedside, Flowere  used (#[id number]). Reports pt with tolerance of tube feeds at this time, notes x2-3 loose BMs yesterday - note pt ordered for mag-ox daily. Reports observing tube feed formula leaking from PEG site - RD communicated to team. Ongoing abdominal distension documented per nursing, RD discussed with team and MD believes unrelated to PEG feeds. Team requested cyclic feed recommendations, see nutrition recommendations. RD to remain available.     Previous Nutrition Diagnosis:  Inadequate Energy Intake related to pt's inability to meet nutritional demands via PO as evidenced by NPO status.    Active [ x ]  Resolved [   ]    Goal:  Pt to consistently meet >75% nutrient needs via most appropriate route for nutrition.     Recommendations:  1. Continue to advance TribeHR Glucose Support 1.2 as tolerated to goal rate 63mL/hr x24hr to provide 1512mL total volume, 1814kcal (26kcal/kg dosing wt 70.3kg), 97g protein (1.4g/kg dosing wt 70.3kg), and 1149mL free water.   >>Defer free water flushes to team.  >>Monitor GI tolerance & maintain aspiration precautions. RD to remain available to adjust EN regimen prn.   2. Pending tolerance to continuous feeds and as medically feasible, recommend transition to cyclic feeds: TribeHR Glucose Support 1.2 @95mL/hr x16hr to provide 1520mL total volume, 1824kcal (26kcal/kg dosing wt 70.3kg), 97g protein (1.4g/kg dosing wt 70.3kg), and 1155mL free water.   >>Defer free water flushes to team.  >>Monitor GI tolerance & maintain aspiration precautions. RD to remain available to adjust EN regimen prn.  3. Continue micronutrient supplementation.   4. Monitor weight trends, labs, skin integrity, & hydration status.  5. Pain and bowel regimens per team.  >>If pt with frequent loose BMs, recommend add Banatrol x2/day to promote fecal bulk.   6. RD remains available for dietary education/intervention prn.     Education:   RD answered pt's wife questions regarding tube feed formula and discouraged from flushing oral nutrition supplements through the PEG to avoid clogging. Discussed advancing feeds to goal rate to meet pt nutrient needs. Pt's wife aware RD remains available for additional questions/concerns.     Risk Level: High [ x ] Moderate [   ] Low [   ]

## 2024-05-16 NOTE — PROVIDER CONTACT NOTE (CRITICAL VALUE NOTIFICATION) - TEST AND RESULT REPORTED:
abdominal fluid final- moderate klebsiella pneumoniae, moderate enterobacter colacae complex, rare enterococcus facialis, moderate pyrimoris amicra, moderate streptococcus anginusus
Lactate 5.6
culture from 02:00 of abdominal fluid - gram stain has moderate gram positive cocci in pairs
potassium-3.3

## 2024-05-16 NOTE — CHART NOTE - NSCHARTNOTESELECT_GEN_ALL_CORE
Event Note
Event Note
Nutrition Services
Event Note
Event Note
Nutrition Services
PEG Placement
Rehab

## 2024-05-16 NOTE — PROGRESS NOTE ADULT - ASSESSMENT
Mr. Bruce is an 88y Cantonese speaking Male with PMHx of JUSTYNA, stroke (2004, residual right sided weakness), HLD, glaucoma, DM, BPH, depression, and b/l hearing loss, who present to [facility] with new left-sided arm and leg weakness starting 4/26 pm, transferred to [facility] for further stroke w/u. Course c/b periods of waxing/waning left sided hemiparesis, likely due to hypoperfusion, s/p IVF boluses, now started on midodrine. Patient with urinary retention with postraumatic hematuria after straight cath in pt with BPH. Now s/p PEG placement on 5/7 and stepped down from stroke tele to [facility] while awaiting AR placement but course c/b new onset sepsis i/s/o cellulitis and gas around PEG tube site.      Mr. Bruce is an 88y Cantonese speaking Male with PMHx of JUSTYNA, stroke (2004, residual right sided weakness), HLD, glaucoma, DM, BPH, depression, and b/l hearing loss, who present to [facility] with new left-sided arm and leg weakness starting 4/26 pm, transferred to [facility] for further stroke w/u. Course c/b periods of waxing/waning left sided hemiparesis, likely due to hypoperfusion, s/p IVF boluses, now started on midodrine. Patient with urinary retention with postraumatic hematuria after straight cath in pt with BPH. Now s/p PEG placement on 5/7 and stepped down from stroke tele to [facility] while awaiting AR placement but course c/b new onset sepsis i/s/o cellulitis around PEG tube site requiring IV antibiotics.

## 2024-05-16 NOTE — PROGRESS NOTE ADULT - PROBLEM SELECTOR PLAN 4
vEEG placed on 05/11 to observe for seizure-like activity. Prelim read with GRDA w/ R sided LRDA w/ fast activity and right sided spikes without evolution. S/p Keppra 2g on 05/11. 05/12 read with potentially epileptogenic foci over the left frontal and right frontal/frontotemporal regions; also evidence for moderate diffuse and R>L frontal focal cerebral dysfunction which is nonspecific in etiology.     PLAN  - Continue Keppra 750mg BID  - Discontinue vEEG   - Ativan 2mg IV for GTCs > 2 min only  - Neurological assessment Q8hrs  - Seizure & Fall precautions  - Maintain Mg> 2 mmol/l vEEG placed on 05/11 to observe for seizure-like activity. Prelim read with GRDA w/ R sided LRDA w/ fast activity and right sided spikes without evolution. S/p Keppra 2g on 05/11. 05/12 read with potentially epileptogenic foci over the left frontal and right frontal/frontotemporal regions; also evidence for moderate diffuse and R>L frontal focal cerebral dysfunction which is nonspecific in etiology.     PLAN  - Continue Keppra 750mg BID  - Ativan 2mg IV for GTCs > 2 min only  - Neurological assessment Q8hrs  - Seizure & Fall precautions  - Maintain Mg> 2 mmol/l

## 2024-05-16 NOTE — PROGRESS NOTE ADULT - PROBLEM SELECTOR PLAN 2
Patient with abdominal pain s/p PEG tube placement 05/07. Meeting sepsis criteria on 05/12. Tmax 102 (rectal). Blood cultures, CXR, abdominal XR non-revealing. CTAP 05/13 with cellulitis around PEG site and surrounding soft tissue gas, partial erosion of gastrostomy balloon into abdominal wall. Gastroenterology following.     PLAN  - Continue antibiotic management as above  - Continue zinc oxic cream for topical therapy affected site   - Keep patient NPO except meds, hold tube feeds.   - Cellulitis may be due to patient moving around PEG tube, maintain bilateral wrist restraints for now   - Follow-up PEG xray study   - Switch to pantoprazole 40mg PO qd    - F/u blood cultures  - F/u GI recs Patient with abdominal pain s/p PEG tube placement 05/07. Meeting sepsis criteria on 05/12. Tmax 102 (rectal). Blood cultures, CXR, abdominal XR non-revealing. CTAP 05/13 with cellulitis around PEG site and surrounding soft tissue gas, partial erosion of gastrostomy balloon into abdominal wall. Gastroenterology following. PEG study performed, no extravasation. The bowel gas pattern is otherwise normal and no free air. Cellulitis may be due to patient moving around PEG tube      PLAN  - Continue antibiotic management as above  - Continue zinc oxic cream for topical therapy affected site   - Continue with tube feeds   - Continue pantoprazole 40mg PO qd oral suspension     - F/u blood cultures  - F/u GI recs

## 2024-05-16 NOTE — PROGRESS NOTE ADULT - SUBJECTIVE AND OBJECTIVE BOX
*** DRAFT NOTE ***    GASTROENTEROLOGY PROGRESS NOTE    INTERVAL/SUBJECTIVE:    Allergies    No Known Allergies    Intolerances      MEDICATIONS:  MEDICATIONS  (STANDING):  aspirin  chewable 81 milliGRAM(s) Oral daily  atorvastatin 80 milliGRAM(s) Oral at bedtime  clopidogrel Tablet 75 milliGRAM(s) Oral daily  dextrose 10% Bolus 125 milliLiter(s) IV Bolus once  dextrose 5%. 1000 milliLiter(s) (50 mL/Hr) IV Continuous <Continuous>  dextrose 5%. 1000 milliLiter(s) (100 mL/Hr) IV Continuous <Continuous>  dextrose 50% Injectable 25 Gram(s) IV Push once  dextrose 50% Injectable 12.5 Gram(s) IV Push once  doxazosin 1 milliGRAM(s) Oral at bedtime  enoxaparin Injectable 40 milliGRAM(s) SubCutaneous every 24 hours  ferrous    sulfate 325 milliGRAM(s) Oral daily  folic acid 1 milliGRAM(s) Oral daily  glucagon  Injectable 1 milliGRAM(s) IntraMuscular once  insulin lispro (ADMELOG) corrective regimen sliding scale   SubCutaneous every 6 hours  levETIRAcetam   Injectable 750 milliGRAM(s) IV Push every 12 hours  magnesium oxide 400 milliGRAM(s) Oral daily  midodrine. 5 milliGRAM(s) Oral <User Schedule>  pantoprazole   Suspension 40 milliGRAM(s) Oral every 24 hours  piperacillin/tazobactam IVPB.. 3.375 Gram(s) IV Intermittent every 8 hours  polyethylene glycol 3350 17 Gram(s) Oral every 12 hours  senna 2 Tablet(s) Oral at bedtime  sodium chloride 0.9%. 1000 milliLiter(s) (50 mL/Hr) IV Continuous <Continuous>  zinc oxide 20% Ointment 1 Application(s) Topical every 24 hours    MEDICATIONS  (PRN):  acetaminophen     Tablet .. 650 milliGRAM(s) Oral every 6 hours PRN Temp greater or equal to 38C (100.4F), Mild Pain (1 - 3), Moderate Pain (4 - 6)  dextrose Oral Gel 15 Gram(s) Oral once PRN Blood Glucose LESS THAN 70 milliGRAM(s)/deciliter  guaiFENesin Oral Liquid (Sugar-Free) 100 milliGRAM(s) Oral every 6 hours PRN Cough    Vital Signs Last 24 Hrs  T(C): 36.3 (16 May 2024 11:54), Max: 36.6 (15 May 2024 14:00)  T(F): 97.4 (16 May 2024 11:54), Max: 97.8 (15 May 2024 14:00)  HR: 71 (16 May 2024 11:54) (65 - 79)  BP: 145/83 (16 May 2024 11:54) (118/72 - 148/77)  BP(mean): --  RR: 18 (16 May 2024 11:54) (17 - 18)  SpO2: 94% (16 May 2024 11:54) (94% - 96%)    Parameters below as of 16 May 2024 11:54  Patient On (Oxygen Delivery Method): room air        05-15 @ 07:01  -  05-16 @ 07:00  --------------------------------------------------------  IN: 0 mL / OUT: 2100 mL / NET: -2100 mL      PHYSICAL EXAM:  General: Alert, no acute distress  Lungs: Normal respiratory effort  Abdomen: Soft, nondistended, nontender  Extremities: *** edema  Neurological: Moving all extremities spontaneously***    LABS:                        10.1   5.97  )-----------( 263      ( 16 May 2024 05:30 )             32.7     05-16    135  |  105  |  13  ----------------------------<  142<H>  3.8   |  19<L>  |  0.80    Ca    8.0<L>      16 May 2024 05:30  Phos  2.3     05-16  Mg     2.1     05-16          RADIOLOGY & ADDITIONAL STUDIES: Reviewed GASTROENTEROLOGY PROGRESS NOTE    INTERVAL/SUBJECTIVE:  - PEG examined with decreasing erythema compared to prior, some leakage around PEG insertions site    Allergies  No Known Allergies    Intolerances      MEDICATIONS:  MEDICATIONS  (STANDING):  aspirin  chewable 81 milliGRAM(s) Oral daily  atorvastatin 80 milliGRAM(s) Oral at bedtime  clopidogrel Tablet 75 milliGRAM(s) Oral daily  dextrose 10% Bolus 125 milliLiter(s) IV Bolus once  dextrose 5%. 1000 milliLiter(s) (50 mL/Hr) IV Continuous <Continuous>  dextrose 5%. 1000 milliLiter(s) (100 mL/Hr) IV Continuous <Continuous>  dextrose 50% Injectable 25 Gram(s) IV Push once  dextrose 50% Injectable 12.5 Gram(s) IV Push once  doxazosin 1 milliGRAM(s) Oral at bedtime  enoxaparin Injectable 40 milliGRAM(s) SubCutaneous every 24 hours  ferrous    sulfate 325 milliGRAM(s) Oral daily  folic acid 1 milliGRAM(s) Oral daily  glucagon  Injectable 1 milliGRAM(s) IntraMuscular once  insulin lispro (ADMELOG) corrective regimen sliding scale   SubCutaneous every 6 hours  levETIRAcetam   Injectable 750 milliGRAM(s) IV Push every 12 hours  magnesium oxide 400 milliGRAM(s) Oral daily  midodrine. 5 milliGRAM(s) Oral <User Schedule>  pantoprazole   Suspension 40 milliGRAM(s) Oral every 24 hours  piperacillin/tazobactam IVPB.. 3.375 Gram(s) IV Intermittent every 8 hours  polyethylene glycol 3350 17 Gram(s) Oral every 12 hours  senna 2 Tablet(s) Oral at bedtime  sodium chloride 0.9%. 1000 milliLiter(s) (50 mL/Hr) IV Continuous <Continuous>  zinc oxide 20% Ointment 1 Application(s) Topical every 24 hours    MEDICATIONS  (PRN):  acetaminophen     Tablet .. 650 milliGRAM(s) Oral every 6 hours PRN Temp greater or equal to 38C (100.4F), Mild Pain (1 - 3), Moderate Pain (4 - 6)  dextrose Oral Gel 15 Gram(s) Oral once PRN Blood Glucose LESS THAN 70 milliGRAM(s)/deciliter  guaiFENesin Oral Liquid (Sugar-Free) 100 milliGRAM(s) Oral every 6 hours PRN Cough    Vital Signs Last 24 Hrs  T(C): 36.3 (16 May 2024 11:54), Max: 36.6 (15 May 2024 14:00)  T(F): 97.4 (16 May 2024 11:54), Max: 97.8 (15 May 2024 14:00)  HR: 71 (16 May 2024 11:54) (65 - 79)  BP: 145/83 (16 May 2024 11:54) (118/72 - 148/77)  BP(mean): --  RR: 18 (16 May 2024 11:54) (17 - 18)  SpO2: 94% (16 May 2024 11:54) (94% - 96%)    Parameters below as of 16 May 2024 11:54  Patient On (Oxygen Delivery Method): room air        05-15 @ 07:01  -  05-16 @ 07:00  --------------------------------------------------------  IN: 0 mL / OUT: 2100 mL / NET: -2100 mL      PHYSICAL EXAM:  General: Sleepy but NAD  Lungs: Normal respiratory effort  Abdomen: Soft, nondistended, nontender. Erythema around PEG insertion site decreased from yesterday, +small amounts of leakage  Extremities: No edema    LABS:                        10.1   5.97  )-----------( 263      ( 16 May 2024 05:30 )             32.7     05-16    135  |  105  |  13  ----------------------------<  142<H>  3.8   |  19<L>  |  0.80    Ca    8.0<L>      16 May 2024 05:30  Phos  2.3     05-16  Mg     2.1     05-16          RADIOLOGY & ADDITIONAL STUDIES: Reviewed

## 2024-05-16 NOTE — PROGRESS NOTE ADULT - PROBLEM SELECTOR PLAN 8
Failed SLP evaluation. FEES completed on 5/2 - recommended for PEG. s/p PEG on 5/7. Nutrition consulted. Started continuous tube feeds on 05/08- [place] [employer] Glucose Support 1.2 @10mL/hr x24hr and increase as tolerated to goal rate 63mL/hr x24hr to provide 1512mL total volume, 1814kcal (26kcal/kg dosing wt 70.3kg), 97g protein (1.4g/kg dosing wt 70.3kg), and 1149mL free water.     PLAN  - Hold tube feeds i/s/o cellulitis and gas around PEG tube site and partial erosion of gastrostomy balloon into abdominal wall   - Continue PPI as above   - Continue lantus 4u qhs due to elevated FGS   - Maintain aspiration precautions   - If pt starts having frequent loose BMs, add Banatrol x2/day to promote fecal bulk.   - F/u nutrition recs  - high dose statin as above in CVA   - LDL results: 170 Failed SLP evaluation. FEES completed on 5/2 - recommended for PEG. s/p PEG on 5/7. Nutrition consulted. Started continuous tube feeds on 05/08- [place] Glucose Support 1.2 @10mL/hr x24hr and increase as tolerated to goal rate 63mL/hr x24hr to provide 1512mL total volume, 1814kcal (26kcal/kg dosing wt 70.3kg), 97g protein (1.4g/kg dosing wt 70.3kg), and 1149mL free water.     PLAN  - Continue with tube feeds   - Continue PPI as above   - Continue lantus 4u qhs due to elevated FGS   - Maintain aspiration precautions   - If pt starts having frequent loose BMs, add Banatrol x2/day to promote fecal bulk.   - F/u nutrition recs  - high dose statin as above in CVA   - LDL results: 170

## 2024-05-16 NOTE — PROGRESS NOTE ADULT - ASSESSMENT
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     Appearance consistent with cellulitis at PEG site now improving. MRSA swab neg 5/12.     Some leakage around PEG which may improve as PEG tract heals after infection is treated. Would apply topical skin protectants (eg. zinc oxide paste) to promote healing though would note that ultimately whether this resolves or not depends on pt's wound healing capability. Would NOT place a larger PEG as that would only serve to enlarge the hole.    Recommendations:  - Continue PPI daily x 4 wks  - Treat for cellulitis around PEG, abx per primary team but consider 5-7d course  - Zinc oxide paste and wound care for PEG leakage    We will sign off, but please page if any further questions arise. Please see attending addendum for final recommendations.    Khadar (Adrienne) MD Krishna  Gastroenterology Fellow  Pager (M-F 7a-5p): [phone]  Pager (after hours): Please call  for on-call fellow

## 2024-05-16 NOTE — PROGRESS NOTE ADULT - PROBLEM SELECTOR PLAN 11
Home meds: metformin 500mg TID, Januvia 50mg daily      PLAN  - A1C results: 6.3%   - c/w mISS   - Tube feeds  - FSG q6 hrs while NPO

## 2024-05-17 VITALS
HEART RATE: 72 BPM | RESPIRATION RATE: 18 BRPM | DIASTOLIC BLOOD PRESSURE: 67 MMHG | SYSTOLIC BLOOD PRESSURE: 115 MMHG | OXYGEN SATURATION: 95 % | TEMPERATURE: 99 F

## 2024-05-17 DIAGNOSIS — L03.90 CELLULITIS, UNSPECIFIED: ICD-10-CM

## 2024-05-17 LAB
ANION GAP SERPL CALC-SCNC: 9 MMOL/L — SIGNIFICANT CHANGE UP (ref 5–17)
BUN SERPL-MCNC: 12 MG/DL — SIGNIFICANT CHANGE UP (ref 7–23)
CALCIUM SERPL-MCNC: 8.4 MG/DL — SIGNIFICANT CHANGE UP (ref 8.4–10.5)
CHLORIDE SERPL-SCNC: 106 MMOL/L — SIGNIFICANT CHANGE UP (ref 96–108)
CO2 SERPL-SCNC: 20 MMOL/L — LOW (ref 22–31)
CREAT SERPL-MCNC: 0.72 MG/DL — SIGNIFICANT CHANGE UP (ref 0.5–1.3)
CULTURE RESULTS: SIGNIFICANT CHANGE UP
EGFR: 89 ML/MIN/1.73M2 — SIGNIFICANT CHANGE UP
GLUCOSE BLDC GLUCOMTR-MCNC: 174 MG/DL — HIGH (ref 70–99)
GLUCOSE BLDC GLUCOMTR-MCNC: 187 MG/DL — HIGH (ref 70–99)
GLUCOSE SERPL-MCNC: 169 MG/DL — HIGH (ref 70–99)
HCT VFR BLD CALC: 30.9 % — LOW (ref 39–50)
HGB BLD-MCNC: 10.2 G/DL — LOW (ref 13–17)
MAGNESIUM SERPL-MCNC: 2.1 MG/DL — SIGNIFICANT CHANGE UP (ref 1.6–2.6)
MCHC RBC-ENTMCNC: 23.2 PG — LOW (ref 27–34)
MCHC RBC-ENTMCNC: 33 GM/DL — SIGNIFICANT CHANGE UP (ref 32–36)
MCV RBC AUTO: 70.2 FL — LOW (ref 80–100)
NRBC # BLD: 0 /100 WBCS — SIGNIFICANT CHANGE UP (ref 0–0)
PHOSPHATE SERPL-MCNC: 2.1 MG/DL — LOW (ref 2.5–4.5)
PLATELET # BLD AUTO: 305 K/UL — SIGNIFICANT CHANGE UP (ref 150–400)
POTASSIUM SERPL-MCNC: 3.6 MMOL/L — SIGNIFICANT CHANGE UP (ref 3.5–5.3)
POTASSIUM SERPL-SCNC: 3.6 MMOL/L — SIGNIFICANT CHANGE UP (ref 3.5–5.3)
RAPID RVP RESULT: SIGNIFICANT CHANGE UP
RBC # BLD: 4.4 M/UL — SIGNIFICANT CHANGE UP (ref 4.2–5.8)
RBC # FLD: 15.7 % — HIGH (ref 10.3–14.5)
SARS-COV-2 RNA SPEC QL NAA+PROBE: SIGNIFICANT CHANGE UP
SODIUM SERPL-SCNC: 135 MMOL/L — SIGNIFICANT CHANGE UP (ref 135–145)
SPECIMEN SOURCE: SIGNIFICANT CHANGE UP
WBC # BLD: 6.92 K/UL — SIGNIFICANT CHANGE UP (ref 3.8–10.5)
WBC # FLD AUTO: 6.92 K/UL — SIGNIFICANT CHANGE UP (ref 3.8–10.5)

## 2024-05-17 PROCEDURE — 87070 CULTURE OTHR SPECIMN AEROBIC: CPT

## 2024-05-17 PROCEDURE — 70450 CT HEAD/BRAIN W/O DYE: CPT | Mod: MC

## 2024-05-17 PROCEDURE — 87075 CULTR BACTERIA EXCEPT BLOOD: CPT

## 2024-05-17 PROCEDURE — 84156 ASSAY OF PROTEIN URINE: CPT

## 2024-05-17 PROCEDURE — L8699: CPT

## 2024-05-17 PROCEDURE — 93005 ELECTROCARDIOGRAM TRACING: CPT

## 2024-05-17 PROCEDURE — 0042T: CPT | Mod: MC

## 2024-05-17 PROCEDURE — 84540 ASSAY OF URINE/UREA-N: CPT

## 2024-05-17 PROCEDURE — 85025 COMPLETE CBC W/AUTO DIFF WBC: CPT

## 2024-05-17 PROCEDURE — 86803 HEPATITIS C AB TEST: CPT

## 2024-05-17 PROCEDURE — 87077 CULTURE AEROBIC IDENTIFY: CPT

## 2024-05-17 PROCEDURE — 86850 RBC ANTIBODY SCREEN: CPT

## 2024-05-17 PROCEDURE — 83735 ASSAY OF MAGNESIUM: CPT

## 2024-05-17 PROCEDURE — 85027 COMPLETE CBC AUTOMATED: CPT

## 2024-05-17 PROCEDURE — 95700 EEG CONT REC W/VID EEG TECH: CPT

## 2024-05-17 PROCEDURE — 85730 THROMBOPLASTIN TIME PARTIAL: CPT

## 2024-05-17 PROCEDURE — 97112 NEUROMUSCULAR REEDUCATION: CPT

## 2024-05-17 PROCEDURE — 80048 BASIC METABOLIC PNL TOTAL CA: CPT

## 2024-05-17 PROCEDURE — 85610 PROTHROMBIN TIME: CPT

## 2024-05-17 PROCEDURE — 84300 ASSAY OF URINE SODIUM: CPT

## 2024-05-17 PROCEDURE — 84484 ASSAY OF TROPONIN QUANT: CPT

## 2024-05-17 PROCEDURE — 84133 ASSAY OF URINE POTASSIUM: CPT

## 2024-05-17 PROCEDURE — 97530 THERAPEUTIC ACTIVITIES: CPT

## 2024-05-17 PROCEDURE — 93306 TTE W/DOPPLER COMPLETE: CPT

## 2024-05-17 PROCEDURE — 97166 OT EVAL MOD COMPLEX 45 MIN: CPT

## 2024-05-17 PROCEDURE — 93970 EXTREMITY STUDY: CPT

## 2024-05-17 PROCEDURE — 97535 SELF CARE MNGMENT TRAINING: CPT

## 2024-05-17 PROCEDURE — 99291 CRITICAL CARE FIRST HOUR: CPT

## 2024-05-17 PROCEDURE — 84443 ASSAY THYROID STIM HORMONE: CPT

## 2024-05-17 PROCEDURE — 70498 CT ANGIOGRAPHY NECK: CPT | Mod: MC

## 2024-05-17 PROCEDURE — 83540 ASSAY OF IRON: CPT

## 2024-05-17 PROCEDURE — 36415 COLL VENOUS BLD VENIPUNCTURE: CPT

## 2024-05-17 PROCEDURE — 97161 PT EVAL LOW COMPLEX 20 MIN: CPT

## 2024-05-17 PROCEDURE — 75572 CT HRT W/3D IMAGE: CPT | Mod: MC

## 2024-05-17 PROCEDURE — 49465 FLUORO EXAM OF G/COLON TUBE: CPT

## 2024-05-17 PROCEDURE — 95708 EEG WO VID EA 12-26HR UNMNTR: CPT

## 2024-05-17 PROCEDURE — 82962 GLUCOSE BLOOD TEST: CPT

## 2024-05-17 PROCEDURE — 71260 CT THORAX DX C+: CPT | Mod: MC

## 2024-05-17 PROCEDURE — 84145 PROCALCITONIN (PCT): CPT

## 2024-05-17 PROCEDURE — 70551 MRI BRAIN STEM W/O DYE: CPT | Mod: MC

## 2024-05-17 PROCEDURE — 87637 SARSCOV2&INF A&B&RSV AMP PRB: CPT

## 2024-05-17 PROCEDURE — 84466 ASSAY OF TRANSFERRIN: CPT

## 2024-05-17 PROCEDURE — 82570 ASSAY OF URINE CREATININE: CPT

## 2024-05-17 PROCEDURE — 83550 IRON BINDING TEST: CPT

## 2024-05-17 PROCEDURE — 87186 SC STD MICRODIL/AGAR DIL: CPT

## 2024-05-17 PROCEDURE — 71045 X-RAY EXAM CHEST 1 VIEW: CPT

## 2024-05-17 PROCEDURE — 86900 BLOOD TYPING SEROLOGIC ABO: CPT

## 2024-05-17 PROCEDURE — 70496 CT ANGIOGRAPHY HEAD: CPT | Mod: MC

## 2024-05-17 PROCEDURE — 97116 GAIT TRAINING THERAPY: CPT

## 2024-05-17 PROCEDURE — 0225U NFCT DS DNA&RNA 21 SARSCOV2: CPT

## 2024-05-17 PROCEDURE — 87641 MR-STAPH DNA AMP PROBE: CPT

## 2024-05-17 PROCEDURE — 99239 HOSP IP/OBS DSCHRG MGMT >30: CPT | Mod: GC

## 2024-05-17 PROCEDURE — 74018 RADEX ABDOMEN 1 VIEW: CPT

## 2024-05-17 PROCEDURE — 80202 ASSAY OF VANCOMYCIN: CPT

## 2024-05-17 PROCEDURE — 83036 HEMOGLOBIN GLYCOSYLATED A1C: CPT

## 2024-05-17 PROCEDURE — C9399: CPT

## 2024-05-17 PROCEDURE — 82728 ASSAY OF FERRITIN: CPT

## 2024-05-17 PROCEDURE — 97110 THERAPEUTIC EXERCISES: CPT

## 2024-05-17 PROCEDURE — 87205 SMEAR GRAM STAIN: CPT

## 2024-05-17 PROCEDURE — 87086 URINE CULTURE/COLONY COUNT: CPT

## 2024-05-17 PROCEDURE — 83935 ASSAY OF URINE OSMOLALITY: CPT

## 2024-05-17 PROCEDURE — 92610 EVALUATE SWALLOWING FUNCTION: CPT

## 2024-05-17 PROCEDURE — 71275 CT ANGIOGRAPHY CHEST: CPT | Mod: MC

## 2024-05-17 PROCEDURE — 84100 ASSAY OF PHOSPHORUS: CPT

## 2024-05-17 PROCEDURE — 80053 COMPREHEN METABOLIC PANEL: CPT

## 2024-05-17 PROCEDURE — 80061 LIPID PANEL: CPT

## 2024-05-17 PROCEDURE — 74177 CT ABD & PELVIS W/CONTRAST: CPT | Mod: MC

## 2024-05-17 PROCEDURE — 81003 URINALYSIS AUTO W/O SCOPE: CPT

## 2024-05-17 PROCEDURE — 81001 URINALYSIS AUTO W/SCOPE: CPT

## 2024-05-17 PROCEDURE — 87640 STAPH A DNA AMP PROBE: CPT

## 2024-05-17 PROCEDURE — 86901 BLOOD TYPING SEROLOGIC RH(D): CPT

## 2024-05-17 PROCEDURE — 87040 BLOOD CULTURE FOR BACTERIA: CPT

## 2024-05-17 RX ORDER — MIDODRINE HYDROCHLORIDE 2.5 MG/1
0.5 TABLET ORAL
Qty: 0 | Refills: 0 | DISCHARGE
Start: 2024-05-17 | End: 2024-05-19

## 2024-05-17 RX ORDER — ZINC OXIDE 200 MG/G
1 OINTMENT TOPICAL
Qty: 0 | Refills: 0 | DISCHARGE
Start: 2024-05-17

## 2024-05-17 RX ORDER — LEVETIRACETAM 250 MG/1
1 TABLET, FILM COATED ORAL
Qty: 0 | Refills: 0 | DISCHARGE
Start: 2024-05-17

## 2024-05-17 RX ORDER — MIDODRINE HYDROCHLORIDE 2.5 MG/1
2.5 TABLET ORAL
Refills: 0 | Status: DISCONTINUED | OUTPATIENT
Start: 2024-05-17 | End: 2024-05-17

## 2024-05-17 RX ORDER — POTASSIUM PHOSPHATE, MONOBASIC POTASSIUM PHOSPHATE, DIBASIC 236; 224 MG/ML; MG/ML
30 INJECTION, SOLUTION INTRAVENOUS ONCE
Refills: 0 | Status: COMPLETED | OUTPATIENT
Start: 2024-05-17 | End: 2024-05-17

## 2024-05-17 RX ADMIN — PANTOPRAZOLE SODIUM 40 MILLIGRAM(S): 20 TABLET, DELAYED RELEASE ORAL at 05:15

## 2024-05-17 RX ADMIN — CLOPIDOGREL BISULFATE 75 MILLIGRAM(S): 75 TABLET, FILM COATED ORAL at 13:19

## 2024-05-17 RX ADMIN — POTASSIUM PHOSPHATE, MONOBASIC POTASSIUM PHOSPHATE, DIBASIC 83.33 MILLIMOLE(S): 236; 224 INJECTION, SOLUTION INTRAVENOUS at 11:46

## 2024-05-17 RX ADMIN — PIPERACILLIN AND TAZOBACTAM 25 GRAM(S): 4; .5 INJECTION, POWDER, LYOPHILIZED, FOR SOLUTION INTRAVENOUS at 05:15

## 2024-05-17 RX ADMIN — Medication 81 MILLIGRAM(S): at 13:19

## 2024-05-17 RX ADMIN — MAGNESIUM OXIDE 400 MG ORAL TABLET 400 MILLIGRAM(S): 241.3 TABLET ORAL at 13:19

## 2024-05-17 RX ADMIN — ENOXAPARIN SODIUM 40 MILLIGRAM(S): 100 INJECTION SUBCUTANEOUS at 13:18

## 2024-05-17 RX ADMIN — Medication 1: at 07:04

## 2024-05-17 RX ADMIN — PIPERACILLIN AND TAZOBACTAM 25 GRAM(S): 4; .5 INJECTION, POWDER, LYOPHILIZED, FOR SOLUTION INTRAVENOUS at 14:25

## 2024-05-17 RX ADMIN — Medication 1 MILLIGRAM(S): at 13:19

## 2024-05-17 RX ADMIN — Medication 325 MILLIGRAM(S): at 13:19

## 2024-05-17 RX ADMIN — MIDODRINE HYDROCHLORIDE 2.5 MILLIGRAM(S): 2.5 TABLET ORAL at 13:20

## 2024-05-17 RX ADMIN — SODIUM CHLORIDE 50 MILLILITER(S): 9 INJECTION INTRAMUSCULAR; INTRAVENOUS; SUBCUTANEOUS at 00:17

## 2024-05-17 RX ADMIN — Medication 1: at 12:34

## 2024-05-17 RX ADMIN — SODIUM CHLORIDE 50 MILLILITER(S): 9 INJECTION INTRAMUSCULAR; INTRAVENOUS; SUBCUTANEOUS at 11:08

## 2024-05-17 NOTE — PROGRESS NOTE ADULT - ASSESSMENT
88y M Cantonese speaking, R hand dominant, b/l  hearing impairment ( b/l hearing aids) with PMHx of DM, Thalassemia minor/JUSTYNA, HLD, hx stroke with right sided weakness in 2004 (has a cane and walker at home but pt refuses using assisted device, off ASA since 2020 for unclear reason), depression, glaucoma, BPH, presented with GV ( 4/27) with new left side arm and leg weakness starting 4/26 pm. Wife noticed that patient was leaning against the wall with the left side of his body to support himself to walk and was unable to hold himself upright when sitting, slumping over to the left. Patient felt that his left arm and leg were weak. Called PCP who recommended ED visit. CTH/CTP/CTA head and neck with no acute findings. Loaded with aspirin 300mg x1. Transferred to [facility] for further stroke w/u.    # Fever/cellulitis around PEG site   # possible aspiration PNA   # Dysphagia  - c/w PIP/TAZO change to regular dose at 3.375g iv q8h EI to complete today 5/17   - Local wound culture: growing klebsiella, enterobacter cloacae an dE. 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  # hx CVA w/ residual R sided weakness   # gait abnormality   # Seizure   - c/w ASA 81mg daily   - Clopidogrel resumed (5/9-) total 21 days   - statin 80mg qHS   - Keppra 750mg via PEG bid   - taper midodrine 5mg bid off ( 50% reduction then off as long as SBP>110)  - PT/OT evalto  - notify neuro team on day of discharge 5/17 to check NIHSS scale.         # Acute urinary retention  # BPH   # Gross hematuria   - resumed tamsulosin 0.4mg qHS   -  placed Galindo cath 22Fr placed, for TOV when regular BMs/ambulatory    # Anemia hx Thallassemia minor  - Ferritin 410, Tsat 33% adequate iron store  - hb now at 9.5 likely from intermittent hematuria, to transfuse prn.     #constipation  - hold off miralax for 2 days then resumed daily     #DM  - A1C 6.3%   home meds: metformin 500mg, Januvia 50mg daily - held for now.   - ISS, goal -180   -to stop lantus -now feeding held -to reintroduce when he is tolerating full feeding.    - mild hypokalemia- being replaced.      #DVT Prophylaxis: Lovenox 40mg sq daily     Dispo: fu PT/OT, will benefit inpatient Rehab- [facility] @2pm today      Contact:  PMD Dr. Aron Soares   Wife: Farshad Pierce # [phone]    poc dw medical team , wife

## 2024-05-17 NOTE — PROGRESS NOTE ADULT - REASON FOR ADMISSION
left sided weakness

## 2024-05-17 NOTE — PROGRESS NOTE ADULT - SUBJECTIVE AND OBJECTIVE BOX
Patient is a 88y old  Male who presents with a chief complaint of left sided weakness (16 May 2024 12:17)    INTERVAL EVENTS: NAEON    SUBJECTIVE:  Patient was seen and examined at bedside. Wife at bedside. Reports pt having loose stools/likely 2/2 miralax bid. Pt arousable, moves RUE/RLE. Tolerating TF @ 63ml/hr. Wife agreeable for pt transferred to acute rehab this afternoon    Review of systems: No fever, chills, dizziness, HA, Changes in vision, CP, dyspnea, nausea or vomiting, dysuria, changes in bowel movements, LE edema. Rest of 12 point Review of systems negative unless otherwise documented elsewhere in note.     Diet, NPO with Tube Feed:   Tube Feeding Modality: Gastrostomy  Assignment Editor Glucose Support 1.2 ([id number])  Total Volume for 24 Hours (mL): 1512  Total Number of Cans: 7  Continuous  Starting Tube Feed Rate mL per Hour: 20  Increase Tube Feed Rate by (mL): 10     Every 4 hours  Until Goal Tube Feed Rate (mL per Hour): 63  Tube Feed Duration (in Hours): 24  Tube Feed Start Time: 08:00  Tube Feed Stop Time: 08:00  Free Water Flush   Total Volume per Flush (mL): 100   Frequency: Every 8 Hours (05-16-24 @ 11:29) [Active]      MEDICATIONS:  MEDICATIONS  (STANDING):  aspirin  chewable 81 milliGRAM(s) Oral daily  atorvastatin 80 milliGRAM(s) Oral at bedtime  clopidogrel Tablet 75 milliGRAM(s) Oral daily  dextrose 10% Bolus 125 milliLiter(s) IV Bolus once  dextrose 5%. 1000 milliLiter(s) (50 mL/Hr) IV Continuous <Continuous>  dextrose 5%. 1000 milliLiter(s) (100 mL/Hr) IV Continuous <Continuous>  dextrose 50% Injectable 25 Gram(s) IV Push once  dextrose 50% Injectable 12.5 Gram(s) IV Push once  doxazosin 1 milliGRAM(s) Oral at bedtime  enoxaparin Injectable 40 milliGRAM(s) SubCutaneous every 24 hours  ferrous    sulfate 325 milliGRAM(s) Oral daily  folic acid 1 milliGRAM(s) Oral daily  glucagon  Injectable 1 milliGRAM(s) IntraMuscular once  insulin glargine Injectable (LANTUS) 4 Unit(s) SubCutaneous at bedtime  insulin lispro (ADMELOG) corrective regimen sliding scale   SubCutaneous every 6 hours  levETIRAcetam 750 milliGRAM(s) Oral every 12 hours  magnesium oxide 400 milliGRAM(s) Oral daily  midodrine. 2.5 milliGRAM(s) Oral <User Schedule>  pantoprazole   Suspension 40 milliGRAM(s) Oral every 24 hours  piperacillin/tazobactam IVPB.. 3.375 Gram(s) IV Intermittent every 8 hours  sodium chloride 0.9%. 1000 milliLiter(s) (50 mL/Hr) IV Continuous <Continuous>  vancomycin  IVPB 750 milliGRAM(s) IV Intermittent every 12 hours  zinc oxide 20% Ointment 1 Application(s) Topical every 24 hours    MEDICATIONS  (PRN):  acetaminophen     Tablet .. 650 milliGRAM(s) Oral every 6 hours PRN Temp greater or equal to 38C (100.4F), Mild Pain (1 - 3), Moderate Pain (4 - 6)  dextrose Oral Gel 15 Gram(s) Oral once PRN Blood Glucose LESS THAN 70 milliGRAM(s)/deciliter  guaiFENesin Oral Liquid (Sugar-Free) 100 milliGRAM(s) Oral every 6 hours PRN Cough      Allergies    No Known Allergies    Intolerances        OBJECTIVE:  Vital Signs Last 24 Hrs  T(C): 36.3 (17 May 2024 06:20), Max: 37.2 (16 May 2024 21:51)  T(F): 97.4 (17 May 2024 06:20), Max: 99 (16 May 2024 21:51)  HR: 85 (17 May 2024 06:20) (71 - 85)  BP: 160/87 (17 May 2024 06:20) (145/83 - 160/87)  BP(mean): --  RR: 17 (17 May 2024 06:20) (17 - 18)  SpO2: 96% (17 May 2024 06:20) (94% - 99%)    Parameters below as of 17 May 2024 06:20  Patient On (Oxygen Delivery Method): room air      I&O's Summary    16 May 2024 07:01  -  17 May 2024 07:00  --------------------------------------------------------  IN: 0 mL / OUT: 650 mL / NET: -650 mL        PHYSICAL EXAM:  Gen: Reclining in bed at time of exam, appears stated age  HEENT: NCAT, MMM, clear OP  Neck: supple, trachea at midline  CV: RRR, +S1/S2  Pulm: adequate respiratory effort, no increase in work of breathing  Abd: soft, NTND, PEG in place, site improving, no longer erythematous   Skin: warm and dry,   Ext: WWP, no LE edema  Neuro: AA, Moves RUE/RLE   Psych: affect and behavior appropriate, pleasant at time of interview  :     LABS:                        10.2   6.92  )-----------( 305      ( 17 May 2024 05:30 )             30.9     05-17    135  |  106  |  12  ----------------------------<  169<H>  3.6   |  20<L>  |  0.72    Ca    8.4      17 May 2024 05:30  Phos  2.1     05-17  Mg     2.1     05-17          CAPILLARY BLOOD GLUCOSE      POCT Blood Glucose.: 174 mg/dL (17 May 2024 06:50)  POCT Blood Glucose.: 165 mg/dL (16 May 2024 23:01)  POCT Blood Glucose.: 158 mg/dL (16 May 2024 17:43)  POCT Blood Glucose.: 177 mg/dL (16 May 2024 12:09)    Urinalysis Basic - ( 17 May 2024 05:30 )    Color: x / Appearance: x / SG: x / pH: x  Gluc: 169 mg/dL / Ketone: x  / Bili: x / Urobili: x   Blood: x / Protein: x / Nitrite: x   Leuk Esterase: x / RBC: x / WBC x   Sq Epi: x / Non Sq Epi: x / Bacteria: x        MICRODATA:      RADIOLOGY/OTHER STUDIES:

## 2024-05-20 PROBLEM — Z00.00 ENCOUNTER FOR PREVENTIVE HEALTH EXAMINATION: Status: ACTIVE | Noted: 2024-05-20

## 2024-05-24 DIAGNOSIS — F32.9 MAJOR DEPRESSIVE DISORDER, SINGLE EPISODE, UNSPECIFIED: ICD-10-CM

## 2024-05-24 DIAGNOSIS — D50.9 IRON DEFICIENCY ANEMIA, UNSPECIFIED: ICD-10-CM

## 2024-05-24 DIAGNOSIS — G93.49 OTHER ENCEPHALOPATHY: ICD-10-CM

## 2024-05-24 DIAGNOSIS — K59.00 CONSTIPATION, UNSPECIFIED: ICD-10-CM

## 2024-05-24 DIAGNOSIS — E83.39 OTHER DISORDERS OF PHOSPHORUS METABOLISM: ICD-10-CM

## 2024-05-24 DIAGNOSIS — I63.511 CEREBRAL INFARCTION DUE TO UNSPECIFIED OCCLUSION OR STENOSIS OF RIGHT MIDDLE CEREBRAL ARTERY: ICD-10-CM

## 2024-05-24 DIAGNOSIS — T81.44XA SEPSIS FOLLOWING A PROCEDURE, INITIAL ENCOUNTER: ICD-10-CM

## 2024-05-24 DIAGNOSIS — R53.81 OTHER MALAISE: ICD-10-CM

## 2024-05-24 DIAGNOSIS — R26.0 ATAXIC GAIT: ICD-10-CM

## 2024-05-24 DIAGNOSIS — E11.9 TYPE 2 DIABETES MELLITUS WITHOUT COMPLICATIONS: ICD-10-CM

## 2024-05-24 DIAGNOSIS — E87.1 HYPO-OSMOLALITY AND HYPONATREMIA: ICD-10-CM

## 2024-05-24 DIAGNOSIS — R33.8 OTHER RETENTION OF URINE: ICD-10-CM

## 2024-05-24 DIAGNOSIS — B96.1 KLEBSIELLA PNEUMONIAE [K. PNEUMONIAE] AS THE CAUSE OF DISEASES CLASSIFIED ELSEWHERE: ICD-10-CM

## 2024-05-24 DIAGNOSIS — H40.9 UNSPECIFIED GLAUCOMA: ICD-10-CM

## 2024-05-24 DIAGNOSIS — Z97.4 PRESENCE OF EXTERNAL HEARING-AID: ICD-10-CM

## 2024-05-24 DIAGNOSIS — N40.1 BENIGN PROSTATIC HYPERPLASIA WITH LOWER URINARY TRACT SYMPTOMS: ICD-10-CM

## 2024-05-24 DIAGNOSIS — R47.01 APHASIA: ICD-10-CM

## 2024-05-24 DIAGNOSIS — R29.702 NIHSS SCORE 2: ICD-10-CM

## 2024-05-24 DIAGNOSIS — T81.41XA INFECTION FOLLOWING A PROCEDURE, SUPERFICIAL INCISIONAL SURGICAL SITE, INITIAL ENCOUNTER: ICD-10-CM
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
## 2024-05-24 DIAGNOSIS — L03.311 CELLULITIS OF ABDOMINAL WALL: ICD-10-CM

## 2024-05-24 DIAGNOSIS — G81.94 HEMIPLEGIA, UNSPECIFIED AFFECTING LEFT NONDOMINANT SIDE: ICD-10-CM

## 2024-05-24 DIAGNOSIS — G94 OTHER DISORDERS OF BRAIN IN DISEASES CLASSIFIED ELSEWHERE: ICD-10-CM
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
## 2024-05-24 DIAGNOSIS — R13.10 DYSPHAGIA, UNSPECIFIED: ICD-10-CM

## 2024-05-24 DIAGNOSIS — I10 ESSENTIAL (PRIMARY) HYPERTENSION: ICD-10-CM

## 2024-05-24 DIAGNOSIS — R31.0 GROSS HEMATURIA: ICD-10-CM

## 2024-05-24 DIAGNOSIS — A41.59 OTHER GRAM-NEGATIVE SEPSIS: ICD-10-CM

## 2024-05-24 DIAGNOSIS — D56.3 THALASSEMIA MINOR: ICD-10-CM

## 2024-05-24 DIAGNOSIS — I95.1 ORTHOSTATIC HYPOTENSION: ICD-10-CM

## 2024-05-24 DIAGNOSIS — H91.93 UNSPECIFIED HEARING LOSS, BILATERAL: ICD-10-CM

## 2024-05-24 DIAGNOSIS — R56.9 UNSPECIFIED CONVULSIONS: ICD-10-CM

## 2024-05-24 DIAGNOSIS — J69.0 PNEUMONITIS DUE TO INHALATION OF FOOD AND VOMIT: ICD-10-CM

## 2024-05-24 DIAGNOSIS — Z79.84 LONG TERM (CURRENT) USE OF ORAL HYPOGLYCEMIC DRUGS: ICD-10-CM

## 2024-05-24 DIAGNOSIS — E78.5 HYPERLIPIDEMIA, UNSPECIFIED: ICD-10-CM

## 2024-05-30 ENCOUNTER — APPOINTMENT (OUTPATIENT)
Dept: UROLOGY | Facility: CLINIC | Age: 89
End: 2024-05-30

## 2024-05-30 ENCOUNTER — APPOINTMENT (OUTPATIENT)
Dept: NEUROLOGY | Facility: CLINIC | Age: 89
End: 2024-05-30

## 2024-07-16 ENCOUNTER — APPOINTMENT (OUTPATIENT)
Dept: UROLOGY | Facility: CLINIC | Age: 89
End: 2024-07-16
Payer: MEDICARE

## 2024-07-16 VITALS — BODY MASS INDEX: 18.51 KG/M2 | WEIGHT: 125 LBS | HEIGHT: 69 IN

## 2024-07-16 VITALS
TEMPERATURE: 98 F | HEIGHT: 60 IN | OXYGEN SATURATION: 94 % | DIASTOLIC BLOOD PRESSURE: 69 MMHG | HEART RATE: 91 BPM | SYSTOLIC BLOOD PRESSURE: 105 MMHG

## 2024-07-16 PROCEDURE — 99213 OFFICE O/P EST LOW 20 MIN: CPT

## 2024-07-16 RX ORDER — LIDOCAINE 50 MG/G
5 PATCH CUTANEOUS
Refills: 0 | Status: ACTIVE | COMMUNITY

## 2024-07-16 RX ORDER — ZINC SULFATE 50(220)MG
CAPSULE ORAL
Refills: 0 | Status: ACTIVE | COMMUNITY

## 2024-07-16 RX ORDER — DOXAZOSIN 1 MG/1
1 TABLET ORAL
Refills: 0 | Status: ACTIVE | COMMUNITY

## 2024-07-16 RX ORDER — ENOXAPARIN SODIUM 300 MG/3ML
INJECTION INTRAVENOUS; SUBCUTANEOUS
Refills: 0 | Status: ACTIVE | COMMUNITY

## 2024-07-16 RX ORDER — ATORVASTATIN CALCIUM 10 MG/1
10 TABLET, FILM COATED ORAL
Refills: 0 | Status: ACTIVE | COMMUNITY

## 2024-07-16 RX ORDER — FOLIC ACID-PYRIDOXINE-CYANOCOBALAMIN TAB 2.5-25-2 MG 2.5-25-2 MG
TAB ORAL
Refills: 0 | Status: ACTIVE | COMMUNITY

## 2024-07-16 RX ORDER — ASPIRIN 81 MG
81 TABLET,CHEWABLE ORAL
Refills: 0 | Status: ACTIVE | COMMUNITY

## 2024-07-16 RX ORDER — ACETAMINOPHEN 500 MG/1
500 TABLET ORAL
Refills: 0 | Status: ACTIVE | COMMUNITY

## 2024-07-16 RX ORDER — ESOMEPRAZOLE MAGNESIUM 10 MG/1
10 GRANULE, FOR SUSPENSION, EXTENDED RELEASE ORAL
Refills: 0 | Status: ACTIVE | COMMUNITY

## 2024-07-16 RX ORDER — HYPROMELLOSE 0.3 %
GEL (ML) OPHTHALMIC (EYE)
Refills: 0 | Status: ACTIVE | COMMUNITY

## 2024-08-16 ENCOUNTER — APPOINTMENT (OUTPATIENT)
Dept: NEUROLOGY | Facility: CLINIC | Age: 89
End: 2024-08-16
Payer: MEDICARE

## 2024-08-16 VITALS
DIASTOLIC BLOOD PRESSURE: 80 MMHG | OXYGEN SATURATION: 94 % | SYSTOLIC BLOOD PRESSURE: 117 MMHG | HEIGHT: 69 IN | TEMPERATURE: 96.5 F | HEART RATE: 105 BPM

## 2024-08-16 DIAGNOSIS — F06.31 CEREBRAL INFARCTION, UNSPECIFIED: ICD-10-CM

## 2024-08-16 DIAGNOSIS — R56.9 UNSPECIFIED CONVULSIONS: ICD-10-CM

## 2024-08-16 DIAGNOSIS — I63.9 CEREBRAL INFARCTION, UNSPECIFIED: ICD-10-CM

## 2024-08-16 PROCEDURE — 99215 OFFICE O/P EST HI 40 MIN: CPT

## 2024-08-19 NOTE — ASSESSMENT
[FreeTextEntry1] : Eric Bruce is an 89-year-old male with PMH of JUSTYNA stroke (2004) with residual right sided weakness, HLD, glaucoma, DM, BPH, depression, and b/l hearing loss who presented to [facility] ED 4/27/24 with complaints of new left sided arm and leg weakness. His symptoms were attributed to hypoperfusion, was given IVF boluses and started on midodrine. His hospitalization was complicated by urinary retention with hematuria BPH, s/p PEG 5/7 and sepsis secondary to abdominal cellulitis. He was discharged to [facility], which he still resides in Presents to the office for stroke and seizure follow-up  Plan: Continue with current medication regimen. Continue Keppra, ASA and Atorvastatin  Plan for repeat EEG in October Start Zoloft 25mg daily for depression Encourage to continue PT/OTSPTEand Encourage patient to increase oral intake to prevent fatigue and lack of energy Continue current medication regimen for secondary stroke prevention Continue aggressive vascular risk factor control with PCP, BP goal <130/80 Counselled on healthy eating (DASH/Mediterranean diet, limiting red meats and fried foods) Counselled on importance of regular exercise and remaining active Counselled on f/u with PCP regarding regular health maintenance and prevention, including routine screening Counselled on signs of stroke BEFAST and to call 911 with any new or worsening neurological symptoms RTC in 3 months

## 2024-08-19 NOTE — HISTORY OF PRESENT ILLNESS
[FreeTextEntry1] :  used throughout the visit Tod: ID# [id number]  Eric Bruce is an 89-year-old male with PMH of JUSTYNA stroke (2004) with residual right sided weakness, HLD, glaucoma, DM, BPH, depression, and b/l hearing loss who presented to [facility] ED 4/27/24 with complaints of new left sided arm and leg weakness. His symptoms were attributed to hypoperfusion, was given IVF boluses and started on midodrine. His hospitalization was complicated by urinary retention with hematuria BPH, s/p PEG 5/7 and sepsis secondary to abdominal cellulitis. He was discharged to [facility], which he still resides in  He presents to the office for stroke follow-up on a stretcher from rehab.  Since discharge, he continue to take ASA 81 and Atorvastatin 80mg with no reports of bleeding or myalgias. He has shown progress per wife, he is able to move his left leg a bit and diet has been advanced to puree diet. Wife reports, he refuses to eat because he has bowel and bladder incontinence and ends have having a sore bottom from going too much.  He also appears to be depressed and does not want to do things.  His wife mostly does things for him.  He continues to be on Keppra for seizures, no seizure acitivity noted

## 2024-08-19 NOTE — PHYSICAL EXAM
[FreeTextEntry1] : Mood is depressed flat affect Patient was only communicating to wife in Cantonese but is able to follow simple commands unable to assess dysarthria unable to assess memory L facial droop LUE no movement LLE 3/5 RUE 5/5 RLE 4/5 Sensory exam: Intact to light touch and pinprick. unable to assess gait, patient was lying on stretcher

## 2024-08-19 NOTE — ASSESSMENT
[FreeTextEntry1] : Eric Bruce is an 89-year-old male with PMH of JUSTYNA stroke (2004) with residual right sided weakness, HLD, glaucoma, DM, BPH, depression, and b/l hearing loss who presented to [facility] ED 4/27/24 with complaints of new left sided arm and leg weakness. His symptoms were attributed to hypoperfusion, was given IVF boluses and started on midodrine. His hospitalization was complicated by urinary retention with hematuria BPH, s/p PEG 5/7 and sepsis secondary to abdominal cellulitis. He was discharged to [facility], which he still resides in Presents to the office for stroke and seizure follow-up  Plan: Continue with current medication regimen. Continue Keppra, ASA and Atorvastatin  Plan for repeat EEG in October Start Zoloft 25mg daily for depression Encourage to continue PT/OTSPTEand Encourage patient to increase oral intake to prevent fatigue and lack of energy Continue current medication regimen for secondary stroke prevention Continue aggressive vascular risk factor control with PCP, BP goal <130/80 Counselled on healthy eating (DASH/Mediterranean diet, limiting red meats and fried foods) Counselled on importance of regular exercise and remaining active Counselled on f/u with PCP regarding regular health maintenance and prevention, including routine screening Counselled on signs of stroke BEFAST and to call 911 with any new or worsening neurological symptoms RTC in 3 months

## 2024-08-19 NOTE — HISTORY OF PRESENT ILLNESS
[FreeTextEntry1] :  used throughout the visit Tod: ID# [id number]  Eric Bruce is an 89-year-old male with PMH of JUSTYNA stroke (2004) with residual right sided weakness, HLD, glaucoma, DM, BPH, depression, and b/l hearing loss who presented to [facility] ED 4/27/24 with complaints of new left sided arm and leg weakness. His symptoms were attributed to hypoperfusion, was given IVF boluses and started on midodrine. His hospitalization was complicated by urinary retention with hematuria BPH, s/p PEG 5/7 and sepsis secondary to abdominal cellulitis. He was discharged to [facility], which he still resides in  He presents to the office for stroke follow-up on a stretcher from rehab.  Since discharge, he continue to take ASA 81 and Atorvastatin 80mg with no reports of bleeding or myalgias. He has shown progress per wife, he is able to move his left leg a bit and diet has been advanced to puree diet. Wife reports, he refuses to eat because he has bowel and bladder incontinence and ends have having a sore bottom from going too much.  He also appears to be depressed and does not want to do things.  His wife mostly does things for him.  He continues to be on Keppra for seizures, no seizure acitivity noted

## 2024-08-19 NOTE — ASSESSMENT
[FreeTextEntry1] : Eric Bruce is an 89-year-old male with PMH of JUSTYNA stroke (2004) with residual right sided weakness, HLD, glaucoma, DM, BPH, depression, and b/l hearing loss who presented to [facility] ED 4/27/24 with complaints of new left sided arm and leg weakness. His symptoms were attributed to hypoperfusion, was given IVF boluses and started on midodrine. His hospitalization was complicated by urinary retention with hematuria BPH, s/p PEG 5/7 and sepsis secondary to abdominal cellulitis. He was discharged to [facility], which he still resides in Presents to the office for stroke and seizure follow-up  Plan: Continue with current medication regimen. Continue Keppra, ASA and Atorvastatin  Plan for repeat EEG in October Start Zoloft 25mg daily for depression Encourage to continue PT/OTSPTEand Encourage patient to increase oral intake to prevent fatigue and lack of energy Continue current medication regimen for secondary stroke prevention Continue aggressive vascular risk factor control with PCP, BP goal <130/80 Counselled on healthy eating (DASH/Mediterranean diet, limiting red meats and fried foods) Counselled on importance of regular exercise and remaining active Counselled on f/u with PCP regarding regular health maintenance and prevention, including routine screening Counselled on signs of stroke BEFAST and to call 911 with any new or worsening neurological symptoms RTC in 3 months

## 2024-08-19 NOTE — HISTORY OF PRESENT ILLNESS
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 His symptoms were attributed to hypoperfusion, was given IVF boluses and started on midodrine. His hospitalization was complicated by urinary retention with hematuria BPH, s/p PEG 5/7 and sepsis secondary to abdominal cellulitis. He was discharged to [facility], which he still resides in  He presents to the office for stroke follow-up on a stretcher from rehab.  Since discharge, he continue to take ASA 81 and Atorvastatin 80mg with no reports of bleeding or myalgias. He has shown progress per wife, he is able to move his left leg a bit and diet has been advanced to puree diet. Wife reports, he refuses to eat because he has bowel and bladder incontinence and ends have having a sore bottom from going too much.  He also appears to be depressed and does not want to do things.  His wife mostly does things for him.  He continues to be on Keppra for seizures, no seizure acitivity noted

## 2024-09-05 NOTE — PROGRESS NOTE ADULT - PROBLEM SELECTOR PLAN 2
From: Randal Bullard  To: Mary Freeman MD  Sent: 7/23/2024 12:17 PM CDT  Subject: Labs for appointment    Hi,  I received a message from Dr Andrew's office that they need the labs faxed over. I would think you are on the same system and the Dr can review them. If that is not the case, can someone send them to Dr. Andrew's office? Our appointment is tomorrow at 9am.    Thank you,  Marlys Bullard   please excuse any typos.        Urinary retention with postraumatic hematuria after straight cath. On 5/4, 900 cc and 350cc in BS with postraumatic hematuria.  - Started on doxazosin 1mg/night (tamsulosin cannot be administered through PEG tube)   - urology consulted  - hudson to remain in place until patient is ambulatory; should follow up with his outpatient urologist  - can attempt TOV on 5/8 if functional status improves, however, will likely require hudson to stay in place at discharge

## 2024-10-15 ENCOUNTER — APPOINTMENT (OUTPATIENT)
Dept: NEUROLOGY | Facility: CLINIC | Age: 89
End: 2024-10-15

## 2024-10-31 NOTE — PROGRESS NOTE ADULT - SUBJECTIVE AND OBJECTIVE BOX
----------------TRANSFER SENDING FROM STROKE/TELE TO REGIONAL--------------  HPI: 88y Cantonese speaking Male with PMHx of JUSTYNA, stroke (2004, residual right sided weakness), HLD, glaucoma, DM, BPH, depression, b/l hearing loss presents with GV with new left side arm and leg weakness starting 4/26 pm. Wife also noted truncal ataxia (slumping over to the left when sitting) and gait ataxia. CT imaging with no acute pathology. Transferred to [facility] for further stroke w/u. Course c/b periods of waxing/waning left sided hemiparesis likely due to hypoperfusion, s/p IVF boluses, now started on midodrine. Patient with urinary retention with postraumatic hematuria after straight cath in pt with BPH. Now s/p PEG placement on 5/7 and awaiting AR placement.    Neurology Stroke Progress Note    INTERVAL HPI/OVERNIGHT EVENTS:  Patient seen and examined. Underwent PEG placement today.     MEDICATIONS  (STANDING):  aspirin  chewable 81 milliGRAM(s) Oral daily  atorvastatin 80 milliGRAM(s) Oral at bedtime  dextrose 10% Bolus 125 milliLiter(s) IV Bolus once  dextrose 5%. 1000 milliLiter(s) (50 mL/Hr) IV Continuous <Continuous>  dextrose 5%. 1000 milliLiter(s) (100 mL/Hr) IV Continuous <Continuous>  dextrose 50% Injectable 25 Gram(s) IV Push once  dextrose 50% Injectable 12.5 Gram(s) IV Push once  doxazosin 1 milliGRAM(s) Oral at bedtime  ferrous    sulfate 325 milliGRAM(s) Oral daily  folic acid 1 milliGRAM(s) Oral daily  glucagon  Injectable 1 milliGRAM(s) IntraMuscular once  insulin lispro (ADMELOG) corrective regimen sliding scale   SubCutaneous every 6 hours  magnesium oxide 400 milliGRAM(s) Oral daily  midodrine. 5 milliGRAM(s) Oral <User Schedule>  sodium chloride 0.9%. 1000 milliLiter(s) (75 mL/Hr) IV Continuous <Continuous>    MEDICATIONS  (PRN):  dextrose Oral Gel 15 Gram(s) Oral once PRN Blood Glucose LESS THAN 70 milliGRAM(s)/deciliter      Allergies    No Known Allergies    Intolerances        Vital Signs Last 24 Hrs  T(C): 36.8 (07 May 2024 13:45), Max: 37.1 (07 May 2024 09:49)  T(F): 98.3 (07 May 2024 13:45), Max: 98.8 (07 May 2024 09:49)  HR: 70 (07 May 2024 12:25) (69 - 84)  BP: 142/70 (07 May 2024 12:25) (137/79 - 152/72)  BP(mean): 96 (07 May 2024 12:25) (96 - 104)  RR: 24 (07 May 2024 12:25) (20 - 24)  SpO2: 94% (07 May 2024 12:25) (93% - 96%)    Parameters below as of 07 May 2024 12:25  Patient On (Oxygen Delivery Method): room air        Physical exam:  General: No acute distress  Eyes: Anicteric sclerae, moist conjunctivae, see below for CNs  Neck: trachea midline  Cardiovascular: Regular rate and rhythm  Pulmonary: No use of accessory muscles  GI: Abdomen soft  Extremities:no edema    Neurological Exam:   Mental status: Awake, alert and oriented to self, and place. Severe dysarthria, minimal verbal output. Able to follow simple commands  Cranial nerves: Pupils equally round and reactive to light, visual fields full, no nystagmus, extraocular muscles intact, V1 through V3 intact bilaterally and symmetric, Left facial droop hearing intact to finger rub, palate elevation symmetric, tongue was midline.  Motor: Right upper and lower extremity 4/5 without drift. Left upper extremity 0/5, left lower extremity with trace movement, 1/5    Sensation: Grossly intact, inconsistent response when testing sensation of bilateral lower extremities (will say examiner is touching right side when examiner is only touching left, which is the affected side of the stroke)  Coordination: No dysmetria appreciated with right upper extremity  LABS:                        10.3   6.53  )-----------( 177      ( 07 May 2024 05:30 )             30.8     05-07    134<L>  |  100  |  15  ----------------------------<  143<H>  3.3<L>   |  22  |  0.72    Ca    8.6      07 May 2024 05:30  Phos  3.2     05-07  Mg     1.8     05-07    TPro  6.3  /  Alb  3.3  /  TBili  0.7  /  DBili  x   /  AST  40  /  ALT  39  /  AlkPhos  71  05-07      Urinalysis Basic - ( 07 May 2024 05:30 )    Color: x / Appearance: x / SG: x / pH: x  Gluc: 143 mg/dL / Ketone: x  / Bili: x / Urobili: x   Blood: x / Protein: x / Nitrite: x   Leuk Esterase: x / RBC: x / WBC x   Sq Epi: x / Non Sq Epi: x / Bacteria: x        RADIOLOGY & ADDITIONAL TESTS:     Render Risk Assessment In Note?: no Comment: Understands concern, differential and need to follow, etc. Detail Level: Simple

## 2024-12-10 ENCOUNTER — APPOINTMENT (OUTPATIENT)
Dept: GASTROENTEROLOGY | Facility: CLINIC | Age: 89
End: 2024-12-10
Payer: MEDICARE

## 2024-12-10 ENCOUNTER — NON-APPOINTMENT (OUTPATIENT)
Age: 89
End: 2024-12-10

## 2024-12-10 ENCOUNTER — INPATIENT (INPATIENT)
Facility: HOSPITAL | Age: 89
LOS: 1 days | Discharge: HOME HEALTH | DRG: 394 | End: 2024-12-12
Attending: HOSPITALIST | Admitting: STUDENT IN AN ORGANIZED HEALTH CARE EDUCATION/TRAINING PROGRAM
Payer: MEDICARE

## 2024-12-10 VITALS
RESPIRATION RATE: 16 BRPM | OXYGEN SATURATION: 96 % | DIASTOLIC BLOOD PRESSURE: 73 MMHG | SYSTOLIC BLOOD PRESSURE: 114 MMHG | HEART RATE: 69 BPM

## 2024-12-10 DIAGNOSIS — Z29.9 ENCOUNTER FOR PROPHYLACTIC MEASURES, UNSPECIFIED: ICD-10-CM

## 2024-12-10 DIAGNOSIS — E78.5 HYPERLIPIDEMIA, UNSPECIFIED: ICD-10-CM

## 2024-12-10 DIAGNOSIS — D50.9 IRON DEFICIENCY ANEMIA, UNSPECIFIED: ICD-10-CM

## 2024-12-10 DIAGNOSIS — E11.9 TYPE 2 DIABETES MELLITUS WITHOUT COMPLICATIONS: ICD-10-CM

## 2024-12-10 DIAGNOSIS — Z86.73 PERSONAL HISTORY OF TRANSIENT ISCHEMIC ATTACK (TIA), AND CEREBRAL INFARCTION WITHOUT RESIDUAL DEFICITS: ICD-10-CM

## 2024-12-10 DIAGNOSIS — R33.9 RETENTION OF URINE, UNSPECIFIED: ICD-10-CM

## 2024-12-10 DIAGNOSIS — Z93.1 GASTROSTOMY STATUS: ICD-10-CM

## 2024-12-10 DIAGNOSIS — Z86.73 PERSONAL HISTORY OF TRANSIENT ISCHEMIC ATTACK (TIA), AND CEREBRAL INFARCTION W/OUT RESIDUAL DEFICITS: ICD-10-CM

## 2024-12-10 DIAGNOSIS — I10 ESSENTIAL (PRIMARY) HYPERTENSION: ICD-10-CM

## 2024-12-10 LAB
ALBUMIN SERPL ELPH-MCNC: 3.8 G/DL — SIGNIFICANT CHANGE UP (ref 3.3–5)
ALP SERPL-CCNC: 87 U/L — SIGNIFICANT CHANGE UP (ref 40–120)
ALT FLD-CCNC: 12 U/L — SIGNIFICANT CHANGE UP (ref 10–45)
ANION GAP SERPL CALC-SCNC: 11 MMOL/L — SIGNIFICANT CHANGE UP (ref 5–17)
AST SERPL-CCNC: 21 U/L — SIGNIFICANT CHANGE UP (ref 10–40)
BASOPHILS # BLD AUTO: 0.07 K/UL — SIGNIFICANT CHANGE UP (ref 0–0.2)
BASOPHILS NFR BLD AUTO: 0.9 % — SIGNIFICANT CHANGE UP (ref 0–2)
BILIRUB SERPL-MCNC: 0.4 MG/DL — SIGNIFICANT CHANGE UP (ref 0.2–1.2)
BUN SERPL-MCNC: 19 MG/DL — SIGNIFICANT CHANGE UP (ref 7–23)
CALCIUM SERPL-MCNC: 9 MG/DL — SIGNIFICANT CHANGE UP (ref 8.4–10.5)
CHLORIDE SERPL-SCNC: 99 MMOL/L — SIGNIFICANT CHANGE UP (ref 96–108)
CO2 SERPL-SCNC: 24 MMOL/L — SIGNIFICANT CHANGE UP (ref 22–31)
CREAT SERPL-MCNC: 0.61 MG/DL — SIGNIFICANT CHANGE UP (ref 0.5–1.3)
EGFR: 93 ML/MIN/1.73M2 — SIGNIFICANT CHANGE UP
EOSINOPHIL # BLD AUTO: 0.49 K/UL — SIGNIFICANT CHANGE UP (ref 0–0.5)
EOSINOPHIL NFR BLD AUTO: 6.2 % — HIGH (ref 0–6)
GLUCOSE SERPL-MCNC: 151 MG/DL — HIGH (ref 70–99)
HCT VFR BLD CALC: 33 % — LOW (ref 39–50)
HGB BLD-MCNC: 10.3 G/DL — LOW (ref 13–17)
LIDOCAIN IGE QN: 43 U/L — SIGNIFICANT CHANGE UP (ref 7–60)
LYMPHOCYTES # BLD AUTO: 0.83 K/UL — LOW (ref 1–3.3)
LYMPHOCYTES # BLD AUTO: 10.5 % — LOW (ref 13–44)
MCHC RBC-ENTMCNC: 21.4 PG — LOW (ref 27–34)
MCHC RBC-ENTMCNC: 31.2 G/DL — LOW (ref 32–36)
MCV RBC AUTO: 68.6 FL — LOW (ref 80–100)
MONOCYTES # BLD AUTO: 0.55 K/UL — SIGNIFICANT CHANGE UP (ref 0–0.9)
MONOCYTES NFR BLD AUTO: 7 % — SIGNIFICANT CHANGE UP (ref 2–14)
NEUTROPHILS # BLD AUTO: 5.96 K/UL — SIGNIFICANT CHANGE UP (ref 1.8–7.4)
NEUTROPHILS NFR BLD AUTO: 75.4 % — SIGNIFICANT CHANGE UP (ref 43–77)
PLATELET # BLD AUTO: 225 K/UL — SIGNIFICANT CHANGE UP (ref 150–400)
POTASSIUM SERPL-MCNC: 4.6 MMOL/L — SIGNIFICANT CHANGE UP (ref 3.5–5.3)
POTASSIUM SERPL-SCNC: 4.6 MMOL/L — SIGNIFICANT CHANGE UP (ref 3.5–5.3)
PROT SERPL-MCNC: 7.4 G/DL — SIGNIFICANT CHANGE UP (ref 6–8.3)
RBC # BLD: 4.81 M/UL — SIGNIFICANT CHANGE UP (ref 4.2–5.8)
RBC # FLD: 15.7 % — HIGH (ref 10.3–14.5)
SODIUM SERPL-SCNC: 134 MMOL/L — LOW (ref 135–145)
WBC # BLD: 7.91 K/UL — SIGNIFICANT CHANGE UP (ref 3.8–10.5)
WBC # FLD AUTO: 7.91 K/UL — SIGNIFICANT CHANGE UP (ref 3.8–10.5)

## 2024-12-10 PROCEDURE — 99215 OFFICE O/P EST HI 40 MIN: CPT

## 2024-12-10 PROCEDURE — 71045 X-RAY EXAM CHEST 1 VIEW: CPT | Mod: 26

## 2024-12-10 PROCEDURE — G2211 COMPLEX E/M VISIT ADD ON: CPT

## 2024-12-10 PROCEDURE — 99223 1ST HOSP IP/OBS HIGH 75: CPT

## 2024-12-10 PROCEDURE — 99285 EMERGENCY DEPT VISIT HI MDM: CPT

## 2024-12-10 PROCEDURE — 49465 FLUORO EXAM OF G/COLON TUBE: CPT

## 2024-12-10 RX ORDER — ACETAMINOPHEN 500MG 500 MG/1
650 TABLET, COATED ORAL EVERY 6 HOURS
Refills: 0 | Status: DISCONTINUED | OUTPATIENT
Start: 2024-12-10 | End: 2024-12-12

## 2024-12-10 RX ORDER — METOPROLOL TARTRATE 100 MG/1
1 TABLET, FILM COATED ORAL
Refills: 0 | DISCHARGE

## 2024-12-10 RX ORDER — IOHEXOL 300 MG/ML
30 INJECTION, SOLUTION INTRAVENOUS ONCE
Refills: 0 | Status: COMPLETED | OUTPATIENT
Start: 2024-12-10 | End: 2024-12-10

## 2024-12-10 RX ORDER — DOXAZOSIN MESYLATE 1 MG
1 TABLET ORAL AT BEDTIME
Refills: 0 | Status: DISCONTINUED | OUTPATIENT
Start: 2024-12-10 | End: 2024-12-12

## 2024-12-10 RX ORDER — ENOXAPARIN SODIUM 30 MG/.3ML
40 INJECTION SUBCUTANEOUS EVERY 24 HOURS
Refills: 0 | Status: DISCONTINUED | OUTPATIENT
Start: 2024-12-10 | End: 2024-12-12

## 2024-12-10 RX ORDER — METOPROLOL TARTRATE 100 MG/1
50 TABLET, FILM COATED ORAL EVERY 12 HOURS
Refills: 0 | Status: DISCONTINUED | OUTPATIENT
Start: 2024-12-10 | End: 2024-12-12

## 2024-12-10 RX ORDER — ACETAMINOPHEN, DIPHENHYDRAMINE HCL, PHENYLEPHRINE HCL 325; 25; 5 MG/1; MG/1; MG/1
3 TABLET ORAL AT BEDTIME
Refills: 0 | Status: DISCONTINUED | OUTPATIENT
Start: 2024-12-10 | End: 2024-12-12

## 2024-12-10 RX ORDER — MIDODRINE HYDROCHLORIDE 5 MG/1
2.5 TABLET ORAL
Refills: 0 | Status: DISCONTINUED | OUTPATIENT
Start: 2024-12-10 | End: 2024-12-12

## 2024-12-10 RX ORDER — MAGNESIUM, ALUMINUM HYDROXIDE 200-225/5
30 SUSPENSION, ORAL (FINAL DOSE FORM) ORAL EVERY 4 HOURS
Refills: 0 | Status: DISCONTINUED | OUTPATIENT
Start: 2024-12-10 | End: 2024-12-12

## 2024-12-10 RX ORDER — GLUCOSAMINE SULFATE DIPOT CHLR 500 MG
1 CAPSULE ORAL DAILY
Refills: 0 | Status: DISCONTINUED | OUTPATIENT
Start: 2024-12-10 | End: 2024-12-12

## 2024-12-10 RX ORDER — ONDANSETRON HYDROCHLORIDE 4 MG/1
4 TABLET, FILM COATED ORAL EVERY 8 HOURS
Refills: 0 | Status: DISCONTINUED | OUTPATIENT
Start: 2024-12-10 | End: 2024-12-12

## 2024-12-10 RX ORDER — FERROUS SULFATE 325(65) MG
325 TABLET ORAL DAILY
Refills: 0 | Status: DISCONTINUED | OUTPATIENT
Start: 2024-12-10 | End: 2024-12-12

## 2024-12-10 RX ORDER — 0.9 % SODIUM CHLORIDE 0.9 %
1000 INTRAVENOUS SOLUTION INTRAVENOUS
Refills: 0 | Status: DISCONTINUED | OUTPATIENT
Start: 2024-12-10 | End: 2024-12-12

## 2024-12-10 RX ADMIN — Medication 30 MILLILITER(S): at 19:45

## 2024-12-10 RX ADMIN — IOHEXOL 30 MILLILITER(S): 300 INJECTION, SOLUTION INTRAVENOUS at 17:33

## 2024-12-10 NOTE — ED PROVIDER NOTE - PROGRESS NOTE DETAILS
MD Lucia:   - labs: cbc shows normal wbc h/h 10/33, na 134.   - incident: AXR was ordered. someone wrote in the status column: "order contrast for Xray." I assumed the writer of this comment was the rn of the pt. I ordered omnipaque in error, thinking this was the correct contrast for the AXR to confirm the peg tube. the rn gave this contrast orally since it was ordered as oral contrast, although I thought it was meant to be administered through the peg tube. the pt was given oral contrast to drink and drank it even though he should get nothing by mouth. pt had started coughing. error was realized by ed team. cxr done right after appears clear and similar to prior. axr appears to have peg tube in place.     ID: [id number]  wife at bedside explained error. I explained error as stated above. I explained that I ordered the incorrect oral contrast instead of the gastrograffin. I didn't verbally confirm with pt rn that the contrast was meant for the tube and not to drink. I told her that the cxr appears clear for now and his O2 sat will be monitored closely and right now it is 95% on RA which is normal and he was sat more upright. also an npo order was placed in the computer. I explained that in the future I will ensure to always communicate with the rn directly regarding patients who are npo if a contrast is ordered for them in the computer. patient relations is at bedside. this error was communicated to the GI fellow on the case and was also explained to the susanne in depth so. MD Lucia:   - labs: cbc shows normal wbc h/h 10/33, na 134.   - incident: AXR was ordered. someone wrote in the status column: "order contrast for Xray." I assumed the writer of this comment was the rn of the pt. I ordered omnipaque in error, thinking this was the correct contrast for the AXR to confirm the peg tube. the rn gave this contrast orally since it was ordered as oral contrast, although I thought it was meant to be administered through the peg tube. the pt was given oral contrast to drink and drank it even though he should get nothing by mouth. pt had started coughing. error was realized by ed team. cxr done right after appears clear and similar to prior. axr appears to have peg tube in place.     ID: [id number]  wife at bedside explained error. I explained error as stated above. I explained that I ordered the incorrect oral contrast instead of the gastrograffin. I didn't verbally confirm with pt rn that the contrast was meant for the tube and not to drink. I told her that the cxr appears clear for now and his O2 sat will be monitored closely and right now it is 95% on RA which is normal and he was sat more upright. also an npo order was placed in the computer. I explained that in the future I will ensure to always communicate with the rn directly regarding patients who are npo if a contrast is ordered for them in the computer. patient relations is at bedside. this error was communicated to the GI fellow on the case and was also explained to the susanne in depth so. she recommended to call ICU consult, I called icu consult who states they will speak to their attending about evaluating patient MD Lucia:   - icu states that there is no need for icu consult and pt can be admitted to medicine off tele since pt is comfortable. discussed with susanne who states inpatient team will decide on empiric unasyn for aspiration empirically or not and to not order it yet. wife updated will admit

## 2024-12-10 NOTE — H&P ADULT - NSHPPHYSICALEXAM_GEN_ALL_CORE
T(C): 36.4 (12-10-24 @ 21:05), Max: 36.8 (12-10-24 @ 16:12)  HR: 80 (12-10-24 @ 21:05) (69 - 80)  BP: 135/75 (12-10-24 @ 21:05) (114/73 - 135/75)  RR: 18 (12-10-24 @ 21:05) (16 - 18)  SpO2: 94% (12-10-24 @ 21:05) (94% - 96%)    General: NAD, laying in bed  HEENT: head NC/AT, no conjunctival injection, EOMI, MMM  Neck: supple, no JVD  Cardio: RRR, +S1/S2, no M/R/G  Resp: lungs CTAB, no cough/wheezes/rales/rhonchi  Abdo: soft, NT, ND, +bowel sounds x4, PEG tube in LUQ. no erythema, exudate, fluctuance. No tenderness to palpation, Galindo in place  Extremities: WWP, no edema/cyanosis/clubbing   Vasc: 2+ radial and DP pulses b/l  Neuro: A&Ox1-2; very hard of hearing   Skin: dry, intact, no visible jaundice T(C): 36.4 (12-10-24 @ 21:05), Max: 36.8 (12-10-24 @ 16:12)  HR: 80 (12-10-24 @ 21:05) (69 - 80)  BP: 135/75 (12-10-24 @ 21:05) (114/73 - 135/75)  RR: 18 (12-10-24 @ 21:05) (16 - 18)  SpO2: 94% (12-10-24 @ 21:05) (94% - 96%)    General: NAD, laying in bed  HEENT: head NC/AT, no conjunctival injection, EOMI, MMM  Neck: supple, no JVD  Cardio: RRR, +S1/S2, no M/R/G  Resp: lungs CTAB, no cough/wheezes/rales/rhonchi  Abdo: soft, NT, ND, +bowel sounds x4, PEG tube in LUQ. no erythema, exudate, fluctuance. No tenderness to palpation, Galindo in place draining yellow urine  Extremities: WWP, no edema/cyanosis/clubbing   Vasc: 2+ radial and DP pulses b/l  Neuro: A&Ox1-2; very hard of hearing   Skin: dry, intact, no visible jaundice

## 2024-12-10 NOTE — H&P ADULT - PROBLEM SELECTOR PLAN 5
Hx of HLD controlled w/ Atorvastatin 80mg  - cont home therapy Hx of HLD controlled w/ Atorvastatin 40mg  - cont home therapy Home meds: metformin 500mg TID, Januvia 50mg daily  Previous A1C 6.3%   - mISS - once resumed tube feeds  - Hold Tube feeds  - FSG q6 hrs while NPO.

## 2024-12-10 NOTE — H&P ADULT - PROBLEM SELECTOR PLAN 7
F: None  E: replete as needed; Keep K>4, Mg >2   D: NPO  DVT Proph: Lovenox    Dispo: F F: D5LR @40cc/hr for 10 hrs  E: replete as needed; Keep K>4, Mg >2   D: NPO  DVT Proph: Lovenox    Dispo: RMF

## 2024-12-10 NOTE — H&P ADULT - VTE RISK ASSESSMENT
Detail Level: Simple Additional Notes: Patient consent was obtained to proceed with the visit and recommended plan of care after discussion of all risks and benefits, including the risks of COVID-19 exposure. VTE Assessment already completed for this visit

## 2024-12-10 NOTE — ED ADULT NURSE REASSESSMENT NOTE - NS ED NURSE REASSESS COMMENT FT1
Gastrografin given in XR for tube placement @ 1845. Tube patent and draining, patient tolerated well. ROHITH Campbell @ bedside.

## 2024-12-10 NOTE — ED PROVIDER NOTE - NS ED ROS FT
positive: discharge and bleeding around peg tube site  negative: f/c/cough/cp/sob/abdominal pain/n/v/d/dysuria/hematuria/leg edema/rash

## 2024-12-10 NOTE — H&P ADULT - PROBLEM SELECTOR PLAN 4
Home meds: metformin 500mg TID, Januvia 50mg daily    Previous A1C 6.3%   - c/w mISS   - Hold Tube feeds  - FSG q6 hrs while NPO. Home meds: metformin 500mg TID, Januvia 50mg daily  Previous A1C 6.3%   - mISS - once resumed tube feeds  - Hold Tube feeds  - FSG q6 hrs while NPO. 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   MRI brain on 4/28 with R internal capsule infarct   Head CT 5/5: subacute infarct centered in the genu and posterior limb of the right internal capsule is stable.  A chronic transcortical infarction in the left precentral gyrus and a small chronic infarct in the right cerebellar hemisphere are stable.  Home meds: ASA 81mg, Atorvastatin 40mg, Eliquis 5mg BID. Stopped by PCP. Unclear why patient was on Eliquis.  - collateral in AM for indications for Eliquis   - Continue with Atorvastatin  - Restart aspirin

## 2024-12-10 NOTE — ED ADULT NURSE REASSESSMENT NOTE - NS ED NURSE REASSESS COMMENT FT1
pt and wife educated on oral contrast for scan. Pt tolerated contrast well.( No wet cough while rn at bedside) Educated that pt has an hour to drink contrast. Wife verbalized understanding. Contrast given as ordered. pt and wife educated on oral contrast for scan, using  service on wife's phone. Pt tolerated contrast well.( No wet cough while rn at bedside) Educated that pt has an hour to drink contrast and to take time with drink. Wife verbalized understanding. Contrast given as ordered.

## 2024-12-10 NOTE — ED ADULT NURSE REASSESSMENT NOTE - NS ED NURSE REASSESS COMMENT FT1
MD Marshall informed about wife wanting a peg tube feeding. MD states that we are keeping the patient NPO.

## 2024-12-10 NOTE — H&P ADULT - ASSESSMENT
89y M Cantonese speaking, wheelchair bound, R hand dominant, b/l  hearing impairment ( b/l hearing aids) with PMHx of DM, Thalassemia minor/JUSTYNA, HLD, hx stroke with right sided weakness in 2004 and stroke workup (05/24) depression, glaucoma, BPH, s/p PEG placement on 5/7 presents with history of increasing discharge around peg tube site for the last week, admitted for PEG tube study.

## 2024-12-10 NOTE — H&P ADULT - PROBLEM SELECTOR PLAN 2
History of BPH. Patient arrived with hudson in place. Draining clear yellow urine.   - continue MEDS History of BPH. Patient arrived with hudson in place. Draining clear yellow urine. Home med: Doxazosin 1mg qd  - continue home meds Aspiration event in ED. See ED provider note for details.   - Wean supplemental O2 as tolerated (Placed for comfort, no documented hypoxia)  - Repeat CXR for increasing O2 requirements or fever  - Monitor off abx (Low suspicion for aspiration PNA)  - Strict NPO   - Aspiration precautions  - Medications via PEG only  - mIVF + Q6 FS while NPO

## 2024-12-10 NOTE — ED ADULT NURSE REASSESSMENT NOTE - NS ED NURSE REASSESS COMMENT FT1
Pt was handed off to covering RN Abdirizak approx 1700. Was told in report pt was drinking PO contrast for CT scan, supposed to go in approx 30min. Pt noted to be coughing while trying to drink contrast, approx 75% of 1L contrast already consumed by pt. MD Myers informed of coughing, and questioned of order for PO contrast for pt w/ PEG tube. Pt not ordered for CT, only XR for PEG placement. MD Myers states "I ordered the wrong contrast." No NPO order existed @ time of incident. Pt VS repeated, oxygen saturation > 95% on RA. Pt ordered for CXR and PEG placement XR. Scans completed, pt placed on continuous cardiac monitor & pulse ox. Oxygen saturation remains > 95% on RA. MD Myers spoke to pt and wife using   (#[id number]) to update on situation. Will continue to monitor in ED. Pt to be admitted. Charge ROHITH Costa aware of situation, EWELINA Owens aware of situation & at bedside. Pt was handed off to covering ROHITH Reveles approx 1700. Was told in report pt was drinking PO contrast for CT scan, supposed to go in approx 30min. Pt noted to be coughing while trying to drink contrast, approx 75% of 1L contrast already consumed by pt. MD Myers informed of coughing, and questioned of order for PO contrast for pt w/ PEG tube. Pt not ordered for CT, only XR for PEG placement. MD Myers states "I ordered the wrong contrast." No NPO order existed @ time of incident. Pt VS repeated, oxygen saturation > 95% on RA. Pt ordered for CXR and PEG placement XR. Scans completed, pt placed on continuous cardiac monitor & pulse ox. Oxygen saturation remains > 95% on RA. MD Myers spoke to pt and wife using   (#[id number]) to update on situation. Will continue to monitor in ED. Pt to be admitted. Charge ROHITH Costa aware of situation, EWELINA Owens aware of situation & at bedside. ROHITH Reveles covering from approx [id number]. Pt was handed off to covering ROHITH Reveles approx 1900. Was told in report pt was drinking PO contrast for CT scan, supposed to go in approx 30min. Pt noted to be coughing while trying to drink contrast, approx 75% of 1L contrast already consumed by pt. MD Myers informed of coughing, and questioned of order for PO contrast for pt w/ PEG tube. Pt not ordered for CT, only XR for PEG placement. MD Myers states "I ordered the wrong contrast." No NPO order existed @ time of incident. Pt VS repeated, oxygen saturation > 95% on RA. Pt ordered for CXR and PEG placement XR. Scans completed, pt placed on continuous cardiac monitor & pulse ox. Oxygen saturation remains > 95% on RA. MD Myers spoke to pt and wife using   (#[id number]) to update on situation. Will continue to monitor in ED. Pt to be admitted. Charge ROHITH Costa aware of situation, EWELINA Owens aware of situation & at bedside. ROHITH Reveles covering from approx [phone].

## 2024-12-10 NOTE — H&P ADULT - PROBLEM SELECTOR PLAN 1
Hx of stroke. s/p PEG placement on 5/7/24. Not meeting sepsis criteria. Per wife, discharge and bleeding around PEG tube site. PEG study ordered per GI. Concern for aspiration after oral contrast given to the patient.   - f/u PEG Xray study  - NPO except meds  - f/u GI recs Hx of stroke. s/p PEG placement on 5/7/24. Not meeting sepsis criteria. Per wife, discharge and bleeding around PEG tube site. PEG study ordered per GI. Concern for aspiration after oral contrast given to the patient. Pt seen by GI earlier. Poor wound healing around site and concern for poor nutritional status.   - f/u PEG Xray study  - NPO except meds  - f/u GI recs  - f/u wound care  - s/w consult - wife needs assistance with tube feeds Hx of stroke. s/p PEG placement on 5/7/24. Not meeting sepsis criteria. Per wife, discharge and bleeding around PEG tube site. PEG study ordered per GI. Concern for aspiration after oral contrast given to the patient. Pt seen by GI earlier. Poor wound healing around site and concern for poor nutritional status.   - f/u PEG Xray study  - NPO except meds - pending study  - f/u GI recs  - f/u wound care  - s/w consult - wife needs assistance with tube feeds

## 2024-12-10 NOTE — ED PROVIDER NOTE - CLINICAL SUMMARY MEDICAL DECISION MAKING FREE TEXT BOX
89M with PMH of JUSTYNA, stroke (2004 and 2024, residual right sided weakness, s/p PEG tube placement May 2024), HLD, glaucoma, DM, BPH, depression, and b/l hearing loss p/w several day hx of discharge around peg tube site. per wife at bedside peg tube is functional was sent by pmd due to some bleeding and discharge around peg tube site and bloodthinners were stopped due to bleeding around site.  On exam, vs are wnl, peg tube site has subcutaneous vs bowel around insertion site.    ddx: dislodged vs leaking peg tube vs no indication for infection    Plan:  - labs  - d/w gi who recommends peg tube xr and admission they will eval pt tomorrow

## 2024-12-10 NOTE — ED PROVIDER NOTE - PHYSICAL EXAMINATION
GENERAL:  Awake, alert and in NAD, non-toxic appearing  ENMT: Airway patent  EYES: conjunctiva clear  CARDIAC: Regular rate, regular rhythm.  Heart sounds S1, S2, no S3, S4. No murmurs, rubs or gallops.  RESPIRATORY: Breath sounds clear and equal in bilateral anterior lung fields, no wheezes/ronchi/crackles/stridor; pt breathing and speaking comfortably with no increased WOB, no accessory mm. use, no intercostal retractions, no nasal flaring  GI: abdomen soft, non-distended, non-tender, no rebound or guarding. peg tube in upper abdomen with soft tissue vs bowel surrounding the peg tube insertion site for about 1 cm, no active bleeding, no erythema/exudate/fluctuance/induration. no ttp surrounding peg tube  SKIN: warm and dry, no rashes  PSYCH: awake, alert, calm and cooperative  EXTREMITIES: no ble edema  NEURO: gcs 15

## 2024-12-10 NOTE — H&P ADULT - HISTORY OF PRESENT ILLNESS
89y M Cantonese speaking, R hand dominant, b/l  hearing impairment ( b/l hearing aids) with PMHx of DM, Thalassemia minor/JUSTYNA, HLD, hx stroke with right sided weakness in 2004 and stroke workup (05/24) has a cane and walker at home but pt refuses using assisted device, depression, glaucoma, BPH, s/p PEG placement on 5/7 presents with history of discharge around peg tube site for ___ days. Per wife at bedside, PEG tube was functional but went to see primary care provider due to some bleeding and discharge around the site. Blood thinners were stopped due to bleeding; PEG was placed on May 7th and course was complicated by sepsis secondary to abdominal cellulitis that resolved prior to discharge to rehab. Patient arrived with hudson in place. Denies fevers, chest pain, cough, SOB, nausea, vomiting.    Of note, in the ED, patient was ordered PO contrast (patient was ordered for an XRay of PEG placement). Patient noted to be coughing while trying to drink contrast (approx 75% of 1L consumed). Patient was not NPO at the time. Vitals signs stable with O2 >95% on RA. Wife and patient informed of situation. ICU consulted and patient stable for RMF.     ED Course:  Vitals: 98.3 F, HR 69, /73, RR (96%) on RA  Labs: WBC 7.91, Hgb 10.3, Na 134, Glucose 151  Imaging: Xray feeding tube, Xray chest  EKG: NSR 84 with 1st degree AV block  Consults: GI  Interventions: Gastrografin, Omnipaque,    89y M Cantonese speaking, wheelchair bound, R hand dominant, b/l  hearing impairment ( b/l hearing aids) with PMHx of DM, Thalassemia minor/JUSTYNA, HLD, hx stroke with right sided weakness in 2004 and stroke workup (05/24) depression, glaucoma, BPH, s/p PEG placement on 5/7 presents with history of increasing discharge around peg tube site for the last week. Per wife at bedside, PEG tube was functional but went to see primary care provider due to some bleeding and discharge around the site. Blood thinners (aspirin and Eliquis) stopped due to bleeding; PEG was placed on May 7th and course was complicated by sepsis secondary to abdominal cellulitis that resolved prior to discharge to rehab.  Patient arrived with Galindo in place. Per the wife, patient gets it changed every month. It was last changed on November 14th. Denies fevers, chest pain, cough, SOB, nausea, vomiting.    Of note, in the ED, patient was ordered PO contrast (patient was ordered for an XRay of PEG placement). Patient noted to be coughing while trying to drink contrast (approx 75% of 1L consumed). Patient was not NPO at the time. Vitals signs stable with O2 >95% on RA. Wife and patient informed of situation. ICU consulted and patient stable for RMF.     ED Course:  Vitals: 98.3 F, HR 69, /73, RR (96%) on RA  Labs: WBC 7.91, Hgb 10.3, Na 134, Glucose 151  Imaging: Xray feeding tube, Xray chest  EKG: NSR 84 with 1st degree AV block  Consults: GI  Interventions: Gastrografin, Omnipaque,

## 2024-12-10 NOTE — H&P ADULT - ATTENDING COMMENTS
88 yo Cantonese-speaking M (hard of hearing) with PMHx HFpEF (LVEF 56%, grade I diastolic dysfunction), aortic root aneurysm (4.6 cm), CVA (residual R-sided weakness, wheelchair-bound), dysphagia (s/p PEG 5/2024), HLD, DM, iron-deficiency anemia, thalassemia minor, BPH (chronic Galindo), glaucoma, depression BIBEMS from outpatient GI office due to concern for poor wound healing surrounding PEG tube, admitted for wound care and further nutrition evaluation and management.       VSS. Placed on 2L NC s/p aspiration event in ED following ED error with PO contrast administration (see ED provider documentation for details) however no documented hypoxia and CXR clear. Labs reviewed. No significant leukocytosis or bandemia. Peripheral eosinophilia noted on differential. Hb 10.3 (MCV 68.6). +Slight schistocytes on peripheral smear. Known hx of JUSTYNA on supplementation. Na 134. Remainder of CBC/CMP largely within normal limits. Of note, pt with prior admission in 5/2024 for cellulitis surrounding PEG tube s/p IV abx. On current px, PEG tube with mild surrounding oozing but does not appear acutely infected at this time. Galindo present on admission (known hx of urinary retention).      [ ] Strict NPO   - Medications via PEG tube only   [ ] Aspiration precautions   - Monitor O2 requirements s/p aspiration event in ED   - Low threshold to initiate coverage for aspiration PNA if becomes febrile   [ ] Wound care evaluation    - Sacral wound present on admission    [ ] Nutrition evaluation   [ ] Fall precautions   [ ] PT evaluation    [ ] Outpatient PCP collateral (Dr. Aron Soares)   - Pt appears to be on Eliquis (recently DC’d), unclear indication   - No longer on Keppra (Hx of seizure i/s/o CVA)

## 2024-12-10 NOTE — H&P ADULT - PROBLEM SELECTOR PLAN 3
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   MRI brain on 4/28 with R internal capsule infarct   Head CT 5/5: subacute infarct centered in the genu and posterior limb of the right internal capsule is stable.  A chronic transcortical infarction in the left precentral gyrus and a small chronic infarct in the right cerebellar hemisphere are stable.  Home meds: ASA 81mg, Atorvastatin 80mg Pt with a hx of stroke 2004 with residual right sided weakness.   MRI brain on 4/28 with R internal capsule infarct   Head CT 5/5: subacute infarct centered in the genu and posterior limb of the right internal capsule is stable.  A chronic transcortical infarction in the left precentral gyrus and a small chronic infarct in the right cerebellar hemisphere are stable.  Home meds: ASA 81mg, Atorvastatin 40mg, Eliquis 5mg BID. Stopped by PCP. Unclear why patient was on Eliquis.  - collateral in AM  - Continue with Atorvastatin Pt with a hx of stroke 2004 with residual right sided weakness.   MRI brain on 4/28 with R internal capsule infarct   Head CT 5/5: subacute infarct centered in the genu and posterior limb of the right internal capsule is stable.  A chronic transcortical infarction in the left precentral gyrus and a small chronic infarct in the right cerebellar hemisphere are stable.  Home meds: ASA 81mg, Atorvastatin 40mg, Eliquis 5mg BID. Stopped by PCP. Unclear why patient was on Eliquis.  - collateral in AM for indications for Eliquis   - Continue with Atorvastatin  - Restart aspirin History of BPH. Patient arrived with hudson in place. Draining clear yellow urine. Home med: Doxazosin 1mg qd  - continue home meds

## 2024-12-10 NOTE — ED ADULT NURSE NOTE - OBJECTIVE STATEMENT
89yoM BIBEMS c/o PEG tube problem. 89yoM  PMH CVA, HLD, glaucoma, BIBEMS c/o PEG tube problem. Pt was sent  by PMD c/o oozing around PEG tube. Wife states the PEG tube is still operable. PEG tube gauze noted with dried blood. Changed upon arrival. Wife denies fevers, chest pain, cough SOB. NAD. PT arrived with hudson catheter placed ( Unknown placement). NAD. 89yoM  PMH CVA, HLD, glaucoma, BIBEMS c/o PEG tube problem. Pt was sent  by PMD c/o oozing around PEG tube. Wife states the PEG tube is still operable. PEG tube gauze noted with dried blood. Changed upon arrival. Wife denies fevers, chest pain, cough SOB. NAD. PT arrived with hudson catheter placed ( Unknown placement). NAD. pt observed  tolerating small sips of water from wife.

## 2024-12-10 NOTE — ED PROVIDER NOTE - OBJECTIVE STATEMENT
ID: [id number]  ID: [id number]    89M with PMH of JUSTYNA, stroke (2004 and 2024, residual right sided weakness, s/p PEG tube placement May 2024), HLD, glaucoma, DM, BPH, depression, and b/l hearing loss p/w several day hx of discharge around peg tube site. per wife at bedside peg tube is functional was sent by pmd due to some bleeding and discharge around peg tube site and bloodthinners were stopped due to bleeding around site.  PEG tube placement on 5/7/24 was c/b sepsis secondary to abdominal cellulitis that resolved before discharge to rehab.

## 2024-12-10 NOTE — H&P ADULT - PROBLEM SELECTOR PLAN 6
Pt w/ Hx of HTN managed w/ lisinopril 20mg QD and amlodipine 5mg QD. Goal BP <130/80. F: None  E: replete as needed; Keep K>4, Mg >2   D: NPO  DVT Proph: Lovenox    Dispo: F F: D5LR @40cc/hr for 10 hrs  E: replete as needed; Keep K>4, Mg >2   D: NPO  DVT Proph: Lovenox    Dispo: RMF Hx of HLD controlled w/ Atorvastatin 40mg  - cont home therapy

## 2024-12-10 NOTE — H&P ADULT - NSHPLABSRESULTS_GEN_ALL_CORE
LABS:                         10.3   7.91  )-----------( 225      ( 10 Dec 2024 16:37 )             33.0     12-10    134[L]  |  99  |  19  ----------------------------<  151[H]  4.6   |  24  |  0.61    Ca    9.0      10 Dec 2024 16:37    TPro  7.4  /  Alb  3.8  /  TBili  0.4  /  DBili  x   /  AST  21  /  ALT  12  /  AlkPhos  87  12-10      Urinalysis Basic - ( 10 Dec 2024 16:37 )    Color: x / Appearance: x / SG: x / pH: x  Gluc: 151 mg/dL / Ketone: x  / Bili: x / Urobili: x   Blood: x / Protein: x / Nitrite: x   Leuk Esterase: x / RBC: x / WBC x   Sq Epi: x / Non Sq Epi: x / Bacteria: x                RADIOLOGY, EKG & ADDITIONAL TESTS: Reviewed

## 2024-12-11 DIAGNOSIS — Z91.89 OTHER SPECIFIED PERSONAL RISK FACTORS, NOT ELSEWHERE CLASSIFIED: ICD-10-CM

## 2024-12-11 LAB
A1C WITH ESTIMATED AVERAGE GLUCOSE RESULT: 6.8 % — HIGH (ref 4–5.6)
ALBUMIN SERPL ELPH-MCNC: 3.8 G/DL — SIGNIFICANT CHANGE UP (ref 3.3–5)
ALP SERPL-CCNC: 89 U/L — SIGNIFICANT CHANGE UP (ref 40–120)
ALT FLD-CCNC: 13 U/L — SIGNIFICANT CHANGE UP (ref 10–45)
ANION GAP SERPL CALC-SCNC: 14 MMOL/L — SIGNIFICANT CHANGE UP (ref 5–17)
AST SERPL-CCNC: 18 U/L — SIGNIFICANT CHANGE UP (ref 10–40)
BILIRUB SERPL-MCNC: 0.6 MG/DL — SIGNIFICANT CHANGE UP (ref 0.2–1.2)
BLD GP AB SCN SERPL QL: NEGATIVE — SIGNIFICANT CHANGE UP
BUN SERPL-MCNC: 16 MG/DL — SIGNIFICANT CHANGE UP (ref 7–23)
CALCIUM SERPL-MCNC: 9.2 MG/DL — SIGNIFICANT CHANGE UP (ref 8.4–10.5)
CHLORIDE SERPL-SCNC: 100 MMOL/L — SIGNIFICANT CHANGE UP (ref 96–108)
CO2 SERPL-SCNC: 22 MMOL/L — SIGNIFICANT CHANGE UP (ref 22–31)
CREAT SERPL-MCNC: 0.57 MG/DL — SIGNIFICANT CHANGE UP (ref 0.5–1.3)
EGFR: 95 ML/MIN/1.73M2 — SIGNIFICANT CHANGE UP
ESTIMATED AVERAGE GLUCOSE: 148 MG/DL — HIGH (ref 68–114)
GLUCOSE SERPL-MCNC: 125 MG/DL — HIGH (ref 70–99)
HCT VFR BLD CALC: 34 % — LOW (ref 39–50)
HGB BLD-MCNC: 10.7 G/DL — LOW (ref 13–17)
MAGNESIUM SERPL-MCNC: 2.2 MG/DL — SIGNIFICANT CHANGE UP (ref 1.6–2.6)
MCHC RBC-ENTMCNC: 22 PG — LOW (ref 27–34)
MCHC RBC-ENTMCNC: 31.5 G/DL — LOW (ref 32–36)
MCV RBC AUTO: 70 FL — LOW (ref 80–100)
NRBC # BLD: 0 /100 WBCS — SIGNIFICANT CHANGE UP (ref 0–0)
PHOSPHATE SERPL-MCNC: 3.2 MG/DL — SIGNIFICANT CHANGE UP (ref 2.5–4.5)
PLATELET # BLD AUTO: 246 K/UL — SIGNIFICANT CHANGE UP (ref 150–400)
POTASSIUM SERPL-MCNC: 4 MMOL/L — SIGNIFICANT CHANGE UP (ref 3.5–5.3)
POTASSIUM SERPL-SCNC: 4 MMOL/L — SIGNIFICANT CHANGE UP (ref 3.5–5.3)
PROT SERPL-MCNC: 7.6 G/DL — SIGNIFICANT CHANGE UP (ref 6–8.3)
RBC # BLD: 4.86 M/UL — SIGNIFICANT CHANGE UP (ref 4.2–5.8)
RBC # FLD: 15.9 % — HIGH (ref 10.3–14.5)
RH IG SCN BLD-IMP: POSITIVE — SIGNIFICANT CHANGE UP
SODIUM SERPL-SCNC: 136 MMOL/L — SIGNIFICANT CHANGE UP (ref 135–145)
WBC # BLD: 9.87 K/UL — SIGNIFICANT CHANGE UP (ref 3.8–10.5)
WBC # FLD AUTO: 9.87 K/UL — SIGNIFICANT CHANGE UP (ref 3.8–10.5)

## 2024-12-11 PROCEDURE — 71045 X-RAY EXAM CHEST 1 VIEW: CPT | Mod: 26

## 2024-12-11 PROCEDURE — 99222 1ST HOSP IP/OBS MODERATE 55: CPT

## 2024-12-11 RX ADMIN — METOPROLOL TARTRATE 50 MILLIGRAM(S): 100 TABLET, FILM COATED ORAL at 19:34

## 2024-12-11 RX ADMIN — Medication 81 MILLIGRAM(S): at 13:34

## 2024-12-11 RX ADMIN — MIDODRINE HYDROCHLORIDE 2.5 MILLIGRAM(S): 5 TABLET ORAL at 19:35

## 2024-12-11 RX ADMIN — Medication 325 MILLIGRAM(S): at 13:33

## 2024-12-11 RX ADMIN — Medication 400 MILLIGRAM(S): at 13:33

## 2024-12-11 RX ADMIN — Medication 1 MILLIGRAM(S): at 22:02

## 2024-12-11 RX ADMIN — Medication 40 MILLIGRAM(S): at 22:02

## 2024-12-11 RX ADMIN — Medication 1 MILLIGRAM(S): at 13:33

## 2024-12-11 RX ADMIN — ENOXAPARIN SODIUM 40 MILLIGRAM(S): 30 INJECTION SUBCUTANEOUS at 05:18

## 2024-12-11 RX ADMIN — Medication 40 MILLILITER(S): at 08:42

## 2024-12-11 NOTE — CONSULT NOTE ADULT - ASSESSMENT
Patient is a 89M Cantonese speaking, wheelchair bound b/l  hearing impairment ( b/l hearing aids) with PMHx of DM, Thalassemia minor/JUSTYNA, HLD, hx stroke with right sided weakness in 2004 and stroke workup (05/24) depression, glaucoma, BPH, s/p PEG placement on 5/7/2024 presents with history of increasing discharge around peg tube site for the last week. GI consulted for FTT and PEG tube eval.     PEG tube flushing well. Poor healing around PEG site likely due to poor nutritional status. No active bleeding or pus noted around the PEG insertion site. Hgb on admission was 10.7 (Baseline Hgb 10 in 5/2024). Wife expressing concerns about patient possibly having aspirated on the gastrografin in the ED and overall poor intake. We had an extensive discussion regarding his failure to thrive and aspiration may be a progression of his underlying conditions.       Recommendations:   - SLP evaluation   - Nutrition consult   - PEG tube may be accessed       GI will sign off the case. Please reach out if any questions arises.     THE NOTE IS INCOMPLETE UNTIL CO-SIGNED BY A FELLOW OR ATTENDING.  Patient is a 89M Cantonese speaking, wheelchair bound b/l  hearing impairment ( b/l hearing aids) with PMHx of DM, Thalassemia minor/JUSTYNA, HLD, hx stroke with right sided weakness in 2004 and stroke workup (05/24) depression, glaucoma, BPH, s/p PEG placement on 5/7/2024 presents with history of increasing discharge around peg tube site for the last week. GI consulted for FTT and PEG tube eval.     PEG tube flushing well. Poor healing around PEG site likely due to poor nutritional status. No active bleeding or pus noted around the PEG insertion site. Hgb on admission was 10.7 (Baseline Hgb 10 in 5/2024). Wife expressing concerns about patient possibly having aspirated on the gastrografin in the ED and overall poor intake. We had an extensive discussion regarding his failure to thrive and aspiration may be a progression of his underlying conditions.       Recommendations:   - SLP evaluation   - Nutrition consult   - PEG tube may be accessed   -  consult for HH discussion  - PT/OT      GI will sign off the case. Please reach out if any questions arises.     THE NOTE IS INCOMPLETE UNTIL CO-SIGNED BY A FELLOW OR ATTENDING.

## 2024-12-11 NOTE — PROGRESS NOTE ADULT - PROBLEM SELECTOR PLAN 7
F: D5LR @40cc/hr for 10 hrs  E: replete as needed; Keep K>4, Mg >2   D: NPO  DVT Proph: Lovenox    Dispo: RMF

## 2024-12-11 NOTE — PROGRESS NOTE ADULT - ATTENDING COMMENTS
88 yo Cantonese-speaking M (hard of hearing) with PMHx HFpEF (LVEF 56%, grade I diastolic dysfunction), aortic root aneurysm (4.6 cm), prior cva with right side weakness, recent new CVA on April 2024 with Left sided hemiparesis /become bed-bound/wheelchair bound, dysphagia (s/p PEG 5/2024), HLD, DM, iron-deficiency anemia, thalassemia minor, BPH (chronic Hudson), glaucoma, depression BIBEMS from outpatient GI office due to concern for poor wound healing surrounding PEG tube, admitted for wound care and further nutrition evaluation and management-     pt known to me from prior admission  collateral from wife who speak Mandarin/cantonese- writer speak mandarin  pt seen in ER -waiting for bed- pt with dysphagia, he was give large amout of oral contrast to drink= coughing during that time wife concern about aspiration- she request for peg dressing change since yesterday ( changed this morning by RN when I was there - family change dressing daily for him - he has oxygen nc at home, chronic hudson that was change mid-november .   pt is bed=bound moving right hand- wife care for him with 24/7 home attendent  now they have home visit doctor - part of [facility]   seen by GI earlier-  ok to start using peg   on exam - pt is on oxygen NC, no tachypnea, RRR moving air. left hemiplegic, moving right hand   peg in place- can see the underlying mucosa, skin around the peg looks ok- wife changes dressing  he is hard of hearing  chronic hudson -     - dysphagia- nothing via orally -  - evaluation by GI - ok- to start feeding via PEG Tube   - he was given large amount of contrast to drink - reported coughing after he drank some- chest x ray - no sign of pneumonitis- repeat Today 12/11- looks ok   - chronic hypoxic respiratory failure- he has oxygen at home  - to have wound care team see the peg site.  - no sign of infection  - please change hudson - chronic hudson ( time to change )    - DEBI/ YAMINI evaluation - pt has 24/7 hha , wife change dressing - his primary care make home visit ( Michel Roach ) - I have spoken with wife that she will need to make call to pmd for follow up after discharge from hospital   - diana Valle in ER - hha reinstated for tomorrow 12/12/24- at 2 pm -     [facility] medical team 4 uris 2- Dr. Kae Bryan  and DR. Alta Nava.     Dr. Rajesh Villalba covering 12/12- 12/22/24

## 2024-12-11 NOTE — PROGRESS NOTE ADULT - PROBLEM SELECTOR PLAN 1
Hx of stroke. s/p PEG placement on 5/7/24. Not meeting sepsis criteria. Per wife, discharge and bleeding around PEG tube site. PEG study ordered per GI. Concern for aspiration after oral contrast given to the patient. Pt seen by GI earlier. Poor wound healing around site and concern for poor nutritional status.   - f/u PEG Xray study  - NPO except meds - pending study  - f/u GI recs  - f/u wound care  - s/w consult - wife needs assistance with tube feeds Hx of stroke. s/p PEG placement on 5/7/24. Not meeting sepsis criteria. Per wife, discharge and bleeding around PEG tube site. PEG study ordered per GI. Concern for aspiration after oral contrast given to the patient. Pt seen by GI earlier. Poor wound healing around site and concern for poor nutritional status.   - OK to use peg tube   - NPO except meds - pending study  - f/u GI recs  - f/u wound care  - s/w consult - wife needs assistance with tube feeds

## 2024-12-11 NOTE — CONSULT NOTE ADULT - ATTENDING COMMENTS
89M Cantonese speaking, wheelchair bound b/l  hearing impairment ( b/l hearing aids) with PMHx of DM, Thalassemia minor/JUSTYNA, HLD, hx stroke with right sided weakness in 2004 and stroke workup (05/24) depression, glaucoma, BPH, s/p PEG placement on 5/7/2024 presents with history of increasing discharge around peg tube site for the last week.     PEG tube flushed ay bedside without resistance.   Patient's family stated that they flush the PEG tube daily.   Family noted dysphagia, however was given large amount of contrast to drink,   - Nutrition evaluation   - SW evaluation

## 2024-12-11 NOTE — PROGRESS NOTE ADULT - PROBLEM SELECTOR PLAN 2
Aspiration event in ED. See ED provider note for details.   - Wean supplemental O2 as tolerated (Placed for comfort, no documented hypoxia)  - Repeat CXR for increasing O2 requirements or fever  - Monitor off abx (Low suspicion for aspiration PNA)  - Strict NPO   - Aspiration precautions  - Medications via PEG only  - mIVF + Q6 FS while NPO Aspiration event in ED. See ED provider note for details.   - Wean supplemental O2 as tolerated (Placed for comfort, no documented hypoxia)  - Repeat CXR for increasing O2 requirements or fever  - Monitor off abx (Low suspicion for aspiration PNA)  -Repeat CXR shows no consolidations   - Strict NPO   - Aspiration precautions  - Medications via PEG only  - mIVF + Q6 FS while NPO

## 2024-12-11 NOTE — PROGRESS NOTE ADULT - PROBLEM SELECTOR PLAN 3
History of BPH. Patient arrived with hudson in place. Draining clear yellow urine. Home med: Doxazosin 1mg qd  - continue home meds History of BPH. Patient arrived with hudson in place. Draining clear yellow urine. Home med: Doxazosin 1mg qd  - continue home meds  -hudson changed today

## 2024-12-11 NOTE — PROGRESS NOTE ADULT - PROBLEM SELECTOR PLAN 4
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   MRI brain on 4/28 with R internal capsule infarct   Head CT 5/5: subacute infarct centered in the genu and posterior limb of the right internal capsule is stable.  A chronic transcortical infarction in the left precentral gyrus and a small chronic infarct in the right cerebellar hemisphere are stable.  Home meds: ASA 81mg, Atorvastatin 40mg, Eliquis 5mg BID. Stopped by PCP. Unclear why patient was on Eliquis.  - collateral in AM for indications for Eliquis   - Continue with Atorvastatin  - Restart aspirin

## 2024-12-11 NOTE — PROGRESS NOTE ADULT - SUBJECTIVE AND OBJECTIVE BOX
INTERVAL HPI/OVERNIGHT EVENTS:  Patient was seen and examined at bedside. As per nurse and patient, no o/n events, patient resting comfortably. No complaints at this time. Patient denies: fever, chills, dizziness, weakness, HA, Changes in vision, CP, palpitations, SOB, cough, N/V/D/C, dysuria, changes in bowel movements, LE edema. ROS otherwise negative.    VITAL SIGNS:  T(F): 98.2 (12-11-24 @ 09:30)  HR: 94 (12-11-24 @ 09:30)  BP: 95/60 (12-11-24 @ 09:30)  RR: 16 (12-11-24 @ 09:30)  SpO2: 95% (12-11-24 @ 09:30)  Wt(kg): --        PHYSICAL EXAM:    Constitutional: resting comfortably in bed; NAD  HEENT: NC/AT, PERRL, EOMI, anicteric sclera, no nasal discharge; uvula midline, no oropharyngeal erythema or exudates; MMM  Neck: supple; no JVD or thyromegaly  Respiratory: CTA B/L; no W/R/R, no retractions  Cardiac: +S1/S2; RRR; no M/R/G; PMI non-displaced  Gastrointestinal: soft, NT/ND; no rebound or guarding; +BSx4  Genitourinary: normal external genitalia  Back: spine midline, no bony tenderness or step-offs; no CVAT B/L  Extremities: WWP, no clubbing or cyanosis; no peripheral edema  Musculoskeletal: NROM x4; no joint swelling, tenderness or erythema  Vascular: 2+ radial, DP/PT pulses B/L  Dermatologic: skin warm, dry and intact; no rashes, wounds, or scars  Lymphatic: no submandibular or cervical LAD  Neurologic: AAOx3; CNII-XII grossly intact; no focal deficits  Psychiatric: affect and characteristics of appearance, verbalizations, behaviors are appropriate, denies SI/HI/AH/VH    MEDICATIONS  (STANDING):  aspirin  chewable 81 milliGRAM(s) Oral daily  atorvastatin 40 milliGRAM(s) Oral at bedtime  dextrose 5% + lactated ringers. 1000 milliLiter(s) (40 mL/Hr) IV Continuous <Continuous>  doxazosin 1 milliGRAM(s) Oral at bedtime  enoxaparin Injectable 40 milliGRAM(s) SubCutaneous every 24 hours  ferrous    sulfate 325 milliGRAM(s) Oral daily  folic acid 1 milliGRAM(s) Oral daily  magnesium oxide 400 milliGRAM(s) Oral daily  metoprolol tartrate 50 milliGRAM(s) Oral every 12 hours  midodrine. 2.5 milliGRAM(s) Oral <User Schedule>    MEDICATIONS  (PRN):  acetaminophen     Tablet .. 650 milliGRAM(s) Oral every 6 hours PRN Temp greater or equal to 38C (100.4F), Mild Pain (1 - 3)  aluminum hydroxide/magnesium hydroxide/simethicone Suspension 30 milliLiter(s) Oral every 4 hours PRN Dyspepsia  melatonin 3 milliGRAM(s) Oral at bedtime PRN Insomnia  ondansetron Injectable 4 milliGRAM(s) IV Push every 8 hours PRN Nausea and/or Vomiting      Allergies    No Known Allergies    Intolerances        LABS:                        10.7   9.87  )-----------( 246      ( 11 Dec 2024 06:19 )             34.0     12-11    136  |  100  |  16  ----------------------------<  125[H]  4.0   |  22  |  0.57    Ca    9.2      11 Dec 2024 06:19  Phos  3.2     12-11  Mg     2.2     12-11    TPro  7.6  /  Alb  3.8  /  TBili  0.6  /  DBili  x   /  AST  18  /  ALT  13  /  AlkPhos  89  12-11      Urinalysis Basic - ( 11 Dec 2024 06:19 )    Color: x / Appearance: x / SG: x / pH: x  Gluc: 125 mg/dL / Ketone: x  / Bili: x / Urobili: x   Blood: x / Protein: x / Nitrite: x   Leuk Esterase: x / RBC: x / WBC x   Sq Epi: x / Non Sq Epi: x / Bacteria: x        RADIOLOGY & ADDITIONAL TESTS:  Reviewed INTERVAL HPI/OVERNIGHT EVENTS:  Patient was seen and examined at bedside. As per nurse and patient, no o/n events, patient resting comfortably. No complaints at this time. Patient denies: fever, chills, dizziness, weakness, HA, Changes in vision, CP, palpitations, SOB, cough, N/V/D/C, dysuria, changes in bowel movements, LE edema. ROS otherwise negative.    VITAL SIGNS:  T(F): 98.2 (12-11-24 @ 09:30)  HR: 94 (12-11-24 @ 09:30)  BP: 95/60 (12-11-24 @ 09:30)  RR: 16 (12-11-24 @ 09:30)  SpO2: 95% (12-11-24 @ 09:30)  Wt(kg): --        PHYSICAL EXAM:    General: NAD, laying in bed  HEENT: head NC/AT, no conjunctival injection, EOMI, MMM  Neck: supple, no JVD  Cardio: RRR, +S1/S2, no M/R/G  Resp: lungs CTAB, no cough/wheezes/rales/rhonchi  Abdo: soft, NT, ND, +bowel sounds x4, PEG tube in LUQ. no erythema, exudate, fluctuance. No tenderness to palpation, Galindo in place draining yellow urine  Extremities: WWP, no edema/cyanosis/clubbing   Vasc: 2+ radial and DP pulses b/l  Neuro: A&Ox1-2; very hard of hearing   Skin: dry, intact, no visible jaundice    MEDICATIONS  (STANDING):  aspirin  chewable 81 milliGRAM(s) Oral daily  atorvastatin 40 milliGRAM(s) Oral at bedtime  dextrose 5% + lactated ringers. 1000 milliLiter(s) (40 mL/Hr) IV Continuous <Continuous>  doxazosin 1 milliGRAM(s) Oral at bedtime  enoxaparin Injectable 40 milliGRAM(s) SubCutaneous every 24 hours  ferrous    sulfate 325 milliGRAM(s) Oral daily  folic acid 1 milliGRAM(s) Oral daily  magnesium oxide 400 milliGRAM(s) Oral daily  metoprolol tartrate 50 milliGRAM(s) Oral every 12 hours  midodrine. 2.5 milliGRAM(s) Oral <User Schedule>    MEDICATIONS  (PRN):  acetaminophen     Tablet .. 650 milliGRAM(s) Oral every 6 hours PRN Temp greater or equal to 38C (100.4F), Mild Pain (1 - 3)  aluminum hydroxide/magnesium hydroxide/simethicone Suspension 30 milliLiter(s) Oral every 4 hours PRN Dyspepsia  melatonin 3 milliGRAM(s) Oral at bedtime PRN Insomnia  ondansetron Injectable 4 milliGRAM(s) IV Push every 8 hours PRN Nausea and/or Vomiting      Allergies    No Known Allergies    Intolerances        LABS:                        10.7   9.87  )-----------( 246      ( 11 Dec 2024 06:19 )             34.0     12-11    136  |  100  |  16  ----------------------------<  125[H]  4.0   |  22  |  0.57    Ca    9.2      11 Dec 2024 06:19  Phos  3.2     12-11  Mg     2.2     12-11    TPro  7.6  /  Alb  3.8  /  TBili  0.6  /  DBili  x   /  AST  18  /  ALT  13  /  AlkPhos  89  12-11      Urinalysis Basic - ( 11 Dec 2024 06:19 )    Color: x / Appearance: x / SG: x / pH: x  Gluc: 125 mg/dL / Ketone: x  / Bili: x / Urobili: x   Blood: x / Protein: x / Nitrite: x   Leuk Esterase: x / RBC: x / WBC x   Sq Epi: x / Non Sq Epi: x / Bacteria: x        RADIOLOGY & ADDITIONAL TESTS:  Reviewed INTERVAL HPI/OVERNIGHT EVENTS:  Patient was seen and examined at bedside. Pt hard of hearing. Difficult to participate in interview.     VITAL SIGNS:  T(F): 98.2 (12-11-24 @ 09:30)  HR: 94 (12-11-24 @ 09:30)  BP: 95/60 (12-11-24 @ 09:30)  RR: 16 (12-11-24 @ 09:30)  SpO2: 95% (12-11-24 @ 09:30)        PHYSICAL EXAM:    General: NAD, laying in bed  HEENT: head NC/AT, no conjunctival injection, EOMI, MMM  Neck: supple, no JVD  Cardio: RRR, +S1/S2, no M/R/G  Resp: lungs CTAB, no cough/wheezes/rales/rhonchi  Abdo: soft, NT, ND, +bowel sounds x4, PEG tube in LUQ. no erythema, exudate, fluctuance. No tenderness to palpation, Galindo in place draining yellow urine---> urethral meatus eroded   Extremities: WWP, no edema/cyanosis/clubbing   Vasc: 2+ radial and DP pulses b/l  Neuro: A&Ox1; very hard of hearing   Skin: dry, intact, no visible jaundice    MEDICATIONS  (STANDING):  aspirin  chewable 81 milliGRAM(s) Oral daily  atorvastatin 40 milliGRAM(s) Oral at bedtime  dextrose 5% + lactated ringers. 1000 milliLiter(s) (40 mL/Hr) IV Continuous <Continuous>  doxazosin 1 milliGRAM(s) Oral at bedtime  enoxaparin Injectable 40 milliGRAM(s) SubCutaneous every 24 hours  ferrous    sulfate 325 milliGRAM(s) Oral daily  folic acid 1 milliGRAM(s) Oral daily  magnesium oxide 400 milliGRAM(s) Oral daily  metoprolol tartrate 50 milliGRAM(s) Oral every 12 hours  midodrine. 2.5 milliGRAM(s) Oral <User Schedule>    MEDICATIONS  (PRN):  acetaminophen     Tablet .. 650 milliGRAM(s) Oral every 6 hours PRN Temp greater or equal to 38C (100.4F), Mild Pain (1 - 3)  aluminum hydroxide/magnesium hydroxide/simethicone Suspension 30 milliLiter(s) Oral every 4 hours PRN Dyspepsia  melatonin 3 milliGRAM(s) Oral at bedtime PRN Insomnia  ondansetron Injectable 4 milliGRAM(s) IV Push every 8 hours PRN Nausea and/or Vomiting      Allergies    No Known Allergies    Intolerances        LABS:                        10.7   9.87  )-----------( 246      ( 11 Dec 2024 06:19 )             34.0     12-11    136  |  100  |  16  ----------------------------<  125[H]  4.0   |  22  |  0.57    Ca    9.2      11 Dec 2024 06:19  Phos  3.2     12-11  Mg     2.2     12-11    TPro  7.6  /  Alb  3.8  /  TBili  0.6  /  DBili  x   /  AST  18  /  ALT  13  /  AlkPhos  89  12-11      Urinalysis Basic - ( 11 Dec 2024 06:19 )    Color: x / Appearance: x / SG: x / pH: x  Gluc: 125 mg/dL / Ketone: x  / Bili: x / Urobili: x   Blood: x / Protein: x / Nitrite: x   Leuk Esterase: x / RBC: x / WBC x   Sq Epi: x / Non Sq Epi: x / Bacteria: x        RADIOLOGY & ADDITIONAL TESTS:  Reviewed

## 2024-12-11 NOTE — PROGRESS NOTE ADULT - PROBLEM SELECTOR PLAN 5
Home meds: metformin 500mg TID, Januvia 50mg daily  Previous A1C 6.3%   - mISS - once resumed tube feeds  - Hold Tube feeds  - FSG q6 hrs while NPO.

## 2024-12-12 ENCOUNTER — TRANSCRIPTION ENCOUNTER (OUTPATIENT)
Age: 89
End: 2024-12-12

## 2024-12-12 VITALS
DIASTOLIC BLOOD PRESSURE: 64 MMHG | TEMPERATURE: 98 F | RESPIRATION RATE: 18 BRPM | SYSTOLIC BLOOD PRESSURE: 109 MMHG | HEART RATE: 62 BPM | OXYGEN SATURATION: 94 %

## 2024-12-12 LAB
ANION GAP SERPL CALC-SCNC: 12 MMOL/L — SIGNIFICANT CHANGE UP (ref 5–17)
BASOPHILS # BLD AUTO: 0 K/UL — SIGNIFICANT CHANGE UP (ref 0–0.2)
BASOPHILS NFR BLD AUTO: 0 % — SIGNIFICANT CHANGE UP (ref 0–2)
BUN SERPL-MCNC: 18 MG/DL — SIGNIFICANT CHANGE UP (ref 7–23)
CALCIUM SERPL-MCNC: 8.9 MG/DL — SIGNIFICANT CHANGE UP (ref 8.4–10.5)
CHLORIDE SERPL-SCNC: 102 MMOL/L — SIGNIFICANT CHANGE UP (ref 96–108)
CO2 SERPL-SCNC: 22 MMOL/L — SIGNIFICANT CHANGE UP (ref 22–31)
CREAT SERPL-MCNC: 0.56 MG/DL — SIGNIFICANT CHANGE UP (ref 0.5–1.3)
EGFR: 95 ML/MIN/1.73M2 — SIGNIFICANT CHANGE UP
EOSINOPHIL # BLD AUTO: 0.85 K/UL — HIGH (ref 0–0.5)
EOSINOPHIL NFR BLD AUTO: 11.1 % — HIGH (ref 0–6)
GLUCOSE SERPL-MCNC: 119 MG/DL — HIGH (ref 70–99)
HCT VFR BLD CALC: 31.4 % — LOW (ref 39–50)
HGB BLD-MCNC: 9.6 G/DL — LOW (ref 13–17)
LYMPHOCYTES # BLD AUTO: 1.21 K/UL — SIGNIFICANT CHANGE UP (ref 1–3.3)
LYMPHOCYTES # BLD AUTO: 15.8 % — SIGNIFICANT CHANGE UP (ref 13–44)
MAGNESIUM SERPL-MCNC: 2 MG/DL — SIGNIFICANT CHANGE UP (ref 1.6–2.6)
MCHC RBC-ENTMCNC: 20.9 PG — LOW (ref 27–34)
MCHC RBC-ENTMCNC: 30.6 G/DL — LOW (ref 32–36)
MCV RBC AUTO: 68.4 FL — LOW (ref 80–100)
MONOCYTES # BLD AUTO: 0.63 K/UL — SIGNIFICANT CHANGE UP (ref 0–0.9)
MONOCYTES NFR BLD AUTO: 8.3 % — SIGNIFICANT CHANGE UP (ref 2–14)
NEUTROPHILS # BLD AUTO: 4.94 K/UL — SIGNIFICANT CHANGE UP (ref 1.8–7.4)
NEUTROPHILS NFR BLD AUTO: 64.8 % — SIGNIFICANT CHANGE UP (ref 43–77)
PHOSPHATE SERPL-MCNC: 3.5 MG/DL — SIGNIFICANT CHANGE UP (ref 2.5–4.5)
PLATELET # BLD AUTO: 226 K/UL — SIGNIFICANT CHANGE UP (ref 150–400)
POTASSIUM SERPL-MCNC: 3.7 MMOL/L — SIGNIFICANT CHANGE UP (ref 3.5–5.3)
POTASSIUM SERPL-SCNC: 3.7 MMOL/L — SIGNIFICANT CHANGE UP (ref 3.5–5.3)
RBC # BLD: 4.59 M/UL — SIGNIFICANT CHANGE UP (ref 4.2–5.8)
RBC # FLD: 15.9 % — HIGH (ref 10.3–14.5)
SODIUM SERPL-SCNC: 136 MMOL/L — SIGNIFICANT CHANGE UP (ref 135–145)
WBC # BLD: 7.63 K/UL — SIGNIFICANT CHANGE UP (ref 3.8–10.5)
WBC # FLD AUTO: 7.63 K/UL — SIGNIFICANT CHANGE UP (ref 3.8–10.5)

## 2024-12-12 PROCEDURE — 71045 X-RAY EXAM CHEST 1 VIEW: CPT

## 2024-12-12 PROCEDURE — 49465 FLUORO EXAM OF G/COLON TUBE: CPT

## 2024-12-12 PROCEDURE — 36415 COLL VENOUS BLD VENIPUNCTURE: CPT

## 2024-12-12 PROCEDURE — 80048 BASIC METABOLIC PNL TOTAL CA: CPT

## 2024-12-12 PROCEDURE — 85025 COMPLETE CBC W/AUTO DIFF WBC: CPT

## 2024-12-12 PROCEDURE — 85027 COMPLETE CBC AUTOMATED: CPT

## 2024-12-12 PROCEDURE — 99285 EMERGENCY DEPT VISIT HI MDM: CPT | Mod: 25

## 2024-12-12 PROCEDURE — 82962 GLUCOSE BLOOD TEST: CPT

## 2024-12-12 PROCEDURE — 83036 HEMOGLOBIN GLYCOSYLATED A1C: CPT

## 2024-12-12 PROCEDURE — 83690 ASSAY OF LIPASE: CPT

## 2024-12-12 PROCEDURE — 83735 ASSAY OF MAGNESIUM: CPT

## 2024-12-12 PROCEDURE — 86900 BLOOD TYPING SEROLOGIC ABO: CPT

## 2024-12-12 PROCEDURE — 86901 BLOOD TYPING SEROLOGIC RH(D): CPT

## 2024-12-12 PROCEDURE — T1013: CPT

## 2024-12-12 PROCEDURE — 99239 HOSP IP/OBS DSCHRG MGMT >30: CPT

## 2024-12-12 PROCEDURE — 84100 ASSAY OF PHOSPHORUS: CPT

## 2024-12-12 PROCEDURE — 86850 RBC ANTIBODY SCREEN: CPT

## 2024-12-12 PROCEDURE — 80053 COMPREHEN METABOLIC PANEL: CPT

## 2024-12-12 RX ORDER — APIXABAN 2.5 MG/1
1 TABLET, FILM COATED ORAL
Refills: 0 | DISCHARGE

## 2024-12-12 RX ORDER — SILVER NITRATE
1 CRYSTALS MISCELLANEOUS ONCE
Refills: 0 | Status: COMPLETED | OUTPATIENT
Start: 2024-12-12 | End: 2024-12-12

## 2024-12-12 RX ADMIN — Medication 81 MILLIGRAM(S): at 11:50

## 2024-12-12 RX ADMIN — Medication 1 APPLICATION(S): at 12:50

## 2024-12-12 RX ADMIN — Medication 400 MILLIGRAM(S): at 11:50

## 2024-12-12 RX ADMIN — METOPROLOL TARTRATE 50 MILLIGRAM(S): 100 TABLET, FILM COATED ORAL at 06:03

## 2024-12-12 RX ADMIN — Medication 1 MILLIGRAM(S): at 11:50

## 2024-12-12 RX ADMIN — Medication 325 MILLIGRAM(S): at 11:50

## 2024-12-12 RX ADMIN — ENOXAPARIN SODIUM 40 MILLIGRAM(S): 30 INJECTION SUBCUTANEOUS at 06:03

## 2024-12-12 RX ADMIN — MIDODRINE HYDROCHLORIDE 2.5 MILLIGRAM(S): 5 TABLET ORAL at 06:03

## 2024-12-12 NOTE — DIETITIAN INITIAL EVALUATION ADULT - PROBLEM SELECTOR PLAN 2
Aspiration event in ED. See ED provider note for details.   - Wean supplemental O2 as tolerated (Placed for comfort, no documented hypoxia)  - Repeat CXR for increasing O2 requirements or fever  - Monitor off abx (Low suspicion for aspiration PNA)  - Strict NPO   - Aspiration precautions  - Medications via PEG only  - mIVF + Q6 FS while NPO

## 2024-12-12 NOTE — DISCHARGE NOTE PROVIDER - HOSPITAL COURSE
89y M Cantonese speaking, wheelchair bound, R hand dominant, b/l  hearing impairment ( b/l hearing aids) with PMHx of DM, Thalassemia minor/JUSTYNA, HLD, hx stroke with right sided weakness in 2004 and stroke workup (05/24) depression, glaucoma, BPH, s/p PEG placement on 5/7 presents with history of increasing discharge around peg tube site for the last week, admitted for PEG tube study.        Problem/Plan - 1:  ·  Problem: PEG (percutaneous endoscopic gastrostomy) adjustment/replacement/removal.   ·  Plan: Hx of stroke. s/p PEG placement on 5/7/24. Not meeting sepsis criteria. Per wife, discharge and bleeding around PEG tube site. PEG study ordered per GI. Concern for aspiration after oral contrast given to the patient. Pt seen by GI earlier. Poor wound healing around site and concern for poor nutritional status.   - OK to use peg tube   - NPO except meds - pending study  - f/u GI recs  - f/u wound care  - s/w consult - wife needs assistance with tube feeds.     Problem/Plan - 2:  ·  Problem: At risk for aspiration.   ·  Plan: Aspiration event in ED. See ED provider note for details.   - Wean supplemental O2 as tolerated (Placed for comfort, no documented hypoxia)  - Repeat CXR for increasing O2 requirements or fever  - Monitor off abx (Low suspicion for aspiration PNA)  -Repeat CXR shows no consolidations   - Strict NPO   - Aspiration precautions  - Medications via PEG only  - mIVF + Q6 FS while NPO.     Problem/Plan - 3:  ·  Problem: Urinary retention.   ·  Plan: History of BPH. Patient arrived with hudson in place. Draining clear yellow urine. Home med: Doxazosin 1mg qd  - continue home meds  -hudson changed today.     Problem/Plan - 4:  ·  Problem: History of stroke.   ·  Plan: Pt with a hx of stroke 2004 with residual right sided weakness.   MRI brain on 4/28 with R internal capsule infarct   Head CT 5/5: subacute infarct centered in the genu and posterior limb of the right internal capsule is stable.  A chronic transcortical infarction in the left precentral gyrus and a small chronic infarct in the right cerebellar hemisphere are stable.  Home meds: ASA 81mg, Atorvastatin 40mg, Eliquis 5mg BID. Stopped by PCP. Unclear why patient was on Eliquis.  - collateral in AM for indications for Eliquis   - Continue with Atorvastatin  - Restart aspirin.     Problem/Plan - 5:  ·  Problem: Diabetes.   ·  Plan: Home meds: metformin 500mg TID, Januvia 50mg daily  Previous A1C 6.3%   - mISS - once resumed tube feeds  - Hold Tube feeds  - FSG q6 hrs while NPO.     Problem/Plan - 6:  ·  Problem: HLD (hyperlipidemia).   ·  Plan: Hx of HLD controlled w/ Atorvastatin 40mg  - cont home therapy.     Problem/Plan - 7:  ·  Problem: Prophylactic measure.   ·  Plan: F: D5LR @40cc/hr for 10 hrs  E: replete as needed; Keep K>4, Mg >2   D: NPO  DVT Proph: Lovenox    New medications: none  Changes to old medications: none  Items to follow up outpatient: outpatient GI for PEG management  Physical exam at time of discharge:  General: NAD, laying in bed  HEENT: head NC/AT, no conjunctival injection, EOMI, MMM  Neck: supple, no JVD  Cardio: RRR, +S1/S2, no M/R/G  Resp: lungs CTAB, no cough/wheezes/rales/rhonchi  Abdo: soft, NT, ND, +bowel sounds x4, PEG tube in LUQ. no erythema, exudate, fluctuance. No tenderness to palpation, Hudson in place draining yellow urine---> urethral meatus eroded   Extremities: WWP, no edema/cyanosis/clubbing   Vasc: 2+ radial and DP pulses b/l  Neuro: A&Ox1; very hard of hearing   Skin: dry, intact, no visible jaundice

## 2024-12-12 NOTE — DISCHARGE NOTE NURSING/CASE MANAGEMENT/SOCIAL WORK - PATIENT PORTAL LINK FT
You can access the FollowMyHealth Patient Portal offered by [facility] by registering at the following website: [email or web address]. By joining [facility]’s FollowMyHealth portal, you will also be able to view your health information using other applications (apps) compatible with our system.

## 2024-12-12 NOTE — PROGRESS NOTE ADULT - SUBJECTIVE AND OBJECTIVE BOX
GASTROENTEROLOGY PROGRESS NOTE  Patient seen and examined at bedside. Discussed family's concerns about patient at length at bedside. Reassured them that the PEG tube is functioning well.     PERTINENT REVIEW OF SYSTEMS:  CONSTITUTIONAL: No weakness, fevers or chills  HEENT: No visual changes; No vertigo or throat pain   GASTROINTESTINAL: As above.  NEUROLOGICAL: No numbness or weakness  SKIN: No itching, burning, rashes, or lesions     Allergies    No Known Allergies    Intolerances      MEDICATIONS:  MEDICATIONS  (STANDING):  aspirin  chewable 81 milliGRAM(s) Oral daily  atorvastatin 40 milliGRAM(s) Oral at bedtime  dextrose 5% + lactated ringers. 1000 milliLiter(s) (40 mL/Hr) IV Continuous <Continuous>  doxazosin 1 milliGRAM(s) Oral at bedtime  enoxaparin Injectable 40 milliGRAM(s) SubCutaneous every 24 hours  ferrous    sulfate 325 milliGRAM(s) Oral daily  folic acid 1 milliGRAM(s) Oral daily  magnesium oxide 400 milliGRAM(s) Oral daily  metoprolol tartrate 50 milliGRAM(s) Oral every 12 hours  midodrine. 2.5 milliGRAM(s) Oral <User Schedule>    MEDICATIONS  (PRN):  acetaminophen     Tablet .. 650 milliGRAM(s) Oral every 6 hours PRN Temp greater or equal to 38C (100.4F), Mild Pain (1 - 3)  aluminum hydroxide/magnesium hydroxide/simethicone Suspension 30 milliLiter(s) Oral every 4 hours PRN Dyspepsia  melatonin 3 milliGRAM(s) Oral at bedtime PRN Insomnia  ondansetron Injectable 4 milliGRAM(s) IV Push every 8 hours PRN Nausea and/or Vomiting    Vital Signs Last 24 Hrs  T(C): 36.4 (12 Dec 2024 12:45), Max: 36.6 (11 Dec 2024 21:34)  T(F): 97.5 (12 Dec 2024 12:45), Max: 97.8 (11 Dec 2024 21:34)  HR: 62 (12 Dec 2024 12:45) (62 - 83)  BP: 109/64 (12 Dec 2024 12:45) (109/64 - 132/76)  BP(mean): --  RR: 18 (12 Dec 2024 12:45) (17 - 18)  SpO2: 94% (12 Dec 2024 12:45) (94% - 97%)    Parameters below as of 12 Dec 2024 12:45  Patient On (Oxygen Delivery Method): nasal cannula  O2 Flow (L/min): 2      PHYSICAL EXAM:    General: lying in bed, chronically ill appearing   HEENT: MMM, conjunctiva and sclera clear  Gastrointestinal: Soft non-tender non-distended; PEG tube in place   Skin: Warm and dry. No obvious rash    LABS:                        9.6    7.63  )-----------( 226      ( 12 Dec 2024 05:30 )             31.4     12-12    136  |  102  |  18  ----------------------------<  119[H]  3.7   |  22  |  0.56    Ca    8.9      12 Dec 2024 05:30  Phos  3.5     12-12  Mg     2.0     12-12    TPro  7.6  /  Alb  3.8  /  TBili  0.6  /  DBili  x   /  AST  18  /  ALT  13  /  AlkPhos  89  12-11          Urinalysis Basic - ( 12 Dec 2024 05:30 )    Color: x / Appearance: x / SG: x / pH: x  Gluc: 119 mg/dL / Ketone: x  / Bili: x / Urobili: x   Blood: x / Protein: x / Nitrite: x   Leuk Esterase: x / RBC: x / WBC x   Sq Epi: x / Non Sq Epi: x / Bacteria: x                RADIOLOGY & ADDITIONAL STUDIES:  Reviewed

## 2024-12-12 NOTE — DISCHARGE NOTE NURSING/CASE MANAGEMENT/SOCIAL WORK - FINANCIAL ASSISTANCE
[facility] provides services at a reduced cost to those who are determined to be eligible through [facility]’s financial assistance program. Information regarding [facility]’s financial assistance program can be found by going to [email or web address] or by calling [phone].

## 2024-12-12 NOTE — DIETITIAN INITIAL EVALUATION ADULT - PROBLEM SELECTOR PLAN 3
History of BPH. Patient arrived with hudson in place. Draining clear yellow urine. Home med: Doxazosin 1mg qd  - continue home meds

## 2024-12-12 NOTE — DISCHARGE NOTE PROVIDER - NSDCCPCAREPLAN_GEN_ALL_CORE_FT
PRINCIPAL DISCHARGE DIAGNOSIS  Diagnosis: PEG tube malfunction  Assessment and Plan of Treatment: You were brought to the hospital for some observed issues with your PEG tube, then admitted due to aspiration of contrast. While you were here, the GI specialists saw you and evaluated your PEG. From their perspective, they do not feel that there is any need for replacement of your PEG, as it is working well.   The wound care nurses noticed that there is some scar tissue around the PEG tube site, which can be contributing to some oozing/bleeding that you're observing. They recommend applying silver nitrate then applying Aquacell on top to help dry out the scar tissue and help it heal.     PRINCIPAL DISCHARGE DIAGNOSIS  Diagnosis: PEG tube malfunction  Assessment and Plan of Treatment: You were brought to the hospital for some observed issues with your PEG tube, then admitted due to aspiration of contrast. While you were here, the GI specialists saw you and evaluated your PEG. From their perspective, they do not feel that there is any need for replacement of your PEG, as it is working well.   The wound care nurses noticed that there is some scar tissue around the PEG tube site, which can be contributing to some oozing/bleeding that you're observing. They recommend applying silver nitrate then applying Aquacell on top to help dry out the scar tissue and help it heal.  ---  - STOP Eliquis  - CONTINUE Aspirin  - Please follow up with Dr. Krissy Roach for a home office visit  - START this tube feed schedule: Glucerna 1.2 through the PEG at 83 cc/hr x18 hrs, from 5:00PM - 11:00AM. Give free water flushes as needed for hydration.   - Follow wound care instructions listed above

## 2024-12-12 NOTE — PROGRESS NOTE ADULT - TIME BILLING
review of laboratory data, radiology results, consultants' recommendations, documentation in [place], discussion with patient, interdisciplinary staff.

## 2024-12-12 NOTE — DIETITIAN INITIAL EVALUATION ADULT - PROBLEM SELECTOR PLAN 1
Hx of stroke. s/p PEG placement on 5/7/24. Not meeting sepsis criteria. Per wife, discharge and bleeding around PEG tube site. PEG study ordered per GI. Concern for aspiration after oral contrast given to the patient. Pt seen by GI earlier. Poor wound healing around site and concern for poor nutritional status.   - f/u PEG Xray study  - NPO except meds - pending study  - f/u GI recs  - f/u wound care  - s/w consult - wife needs assistance with tube feeds

## 2024-12-12 NOTE — PROGRESS NOTE ADULT - SUBJECTIVE AND OBJECTIVE BOX
Patient is a 89y old  Male who presents with a chief complaint of PEG TUBE MALFUNCTION     (12 Dec 2024 12:30)    INTERVAL EVENTS:    SUBJECTIVE:  Patient was seen and examined at bedside.    Review of systems: No fever, chills, dizziness, HA, Changes in vision, CP, dyspnea, nausea or vomiting, dysuria, changes in bowel movements, LE edema. Rest of 12 point Review of systems negative unless otherwise documented elsewhere in note.     Diet, NPO with Tube Feed:   Tube Feeding Modality: Gastrostomy  Glucerna 1.2 Nikolai (GLUCERNARTH)  Total Volume for 24 Hours (mL): 360  Total Number of Cans: 2  Continuous  Until Goal Tube Feed Rate (mL per Hour): 15  Tube Feed Duration (in Hours): 24  Tube Feed Start Time: 17:00 (12-11-24 @ 16:57) [Active]      MEDICATIONS:  MEDICATIONS  (STANDING):  aspirin  chewable 81 milliGRAM(s) Oral daily  atorvastatin 40 milliGRAM(s) Oral at bedtime  dextrose 5% + lactated ringers. 1000 milliLiter(s) (40 mL/Hr) IV Continuous <Continuous>  doxazosin 1 milliGRAM(s) Oral at bedtime  enoxaparin Injectable 40 milliGRAM(s) SubCutaneous every 24 hours  ferrous    sulfate 325 milliGRAM(s) Oral daily  folic acid 1 milliGRAM(s) Oral daily  magnesium oxide 400 milliGRAM(s) Oral daily  metoprolol tartrate 50 milliGRAM(s) Oral every 12 hours  midodrine. 2.5 milliGRAM(s) Oral <User Schedule>    MEDICATIONS  (PRN):  acetaminophen     Tablet .. 650 milliGRAM(s) Oral every 6 hours PRN Temp greater or equal to 38C (100.4F), Mild Pain (1 - 3)  aluminum hydroxide/magnesium hydroxide/simethicone Suspension 30 milliLiter(s) Oral every 4 hours PRN Dyspepsia  melatonin 3 milliGRAM(s) Oral at bedtime PRN Insomnia  ondansetron Injectable 4 milliGRAM(s) IV Push every 8 hours PRN Nausea and/or Vomiting      Allergies    No Known Allergies    Intolerances        OBJECTIVE:  Vital Signs Last 24 Hrs  T(C): 36.4 (12 Dec 2024 12:45), Max: 36.6 (11 Dec 2024 21:34)  T(F): 97.5 (12 Dec 2024 12:45), Max: 97.8 (11 Dec 2024 21:34)  HR: 62 (12 Dec 2024 12:45) (62 - 83)  BP: 109/64 (12 Dec 2024 12:45) (109/64 - 132/76)  BP(mean): --  RR: 18 (12 Dec 2024 12:45) (17 - 18)  SpO2: 94% (12 Dec 2024 12:45) (94% - 97%)    Parameters below as of 12 Dec 2024 12:45  Patient On (Oxygen Delivery Method): nasal cannula  O2 Flow (L/min): 2    I&O's Summary      PHYSICAL EXAM:  Gen: Reclining in bed at time of exam, appears stated age  HEENT: NCAT, MMM, clear OP  Neck: supple, trachea at midline  CV: RRR, +S1/S2  Pulm: adequate respiratory effort, no increase in work of breathing  Abd: soft, NTND  Skin: warm and dry,   Ext: WWP, no LE edema  Neuro: AOx3, no gross focal neurological deficits  Psych: affect and behavior appropriate, pleasant at time of interview  :     LABS:                        9.6    7.63  )-----------( 226      ( 12 Dec 2024 05:30 )             31.4     12-12    136  |  102  |  18  ----------------------------<  119[H]  3.7   |  22  |  0.56    Ca    8.9      12 Dec 2024 05:30  Phos  3.5     12-12  Mg     2.0     12-12    TPro  7.6  /  Alb  3.8  /  TBili  0.6  /  DBili  x   /  AST  18  /  ALT  13  /  AlkPhos  89  12-11    LIVER FUNCTIONS - ( 11 Dec 2024 06:19 )  Alb: 3.8 g/dL / Pro: 7.6 g/dL / ALK PHOS: 89 U/L / ALT: 13 U/L / AST: 18 U/L / GGT: x             CAPILLARY BLOOD GLUCOSE      POCT Blood Glucose.: 158 mg/dL (12 Dec 2024 13:30)  POCT Blood Glucose.: 126 mg/dL (12 Dec 2024 06:08)  POCT Blood Glucose.: 121 mg/dL (11 Dec 2024 23:57)  POCT Blood Glucose.: 120 mg/dL (11 Dec 2024 22:15)  POCT Blood Glucose.: 148 mg/dL (11 Dec 2024 17:39)    Urinalysis Basic - ( 12 Dec 2024 05:30 )    Color: x / Appearance: x / SG: x / pH: x  Gluc: 119 mg/dL / Ketone: x  / Bili: x / Urobili: x   Blood: x / Protein: x / Nitrite: x   Leuk Esterase: x / RBC: x / WBC x   Sq Epi: x / Non Sq Epi: x / Bacteria: x        MICRODATA:      RADIOLOGY/OTHER STUDIES:    PCP  Pharmacy:   Emergency contact:    Patient is a 89y old  Male who presents with a chief complaint of PEG TUBE MALFUNCTION     (12 Dec 2024 12:30)    INTERVAL EVENTS:NAEON    SUBJECTIVE:  Patient was seen and examined at bedside w. wife and WOCN nurse. Pt tolerated TF. Local wound care as d/w WOCN nurse Jennifer Lamb and wife.     Review of systems: No fever, chills, dizziness, HA, Changes in vision, CP, dyspnea, nausea or vomiting, dysuria, changes in bowel movements, LE edema. Rest of 12 point Review of systems negative unless otherwise documented elsewhere in note.     Diet, NPO with Tube Feed:   Tube Feeding Modality: Gastrostomy  Glucerna 1.2 Nikolai (GLUCERNARTH)  Total Volume for 24 Hours (mL): 360  Total Number of Cans: 2  Continuous  Until Goal Tube Feed Rate (mL per Hour): 15  Tube Feed Duration (in Hours): 24  Tube Feed Start Time: 17:00 (12-11-24 @ 16:57) [Active]      MEDICATIONS:  MEDICATIONS  (STANDING):  aspirin  chewable 81 milliGRAM(s) Oral daily  atorvastatin 40 milliGRAM(s) Oral at bedtime  dextrose 5% + lactated ringers. 1000 milliLiter(s) (40 mL/Hr) IV Continuous <Continuous>  doxazosin 1 milliGRAM(s) Oral at bedtime  enoxaparin Injectable 40 milliGRAM(s) SubCutaneous every 24 hours  ferrous    sulfate 325 milliGRAM(s) Oral daily  folic acid 1 milliGRAM(s) Oral daily  magnesium oxide 400 milliGRAM(s) Oral daily  metoprolol tartrate 50 milliGRAM(s) Oral every 12 hours  midodrine. 2.5 milliGRAM(s) Oral <User Schedule>    MEDICATIONS  (PRN):  acetaminophen     Tablet .. 650 milliGRAM(s) Oral every 6 hours PRN Temp greater or equal to 38C (100.4F), Mild Pain (1 - 3)  aluminum hydroxide/magnesium hydroxide/simethicone Suspension 30 milliLiter(s) Oral every 4 hours PRN Dyspepsia  melatonin 3 milliGRAM(s) Oral at bedtime PRN Insomnia  ondansetron Injectable 4 milliGRAM(s) IV Push every 8 hours PRN Nausea and/or Vomiting      Allergies    No Known Allergies    Intolerances        OBJECTIVE:  Vital Signs Last 24 Hrs  T(C): 36.4 (12 Dec 2024 12:45), Max: 36.6 (11 Dec 2024 21:34)  T(F): 97.5 (12 Dec 2024 12:45), Max: 97.8 (11 Dec 2024 21:34)  HR: 62 (12 Dec 2024 12:45) (62 - 83)  BP: 109/64 (12 Dec 2024 12:45) (109/64 - 132/76)  BP(mean): --  RR: 18 (12 Dec 2024 12:45) (17 - 18)  SpO2: 94% (12 Dec 2024 12:45) (94% - 97%)    Parameters below as of 12 Dec 2024 12:45  Patient On (Oxygen Delivery Method): nasal cannula  O2 Flow (L/min): 2    I&O's Summary      PHYSICAL EXAM:  Gen: Reclining in bed at time of exam, appears stated age  HEENT: NCAT, MMM, clear OP  Neck: supple, trachea at midline  CV: RRR, +S1/S2  Pulm: adequate respiratory effort, no increase in work of breathing  Abd: soft, NTND  Skin: warm and dry,   Ext: WWP, no LE edema  Neuro: AOx3, no gross focal neurological deficits  Psych: affect and behavior appropriate, pleasant at time of interview  :     LABS:                        9.6    7.63  )-----------( 226      ( 12 Dec 2024 05:30 )             31.4     12-12    136  |  102  |  18  ----------------------------<  119[H]  3.7   |  22  |  0.56    Ca    8.9      12 Dec 2024 05:30  Phos  3.5     12-12  Mg     2.0     12-12    TPro  7.6  /  Alb  3.8  /  TBili  0.6  /  DBili  x   /  AST  18  /  ALT  13  /  AlkPhos  89  12-11    LIVER FUNCTIONS - ( 11 Dec 2024 06:19 )  Alb: 3.8 g/dL / Pro: 7.6 g/dL / ALK PHOS: 89 U/L / ALT: 13 U/L / AST: 18 U/L / GGT: x             CAPILLARY BLOOD GLUCOSE      POCT Blood Glucose.: 158 mg/dL (12 Dec 2024 13:30)  POCT Blood Glucose.: 126 mg/dL (12 Dec 2024 06:08)  POCT Blood Glucose.: 121 mg/dL (11 Dec 2024 23:57)  POCT Blood Glucose.: 120 mg/dL (11 Dec 2024 22:15)  POCT Blood Glucose.: 148 mg/dL (11 Dec 2024 17:39)    Urinalysis Basic - ( 12 Dec 2024 05:30 )    Color: x / Appearance: x / SG: x / pH: x  Gluc: 119 mg/dL / Ketone: x  / Bili: x / Urobili: x   Blood: x / Protein: x / Nitrite: x   Leuk Esterase: x / RBC: x / WBC x   Sq Epi: x / Non Sq Epi: x / Bacteria: x        MICRODATA:      RADIOLOGY/OTHER STUDIES:    PCP  Pharmacy:   Emergency contact:    Patient is a 89y old  Male who presents with a chief complaint of PEG TUBE MALFUNCTION     (12 Dec 2024 12:30)    INTERVAL EVENTS:NAEON    SUBJECTIVE:  Patient was seen and examined at bedside w. wife and WOCN nurse. Pt tolerated TF. Local wound care as d/w WOCN nurse Jennifer Contreras and wife. Siliver nitrate applied and aquagel Ag dressing daily per WOCN RN. Dietitian visited pt and rec: Glucerna 1.2 @ 83ml/hr from 5pm to 11am, total 6 cans per day, and free water flush. Spoke w. GI fellow and attending, Dr. Macedo, no need to change PEG since it's functioning fine. Spoke w. housecall Dr. Destin Roach, apparently pt had hx gross hematuria intermittently at home, temporarily stopped when Eliquis was withheld at home. Pt was prescribed ASA lifetime and Plavix for 21 days from last admission for the ischemic stroke. NO clinical evidence of AFib. Pt apparently was discharged home on Eliquis from [facility]. Plan to stop Eliquis as d/w  and wife., only continue ASA 81mg daily.     Review of systems: No fever, chills, dizziness, HA, Changes in vision, CP, dyspnea, nausea or vomiting, dysuria, changes in bowel movements, LE edema. Rest of 12 point Review of systems negative unless otherwise documented elsewhere in note.     Diet, NPO with Tube Feed:   Tube Feeding Modality: Gastrostomy  Glucerna 1.2 Nikolai (GLUCERNARTH)  Total Volume for 24 Hours (mL): 360  Total Number of Cans: 2  Continuous  Until Goal Tube Feed Rate (mL per Hour): 15  Tube Feed Duration (in Hours): 24  Tube Feed Start Time: 17:00 (12-11-24 @ 16:57) [Active]      MEDICATIONS:  MEDICATIONS  (STANDING):  aspirin  chewable 81 milliGRAM(s) Oral daily  atorvastatin 40 milliGRAM(s) Oral at bedtime  dextrose 5% + lactated ringers. 1000 milliLiter(s) (40 mL/Hr) IV Continuous <Continuous>  doxazosin 1 milliGRAM(s) Oral at bedtime  enoxaparin Injectable 40 milliGRAM(s) SubCutaneous every 24 hours  ferrous    sulfate 325 milliGRAM(s) Oral daily  folic acid 1 milliGRAM(s) Oral daily  magnesium oxide 400 milliGRAM(s) Oral daily  metoprolol tartrate 50 milliGRAM(s) Oral every 12 hours  midodrine. 2.5 milliGRAM(s) Oral <User Schedule>    MEDICATIONS  (PRN):  acetaminophen     Tablet .. 650 milliGRAM(s) Oral every 6 hours PRN Temp greater or equal to 38C (100.4F), Mild Pain (1 - 3)  aluminum hydroxide/magnesium hydroxide/simethicone Suspension 30 milliLiter(s) Oral every 4 hours PRN Dyspepsia  melatonin 3 milliGRAM(s) Oral at bedtime PRN Insomnia  ondansetron Injectable 4 milliGRAM(s) IV Push every 8 hours PRN Nausea and/or Vomiting      Allergies    No Known Allergies    Intolerances        OBJECTIVE:  Vital Signs Last 24 Hrs  T(C): 36.4 (12 Dec 2024 12:45), Max: 36.6 (11 Dec 2024 21:34)  T(F): 97.5 (12 Dec 2024 12:45), Max: 97.8 (11 Dec 2024 21:34)  HR: 62 (12 Dec 2024 12:45) (62 - 83)  BP: 109/64 (12 Dec 2024 12:45) (109/64 - 132/76)  BP(mean): --  RR: 18 (12 Dec 2024 12:45) (17 - 18)  SpO2: 94% (12 Dec 2024 12:45) (94% - 97%)    Parameters below as of 12 Dec 2024 12:45  Patient On (Oxygen Delivery Method): nasal cannula  O2 Flow (L/min): 2    I&O's Summary      PHYSICAL EXAM:  Gen: Reclining in bed at time of exam, appears stated age  HEENT: NCAT, MMM, clear OP  Neck: supple, trachea at midline  CV: RRR, +S1/S2  Pulm: adequate respiratory effort, no increase in work of breathing  Abd: soft, NTND, PEG in place   Skin: warm and dry,   Ext: WWP, no LE edema  Neuro: AOx3, no gross focal neurological deficits  Psych: affect and behavior appropriate, pleasant at time of interview  :     LABS:                        9.6    7.63  )-----------( 226      ( 12 Dec 2024 05:30 )             31.4     12-12    136  |  102  |  18  ----------------------------<  119[H]  3.7   |  22  |  0.56    Ca    8.9      12 Dec 2024 05:30  Phos  3.5     12-12  Mg     2.0     12-12    TPro  7.6  /  Alb  3.8  /  TBili  0.6  /  DBili  x   /  AST  18  /  ALT  13  /  AlkPhos  89  12-11    LIVER FUNCTIONS - ( 11 Dec 2024 06:19 )  Alb: 3.8 g/dL / Pro: 7.6 g/dL / ALK PHOS: 89 U/L / ALT: 13 U/L / AST: 18 U/L / GGT: x             CAPILLARY BLOOD GLUCOSE      POCT Blood Glucose.: 158 mg/dL (12 Dec 2024 13:30)  POCT Blood Glucose.: 126 mg/dL (12 Dec 2024 06:08)  POCT Blood Glucose.: 121 mg/dL (11 Dec 2024 23:57)  POCT Blood Glucose.: 120 mg/dL (11 Dec 2024 22:15)  POCT Blood Glucose.: 148 mg/dL (11 Dec 2024 17:39)    Urinalysis Basic - ( 12 Dec 2024 05:30 )    Color: x / Appearance: x / SG: x / pH: x  Gluc: 119 mg/dL / Ketone: x  / Bili: x / Urobili: x   Blood: x / Protein: x / Nitrite: x   Leuk Esterase: x / RBC: x / WBC x   Sq Epi: x / Non Sq Epi: x / Bacteria: x        MICRODATA:      RADIOLOGY/OTHER STUDIES:

## 2024-12-12 NOTE — DISCHARGE NOTE PROVIDER - NSDCMRMEDTOKEN_GEN_ALL_CORE_FT
aspirin 81 mg oral tablet, chewable: 1 tab(s) orally once a day  atorvastatin 40 mg oral tablet: 1 tab(s) orally once a day (at bedtime)  doxazosin 1 mg oral tablet: 1 tab(s) orally once a day (at bedtime)  Eliquis 5 mg oral tablet: 1 tab(s) orally 2 times a day  ferrous sulfate 325 mg (65 mg elemental iron) oral tablet: 1 tab(s) orally once a day  folic acid 1 mg oral tablet: 1 tab(s) orally once a day  Januvia 50 mg oral tablet: 1 tab(s) orally once a day  magnesium oxide 400 mg oral tablet: 1 tab(s) orally once a day  metFORMIN 500 mg oral tablet: 1 tab(s) orally 3 times a day  Metoprolol Tartrate 50 mg oral tablet: 1 tab(s) orally 2 times a day  midodrine 5 mg oral tablet: 0.5 tab(s) orally every 12 hours   aspirin 81 mg oral tablet, chewable: 1 tab(s) orally once a day  atorvastatin 40 mg oral tablet: 1 tab(s) orally once a day (at bedtime)  doxazosin 1 mg oral tablet: 1 tab(s) orally once a day (at bedtime)  ferrous sulfate 325 mg (65 mg elemental iron) oral tablet: 1 tab(s) orally once a day  folic acid 1 mg oral tablet: 1 tab(s) orally once a day  Januvia 50 mg oral tablet: 1 tab(s) orally once a day  magnesium oxide 400 mg oral tablet: 1 tab(s) orally once a day  metFORMIN 500 mg oral tablet: 1 tab(s) orally 3 times a day  Metoprolol Tartrate 50 mg oral tablet: 1 tab(s) orally 2 times a day  midodrine 5 mg oral tablet: 0.5 tab(s) orally every 12 hours

## 2024-12-12 NOTE — DIETITIAN INITIAL EVALUATION ADULT - NS FNS DIET ORDER
Diet, NPO with Tube Feed:   Tube Feeding Modality: Gastrostomy  Glucerna 1.2 Nikolai (GLUCERNARTH)  Total Volume for 24 Hours (mL): 360  Total Number of Cans: 2  Continuous  Until Goal Tube Feed Rate (mL per Hour): 15  Tube Feed Duration (in Hours): 24  Tube Feed Start Time: 17:00 (12-11-24 @ 16:57)

## 2024-12-12 NOTE — DIETITIAN INITIAL EVALUATION ADULT - OTHER INFO
"89y M Cantonese speaking, wheelchair bound, R hand dominant, b/l  hearing impairment ( b/l hearing aids) with PMHx of DM, Thalassemia minor/JUSTYNA, HLD, hx stroke with right sided weakness in 2004 and stroke workup (05/24) depression, glaucoma, BPH, s/p PEG placement on 5/7 presents with history of increasing discharge around peg tube site for the last week. Per wife at bedside, PEG tube was functional but went to see primary care provider due to some bleeding and discharge around the site. Blood thinners (aspirin and Eliquis) stopped due to bleeding; PEG was placed on May 7th and course was complicated by sepsis secondary to abdominal cellulitis that resolved prior to discharge to rehab.  Patient arrived with Galindo in place. Per the wife, patient gets it changed every month. It was last changed on November 14th. Denies fevers, chest pain, cough, SOB, nausea, vomiting."     Patient seen at bedside on 7UR for nutrition assessment, obtained all information from wife at bedside via [facility]  ID #[id number]. Wife reports she provides pt's tube feed regimen of "Ensure" but ? as she states the formula at home is a [employer] formula (Ensure is an [employer] formula) - endorses providing the tube feed formula from 8am-9am with a free water flush afterwards + medications, then gives the pt another hour of the same tube feed at night (wife unable to recall tube feed rate or exact formula name). Also reports the pt was able to tolerate some drops of water or congee previously along with tube feeds, however, pt has been refusing any PO intake this past week - overall, tube feed regimen is the primary source of nutrition, has likely been meeting </= 75% estimated nutrient needs. Current diet order: NPO, currently providing tube feeds at a trickle rate of 15 cc/hr with Glucerna 1.2 formula - RD will provide appropriate tube feed recommendations below. Wife confirms, no known food allergies and pt with hx of difficulty swallowing which is why he has the tube feed. Dosing weight or height not documented - wife reported pt's last wt ~115 pounds x ~1-2 weeks ago and reports likely wt loss since but unknonw, will also use wife's reported height of 67 inches: BMI 18.0. Skin: pressure injuries of stage II of the sacrum and stage II of the PEG site documented. No edema documented. GI: denies nausea/vomiting/constipation, last bowel movement documented 12/11 as per flowsheet; wife did report pt typically has loose/ soft bowel movements at home but denies any intolerance to lactose or dairy products prior to tube feeds. Labs: POCTs slightly elevated but WNL (126, 121, 120, 148 mg/dL...), serum glucose 125 mg/dL, HgA1c 6.8% - not on insulin at this time. Meds: ferrous sulfate, folic acid, Mg oxide, zofran PRN. NFPE revealed moderate muscle wasting of the clavicle and shoulder and severe fat wasting of the orbitals and triceps - meets criteria for protein-calorie malnutrition as per ASPEN guidelines based on moderate-severe muscle/fat wasting. See nutrition recommendations and tube feed rx below.

## 2024-12-12 NOTE — DIETITIAN INITIAL EVALUATION ADULT - ETIOLOGY
related to decreased ability to meet increased nutrient needs due to chronically on tube feeds, multiple pressure injuries, advanced age

## 2024-12-12 NOTE — ADVANCED PRACTICE NURSE CONSULT - ASSESSMENT
Blake  Danny #[id number] utilized during visit  Pt lethargic, wife at bedside states she cares for PEG at home. States there has been some leakage at home.    PEG site: hypergranulation tissue circumferentially, otherwise skin is intact without any irritation. Small serosang drainage on old dressing. TF running and none noted on drain sponge

## 2024-12-12 NOTE — DIETITIAN INITIAL EVALUATION ADULT - ADD RECOMMEND
1. Remains NPO, recommend to initiate tube feeds as follows:   >> Initiate Glucerna 1.2 @ 20 cc/hr, increase by 10 cc/hr q6 hours until goal rate of 63 cc/hr met x24 hrs (total volume = 1512 mL / ~6 cans/day). Will provide ~ 1800 kcal, 90g protein, and 1208 mL free water. Consider free water flushes of 120 mL q4 hrs (provides additional 720 mL free water), adjust PRN to maintain hydration.   2. If pt able to tolerate tube feeds at goal rate x24 hrs, recommend to transition to bolus feeds as follows: 3 bolus feeds of 6 cans of Glucerna 1.2 (total volume/day = 1422 mL). Please give 2 cans @8am, 2 cans @12pm, and 2 cans @4pm - tube feeds to meet 100% estimated nutrient needs at this time. Consider free water flushes of 120 mL before and after each flush (provides additional 720 mL), adjust PRN to maintain hydration.   3. Provide tube feed teaching to wife prior to discharge - will discuss with team **  4. Monitor wt trends, GI function, skin integrity  5. Monitor lytes, renal indices, blood glucose, LFTs    6. Pain and bowel regimen per team   7. Maintain aspiration precautions, back of bed >45 degrees.   8. Monitor and optimize BG levels between 140-180 mg/dL by medical management as per team   9. Consider adding thiamine 100 mg daily due to malnutrition   RD will continue to monitor PO intake, labs, hydration, and wt prn.  1. Remains NPO, recommend to initiate tube feeds as follows:   >> Initiate 3 bolus feeds of 6 cans of Glucerna 1.2 (total volume/day = 1422 mL). Please give 2 cans @8am, 2 cans @12pm, and 2 cans @4pm - tube feeds to meet 100% estimated nutrient needs at this time. Consider free water flushes of 120 mL before and after each flush (provides additional 720 mL), adjust PRN to maintain hydration.   2. Provide tube feed teaching to wife prior to discharge - will discuss with team **  3. Monitor wt trends, GI function, skin integrity  4. Monitor lytes, renal indices, blood glucose, LFTs    5. Pain and bowel regimen per team   6. Maintain aspiration precautions, back of bed >45 degrees.   7. Monitor and optimize BG levels between 140-180 mg/dL by medical management as per team   8. Consider adding thiamine 100 mg daily due to malnutrition   RD will continue to monitor PO intake, labs, hydration, and wt prn.  1. Remains NPO, recommend to initiate tube feeds as follows:   >> Initiate 3 bolus feeds of 6 cans of Glucerna 1.2 (total volume/day = 1422 mL). Please give 2 cans @8am, 2 cans @1pm, and 2 cans @6pm - tube feeds to meet 100% estimated nutrient needs at this time. Consider free water flushes of 120 mL before and after each flush (provides additional 720 mL), adjust PRN to maintain hydration.   2. Provide tube feed teaching to wife prior to discharge - will discuss with team **  3. Monitor wt trends, GI function, skin integrity  4. Monitor lytes, renal indices, blood glucose, LFTs    5. Pain and bowel regimen per team   6. Maintain aspiration precautions, back of bed >45 degrees.   7. Monitor and optimize BG levels between 140-180 mg/dL by medical management as per team   8. Consider adding thiamine 100 mg daily due to malnutrition   RD will continue to monitor PO intake, labs, hydration, and wt prn.

## 2024-12-12 NOTE — DIETITIAN NUTRITION RISK NOTIFICATION - ADDITIONAL COMMENTS/DIETITIAN RECOMMENDATIONS
meets criteria for protein-calorie malnutrition as per ASPEN guidelines based on moderate-severe muscle/fat wasting.

## 2024-12-12 NOTE — DISCHARGE NOTE NURSING/CASE MANAGEMENT/SOCIAL WORK - NSDCFUADDAPPT_GEN_ALL_CORE_FT
Destin Roach DO. Someone from Dr. Destin Roach's office will reach out to set up a home health appointment.

## 2024-12-12 NOTE — DISCHARGE NOTE PROVIDER - NSDCFUADDAPPT_GEN_ALL_CORE_FT
Destin Roach, DO Destin Roach DO. Someone from Dr. Destin Roach's office will reach out to set up a home health appointment.

## 2024-12-12 NOTE — CHART NOTE - NSCHARTNOTEFT_GEN_A_CORE
Brief Note     Upon second RD visit this afternoon, spoke with wife at bedside regarding tube feed questions/ regimen, communicated via [facility]  ID #[id number]. Wife clarified that she has a tube feed pump at home that she ran overnight, ? from 9pm-8am. RD changed tube feed recommendations to the following as per wife's request to keep tube feed regimen running overnight, wife agreeable to changing tube feed time x18 hrs. Recommended tube feed of Glucerna 1.2 via PEG @ 83 cc/hr x18 hrs (5:00PM - 11:00AM) to provide ~ 1494 total volume (~6 cans / day). Will provide ~ 1800 kcal, 90g protein, and 1208 mL free water (meeting 100% estimated energy and protein needs as pt remains NPO / with minimal PO intake). Free water flushes PRN to maintain hydration. Wife updated with recommended tube feed regimen as above, receptive and very appreciative, plan to discharge home this afternoon.     Recommendations:   1. Glucerna 1.2 via PEG @ 83 cc/hr x18 hrs (5:00PM - 11:00AM) to provide ~ 1494 total volume (~6 cans / day). Will provide ~ 1800 kcal, 90g protein, and 1208 mL free water (meeting 100% estimated energy and protein needs as pt remains NPO / with minimal PO intake). Free water flushes PRN to maintain hydration.   >>Tube feed regimen discussed with wife prior to discharge, wife agreeable   2. Monitor wt trends, GI function, skin integrity  3. Monitor lytes, renal indices, blood glucose, LFTs    4. Pain and bowel regimen per team   5. Maintain aspiration precautions, back of bed >45 degrees.   6. Monitor and optimize BG levels between 140-180 mg/dL by medical management as per team   7. Consider adding thiamine 100 mg daily due to malnutrition

## 2024-12-12 NOTE — DISCHARGE NOTE NURSING/CASE MANAGEMENT/SOCIAL WORK - NSDCPEPTSTROKESIGNS_GEN_ALL_CORE
Sudden numbness or weakness of the face, arm, or leg, especially on one side of the body. Confusion, trouble speaking or understanding. Trouble seeing in one or both eyes. Trouble walking, dizziness, loss of balance or coordination. Severe headache.
normal...

## 2024-12-12 NOTE — ADVANCED PRACTICE NURSE CONSULT - RECOMMEDATIONS
PEG site: silver nitrate applied. Small piece of aquacel ag, cover with drain sponge. Change daily.    Education with wife, supplies provided.  Discussed with primary RN and medical team.    Please reconsult if new wound care concerns arise.    Total time spent: 45 minutes PEG site: silver nitrate applied. Small piece of aquacel ag, cover with drain sponge. Change daily.    Education with wife, supplies provided. Wife took video of dressing change.   Discussed with primary RN and medical team.    Please reconsult if new wound care concerns arise.    Total time spent: 60 minutes

## 2024-12-12 NOTE — DIETITIAN INITIAL EVALUATION ADULT - PERSON TAUGHT/METHOD
RD provided verbal nutrition education on the importance of meeting the pt's estimated nutrient needs via the tube feed, wife receptive and appreciative./verbal instruction/spouse instructed

## 2024-12-12 NOTE — PROGRESS NOTE ADULT - ASSESSMENT
89M Cantonese speaking, wheelchair bound b/l  hearing impairment ( b/l hearing aids) with PMHx of DM, Thalassemia minor/JUSTYNA, HLD, hx stroke with right sided weakness in 2004 and stroke workup (05/24) depression, glaucoma, BPH, s/p PEG placement on 5/7/2024 presents with history of increasing discharge around peg tube site for the last week. GI consulted for FTT and PEG tube eval.     PEG tube is functioning well. Patient was assessed by nutrition team who changed patient's tube feed regimen    Recommendations:  - Outpatient GI f/u  - GI will sign off at this time. Please re-consult if patient's clinical status changes or you have further questions    Case discussed w/ GI attending Dr. Hellen Jean MD  PGY-4 GI Fellow  GI consult pager (M-F 2p-9n): [phone]  Please call  for on-call fellow after hours  
89y M Cantonese speaking, wheelchair bound, R hand dominant, b/l  hearing impairment ( b/l hearing aids) with PMHx of DM, Thalassemia minor/JUSTYNA, HLD, hx stroke with right sided weakness in 2004 and stroke workup (05/24) depression, glaucoma, BPH, s/p PEG placement on 5/7 presents with history of increasing discharge around peg tube site for the last week, admitted for PEG tube study. 
89 yo Cantonese-speaking M (hard of hearing) with PMHx HFpEF (LVEF 56%, grade I diastolic dysfunction), aortic root aneurysm (4.6 cm), prior cva with right side weakness, recent new CVA on April 2024 with Left sided hemiparesis /become bed-bound/wheelchair bound, dysphagia (s/p PEG 5/2024), HLD, DM, iron-deficiency anemia, thalassemia minor, BPH (chronic Galindo), glaucoma, depression BIBEMS from outpatient GI office due to concern for poor wound healing surrounding PEG tube, admitted for wound care and further nutrition evaluation and management. GI consulted and PEG tube study showed PEG is functioning, and no need to change to a new one. WOCN consult appreciated, given Sliver nitrate and aquagel Ag dressing daily to PEG site. Dietitian consult appreciated, TF changed to Glucerna 1.2 @ 83ml/hr from 5pm to 11am x 18hr, total 6 cans per day. Galindo Changed 12/11. Eliquis d/c as discussed above, no prior hx AFib and given intermittent gross hematuria.   Pt is medically cleared for discharge home. Warm handoff d/w [facility] house call Dr. Destin Roach.  POC as d/w , 4UR2 TEAM.     Discharge time>30mins

## 2024-12-12 NOTE — ADVANCED PRACTICE NURSE CONSULT - REASON FOR CONSULT
PEG site    Per chart review, 89y M Cantonese speaking, wheelchair bound, R hand dominant, b/l  hearing impairment ( b/l hearing aids) with PMHx of DM, Thalassemia minor/JUSTYNA, HLD, hx stroke with right sided weakness in 2004 and stroke workup (05/24) depression, glaucoma, BPH, s/p PEG placement on 5/7 presents with history of increasing discharge around peg tube site for the last week, admitted for PEG tube study.

## 2024-12-12 NOTE — DISCHARGE NOTE PROVIDER - DETAILS OF MALNUTRITION DIAGNOSIS/DIAGNOSES
This patient has been assessed with a concern for Malnutrition and was treated during this hospitalization for the following Nutrition diagnosis/diagnoses:     -  12/12/2024: Moderate protein-calorie malnutrition   -  12/12/2024: Underweight (BMI < 19)

## 2024-12-12 NOTE — DIETITIAN INITIAL EVALUATION ADULT - OTHER CALCULATIONS
Based on ideal body weight 148 pounds (as per wife reported UBW and height) as pt <80% ideal body weight (78%). Needs adjusted for malnutrition, multiple stage II pressure injuries, advanced age.

## 2024-12-12 NOTE — PROGRESS NOTE ADULT - NUTRITIONAL ASSESSMENT
This patient has been assessed with a concern for Malnutrition and has been determined to have a diagnosis/diagnoses of Moderate protein-calorie malnutrition and Underweight (BMI < 19).    This patient is being managed with:   Diet NPO with Tube Feed-  Tube Feeding Modality: Gastrostomy  Glucerna 1.2 Nikolai (GLUCERNARTH)  Total Volume for 24 Hours (mL): 360  Total Number of Cans: 2  Continuous  Until Goal Tube Feed Rate (mL per Hour): 15  Tube Feed Duration (in Hours): 24  Tube Feed Start Time: 17:00  Entered: Dec 11 2024  4:56PM

## 2024-12-12 NOTE — DIETITIAN INITIAL EVALUATION ADULT - PERTINENT LABORATORY DATA
12-12    136  |  102  |  18  ----------------------------<  119[H]  3.7   |  22  |  0.56    Ca    8.9      12 Dec 2024 05:30  Phos  3.5     12-12  Mg     2.0     12-12    TPro  7.6  /  Alb  3.8  /  TBili  0.6  /  DBili  x   /  AST  18  /  ALT  13  /  AlkPhos  89  12-11  POCT Blood Glucose.: 126 mg/dL (12-12-24 @ 06:08)  A1C with Estimated Average Glucose Result: 6.8 % (12-11-24 @ 06:19)  A1C with Estimated Average Glucose Result: 6.3 % (04-28-24 @ 05:30)

## 2024-12-13 ENCOUNTER — APPOINTMENT (OUTPATIENT)
Dept: UROLOGY | Facility: CLINIC | Age: 89
End: 2024-12-13

## 2024-12-19 DIAGNOSIS — N40.0 BENIGN PROSTATIC HYPERPLASIA WITHOUT LOWER URINARY TRACT SYMPTOMS: ICD-10-CM

## 2024-12-19 DIAGNOSIS — R33.9 RETENTION OF URINE, UNSPECIFIED: ICD-10-CM

## 2024-12-19 DIAGNOSIS — E44.0 MODERATE PROTEIN-CALORIE MALNUTRITION: ICD-10-CM

## 2024-12-19 DIAGNOSIS — I10 ESSENTIAL (PRIMARY) HYPERTENSION: ICD-10-CM
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
## 2024-12-19 DIAGNOSIS — Z79.84 LONG TERM (CURRENT) USE OF ORAL HYPOGLYCEMIC DRUGS: ICD-10-CM

## 2024-12-19 DIAGNOSIS — E11.9 TYPE 2 DIABETES MELLITUS WITHOUT COMPLICATIONS: ICD-10-CM

## 2024-12-19 DIAGNOSIS — T17.998A OTHER FOREIGN OBJECT IN RESPIRATORY TRACT, PART UNSPECIFIED CAUSING OTHER INJURY, INITIAL ENCOUNTER: ICD-10-CM

## 2024-12-19 DIAGNOSIS — E78.5 HYPERLIPIDEMIA, UNSPECIFIED: ICD-10-CM

## 2024-12-19 DIAGNOSIS — Z99.3 DEPENDENCE ON WHEELCHAIR: ICD-10-CM

## 2024-12-19 DIAGNOSIS — R13.10 DYSPHAGIA, UNSPECIFIED: ICD-10-CM

## 2024-12-19 DIAGNOSIS — Z79.82 LONG TERM (CURRENT) USE OF ASPIRIN: ICD-10-CM

## 2024-12-19 DIAGNOSIS — K94.29 OTHER COMPLICATIONS OF GASTROSTOMY: ICD-10-CM

## 2024-12-19 DIAGNOSIS — J96.11 CHRONIC RESPIRATORY FAILURE WITH HYPOXIA: ICD-10-CM

## 2024-12-19 DIAGNOSIS — I69.351 HEMIPLEGIA AND HEMIPARESIS FOLLOWING CEREBRAL INFARCTION AFFECTING RIGHT DOMINANT SIDE: ICD-10-CM

## 2024-12-19 DIAGNOSIS — F32.A DEPRESSION, UNSPECIFIED: ICD-10-CM

## 2024-12-19 DIAGNOSIS — D56.3 THALASSEMIA MINOR: ICD-10-CM

## 2024-12-19 DIAGNOSIS — H40.9 UNSPECIFIED GLAUCOMA: ICD-10-CM

## 2024-12-19 DIAGNOSIS — H91.93 UNSPECIFIED HEARING LOSS, BILATERAL: ICD-10-CM

## 2024-12-19 DIAGNOSIS — K94.23 GASTROSTOMY MALFUNCTION: ICD-10-CM

## 2024-12-19 DIAGNOSIS — D50.9 IRON DEFICIENCY ANEMIA, UNSPECIFIED: ICD-10-CM

## 2025-03-17 NOTE — SWALLOW BEDSIDE ASSESSMENT ADULT - SWALLOW EVAL: RECOMMENDED DIET
PROVIDER:[TOKEN:[[id number]],FOLLOWUP:[4-6 Days]] NPO - alternate means of nutrition, hydration and medications. MD team to consider NGT is necessary. SLP team to follow closely.

## 2025-03-26 ENCOUNTER — INPATIENT (INPATIENT)
Facility: HOSPITAL | Age: 89
LOS: 6 days | Discharge: HOME | DRG: 871 | End: 2025-04-02
Attending: HOSPITALIST | Admitting: INTERNAL MEDICINE
Payer: MEDICARE

## 2025-03-26 VITALS
HEART RATE: 121 BPM | RESPIRATION RATE: 20 BRPM | TEMPERATURE: 98 F | SYSTOLIC BLOOD PRESSURE: 122 MMHG | DIASTOLIC BLOOD PRESSURE: 76 MMHG | WEIGHT: 132.28 LBS | OXYGEN SATURATION: 95 %

## 2025-03-26 LAB
ALBUMIN SERPL ELPH-MCNC: 2.2 G/DL — LOW (ref 3.4–5)
ALP SERPL-CCNC: 72 U/L — SIGNIFICANT CHANGE UP (ref 40–120)
ALT FLD-CCNC: 16 U/L — SIGNIFICANT CHANGE UP (ref 12–42)
ANION GAP SERPL CALC-SCNC: 13 MMOL/L — SIGNIFICANT CHANGE UP (ref 9–16)
APTT BLD: 32.7 SEC — SIGNIFICANT CHANGE UP (ref 24.5–35.6)
AST SERPL-CCNC: 25 U/L — SIGNIFICANT CHANGE UP (ref 15–37)
BASOPHILS # BLD AUTO: 0.02 K/UL — SIGNIFICANT CHANGE UP (ref 0–0.2)
BASOPHILS NFR BLD AUTO: 0.3 % — SIGNIFICANT CHANGE UP (ref 0–2)
BILIRUB SERPL-MCNC: 0.4 MG/DL — SIGNIFICANT CHANGE UP (ref 0.2–1.2)
BUN SERPL-MCNC: 42 MG/DL — HIGH (ref 7–23)
CALCIUM SERPL-MCNC: 8.3 MG/DL — LOW (ref 8.5–10.5)
CHLORIDE SERPL-SCNC: 113 MMOL/L — HIGH (ref 96–108)
CO2 SERPL-SCNC: 23 MMOL/L — SIGNIFICANT CHANGE UP (ref 22–31)
CREAT SERPL-MCNC: 0.9 MG/DL — SIGNIFICANT CHANGE UP (ref 0.5–1.3)
EGFR: 83 ML/MIN/1.73M2 — SIGNIFICANT CHANGE UP
EGFR: 83 ML/MIN/1.73M2 — SIGNIFICANT CHANGE UP
EOSINOPHIL # BLD AUTO: 0.09 K/UL — SIGNIFICANT CHANGE UP (ref 0–0.5)
EOSINOPHIL NFR BLD AUTO: 1.2 % — SIGNIFICANT CHANGE UP (ref 0–6)
FLUAV AG NPH QL: SIGNIFICANT CHANGE UP
FLUBV AG NPH QL: SIGNIFICANT CHANGE UP
GLUCOSE SERPL-MCNC: 213 MG/DL — HIGH (ref 70–99)
HCT VFR BLD CALC: 28 % — LOW (ref 39–50)
HGB BLD-MCNC: 8.3 G/DL — LOW (ref 13–17)
IMM GRANULOCYTES # BLD AUTO: 0.02 K/UL — SIGNIFICANT CHANGE UP (ref 0–0.07)
IMM GRANULOCYTES NFR BLD AUTO: 0.3 % — SIGNIFICANT CHANGE UP (ref 0–0.9)
INR BLD: 1.1 — SIGNIFICANT CHANGE UP (ref 0.85–1.16)
LACTATE BLDV-MCNC: 3 MMOL/L — HIGH (ref 0.5–2)
LACTATE BLDV-MCNC: 6 MMOL/L — CRITICAL HIGH (ref 0.5–2)
LYMPHOCYTES # BLD AUTO: 1.27 K/UL — SIGNIFICANT CHANGE UP (ref 1–3.3)
LYMPHOCYTES NFR BLD AUTO: 17.2 % — SIGNIFICANT CHANGE UP (ref 13–44)
MCHC RBC-ENTMCNC: 22.1 PG — LOW (ref 27–34)
MCHC RBC-ENTMCNC: 29.6 G/DL — LOW (ref 32–36)
MCV RBC AUTO: 74.5 FL — LOW (ref 80–100)
MONOCYTES # BLD AUTO: 0.43 K/UL — SIGNIFICANT CHANGE UP (ref 0–0.9)
MONOCYTES NFR BLD AUTO: 5.8 % — SIGNIFICANT CHANGE UP (ref 2–14)
NEUTROPHILS # BLD AUTO: 5.56 K/UL — SIGNIFICANT CHANGE UP (ref 1.8–7.4)
NEUTROPHILS NFR BLD AUTO: 75.2 % — SIGNIFICANT CHANGE UP (ref 43–77)
NRBC # BLD AUTO: 0.05 K/UL — HIGH (ref 0–0)
NRBC # FLD: 0.05 K/UL — HIGH (ref 0–0)
PLATELET # BLD AUTO: 128 K/UL — LOW (ref 150–400)
PMV BLD: SIGNIFICANT CHANGE UP FL (ref 7–13)
POTASSIUM SERPL-MCNC: 3.7 MMOL/L — SIGNIFICANT CHANGE UP (ref 3.5–5.3)
POTASSIUM SERPL-SCNC: 3.7 MMOL/L — SIGNIFICANT CHANGE UP (ref 3.5–5.3)
PROT SERPL-MCNC: 6 G/DL — LOW (ref 6.4–8.2)
PROTHROM AB SERPL-ACNC: 12.7 SEC — SIGNIFICANT CHANGE UP (ref 9.9–13.4)
RBC # BLD: 3.76 M/UL — LOW (ref 4.2–5.8)
RBC # FLD: 18.6 % — HIGH (ref 10.3–14.5)
RSV RNA NPH QL NAA+NON-PROBE: SIGNIFICANT CHANGE UP
SARS-COV-2 RNA SPEC QL NAA+PROBE: SIGNIFICANT CHANGE UP
SODIUM SERPL-SCNC: 149 MMOL/L — HIGH (ref 132–145)
SOURCE RESPIRATORY: SIGNIFICANT CHANGE UP
TROPONIN I, HIGH SENSITIVITY RESULT: 29.9 NG/L — SIGNIFICANT CHANGE UP
WBC # BLD: 7.39 K/UL — SIGNIFICANT CHANGE UP (ref 3.8–10.5)
WBC # FLD AUTO: 7.39 K/UL — SIGNIFICANT CHANGE UP (ref 3.8–10.5)

## 2025-03-26 PROCEDURE — 74176 CT ABD & PELVIS W/O CONTRAST: CPT | Mod: 26

## 2025-03-26 PROCEDURE — 99285 EMERGENCY DEPT VISIT HI MDM: CPT

## 2025-03-26 PROCEDURE — 71045 X-RAY EXAM CHEST 1 VIEW: CPT | Mod: 26

## 2025-03-26 PROCEDURE — 71250 CT THORAX DX C-: CPT | Mod: 26

## 2025-03-26 RX ORDER — CEFTRIAXONE 500 MG/1
1000 INJECTION, POWDER, FOR SOLUTION INTRAMUSCULAR; INTRAVENOUS ONCE
Refills: 0 | Status: COMPLETED | OUTPATIENT
Start: 2025-03-26 | End: 2025-03-26

## 2025-03-26 RX ORDER — ACETAMINOPHEN 500 MG/5ML
1000 LIQUID (ML) ORAL ONCE
Refills: 0 | Status: COMPLETED | OUTPATIENT
Start: 2025-03-26 | End: 2025-03-26

## 2025-03-26 RX ADMIN — Medication 400 MILLIGRAM(S): at 19:39

## 2025-03-26 RX ADMIN — Medication 1850 MILLILITER(S): at 19:39

## 2025-03-26 RX ADMIN — CEFTRIAXONE 100 MILLIGRAM(S): 500 INJECTION, POWDER, FOR SOLUTION INTRAMUSCULAR; INTRAVENOUS at 19:39

## 2025-03-26 NOTE — ED PROVIDER NOTE - OBJECTIVE STATEMENT
89y M Cantonese speaking, wheelchair bound, R hand dominant, b/l  hearing impairment ( b/l hearing aids) with PMHx of DM, Thalassemia minor/JUSTYNA, HLD, hx stroke with right sided weakness in 2004 and stroke workup (05/24) depression, glaucoma, BPH, s/p PEG placement on 5/7 Now coming into emergency room with family stating he is not at his baseline mental status and has had subjective fevers with elevated heart rate.  Patient is nonverbal at baseline.  Arrives febrile and tachycardic.  Patient's wife states he has had a elevated heart rate for the last several days.

## 2025-03-26 NOTE — ED ADULT NURSE NOTE - CHIEF COMPLAINT
Perform about 10 reps of each. Do not have to do all of them. Pick 1-2 a day.     Across Body        Reach across body with right arm toward target, while weight bearing through left.   Copyright © I. All rights reserved.     Down and Forward        Reach down and forward toward target on floor with right arm while weight bearing through left.    Copyright © I. All rights reserved.     Down and Lateral        Reach down and to side toward target on floor with right while weight bearing through left.    Copyright © I. All rights reserved.     Down and Across Body        Reach across body and down toward target on floor with right while weight bearing through left.    Copyright © I. All rights reserved.     Forward Reach With Upper Extremity        Stance: shoulder-width on floor. Reach right arm forward as far as possible without moving feet while weight bearing through left.    Copyright © I. All rights reserved.     ROTATION: Weight Bearing Reach        Weight bear on left hand. Reach across body toward target with right.      Copyright © I. All rights reserved.       Shoulder Stabilization        With palms resting comfortably on pool edge, gently lean to one side and forward over same hand.      Copyright © I. All rights reserved.       STANDING: Wall Push Up        With arms slightly wider than shoulders, gently lean body to wall. Push body away from wall by straightening elbows.  ___ reps per set, ___ sets per day, ___ days per week Place wedge under hand if wrist range of motion is limited.    Copyright © I. All rights reserved.      The patient is a 89y Male complaining of altered mental status.

## 2025-03-26 NOTE — ED ADULT NURSE NOTE - OBJECTIVE STATEMENT
Pt presents w/ AMS and weakness.  On presentation, pt responsive to stimuli and movement w/ eyes opening. Pt noted to have a Stage IV pressure ulcer to sacrum

## 2025-03-26 NOTE — ED ADULT TRIAGE NOTE - ARRIVAL FROM
"OB Progress Note  2018  11:01 AM      S:  Patient first seen @ 7:50. At that time, resting with epidural and feeling some ctx.  10:55am: Patient started pushing at approx 10:40am, feeling contractions and rectal pressure.       O:  /59  Pulse 78  Temp 98.4  F (36.9  C) (Temporal)  Resp 18  Ht 1.702 m (5' 7\")  Wt 77.6 kg (171 lb)  LMP 2017  SpO2 97%  BMI 26.78 kg/m2   At 7:50 EFM showed 130bpm baseline, accelerations present, decelerations absent; SVE @ 6:30 was 7/90/-1    10:50am  EFM: baseline 150bpm, accelerations present, variable decelerations with pushing and quick return to baseline, periods of moderate and minimal variability  [place]:  Ctx q2-4 min  SVE:  Complete, +3  Membranes: clear fluid, moderate amount of bloody show    Pitocin at 18 mU/min    A/P:  30 year old  @40w5d  1.  Routine cares  Category 2  3.  Anticipate     Daisy Sheridan    " Home

## 2025-03-26 NOTE — ED PROVIDER NOTE - CLINICAL SUMMARY MEDICAL DECISION MAKING FREE TEXT BOX
89y M Cantonese speaking, wheelchair bound, R hand dominant, b/l  hearing impairment ( b/l hearing aids) with PMHx of DM, Thalassemia minor/JUSTYNA, HLD, hx stroke with right sided weakness in 2004 and stroke workup (05/24) depression, glaucoma, BPH, s/p PEG placement on 5/7 Patient arriving septic.  Septic protocol initiated.  Will plan for labs, x-ray, urine, IV resuscitation, CT scans

## 2025-03-26 NOTE — ED ADULT NURSE NOTE - NSFALLHARMRISKINTERV_ED_ALL_ED

## 2025-03-26 NOTE — ED PROVIDER NOTE - PHYSICAL EXAMINATION
Physical Exam  GEN:  NCAT  EYES: PERRL, full EOMI,  ENT: External inspection normal, normal voice, no oropharyngeal ulcerations/lesions/swelling  HEAD: atraumatic  NECK: FROM neck, supple, no meningismus, trachea midline, no JVD  CV:tachycardia   RESP: cta bl, no tachypnea, no hypoxia, no resp distress,  GI: +BS, Soft, nontender, no guarding/rebound,   -peg in place.   MSK: baseline   SKIN: Color normal for race, warm and dry, no rash  NEURO: baseline nonverbal

## 2025-03-26 NOTE — ED ADULT TRIAGE NOTE - CHIEF COMPLAINT QUOTE
patient here with family form home c/o AMS; warm to touch; tachycardic with EMS; arrives 6LNC in place; non-verbal at baseline as per EMS; iv in right wrist and givne 1L normal saline

## 2025-03-26 NOTE — ED PROVIDER NOTE - PROGRESS NOTE DETAILS
geovany: Patient signed out to me for admission.  Spoke with hospitalist.  Will admit.  Pending CT reads but appears to be pneumonia.  Lactate improved from 6 to 3.

## 2025-03-27 DIAGNOSIS — A41.9 SEPSIS, UNSPECIFIED ORGANISM: ICD-10-CM

## 2025-03-27 DIAGNOSIS — E87.0 HYPEROSMOLALITY AND HYPERNATREMIA: ICD-10-CM

## 2025-03-27 DIAGNOSIS — Z86.73 PERSONAL HISTORY OF TRANSIENT ISCHEMIC ATTACK (TIA), AND CEREBRAL INFARCTION WITHOUT RESIDUAL DEFICITS: ICD-10-CM

## 2025-03-27 DIAGNOSIS — E11.9 TYPE 2 DIABETES MELLITUS WITHOUT COMPLICATIONS: ICD-10-CM

## 2025-03-27 DIAGNOSIS — K94.22 GASTROSTOMY INFECTION: ICD-10-CM

## 2025-03-27 DIAGNOSIS — L89.159 PRESSURE ULCER OF SACRAL REGION, UNSPECIFIED STAGE: ICD-10-CM

## 2025-03-27 DIAGNOSIS — Z91.89 OTHER SPECIFIED PERSONAL RISK FACTORS, NOT ELSEWHERE CLASSIFIED: ICD-10-CM

## 2025-03-27 DIAGNOSIS — Z87.898 PERSONAL HISTORY OF OTHER SPECIFIED CONDITIONS: ICD-10-CM

## 2025-03-27 DIAGNOSIS — Z29.9 ENCOUNTER FOR PROPHYLACTIC MEASURES, UNSPECIFIED: ICD-10-CM

## 2025-03-27 DIAGNOSIS — R33.9 RETENTION OF URINE, UNSPECIFIED: ICD-10-CM

## 2025-03-27 LAB
ALBUMIN SERPL ELPH-MCNC: 2.7 G/DL — LOW (ref 3.3–5)
ALP SERPL-CCNC: 63 U/L — SIGNIFICANT CHANGE UP (ref 40–120)
ALT FLD-CCNC: 12 U/L — SIGNIFICANT CHANGE UP (ref 10–45)
ANION GAP SERPL CALC-SCNC: 11 MMOL/L — SIGNIFICANT CHANGE UP (ref 5–17)
APPEARANCE UR: CLEAR — SIGNIFICANT CHANGE UP
AST SERPL-CCNC: 25 U/L — SIGNIFICANT CHANGE UP (ref 10–40)
BASOPHILS # BLD AUTO: 0.02 K/UL — SIGNIFICANT CHANGE UP (ref 0–0.2)
BASOPHILS NFR BLD AUTO: 0.3 % — SIGNIFICANT CHANGE UP (ref 0–2)
BILIRUB SERPL-MCNC: 0.4 MG/DL — SIGNIFICANT CHANGE UP (ref 0.2–1.2)
BILIRUB UR-MCNC: NEGATIVE — SIGNIFICANT CHANGE UP
BUN SERPL-MCNC: 22 MG/DL — SIGNIFICANT CHANGE UP (ref 7–23)
CALCIUM SERPL-MCNC: 8.1 MG/DL — LOW (ref 8.4–10.5)
CHLORIDE SERPL-SCNC: 110 MMOL/L — HIGH (ref 96–108)
CO2 SERPL-SCNC: 24 MMOL/L — SIGNIFICANT CHANGE UP (ref 22–31)
COLOR SPEC: YELLOW — SIGNIFICANT CHANGE UP
CREAT SERPL-MCNC: 0.36 MG/DL — LOW (ref 0.5–1.3)
DIFF PNL FLD: NEGATIVE — SIGNIFICANT CHANGE UP
EGFR: 109 ML/MIN/1.73M2 — SIGNIFICANT CHANGE UP
EGFR: 109 ML/MIN/1.73M2 — SIGNIFICANT CHANGE UP
EOSINOPHIL # BLD AUTO: 0.14 K/UL — SIGNIFICANT CHANGE UP (ref 0–0.5)
EOSINOPHIL NFR BLD AUTO: 2.4 % — SIGNIFICANT CHANGE UP (ref 0–6)
GLUCOSE SERPL-MCNC: 227 MG/DL — HIGH (ref 70–99)
GLUCOSE UR QL: NEGATIVE MG/DL — SIGNIFICANT CHANGE UP
HCT VFR BLD CALC: 25.5 % — LOW (ref 39–50)
HGB BLD-MCNC: 7.6 G/DL — LOW (ref 13–17)
IMM GRANULOCYTES NFR BLD AUTO: 0.3 % — SIGNIFICANT CHANGE UP (ref 0–0.9)
KETONES UR-MCNC: NEGATIVE MG/DL — SIGNIFICANT CHANGE UP
LACTATE SERPL-SCNC: 1.4 MMOL/L — SIGNIFICANT CHANGE UP (ref 0.5–2)
LEGIONELLA AG UR QL: NEGATIVE — SIGNIFICANT CHANGE UP
LEUKOCYTE ESTERASE UR-ACNC: NEGATIVE — SIGNIFICANT CHANGE UP
LYMPHOCYTES # BLD AUTO: 0.9 K/UL — LOW (ref 1–3.3)
LYMPHOCYTES # BLD AUTO: 15.4 % — SIGNIFICANT CHANGE UP (ref 13–44)
MAGNESIUM SERPL-MCNC: 2.2 MG/DL — SIGNIFICANT CHANGE UP (ref 1.6–2.6)
MCHC RBC-ENTMCNC: 22.7 PG — LOW (ref 27–34)
MCHC RBC-ENTMCNC: 29.8 G/DL — LOW (ref 32–36)
MCV RBC AUTO: 76.1 FL — LOW (ref 80–100)
MONOCYTES # BLD AUTO: 0.25 K/UL — SIGNIFICANT CHANGE UP (ref 0–0.9)
MONOCYTES NFR BLD AUTO: 4.3 % — SIGNIFICANT CHANGE UP (ref 2–14)
NEUTROPHILS # BLD AUTO: 4.51 K/UL — SIGNIFICANT CHANGE UP (ref 1.8–7.4)
NEUTROPHILS NFR BLD AUTO: 77.3 % — HIGH (ref 43–77)
NITRITE UR-MCNC: NEGATIVE — SIGNIFICANT CHANGE UP
NRBC BLD AUTO-RTO: 0 /100 WBCS — SIGNIFICANT CHANGE UP (ref 0–0)
NT-PROBNP SERPL-SCNC: 876 PG/ML — HIGH (ref 0–300)
PH UR: 7 — SIGNIFICANT CHANGE UP (ref 5–8)
PHOSPHATE SERPL-MCNC: 2.8 MG/DL — SIGNIFICANT CHANGE UP (ref 2.5–4.5)
PLATELET # BLD AUTO: 123 K/UL — LOW (ref 150–400)
POTASSIUM SERPL-MCNC: 3.4 MMOL/L — LOW (ref 3.5–5.3)
POTASSIUM SERPL-SCNC: 3.4 MMOL/L — LOW (ref 3.5–5.3)
PROT SERPL-MCNC: 5.6 G/DL — LOW (ref 6–8.3)
PROT UR-MCNC: 30 MG/DL
RAPID RVP RESULT: SIGNIFICANT CHANGE UP
RBC # BLD: 3.35 M/UL — LOW (ref 4.2–5.8)
RBC # FLD: 18.3 % — HIGH (ref 10.3–14.5)
S PNEUM AG UR QL: NEGATIVE — SIGNIFICANT CHANGE UP
SARS-COV-2 RNA SPEC QL NAA+PROBE: SIGNIFICANT CHANGE UP
SODIUM SERPL-SCNC: 145 MMOL/L — SIGNIFICANT CHANGE UP (ref 135–145)
SP GR SPEC: 1.03 — SIGNIFICANT CHANGE UP (ref 1–1.03)
UROBILINOGEN FLD QL: 1 MG/DL — SIGNIFICANT CHANGE UP (ref 0.2–1)
WBC # BLD: 5.84 K/UL — SIGNIFICANT CHANGE UP (ref 3.8–10.5)
WBC # FLD AUTO: 5.84 K/UL — SIGNIFICANT CHANGE UP (ref 3.8–10.5)

## 2025-03-27 PROCEDURE — 99223 1ST HOSP IP/OBS HIGH 75: CPT

## 2025-03-27 RX ORDER — ENOXAPARIN SODIUM 100 MG/ML
40 INJECTION SUBCUTANEOUS EVERY 24 HOURS
Refills: 0 | Status: DISCONTINUED | OUTPATIENT
Start: 2025-03-27 | End: 2025-04-02

## 2025-03-27 RX ORDER — TIMOLOL MALEATE 6.8 MG/ML
1 SOLUTION OPHTHALMIC
Refills: 0 | Status: DISCONTINUED | OUTPATIENT
Start: 2025-03-27 | End: 2025-04-02

## 2025-03-27 RX ORDER — ATORVASTATIN CALCIUM 80 MG/1
40 TABLET, FILM COATED ORAL AT BEDTIME
Refills: 0 | Status: DISCONTINUED | OUTPATIENT
Start: 2025-03-27 | End: 2025-04-02

## 2025-03-27 RX ORDER — PIPERACILLIN-TAZO-DEXTROSE,ISO 3.375G/5
4.5 IV SOLUTION, PIGGYBACK PREMIX FROZEN(ML) INTRAVENOUS ONCE
Refills: 0 | Status: COMPLETED | OUTPATIENT
Start: 2025-03-27 | End: 2025-03-27

## 2025-03-27 RX ORDER — PIPERACILLIN-TAZO-DEXTROSE,ISO 3.375G/5
4.5 IV SOLUTION, PIGGYBACK PREMIX FROZEN(ML) INTRAVENOUS EVERY 8 HOURS
Refills: 0 | Status: DISCONTINUED | OUTPATIENT
Start: 2025-03-28 | End: 2025-04-02

## 2025-03-27 RX ORDER — INSULIN LISPRO 100 U/ML
INJECTION, SOLUTION INTRAVENOUS; SUBCUTANEOUS AT BEDTIME
Refills: 0 | Status: DISCONTINUED | OUTPATIENT
Start: 2025-03-27 | End: 2025-03-27

## 2025-03-27 RX ORDER — INSULIN LISPRO 100 U/ML
INJECTION, SOLUTION INTRAVENOUS; SUBCUTANEOUS
Refills: 0 | Status: DISCONTINUED | OUTPATIENT
Start: 2025-03-27 | End: 2025-03-27

## 2025-03-27 RX ORDER — METFORMIN HYDROCHLORIDE 850 MG/1
500 TABLET ORAL ONCE
Refills: 0 | Status: COMPLETED | OUTPATIENT
Start: 2025-03-27 | End: 2025-03-27

## 2025-03-27 RX ORDER — ASPIRIN 325 MG
81 TABLET ORAL ONCE
Refills: 0 | Status: COMPLETED | OUTPATIENT
Start: 2025-03-27 | End: 2025-03-27

## 2025-03-27 RX ORDER — TIMOLOL MALEATE 6.8 MG/ML
1 SOLUTION OPHTHALMIC
Refills: 0 | DISCHARGE

## 2025-03-27 RX ORDER — LEVETIRACETAM 10 MG/ML
750 INJECTION, SOLUTION INTRAVENOUS
Refills: 0 | DISCHARGE

## 2025-03-27 RX ORDER — ONDANSETRON HCL/PF 4 MG/2 ML
4 VIAL (ML) INJECTION EVERY 8 HOURS
Refills: 0 | Status: DISCONTINUED | OUTPATIENT
Start: 2025-03-27 | End: 2025-03-27

## 2025-03-27 RX ORDER — LEVETIRACETAM 10 MG/ML
750 INJECTION, SOLUTION INTRAVENOUS EVERY 12 HOURS
Refills: 0 | Status: DISCONTINUED | OUTPATIENT
Start: 2025-03-27 | End: 2025-03-30

## 2025-03-27 RX ORDER — AZITHROMYCIN 250 MG
500 CAPSULE ORAL ONCE
Refills: 0 | Status: COMPLETED | OUTPATIENT
Start: 2025-03-27 | End: 2025-03-27

## 2025-03-27 RX ORDER — DEXTROSE 50 % IN WATER 50 %
12.5 SYRINGE (ML) INTRAVENOUS ONCE
Refills: 0 | Status: DISCONTINUED | OUTPATIENT
Start: 2025-03-27 | End: 2025-04-02

## 2025-03-27 RX ORDER — AZITHROMYCIN 250 MG
500 CAPSULE ORAL EVERY 24 HOURS
Refills: 0 | Status: COMPLETED | OUTPATIENT
Start: 2025-03-28 | End: 2025-03-29

## 2025-03-27 RX ORDER — SODIUM CHLORIDE 9 G/1000ML
1000 INJECTION, SOLUTION INTRAVENOUS
Refills: 0 | Status: DISCONTINUED | OUTPATIENT
Start: 2025-03-27 | End: 2025-04-02

## 2025-03-27 RX ORDER — LEVETIRACETAM 10 MG/ML
750 INJECTION, SOLUTION INTRAVENOUS EVERY 12 HOURS
Refills: 0 | Status: DISCONTINUED | OUTPATIENT
Start: 2025-03-27 | End: 2025-03-27

## 2025-03-27 RX ORDER — MELATONIN 5 MG
3 TABLET ORAL AT BEDTIME
Refills: 0 | Status: DISCONTINUED | OUTPATIENT
Start: 2025-03-27 | End: 2025-03-27

## 2025-03-27 RX ORDER — PIPERACILLIN-TAZO-DEXTROSE,ISO 3.375G/5
4.5 IV SOLUTION, PIGGYBACK PREMIX FROZEN(ML) INTRAVENOUS ONCE
Refills: 0 | Status: COMPLETED | OUTPATIENT
Start: 2025-03-28 | End: 2025-03-28

## 2025-03-27 RX ORDER — DEXTROSE 50 % IN WATER 50 %
25 SYRINGE (ML) INTRAVENOUS ONCE
Refills: 0 | Status: DISCONTINUED | OUTPATIENT
Start: 2025-03-27 | End: 2025-04-02

## 2025-03-27 RX ORDER — CYANOCOBALAMIN 1000 UG/ML
1 INJECTION INTRAMUSCULAR; SUBCUTANEOUS
Refills: 0 | DISCHARGE

## 2025-03-27 RX ORDER — MAGNESIUM, ALUMINUM HYDROXIDE 200-200 MG
30 TABLET,CHEWABLE ORAL EVERY 4 HOURS
Refills: 0 | Status: DISCONTINUED | OUTPATIENT
Start: 2025-03-27 | End: 2025-03-27

## 2025-03-27 RX ORDER — FINASTERIDE 1 MG/1
1 TABLET, FILM COATED ORAL
Refills: 0 | DISCHARGE

## 2025-03-27 RX ORDER — ASPIRIN 325 MG
81 TABLET ORAL EVERY 24 HOURS
Refills: 0 | Status: DISCONTINUED | OUTPATIENT
Start: 2025-03-28 | End: 2025-03-28

## 2025-03-27 RX ORDER — GLUCAGON 3 MG/1
1 POWDER NASAL ONCE
Refills: 0 | Status: DISCONTINUED | OUTPATIENT
Start: 2025-03-27 | End: 2025-04-02

## 2025-03-27 RX ORDER — ACETAMINOPHEN 500 MG/5ML
650 LIQUID (ML) ORAL EVERY 6 HOURS
Refills: 0 | Status: DISCONTINUED | OUTPATIENT
Start: 2025-03-27 | End: 2025-03-27

## 2025-03-27 RX ORDER — FINASTERIDE 1 MG/1
5 TABLET, FILM COATED ORAL EVERY 24 HOURS
Refills: 0 | Status: DISCONTINUED | OUTPATIENT
Start: 2025-03-27 | End: 2025-03-28

## 2025-03-27 RX ORDER — DEXTROSE 50 % IN WATER 50 %
15 SYRINGE (ML) INTRAVENOUS ONCE
Refills: 0 | Status: DISCONTINUED | OUTPATIENT
Start: 2025-03-27 | End: 2025-04-02

## 2025-03-27 RX ORDER — METOPROLOL SUCCINATE 50 MG/1
50 TABLET, EXTENDED RELEASE ORAL ONCE
Refills: 0 | Status: COMPLETED | OUTPATIENT
Start: 2025-03-27 | End: 2025-03-27

## 2025-03-27 RX ORDER — DOXAZOSIN MESYLATE 8 MG/1
1 TABLET ORAL AT BEDTIME
Refills: 0 | Status: DISCONTINUED | OUTPATIENT
Start: 2025-03-27 | End: 2025-03-28

## 2025-03-27 RX ORDER — SODIUM CHLORIDE 9 G/1000ML
1000 INJECTION, SOLUTION INTRAVENOUS
Refills: 0 | Status: DISCONTINUED | OUTPATIENT
Start: 2025-03-27 | End: 2025-03-28

## 2025-03-27 RX ADMIN — Medication 255 MILLIGRAM(S): at 02:42

## 2025-03-27 RX ADMIN — METOPROLOL SUCCINATE 50 MILLIGRAM(S): 50 TABLET, EXTENDED RELEASE ORAL at 07:19

## 2025-03-27 RX ADMIN — METFORMIN HYDROCHLORIDE 500 MILLIGRAM(S): 850 TABLET ORAL at 07:19

## 2025-03-27 RX ADMIN — LEVETIRACETAM 750 MILLIGRAM(S): 10 INJECTION, SOLUTION INTRAVENOUS at 19:28

## 2025-03-27 RX ADMIN — Medication 81 MILLIGRAM(S): at 07:19

## 2025-03-27 RX ADMIN — Medication 25 GRAM(S): at 19:28

## 2025-03-27 RX ADMIN — Medication 100 MILLIEQUIVALENT(S): at 19:28

## 2025-03-27 RX ADMIN — ENOXAPARIN SODIUM 40 MILLIGRAM(S): 100 INJECTION SUBCUTANEOUS at 22:16

## 2025-03-27 RX ADMIN — SODIUM CHLORIDE 75 MILLILITER(S): 9 INJECTION, SOLUTION INTRAVENOUS at 15:29

## 2025-03-27 RX ADMIN — Medication 200 GRAM(S): at 15:28

## 2025-03-27 RX ADMIN — Medication 100 MILLIEQUIVALENT(S): at 21:01

## 2025-03-27 RX ADMIN — Medication 100 MILLIEQUIVALENT(S): at 22:14

## 2025-03-27 NOTE — H&P ADULT - PROBLEM SELECTOR PLAN 2
Purulence at site of PEG. No acute abdomen.   Plan  -wound culture of PEG site  -c/w zosyn 4.5 mg x7 days for anaerobic coverage   -GI consult for infected PEG

## 2025-03-27 NOTE — H&P ADULT - NSHPLABSRESULTS_GEN_ALL_CORE
.  LABS:                         8.3    7.39  )-----------( 128      ( 26 Mar 2025 19:21 )             28.0     03-26    149[H]  |  113[H]  |  42[H]  ----------------------------<  213[H]  3.7   |  23  |  0.90    Ca    8.3[L]      26 Mar 2025 19:21    TPro  6.0[L]  /  Alb  2.2[L]  /  TBili  0.4  /  DBili  x   /  AST  25  /  ALT  16  /  AlkPhos  72  03-26    PT/INR - ( 26 Mar 2025 19:21 )   PT: 12.7 sec;   INR: 1.10          PTT - ( 26 Mar 2025 19:21 )  PTT:32.7 sec  Urinalysis Basic - ( 26 Mar 2025 19:21 )    Color: x / Appearance: x / SG: x / pH: x  Gluc: 213 mg/dL / Ketone: x  / Bili: x / Urobili: x   Blood: x / Protein: x / Nitrite: x   Leuk Esterase: x / RBC: x / WBC x   Sq Epi: x / Non Sq Epi: x / Bacteria: x                RADIOLOGY, EKG & ADDITIONAL TESTS: Reviewed. LABS:                         8.3    7.39  )-----------( 128      ( 26 Mar 2025 19:21 )             28.0     03-26    149[H]  |  113[H]  |  42[H]  ----------------------------<  213[H]  3.7   |  23  |  0.90    Ca    8.3[L]      26 Mar 2025 19:21    TPro  6.0[L]  /  Alb  2.2[L]  /  TBili  0.4  /  DBili  x   /  AST  25  /  ALT  16  /  AlkPhos  72  03-26    PT/INR - ( 26 Mar 2025 19:21 )   PT: 12.7 sec;   INR: 1.10          PTT - ( 26 Mar 2025 19:21 )  PTT:32.7 sec  Urinalysis Basic - ( 26 Mar 2025 19:21 )    Color: x / Appearance: x / SG: x / pH: x  Gluc: 213 mg/dL / Ketone: x  / Bili: x / Urobili: x   Blood: x / Protein: x / Nitrite: x   Leuk Esterase: x / RBC: x / WBC x   Sq Epi: x / Non Sq Epi: x / Bacteria: x                RADIOLOGY, EKG & ADDITIONAL TESTS: Reviewed.

## 2025-03-27 NOTE — H&P ADULT - PROBLEM SELECTOR PLAN 4
Na 149 on arrival. FWD 1.5L     Plan   -D5, 0.45% NS 75mL/hr x 24h   -add free water flushes with tube feeds

## 2025-03-27 NOTE — H&P ADULT - PROBLEM SELECTOR PLAN 6
Stage 3 sacral ulcer likely 2/2 to bedbound status. No purulence at site of ulcer. Low suspicion for infection. Pt on abx for UTI vs. PNA. Zosyn 4.5 will also cover for skin infection and will use pseudomonal dosing as pt at risk for pseudomonal infection as pt has DM2.     Plan  -wound care consult  -palliative care consult for repeat aspiration events, bedbound status and declining quality of life

## 2025-03-27 NOTE — H&P ADULT - HISTORY OF PRESENT ILLNESS
ED course:  Vitals: 98.4F, , 122/76, RR 20, 95% 6L NC weaned to 2L    Labs: WBC 7.39, Hgb 8.3, MCV 74.5, Plt 128, Na 149, K 3.7, Cl 113, BUN 42, Cr 0.90, Lactate 6.0---> 3.0   EKG:   Imaging:   Interventions:   Consults:  ED course:  Vitals: 98.4F, , 122/76, RR 20, 95% 6L NC weaned to 2L    Labs: WBC 7.39, Hgb 8.3, MCV 74.5, Plt 128, Na 149, K 3.7, Cl 113, BUN 42, Cr 0.90, Lactate 6.0---> 3.0   EKG:   Imaging:  CT Chest, Abdomen/Pelvis shows multifocal pneumonia and mild inflammatory fat stranding in the mesentery w/ perinephric fat stranding.   Interventions: aspirin 81mgx1, metformin [id number], metoprolol 50mg x1, ofrimev 1000mg x1, azithromycin 500mg x1, CTX 1g x1, 1.85L NS    89y M Cantonese speaking, wheelchair bound, R hand dominant, b/l  hearing impairment ( b/l hearing aids) with PMHx of DM, Thalassemia minor/JUSTYNA, HLD, hx stroke with right sided weakness in 2004 and stroke workup (05/24) depression, glaucoma, BPH, s/p PEG placement on 5/7 Now coming into emergency room with family stating he is not at his baseline mental status and has had subjective fevers with elevated heart rate.  Patient is nonverbal at baseline.  Arrives febrile and tachycardic.  Patient's wife states he has had a elevated heart rate for the last several day      ED course:  Vitals: 98.4F, , 122/76, RR 20, 95% 6L NC weaned to 2L    Labs: WBC 7.39, Hgb 8.3, MCV 74.5, Plt 128, Na 149, K 3.7, Cl 113, BUN 42, Cr 0.90, Lactate 6.0---> 3.0   EKG:   Imaging:  CT Chest, Abdomen/Pelvis shows multifocal pneumonia and mild inflammatory fat stranding in the mesentery w/ perinephric fat stranding.   Interventions: aspirin 81mgx1, metformin [id number], metoprolol 50mg x1, ofrimev 1000mg x1, azithromycin 500mg x1, CTX 1g x1, 1.85L NS    89y M Cantonese speaking, wheelchair bound, R hand dominant, b/l  hearing impairment ( b/l hearing aids) with PMHx of DM, Thalassemia minor/JUSTYNA, HLD, hx stroke with right sided weakness in 2004 and stroke workup (05/24) depression, glaucoma, BPH, s/p PEG placement on 5/7 now coming into ED as pt wife took his vitals and his BP was 80/40s and HR was in the 110s. Wife also noted that he had increased secretions and noted purulence at his PEG site. Pt non-verbal since 1 month ago since hospitalization at [facility] (discharge on 25 Feb 25) during which he was hospitalized for 2 weeks in the ICU for aspiration PNA. At the time, the pt's hudson was removed (prior he had a chronic hudson for urinary retention). Pt now wears diapers at home and last BM was yesterday.     ED course:  Vitals: 98.4F, , 122/76, RR 20, 95% 6L NC weaned to 2L    Labs: WBC 7.39, Hgb 8.3, MCV 74.5, Plt 128, Na 149, K 3.7, Cl 113, BUN 42, Cr 0.90, Lactate 6.0---> 3.0   EKG:   Imaging:  CT Chest, Abdomen/Pelvis shows multifocal pneumonia and mild inflammatory fat stranding in the mesentery w/ perinephric fat stranding.   Interventions: aspirin 81mgx1, metformin [id number], metoprolol 50mg x1, ofrimev 1000mg x1, azithromycin 500mg x1, CTX 1g x1, 1.85L NS

## 2025-03-27 NOTE — H&P ADULT - PROBLEM SELECTOR PLAN 1
Lactate 6.0 on arrival to ED downtrended to 3.0 after 1.8L of NS bolus. CT shows perinephric stranding c/f pyelonephritis and multifocal PNA concerns for HAP (last hospitalization in Dec 2024) vs. aspiration PNA      Plan   -repeat lactate  -zosyn 4.5g q8h x 5 days and azithromycin 500mg x3

## 2025-03-27 NOTE — H&P ADULT - PROBLEM SELECTOR PLAN 7
Home Meds:     Likely pyelonephritis vs. UTI 2/2 retention    Plan  -BS w/ PVR   -UA w/ urine cx   -zosyn 4.5mg x 5 days for complicated UTI vs. pyelonephritis Pt with a hx of stroke 2004 with residual right sided weakness.   MRI brain on 4/28 with R internal capsule infarct   Head CT 5/5: subacute infarct centered in the genu and posterior limb of the right internal capsule is stable.  A chronic transcortical infarction in the left precentral gyrus and a small chronic infarct in the right cerebellar hemisphere are stable.  Home meds: ASA 81mg, Atorvastatin 40mg  -c/w atorvastatin and aspirin   -c/w keppra 750mg (seizure ppx), per wife pt never had seizures

## 2025-03-27 NOTE — H&P ADULT - ASSESSMENT
89y M Cantonese speaking, wheelchair bound, R hand dominant, b/l  hearing impairment ( b/l hearing aids) with PMHx of DM, Thalassemia minor/JUSTYNA, HLD, hx stroke with right sided weakness in 2004 and stroke workup (05/24) depression, glaucoma, BPH, s/p PEG placement on 5/7 presents with XX 89y M Cantonese speaking, wheelchair bound, R hand dominant, b/l  hearing impairment ( b/l hearing aids) with PMHx of DM, Thalassemia minor/JUSTYNA, HLD, hx stroke with right sided weakness in 2004 and stroke workup (05/24) depression, glaucoma, BPH, s/p PEG placement on 5/7 presents with severe sepsis 2/2 multifocal PNA

## 2025-03-27 NOTE — H&P ADULT - PROBLEM SELECTOR PLAN 3
CT findings of multifocal PNA, HAP vs. CAP vs. aspiration     Plan  -zosyn 4.5g x5 days   -f/u blood culture  -sputum cultures (if able to produce)  -aspiration precautions

## 2025-03-27 NOTE — PATIENT PROFILE ADULT - DO YOU EVER NEED HELP READING HOSPITAL MATERIALS?
Dx: Numbness of left anterior thigh (R20.0)         Authorized # of Visits:  12         Next MD visit: none scheduled  Fall Risk: standard         Precautions: n/a           Medication Changes since last visit?: No     Subjective: did some shopping yesterd well with increased activity, L quads weak. Goals:   to be reached in 8-12 visits. · Increase strength of L quads and bilateral hips to 5/5 to allow pt to safely perform all ADL's without symptoms.   · Pt. will be able to tolerate standing for 10 alexey no

## 2025-03-27 NOTE — H&P ADULT - NSHPPHYSICALEXAM_GEN_ALL_CORE
VITAL SIGNS:  T(F): 98.8 (03-27-25 @ 13:41), Max: 100.5 (03-26-25 @ 19:51)  HR: 88 (03-27-25 @ 13:41) (86 - 121)  BP: 122/72 (03-27-25 @ 13:41) (101/60 - 126/81)  BP(mean): --  RR: 15 (03-27-25 @ 13:41) (15 - 20)  SpO2: 98% (03-27-25 @ 13:41) (95% - 100%)    PHYSICAL EXAM:    Constitutional: resting comfortably in bed; NAD; pt non verbal at baseline   HEENT: NC/AT, PERRL, EOMI, anicteric sclera, no nasal discharge; uvula midline, no oropharyngeal erythema or exudates; MMM  Neck: supple; no JVD or thyromegaly  Respiratory: unlabored breathing, CTA B/L; no W/Rhonchi/Crackles, no retractions or use of accessory muscles   Cardiac: +S1/S2; RRR; no M/R/G; No ventricular heaves, PMI non-displaced  Gastrointestinal: soft, NT/ND; no rebound or guarding; +BSx4  Genitourinary: condom cath   Extremities: WWP, no clubbing or cyanosis; no peripheral edema  Musculoskeletal: NROM x4; no joint swelling, tenderness or erythema  Vascular: 2+ radial, DP/PT pulses B/L  Neurologic: AAOx3; CNII-XII grossly intact; no focal deficits VITAL SIGNS:  T(F): 98.8 (03-27-25 @ 13:41), Max: 100.5 (03-26-25 @ 19:51)  HR: 88 (03-27-25 @ 13:41) (86 - 121)  BP: 122/72 (03-27-25 @ 13:41) (101/60 - 126/81)  BP(mean): --  RR: 15 (03-27-25 @ 13:41) (15 - 20)  SpO2: 98% (03-27-25 @ 13:41) (95% - 100%)    PHYSICAL EXAM:    Constitutional: resting comfortably in bed; NAD; pt non verbal at baseline   HEENT: eyes equal and reactive. pt does not follow commands   Neck: supple  Respiratory: unlabored breathing, no wheezing, +gurgling sounds   Cardiac: +S1/S2; RRR; no M/R/G  Gastrointestinal: soft, NT/ND; BS+, PEG tube with oozing and purulence around it   Genitourinary: condom cath   Extremities: WWP, no clubbing or cyanosis; no peripheral edema  Vascular: 2+ radial, DP/PT pulses B/L

## 2025-03-27 NOTE — PATIENT PROFILE ADULT - FALL HARM RISK - HARM RISK INTERVENTIONS

## 2025-03-27 NOTE — H&P ADULT - ATTENDING COMMENTS
pt seen and examined with Dr. Bryan    agree with above -fu cultures, wound care mary for sacral decubitus ulcer, fu GI for PEG,   hypokalemia corrected. IVF for hydration till GI can check Peg     GOC discussion - pt is able to gesture, not able to communicate well - wife would like to keep him alive and remain Full code.     Dr. Rajesh Villalba covering 3/28/25 -

## 2025-03-27 NOTE — H&P ADULT - PROBLEM SELECTOR PLAN 9
F: D5LR @75cc/hr for 24 hrs  E: replete as needed; Keep K>4, Mg >2   D: NPO   DVT Proph: Lovenox  Dispo: RMF.  Full Code

## 2025-03-27 NOTE — H&P ADULT - PROBLEM SELECTOR PLAN 8
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   MRI brain on 4/28 with R internal capsule infarct   Head CT 5/5: subacute infarct centered in the genu and posterior limb of the right internal capsule is stable.  A chronic transcortical infarction in the left precentral gyrus and a small chronic infarct in the right cerebellar hemisphere are stable.  Home meds: ASA 81mg, Atorvastatin 40mg, Eliquis 5mg BID. Stopped by PCP. Unclear why patient was on Eliquis.  - collateral in AM for indications for Eliquis   - Continue with Atorvastatin  - Restart aspirin. Home Meds: finasteride, doxazosin     Likely pyelonephritis vs. UTI 2/2 retention    Plan  -BS w/ PVR (pt appears to be retaining >250cc), f/u BS q6h   -UA w/ urine cx   -zosyn 4.5mg x 5 days for complicated UTI vs. pyelonephritis

## 2025-03-27 NOTE — PATIENT PROFILE ADULT - FUNCTIONAL ASSESSMENT - BASIC MOBILITY 6.
Bria Luis, BAYRON-CNP       1 = Total assistance

## 2025-03-27 NOTE — H&P ADULT - PROBLEM SELECTOR PLAN 10
F: D5LR @40cc/hr for 10 hrs  E: replete as needed; Keep K>4, Mg >2   D: NPO   DVT Proph: Lovenox    Dispo: RMF.

## 2025-03-28 LAB
A1C WITH ESTIMATED AVERAGE GLUCOSE RESULT: 8.1 % — HIGH (ref 4–5.6)
ANION GAP SERPL CALC-SCNC: 9 MMOL/L — SIGNIFICANT CHANGE UP (ref 5–17)
BASOPHILS # BLD AUTO: 0.01 K/UL — SIGNIFICANT CHANGE UP (ref 0–0.2)
BASOPHILS NFR BLD AUTO: 0.2 % — SIGNIFICANT CHANGE UP (ref 0–2)
BUN SERPL-MCNC: 14 MG/DL — SIGNIFICANT CHANGE UP (ref 7–23)
CALCIUM SERPL-MCNC: 7.8 MG/DL — LOW (ref 8.4–10.5)
CHLORIDE SERPL-SCNC: 110 MMOL/L — HIGH (ref 96–108)
CO2 SERPL-SCNC: 23 MMOL/L — SIGNIFICANT CHANGE UP (ref 22–31)
CREAT SERPL-MCNC: 0.31 MG/DL — LOW (ref 0.5–1.3)
EGFR: 114 ML/MIN/1.73M2 — SIGNIFICANT CHANGE UP
EGFR: 114 ML/MIN/1.73M2 — SIGNIFICANT CHANGE UP
EOSINOPHIL # BLD AUTO: 0.21 K/UL — SIGNIFICANT CHANGE UP (ref 0–0.5)
EOSINOPHIL NFR BLD AUTO: 4.2 % — SIGNIFICANT CHANGE UP (ref 0–6)
ESTIMATED AVERAGE GLUCOSE: 186 MG/DL — HIGH (ref 68–114)
GLUCOSE SERPL-MCNC: 275 MG/DL — HIGH (ref 70–99)
HCT VFR BLD CALC: 24.8 % — LOW (ref 39–50)
HGB BLD-MCNC: 7.6 G/DL — LOW (ref 13–17)
IMM GRANULOCYTES NFR BLD AUTO: 0.2 % — SIGNIFICANT CHANGE UP (ref 0–0.9)
LYMPHOCYTES # BLD AUTO: 0.59 K/UL — LOW (ref 1–3.3)
LYMPHOCYTES # BLD AUTO: 11.9 % — LOW (ref 13–44)
MAGNESIUM SERPL-MCNC: 2.1 MG/DL — SIGNIFICANT CHANGE UP (ref 1.6–2.6)
MCHC RBC-ENTMCNC: 23 PG — LOW (ref 27–34)
MCHC RBC-ENTMCNC: 30.6 G/DL — LOW (ref 32–36)
MCV RBC AUTO: 75.2 FL — LOW (ref 80–100)
MONOCYTES # BLD AUTO: 0.11 K/UL — SIGNIFICANT CHANGE UP (ref 0–0.9)
MONOCYTES NFR BLD AUTO: 2.2 % — SIGNIFICANT CHANGE UP (ref 2–14)
NEUTROPHILS # BLD AUTO: 4.02 K/UL — SIGNIFICANT CHANGE UP (ref 1.8–7.4)
NEUTROPHILS NFR BLD AUTO: 81.3 % — HIGH (ref 43–77)
NRBC BLD AUTO-RTO: 0 /100 WBCS — SIGNIFICANT CHANGE UP (ref 0–0)
PHOSPHATE SERPL-MCNC: 2.3 MG/DL — LOW (ref 2.5–4.5)
PLATELET # BLD AUTO: 134 K/UL — LOW (ref 150–400)
POTASSIUM SERPL-MCNC: 3.3 MMOL/L — LOW (ref 3.5–5.3)
POTASSIUM SERPL-SCNC: 3.3 MMOL/L — LOW (ref 3.5–5.3)
RBC # BLD: 3.3 M/UL — LOW (ref 4.2–5.8)
RBC # FLD: 18.2 % — HIGH (ref 10.3–14.5)
SODIUM SERPL-SCNC: 142 MMOL/L — SIGNIFICANT CHANGE UP (ref 135–145)
WBC # BLD: 4.95 K/UL — SIGNIFICANT CHANGE UP (ref 3.8–10.5)
WBC # FLD AUTO: 4.95 K/UL — SIGNIFICANT CHANGE UP (ref 3.8–10.5)

## 2025-03-28 PROCEDURE — 99233 SBSQ HOSP IP/OBS HIGH 50: CPT | Mod: GC

## 2025-03-28 PROCEDURE — 99221 1ST HOSP IP/OBS SF/LOW 40: CPT

## 2025-03-28 PROCEDURE — 99498 ADVNCD CARE PLAN ADDL 30 MIN: CPT | Mod: 25

## 2025-03-28 PROCEDURE — 99497 ADVNCD CARE PLAN 30 MIN: CPT | Mod: 25

## 2025-03-28 PROCEDURE — 99223 1ST HOSP IP/OBS HIGH 75: CPT

## 2025-03-28 RX ORDER — SILVER NITRATE
1 CRYSTALS MISCELLANEOUS ONCE
Refills: 0 | Status: COMPLETED | OUTPATIENT
Start: 2025-03-28 | End: 2025-03-28

## 2025-03-28 RX ORDER — ZINC SULFATE 50(220)MG
220 CAPSULE ORAL DAILY
Refills: 0 | Status: DISCONTINUED | OUTPATIENT
Start: 2025-03-28 | End: 2025-04-02

## 2025-03-28 RX ORDER — FINASTERIDE 1 MG/1
5 TABLET, FILM COATED ORAL EVERY 24 HOURS
Refills: 0 | Status: DISCONTINUED | OUTPATIENT
Start: 2025-03-28 | End: 2025-04-02

## 2025-03-28 RX ORDER — SODIUM CHLORIDE 9 G/1000ML
1000 INJECTION, SOLUTION INTRAVENOUS
Refills: 0 | Status: DISCONTINUED | OUTPATIENT
Start: 2025-03-28 | End: 2025-03-28

## 2025-03-28 RX ORDER — INSULIN LISPRO 100 U/ML
INJECTION, SOLUTION INTRAVENOUS; SUBCUTANEOUS
Refills: 0 | Status: DISCONTINUED | OUTPATIENT
Start: 2025-03-28 | End: 2025-03-30

## 2025-03-28 RX ORDER — B1/B2/B3/B5/B6/B12/VIT C/FOLIC 500-0.5 MG
1 TABLET ORAL DAILY
Refills: 0 | Status: DISCONTINUED | OUTPATIENT
Start: 2025-03-28 | End: 2025-04-02

## 2025-03-28 RX ORDER — POTASSIUM PHOSPHATE, MONOBASIC POTASSIUM PHOSPHATE, DIBASIC INJECTION, 236; 224 MG/ML; MG/ML
30 SOLUTION, CONCENTRATE INTRAVENOUS ONCE
Refills: 0 | Status: COMPLETED | OUTPATIENT
Start: 2025-03-28 | End: 2025-03-28

## 2025-03-28 RX ORDER — DOXAZOSIN MESYLATE 8 MG/1
1 TABLET ORAL AT BEDTIME
Refills: 0 | Status: DISCONTINUED | OUTPATIENT
Start: 2025-03-28 | End: 2025-04-02

## 2025-03-28 RX ORDER — ASPIRIN 325 MG
81 TABLET ORAL EVERY 24 HOURS
Refills: 0 | Status: DISCONTINUED | OUTPATIENT
Start: 2025-03-28 | End: 2025-04-02

## 2025-03-28 RX ADMIN — ENOXAPARIN SODIUM 40 MILLIGRAM(S): 100 INJECTION SUBCUTANEOUS at 22:01

## 2025-03-28 RX ADMIN — LEVETIRACETAM 400 MILLIGRAM(S): 10 INJECTION, SOLUTION INTRAVENOUS at 05:16

## 2025-03-28 RX ADMIN — TIMOLOL MALEATE 1 DROP(S): 6.8 SOLUTION OPHTHALMIC at 05:17

## 2025-03-28 RX ADMIN — Medication 25 GRAM(S): at 13:23

## 2025-03-28 RX ADMIN — SODIUM CHLORIDE 75 MILLILITER(S): 9 INJECTION, SOLUTION INTRAVENOUS at 05:16

## 2025-03-28 RX ADMIN — Medication 255 MILLIGRAM(S): at 05:47

## 2025-03-28 RX ADMIN — ATORVASTATIN CALCIUM 40 MILLIGRAM(S): 80 TABLET, FILM COATED ORAL at 22:02

## 2025-03-28 RX ADMIN — Medication 25 GRAM(S): at 22:00

## 2025-03-28 RX ADMIN — Medication 25 GRAM(S): at 07:13

## 2025-03-28 RX ADMIN — DOXAZOSIN MESYLATE 1 MILLIGRAM(S): 8 TABLET ORAL at 22:02

## 2025-03-28 RX ADMIN — Medication 81 MILLIGRAM(S): at 17:39

## 2025-03-28 RX ADMIN — Medication 1 APPLICATION(S): at 19:52

## 2025-03-28 RX ADMIN — FINASTERIDE 5 MILLIGRAM(S): 1 TABLET, FILM COATED ORAL at 17:39

## 2025-03-28 RX ADMIN — LEVETIRACETAM 400 MILLIGRAM(S): 10 INJECTION, SOLUTION INTRAVENOUS at 18:04

## 2025-03-28 RX ADMIN — POTASSIUM PHOSPHATE, MONOBASIC POTASSIUM PHOSPHATE, DIBASIC INJECTION, 83.33 MILLIMOLE(S): 236; 224 SOLUTION, CONCENTRATE INTRAVENOUS at 13:24

## 2025-03-28 RX ADMIN — TIMOLOL MALEATE 1 DROP(S): 6.8 SOLUTION OPHTHALMIC at 17:47

## 2025-03-28 RX ADMIN — Medication 25 GRAM(S): at 01:15

## 2025-03-28 RX ADMIN — Medication 220 MILLIGRAM(S): at 17:39

## 2025-03-28 RX ADMIN — INSULIN LISPRO 2: 100 INJECTION, SOLUTION INTRAVENOUS; SUBCUTANEOUS at 17:43

## 2025-03-28 RX ADMIN — Medication 1 TABLET(S): at 17:39

## 2025-03-28 RX ADMIN — INSULIN LISPRO 1: 100 INJECTION, SOLUTION INTRAVENOUS; SUBCUTANEOUS at 22:01

## 2025-03-28 RX ADMIN — Medication 500 MILLIGRAM(S): at 17:39

## 2025-03-28 NOTE — DIETITIAN INITIAL EVALUATION ADULT - OTHER CALCULATIONS
Estimated needs based on dosing wt as within %  pounds / 59.1 kilograms (82%). Needs adjusted for advanced age, wound healing, malnutrition, and clinical/functional status.

## 2025-03-28 NOTE — CONSULT NOTE ADULT - ATTENDING COMMENTS
88yo Cantonese speaking, wheelchair bound M with PMH of DM, Thalassemia minor/JUSTYNA, HLD, hx stroke with right sided weakness, b/l hearing loss, depression, glaucoma, BPH, PEG 5/7 who presents for hypotension, tachycardia, and gastrostomy tube site infection.     Patient's wife noted purulent secretions from g-tube site. CT with mild mesentery fat stranding, no signs of complex cellulitis. G-tube is functional. Patient with skin/soft tissue infection around G-tube site - defer treatment to primary team and/or ID. Also recommend wound care nursing evaluation. Ok to use g-tube.     Agree with plan above.     MALU Harkins MD  GI Attending

## 2025-03-28 NOTE — PROGRESS NOTE ADULT - TIME BILLING
bedside interview w/ caregiver, examine  reviewed vitals and labs and imagiing   POC as d/w housestaff and caregiver   documentation of encounter   excluded teaching time and/or separately reported services

## 2025-03-28 NOTE — ADVANCED PRACTICE NURSE CONSULT - ASSESSMENT
89y M Cantonese speaking, wheelchair bound, R hand dominant, b/l  hearing impairment ( b/l hearing aids) with PMHx of DM, Thalassemia minor/JUSTYNA, HLD, hx stroke with right sided weakness in 2004 and stroke workup (05/24) depression, glaucoma, BPH, s/p PEG placement on 5/7 presents with severe sepsis 2/2 multifocal PNA. Pt with unstageable pressure injury to sacrum measuring 5.5 x 5.5 cm with yellow slough in center of wound, remainder is soft eschar, no drainage noted. Offloading pillow obtained and demonstrated how to use it with pt's spouse at bedside. Triad ointment applied to site.

## 2025-03-28 NOTE — CONSULT NOTE ADULT - ASSESSMENT
89y M Cantonese speaking, wheelchair bound, R hand dominant, b/l  hearing impairment ( b/l hearing aids) with PMHx of DM, Thalassemia minor/JUSTYNA, HLD, hx stroke with right sided weakness in 2004 and stroke workup (05/24) depression, glaucoma, BPH, s/p PEG placement on 5/7 presents with severe sepsis 2/2 multifocal PNA. GI consulted for PEG infection    Purulence around peg site, cleaned, sample collected overnight for culture, no elev in WBC, fever, chills or systemic symptoms to suggest abscess. Does have nw multifocal PNA, likely unrelated.     CT abd/pelv  BOWEL: Percutaneous gastrostomy tube present. No bowel obstruction. Appendix is normal.  PERITONEUM/RETROPERITONEUM: Mild inflammatory fat stranding in the mesentery, nonspecific.    Recommendations:  - Abx per primary   - FU Blood cultures  - FU peg site cultures  - electrolyte repletion per primary  - Peg tube functioning properly  - Hold tube feeds for now      Patient seen and discussed with GI Attending on Rounds Dr. Torin Cerna DO, PhD  Gastroenterology Fellow  GI Consult Weekday 7am-5pm Pager: [phone]  Weeknights/Weekend/Holiday Coverage: Please Call the  for contact info   89y M Cantonese speaking, wheelchair bound, R hand dominant, b/l  hearing impairment ( b/l hearing aids) with PMHx of DM, Thalassemia minor/JUSTYNA, HLD, hx stroke with right sided weakness in 2004 and stroke workup (05/24) depression, glaucoma, BPH, s/p PEG placement on 5/7 presents with severe sepsis 2/2 multifocal PNA. GI consulted for PEG infection    Purulence around peg site, cleaned, sample collected overnight for culture, no elev in WBC, fever, chills or systemic symptoms to suggest abscess. Does have nw multifocal PNA, likely unrelated.     CT abd/pelv  BOWEL: Percutaneous gastrostomy tube present. No bowel obstruction. Appendix is normal.  PERITONEUM/RETROPERITONEUM: Mild inflammatory fat stranding in the mesentery, nonspecific.    Recommendations:  - Abx per primary   - FU Blood cultures  - FU peg site cultures  - electrolyte repletion per primary  - Peg tube functioning properly  - Ok to continue using peg tube for feeds  - No indication to exchange or replace peg tube at this time.     Please call GI back if any additional questions or concerns.       Patient seen and discussed with GI Attending on Rounds Dr. Torin Cerna DO, PhD  Gastroenterology Fellow  GI Consult Weekday 7am-5pm Pager: [phone]  Weeknights/Weekend/Holiday Coverage: Please Call the  for contact info   89y M Cantonese speaking, wheelchair bound, R hand dominant, b/l  hearing impairment ( b/l hearing aids) with PMHx of DM, Thalassemia minor/JUSTYNA, HLD, hx stroke with right sided weakness in 2004 and stroke workup (05/24) depression, glaucoma, BPH, s/p PEG placement on 5/7 presents with severe sepsis 2/2 multifocal PNA. GI consulted for PEG infection    Purulence around peg site, cleaned, sample collected overnight for culture, no elev in WBC, fever, chills or systemic symptoms to suggest abscess. Does have nw multifocal PNA, likely unrelated.     CT abd/pelv  BOWEL: Percutaneous gastrostomy tube present. No bowel obstruction. Appendix is normal.  PERITONEUM/RETROPERITONEUM: Mild inflammatory fat stranding in the mesentery, nonspecific.    Recommendations:  - Abx per primary   - Can place zinc oxide around site BID for a few days.   - FU Blood cultures  - FU peg site cultures  - electrolyte repletion per primary  - Peg tube functioning properly  - Ok to continue using peg tube for feeds  - No indication to exchange or replace peg tube at this time.     Please call GI back if any additional questions or concerns.       Patient seen and discussed with GI Attending on Rounds Dr. Torin Cerna DO, PhD  Gastroenterology Fellow  GI Consult Weekday 7am-5pm Pager: [phone]  Weeknights/Weekend/Holiday Coverage: Please Call the  for contact info

## 2025-03-28 NOTE — DIETITIAN INITIAL EVALUATION ADULT - PROBLEM SELECTOR PLAN 4
Home Na 149 on arrival. FWD 1.5L     Plan   -D5, 0.45% NS 75mL/hr x 24h   -add free water flushes with tube feeds

## 2025-03-28 NOTE — DIETITIAN NUTRITION RISK NOTIFICATION - TREATMENT: THE FOLLOWING DIET HAS BEEN RECOMMENDED
Diet, NPO with Tube Feed (03-27-25 @ 14:28) [Active]  See initial assessment for complete recommendations     Pt. encouraged to perform ADL's. Pt. encouraged to attend at least 2 groups despite mood/energy and not to isolate. Pt. encouraged to seek staff support and verbalize negative self talk. Pt. encouraged to utilize effective coping skills to help improve mood. Nurse will encourage pt to use coping skills to alleviate depressive symptoms and seek out staff if expiriencing thoughts of self harm.

## 2025-03-28 NOTE — DIETITIAN INITIAL EVALUATION ADULT - OTHER INFO
89M, Cantonese speaking, wheelchair bound, R hand dominant with PMH of bilateral hearing impairment (bilateral hearing aids), DM, thalassemia minor/JUSTYNA, HLD, hx stroke (right sided weakness, 2004) and stroke workup (05/24), depression, glaucoma, BPH, and status post PEG placement (5/7) who presented with severe sepsis secondary to multifocal PNA and purulence at PEG site, admitted for work-up and management.     Pt seen on 7WO for assessment. Labs and medication orders reviewed. Na/Mg WNL, Phos 2.3 <low>, K 3.3 <low>, BUN/Cr 14 WNL/0.31 <low>, HgbA1c 8.1% (3/28), POC blood glucose (3/27-3/28) [phone]. Ordered for D5% + sodium chloride 0.45%. NPO, per GI (3/28) hold TF at this time, pending PEG site cultures. Pt lethargic on visit consistent with nursing documentation, interview deferred to wife at bedside. RD utilized Daojia , ID #[id number]. Reports pt NPO tolerating cyclic nocturnal tube feeds via PEG PTA: unspecified Glucerna formula (1.2 per prior admission RD note 12/12), x3 cans utilized over unknown time frame (18hr per RD note 12/12). States pt's UBW >110 pounds, endorses pt with wt loss in setting of recent hospitalizations for infections, per RD note (12/12) pt 115 pounds; current admission wt 106 pounds / 48.1 kilograms indicates 9 pound / 8% wt loss over ~x3 months - clinically significant. Nutrition-focused physical examination notable for moderate-severe muscle and fat wasting. Per ASPEN guidelines, pt meets criteria for severe malnutrition - see nutrition risk notification. Wife reports pt with no nausea/vomiting, endorses x1-3 loose BMs daily, last BM yesterday 3/27. No abdominal distension/discomfort noted, no pain reported or nonverbal indicators noted. Wife confirms pt with no known food allergies, no Jehovah's witness/ethnic/cultural food preferences noted. No edema documented, sacral unstageable pressure injury noted, Leonel score 13. See nutrition recommendations. RD to remain available.

## 2025-03-28 NOTE — PROGRESS NOTE ADULT - SUBJECTIVE AND OBJECTIVE BOX
HOSPITAL COURSE:     OVERNIGHT EVENTS:    SUBJECTIVE: Pt seen and evaluated bedside. _ .    ROS: otherwise negative      PHYSICAL EXAM:  Constitutional: resting comfortably in bed; NAD  Head: NC/AT  Eyes: EOMI, anicteric sclera  ENT: no nasal discharge; MMM  Neck: supple  Respiratory: CTA B/L; no W/R/R  Cardiac: RRR; no M/R/G  Gastrointestinal: soft, NT/ND; no rebound or guarding  Extremities: WWP, no clubbing or cyanosis; no peripheral edema  Musculoskeletal: NROM x4; no joint swelling, tenderness or erythema  Dermatologic: skin warm, dry and intact; no rashes, wounds, or scars  Neurologic: AAOx3; no focal deficits  Psychiatric: affect and characteristics of appearance, verbalizations, behaviors are appropriate        RADIOLOGY & ADDITIONAL TESTS: Reviewed   HOSPITAL COURSE: 89y M non-verbal ( understands Cantonese), wheelchair/bed bound, R hand dominant, b/l  hearing impairment ( b/l hearing aids) with PMHx of BPH (had indwelling hudson 2/2 chronic urinary retention, removed last hospitalization at [place]) , HFpEF (LVEF 56%, grade I diastolic dysfunction), aortic root aneurysm (4.6 cm), hx CVA with right sided weakness in 2004 and new R internal capsule CVA with L HP/WC/bed bound and Sz like activity on vEEG (04/24), Low BMI( 18.2), dysphagia, s/p PEG placement on (5/7/24) , last admitted to [facility]  (12/10-12/12/24) for wound care and further nutrition evaluation and management. Pt recently admitted to ICU for aspiration PNA discharged from [place] ( 2/25/25), now admitted ( 3/26) to [facility] for sepsis w/ lactic acidosis and acute hypoxic respiratory failure 2/2 multifocal /likely gram negative PNA( HAP since recent hospitalization and on PEG feeds), stage III sacral ulcer ( presence on admission), and concerns of PEG site infection w/ superficial ulceration and reported purulent discharge.     OVERNIGHT EVENTS: Anabel    SUBJECTIVE: Pt seen and evaluated bedside, A&Ox0. Wife present at bedside to supplement history.     ROS: otherwise negative      PHYSICAL EXAM:  Constitutional: resting comfortably in bed; NAD  Head: NC/AT  Eyes: EOMI, anicteric sclera  ENT: no nasal discharge; MMM  Neck: supple  Respiratory: CTA B/L; no W/R/R  Cardiac: RRR; no M/R/G  Gastrointestinal: soft, NT/ND; no rebound or guarding  Extremities: WWP, no clubbing or cyanosis; no peripheral edema  Musculoskeletal: NROM x4; no joint swelling, tenderness or erythema  Dermatologic: skin warm, dry and intact; no rashes, wounds, or scars  Neurologic: AAOx3; no focal deficits  Psychiatric: affect and characteristics of appearance, verbalizations, behaviors are appropriate        RADIOLOGY & ADDITIONAL TESTS: Reviewed   HOSPITAL COURSE: 89y M non-verbal ( understands Cantonese), wheelchair/bed bound, R hand dominant, b/l  hearing impairment ( b/l hearing aids) with PMHx of BPH (had indwelling hudson 2/2 chronic urinary retention, removed last hospitalization at [place]) , HFpEF (LVEF 56%, grade I diastolic dysfunction), aortic root aneurysm (4.6 cm), hx CVA with right sided weakness in 2004 and new R internal capsule CVA with L HP/WC/bed bound and Sz like activity on vEEG (04/24), low BMI( 18.2), dysphagia, s/p PEG placement on (5/7/24) , last admitted to [facility]  (12/10-12/12/24) for wound care and further nutrition evaluation and management. Pt recently admitted to ICU for aspiration PNA discharged from [place] ( 2/25/25), now admitted ( 3/26) to [facility] for sepsis w/ lactic acidosis and acute hypoxic respiratory failure 2/2 multifocal /likely gram negative PNA (HAP since recent hospitalization and on PEG feeds), stage III sacral ulcer ( presence on admission), and concerns of PEG site infection w/ superficial ulceration and reported purulent discharge.     OVERNIGHT EVENTS: Anabel    SUBJECTIVE: Pt seen and evaluated bedside, A&Ox0. Wife present at bedside to supplement history using  Traci ([id number]). She reports she was checking his vitals at home when she found him to have low blood pressure prompting her to come to the ED. She reports she has a HHA and cares for him. She is concerned with his sacral wound (stage 3 on admission) and feels like the home wound care was not adequate. She is also concerned with some lesions in the pts mouth she had pictures of (pt non cooperative to open mouth during my encounter, although no external lesions noted).     ROS: otherwise negative      PHYSICAL EXAM:  Constitutional:   Head: NC/AT  Eyes: EOMI, anicteric sclera  ENT: no nasal discharge; MMM  Neck: supple  Respiratory: CTA B/L; no W/R/R  Cardiac: RRR; no M/R/G  Gastrointestinal: soft, NT/ND; no rebound or guarding  Extremities: WWP, no clubbing or cyanosis; no peripheral edema  Musculoskeletal: NROM x4; no joint swelling, tenderness or erythema  Dermatologic: skin warm, dry and intact; no rashes, wounds, or scars  Neurologic: AAOx3; no focal deficits  Psychiatric: affect and characteristics of appearance, verbalizations, behaviors are appropriate        RADIOLOGY & ADDITIONAL TESTS: Reviewed   HOSPITAL COURSE: 89y M non-verbal ( understands Cantonese), wheelchair/bed bound, R hand dominant, b/l  hearing impairment ( b/l hearing aids) with PMHx of BPH (had indwelling hudson 2/2 chronic urinary retention, removed last hospitalization at [place]) , HFpEF (LVEF 56%, grade I diastolic dysfunction), aortic root aneurysm (4.6 cm), hx CVA with right sided weakness in 2004 and new R internal capsule CVA with L HP/WC/bed bound and Sz like activity on vEEG (04/24), low BMI( 18.2), dysphagia, s/p PEG placement on (5/7/24) , last admitted to [facility]  (12/10-12/12/24) for wound care and further nutrition evaluation and management. Pt recently admitted to ICU for aspiration PNA discharged from [place] ( 2/25/25), now admitted ( 3/26) to [facility] for sepsis w/ lactic acidosis and acute hypoxic respiratory failure 2/2 multifocal /likely gram negative PNA (HAP since recent hospitalization and on PEG feeds), stage III sacral ulcer ( presence on admission), and concerns of PEG site infection w/ superficial ulceration and reported purulent discharge.     OVERNIGHT EVENTS: Anabel    SUBJECTIVE: Pt seen and evaluated bedside, A&Ox0. Wife present at bedside to supplement history using  Traci ([id number]). She reports she was checking his vitals at home when she found him to have low blood pressure prompting her to come to the ED. She reports she has a HHA and cares for him. She is concerned with his sacral wound (stage 3 on admission) and feels like the home wound care was not adequate. She is also concerned with some lesions in the pts mouth she had pictures of (pt non cooperative to open mouth during my encounter, although no external lesions noted).     ROS: otherwise negative      PHYSICAL EXAM:  Constitutional: A&Ox0, opens eyes. Does not follow commands  Eyes: EOMI, anicteric sclera  ENT: Dry MM, does not open mouth to command  Respiratory: Coarse breath sounds  Cardiac: RRR  Gastrointestinal: soft, non distended. PEG site with dried exudate on dressing. No active oozing or erythema noted. No induration or crepitus  Extremities: WWP, B/L heel protectors on  Dermatologic: Stage 3 sacral ulcer with some purulence on dressing. Wound photos from 3/27 reviewed as well  Neurologic: AAOx0, non verbal and minimally responsive, groans intermittently        RADIOLOGY & ADDITIONAL TESTS: Reviewed   HOSPITAL COURSE: 89y M non-verbal ( understands Cantonese), wheelchair/bed bound, R hand dominant, b/l  hearing impairment ( b/l hearing aids) with PMHx of BPH (had indwelling hudson 2/2 chronic urinary retention, removed last hospitalization at [place]) , HFpEF (LVEF 56%, grade I diastolic dysfunction), aortic root aneurysm (4.6 cm), hx CVA with right sided weakness in 2004 and new R internal capsule CVA with L HP/WC/bed bound and Sz like activity on vEEG (04/24), low BMI( 18.2), dysphagia, s/p PEG placement on (5/7/24) , last admitted to [facility]  (12/10-12/12/24) for wound care and further nutrition evaluation and management. Pt recently admitted to ICU for aspiration PNA discharged from [place] ( 2/25/25), now admitted ( 3/26) to [facility] for sepsis w/ lactic acidosis and acute hypoxic respiratory failure 2/2 multifocal /likely gram negative PNA (HAP since recent hospitalization and on PEG feeds), stage III sacral ulcer ( presence on admission), and concerns of PEG site infection w/ superficial ulceration and reported purulent discharge.     OVERNIGHT EVENTS: Anabel    SUBJECTIVE: Pt seen and evaluated bedside, A&Ox0. Wife present at bedside to supplement history using  Traci ([id number]). She reports she was checking his vitals at home when she found him to have low blood pressure prompting her to come to the ED. She reports she has a HHA and cares for him. She is concerned with his sacral wound (stage 3 on admission) and feels like the home wound care was not adequate. She is also concerned with some lesions in the pts mouth she had pictures of (pt non cooperative to open mouth during my encounter, although no external lesions noted).     ROS: otherwise negative      PHYSICAL EXAM:  Constitutional: A&Ox0, opens eyes. Does not follow commands  Eyes: EOMI, anicteric sclera  ENT: Dry MM, does not open mouth to command  Respiratory: Coarse breath sounds  Cardiac: RRR  Gastrointestinal: soft, non distended. PEG site with dried exudate on dressing. No active oozing or erythema noted. No induration or crepitus. Condom catheter on, 70cc yellow urine output  Extremities: WWP, B/L heel protectors on  Dermatologic: Stage 3 sacral ulcer with some purulence on dressing. Wound photos from 3/27 reviewed as well  Neurologic: AAOx0, non verbal and minimally responsive, groans intermittently        RADIOLOGY & ADDITIONAL TESTS: Reviewed

## 2025-03-28 NOTE — CONSULT NOTE ADULT - TIME BILLING
Emotional Support/Supportive Care and Clarification of Potential Disease Trajectory related to  Assessment of Symptom [place] and Palliative regimen  Education and Monitoring of Opiates including titration and management of high risk medications  Discharge Facilitation with ongoing coordination  Exploration of GOC/Advanced Directives including HCP designation, code status, and hospice eligibility    Time exclusive of ACP discussion  Time inclusive of chart review, medication ordering, discussion with primary team, clinical documentation, and communication with family/caregiver

## 2025-03-28 NOTE — DIETITIAN INITIAL EVALUATION ADULT - ADD RECOMMEND
1. As medically feasible, recommend resume cyclic nocturnal tube feeds via PEG: Glucerna 1.5 at 10mL/hr and increase 10mL q6hr or as tolerated to goal rate 80mL/hr x12hr (19:00-07:00) to provide 960mL total volume, 1440kcal (30kcal/kg dosing wt 48.1kg), 79g protein (1.65g/kg dosing wt 48.1kg), and 728mL free water.   >>Defer free water flushes to team.  >>Monitor GI tolerance and maintain aspiration precautions. RD to remain available to adjust EN regimen prn.   >>Note pt at risk for refeeding syndrome in setting of severe malnutrition and electrolyte abnormalities. Recommend advance tube feeds cautiously. Monitor K/Phos/Mg and replete prn. If electrolytes low, consider holding initiation or increase in feeds until electrolytes are supplemented and/or normalized.   2. Pending tolerance of above regimen at goal for >24hr and as medically feasible, consider transition to bolus feeds: Glucerna 1.5 x4 237mL (=x1 can) feeds daily to provide 948mL total volume, 1422kcal (30kcal/kg dosing wt 48.1kg), 78g protein (1.6g/kg dosing wt 48.1kg), and 720mL free water.   >>Defer free water flushes to team. Recommend at least 30mL free water flush before and after each tube feed bolus for line patency; increase as need for hydration demands.   >>Monitor GI tolerance and maintain aspiration precautions. RD to remain available to adjust EN regimen prn.   3. Recommend add thiamine pending no medical contraindications.   4. Monitor weight trends, labs, skin integrity, & hydration status.  5. Pain and bowel regimens per team.  6. RD remains available for dietary education/intervention prn.

## 2025-03-28 NOTE — PROGRESS NOTE ADULT - ASSESSMENT
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/WC/bed bound and Sz like activity on vEEG (04/24), low BMI( 18.2), dysphagia, s/p PEG placement on (5/7/24) , presented with severe sepsis 2/2 multifocal PNA  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/WC/bed bound and Sz like activity on vEEG (04/24), low BMI( 18.2), dysphagia, s/p PEG placement on (5/7/24) , presented with severe sepsis 2/2 multifocal PNA and concern for infection to PEG and sacral wound. Currently HDS on zosyn.

## 2025-03-28 NOTE — DIETITIAN INITIAL EVALUATION ADULT - PERSON TAUGHT/METHOD
Discussed resumption on tube feeds as sole source nutrition pending medical feasibility and pt tolerance. Answered questions regarding formula and nutritional adequacy of regimen. Educated on signs of tolerance to monitor. Pt's wife aware RD remains available for additional questions/concerns./verbal instruction/spouse instructed

## 2025-03-28 NOTE — CONSULT NOTE ADULT - SUBJECTIVE AND OBJECTIVE BOX
GASTROENTEROLOGY CONSULT NOTE  HPI:  89y M Cantonese speaking, wheelchair bound, R hand dominant, b/l  hearing impairment ( b/l hearing aids) with PMHx of DM, Thalassemia minor/JUSTYNA, HLD, hx stroke with right sided weakness in 2004 and stroke workup (05/24) depression, glaucoma, BPH, s/p PEG placement on 5/7 now coming into ED as pt wife took his vitals and his BP was 80/40s and HR was in the 110s. Wife also noted that he had increased secretions and noted purulence at his PEG site. Pt non-verbal since 1 month ago since hospitalization at [place] (discharge on 25 Feb 25) during which he was hospitalized for 2 weeks in the ICU for aspiration PNA. At the time, the pt's hudson was removed (prior he had a chronic hudson for urinary retention). Pt now wears diapers at home and last BM was yesterday.     ED course:  Vitals: 98.4F, , 122/76, RR 20, 95% 6L NC weaned to 2L    Labs: WBC 7.39, Hgb 8.3, MCV 74.5, Plt 128, Na 149, K 3.7, Cl 113, BUN 42, Cr 0.90, Lactate 6.0---> 3.0   EKG:   Imaging:  CT Chest, Abdomen/Pelvis shows multifocal pneumonia and mild inflammatory fat stranding in the mesentery w/ perinephric fat stranding.   Interventions: aspirin 81mgx1, metformin 456sup1, metoprolol 50mg x1, ofrimev 1000mg x1, azithromycin 500mg x1, CTX 1g x1, 1.85L NS    (27 Mar 2025 14:04)    Allergies    No Known Allergies    Intolerances      Home Medications:  aspirin 81 mg oral tablet, chewable: 1 tab(s) orally once a day (27 Mar 2025 08:32)  atorvastatin 40 mg oral tablet: 1 tab(s) orally once a day (at bedtime) (27 Mar 2025 08:32)  cyanocobalamin: 1 tab(s) once a day (27 Mar 2025 08:44)  doxazosin 1 mg oral tablet: 1 tab(s) orally once a day (at bedtime) (27 Mar 2025 08:33)  finasteride 5 mg oral tablet: 1 tab(s) orally once a day (27 Mar 2025 08:40)  folic acid 1 mg oral tablet: 1 tab(s) orally once a day (10 Dec 2024 23:03)  furosemide: 20 milligram(s) once a day as needed for  scrotal edma (27 Mar 2025 08:43)  Januvia 50 mg oral tablet: 1 tab(s) orally once a day (27 Mar 2025 08:33)  LevETIRAcetam: 750 milligram(s) every 12 hours (27 Mar 2025 08:35)  metFORMIN 500 mg oral tablet: 1 tab(s) orally 3 times a day (10 Dec 2024 23:03)  thiamine 100 mg oral capsule: 1 cap(s) orally once a day (27 Mar 2025 08:41)  timolol maleate 0.5% ophthalmic solution: 1 drop(s) in each affected eye 2 times a day (27 Mar 2025 08:42)    MEDICATIONS:  MEDICATIONS  (STANDING):  aspirin  chewable 81 milliGRAM(s) Oral every 24 hours  atorvastatin 40 milliGRAM(s) Oral at bedtime  azithromycin  IVPB 500 milliGRAM(s) IV Intermittent every 24 hours  dextrose 5% + sodium chloride 0.45%. 1000 milliLiter(s) (75 mL/Hr) IV Continuous <Continuous>  dextrose 5%. 1000 milliLiter(s) (50 mL/Hr) IV Continuous <Continuous>  dextrose 5%. 1000 milliLiter(s) (100 mL/Hr) IV Continuous <Continuous>  dextrose 50% Injectable 25 Gram(s) IV Push once  dextrose 50% Injectable 12.5 Gram(s) IV Push once  dextrose 50% Injectable 25 Gram(s) IV Push once  doxazosin 1 milliGRAM(s) Oral at bedtime  enoxaparin Injectable 40 milliGRAM(s) SubCutaneous every 24 hours  finasteride 5 milliGRAM(s) Oral every 24 hours  glucagon  Injectable 1 milliGRAM(s) IntraMuscular once  levETIRAcetam  IVPB 750 milliGRAM(s) IV Intermittent every 12 hours  piperacillin/tazobactam IVPB.. 4.5 Gram(s) IV Intermittent every 8 hours  potassium phosphate IVPB 30 milliMole(s) IV Intermittent once  timolol 0.5% Solution 1 Drop(s) Both EYES two times a day    MEDICATIONS  (PRN):  dextrose Oral Gel 15 Gram(s) Oral once PRN Blood Glucose LESS THAN 70 milliGRAM(s)/deciliter    PAST MEDICAL & SURGICAL HISTORY:  Diabetes  HLD (hyperlipidemia)  BPH (benign prostatic hyperplasia)  JUSTYNA (iron deficiency anemia)  Stroke  No significant past surgical history      FAMILY HISTORY:  No pertinent family history in first degree relatives      SOCIAL HISTORY:  Tobacco: denies  Alcohol: denies  Illicit Drugs: denies    REVIEW OF SYSTEMS:  All other 10 review of systems is negative unless indicated above.    Vital Signs Last 24 Hrs  T(C): 37.1 (28 Mar 2025 12:00), Max: 37.1 (27 Mar 2025 13:41)  T(F): 98.7 (28 Mar 2025 12:00), Max: 98.8 (27 Mar 2025 13:41)  HR: 89 (28 Mar 2025 12:00) (86 - 102)  BP: 106/70 (28 Mar 2025 12:00) (105/64 - 122/72)  BP(mean): 81 (28 Mar 2025 06:22) (81 - 81)  RR: 16 (28 Mar 2025 12:00) (15 - 16)  SpO2: 96% (28 Mar 2025 12:00) (96% - 99%)    Parameters below as of 28 Mar 2025 12:00  Patient On (Oxygen Delivery Method): nasal cannula  O2 Flow (L/min): 3    PHYSICAL EXAM:  General: lying in bed, in no acute distress  HEENT: Neck supple, mmm, no jvd  Lungs: Normal respiratory effort, no intercostal retractions  Cardiovascular: regular rate  Abdomen: Soft, non-tender non-distended; No rebound or guarding, peg tube with purulence around  Extremities: wwp, no cce  Skin: Warm and dry. sacral ulcer      LABS:                        7.6    4.95  )-----------( 134      ( 28 Mar 2025 08:30 )             24.8     03-28    142  |  110[H]  |  14  ----------------------------<  275[H]  3.3[L]   |  23  |  0.31[L]    Ca    7.8[L]      28 Mar 2025 08:30  Phos  2.3     03-28  Mg     2.1     03-28    TPro  5.6[L]  /  Alb  2.7[L]  /  TBili  0.4  /  DBili  x   /  AST  25  /  ALT  12  /  AlkPhos  63  03-27    PT/INR - ( 26 Mar 2025 19:21 )   PT: 12.7 sec;   INR: 1.10       PTT - ( 26 Mar 2025 19:21 )  PTT:32.7 sec    Urinalysis with Rflx Culture (collected 27 Mar 2025 17:25)    Culture - Blood (collected 26 Mar 2025 19:23)  Source: Blood Blood-Peripheral  Preliminary Report (28 Mar 2025 01:02):    No growth at 24 hours    Culture - Blood (collected 26 Mar 2025 19:23)  Source: Blood Blood-Peripheral  Preliminary Report (28 Mar 2025 01:02):    No growth at 24 hours      RADIOLOGY & ADDITIONAL STUDIES:     Reviewed  
[facility]  John Hernandez, Palliative Care Attending  Contact Info: Call [phone] (HEAL Line) or message on [employer] Teams    HPI:  89y M Cantonese speaking, wheelchair bound, R hand dominant, b/l  hearing impairment ( b/l hearing aids) with PMHx of DM, Thalassemia minor/JUSTYNA, HLD, hx stroke with right sided weakness in 2004 and stroke workup (05/24) depression, glaucoma, BPH, s/p PEG placement on 5/7 now coming into ED as pt wife took his vitals and his BP was 80/40s and HR was in the 110s. Wife also noted that he had increased secretions and noted purulence at his PEG site. Pt non-verbal since 1 month ago since hospitalization at [place] (discharge on 25 Feb 25) during which he was hospitalized for 2 weeks in the ICU for aspiration PNA. At the time, the pt's hudson was removed (prior he had a chronic hudson for urinary retention). Pt now wears diapers at home and last BM was yesterday.     ED course:  Vitals: 98.4F, , 122/76, RR 20, 95% 6L NC weaned to 2L    Labs: WBC 7.39, Hgb 8.3, MCV 74.5, Plt 128, Na 149, K 3.7, Cl 113, BUN 42, Cr 0.90, Lactate 6.0---> 3.0   EKG:   Imaging:  CT Chest, Abdomen/Pelvis shows multifocal pneumonia and mild inflammatory fat stranding in the mesentery w/ perinephric fat stranding.   Interventions: aspirin 81mgx1, metformin [id number], metoprolol 50mg x1, ofrimev 1000mg x1, azithromycin 500mg x1, CTX 1g x1, 1.85L NS    (27 Mar 2025 14:04)    PERTINENT PM/SXH:   Diabetes    HLD (hyperlipidemia)    BPH (benign prostatic hyperplasia)    JUSTYNA (iron deficiency anemia)    Stroke      No significant past surgical history      FAMILY HISTORY:  No pertinent family history in first degree relatives      ITEMS NOT CHECKED ARE NOT PRESENT    SOCIAL HISTORY:   Significant other/partner:  []  Children:  []   Substance hx:  []   Tobacco hx:  []   Alcohol hx: []   Home Opioid hx:  [] I-Stop Reference No:  - no active Rx's / see chart note  Living Situation: []Home  []Long term care  []Rehab []Other  Confucianist/Spiritual practice: ; Role of organized Yazidism [ ] important [ ] some [ ] unable to assess  Coping: [ ] well [ ] with difficulty [ ] poor coping [ ] unable to assess  Support system: [ ] strong [ ] adequate [ ] inadequate    ADVANCE DIRECTIVES:    []MOLST  []Living Will  DECISION MAKER(s):  [] Health Care Proxy(s)  [] Surrogate(s)  [] Guardian           Name(s)/Phone Number(s):     BASELINE (I)ADLs (prior to admission):  [place]: []Total  [] Moderate []Dependent    ALLERGIES:  No Known Allergies    MEDICATIONS  (STANDING):  albuterol/ipratropium for Nebulization 3 milliLiter(s) Nebulizer every 12 hours  ascorbic acid 500 milliGRAM(s) Oral daily  aspirin  chewable 81 milliGRAM(s) Oral every 24 hours  atorvastatin 40 milliGRAM(s) Oral at bedtime  cyanocobalamin 1000 MICROGram(s) Oral daily  dextrose 5%. 1000 milliLiter(s) (100 mL/Hr) IV Continuous <Continuous>  dextrose 5%. 1000 milliLiter(s) (50 mL/Hr) IV Continuous <Continuous>  dextrose 50% Injectable 25 Gram(s) IV Push once  dextrose 50% Injectable 12.5 Gram(s) IV Push once  dextrose 50% Injectable 25 Gram(s) IV Push once  doxazosin 1 milliGRAM(s) Oral at bedtime  enoxaparin Injectable 40 milliGRAM(s) SubCutaneous every 24 hours  finasteride 5 milliGRAM(s) Oral every 24 hours  glucagon  Injectable 1 milliGRAM(s) IntraMuscular once  insulin lispro (ADMELOG) corrective regimen sliding scale   SubCutaneous every 6 hours  insulin regular  human recombinant 3 Unit(s) SubCutaneous every 6 hours  levETIRAcetam  Solution 750 milliGRAM(s) Enteral Tube two times a day  multivitamin 1 Tablet(s) Oral daily  piperacillin/tazobactam IVPB.. 4.5 Gram(s) IV Intermittent every 8 hours  sodium chloride 7% Inhalation 4 milliLiter(s) Inhalation every 12 hours  thiamine 100 milliGRAM(s) Oral daily  timolol 0.5% Solution 1 Drop(s) Both EYES two times a day  zinc oxide 20% Ointment 1 Application(s) Topical two times a day  zinc sulfate 220 milliGRAM(s) Oral daily    MEDICATIONS  (PRN):  dextrose Oral Gel 15 Gram(s) Oral once PRN Blood Glucose LESS THAN 70 milliGRAM(s)/deciliter    PRESENT SYMPTOMS: []Unable to obtain due to poor mentation/encephalopathy  Source if other than patient:  []Family   []Team     Pain: [ ] yes [ ] no  QOL impact -   Location -                    Aggravating Factors -  Quality -  Radiation -  Timing -  Severity (0-10 scale) -   Minimal Acceptable Level (0-10 scale) -    PAIN AD Score:  [email or web address] (press ctrl +  left click to view)    Dyspnea:                           [ ]Mild  [ ]Moderate [ ]Severe  Anxiety:                             [ ]Mild [ ]Moderate [ ]Severe  Fatigue:                             [ ]Mild [ ]Moderate [ ]Severe  Nausea:                             [ ]Mild [ ]Moderate [ ]Severe  Loss of Appetite:             [ ]Mild [ ]Moderate [ ]Severe  Constipation:                   [ ]Mild [ ]Moderate [ ]Severe    Other Symptoms:  [ ]All other review of systems negative     Palliative Performance Status Version 2:  %    http://npcrc.org/files/news/palliative_performance_scale_[email or web address].pdf    PHYSICAL EXAM:  GENERAL:  [ ]Alert  [ ]Oriented x   [ ]Lethargic  [ ]Cachexia  [ ]Unarousable  [ ]Verbal  [ ]Non-Verbal  Behavioral:   [ ]Anxiety  [ ]Delirium [ ]Agitation [ ]Cooperative  HEENT:  [ ]Normal   [ ]Dry mouth   [ ]ET Tube/Trach  [ ]Oral lesions  PULMONARY:   [ ]Clear []Tachypnea  []Audible excessive secretions   [ ]Rhonchi        [ ]Right [ ]Left [ ]Bilateral  [ ]Crackles        [ ]Right [ ]Left [ ]Bilateral  [ ]Wheezing     [ ]Right [ ]Left [ ]Bilateral  CARDIOVASCULAR:    [ ]Regular [ ]Irregular [ ]Tachy  [ ]Kevin [ ]Murmur [ ]Other  GASTROINTESTINAL:  [ ]Soft  [ ]Distended   [ ]+BS  [ ]Non tender [ ]Tender  [ ]PEG [ ]OGT/ NGT  Last BM:     GENITOURINARY:  [ ]Normal [ ] Incontinent   [ ]Oliguria/Anuria   [ ]Hudson  MUSCULOSKELETAL:   [ ]Normal   [ ]Weakness  [ ]Bed/Wheelchair bound [ ]Edema  NEUROLOGIC:   [ ]No focal deficits  [ ]Cognitive impairment  [ ]Dysphagia [ ]Dysarthria [ ]Paresis [ ]Encephalopathic   SKIN:   [ ]Normal   [ ]Pressure ulcer(s)  [ ]Rash    CRITICAL CARE:  [ ]Shock Present  [ ]Septic [ ]Cardiogenic [ ]Neurologic [ ]Hypovolemic  [ ]Vasopressors [ ]Inotropes   [ ]Respiratory failure present [ ]Mechanical Ventilation [ ]Non-invasive ventilatory support [ ]High-Flow  [ ]Acute  [ ]Chronic [ ]Hypoxic  [ ]Hypercarbic  [ ]Other organ failure    Vital Signs Last 24 Hrs  T(C): 36.6 (31 Mar 2025 06:29), Max: 36.6 (30 Mar 2025 21:05)  T(F): 97.9 (31 Mar 2025 06:29), Max: 97.9 (30 Mar 2025 21:05)  HR: 102 (31 Mar 2025 06:29) (100 - 107)  BP: 112/71 (31 Mar 2025 06:29) (102/66 - 112/71)  BP(mean): --  RR: 18 (31 Mar 2025 06:29) (18 - 18)  SpO2: 98% (31 Mar 2025 06:29) (96% - 98%)    Parameters below as of 31 Mar 2025 06:29    O2 Flow (L/min): 3   I&O's Summary    30 Mar 2025 07:01  -  31 Mar 2025 07:00  --------------------------------------------------------  IN: 640 mL / OUT: 450 mL / NET: 190 mL        LABS:                        7.6    3.93  )-----------( 115      ( 30 Mar 2025 05:30 )             25.0   03-30    136  |  106  |  14  ----------------------------<  272[H]  4.1   |  22  |  0.27[L]    Ca    7.8[L]      30 Mar 2025 05:30  Phos  2.4     03-30  Mg     2.3     03-30    TPro  5.2[L]  /  Alb  2.5[L]  /  TBili  0.2  /  DBili  x   /  AST  35  /  ALT  18  /  AlkPhos  100  03-30    Urinalysis Basic - ( 30 Mar 2025 05:30 )    Color: x / Appearance: x / SG: x / pH: x  Gluc: 272 mg/dL / Ketone: x  / Bili: x / Urobili: x   Blood: x / Protein: x / Nitrite: x   Leuk Esterase: x / RBC: x / WBC x   Sq Epi: x / Non Sq Epi: x / Bacteria: x      RADIOLOGY & ADDITIONAL STUDIES:      PROTEIN CALORIE MALNUTRITION PRESENT: [ ]mild [ ]moderate [ ]severe [ ]underweight [ ]morbid obesity  [ ]PPSV2 < or = to 30% [ ]significant weight loss  [ ]poor nutritional intake [ ]catabolic state [ ]anasarca     Artificial Nutrition [ ]     REFERRALS:  [x]Social Work  [ ]Case management [ ]PT/OT [ ]Chaplaincy  [ ]Hospice  [ ]Patient/Family Support    DISCUSSION OF CASE: Family - to obtain additional history and to provide emotional support; ( ) -

## 2025-03-28 NOTE — PROGRESS NOTE ADULT - PROBLEM SELECTOR PLAN 2
Purulence at site of PEG. No acute abdomen.   Plan  -wound culture of PEG site  -c/w zosyn 4.5 mg x7 days for anaerobic coverage   -GI consult for infected PEG #Acute hypoxic respiratory failure, resolved  CT findings of multifocal PNA, HAP vs. CAP vs. aspiration (recent hospitalization for aspiration PNA 2/25)  On 2L nasal cannula currently    Plan  -zosyn 4.5g x5 days   - NC2L, titrate O2 to keep SpO2>92%  -aspiration precautions

## 2025-03-28 NOTE — PROGRESS NOTE ADULT - PROBLEM SELECTOR PLAN 8
Home Meds: finasteride, doxazosin     Likely pyelonephritis vs. UTI 2/2 retention    Plan  -BS w/ PVR (pt appears to be retaining >250cc), f/u BS q6h   -UA w/ urine cx   -zosyn 4.5mg x 5 days for complicated UTI vs. pyelonephritis Home Meds: finasteride, doxazosin   Likely pyelonephritis vs. UTI 2/2 retention  UA negative  Bladder scan overnight 152 cc, 144cc this AM  Condom cath on with 70cc UOP this am    Plan  -zosyn 4.5mg x 5 days as above  -C/W bladder scans q6

## 2025-03-28 NOTE — DIETITIAN INITIAL EVALUATION ADULT - OBTAIN WEEKLY WEIGHT
Spoke to patient in regards to stress test on 3/28 @ 130pm. Provided detailed instructions below and patient verbalized understanding.     • Location: 6th Floor OPP, Parking garage D on [facility]  • Arrive 15min early for registration  • No eating 1hr prior to test  • No Caffeine 12hrs prior to test  • Please wear comfortable clothes/shoes for exercise     yes

## 2025-03-28 NOTE — DIETITIAN INITIAL EVALUATION ADULT - NUTRITIONGOAL OUTCOME1
Pt to consistently meet >75% nutrient needs via most appropriate route for nutrition. Reduce signs and symptoms of protein-calorie malnutrition.   Maintain nutrition within goals of care.

## 2025-03-28 NOTE — PROGRESS NOTE ADULT - PROBLEM SELECTOR PLAN 3
CT findings of multifocal PNA, HAP vs. CAP vs. aspiration     Plan  -zosyn 4.5g x5 days   -f/u blood culture  -sputum cultures (if able to produce)  -aspiration precautions Purulence at site of PEG. No acute abdomen.   GI consulted: ok to use PEG, no indication to change PEG at this point    Plan  -F/U wound culture from PEG site  -c/w zosyn 4.5 mg x7 days for anaerobic coverage   -Tube feeds resumed per nutrition recs (Glucerna 1.5 at 10mL/hr and increase 10mL q6hr or as tolerated to goal rate 80mL/hr x12hr)

## 2025-03-28 NOTE — DIETITIAN INITIAL EVALUATION ADULT - PERTINENT MEDS FT
MEDICATIONS  (STANDING):  aspirin  chewable 81 milliGRAM(s) Oral every 24 hours  atorvastatin 40 milliGRAM(s) Oral at bedtime  azithromycin  IVPB 500 milliGRAM(s) IV Intermittent every 24 hours  dextrose 5% + sodium chloride 0.45%. 1000 milliLiter(s) (75 mL/Hr) IV Continuous <Continuous>  dextrose 5%. 1000 milliLiter(s) (100 mL/Hr) IV Continuous <Continuous>  dextrose 5%. 1000 milliLiter(s) (50 mL/Hr) IV Continuous <Continuous>  dextrose 50% Injectable 25 Gram(s) IV Push once  dextrose 50% Injectable 12.5 Gram(s) IV Push once  dextrose 50% Injectable 25 Gram(s) IV Push once  doxazosin 1 milliGRAM(s) Oral at bedtime  enoxaparin Injectable 40 milliGRAM(s) SubCutaneous every 24 hours  finasteride 5 milliGRAM(s) Oral every 24 hours  glucagon  Injectable 1 milliGRAM(s) IntraMuscular once  levETIRAcetam  IVPB 750 milliGRAM(s) IV Intermittent every 12 hours  piperacillin/tazobactam IVPB.. 4.5 Gram(s) IV Intermittent every 8 hours  timolol 0.5% Solution 1 Drop(s) Both EYES two times a day    MEDICATIONS  (PRN):  dextrose Oral Gel 15 Gram(s) Oral once PRN Blood Glucose LESS THAN 70 milliGRAM(s)/deciliter

## 2025-03-28 NOTE — DIETITIAN INITIAL EVALUATION ADULT - PERTINENT LABORATORY DATA
03-28    142  |  110[H]  |  14  ----------------------------<  275[H]  3.3[L]   |  23  |  0.31[L]    Ca    7.8[L]      28 Mar 2025 08:30  Phos  2.3     03-28  Mg     2.1     03-28    TPro  5.6[L]  /  Alb  2.7[L]  /  TBili  0.4  /  DBili  x   /  AST  25  /  ALT  12  /  AlkPhos  63  03-27  POCT Blood Glucose.: 267 mg/dL (03-28-25 @ 12:41)  A1C with Estimated Average Glucose Result: 8.1 % (03-28-25 @ 08:30)  A1C with Estimated Average Glucose Result: 6.8 % (12-11-24 @ 06:19)  A1C with Estimated Average Glucose Result: 6.3 % (04-28-24 @ 05:30)

## 2025-03-28 NOTE — PROGRESS NOTE ADULT - PROBLEM SELECTOR PLAN 5
Home meds: metformin 500mg TID, Januvia 50mg daily  Previous A1C 6.3%   - mISS - once resumed tube feeds  - Hold Tube feeds  - FSG q6 hrs while NPO. Home meds: metformin 500mg TID, Januvia 50mg daily  Previous A1C 6.3%   - Steve

## 2025-03-28 NOTE — CONSULT NOTE ADULT - ASSESSMENT
89y M Cantonese speaking, wheelchair bound, R hand dominant, b/l  hearing impairment ( b/l hearing aids) with PMHx of DM, Thalassemia minor/JUSTYNA, HLD, hx stroke with right sided weakness in 2004 and stroke workup (05/24) depression, glaucoma, BPH, s/p PEG placement on 5/7 presents with severe sepsis 2/2 multifocal PNA     FULL CONSULT TO FOLLOW.    Palliative care consulted to assist with advanced care planning and evaluation for hospice.  At this time, family will be speaking internally, but patient remains full code and not amenable to hospice.

## 2025-03-28 NOTE — PROGRESS NOTE ADULT - ATTENDING COMMENTS
89y M non-verbal ( understands Cantonese), wheelchair/bed bound, R hand dominant, b/l  hearing impairment ( b/l hearing aids) with PMHx of Low BMI( 18.2), Depression, glaucoma, HLD, DM, Thalassemia minor/JUSTYNA, BPH/indwelling hudson 2/2 chronic urinary retention , HFpEF (LVEF 56%, grade I diastolic dysfunction), aortic root aneurysm (4.6 cm), hx CVA with right sided weakness in 2004 and new R internal capsule CVA with L HP/WC/bed bound and Sz like activity on vEEG (04/24), dysphagia, s/p PEG placement on (5/7/24) , last admitted to [facility]  (12/10-12/12/24) for wound care and further nutrition evaluation and management. GI consulted and PEG tube study showed PEG is functioning, and no need to change to a new one. WOCN consult appreciated, given Sliver nitrate and aquagel Ag dressing daily to PEG site. Dietitian consult appreciated, TF changed to Glucerna 1.2 @ 83ml/hr from 5pm to 11am x 18hr, total 6 cans per day. Pt recently admitted to ICU for aspiration PNA and had indwelling hudson removed and discharged from [place] ( 2/25/25), now admitted ( 3/26) to [facility] for sepsis w/ lactic acidosis and acute hypoxic respiratory failure 2/2 multifocal /likely gram negative PNA( HAP since recent hospitalization and on PEG feeds), stage III sacral ulcer ( presence on admission), and concerns of PEG site infection w/ superficial ulceration and reported purulent discharge.     # Sepsis ( met 2/4 criteria on adm)  # ? HAP likely gram negative PNA ( lower lobes) w /trace R pleural effusion   # Acute hypoxic respiratory failure   - NC2L, titrate O2 to keep SpO2>92%  - agrees w/ Zosyn 4.5g IV q8h x 7 days ( 3/28-4/3) Day 1   - c/w Azithromycin 500mg daily for 3 days( 3/28-3/30)  , Day 1   - check urine strep and legionella antigen   - check procalcitonin   - normal WBC but w/ left shift   - BCx( 3/26); ngtd x24hr   - oral hygiene q shift please     # PEG (percutaneous endoscopic gastrostomy) adjustment/replacement/removal.   - PEG tube in place   - f/u GI recs- ? hold feeds ( please give IVF if holding feeds for any reason)   - f/u wound care w. resume silver nitrate and aquagel Ag to PEG site     # Low BMI/malnutrition  # Uncontrolled DM  -A1C 8.1%  - hold home meds: metformin 500mg TID, Januvia 50mg daily  - BMI 18.2, albumin 2.8   - dietitian consult please ( please clarify feeds from home)   - mIVF + Q6 FS while NPO.    # unstageable tissue injury sacral area ( POA)   - WOCN consult appreciated, f/u recs: Triad ointment every other day  - add MVI daily, zinc sulfatre 220mg daily and Vit C 500mg daily     # Chronic Urinary retention  # BPH  - resume doxazosin 1mg daily   - ? hudson cath placed in ED?     # hx CVA/R & L H/non-verbal/dysphagia  # hxSz like activity  - c/w ASA/ Atorvastatin and Keppra    # DVT ppx: 89y M non-verbal ( understands Cantonese), wheelchair/bed bound, R hand dominant, b/l  hearing impairment ( b/l hearing aids) with PMHx of Low BMI( 18.2), Depression, glaucoma, HLD, DM, Thalassemia minor/JUSTYNA, BPH/indwelling hudson 2/2 chronic urinary retention , HFpEF (LVEF 56%, grade I diastolic dysfunction), aortic root aneurysm (4.6 cm), hx CVA with right sided weakness in 2004 and new R internal capsule CVA with L HP/WC/bed bound and Sz like activity on vEEG (04/24), dysphagia, s/p PEG placement on (5/7/24) , last admitted to [facility]  (12/10-12/12/24) for wound care and further nutrition evaluation and management. GI consulted and PEG tube study showed PEG is functioning, and no need to change to a new one. WOCN consult appreciated, given Sliver nitrate and aquagel Ag dressing daily to PEG site. Dietitian consult appreciated, TF changed to Glucerna 1.2 @ 83ml/hr from 5pm to 11am x 18hr, total 6 cans per day. Pt recently admitted to ICU for aspiration PNA and had indwelling hudson removed and discharged from [place] ( 2/25/25), now admitted ( 3/26) to [facility] for sepsis w/ lactic acidosis and acute hypoxic respiratory failure 2/2 multifocal /likely gram negative PNA( HAP since recent hospitalization and on PEG feeds), stage III sacral ulcer ( presence on admission), and concerns of PEG site infection w/ superficial ulceration and reported purulent discharge.     # Sepsis ( met 2/4 criteria on adm)  # ? HAP likely gram negative PNA ( lower lobes) w /trace R pleural effusion   # Acute hypoxic respiratory failure   - NC2L, titrate O2 to keep SpO2>92%  - agrees w/ Zosyn 4.5g IV q8h x 7 days ( 3/28-4/3) Day 1   - c/w Azithromycin 500mg daily for 3 days( 3/28-3/30)  , Day 1   - check urine strep and legionella antigen   - check procalcitonin   - normal WBC but w/ left shift   - BCx( 3/26); ngtd x24hr   - oral hygiene q shift please     # PEG (percutaneous endoscopic gastrostomy) adjustment/replacement/removal.   - PEG tube in place   - f/u GI recs- ? hold feeds ( please give IVF if holding feeds for any reason)   - f/u wound care w. resume silver nitrate and aquagel Ag to PEG site     # Low BMI/malnutrition  # Uncontrolled DM  -A1C 8.1%  - hold home meds: metformin 500mg TID, Januvia 50mg daily  - BMI 18.2, albumin 2.8   - dietitian consult please ( please clarify feeds from home)   - mIVF + Q6 FS while NPO.    # unstageable tissue injury sacral area ( POA)   - WOCN consult appreciated, f/u recs: Triad ointment every other day  - add MVI daily, zinc sulfatre 220mg daily and Vit C 500mg daily     # Chronic Urinary retention  # BPH  - resume doxazosin 1mg daily   - ? hudson cath placed in ED?     # hx CVA/R & L H/non-verbal/dysphagia  # hxSz like activity  - c/w ASA/ Atorvastatin and Keppra    # JUSTYNA/Thalassemia  - please check anemia w/u: iron panel, B12,  folate, TSH     # DVT ppx: add Lovenox 40mg SQ daily     # code status; FULL CODE. GOC discussed with surrogate/wife at bedside    # Disposition: pt has 24/7 HHA, awaiting clinical improvement, likely d/c plan early next week     POC as d/w 7WO team

## 2025-03-28 NOTE — PROGRESS NOTE ADULT - PROBLEM SELECTOR PLAN 9
F: D5LR @75cc/hr for 24 hrs  E: replete as needed; Keep K>4, Mg >2   D: NPO   DVT Proph: Lovenox  Dispo: RMF.  Full Code F: None  E: replete as needed; Keep K>4, Mg >2   D: Tube feeds as above  DVT Proph: Lovenox  Dispo: RMF.  Full Code

## 2025-03-28 NOTE — DIETITIAN NUTRITION RISK NOTIFICATION - ADDITIONAL COMMENTS/DIETITIAN RECOMMENDATIONS
Pt's wife states pt's UBW >110 pounds, endorses pt with wt loss in setting of recent hospitalizations for infections, per RD note (12/12) pt 115 pounds; current admission wt 106 pounds / 48.1 kilograms indicates 9 pound / 8% wt loss over ~x3 months - clinically significant. Nutrition-focused physical examination notable for moderate-severe muscle and fat wasting. Per ASPEN guidelines, pt meets criteria for severe malnutrition.

## 2025-03-28 NOTE — ADVANCED PRACTICE NURSE CONSULT - RECOMMEDATIONS
Triad ointment every other day. Spoke with ROHITH Hay and house staff. Total time spent on encounter=30 minutes.

## 2025-03-28 NOTE — PROGRESS NOTE ADULT - PROBLEM SELECTOR PLAN 1
Lactate 6.0 on arrival to ED downtrended to 3.0 after 1.8L of NS bolus. CT shows perinephric stranding c/f pyelonephritis and multifocal PNA concerns for HAP (last hospitalization in Dec 2024) vs. aspiration PNA      Plan   -repeat lactate  -zosyn 4.5g q8h x 5 days and azithromycin 500mg x3 #Likely 2/2 pneumonia  Met 2/4 SIRS on admission (tachycardic, tachypneic)  Lactate 6.0 on arrival to ED downtrended to 3.0 after 1.8L of NS bolus. CT shows perinephric stranding c/f pyelonephritis and multifocal PNA concerns for HAP (last hospitalization in Dec 2024) vs. aspiration PNA    urine strep/legionella negative, RVP negative  Bcx NGTD x24 hr currently  Lactate 1.4 on 3/27  No leukocytosis thus far    -zosyn 4.5g q8h x 5 days and azithromycin 500mg x3  -F/U blood cultures

## 2025-03-28 NOTE — CONSULT NOTE ADULT - CONVERSATION DETAILS
Discussed progressive nature of comorbidities with wife, including advanced directives as well as the possibility of hospice.  Although wife is planning to discuss code status with family, they are not yet ready to make decision. Additionally, not ready to not bring patient in the hospital if condition deteriorates. Not amenable to hospice.

## 2025-03-28 NOTE — PROGRESS NOTE ADULT - PROBLEM SELECTOR PLAN 6
Stage 3 sacral ulcer likely 2/2 to bedbound status. No purulence at site of ulcer. Low suspicion for infection. Pt on abx for UTI vs. PNA. Zosyn 4.5 will also cover for skin infection and will use pseudomonal dosing as pt at risk for pseudomonal infection as pt has DM2.     Plan  -wound care consult  -palliative care consult for repeat aspiration events, bedbound status and declining quality of life Stage 3 sacral ulcer likely 2/2 to bedbound status. No purulence at site of ulcer. Low suspicion for infection. Pt on abx for UTI vs. PNA. Zosyn 4.5 will also cover for skin infection and will use pseudomonal dosing as pt at risk for pseudomonal infection as pt has DM2.     Plan  -wound care consult: Triad ointment every other day.  -palliative care consult for repeat aspiration events, bedbound status and declining quality of life. Still full code

## 2025-03-28 NOTE — PROGRESS NOTE ADULT - PROBLEM SELECTOR PLAN 7
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   MRI brain on 4/28 with R internal capsule infarct   Head CT 5/5: subacute infarct centered in the genu and posterior limb of the right internal capsule is stable.  A chronic transcortical infarction in the left precentral gyrus and a small chronic infarct in the right cerebellar hemisphere are stable.  Home meds: ASA 81mg, Atorvastatin 40mg  -c/w atorvastatin and aspirin   -c/w keppra 750mg (seizure ppx), per wife pt never had seizures

## 2025-03-28 NOTE — DIETITIAN INITIAL EVALUATION ADULT - PROBLEM SELECTOR PLAN 8
Home Meds: finasteride, doxazosin     Likely pyelonephritis vs. UTI 2/2 retention    Plan  -BS w/ PVR (pt appears to be retaining >250cc), f/u BS q6h   -UA w/ urine cx   -zosyn 4.5mg x 5 days for complicated UTI vs. pyelonephritis

## 2025-03-28 NOTE — PROGRESS NOTE ADULT - PROBLEM SELECTOR PLAN 4
Na 149 on arrival. FWD 1.5L     Plan   -D5, 0.45% NS 75mL/hr x 24h   -add free water flushes with tube feeds #Improved  Na 149 on arrival. Na 142 today    Plan   -Tube feeds as above

## 2025-03-29 LAB
ANION GAP SERPL CALC-SCNC: 10 MMOL/L — SIGNIFICANT CHANGE UP (ref 5–17)
BASOPHILS # BLD AUTO: 0 K/UL — SIGNIFICANT CHANGE UP (ref 0–0.2)
BASOPHILS NFR BLD AUTO: 0 % — SIGNIFICANT CHANGE UP (ref 0–2)
BUN SERPL-MCNC: 13 MG/DL — SIGNIFICANT CHANGE UP (ref 7–23)
CALCIUM SERPL-MCNC: 7.9 MG/DL — LOW (ref 8.4–10.5)
CHLORIDE SERPL-SCNC: 109 MMOL/L — HIGH (ref 96–108)
CO2 SERPL-SCNC: 22 MMOL/L — SIGNIFICANT CHANGE UP (ref 22–31)
CREAT SERPL-MCNC: 0.32 MG/DL — LOW (ref 0.5–1.3)
EGFR: 113 ML/MIN/1.73M2 — SIGNIFICANT CHANGE UP
EGFR: 113 ML/MIN/1.73M2 — SIGNIFICANT CHANGE UP
EOSINOPHIL # BLD AUTO: 0.07 K/UL — SIGNIFICANT CHANGE UP (ref 0–0.5)
EOSINOPHIL NFR BLD AUTO: 1.8 % — SIGNIFICANT CHANGE UP (ref 0–6)
FERRITIN SERPL-MCNC: 1605 NG/ML — HIGH (ref 30–400)
FOLATE SERPL-MCNC: >20 NG/ML — SIGNIFICANT CHANGE UP
GLUCOSE SERPL-MCNC: 250 MG/DL — HIGH (ref 70–99)
HCT VFR BLD CALC: 25 % — LOW (ref 39–50)
HGB BLD-MCNC: 7.7 G/DL — LOW (ref 13–17)
IRON SATN MFR SERPL: 24 % — SIGNIFICANT CHANGE UP (ref 16–55)
IRON SATN MFR SERPL: 30 UG/DL — LOW (ref 45–165)
LYMPHOCYTES # BLD AUTO: 0.39 K/UL — LOW (ref 1–3.3)
LYMPHOCYTES # BLD AUTO: 9.6 % — LOW (ref 13–44)
MAGNESIUM SERPL-MCNC: 2.1 MG/DL — SIGNIFICANT CHANGE UP (ref 1.6–2.6)
MCHC RBC-ENTMCNC: 23.3 PG — LOW (ref 27–34)
MCHC RBC-ENTMCNC: 30.8 G/DL — LOW (ref 32–36)
MCV RBC AUTO: 75.5 FL — LOW (ref 80–100)
MONOCYTES # BLD AUTO: 0.04 K/UL — SIGNIFICANT CHANGE UP (ref 0–0.9)
MONOCYTES NFR BLD AUTO: 0.9 % — LOW (ref 2–14)
NEUTROPHILS # BLD AUTO: 3.58 K/UL — SIGNIFICANT CHANGE UP (ref 1.8–7.4)
NEUTROPHILS NFR BLD AUTO: 87.7 % — HIGH (ref 43–77)
PHOSPHATE SERPL-MCNC: 3.1 MG/DL — SIGNIFICANT CHANGE UP (ref 2.5–4.5)
PLATELET # BLD AUTO: 115 K/UL — LOW (ref 150–400)
POTASSIUM SERPL-MCNC: 3.3 MMOL/L — LOW (ref 3.5–5.3)
POTASSIUM SERPL-SCNC: 3.3 MMOL/L — LOW (ref 3.5–5.3)
PROCALCITONIN SERPL-MCNC: 0.05 NG/ML — SIGNIFICANT CHANGE UP (ref 0.02–0.1)
RBC # BLD: 3.31 M/UL — LOW (ref 4.2–5.8)
RBC # FLD: 18.1 % — HIGH (ref 10.3–14.5)
SODIUM SERPL-SCNC: 141 MMOL/L — SIGNIFICANT CHANGE UP (ref 135–145)
T4 FREE+ TSH PNL SERPL: 3.29 UIU/ML — SIGNIFICANT CHANGE UP (ref 0.27–4.2)
TIBC SERPL-MCNC: 123 UG/DL — LOW (ref 220–430)
TRANSFERRIN SERPL-MCNC: 101 MG/DL — LOW (ref 200–360)
UIBC SERPL-MCNC: 93 UG/DL — LOW (ref 110–370)
VIT B12 SERPL-MCNC: 172 PG/ML — LOW (ref 232–1245)
WBC # BLD: 4.08 K/UL — SIGNIFICANT CHANGE UP (ref 3.8–10.5)
WBC # FLD AUTO: 4.08 K/UL — SIGNIFICANT CHANGE UP (ref 3.8–10.5)

## 2025-03-29 PROCEDURE — 99233 SBSQ HOSP IP/OBS HIGH 50: CPT | Mod: GC

## 2025-03-29 RX ORDER — CYANOCOBALAMIN 1000 UG/ML
1000 INJECTION INTRAMUSCULAR; SUBCUTANEOUS DAILY
Refills: 0 | Status: DISCONTINUED | OUTPATIENT
Start: 2025-03-29 | End: 2025-04-02

## 2025-03-29 RX ORDER — IPRATROPIUM BROMIDE AND ALBUTEROL SULFATE .5; 2.5 MG/3ML; MG/3ML
3 SOLUTION RESPIRATORY (INHALATION) EVERY 12 HOURS
Refills: 0 | Status: DISCONTINUED | OUTPATIENT
Start: 2025-03-29 | End: 2025-04-02

## 2025-03-29 RX ORDER — TOUCHLESS CARE ZINC OXIDE PROTECTANT 20; 25 G/100G; G/100G
1 SPRAY TOPICAL
Refills: 0 | Status: DISCONTINUED | OUTPATIENT
Start: 2025-03-29 | End: 2025-04-02

## 2025-03-29 RX ADMIN — FINASTERIDE 5 MILLIGRAM(S): 1 TABLET, FILM COATED ORAL at 17:55

## 2025-03-29 RX ADMIN — Medication 255 MILLIGRAM(S): at 06:13

## 2025-03-29 RX ADMIN — Medication 40 MILLIEQUIVALENT(S): at 09:24

## 2025-03-29 RX ADMIN — Medication 500 MILLIGRAM(S): at 12:34

## 2025-03-29 RX ADMIN — Medication 25 GRAM(S): at 07:03

## 2025-03-29 RX ADMIN — DOXAZOSIN MESYLATE 1 MILLIGRAM(S): 8 TABLET ORAL at 22:44

## 2025-03-29 RX ADMIN — Medication 4 MILLILITER(S): at 09:25

## 2025-03-29 RX ADMIN — TIMOLOL MALEATE 1 DROP(S): 6.8 SOLUTION OPHTHALMIC at 06:29

## 2025-03-29 RX ADMIN — INSULIN LISPRO 3: 100 INJECTION, SOLUTION INTRAVENOUS; SUBCUTANEOUS at 12:47

## 2025-03-29 RX ADMIN — ATORVASTATIN CALCIUM 40 MILLIGRAM(S): 80 TABLET, FILM COATED ORAL at 22:44

## 2025-03-29 RX ADMIN — IPRATROPIUM BROMIDE AND ALBUTEROL SULFATE 3 MILLILITER(S): .5; 2.5 SOLUTION RESPIRATORY (INHALATION) at 09:24

## 2025-03-29 RX ADMIN — Medication 25 GRAM(S): at 22:43

## 2025-03-29 RX ADMIN — LEVETIRACETAM 400 MILLIGRAM(S): 10 INJECTION, SOLUTION INTRAVENOUS at 06:28

## 2025-03-29 RX ADMIN — TIMOLOL MALEATE 1 DROP(S): 6.8 SOLUTION OPHTHALMIC at 17:56

## 2025-03-29 RX ADMIN — Medication 1 TABLET(S): at 12:35

## 2025-03-29 RX ADMIN — Medication 25 GRAM(S): at 14:04

## 2025-03-29 RX ADMIN — CYANOCOBALAMIN 1000 MICROGRAM(S): 1000 INJECTION INTRAMUSCULAR; SUBCUTANEOUS at 17:55

## 2025-03-29 RX ADMIN — Medication 220 MILLIGRAM(S): at 12:34

## 2025-03-29 RX ADMIN — TOUCHLESS CARE ZINC OXIDE PROTECTANT 1 APPLICATION(S): 20; 25 SPRAY TOPICAL at 14:04

## 2025-03-29 RX ADMIN — INSULIN LISPRO 2: 100 INJECTION, SOLUTION INTRAVENOUS; SUBCUTANEOUS at 17:56

## 2025-03-29 RX ADMIN — ENOXAPARIN SODIUM 40 MILLIGRAM(S): 100 INJECTION SUBCUTANEOUS at 22:43

## 2025-03-29 RX ADMIN — Medication 4 MILLILITER(S): at 22:44

## 2025-03-29 RX ADMIN — INSULIN LISPRO 2: 100 INJECTION, SOLUTION INTRAVENOUS; SUBCUTANEOUS at 22:50

## 2025-03-29 RX ADMIN — INSULIN LISPRO 3: 100 INJECTION, SOLUTION INTRAVENOUS; SUBCUTANEOUS at 09:25

## 2025-03-29 RX ADMIN — Medication 81 MILLIGRAM(S): at 17:56

## 2025-03-29 RX ADMIN — IPRATROPIUM BROMIDE AND ALBUTEROL SULFATE 3 MILLILITER(S): .5; 2.5 SOLUTION RESPIRATORY (INHALATION) at 22:44

## 2025-03-29 RX ADMIN — LEVETIRACETAM 400 MILLIGRAM(S): 10 INJECTION, SOLUTION INTRAVENOUS at 17:57

## 2025-03-29 NOTE — PROGRESS NOTE ADULT - PROBLEM SELECTOR PLAN 6
Stage 3 sacral ulcer likely 2/2 to bedbound status. No purulence at site of ulcer. Low suspicion for infection. Pt on abx for UTI vs. PNA. Zosyn 4.5 will also cover for skin infection and will use pseudomonal dosing as pt at risk for pseudomonal infection as pt has DM2.     Plan  -wound care consult: Triad ointment every other day.  -palliative care consult for repeat aspiration events, bedbound status and declining quality of life. Still full code

## 2025-03-29 NOTE — PROGRESS NOTE ADULT - ASSESSMENT
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/WC/bed bound and Sz like activity on vEEG (04/24), low BMI( 18.2), dysphagia, s/p PEG placement on (5/7/24) , presented with severe sepsis 2/2 multifocal PNA and concern for infection to PEG and sacral wound. Currently HDS on zosyn.

## 2025-03-29 NOTE — PROGRESS NOTE ADULT - PROBLEM SELECTOR PLAN 9
F: None  E: replete as needed; Keep K>4, Mg >2   D: Tube feeds as above  DVT Proph: Lovenox  Dispo: RMF.  Full Code

## 2025-03-29 NOTE — PROGRESS NOTE ADULT - PROBLEM SELECTOR PLAN 8
Home Meds: finasteride, doxazosin   Likely pyelonephritis vs. UTI 2/2 retention  UA negative  Bladder scan 85cc this AM  Condom cath     Plan  -zosyn 4.5mg x 5 days as above  -C/W bladder scans q6

## 2025-03-29 NOTE — PROGRESS NOTE ADULT - PROBLEM SELECTOR PLAN 1
#Likely 2/2 pneumonia  Met 2/4 SIRS on admission (tachycardic, tachypneic)  Lactate 6.0 on arrival to ED downtrended to 3.0 after 1.8L of NS bolus. CT shows perinephric stranding c/f pyelonephritis and multifocal PNA concerns for HAP (last hospitalization in Dec 2024) vs. aspiration PNA    urine strep/legionella negative, RVP negative, UA negative  Bcx NGTD x48 hr currently  Lactate 1.4 on 3/27  No leukocytosis thus far, Procal this AM (3/29) 0.05    -C/W zosyn 4.5g q8h x 7 days (EOT 4/4/25)  -S/P 3 days azithromycin 500mg qd  -F/U blood cultures and wound cultures

## 2025-03-29 NOTE — PROGRESS NOTE ADULT - ATTENDING COMMENTS
89y M non-verbal ( understands Cantonese), wheelchair/bed bound, R hand dominant, b/l  hearing impairment ( b/l hearing aids) with PMHx of Low BMI( 18.2), Depression, glaucoma, HLD, DM, Thalassemia minor/JUSTYNA, BPH/indwelling hudson 2/2 chronic urinary retention , HFpEF (LVEF 56%, grade I diastolic dysfunction), aortic root aneurysm (4.6 cm), hx CVA with right sided weakness in 2004 and new R internal capsule CVA with L HP/WC/bed bound and Sz like activity on vEEG (04/24), dysphagia, s/p PEG placement on (5/7/24) , last admitted to [facility]  (12/10-12/12/24) for wound care and further nutrition evaluation and management. GI consulted and PEG tube study showed PEG is functioning, and no need to change to a new one. WOCN consult appreciated, given Sliver nitrate and aquagel Ag dressing daily to PEG site. Dietitian consult appreciated, TF changed to Glucerna 1.2 @ 83ml/hr from 5pm to 11am x 18hr, total 6 cans per day. Pt recently admitted to ICU for aspiration PNA and had indwelling hudson removed and discharged from [place] ( 2/25/25), now admitted ( 3/26) to [facility] for sepsis w/ lactic acidosis and acute hypoxic respiratory failure 2/2 multifocal /likely gram negative PNA( HAP since recent hospitalization and on PEG feeds), stage III sacral ulcer ( presence on admission), and concerns of PEG site infection w/ superficial ulceration and reported purulent discharge.     CC: Pt seen this am, wife at bedside. Reports pt has clear/white sputum w. suction. Occ cough. On NC3L w/ SpO2 95%.     # Sepsis ( met 2/4 criteria on adm)  # ? HAP likely gram negative PNA ( lower lobes) w /trace R pleural effusion Vs consolidations at lower lobes related to recent PNA ( lag of imaging resolution)   # Acute hypoxic respiratory failure   - NC3L, titrate O2 to keep SpO2>92%, will access SpO2 RA in next 1-2 days, doubt needs of home O2 ( has to meet criteria for SpO2 88% or less on RA)  - airway clearance: add humidification to O2, start duonebs followed by hypertonic saline nebs q12hr   - agrees w/ Zosyn 4.5g IV q8h x 7 days ( 3/28-4/3) Day 2, possible able to transition to oral abx when medically ready for d/c home   - c/w Azithromycin 500mg daily for 3 days( 3/28-3/30)  , Day 2   - urine strep and legionella antigen both negative   - procalcitonin=0.05  - normal WBC but w/ left shift   - BCx( 3/26); ngtd x48hr   - oral hygiene q shift please   - aspiration precaution/keep HOB 30* and up    # PEG (percutaneous endoscopic gastrostomy) adjustment/replacement/removal.   - PEG tube in place   - GI consult appreciated, PEG tube functioning, GI removed the bumper  - f/u wound care w. resume silver nitrate and aquagel Ag to PEG site or Zince oxide ointment bid okay too- wound improving today      # Low BMI/severe protein calorie malnutrition  # Uncontrolled DM  -A1C 8.1%  - hold home meds: metformin 500mg TID, Januvia 50mg daily  - BMI 18.2, albumin 2.8   - dietitian consult   - mIVF + Q6 FS while NPO.    # unstageable tissue injury sacral area ( POA)   - WOCN consult appreciated, f/u recs: Triad ointment every other day  - add MVI daily, zinc sulfate 220mg daily and Vit C 500mg daily     # Chronic Urinary retention  # BPH  - resume doxazosin 1mg daily   - texa catheter     # hx CVA/R & L H/non-verbal/dysphagia  # hxSz like activity  - c/w ASA/ Atorvastatin and Keppra    # JUSTYNA/Thalassemia  - Hgb 7.7, Ferriting 1605, Tsat 24%   - f/u B12,  folate, TSH     # DVT ppx: add Lovenox 40mg SQ daily     # code status; FULL CODE. GOC discussed with surrogate/wife at bedside    # Disposition: pt has 24/7 HHA, awaiting clinical improvement, likely d/c plan early next week     POC as d/w 7WO team, Dr. Geovani Camacho     Time-based billing (NON-critical care).     50 minutes spent on total encounter. The necessity of the time spent during the encounter on this date of service was due to:     bedside interview w/ caregiver, examine  reviewed vitals and labs and imagiing   POC as d/w housestaff and caregiver   documentation of encounter   excluded teaching time and/or separately reported services.

## 2025-03-29 NOTE — PROGRESS NOTE ADULT - SUBJECTIVE AND OBJECTIVE BOX
HOSPITAL COURSE:     OVERNIGHT EVENTS:    SUBJECTIVE: Pt seen and evaluated bedside. _ .    ROS: otherwise negative      PHYSICAL EXAM:  Constitutional: resting comfortably in bed; NAD  Head: NC/AT  Eyes: EOMI, anicteric sclera  ENT: no nasal discharge; MMM  Neck: supple  Respiratory: CTA B/L; no W/R/R  Cardiac: RRR; no M/R/G  Gastrointestinal: soft, NT/ND; no rebound or guarding  Extremities: WWP, no clubbing or cyanosis; no peripheral edema  Musculoskeletal: NROM x4; no joint swelling, tenderness or erythema  Dermatologic: skin warm, dry and intact; no rashes, wounds, or scars  Neurologic: AAOx3; no focal deficits  Psychiatric: affect and characteristics of appearance, verbalizations, behaviors are appropriate        RADIOLOGY & ADDITIONAL TESTS: Reviewed   HOSPITAL COURSE: 89y M non-verbal ( understands Cantonese), wheelchair/bed bound, R hand dominant, b/l  hearing impairment ( b/l hearing aids) with PMHx of BPH (had indwelling hudson 2/2 chronic urinary retention, removed last hospitalization at [place]) , HFpEF (LVEF 56%, grade I diastolic dysfunction), aortic root aneurysm (4.6 cm), hx CVA with right sided weakness in 2004 and new R internal capsule CVA with L HP/WC/bed bound and Sz like activity on vEEG (04/24), low BMI( 18.2), dysphagia, s/p PEG placement on (5/7/24) , last admitted to [facility]  (12/10-12/12/24) for wound care and further nutrition evaluation and management. Pt recently admitted to ICU for aspiration PNA discharged from [place] ( 2/25/25), now admitted ( 3/26) to [facility] for sepsis w/ lactic acidosis and acute hypoxic respiratory failure 2/2 multifocal /likely gram negative PNA (HAP since recent hospitalization and on PEG feeds), stage III sacral ulcer ( presence on admission), and concerns of PEG site infection w/ superficial ulceration and reported purulent discharge. Cultures currently pending, however pt HDS on zosyn.    OVERNIGHT EVENTS: Anabel    SUBJECTIVE: Pt seen and evaluated bedside with wife present, translation via Deana ([id number])    ROS: otherwise negative      PHYSICAL EXAM:  Constitutional: resting comfortably in bed; NAD  Head: NC/AT  Eyes: EOMI, anicteric sclera  ENT: no nasal discharge; MMM  Neck: supple  Respiratory: CTA B/L; no W/R/R  Cardiac: RRR; no M/R/G  Gastrointestinal: soft, NT/ND; no rebound or guarding  Extremities: WWP, no clubbing or cyanosis; no peripheral edema  Musculoskeletal: NROM x4; no joint swelling, tenderness or erythema  Dermatologic: skin warm, dry and intact; no rashes, wounds, or scars  Neurologic: AAOx3; no focal deficits  Psychiatric: affect and characteristics of appearance, verbalizations, behaviors are appropriate        RADIOLOGY & ADDITIONAL TESTS: Reviewed   HOSPITAL COURSE: 89y M non-verbal ( understands Cantonese), wheelchair/bed bound, R hand dominant, b/l  hearing impairment ( b/l hearing aids) with PMHx of BPH (had indwelling hudson 2/2 chronic urinary retention, removed last hospitalization at [place]) , HFpEF (LVEF 56%, grade I diastolic dysfunction), aortic root aneurysm (4.6 cm), hx CVA with right sided weakness in 2004 and new R internal capsule CVA with L HP/WC/bed bound and Sz like activity on vEEG (04/24), low BMI( 18.2), dysphagia, s/p PEG placement on (5/7/24) , last admitted to [facility]  (12/10-12/12/24) for wound care and further nutrition evaluation and management. Pt recently admitted to ICU for aspiration PNA discharged from [place] ( 2/25/25), now admitted ( 3/26) to [facility] for sepsis w/ lactic acidosis and acute hypoxic respiratory failure 2/2 multifocal /likely gram negative PNA (HAP since recent hospitalization and on PEG feeds), stage III sacral ulcer ( presence on admission), and concerns of PEG site infection w/ superficial ulceration and reported purulent discharge. Cultures currently pending, however pt HDS on zosyn.    OVERNIGHT EVENTS: Anabel    SUBJECTIVE: Pt seen and evaluated bedside with wife present, translation via Deana ([id number]). Discussed current plan with her. She is concerned with     ROS: otherwise negative      PHYSICAL EXAM:  Constitutional: resting comfortably in bed; NAD  Head: NC/AT  Eyes: EOMI, anicteric sclera  ENT: no nasal discharge; MMM  Neck: supple  Respiratory: CTA B/L; no W/R/R  Cardiac: RRR; no M/R/G  Gastrointestinal: soft, NT/ND; no rebound or guarding  Extremities: WWP, no clubbing or cyanosis; no peripheral edema  Musculoskeletal: NROM x4; no joint swelling, tenderness or erythema  Dermatologic: skin warm, dry and intact; no rashes, wounds, or scars  Neurologic: AAOx3; no focal deficits  Psychiatric: affect and characteristics of appearance, verbalizations, behaviors are appropriate        RADIOLOGY & ADDITIONAL TESTS: Reviewed   HOSPITAL COURSE: 89y M non-verbal ( understands Cantonese), wheelchair/bed bound, R hand dominant, b/l  hearing impairment ( b/l hearing aids) with PMHx of BPH (had indwelling hudson 2/2 chronic urinary retention, removed last hospitalization at [place]) , HFpEF (LVEF 56%, grade I diastolic dysfunction), aortic root aneurysm (4.6 cm), hx CVA with right sided weakness in 2004 and new R internal capsule CVA with L HP/WC/bed bound and Sz like activity on vEEG (04/24), low BMI( 18.2), dysphagia, s/p PEG placement on (5/7/24) , last admitted to [facility]  (12/10-12/12/24) for wound care and further nutrition evaluation and management. Pt recently admitted to ICU for aspiration PNA discharged from [place] ( 2/25/25), now admitted ( 3/26) to [facility] for sepsis w/ lactic acidosis and acute hypoxic respiratory failure 2/2 multifocal /likely gram negative PNA (HAP since recent hospitalization and on PEG feeds), stage III sacral ulcer ( presence on admission), and concerns of PEG site infection w/ superficial ulceration and reported purulent discharge. Cultures currently pending, however pt HDS on zosyn.    OVERNIGHT EVENTS: Anabel    SUBJECTIVE: Pt seen and evaluated bedside with wife present, translation via [employer] ([id number]). Discussed current plan with her. She is concerned with increased secretions overnight. Remainder of interview limited 2/2 pts baseline mental status.    ROS: otherwise negative      PHYSICAL EXAM:  Constitutional: A&Ox0, opens eyes. Does not follow commands  Eyes: EOMI, anicteric sclera  ENT: Moist MM  Respiratory: Coarse breath sounds, wet sounding secretions  Cardiac: RRR  Gastrointestinal: soft, non distended. PEG site with some exudate on dressing. No active oozing or erythema noted. No induration. Condom catheter on.  Extremities: WWP, B/L heel protectors on  Dermatologic: Stage 3 sacral ulcer with some purulence on dressing. Wound photos reviewed  Neurologic: AAOx0, non verbal and minimally responsive, groans intermittently        RADIOLOGY & ADDITIONAL TESTS: Reviewed

## 2025-03-29 NOTE — PROGRESS NOTE ADULT - PROBLEM SELECTOR PLAN 3
Purulence at site of PEG. No acute abdomen.   GI consulted: ok to use PEG, no indication to change PEG at this point  Wound culture with few Enterobacter c. and Acinectobacter b. -> susceptibilities pending    Plan  -F/U susceptibilities from PEG site  -c/w zosyn 4.5 mg x7 days for anaerobic coverage   -Tube feeds resumed per nutrition recs (Glucerna 1.5 at 10mL/hr and increase 10mL q6hr or as tolerated to goal rate 80mL/hr x12hr)  - Zinc oxide BID to peg site

## 2025-03-30 LAB
ALBUMIN SERPL ELPH-MCNC: 2.5 G/DL — LOW (ref 3.3–5)
ALP SERPL-CCNC: 100 U/L — SIGNIFICANT CHANGE UP (ref 40–120)
ALT FLD-CCNC: 18 U/L — SIGNIFICANT CHANGE UP (ref 10–45)
ANION GAP SERPL CALC-SCNC: 8 MMOL/L — SIGNIFICANT CHANGE UP (ref 5–17)
AST SERPL-CCNC: 35 U/L — SIGNIFICANT CHANGE UP (ref 10–40)
BASOPHILS # BLD AUTO: 0.04 K/UL — SIGNIFICANT CHANGE UP (ref 0–0.2)
BASOPHILS NFR BLD AUTO: 1 % — SIGNIFICANT CHANGE UP (ref 0–2)
BILIRUB SERPL-MCNC: 0.2 MG/DL — SIGNIFICANT CHANGE UP (ref 0.2–1.2)
BLD GP AB SCN SERPL QL: NEGATIVE — SIGNIFICANT CHANGE UP
BUN SERPL-MCNC: 14 MG/DL — SIGNIFICANT CHANGE UP (ref 7–23)
CALCIUM SERPL-MCNC: 7.8 MG/DL — LOW (ref 8.4–10.5)
CHLORIDE SERPL-SCNC: 106 MMOL/L — SIGNIFICANT CHANGE UP (ref 96–108)
CO2 SERPL-SCNC: 22 MMOL/L — SIGNIFICANT CHANGE UP (ref 22–31)
CREAT SERPL-MCNC: 0.27 MG/DL — LOW (ref 0.5–1.3)
EGFR: 119 ML/MIN/1.73M2 — SIGNIFICANT CHANGE UP
EGFR: 119 ML/MIN/1.73M2 — SIGNIFICANT CHANGE UP
EOSINOPHIL # BLD AUTO: 0.04 K/UL — SIGNIFICANT CHANGE UP (ref 0–0.5)
EOSINOPHIL NFR BLD AUTO: 1 % — SIGNIFICANT CHANGE UP (ref 0–6)
GLUCOSE SERPL-MCNC: 272 MG/DL — HIGH (ref 70–99)
HCT VFR BLD CALC: 25 % — LOW (ref 39–50)
HGB BLD-MCNC: 7.6 G/DL — LOW (ref 13–17)
LYMPHOCYTES # BLD AUTO: 0.46 K/UL — LOW (ref 1–3.3)
LYMPHOCYTES # BLD AUTO: 11.8 % — LOW (ref 13–44)
MAGNESIUM SERPL-MCNC: 2.3 MG/DL — SIGNIFICANT CHANGE UP (ref 1.6–2.6)
MCHC RBC-ENTMCNC: 23 PG — LOW (ref 27–34)
MCHC RBC-ENTMCNC: 30.4 G/DL — LOW (ref 32–36)
MCV RBC AUTO: 75.5 FL — LOW (ref 80–100)
MONOCYTES # BLD AUTO: 0 K/UL — SIGNIFICANT CHANGE UP (ref 0–0.9)
MONOCYTES NFR BLD AUTO: 0 % — LOW (ref 2–14)
NEUTROPHILS # BLD AUTO: 3.39 K/UL — SIGNIFICANT CHANGE UP (ref 1.8–7.4)
NEUTROPHILS NFR BLD AUTO: 83.3 % — HIGH (ref 43–77)
NRBC BLD AUTO-RTO: SIGNIFICANT CHANGE UP /100 WBCS (ref 0–0)
PHOSPHATE SERPL-MCNC: 2.4 MG/DL — LOW (ref 2.5–4.5)
PLATELET # BLD AUTO: 115 K/UL — LOW (ref 150–400)
POTASSIUM SERPL-MCNC: 4.1 MMOL/L — SIGNIFICANT CHANGE UP (ref 3.5–5.3)
POTASSIUM SERPL-SCNC: 4.1 MMOL/L — SIGNIFICANT CHANGE UP (ref 3.5–5.3)
PROT SERPL-MCNC: 5.2 G/DL — LOW (ref 6–8.3)
RBC # BLD: 3.31 M/UL — LOW (ref 4.2–5.8)
RBC # FLD: 17.5 % — HIGH (ref 10.3–14.5)
RH IG SCN BLD-IMP: POSITIVE — SIGNIFICANT CHANGE UP
SODIUM SERPL-SCNC: 136 MMOL/L — SIGNIFICANT CHANGE UP (ref 135–145)
WBC # BLD: 3.93 K/UL — SIGNIFICANT CHANGE UP (ref 3.8–10.5)
WBC # FLD AUTO: 3.93 K/UL — SIGNIFICANT CHANGE UP (ref 3.8–10.5)

## 2025-03-30 PROCEDURE — 99233 SBSQ HOSP IP/OBS HIGH 50: CPT

## 2025-03-30 RX ORDER — LEVETIRACETAM 10 MG/ML
750 INJECTION, SOLUTION INTRAVENOUS
Refills: 0 | Status: DISCONTINUED | OUTPATIENT
Start: 2025-03-30 | End: 2025-03-30

## 2025-03-30 RX ORDER — LEVETIRACETAM 10 MG/ML
750 INJECTION, SOLUTION INTRAVENOUS
Refills: 0 | Status: DISCONTINUED | OUTPATIENT
Start: 2025-03-30 | End: 2025-04-02

## 2025-03-30 RX ORDER — INSULIN LISPRO 100 U/ML
INJECTION, SOLUTION INTRAVENOUS; SUBCUTANEOUS EVERY 6 HOURS
Refills: 0 | Status: DISCONTINUED | OUTPATIENT
Start: 2025-03-30 | End: 2025-04-02

## 2025-03-30 RX ADMIN — Medication 4 MILLILITER(S): at 18:58

## 2025-03-30 RX ADMIN — INSULIN LISPRO 6: 100 INJECTION, SOLUTION INTRAVENOUS; SUBCUTANEOUS at 18:58

## 2025-03-30 RX ADMIN — Medication 3 UNIT(S): at 18:58

## 2025-03-30 RX ADMIN — TIMOLOL MALEATE 1 DROP(S): 6.8 SOLUTION OPHTHALMIC at 05:40

## 2025-03-30 RX ADMIN — INSULIN LISPRO 3: 100 INJECTION, SOLUTION INTRAVENOUS; SUBCUTANEOUS at 06:52

## 2025-03-30 RX ADMIN — Medication 3 UNIT(S): at 23:15

## 2025-03-30 RX ADMIN — Medication 25 GRAM(S): at 13:46

## 2025-03-30 RX ADMIN — ENOXAPARIN SODIUM 40 MILLIGRAM(S): 100 INJECTION SUBCUTANEOUS at 21:17

## 2025-03-30 RX ADMIN — Medication 4 MILLILITER(S): at 05:41

## 2025-03-30 RX ADMIN — Medication 25 GRAM(S): at 21:17

## 2025-03-30 RX ADMIN — INSULIN LISPRO 6: 100 INJECTION, SOLUTION INTRAVENOUS; SUBCUTANEOUS at 23:15

## 2025-03-30 RX ADMIN — LEVETIRACETAM 400 MILLIGRAM(S): 10 INJECTION, SOLUTION INTRAVENOUS at 05:39

## 2025-03-30 RX ADMIN — Medication 220 MILLIGRAM(S): at 12:10

## 2025-03-30 RX ADMIN — Medication 3 UNIT(S): at 12:33

## 2025-03-30 RX ADMIN — LEVETIRACETAM 750 MILLIGRAM(S): 10 INJECTION, SOLUTION INTRAVENOUS at 18:55

## 2025-03-30 RX ADMIN — ATORVASTATIN CALCIUM 40 MILLIGRAM(S): 80 TABLET, FILM COATED ORAL at 21:17

## 2025-03-30 RX ADMIN — Medication 81 MILLIGRAM(S): at 18:55

## 2025-03-30 RX ADMIN — Medication 25 GRAM(S): at 05:39

## 2025-03-30 RX ADMIN — Medication 1 TABLET(S): at 12:10

## 2025-03-30 RX ADMIN — DOXAZOSIN MESYLATE 1 MILLIGRAM(S): 8 TABLET ORAL at 21:17

## 2025-03-30 RX ADMIN — INSULIN LISPRO 8: 100 INJECTION, SOLUTION INTRAVENOUS; SUBCUTANEOUS at 12:33

## 2025-03-30 RX ADMIN — Medication 100 MILLIGRAM(S): at 12:34

## 2025-03-30 RX ADMIN — TIMOLOL MALEATE 1 DROP(S): 6.8 SOLUTION OPHTHALMIC at 18:50

## 2025-03-30 RX ADMIN — IPRATROPIUM BROMIDE AND ALBUTEROL SULFATE 3 MILLILITER(S): .5; 2.5 SOLUTION RESPIRATORY (INHALATION) at 18:57

## 2025-03-30 RX ADMIN — Medication 500 MILLIGRAM(S): at 12:10

## 2025-03-30 RX ADMIN — FINASTERIDE 5 MILLIGRAM(S): 1 TABLET, FILM COATED ORAL at 18:58

## 2025-03-30 RX ADMIN — IPRATROPIUM BROMIDE AND ALBUTEROL SULFATE 3 MILLILITER(S): .5; 2.5 SOLUTION RESPIRATORY (INHALATION) at 05:41

## 2025-03-30 RX ADMIN — TOUCHLESS CARE ZINC OXIDE PROTECTANT 1 APPLICATION(S): 20; 25 SPRAY TOPICAL at 05:40

## 2025-03-30 RX ADMIN — TOUCHLESS CARE ZINC OXIDE PROTECTANT 1 APPLICATION(S): 20; 25 SPRAY TOPICAL at 18:50

## 2025-03-30 RX ADMIN — CYANOCOBALAMIN 1000 MICROGRAM(S): 1000 INJECTION INTRAMUSCULAR; SUBCUTANEOUS at 12:10

## 2025-03-30 RX ADMIN — Medication 62.5 MILLIMOLE(S): at 12:10

## 2025-03-30 NOTE — PROGRESS NOTE ADULT - SUBJECTIVE AND OBJECTIVE BOX
Patient is a 89y old  Male who presents with a chief complaint of PNA (29 Mar 2025 06:19)    INTERVAL EVENTS: NAEON    SUBJECTIVE:  Patient was seen and examined at bedside. Wife at bedside, reports pt was sent home on home O2 from [facility] last time, and reports pt's desaturated to SpO2 80's when off O2, now on 2LNC ( please humidify O2).    Review of systems: No fever, chills, dizziness, HA, Changes in vision, CP, dyspnea, nausea or vomiting, dysuria, changes in bowel movements, LE edema. Rest of 12 point Review of systems negative unless otherwise documented elsewhere in note.     Diet, NPO with Tube Feed:   Tube Feeding Modality: Gastrostomy  Glucerna 1.5 Nikolai (GLUCERNA1.5)  Total Volume for 24 Hours (mL): 960  Total Number of Cans: 4  Continuous  Starting Tube Feed Rate mL per Hour: 10  Increase Tube Feed Rate by (mL): 10     Every 6 hours  Until Goal Tube Feed Rate (mL per Hour): 80  Tube Feed Duration (in Hours): 12  Tube Feed Start Time: 19:00  Tube Feed Stop Time: 07:00 (03-29-25 @ 08:47) [Active]      MEDICATIONS:  MEDICATIONS  (STANDING):  albuterol/ipratropium for Nebulization 3 milliLiter(s) Nebulizer every 12 hours  ascorbic acid 500 milliGRAM(s) Oral daily  aspirin  chewable 81 milliGRAM(s) Oral every 24 hours  atorvastatin 40 milliGRAM(s) Oral at bedtime  cyanocobalamin 1000 MICROGram(s) Oral daily  dextrose 5%. 1000 milliLiter(s) (100 mL/Hr) IV Continuous <Continuous>  dextrose 5%. 1000 milliLiter(s) (50 mL/Hr) IV Continuous <Continuous>  dextrose 50% Injectable 25 Gram(s) IV Push once  dextrose 50% Injectable 12.5 Gram(s) IV Push once  dextrose 50% Injectable 25 Gram(s) IV Push once  doxazosin 1 milliGRAM(s) Oral at bedtime  enoxaparin Injectable 40 milliGRAM(s) SubCutaneous every 24 hours  finasteride 5 milliGRAM(s) Oral every 24 hours  glucagon  Injectable 1 milliGRAM(s) IntraMuscular once  insulin lispro (ADMELOG) corrective regimen sliding scale   SubCutaneous every 6 hours  insulin regular  human recombinant 3 Unit(s) SubCutaneous every 6 hours  levETIRAcetam  Solution 750 milliGRAM(s) Enteral Tube two times a day  multivitamin 1 Tablet(s) Oral daily  piperacillin/tazobactam IVPB.. 4.5 Gram(s) IV Intermittent every 8 hours  sodium chloride 7% Inhalation 4 milliLiter(s) Inhalation every 12 hours  thiamine 100 milliGRAM(s) Oral daily  timolol 0.5% Solution 1 Drop(s) Both EYES two times a day  zinc oxide 20% Ointment 1 Application(s) Topical two times a day  zinc sulfate 220 milliGRAM(s) Oral daily    MEDICATIONS  (PRN):  dextrose Oral Gel 15 Gram(s) Oral once PRN Blood Glucose LESS THAN 70 milliGRAM(s)/deciliter      Allergies    No Known Allergies    Intolerances        OBJECTIVE:  Vital Signs Last 24 Hrs  T(C): 36.2 (30 Mar 2025 13:00), Max: 36.7 (29 Mar 2025 16:17)  T(F): 97.2 (30 Mar 2025 13:00), Max: 98 (29 Mar 2025 16:17)  HR: 100 (30 Mar 2025 13:00) (87 - 100)  BP: 102/66 (30 Mar 2025 13:00) (95/56 - 111/68)  BP(mean): 69 (30 Mar 2025 05:35) (69 - 69)  RR: 18 (30 Mar 2025 13:00) (16 - 18)  SpO2: 98% (30 Mar 2025 13:00) (97% - 99%)    Parameters below as of 30 Mar 2025 13:00  Patient On (Oxygen Delivery Method): nasal cannula  O2 Flow (L/min): 3    I&O's Summary    29 Mar 2025 07:01  -  30 Mar 2025 07:00  --------------------------------------------------------  IN: 400 mL / OUT: 350 mL / NET: 50 mL        PHYSICAL EXAM:  Gen: Reclining in bed at time of exam, appears stated age  HEENT: NCAT, MMM, clear OP  Neck: supple, trachea at midline  CV: RRR, +S1/S2  Pulm: adequate respiratory effort, no increase in work of breathing  Abd: soft, NTND, PEG site ok   Skin: warm and dry,   Ext: WWP, no LE edema  Neuro: AOx3, no gross focal neurological deficits  Psych: affect and behavior appropriate, pleasant at time of interview  : [place] cath     LABS:                        7.6    3.93  )-----------( 115      ( 30 Mar 2025 05:30 )             25.0     03-30    136  |  106  |  14  ----------------------------<  272[H]  4.1   |  22  |  0.27[L]    Ca    7.8[L]      30 Mar 2025 05:30  Phos  2.4     03-30  Mg     2.3     03-30    TPro  5.2[L]  /  Alb  2.5[L]  /  TBili  0.2  /  DBili  x   /  AST  35  /  ALT  18  /  AlkPhos  100  03-30    LIVER FUNCTIONS - ( 30 Mar 2025 05:30 )  Alb: 2.5 g/dL / Pro: 5.2 g/dL / ALK PHOS: 100 U/L / ALT: 18 U/L / AST: 35 U/L / GGT: x             CAPILLARY BLOOD GLUCOSE      POCT Blood Glucose.: 313 mg/dL (30 Mar 2025 12:11)  POCT Blood Glucose.: 280 mg/dL (30 Mar 2025 06:46)  POCT Blood Glucose.: 228 mg/dL (29 Mar 2025 22:47)  POCT Blood Glucose.: 214 mg/dL (29 Mar 2025 17:28)    Urinalysis Basic - ( 30 Mar 2025 05:30 )    Color: x / Appearance: x / SG: x / pH: x  Gluc: 272 mg/dL / Ketone: x  / Bili: x / Urobili: x   Blood: x / Protein: x / Nitrite: x   Leuk Esterase: x / RBC: x / WBC x   Sq Epi: x / Non Sq Epi: x / Bacteria: x        MICRODATA:    Culture - Wound Aerobic/Anaerobic (collected 27 Mar 2025 17:33)  Source: Surgical Swab skin and peg  Preliminary Report (29 Mar 2025 18:21):    Culture yields >4 types of aerobic and/or anaerobic bacteria    Call client services within 7 days if further workup is clinically    indicated.    Culture includes    Few Enterobacter cloacae complex Susceptibility to follow.    Few Acinetobacter baumannii/nosocomialis group    Rare Staphylococcus aureus Susceptibility to follow.  Organism: Acinetobacter baumannii/nosocomialis group  Acinetobacter baumannii/nosocomialis group (29 Mar 2025 18:20)  Organism: Acinetobacter baumannii/nosocomialis group (29 Mar 2025 18:20)  Organism: Acinetobacter baumannii/nosocomialis group (29 Mar 2025 18:20)    Urinalysis with Rflx Culture (collected 27 Mar 2025 17:25)        RADIOLOGY/OTHER STUDIES:    PCP  Pharmacy:   Emergency contact:

## 2025-03-30 NOTE — PROGRESS NOTE ADULT - ASSESSMENT
89y M non-verbal ( understands Cantonese), wheelchair/bed bound, R hand dominant, b/l  hearing impairment ( b/l hearing aids) with PMHx of Low BMI( 18.2)/severe protein calorie malnutrition, Depression, glaucoma, HLD, uncontrolled T2DM( A1C 8.1%), Thalassemia minor/JUSTYNA, BPH/hx indwelling hudson 2/2 chronic urinary retention , HFpEF (LVEF 56%, grade I diastolic dysfunction), aortic root aneurysm (4.6 cm), hx CVA with right sided weakness in 2004 and new R internal capsule CVA with L HP/WC/bed bound and Sz like activity on vEEG (04/24), dysphagia, s/p PEG placement on (5/7/24) , last admitted to [facility]  (12/10-12/12/24) for wound care and further nutrition evaluation and management. GI consulted and PEG tube study showed PEG is functioning, and no need to change to a new one. WOCN consult appreciated, given Sliver nitrate and aquagel Ag dressing daily to PEG site. Dietitian consult appreciated, TF changed to Glucerna 1.2 @ 83ml/hr from 5pm to 11am x 18hr, total 6 cans per day. Pt recently admitted to ICU for aspiration PNA and had indwelling hudson removed and discharged from [facility] ( 2/25/25) and sent to [facility] and subsequently sent home on home O2 perwife, now admitted ( 3/26) to [facility] for sepsis w/ lactic acidosis and acute hypoxic respiratory failure 2/2 multifocal /likely gram negative PNA( HAP since recent hospitalization and on PEG feeds), stage III sacral ulcer ( presence on admission), and concerns of PEG site infection w/ superficial ulceration and reported purulent discharge.     # Sepsis ( met 2/4 criteria on adm)  # ? HAP likely gram negative PNA ( lower lobes) w /trace R pleural effusion Vs consolidations at lower lobes related to recent PNA ( lag of imaging resolution)   # Acute hypoxic respiratory failure   - O2 supplement NC3L, titrate O2 to keep SpO2>92%, ( has home O2 per wife)  - airway clearance: add humidification to O2, started duonebs followed by hypertonic saline nebs q12hr   - agrees w/ Zosyn 4.5g IV q8h x 7 days ( 3/28-4/3) Day 3, possible able to transition to oral abx when medically ready for d/c home   - c/w Azithromycin 500mg daily for 3 days( 3/28-3/30)  , Day 3   - urine strep and legionella antigen both negative   - procalcitonin=0.05  - normal WBC but w/ left shift   - BCx( 3/26); ngtd x48hr   - oral hygiene q shift please   - aspiration precaution/keep HOB 30* and up    # PEG (percutaneous endoscopic gastrostomy) adjustment/replacement/removal.   - PEG tube in place   - GI consult appreciated, PEG tube functioning  - wound cx; Acinetobacter baumani sensitive to zosyn/fluroquinolone/bactrim etc  - f/u wound care w. Zinc oxide ointment bid okay too- wound improving today      # Low BMI/severe protein calorie malnutrition  # Uncontrolled DM  -A1C 8.1%  - hold home meds: metformin 500mg TID, Januvia 50mg daily  - BMI 18.2, albumin 2.8   - dietitian consult   - mIVF + Q6 FS while NPO.  - agrees to added RI 3U q6h    # unstageable tissue injury sacral area ( POA)   - WOCN consult appreciated, f/u recs: Triad ointment every other day  - added MVI daily, zinc sulfate 220mg daily and Vit C 500mg daily  - added Thiamine 100mg daily      # Chronic Urinary retention  # BPH  - resume doxazosin 1mg daily   - [place] catheter     # hx CVA/R & L H/non-verbal/dysphagia  # hxSz like activity  - c/w ASA/ Atorvastatin and Keppra    # JUSTYNA/Thalassemia  - Hgb 7.7, Ferritin 1605, Tsat 24%   - f/u B12,  folate, TSH     # DVT ppx: add Lovenox 40mg SQ daily     # code status; FULL CODE. GOC discussed with surrogate/wife at bedside    # Disposition: pt has 24/7 HHA, awaiting clinical improvement, likely d/c plan early next week     POC as d/w 7WO team, Dr. Helms   Time-based billing (NON-critical care).     50 minutes spent on total encounter. The necessity of the time spent during the encounter on this date of service was due to:     bedside interview w/ caregiver, examine  reviewed vitals and labs and imagiing   POC as d/w housestaff and caregiver   documentation of encounter   excluded teaching time and/or separately reported services.

## 2025-03-31 ENCOUNTER — TRANSCRIPTION ENCOUNTER (OUTPATIENT)
Age: 89
End: 2025-03-31

## 2025-03-31 LAB
ANION GAP SERPL CALC-SCNC: 8 MMOL/L — SIGNIFICANT CHANGE UP (ref 5–17)
BUN SERPL-MCNC: 19 MG/DL — SIGNIFICANT CHANGE UP (ref 7–23)
CALCIUM SERPL-MCNC: 7.7 MG/DL — LOW (ref 8.4–10.5)
CHLORIDE SERPL-SCNC: 104 MMOL/L — SIGNIFICANT CHANGE UP (ref 96–108)
CO2 SERPL-SCNC: 25 MMOL/L — SIGNIFICANT CHANGE UP (ref 22–31)
CREAT SERPL-MCNC: 0.25 MG/DL — LOW (ref 0.5–1.3)
EGFR: 122 ML/MIN/1.73M2 — SIGNIFICANT CHANGE UP
EGFR: 122 ML/MIN/1.73M2 — SIGNIFICANT CHANGE UP
GLUCOSE SERPL-MCNC: 244 MG/DL — HIGH (ref 70–99)
HCT VFR BLD CALC: 24 % — LOW (ref 39–50)
HGB BLD-MCNC: 7.3 G/DL — LOW (ref 13–17)
MAGNESIUM SERPL-MCNC: 2.2 MG/DL — SIGNIFICANT CHANGE UP (ref 1.6–2.6)
MCHC RBC-ENTMCNC: 22.9 PG — LOW (ref 27–34)
MCHC RBC-ENTMCNC: 30.4 G/DL — LOW (ref 32–36)
MCV RBC AUTO: 75.2 FL — LOW (ref 80–100)
NRBC BLD AUTO-RTO: 0 /100 WBCS — SIGNIFICANT CHANGE UP (ref 0–0)
PHOSPHATE SERPL-MCNC: 1.9 MG/DL — LOW (ref 2.5–4.5)
PLATELET # BLD AUTO: 127 K/UL — LOW (ref 150–400)
POTASSIUM SERPL-MCNC: 4.1 MMOL/L — SIGNIFICANT CHANGE UP (ref 3.5–5.3)
POTASSIUM SERPL-SCNC: 4.1 MMOL/L — SIGNIFICANT CHANGE UP (ref 3.5–5.3)
RBC # BLD: 3.19 M/UL — LOW (ref 4.2–5.8)
RBC # FLD: 17.9 % — HIGH (ref 10.3–14.5)
SODIUM SERPL-SCNC: 137 MMOL/L — SIGNIFICANT CHANGE UP (ref 135–145)
WBC # BLD: 3.52 K/UL — LOW (ref 3.8–10.5)
WBC # FLD AUTO: 3.52 K/UL — LOW (ref 3.8–10.5)

## 2025-03-31 PROCEDURE — 99233 SBSQ HOSP IP/OBS HIGH 50: CPT | Mod: GC

## 2025-03-31 RX ORDER — SULFAMETHOXAZOLE/TRIMETHOPRIM 800-160 MG
2 TABLET ORAL
Qty: 12 | Refills: 0
Start: 2025-03-31 | End: 2025-04-02

## 2025-03-31 RX ORDER — SULFAMETHOXAZOLE/TRIMETHOPRIM 800-160 MG
1 TABLET ORAL
Refills: 0
Start: 2025-03-31

## 2025-03-31 RX ADMIN — Medication 3 UNIT(S): at 18:01

## 2025-03-31 RX ADMIN — TOUCHLESS CARE ZINC OXIDE PROTECTANT 1 APPLICATION(S): 20; 25 SPRAY TOPICAL at 07:23

## 2025-03-31 RX ADMIN — TIMOLOL MALEATE 1 DROP(S): 6.8 SOLUTION OPHTHALMIC at 17:43

## 2025-03-31 RX ADMIN — Medication 220 MILLIGRAM(S): at 13:28

## 2025-03-31 RX ADMIN — ATORVASTATIN CALCIUM 40 MILLIGRAM(S): 80 TABLET, FILM COATED ORAL at 23:39

## 2025-03-31 RX ADMIN — Medication 100 MILLIGRAM(S): at 13:28

## 2025-03-31 RX ADMIN — IPRATROPIUM BROMIDE AND ALBUTEROL SULFATE 3 MILLILITER(S): .5; 2.5 SOLUTION RESPIRATORY (INHALATION) at 07:22

## 2025-03-31 RX ADMIN — Medication 3 UNIT(S): at 13:27

## 2025-03-31 RX ADMIN — FINASTERIDE 5 MILLIGRAM(S): 1 TABLET, FILM COATED ORAL at 17:41

## 2025-03-31 RX ADMIN — DOXAZOSIN MESYLATE 1 MILLIGRAM(S): 8 TABLET ORAL at 23:39

## 2025-03-31 RX ADMIN — CYANOCOBALAMIN 1000 MICROGRAM(S): 1000 INJECTION INTRAMUSCULAR; SUBCUTANEOUS at 13:28

## 2025-03-31 RX ADMIN — Medication 3 UNIT(S): at 07:22

## 2025-03-31 RX ADMIN — Medication 25 GRAM(S): at 13:27

## 2025-03-31 RX ADMIN — Medication 85 MILLIMOLE(S): at 18:08

## 2025-03-31 RX ADMIN — IPRATROPIUM BROMIDE AND ALBUTEROL SULFATE 3 MILLILITER(S): .5; 2.5 SOLUTION RESPIRATORY (INHALATION) at 17:41

## 2025-03-31 RX ADMIN — ENOXAPARIN SODIUM 40 MILLIGRAM(S): 100 INJECTION SUBCUTANEOUS at 23:39

## 2025-03-31 RX ADMIN — TOUCHLESS CARE ZINC OXIDE PROTECTANT 1 APPLICATION(S): 20; 25 SPRAY TOPICAL at 17:42

## 2025-03-31 RX ADMIN — Medication 500 MILLIGRAM(S): at 13:31

## 2025-03-31 RX ADMIN — LEVETIRACETAM 750 MILLIGRAM(S): 10 INJECTION, SOLUTION INTRAVENOUS at 07:22

## 2025-03-31 RX ADMIN — Medication 25 GRAM(S): at 07:21

## 2025-03-31 RX ADMIN — Medication 4 MILLILITER(S): at 17:41

## 2025-03-31 RX ADMIN — INSULIN LISPRO 6: 100 INJECTION, SOLUTION INTRAVENOUS; SUBCUTANEOUS at 07:22

## 2025-03-31 RX ADMIN — Medication 25 GRAM(S): at 23:39

## 2025-03-31 RX ADMIN — TIMOLOL MALEATE 1 DROP(S): 6.8 SOLUTION OPHTHALMIC at 07:23

## 2025-03-31 RX ADMIN — Medication 81 MILLIGRAM(S): at 17:53

## 2025-03-31 RX ADMIN — Medication 4 MILLILITER(S): at 07:22

## 2025-03-31 RX ADMIN — INSULIN LISPRO 2: 100 INJECTION, SOLUTION INTRAVENOUS; SUBCUTANEOUS at 13:31

## 2025-03-31 RX ADMIN — Medication 1 TABLET(S): at 13:28

## 2025-03-31 RX ADMIN — LEVETIRACETAM 750 MILLIGRAM(S): 10 INJECTION, SOLUTION INTRAVENOUS at 17:42

## 2025-03-31 NOTE — DISCHARGE NOTE PROVIDER - CARE PROVIDER_API CALL
Aron Soares.  Internal Medicine  MISTY ADKINS, Phys,    Phone: ()-  Fax: ()-  Established Patient  Follow Up Time: 1 week   Aron Soares  [address] [place] [id number]    [phone]  Phone: (   )    -  Fax: (   )    -  Established Patient  Scheduled Appointment: 04/11/2025 10:30 AM   Aron Soares  [address] [place] [address]    [phone]  Phone: (   )    -  Fax: (   )    -  Established Patient  Scheduled Appointment: 04/11/2025 10:30 AM    Destin Roach  Internal Medicine Residency Office [address] [place] [address]  Phone: [phone]  Established Patient  Follow Up Time: 1-3 days

## 2025-03-31 NOTE — PROGRESS NOTE ADULT - PROBLEM SELECTOR PLAN 3
Purulence at site of PEG. No acute abdomen.   GI consulted: ok to use PEG, no indication to change PEG at this point  Wound culture with few Enterobacter c. and Acinectobacter b. -> susceptibilities pending    Plan  -F/U susceptibilities from PEG site  -c/w zosyn 4.5 mg x7 days for anaerobic coverage   -Tube feeds resumed per nutrition recs (Glucerna 1.5 at 10mL/hr and increase 10mL q6hr or as tolerated to goal rate 80mL/hr x12hr)  - Zinc oxide BID to peg site Purulence at site of PEG. No acute abdomen.   GI consulted: ok to use PEG, no indication to change PEG at this point  Peg site skin cx final with MRSA, pan sensitive enterobacter and acinetobacter    Plan  -c/w zosyn 4.5 mg x7 days for anaerobic coverage  -Tube feeds resumed per nutrition recs (Glucerna 1.5 at 10mL/hr and increase 10mL q6hr or as tolerated to goal rate 80mL/hr x12hr)  - Zinc oxide BID to peg site

## 2025-03-31 NOTE — PROGRESS NOTE ADULT - ATTENDING COMMENTS
89y M non-verbal ( understands Cantonese), wheelchair/bed bound, R hand dominant, b/l  hearing impairment ( b/l hearing aids) with PMHx of Low BMI( 18.2)/severe protein calorie malnutrition, Depression, glaucoma, HLD, uncontrolled T2DM( A1C 8.1%), Thalassemia minor/JUSTYNA, BPH/hx indwelling hudson 2/2 chronic urinary retention , HFpEF (LVEF 56%, grade I diastolic dysfunction), aortic root aneurysm (4.6 cm), hx CVA with right sided weakness in 2004 and new R internal capsule CVA with L HP/WC/bed bound and Sz like activity on vEEG (04/24), dysphagia, s/p PEG placement on (5/7/24) , last admitted to [facility]  (12/10-12/12/24) for wound care and further nutrition evaluation and management. GI consulted and PEG tube study showed PEG is functioning, and no need to change to a new one. WOCN consult appreciated, given Sliver nitrate and aquagel Ag dressing daily to PEG site. Dietitian consult appreciated, TF changed to Glucerna 1.2 @ 83ml/hr from 5pm to 11am x 18hr, total 6 cans per day. Pt recently admitted to ICU for aspiration PNA and had indwelling hudson removed and discharged from [facility] ( 2/25/25) and sent to [facility] and subsequently sent home on home O2 perwife, now admitted ( 3/26) to [facility] for sepsis w/ lactic acidosis and acute hypoxic respiratory failure 2/2 multifocal /likely gram negative PNA( HAP since recent hospitalization and on PEG feeds), stage III sacral ulcer ( presence on admission), and concerns of PEG site infection w/ superficial ulceration and reported purulent discharge.     # Sepsis ( met 2/4 criteria on adm)  # ? HAP likely gram negative PNA ( lower lobes) w /trace R pleural effusion Vs consolidations at lower lobes related to recent PNA ( lag of imaging resolution)   # Acute hypoxic respiratory failure   - O2 supplement NC3L, titrate O2 to keep SpO2>92%, ( has home O2 per wife)  - airway clearance: add humidification to O2, started duonebs followed by hypertonic saline nebs q12hr   - agrees w/ Zosyn 4.5g IV q8h x 7 days ( 3/28-4/3) Day 4, possible able to transition to oral abx when medically ready for d/c home ( i.e. bactrim)   - c/w Azithromycin 500mg daily for 3 days( 3/28-3/30)    - urine strep and legionella antigen both negative   - procalcitonin=0.05  - normal WBC but w/ left shift   - BCx( 3/26); ngtd  - oral hygiene q shift please   - aspiration precaution/keep HOB 30* and up    # PEG (percutaneous endoscopic gastrostomy) adjustment/replacement/removal.   - PEG tube in place   - GI consult appreciated, PEG tube functioning  - wound cx: Enterobacter cloacae, Acinetobacter baumani and MRSA all sensitive to bactrim   - f/u wound care w. Zinc oxide ointment bid okay too- wound improving today      # Low BMI/severe protein calorie malnutrition  # Uncontrolled DM  -A1C 8.1%  - hold home meds: metformin 500mg TID, Januvia 50mg daily  - BMI 18.2, albumin 2.8   - dietitian consult   - mIVF + Q6 FS while NPO.  - agrees to added RI 3U q6h    # unstageable tissue injury sacral area ( POA)   - WOCN consult appreciated, f/u recs: Triad ointment every other day  - added MVI daily, zinc sulfate 220mg daily and Vit C 500mg daily  - added Thiamine 100mg daily      # Chronic Urinary retention  # BPH  - resume doxazosin 1mg daily   - [place] catheter     # hx CVA/R & L H/non-verbal/dysphagia  # hxSz like activity  - c/w ASA/ Atorvastatin and Keppra    # JUSTYNA/Thalassemia  - Hgb 7.7, Ferritin 1605, Tsat 24%   - f/u B12,  folate, TSH     # DVT ppx: add Lovenox 40mg SQ daily     # code status; FULL CODE. GOC discussed with surrogate/wife at bedside    # Disposition: pt has 24/7 HHA, awaiting clinical improvement, d/c home tomorrow Tues 4/1    POC as d/w 7WO team, Dr. Helms and Dr. Camacho    Time-based billing (NON-critical care).     50 minutes spent on total encounter. The necessity of the time spent during the encounter on this date of service was due to:     bedside interview w/ caregiver, examine  reviewed vitals and labs and imagiing   POC as d/w housestaff and caregiver   documentation of encounter   excluded teaching time and/or separately reported services.

## 2025-03-31 NOTE — PROGRESS NOTE ADULT - ASSESSMENT
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/WC/bed bound and Sz like activity on vEEG (04/24), low BMI( 18.2), dysphagia, s/p PEG placement on (5/7/24) , presented with severe sepsis 2/2 multifocal PNA and concern for infection to PEG and sacral wound. Currently HDS on zosyn, tube feeds resumed following GI evaluation. Wound care managing sacral wound.

## 2025-03-31 NOTE — CHART NOTE - NSCHARTNOTEFT_GEN_A_CORE
Gastroenterology reconsulted to evaluate clogged PEG tube. At time of my evaluation, PEG was clogged and would not flush. A single brush on extended wire was inserted into the PEG tube to remove clogged material. This was successful and PEG tube is now functional. Patient can return to normal PEG use.     Colton Jaimes DO  Gastroenterology Fellow  GI Consult Pager Weekdays 7am-5pm: [phone]  Weeknights/Weekend/Holiday Coverage: Please Call the  for Contact Information  Follow Up Questions Welcome via [place] Microsoft Teams Messenger

## 2025-03-31 NOTE — DISCHARGE NOTE PROVIDER - NSDCCPCAREPLAN_GEN_ALL_CORE_FT
PRINCIPAL DISCHARGE DIAGNOSIS  Diagnosis: Severe sepsis  Assessment and Plan of Treatment: Severe sepsis is a serious condition that can occur when an infection, such as pneumonia, spreads throughout the body. Pneumonia is an infection of the lungs that can cause symptoms like cough, fever, and difficulty breathing. When pneumonia leads to severe sepsis, it means the infection has triggered a widespread inflammatory response, causing organ dysfunction and potentially life-threatening complications.  Treatment:  Treatment for severe sepsis due to pneumonia involves several key steps:  1. Antibiotics: you were treated with a course of intravenous antibiotics and will be discharged on an antibiotic called bactrim to complete a 7 day course of treatment. This medication is 2 tablets taken every 12 hours via peg tube.  It is very important to finish the complete course of antibiotics and follow up with your primary care Dr. Aron Soares  In addition your PEG tube was evaluated by our GI team. There was no concern with the function of the PEG tube and you should resume tube feeds as Glucerna 1.5 at goal rate 80mL/hr x12hr 7pm-7am        SECONDARY DISCHARGE DIAGNOSES  Diagnosis: Sacral decubitus ulcer  Assessment and Plan of Treatment: You have a stage 3 sacral wound and were evaluated by our wound care team. They recommended: Triad ointment every other day. We will help set up home nursing to help with wound care once you are discharged home.

## 2025-03-31 NOTE — PROGRESS NOTE ADULT - SUBJECTIVE AND OBJECTIVE BOX
HOSPITAL COURSE: 89y M non-verbal ( understands Cantonese), wheelchair/bed bound, R hand dominant, b/l  hearing impairment ( b/l hearing aids) with PMHx of BPH (had indwelling hudson 2/2 chronic urinary retention, removed last hospitalization at [place]) , HFpEF (LVEF 56%, grade I diastolic dysfunction), aortic root aneurysm (4.6 cm), hx CVA with right sided weakness in 2004 and new R internal capsule CVA with L HP/WC/bed bound and Sz like activity on vEEG (04/24), low BMI( 18.2), dysphagia, s/p PEG placement on (5/7/24) , last admitted to [facility]  (12/10-12/12/24) for wound care and further nutrition evaluation and management. Pt recently admitted to ICU for aspiration PNA discharged from [place] ( 2/25/25), now admitted ( 3/26) to [facility] for sepsis w/ lactic acidosis and acute hypoxic respiratory failure 2/2 multifocal /likely gram negative PNA (HAP since recent hospitalization and on PEG feeds), stage III sacral ulcer ( presence on admission), and concerns of PEG site infection w/ superficial ulceration and reported purulent discharge. Cultures wound cultures with MSSA and pan sensitive enterobacter and acinetobacter. Pt on zosyn currently and remains afebrile and HDS.    OVERNIGHT EVENTS: Anabel    SUBJECTIVE: Pt seen and evaluated bedside, remains A&Ox0. Translation via [employer] ([id number]), wife reports some concern with PEG tube this AM. Reports improvement in secretions since starting airway clearance. Discussed need of continued antibiotics on discharge along with nebulizer for home O2 (on 2L since most recent hospitalization at [place]). Otherwise all other questions answered.    ROS: otherwise negative      PHYSICAL EXAM:  Constitutional: A&Ox0, opens eyes. Does not follow commands  Eyes: EOMI, anicteric sclera  ENT: Moist MM  Respiratory: Coarse breath sounds  Cardiac: RRR  Gastrointestinal: soft, non distended. PEG site with some exudate surrounding, tube feeds running. Condom catheter on.  Extremities: WWP  Dermatologic: Stage 3 sacral ulcer with some purulence on dressing. Wound photos reviewed  Neurologic: AAOx0, non verbal and minimally responsive, groans intermittently      RADIOLOGY & ADDITIONAL TESTS: Reviewed   HOSPITAL COURSE: 89y M non-verbal ( understands Cantonese), wheelchair/bed bound, R hand dominant, b/l  hearing impairment ( b/l hearing aids) with PMHx of BPH (had indwelling hudson 2/2 chronic urinary retention, removed last hospitalization at [place]) , HFpEF (LVEF 56%, grade I diastolic dysfunction), aortic root aneurysm (4.6 cm), hx CVA with right sided weakness in 2004 and new R internal capsule CVA with L HP/WC/bed bound and Sz like activity on vEEG (04/24), low BMI( 18.2), dysphagia, s/p PEG placement on (5/7/24) , last admitted to [facility]  (12/10-12/12/24) for wound care and further nutrition evaluation and management. Pt recently admitted to ICU for aspiration PNA discharged from [place] ( 2/25/25), now admitted ( 3/26) to [facility] for sepsis w/ lactic acidosis and acute hypoxic respiratory failure 2/2 multifocal /likely gram negative PNA (HAP since recent hospitalization and on PEG feeds), stage III sacral ulcer ( presence on admission), and concerns of PEG site infection w/ superficial ulceration and reported purulent discharge. Cultures wound cultures with MSSA and pan sensitive enterobacter and acinetobacter. Pt on zosyn currently and remains afebrile and HDS.    OVERNIGHT EVENTS: Anabel    SUBJECTIVE: Pt seen and evaluated bedside, remains A&Ox0. Translation via [employer] ([id number]), wife reports some concern with PEG tube this AM. Reports improvement in secretions since starting airway clearance. Discussed need of continued antibiotics on discharge along with nebulizer for home O2 (on 2L since most recent hospitalization at [place]). Otherwise all other questions answered.    ROS: otherwise negative      PHYSICAL EXAM:  Constitutional: A&Ox0, opens eyes. Does not follow commands  Eyes: EOMI, anicteric sclera  ENT: Moist MM  Respiratory: Coarse breath sounds  Cardiac: RRR  Gastrointestinal: soft, non distended. PEG site with some exudate surrounding, tube feeds running. Condom catheter on with minimal output noted  Extremities: WWP  Neurologic: AAOx0, non verbal and minimally responsive, groans intermittently      RADIOLOGY & ADDITIONAL TESTS: Reviewed   HOSPITAL COURSE: 89y M non-verbal ( understands Cantonese), wheelchair/bed bound, R hand dominant, b/l  hearing impairment ( b/l hearing aids) with PMHx of BPH (had indwelling hudson 2/2 chronic urinary retention, removed last hospitalization at [place]) , HFpEF (LVEF 56%, grade I diastolic dysfunction), aortic root aneurysm (4.6 cm), hx CVA with right sided weakness in 2004 and new R internal capsule CVA with L HP/WC/bed bound and Sz like activity on vEEG (04/24), low BMI( 18.2), dysphagia, s/p PEG placement on (5/7/24) , last admitted to [facility]  (12/10-12/12/24) for wound care and further nutrition evaluation and management. Pt recently admitted to ICU for aspiration PNA discharged from [place] ( 2/25/25), now admitted ( 3/26) to [facility] for sepsis w/ lactic acidosis and acute hypoxic respiratory failure 2/2 multifocal /likely gram negative PNA (HAP since recent hospitalization and on PEG feeds), stage III sacral ulcer ( presence on admission), and concerns of PEG site infection w/ superficial ulceration and reported purulent discharge. Cultures wound cultures with MRSA and pan sensitive enterobacter and acinetobacter. Pt on zosyn currently and remains afebrile and HDS.    OVERNIGHT EVENTS: Anabel    SUBJECTIVE: Pt seen and evaluated bedside, remains A&Ox0. Translation via [employer] ([id number]), wife reports some concern with PEG tube this AM. Reports improvement in secretions since starting airway clearance. Discussed need of continued antibiotics on discharge along with nebulizer for home O2 (on 2L since most recent hospitalization at [place]). Otherwise all other questions answered.    ROS: otherwise negative      PHYSICAL EXAM:  Constitutional: A&Ox0, opens eyes. Does not follow commands  Eyes: EOMI, anicteric sclera  ENT: Moist MM  Respiratory: Coarse breath sounds  Cardiac: RRR  Gastrointestinal: soft, non distended. PEG site with some exudate surrounding, tube feeds running. Condom catheter on with minimal output noted  Extremities: WWP  Neurologic: AAOx0, non verbal and minimally responsive, groans intermittently      RADIOLOGY & ADDITIONAL TESTS: Reviewed

## 2025-03-31 NOTE — DISCHARGE NOTE PROVIDER - INSTRUCTIONS
Tube Feed-  Tube Feeding Modality: Gastrostomy  Glucerna 1.5 Nikolai (GLUCERNA1.5)  Total Volume for 24 Hours (mL): 960  Total Number of Cans: 4  Continuous  Starting Tube Feed Rate {mL per Hour}: 10  Increase Tube Feed Rate by (mL): 10     Every 6 hours  Until Goal Tube Feed Rate (mL per Hour): 80  Tube Feed Duration (in Hours): 12  Tube Feed Start Time: 19:00  Tube Feed Stop Time: 07:00

## 2025-03-31 NOTE — DISCHARGE NOTE PROVIDER - DETAILS OF MALNUTRITION DIAGNOSIS/DIAGNOSES
This patient has been assessed with a concern for Malnutrition and was treated during this hospitalization for the following Nutrition diagnosis/diagnoses:     -  03/28/2025: Severe protein-calorie malnutrition   -  03/28/2025: Underweight (BMI < 19)

## 2025-03-31 NOTE — DISCHARGE NOTE PROVIDER - PROVIDER TOKENS
PROVIDER:[TOKEN:[[id number]],FOLLOWUP:[1 week],ESTABLISHEDPATIENT:[T]] FREE:[LAST:[Soares],FIRST:[Aron],PHONE:[(   )    -],FAX:[(   )    -],ADDRESS:[[address] [place] [address]    [phone]],SCHEDULEDAPPT:[04/11/2025],SCHEDULEDAPPTTIME:[10:30 AM],ESTABLISHEDPATIENT:[T]] FREE:[LAST:[Soares],FIRST:[Aron],PHONE:[(   )    -],FAX:[(   )    -],ADDRESS:[[address] [place] [address]    [phone]],SCHEDULEDAPPT:[04/11/2025],SCHEDULEDAPPTTIME:[10:30 AM],ESTABLISHEDPATIENT:[T]],PROVIDER:[TOKEN:[[id number]],FOLLOWUP:[1-3 days],ESTABLISHEDPATIENT:[T]]

## 2025-03-31 NOTE — DISCHARGE NOTE PROVIDER - NSDCFUADDAPPT_GEN_ALL_CORE_FT
[address] [id number] [phone] [address] [place] [address]  [phone]    Dr. Destin Roach will see you at home to continue care

## 2025-03-31 NOTE — PROGRESS NOTE ADULT - PROBLEM SELECTOR PLAN 1
#Likely 2/2 pneumonia  Met 2/4 SIRS on admission (tachycardic, tachypneic)  Lactate 6.0 on arrival to ED downtrended to 3.0 after 1.8L of NS bolus. CT shows perinephric stranding c/f pyelonephritis and multifocal PNA concerns for HAP (last hospitalization in Dec 2024) vs. aspiration PNA    urine strep/legionella negative, RVP negative, UA negative  Bcx NGTD x4d currently  Lactate 1.4 on 3/27  No leukocytosis thus far, Procal (3/29) 0.05  Peg site skin cx with MSSA, pan sensitive       -C/W zosyn 4.5g q8h x 7 days (EOT 4/3/25)  -S/P 3 days azithromycin 500mg qd  -F/U blood cultures and wound cultures #Likely 2/2 pneumonia vs peg tube site infection  Met 2/4 SIRS on admission (tachycardic, tachypneic)  Lactate 6.0 on arrival to ED downtrended to 3.0 after 1.8L of NS bolus. CT shows perinephric stranding c/f pyelonephritis and multifocal PNA concerns for HAP (last hospitalization in Dec 2024) vs. aspiration PNA    urine strep/legionella negative, RVP negative, UA negative  Bcx NGTD x4d currently  Lactate 1.4 on 3/27  No leukocytosis thus far, Procal (3/29) 0.05  Peg site skin cx final with MRSA, pan sensitive enterobacter and acinetobacter      -C/W zosyn 4.5g q8h x 7 days (EOT 4/3/25), will plan to DC on PO bactrim to complete course  -S/P 3 days azithromycin 500mg qd  -F/U final blood cultures

## 2025-03-31 NOTE — DISCHARGE NOTE PROVIDER - CARE PROVIDERS DIRECT ADDRESSES
,DirectAddress_Unknown ,DirectAddress_Unknown,[email or web address]

## 2025-03-31 NOTE — PROGRESS NOTE ADULT - PROBLEM SELECTOR PLAN 4
#Improved  Na 149 on arrival. Na 141 today    Plan   -Tube feeds as above #Resolved  Na 149 on arrival. Na 137 today    Plan   -Tube feeds as above

## 2025-03-31 NOTE — PROGRESS NOTE ADULT - PROBLEM SELECTOR PLAN 7
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   MRI brain on 4/28 with R internal capsule infarct   Head CT 5/5: subacute infarct centered in the genu and posterior limb of the right internal capsule is stable.  A chronic transcortical infarction in the left precentral gyrus and a small chronic infarct in the right cerebellar hemisphere are stable.  Home meds: ASA 81mg, Atorvastatin 40mg  -c/w atorvastatin and aspirin   -c/w keppra 750mg (seizure ppx), per wife pt never had seizures Pt with a hx of stroke 2004 with residual right sided weakness.   MRI brain on 4/28 with R internal capsule infarct   Head CT 5/5: subacute infarct centered in the genu and posterior limb of the right internal capsule is stable.  A chronic transcortical infarction in the left precentral gyrus and a small chronic infarct in the right cerebellar hemisphere are stable.  Home meds: ASA 81mg, Atorvastatin 40mg    -c/w atorvastatin and aspirin   -c/w keppra 750mg (seizure ppx), per wife pt never had seizures

## 2025-03-31 NOTE — DISCHARGE NOTE PROVIDER - NSDCMRMEDTOKEN_GEN_ALL_CORE_FT
aspirin 81 mg oral tablet, chewable: 1 tab(s) orally once a day  atorvastatin 40 mg oral tablet: 1 tab(s) orally once a day (at bedtime)  Bactrim  mg-160 mg oral tablet: 2 tab(s) by PEG tube every 12 hours  cyanocobalamin: 1 tab(s) once a day  doxazosin 1 mg oral tablet: 1 tab(s) orally once a day (at bedtime)  finasteride 5 mg oral tablet: 1 tab(s) orally once a day  folic acid 1 mg oral tablet: 1 tab(s) orally once a day  furosemide: 20 milligram(s) once a day as needed for  scrotal edma  Home nebulizer machine, to use on home O2: Please dispense 1 nebulizer machine for patient home use on O2 machine per pts insurance  Januvia 50 mg oral tablet: 1 tab(s) orally once a day  LevETIRAcetam: 750 milligram(s) every 12 hours  metFORMIN 500 mg oral tablet: 1 tab(s) orally 3 times a day  thiamine 100 mg oral capsule: 1 cap(s) orally once a day  timolol maleate 0.5% ophthalmic solution: 1 drop(s) in each affected eye 2 times a day   ascorbic acid 500 mg oral tablet: 1 tab(s) by PEG tube once a day  aspirin 81 mg oral tablet, chewable: 1 tab(s) orally once a day  atorvastatin 40 mg oral tablet: 1 tab(s) orally once a day (at bedtime)  Bactrim  mg-160 mg oral tablet: 2 tab(s) by PEG tube every 12 hours  cyanocobalamin: 1 tab(s) once a day  doxazosin 1 mg oral tablet: 1 tab(s) orally once a day (at bedtime)  finasteride 5 mg oral tablet: 1 tab(s) orally once a day  folic acid 1 mg oral tablet: 1 tab(s) orally once a day  furosemide: 20 milligram(s) once a day as needed for  scrotal edma  Home nebulizer machine, to use on home O2: Please dispense 1 nebulizer machine for patient home use on O2 machine per pts insurance  ipratropium-albuterol 0.5 mg-2.5 mg/3 mL inhalation solution: 3 milliliter(s) inhaled every 12 hours  Januvia 50 mg oral tablet: 1 tab(s) orally once a day  LevETIRAcetam: 750 milligram(s) every 12 hours  metFORMIN 500 mg oral tablet: 1 tab(s) orally 3 times a day  Multiple Vitamins oral tablet: 1 tab(s) by PEG tube once a day  sodium chloride 7% inhalation solution: 4 milliliter(s) inhaled every 12 hours  thiamine 100 mg oral capsule: 1 cap(s) orally once a day  timolol maleate 0.5% ophthalmic solution: 1 drop(s) in each affected eye 2 times a day  zinc oxide 20% topical ointment: Apply topically to affected area every 12 hours 1 Apply topically to affected area 2 times a day   ascorbic acid 500 mg oral tablet: 1 tab(s) by PEG tube once a day  aspirin 81 mg oral tablet, chewable: 1 tab(s) orally once a day  atorvastatin 40 mg oral tablet: 1 tab(s) orally once a day (at bedtime)  Bactrim  mg-160 mg oral tablet: 2 tab(s) by PEG tube every 12 hours Last day of treatment 4/3/25  cyanocobalamin: 1 tab(s) once a day  doxazosin 1 mg oral tablet: 1 tab(s) orally once a day (at bedtime)  finasteride 5 mg oral tablet: 1 tab(s) orally once a day  folic acid 1 mg oral tablet: 1 tab(s) orally once a day  furosemide: 20 milligram(s) once a day as needed for  scrotal edma  Home nebulizer machine, to use on home O2: Please dispense 1 nebulizer machine for patient home use on O2 machine per pts insurance  ipratropium-albuterol 0.5 mg-2.5 mg/3 mL inhalation solution: 3 milliliter(s) inhaled every 12 hours  Januvia 50 mg oral tablet: 1 tab(s) orally once a day  LevETIRAcetam: 750 milligram(s) every 12 hours  metFORMIN 500 mg oral tablet: 1 tab(s) orally 3 times a day  Multiple Vitamins oral tablet: 1 tab(s) by PEG tube once a day  sodium chloride 7% inhalation solution: 4 milliliter(s) inhaled every 12 hours  thiamine 100 mg oral capsule: 1 cap(s) orally once a day  timolol maleate 0.5% ophthalmic solution: 1 drop(s) in each affected eye 2 times a day  zinc oxide 20% topical ointment: Apply topically to affected area every 12 hours 1 Apply topically to affected area 2 times a day

## 2025-03-31 NOTE — PROGRESS NOTE ADULT - PROBLEM SELECTOR PLAN 8
Home Meds: finasteride, doxazosin   Likely pyelonephritis vs. UTI 2/2 retention  UA negative  Bladder scan 85cc this AM  Condom cath     Plan  -zosyn 4.5mg x 5 days as above  -C/W bladder scans q6 Home Meds: finasteride, doxazosin   Likely pyelonephritis vs. UTI 2/2 retention  UA negative  Bladder scan 85cc this AM  Condom cath     Plan  -zosyn 4.5mg x 7 days as above  -C/W bladder scans q6 Home Meds: finasteride, doxazosin   Likely pyelonephritis vs. UTI 2/2 retention  UA negative  Condom cath     Plan  -zosyn 4.5mg x 7 days as above  -C/W bladder scans q6

## 2025-03-31 NOTE — DISCHARGE NOTE PROVIDER - HOSPITAL COURSE
#Discharge: do not delete    Patient is __ yo M/F with past medical history of _____ presented with _____, found to have _____ (one liner)    Hospital course (by problem):     Patient was discharged to: (home/[facility]/acute rehab/hospice, etc, and with what services – home health PT/RN? Home O2?)    New medications:   Changes to old medications:  Medications that were stopped:    Items to follow up as outpatient:    Physical exam at the time of discharge:    Constitutional: resting comfortably in bed; NAD  Head: NC/AT  Eyes: PERRL, EOMI, anicteric sclera  ENT: no nasal discharge; MMM  Neck: supple; no JVD or thyromegaly  Respiratory: CTA B/L; no W/R/R, no retractions  Cardiac: +S1/S2; RRR; no M/R/G  Gastrointestinal: abdomen soft, NT/ND; no rebound or guarding; +BSx4  Back: spine midline, no bony tenderness or step-offs; no CVAT B/L  Extremities: WWP, no clubbing or cyanosis; no peripheral edema  Musculoskeletal: NROM x4; no joint swelling, tenderness or erythema  Vascular: 2+ radial, DP/PT pulses B/L  Dermatologic: skin warm, dry and intact; no rashes, wounds, or scars  Lymphatic: no submandibular or cervical LAD  Neurologic: AAOx3; CNII-XII grossly intact; no focal deficits  Psychiatric: affect and characteristics of appearance, verbalizations, behaviors are appropriate #Discharge: do not delete    89y M non-verbal ( understands Cantonese), wheelchair/bed bound, R hand dominant, b/l  hearing impairment ( b/l hearing aids) with PMHx of BPH (had indwelling hudson 2/2 chronic urinary retention, removed last hospitalization at [place]) , HFpEF (LVEF 56%, grade I diastolic dysfunction), aortic root aneurysm (4.6 cm), hx CVA with right sided weakness in 2004 and new R internal capsule CVA with L HP/WC/bed bound and Sz like activity on vEEG (04/24), low BMI( 18.2), dysphagia, s/p PEG placement on (5/7/24) , last admitted to [facility]  (12/10-12/12/24) for wound care and further nutrition evaluation and management. Pt recently admitted to ICU for aspiration PNA discharged from [place] ( 2/25/25).     Now admitted ( 3/26) to [facility] for sepsis w/ lactic acidosis and acute hypoxic respiratory failure 2/2 multifocal /likely gram negative PNA (HAP since recent hospitalization and on PEG feeds), stage III sacral ulcer ( present on admission), and concerns of PEG site infection w/ superficial ulceration and reported purulent discharge. Cultures wound cultures with MRSA and pan sensitive enterobacter and acinetobacter. Pt on zosyn currently and remains afebrile and HDS.    Hospital course (by problem):     Patient was discharged to: Home with home wound care    New medications:   Changes to old medications:  Medications that were stopped:    Items to follow up as outpatient:    Physical exam at the time of discharge:    Constitutional: resting comfortably in bed; NAD  Head: NC/AT  Eyes: PERRL, EOMI, anicteric sclera  ENT: no nasal discharge; MMM  Neck: supple; no JVD or thyromegaly  Respiratory: CTA B/L; no W/R/R, no retractions  Cardiac: +S1/S2; RRR; no M/R/G  Gastrointestinal: abdomen soft, NT/ND; no rebound or guarding; +BSx4  Back: spine midline, no bony tenderness or step-offs; no CVAT B/L  Extremities: WWP, no clubbing or cyanosis; no peripheral edema  Musculoskeletal: NROM x4; no joint swelling, tenderness or erythema  Vascular: 2+ radial, DP/PT pulses B/L  Dermatologic: skin warm, dry and intact; no rashes, wounds, or scars  Lymphatic: no submandibular or cervical LAD  Neurologic: AAOx3; CNII-XII grossly intact; no focal deficits  Psychiatric: affect and characteristics of appearance, verbalizations, behaviors are appropriate #Discharge: do not delete    89y M non-verbal ( understands Cantonese), wheelchair/bed bound, R hand dominant, b/l  hearing impairment ( b/l hearing aids) with PMHx of BPH (had indwelling hudson 2/2 chronic urinary retention, removed last hospitalization at [place]) , HFpEF (LVEF 56%, grade I diastolic dysfunction), aortic root aneurysm (4.6 cm), hx CVA with right sided weakness in 2004 and new R internal capsule CVA with L HP/WC/bed bound and Sz like activity on vEEG (04/24), low BMI( 18.2), dysphagia, s/p PEG placement on (5/7/24) , last admitted to [facility]  (12/10-12/12/24) for wound care and further nutrition evaluation and management. Pt recently admitted to ICU for aspiration PNA discharged from [place] ( 2/25/25).     Now admitted ( 3/26) to [facility] for sepsis w/ lactic acidosis and acute hypoxic respiratory failure 2/2 multifocal /likely gram negative PNA (HAP since recent hospitalization and on PEG feeds), stage III sacral ulcer ( present on admission), and concerns of PEG site infection w/ superficial ulceration and reported purulent discharge. Cultures wound cultures with MRSA and pan sensitive enterobacter and acinetobacter. Pt on zosyn currently and remains afebrile and HDS.    Hospital course (by problem):     #Severe sepsis.   #Likely 2/2 pneumonia vs peg tube site infection  #On home oxygen (2L NC)  Met 2/4 SIRS on admission (tachycardic, tachypneic)  Lactate 6.0 on arrival to ED downtrended to 3.0 after 1.8L of NS bolus. CT shows perinephric stranding c/f pyelonephritis and multifocal PNA concerns for HAP (last hospitalization in Dec 2024) vs. aspiration PNA    urine strep/legionella negative, RVP negative, UA negative  Bcx NGTD x4d currently  Lactate 1.4 on 3/27  No leukocytosis thus far, Procal (3/29) 0.05  Peg site skin cx final with MRSA, pan sensitive enterobacter and acinetobacter. PEG tube cleared for use by GI  -S/P zosyn 4.5g q8h x 5 days, will plan to DC on PO bactrim to complete course 7 day course (EOT 4/3/25)  -C/W home O2, nebulizer on discharge to continue nebs/hypertonic saline for airway clearance  -Tube feeds resumed per nutrition recs (Glucerna 1.5 at 10mL/hr and increase 10mL q6hr or as tolerated to goal rate 80mL/hr x12hr)    #Type 2 diabetes mellitus.   Home meds: metformin 500mg TID, Januvia 50mg daily  -C/W home meds    #Sacral decubitus ulcer, present on admission  Stage 3 sacral ulcer likely 2/2 to bedbound status. No purulence at site of ulcer. Low suspicion for infection. Pt on abx for UTI vs. PNA. Zosyn 4.5 will also cover for skin infection and will use pseudomonal dosing as pt at risk for pseudomonal infection as pt has DM2.     Plan  -wound care consult: Triad ointment every other day.    Patient was discharged to: Home with home wound care    New medications:   Changes to old medications:  Medications that were stopped:    Items to follow up as outpatient:    Physical exam at the time of discharge:    Constitutional: resting comfortably in bed; NAD  Head: NC/AT  Eyes: PERRL, EOMI, anicteric sclera  ENT: no nasal discharge; MMM  Neck: supple; no JVD or thyromegaly  Respiratory: CTA B/L; no W/R/R, no retractions  Cardiac: +S1/S2; RRR; no M/R/G  Gastrointestinal: abdomen soft, NT/ND; no rebound or guarding; +BSx4  Back: spine midline, no bony tenderness or step-offs; no CVAT B/L  Extremities: WWP, no clubbing or cyanosis; no peripheral edema  Musculoskeletal: NROM x4; no joint swelling, tenderness or erythema  Vascular: 2+ radial, DP/PT pulses B/L  Dermatologic: skin warm, dry and intact; no rashes, wounds, or scars  Lymphatic: no submandibular or cervical LAD  Neurologic: AAOx3; CNII-XII grossly intact; no focal deficits  Psychiatric: affect and characteristics of appearance, verbalizations, behaviors are appropriate #Discharge: do not delete    89y M non-verbal ( understands Cantonese), wheelchair/bed bound, R hand dominant, b/l  hearing impairment ( b/l hearing aids) with PMHx of BPH (had indwelling hudson 2/2 chronic urinary retention, removed last hospitalization at [place]) , HFpEF (LVEF 56%, grade I diastolic dysfunction), aortic root aneurysm (4.6 cm), hx CVA with right sided weakness in 2004 and new R internal capsule CVA with L HP/WC/bed bound and Sz like activity on vEEG (04/24), low BMI( 18.2), dysphagia, s/p PEG placement on (5/7/24) , last admitted to [facility]  (12/10-12/12/24) for wound care and further nutrition evaluation and management. Pt recently admitted to ICU for aspiration PNA discharged from [place] ( 2/25/25).     Now admitted ( 3/26) to [facility] for sepsis w/ lactic acidosis and acute hypoxic respiratory failure 2/2 multifocal /likely gram negative PNA (HAP since recent hospitalization and on PEG feeds), stage III sacral ulcer ( present on admission), and concerns of PEG site infection w/ superficial ulceration and reported purulent discharge. Cultures wound cultures with MRSA and pan sensitive enterobacter and acinetobacter. Pt on zosyn currently and remains afebrile and HDS.    Hospital course (by problem):     #Severe sepsis.   #Likely 2/2 pneumonia vs peg tube site infection  #On home oxygen (2L NC)  Met 2/4 SIRS on admission (tachycardic, tachypneic)  Lactate 6.0 on arrival to ED downtrended to 3.0 after 1.8L of NS bolus. CT shows perinephric stranding c/f pyelonephritis and multifocal PNA concerns for HAP (last hospitalization in Dec 2024) vs. aspiration PNA    urine strep/legionella negative, RVP negative, UA negative  Bcx NGTD x4d currently  Lactate 1.4 on 3/27  No leukocytosis thus far, Procal (3/29) 0.05  Peg site skin cx final with MRSA, pan sensitive enterobacter and acinetobacter. PEG tube cleared for use by GI  -S/P zosyn 4.5g q8h x 5 days, will plan to DC on PO bactrim to complete course 7 day course (EOT 4/3/25)  -C/W home O2, nebulizer on discharge to continue nebs/hypertonic saline for airway clearance  -Tube feeds resumed per nutrition recs (Glucerna 1.5 at 10mL/hr and increase 10mL q6hr or as tolerated to goal rate 80mL/hr x12hr)    #Type 2 diabetes mellitus.   Home meds: metformin 500mg TID, Januvia 50mg daily  -C/W home meds    #Sacral decubitus ulcer, present on admission  Stage 3 sacral ulcer likely 2/2 to bedbound status. No purulence at site of ulcer. Low suspicion for infection. Pt on abx for UTI vs. PNA. Zosyn 4.5 will also cover for skin infection and will use pseudomonal dosing as pt at risk for pseudomonal infection as pt has DM2.   -Home wound care: Triad ointment every other day.    #History of stroke.   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   MRI brain on 4/28 with R internal capsule infarct   Head CT 5/5: subacute infarct centered in the genu and posterior limb of the right internal capsule is stable.  A chronic transcortical infarction in the left precentral gyrus and a small chronic infarct in the right cerebellar hemisphere are stable.  Home meds: ASA 81mg, Atorvastatin 40mg, keppra 750mg (seizure ppx), per wife pt never had seizures.  -C/W home meds    Patient was discharged to: Home with home wound care    New medications: Bactrim DS 2 tabs BID (EOT 4/3/25), Duo nebs/hypertonic saline q12, Glucerna 1.5 at 10mL/hr and increase 10mL q6hr or as tolerated to goal rate 80mL/hr x12hr  Changes to old medications: N/A  Medications that were stopped: N/A    Items to follow up as outpatient: PCP    Physical exam at the time of discharge:    Constitutional: A&Ox0, opens eyes. Does not follow commands  Eyes: EOMI, anicteric sclera  ENT: Moist MM  Respiratory: Coarse breath sounds  Cardiac: RRR  Gastrointestinal: soft, non distended. PEG site with some exudate surrounding, tube feeds running. Condom catheter on.  Extremities: WWP  Neurologic: AAOx0, non verbal and minimally responsive, groans intermittently #Discharge: do not delete    89y M non-verbal ( understands Cantonese), wheelchair/bed bound, R hand dominant, b/l  hearing impairment ( b/l hearing aids) with PMHx of BPH (had indwelling hudson 2/2 chronic urinary retention, removed last hospitalization at [place]) , HFpEF (LVEF 56%, grade I diastolic dysfunction), aortic root aneurysm (4.6 cm), hx CVA with right sided weakness in 2004 and new R internal capsule CVA with L HP/WC/bed bound and Sz like activity on vEEG (04/24), low BMI( 18.2), dysphagia, s/p PEG placement on (5/7/24) , last admitted to [facility]  (12/10-12/12/24) for wound care and further nutrition evaluation and management. Pt recently admitted to ICU for aspiration PNA discharged from [place] ( 2/25/25).     Now admitted ( 3/26) to [facility] for sepsis w/ lactic acidosis and acute hypoxic respiratory failure 2/2 multifocal /likely gram negative PNA (HAP since recent hospitalization and on PEG feeds), stage III sacral ulcer ( present on admission), and concerns of PEG site infection w/ superficial ulceration and reported purulent discharge. Cultures wound cultures with MRSA and pan sensitive enterobacter and acinetobacter. Pt on zosyn currently and remains afebrile and HDS.    Hospital course (by problem):     #Severe sepsis.   #Likely 2/2 pneumonia vs peg tube site infection  #On home oxygen (2L NC)  Met 2/4 SIRS on admission (tachycardic, tachypneic)  Lactate 6.0 on arrival to ED downtrended to 3.0 after 1.8L of NS bolus. CT shows perinephric stranding c/f pyelonephritis and multifocal PNA concerns for HAP (last hospitalization in Dec 2024) vs. aspiration PNA    urine strep/legionella negative, RVP negative, UA negative  Bcx NGTD x4d currently  Lactate 1.4 on 3/27  No leukocytosis thus far, Procal (3/29) 0.05  Peg site skin cx final with MRSA, pan sensitive enterobacter and acinetobacter. PEG tube cleared for use by GI  -S/P zosyn 4.5g q8h x 5 days, will plan to DC on PO bactrim to complete course 7 day course (EOT 4/3/25)  -C/W home O2, nebulizer on discharge to continue nebs/hypertonic saline for airway clearance  -Tube feeds resumed per nutrition recs (Glucerna 1.5 at 10mL/hr and increase 10mL q6hr or as tolerated to goal rate 80mL/hr x12hr)    #Type 2 diabetes mellitus.   Home meds: metformin 500mg TID, Januvia 50mg daily  -C/W home meds    #Sacral decubitus ulcer, present on admission  Stage 3 sacral ulcer likely 2/2 to bedbound status. No purulence at site of ulcer. Low suspicion for infection. Pt on abx for UTI vs. PNA. Zosyn 4.5 will also cover for skin infection and will use pseudomonal dosing as pt at risk for pseudomonal infection as pt has DM2.   -Home wound care: Triad ointment every other day.    #History of stroke.   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   MRI brain on 4/28 with R internal capsule infarct   Head CT 5/5: subacute infarct centered in the genu and posterior limb of the right internal capsule is stable.  A chronic transcortical infarction in the left precentral gyrus and a small chronic infarct in the right cerebellar hemisphere are stable.  Home meds: ASA 81mg, Atorvastatin 40mg, keppra 750mg (seizure ppx), per wife pt never had seizures.  -C/W home meds    #Anemia  Likely of chronic disease, Hb on 4/1 in AM of 6.9. Given 1u PRBC with repeat _  Iron total 30, TIBC 123, %saturation 24%, B12 172, folate >20  -C/W cyanocobalamin 1000mg qd, multivitamin, thiamine    #Testicular swelling  Pt with 2 days of testicular swelling  US testicles: Diffuse scrotal wall edema. Small bilateral hydrocele. No acute testicular abnormality.  -C/W testicular elevation    Patient was discharged to: Home with home wound care    New medications: Bactrim DS 2 tabs BID (EOT 4/3/25), Duo nebs/hypertonic saline q12, Glucerna 1.5 at 10mL/hr and increase 10mL q6hr or as tolerated to goal rate 80mL/hr x12hr  Changes to old medications: N/A  Medications that were stopped: N/A    Items to follow up as outpatient: PCP    Physical exam at the time of discharge:    Constitutional: A&Ox0, opens eyes. Does not follow commands  Eyes: EOMI, anicteric sclera  ENT: Moist MM  Respiratory: Coarse breath sounds  Cardiac: RRR  Gastrointestinal: soft, non distended. PEG site with some exudate surrounding, tube feeds running. Condom catheter on.  Extremities: WWP  Neurologic: AAOx0, non verbal and minimally responsive, groans intermittently #Discharge: do not delete    89y M non-verbal ( understands Cantonese), wheelchair/bed bound, R hand dominant, b/l  hearing impairment ( b/l hearing aids) with PMHx of BPH (had indwelling hudson 2/2 chronic urinary retention, removed last hospitalization at [place]) , HFpEF (LVEF 56%, grade I diastolic dysfunction), aortic root aneurysm (4.6 cm), hx CVA with right sided weakness in 2004 and new R internal capsule CVA with L HP/WC/bed bound and Sz like activity on vEEG (04/24), low BMI( 18.2), dysphagia, s/p PEG placement on (5/7/24) , last admitted to [facility]  (12/10-12/12/24) for wound care and further nutrition evaluation and management. Pt recently admitted to ICU for aspiration PNA discharged from [place] ( 2/25/25).     Now admitted ( 3/26) to [facility] for sepsis w/ lactic acidosis and acute hypoxic respiratory failure 2/2 multifocal /likely gram negative PNA (HAP since recent hospitalization and on PEG feeds), stage III sacral ulcer ( present on admission), and concerns of PEG site infection w/ superficial ulceration and reported purulent discharge. Cultures wound cultures with MRSA and pan sensitive enterobacter and acinetobacter. Pt on zosyn currently and remains afebrile and HDS.    Hospital course (by problem):     #Severe sepsis.   #Likely 2/2 pneumonia vs peg tube site infection  #On home oxygen (2L NC)  Met 2/4 SIRS on admission (tachycardic, tachypneic)  Lactate 6.0 on arrival to ED downtrended to 3.0 after 1.8L of NS bolus. CT shows perinephric stranding c/f pyelonephritis and multifocal PNA concerns for HAP (last hospitalization in Dec 2024) vs. aspiration PNA    urine strep/legionella negative, RVP negative, UA negative  Bcx NGTD x4d currently  Lactate 1.4 on 3/27  No leukocytosis thus far, Procal (3/29) 0.05  Peg site skin cx final with MRSA, pan sensitive enterobacter and acinetobacter. PEG tube cleared for use by GI  -S/P zosyn 4.5g q8h x 5 days, will plan to DC on PO bactrim to complete course 7 day course (EOT 4/3/25)  -C/W home O2, nebulizer on discharge to continue nebs/hypertonic saline for airway clearance  -Tube feeds resumed per nutrition recs (Glucerna 1.5 at 10mL/hr and increase 10mL q6hr or as tolerated to goal rate 80mL/hr x12hr)    #Type 2 diabetes mellitus.   Home meds: metformin 500mg TID, Januvia 50mg daily  -C/W home meds    #Sacral decubitus ulcer, present on admission  Stage 3 sacral ulcer likely 2/2 to bedbound status. No purulence at site of ulcer. Low suspicion for infection. Pt on abx for UTI vs. PNA. Zosyn 4.5 will also cover for skin infection and will use pseudomonal dosing as pt at risk for pseudomonal infection as pt has DM2.   -Home wound care: Triad ointment every other day.    #History of stroke.   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   MRI brain on 4/28 with R internal capsule infarct   Head CT 5/5: subacute infarct centered in the genu and posterior limb of the right internal capsule is stable.  A chronic transcortical infarction in the left precentral gyrus and a small chronic infarct in the right cerebellar hemisphere are stable.  Home meds: ASA 81mg, Atorvastatin 40mg, keppra 750mg (seizure ppx), per wife pt never had seizures.  -C/W home meds    #Anemia  Likely of chronic disease, Hb on 4/1 in AM of 6.9. Given 1u PRBC with repeat 8.6  Iron total 30, TIBC 123, %saturation 24%, B12 172, folate >20  -C/W cyanocobalamin 1000mg qd, multivitamin, thiamine    #Testicular swelling  Pt with 2 days of testicular swelling  US testicles: Diffuse scrotal wall edema. Small bilateral hydrocele. No acute testicular abnormality.  -C/W testicular elevation    Patient was discharged to: Home with home wound care    New medications: Bactrim DS 2 tabs BID (EOT 4/3/25), Duo nebs/hypertonic saline q12, Glucerna 1.5 at 10mL/hr and increase 10mL q6hr or as tolerated to goal rate 80mL/hr x12hr  Changes to old medications: N/A  Medications that were stopped: N/A    Items to follow up as outpatient: PCP    Physical exam at the time of discharge:    Constitutional: A&Ox0, opens eyes. Does not follow commands  Eyes: EOMI, anicteric sclera  ENT: Moist MM  Respiratory: Coarse breath sounds  Cardiac: RRR  Gastrointestinal: soft, non distended. PEG site with some exudate surrounding, tube feeds running. Condom catheter on.  Extremities: WWP  Neurologic: AAOx0, non verbal and minimally responsive, groans intermittently

## 2025-03-31 NOTE — PROGRESS NOTE ADULT - PROBLEM SELECTOR PLAN 2
#Acute hypoxic respiratory failure, resolved  CT findings of multifocal PNA, HAP vs. CAP vs. aspiration (recent hospitalization for aspiration PNA 2/25)  On 3L nasal cannula currently    Plan  -zosyn 4.5g x5 days   - Start airway clearance with duonebs and hypersal q12  - NC2L, titrate O2 to keep SpO2>92%  - aspiration precautions #Acute hypoxic respiratory failure, resolved  CT findings of multifocal PNA, HAP vs. CAP vs. aspiration (recent hospitalization for aspiration PNA 2/25)  On 3L nasal cannula currently    Plan  -zosyn 4.5g x7 days   - C/W airway clearance with duonebs and hypersal q12  - NC2L, on home 2L  - aspiration precautions

## 2025-04-01 ENCOUNTER — TRANSCRIPTION ENCOUNTER (OUTPATIENT)
Age: 89
End: 2025-04-01

## 2025-04-01 LAB
ANION GAP SERPL CALC-SCNC: 11 MMOL/L — SIGNIFICANT CHANGE UP (ref 5–17)
BASOPHILS # BLD AUTO: 0.03 K/UL — SIGNIFICANT CHANGE UP (ref 0–0.2)
BASOPHILS NFR BLD AUTO: 1 % — SIGNIFICANT CHANGE UP (ref 0–2)
BUN SERPL-MCNC: 17 MG/DL — SIGNIFICANT CHANGE UP (ref 7–23)
CALCIUM SERPL-MCNC: 7.5 MG/DL — LOW (ref 8.4–10.5)
CHLORIDE SERPL-SCNC: 102 MMOL/L — SIGNIFICANT CHANGE UP (ref 96–108)
CO2 SERPL-SCNC: 22 MMOL/L — SIGNIFICANT CHANGE UP (ref 22–31)
CREAT SERPL-MCNC: 0.24 MG/DL — LOW (ref 0.5–1.3)
CULTURE RESULTS: SIGNIFICANT CHANGE UP
CULTURE RESULTS: SIGNIFICANT CHANGE UP
EGFR: 123 ML/MIN/1.73M2 — SIGNIFICANT CHANGE UP
EGFR: 123 ML/MIN/1.73M2 — SIGNIFICANT CHANGE UP
EOSINOPHIL # BLD AUTO: 0.06 K/UL — SIGNIFICANT CHANGE UP (ref 0–0.5)
EOSINOPHIL NFR BLD AUTO: 2.1 % — SIGNIFICANT CHANGE UP (ref 0–6)
GLUCOSE SERPL-MCNC: 297 MG/DL — HIGH (ref 70–99)
HCT VFR BLD CALC: 22.5 % — LOW (ref 39–50)
HCT VFR BLD CALC: 26.4 % — LOW (ref 39–50)
HGB BLD-MCNC: 6.9 G/DL — CRITICAL LOW (ref 13–17)
HGB BLD-MCNC: 8.6 G/DL — LOW (ref 13–17)
LYMPHOCYTES # BLD AUTO: 0.72 K/UL — LOW (ref 1–3.3)
LYMPHOCYTES # BLD AUTO: 23.7 % — SIGNIFICANT CHANGE UP (ref 13–44)
MAGNESIUM SERPL-MCNC: 2 MG/DL — SIGNIFICANT CHANGE UP (ref 1.6–2.6)
MCHC RBC-ENTMCNC: 22.4 PG — LOW (ref 27–34)
MCHC RBC-ENTMCNC: 23.9 PG — LOW (ref 27–34)
MCHC RBC-ENTMCNC: 30.7 G/DL — LOW (ref 32–36)
MCHC RBC-ENTMCNC: 32.6 G/DL — SIGNIFICANT CHANGE UP (ref 32–36)
MCV RBC AUTO: 73.1 FL — LOW (ref 80–100)
MCV RBC AUTO: 73.3 FL — LOW (ref 80–100)
MONOCYTES # BLD AUTO: 0 K/UL — SIGNIFICANT CHANGE UP (ref 0–0.9)
MONOCYTES NFR BLD AUTO: 0 % — LOW (ref 2–14)
NEUTROPHILS # BLD AUTO: 2.22 K/UL — SIGNIFICANT CHANGE UP (ref 1.8–7.4)
NEUTROPHILS NFR BLD AUTO: 73.2 % — SIGNIFICANT CHANGE UP (ref 43–77)
NRBC BLD AUTO-RTO: 0 /100 WBCS — SIGNIFICANT CHANGE UP (ref 0–0)
NRBC BLD AUTO-RTO: SIGNIFICANT CHANGE UP /100 WBCS (ref 0–0)
PHOSPHATE SERPL-MCNC: 2.4 MG/DL — LOW (ref 2.5–4.5)
PLATELET # BLD AUTO: 128 K/UL — LOW (ref 150–400)
PLATELET # BLD AUTO: 138 K/UL — LOW (ref 150–400)
POTASSIUM SERPL-MCNC: 4.1 MMOL/L — SIGNIFICANT CHANGE UP (ref 3.5–5.3)
POTASSIUM SERPL-SCNC: 4.1 MMOL/L — SIGNIFICANT CHANGE UP (ref 3.5–5.3)
RBC # BLD: 3.08 M/UL — LOW (ref 4.2–5.8)
RBC # BLD: 3.6 M/UL — LOW (ref 4.2–5.8)
RBC # FLD: 17.8 % — HIGH (ref 10.3–14.5)
RBC # FLD: 18.3 % — HIGH (ref 10.3–14.5)
SODIUM SERPL-SCNC: 135 MMOL/L — SIGNIFICANT CHANGE UP (ref 135–145)
SPECIMEN SOURCE: SIGNIFICANT CHANGE UP
SPECIMEN SOURCE: SIGNIFICANT CHANGE UP
WBC # BLD: 3.03 K/UL — LOW (ref 3.8–10.5)
WBC # BLD: 3.21 K/UL — LOW (ref 3.8–10.5)
WBC # FLD AUTO: 3.03 K/UL — LOW (ref 3.8–10.5)
WBC # FLD AUTO: 3.21 K/UL — LOW (ref 3.8–10.5)

## 2025-04-01 PROCEDURE — 76870 US EXAM SCROTUM: CPT | Mod: 26

## 2025-04-01 PROCEDURE — 99239 HOSP IP/OBS DSCHRG MGMT >30: CPT | Mod: GC

## 2025-04-01 RX ORDER — B1/B2/B3/B5/B6/B12/VIT C/FOLIC 500-0.5 MG
1 TABLET ORAL
Qty: 30 | Refills: 0
Start: 2025-04-01 | End: 2025-04-30

## 2025-04-01 RX ORDER — SULFAMETHOXAZOLE/TRIMETHOPRIM 800-160 MG
2 TABLET ORAL
Qty: 12 | Refills: 0
Start: 2025-04-01 | End: 2025-04-03

## 2025-04-01 RX ORDER — TOUCHLESS CARE ZINC OXIDE PROTECTANT 20; 25 G/100G; G/100G
1 SPRAY TOPICAL
Qty: 1 | Refills: 2
Start: 2025-04-01 | End: 2025-06-29

## 2025-04-01 RX ORDER — IPRATROPIUM BROMIDE AND ALBUTEROL SULFATE .5; 2.5 MG/3ML; MG/3ML
3 SOLUTION RESPIRATORY (INHALATION)
Qty: 60 | Refills: 2
Start: 2025-04-01 | End: 2025-06-29

## 2025-04-01 RX ADMIN — Medication 3 UNIT(S): at 07:22

## 2025-04-01 RX ADMIN — FINASTERIDE 5 MILLIGRAM(S): 1 TABLET, FILM COATED ORAL at 16:29

## 2025-04-01 RX ADMIN — IPRATROPIUM BROMIDE AND ALBUTEROL SULFATE 3 MILLILITER(S): .5; 2.5 SOLUTION RESPIRATORY (INHALATION) at 17:22

## 2025-04-01 RX ADMIN — CYANOCOBALAMIN 1000 MICROGRAM(S): 1000 INJECTION INTRAMUSCULAR; SUBCUTANEOUS at 12:11

## 2025-04-01 RX ADMIN — Medication 500 MILLIGRAM(S): at 12:11

## 2025-04-01 RX ADMIN — Medication 1 TABLET(S): at 12:11

## 2025-04-01 RX ADMIN — Medication 220 MILLIGRAM(S): at 12:12

## 2025-04-01 RX ADMIN — TIMOLOL MALEATE 1 DROP(S): 6.8 SOLUTION OPHTHALMIC at 06:36

## 2025-04-01 RX ADMIN — ENOXAPARIN SODIUM 40 MILLIGRAM(S): 100 INJECTION SUBCUTANEOUS at 22:27

## 2025-04-01 RX ADMIN — Medication 4 MILLILITER(S): at 17:22

## 2025-04-01 RX ADMIN — Medication 25 GRAM(S): at 06:25

## 2025-04-01 RX ADMIN — Medication 81 MILLIGRAM(S): at 16:29

## 2025-04-01 RX ADMIN — Medication 100 MILLIGRAM(S): at 12:11

## 2025-04-01 RX ADMIN — Medication 4 MILLILITER(S): at 06:25

## 2025-04-01 RX ADMIN — INSULIN LISPRO 6: 100 INJECTION, SOLUTION INTRAVENOUS; SUBCUTANEOUS at 01:02

## 2025-04-01 RX ADMIN — DOXAZOSIN MESYLATE 1 MILLIGRAM(S): 8 TABLET ORAL at 22:27

## 2025-04-01 RX ADMIN — Medication 3 UNIT(S): at 00:34

## 2025-04-01 RX ADMIN — Medication 3 UNIT(S): at 13:21

## 2025-04-01 RX ADMIN — LEVETIRACETAM 750 MILLIGRAM(S): 10 INJECTION, SOLUTION INTRAVENOUS at 17:04

## 2025-04-01 RX ADMIN — LEVETIRACETAM 750 MILLIGRAM(S): 10 INJECTION, SOLUTION INTRAVENOUS at 06:25

## 2025-04-01 RX ADMIN — TOUCHLESS CARE ZINC OXIDE PROTECTANT 1 APPLICATION(S): 20; 25 SPRAY TOPICAL at 08:00

## 2025-04-01 RX ADMIN — INSULIN LISPRO 4: 100 INJECTION, SOLUTION INTRAVENOUS; SUBCUTANEOUS at 13:21

## 2025-04-01 RX ADMIN — INSULIN LISPRO 6: 100 INJECTION, SOLUTION INTRAVENOUS; SUBCUTANEOUS at 06:21

## 2025-04-01 RX ADMIN — IPRATROPIUM BROMIDE AND ALBUTEROL SULFATE 3 MILLILITER(S): .5; 2.5 SOLUTION RESPIRATORY (INHALATION) at 06:25

## 2025-04-01 RX ADMIN — TIMOLOL MALEATE 1 DROP(S): 6.8 SOLUTION OPHTHALMIC at 17:21

## 2025-04-01 RX ADMIN — ATORVASTATIN CALCIUM 40 MILLIGRAM(S): 80 TABLET, FILM COATED ORAL at 22:27

## 2025-04-01 RX ADMIN — TOUCHLESS CARE ZINC OXIDE PROTECTANT 1 APPLICATION(S): 20; 25 SPRAY TOPICAL at 17:20

## 2025-04-01 NOTE — DISCHARGE NOTE NURSING/CASE MANAGEMENT/SOCIAL WORK - NSDCPEFALRISK_GEN_ALL_CORE
For information on Fall & Injury Prevention, visit: [email or web address] OR  [email or web address] OR  https://www.cdc.gov/steadi/patient.html

## 2025-04-01 NOTE — PROGRESS NOTE ADULT - ATTENDING COMMENTS
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  (12/10-12/12/24) for wound care and further nutrition evaluation and management. GI consulted and PEG tube study showed PEG is functioning, and no need to change to a new one. WOCN consult appreciated, given Sliver nitrate and aquagel Ag dressing daily to PEG site. Dietitian consult appreciated, TF changed to Glucerna 1.2 @ 83ml/hr from 5pm to 11am x 18hr, total 6 cans per day. Pt recently admitted to ICU for aspiration PNA and had indwelling hudson removed and discharged from [place] ( 2/25/25) and sent to [facility] and subsequently sent home on home O2 perwife, now admitted ( 3/26) to [facility] for sepsis w/ lactic acidosis and acute hypoxic respiratory failure 2/2 multifocal /likely gram negative PNA( HAP since recent hospitalization and on PEG feeds), stage III sacral ulcer ( presence on admission), and concerns of PEG site infection w/ superficial ulceration and reported purulent discharge.     CC: Pt seen this am, noted scrotal enlargement, testicular US showed b/l hydrocele. Hgb 6.9, s/p 1U PRBC w/ post Tx Hgb 8.6. Clinically stable for d/c home today as d/w , and wife at bedside     # Sepsis ( met 2/4 criteria on adm)  # ? HAP likely gram negative PNA ( lower lobes) w /trace R pleural effusion Vs consolidations at lower lobes related to recent PNA ( lag of imaging resolution)   # Acute hypoxic respiratory failure   - O2 supplement NC3L, titrate O2 to keep SpO2>92%, ( has home O2 per wife)  - airway clearance: add humidification to O2, started duonebs followed by hypertonic saline nebs q12hr   - agrees w/ Zosyn 4.5g IV q8h x 7 days ( 3/28-4/3) Day 5, possible able to transition to oral abx when medically ready for d/c home ( i.e. bactrim)   - c/w Azithromycin 500mg daily for 3 days( 3/28-3/30)    - urine strep and legionella antigen both negative   - procalcitonin=0.05  - normal WBC but w/ left shift   - BCx( 3/26); ngtd  - oral hygiene q shift please   - aspiration precaution/keep HOB 30* and up    # PEG (percutaneous endoscopic gastrostomy) adjustment/replacement/removal.   - PEG tube in place   - GI consult appreciated, PEG tube functioning  - tolerating TF w/ Glucerna @ 80ml/hr x 12hr   - wound cx: Enterobacter cloacae, Acinetobacter baumani and MRSA all sensitive to bactrim   - f/u wound care w. Zinc oxide ointment bid okay too- wound improving      # Low BMI/severe protein calorie malnutrition  # Uncontrolled DM  -A1C 8.1%  - hold home meds: metformin 500mg TID, Januvia 50mg daily  - BMI 18.2, albumin 2.8   - dietitian consult   - mIVF + Q6 FS while NPO.  - agrees to added RI 3U q6h    # unstageable tissue injury sacral area ( POA)   - WOCN consult appreciated, f/u recs: Triad ointment every other day  - added MVI daily, zinc sulfate 220mg daily and Vit C 500mg daily  - added Thiamine 100mg daily      # Chronic Urinary retention  # BPH  - resume doxazosin 1mg daily   - [place] catheter     # hx CVA/R & L H/non-verbal/dysphagia  # hxSz like activity  - c/w ASA/ Atorvastatin and Keppra    # JUSTYNA/Thalassemia  - Hgb 7.7, Ferritin 1605, Tsat 24%   - B12 level 172, start B12 1000mg daily   - s/p 1U PRBC for Hgb 6.9--> 8.6    # Scrotal edema  - Testicular US reviewed, b/l hydrocele    # DVT ppx: add Lovenox 40mg SQ daily     # code status; FULL CODE. GOC discussed with surrogate/wife at bedside    # Disposition: pt has 24/7 HHA, clinical stable for d/c home today 4/1/25    POC as d/w 7WO team, Dr. Camacho, and housecall Dr. Destin Roach ( handoff provided)     Time-based billing (NON-critical care).     Discharge time>30mins     50 minutes spent on total encounter. The necessity of the time spent during the encounter on this date of service was due to:     bedside interview w/ caregiver, examine  reviewed vitals and labs and imagiing   POC as d/w housestaff and caregiver   documentation of encounter   excluded teaching time and/or separately reported services.

## 2025-04-01 NOTE — PROGRESS NOTE ADULT - NUTRITIONAL ASSESSMENT
This patient has been assessed with a concern for Malnutrition and has been determined to have a diagnosis/diagnoses of Severe protein-calorie malnutrition and Underweight (BMI < 19).    This patient is being managed with:   Diet NPO with Tube Feed-  Tube Feeding Modality: Gastrostomy  Glucerna 1.5 Nikolai (GLUCERNA1.5)  Total Volume for 24 Hours (mL): 960  Total Number of Cans: 4  Continuous  Starting Tube Feed Rate {mL per Hour}: 10  Increase Tube Feed Rate by (mL): 10     Every 6 hours  Until Goal Tube Feed Rate (mL per Hour): 80  Tube Feed Duration (in Hours): 12  Tube Feed Start Time: 15:00  Entered: Mar 28 2025  2:50PM  
This patient has been assessed with a concern for Malnutrition and has been determined to have a diagnosis/diagnoses of Severe protein-calorie malnutrition and Underweight (BMI < 19).    This patient is being managed with:   Diet NPO with Tube Feed-  Tube Feeding Modality: Gastrostomy  Glucerna 1.5 Nikolai (GLUCERNA1.5)  Total Volume for 24 Hours (mL): 960  Total Number of Cans: 4  Continuous  Starting Tube Feed Rate {mL per Hour}: 10  Increase Tube Feed Rate by (mL): 10     Every 6 hours  Until Goal Tube Feed Rate (mL per Hour): 80  Tube Feed Duration (in Hours): 12  Tube Feed Start Time: 19:00  Tube Feed Stop Time: 07:00  Entered: Mar 29 2025  8:46AM  

## 2025-04-01 NOTE — DISCHARGE NOTE NURSING/CASE MANAGEMENT/SOCIAL WORK - PATIENT PORTAL LINK FT
You can access the FollowMyHealth Patient Portal offered by [facility] by registering at the following website: [email or web address]. By joining [facility]’s FollowMyHealth portal, you will also be able to view your health information using other applications (apps) compatible with our system.

## 2025-04-01 NOTE — DISCHARGE NOTE NURSING/CASE MANAGEMENT/SOCIAL WORK - FINANCIAL ASSISTANCE
[facility] provides services at a reduced cost to those who are determined to be eligible through [facility]’s financial assistance program. Information regarding [facility]’s financial assistance program can be found by going to [email or web address] or by calling [phone].

## 2025-04-01 NOTE — PROGRESS NOTE ADULT - SUBJECTIVE AND OBJECTIVE BOX
HOSPITAL COURSE:     OVERNIGHT EVENTS:    SUBJECTIVE: Pt seen and evaluated bedside. _ .    ROS: otherwise negative      PHYSICAL EXAM:  Constitutional: resting comfortably in bed; NAD  Head: NC/AT  Eyes: EOMI, anicteric sclera  ENT: no nasal discharge; MMM  Neck: supple  Respiratory: CTA B/L; no W/R/R  Cardiac: RRR; no M/R/G  Gastrointestinal: soft, NT/ND; no rebound or guarding  Extremities: WWP, no clubbing or cyanosis; no peripheral edema  Musculoskeletal: NROM x4; no joint swelling, tenderness or erythema  Dermatologic: skin warm, dry and intact; no rashes, wounds, or scars  Neurologic: AAOx3; no focal deficits  Psychiatric: affect and characteristics of appearance, verbalizations, behaviors are appropriate        RADIOLOGY & ADDITIONAL TESTS: Reviewed

## 2025-04-02 VITALS
SYSTOLIC BLOOD PRESSURE: 102 MMHG | HEART RATE: 96 BPM | OXYGEN SATURATION: 96 % | TEMPERATURE: 98 F | DIASTOLIC BLOOD PRESSURE: 62 MMHG | RESPIRATION RATE: 18 BRPM

## 2025-04-02 PROCEDURE — 85027 COMPLETE CBC AUTOMATED: CPT

## 2025-04-02 PROCEDURE — 83735 ASSAY OF MAGNESIUM: CPT

## 2025-04-02 PROCEDURE — P9040: CPT

## 2025-04-02 PROCEDURE — 99285 EMERGENCY DEPT VISIT HI MDM: CPT

## 2025-04-02 PROCEDURE — 87637 SARSCOV2&INF A&B&RSV AMP PRB: CPT

## 2025-04-02 PROCEDURE — 83880 ASSAY OF NATRIURETIC PEPTIDE: CPT

## 2025-04-02 PROCEDURE — 87641 MR-STAPH DNA AMP PROBE: CPT

## 2025-04-02 PROCEDURE — 87640 STAPH A DNA AMP PROBE: CPT

## 2025-04-02 PROCEDURE — 82962 GLUCOSE BLOOD TEST: CPT

## 2025-04-02 PROCEDURE — 81001 URINALYSIS AUTO W/SCOPE: CPT

## 2025-04-02 PROCEDURE — 87186 SC STD MICRODIL/AGAR DIL: CPT

## 2025-04-02 PROCEDURE — 86901 BLOOD TYPING SEROLOGIC RH(D): CPT

## 2025-04-02 PROCEDURE — 87899 AGENT NOS ASSAY W/OPTIC: CPT

## 2025-04-02 PROCEDURE — 83036 HEMOGLOBIN GLYCOSYLATED A1C: CPT

## 2025-04-02 PROCEDURE — 96375 TX/PRO/DX INJ NEW DRUG ADDON: CPT

## 2025-04-02 PROCEDURE — 71250 CT THORAX DX C-: CPT | Mod: MC

## 2025-04-02 PROCEDURE — 83540 ASSAY OF IRON: CPT

## 2025-04-02 PROCEDURE — 86923 COMPATIBILITY TEST ELECTRIC: CPT

## 2025-04-02 PROCEDURE — 86900 BLOOD TYPING SEROLOGIC ABO: CPT

## 2025-04-02 PROCEDURE — 87184 SC STD DISK METHOD PER PLATE: CPT

## 2025-04-02 PROCEDURE — 0225U NFCT DS DNA&RNA 21 SARSCOV2: CPT

## 2025-04-02 PROCEDURE — 96374 THER/PROPH/DIAG INJ IV PUSH: CPT

## 2025-04-02 PROCEDURE — 76870 US EXAM SCROTUM: CPT

## 2025-04-02 PROCEDURE — 71045 X-RAY EXAM CHEST 1 VIEW: CPT

## 2025-04-02 PROCEDURE — 36415 COLL VENOUS BLD VENIPUNCTURE: CPT

## 2025-04-02 PROCEDURE — 36430 TRANSFUSION BLD/BLD COMPNT: CPT

## 2025-04-02 PROCEDURE — 84443 ASSAY THYROID STIM HORMONE: CPT

## 2025-04-02 PROCEDURE — 85730 THROMBOPLASTIN TIME PARTIAL: CPT

## 2025-04-02 PROCEDURE — 74176 CT ABD & PELVIS W/O CONTRAST: CPT | Mod: MC

## 2025-04-02 PROCEDURE — 84466 ASSAY OF TRANSFERRIN: CPT

## 2025-04-02 PROCEDURE — 82728 ASSAY OF FERRITIN: CPT

## 2025-04-02 PROCEDURE — 87070 CULTURE OTHR SPECIMN AEROBIC: CPT

## 2025-04-02 PROCEDURE — 84100 ASSAY OF PHOSPHORUS: CPT

## 2025-04-02 PROCEDURE — 85610 PROTHROMBIN TIME: CPT

## 2025-04-02 PROCEDURE — 80048 BASIC METABOLIC PNL TOTAL CA: CPT

## 2025-04-02 PROCEDURE — 94640 AIRWAY INHALATION TREATMENT: CPT

## 2025-04-02 PROCEDURE — 82746 ASSAY OF FOLIC ACID SERUM: CPT

## 2025-04-02 PROCEDURE — 84484 ASSAY OF TROPONIN QUANT: CPT

## 2025-04-02 PROCEDURE — 83550 IRON BINDING TEST: CPT

## 2025-04-02 PROCEDURE — 83605 ASSAY OF LACTIC ACID: CPT

## 2025-04-02 PROCEDURE — 85025 COMPLETE CBC W/AUTO DIFF WBC: CPT

## 2025-04-02 PROCEDURE — 87077 CULTURE AEROBIC IDENTIFY: CPT

## 2025-04-02 PROCEDURE — 80053 COMPREHEN METABOLIC PANEL: CPT

## 2025-04-02 PROCEDURE — 87040 BLOOD CULTURE FOR BACTERIA: CPT

## 2025-04-02 PROCEDURE — 86850 RBC ANTIBODY SCREEN: CPT

## 2025-04-02 PROCEDURE — 84145 PROCALCITONIN (PCT): CPT

## 2025-04-02 PROCEDURE — 82607 VITAMIN B-12: CPT

## 2025-04-02 PROCEDURE — 87075 CULTR BACTERIA EXCEPT BLOOD: CPT

## 2025-04-02 RX ADMIN — Medication 25 GRAM(S): at 00:00

## 2025-04-11 DIAGNOSIS — Z97.4 PRESENCE OF EXTERNAL HEARING-AID: ICD-10-CM

## 2025-04-11 DIAGNOSIS — E87.20 ACIDOSIS, UNSPECIFIED: ICD-10-CM

## 2025-04-11 DIAGNOSIS — N40.1 BENIGN PROSTATIC HYPERPLASIA WITH LOWER URINARY TRACT SYMPTOMS: ICD-10-CM

## 2025-04-11 DIAGNOSIS — J96.01 ACUTE RESPIRATORY FAILURE WITH HYPOXIA: ICD-10-CM

## 2025-04-11 DIAGNOSIS — A41.9 SEPSIS, UNSPECIFIED ORGANISM: ICD-10-CM

## 2025-04-11 DIAGNOSIS — Z79.84 LONG TERM (CURRENT) USE OF ORAL HYPOGLYCEMIC DRUGS: ICD-10-CM

## 2025-04-11 DIAGNOSIS — I69.351 HEMIPLEGIA AND HEMIPARESIS FOLLOWING CEREBRAL INFARCTION AFFECTING RIGHT DOMINANT SIDE: ICD-10-CM

## 2025-04-11 DIAGNOSIS — K94.22 GASTROSTOMY INFECTION: ICD-10-CM

## 2025-04-11 DIAGNOSIS — D56.3 THALASSEMIA MINOR: ICD-10-CM

## 2025-04-11 DIAGNOSIS — Z99.81 DEPENDENCE ON SUPPLEMENTAL OXYGEN: ICD-10-CM

## 2025-04-11 DIAGNOSIS — L89.153 PRESSURE ULCER OF SACRAL REGION, STAGE 3: ICD-10-CM

## 2025-04-11 DIAGNOSIS — Z74.01 BED CONFINEMENT STATUS: ICD-10-CM

## 2025-04-11 DIAGNOSIS — R33.8 OTHER RETENTION OF URINE: ICD-10-CM

## 2025-04-11 DIAGNOSIS — R65.20 SEVERE SEPSIS WITHOUT SEPTIC SHOCK: ICD-10-CM

## 2025-04-11 DIAGNOSIS — J69.0 PNEUMONITIS DUE TO INHALATION OF FOOD AND VOMIT: ICD-10-CM

## 2025-04-11 DIAGNOSIS — Z79.82 LONG TERM (CURRENT) USE OF ASPIRIN: ICD-10-CM

## 2025-04-11 DIAGNOSIS — E87.0 HYPEROSMOLALITY AND HYPERNATREMIA: ICD-10-CM

## 2025-04-11 DIAGNOSIS — D63.8 ANEMIA IN OTHER CHRONIC DISEASES CLASSIFIED ELSEWHERE: ICD-10-CM

## 2025-04-11 DIAGNOSIS — E43 UNSPECIFIED SEVERE PROTEIN-CALORIE MALNUTRITION: ICD-10-CM

## 2025-04-11 DIAGNOSIS — B95.62 METHICILLIN RESISTANT STAPHYLOCOCCUS AUREUS INFECTION AS THE CAUSE OF DISEASES CLASSIFIED ELSEWHERE: ICD-10-CM

## 2025-04-11 DIAGNOSIS — E78.5 HYPERLIPIDEMIA, UNSPECIFIED: ICD-10-CM

## 2025-04-11 DIAGNOSIS — F32.A DEPRESSION, UNSPECIFIED: ICD-10-CM

## 2025-04-11 DIAGNOSIS — J15.69 PNEUMONIA DUE TO OTHER GRAM-NEGATIVE BACTERIA: ICD-10-CM

## 2025-04-11 DIAGNOSIS — N43.3 HYDROCELE, UNSPECIFIED: ICD-10-CM

## 2025-04-22 ENCOUNTER — INPATIENT (INPATIENT)
Facility: HOSPITAL | Age: 89
LOS: 8 days | Discharge: NURSING, REHAB OR LONG-TERM CARE | DRG: 853 | End: 2025-05-01
Attending: HOSPITALIST | Admitting: STUDENT IN AN ORGANIZED HEALTH CARE EDUCATION/TRAINING PROGRAM
Payer: MEDICARE

## 2025-04-22 VITALS
DIASTOLIC BLOOD PRESSURE: 76 MMHG | SYSTOLIC BLOOD PRESSURE: 122 MMHG | OXYGEN SATURATION: 91 % | WEIGHT: 119.93 LBS | HEIGHT: 64 IN | TEMPERATURE: 98 F | RESPIRATION RATE: 20 BRPM | HEART RATE: 121 BPM

## 2025-04-22 LAB
ALBUMIN SERPL ELPH-MCNC: 1.8 G/DL — LOW (ref 3.4–5)
ALP SERPL-CCNC: 118 U/L — SIGNIFICANT CHANGE UP (ref 40–120)
ALT FLD-CCNC: 33 U/L — SIGNIFICANT CHANGE UP (ref 12–42)
ANION GAP SERPL CALC-SCNC: 11 MMOL/L — SIGNIFICANT CHANGE UP (ref 9–16)
APPEARANCE UR: CLEAR — SIGNIFICANT CHANGE UP
APTT BLD: 28.6 SEC — SIGNIFICANT CHANGE UP (ref 26.1–36.8)
AST SERPL-CCNC: 49 U/L — HIGH (ref 15–37)
BASOPHILS # BLD AUTO: 0.03 K/UL — SIGNIFICANT CHANGE UP (ref 0–0.2)
BASOPHILS NFR BLD AUTO: 0.2 % — SIGNIFICANT CHANGE UP (ref 0–2)
BILIRUB SERPL-MCNC: 0.4 MG/DL — SIGNIFICANT CHANGE UP (ref 0.2–1.2)
BILIRUB UR-MCNC: NEGATIVE — SIGNIFICANT CHANGE UP
BUN SERPL-MCNC: 43 MG/DL — HIGH (ref 7–23)
CALCIUM SERPL-MCNC: 9 MG/DL — SIGNIFICANT CHANGE UP (ref 8.5–10.5)
CHLORIDE SERPL-SCNC: 105 MMOL/L — SIGNIFICANT CHANGE UP (ref 96–108)
CO2 SERPL-SCNC: 26 MMOL/L — SIGNIFICANT CHANGE UP (ref 22–31)
COLOR SPEC: SIGNIFICANT CHANGE UP
CREAT SERPL-MCNC: 0.95 MG/DL — SIGNIFICANT CHANGE UP (ref 0.5–1.3)
DIFF PNL FLD: NEGATIVE — SIGNIFICANT CHANGE UP
EGFR: 77 ML/MIN/1.73M2 — SIGNIFICANT CHANGE UP
EGFR: 77 ML/MIN/1.73M2 — SIGNIFICANT CHANGE UP
EOSINOPHIL # BLD AUTO: 0.08 K/UL — SIGNIFICANT CHANGE UP (ref 0–0.5)
EOSINOPHIL NFR BLD AUTO: 0.5 % — SIGNIFICANT CHANGE UP (ref 0–6)
FLUAV AG NPH QL: SIGNIFICANT CHANGE UP
FLUBV AG NPH QL: SIGNIFICANT CHANGE UP
GLUCOSE SERPL-MCNC: 363 MG/DL — HIGH (ref 70–99)
GLUCOSE UR QL: 250 MG/DL
HCT VFR BLD CALC: 29.5 % — LOW (ref 39–50)
HGB BLD-MCNC: 8.9 G/DL — LOW (ref 13–17)
IMM GRANULOCYTES # BLD AUTO: 0.16 K/UL — HIGH (ref 0–0.07)
IMM GRANULOCYTES NFR BLD AUTO: 0.9 % — SIGNIFICANT CHANGE UP (ref 0–0.9)
INR BLD: 1.13 — SIGNIFICANT CHANGE UP (ref 0.85–1.16)
KETONES UR-MCNC: ABNORMAL MG/DL
LACTATE BLDV-MCNC: 4 MMOL/L — CRITICAL HIGH (ref 0.5–2)
LACTATE BLDV-MCNC: 5.7 MMOL/L — CRITICAL HIGH (ref 0.5–2)
LEUKOCYTE ESTERASE UR-ACNC: ABNORMAL
LYMPHOCYTES # BLD AUTO: 1.15 K/UL — SIGNIFICANT CHANGE UP (ref 1–3.3)
LYMPHOCYTES NFR BLD AUTO: 6.5 % — LOW (ref 13–44)
MCHC RBC-ENTMCNC: 22.5 PG — LOW (ref 27–34)
MCHC RBC-ENTMCNC: 30.2 G/DL — LOW (ref 32–36)
MCV RBC AUTO: 74.5 FL — LOW (ref 80–100)
MONOCYTES # BLD AUTO: 0.92 K/UL — HIGH (ref 0–0.9)
MONOCYTES NFR BLD AUTO: 5.2 % — SIGNIFICANT CHANGE UP (ref 2–14)
NEUTROPHILS # BLD AUTO: 15.29 K/UL — HIGH (ref 1.8–7.4)
NEUTROPHILS NFR BLD AUTO: 86.7 % — HIGH (ref 43–77)
NITRITE UR-MCNC: NEGATIVE — SIGNIFICANT CHANGE UP
NRBC # BLD AUTO: 0.02 K/UL — HIGH (ref 0–0)
NRBC # FLD: 0.02 K/UL — HIGH (ref 0–0)
NRBC BLD AUTO-RTO: 0 /100 WBCS — SIGNIFICANT CHANGE UP (ref 0–0)
PH UR: 5.5 — SIGNIFICANT CHANGE UP (ref 5–8)
PLATELET # BLD AUTO: 210 K/UL — SIGNIFICANT CHANGE UP (ref 150–400)
PMV BLD: 11 FL — SIGNIFICANT CHANGE UP (ref 7–13)
POTASSIUM SERPL-MCNC: 3.8 MMOL/L — SIGNIFICANT CHANGE UP (ref 3.5–5.3)
POTASSIUM SERPL-SCNC: 3.8 MMOL/L — SIGNIFICANT CHANGE UP (ref 3.5–5.3)
PROT SERPL-MCNC: 7.3 G/DL — SIGNIFICANT CHANGE UP (ref 6.4–8.2)
PROT UR-MCNC: 100 MG/DL
PROTHROM AB SERPL-ACNC: 13.2 SEC — SIGNIFICANT CHANGE UP (ref 9.9–13.4)
RBC # BLD: 3.96 M/UL — LOW (ref 4.2–5.8)
RBC # FLD: 19.9 % — HIGH (ref 10.3–14.5)
RSV RNA NPH QL NAA+NON-PROBE: SIGNIFICANT CHANGE UP
SARS-COV-2 RNA SPEC QL NAA+PROBE: SIGNIFICANT CHANGE UP
SODIUM SERPL-SCNC: 142 MMOL/L — SIGNIFICANT CHANGE UP (ref 132–145)
SOURCE RESPIRATORY: SIGNIFICANT CHANGE UP
SP GR SPEC: 1.03 — SIGNIFICANT CHANGE UP (ref 1–1.03)
UROBILINOGEN FLD QL: 1 MG/DL — SIGNIFICANT CHANGE UP (ref 0.2–1)
WBC # BLD: 17.65 K/UL — HIGH (ref 3.8–10.5)
WBC # FLD AUTO: 17.65 K/UL — HIGH (ref 3.8–10.5)

## 2025-04-22 PROCEDURE — 99285 EMERGENCY DEPT VISIT HI MDM: CPT

## 2025-04-22 PROCEDURE — 71045 X-RAY EXAM CHEST 1 VIEW: CPT | Mod: 26

## 2025-04-22 RX ORDER — VANCOMYCIN HCL IN 5 % DEXTROSE 1.5G/250ML
1000 PLASTIC BAG, INJECTION (ML) INTRAVENOUS ONCE
Refills: 0 | Status: COMPLETED | OUTPATIENT
Start: 2025-04-22 | End: 2025-04-22

## 2025-04-22 RX ORDER — CEFEPIME 2 G/20ML
2000 INJECTION, POWDER, FOR SOLUTION INTRAVENOUS ONCE
Refills: 0 | Status: COMPLETED | OUTPATIENT
Start: 2025-04-22 | End: 2025-04-22

## 2025-04-22 RX ORDER — VANCOMYCIN HCL IN 5 % DEXTROSE 1.5G/250ML
800 PLASTIC BAG, INJECTION (ML) INTRAVENOUS EVERY 12 HOURS
Refills: 0 | Status: DISCONTINUED | OUTPATIENT
Start: 2025-04-23 | End: 2025-04-23

## 2025-04-22 RX ORDER — CEFEPIME 2 G/20ML
2000 INJECTION, POWDER, FOR SOLUTION INTRAVENOUS EVERY 8 HOURS
Refills: 0 | Status: DISCONTINUED | OUTPATIENT
Start: 2025-04-23 | End: 2025-04-23

## 2025-04-22 RX ORDER — ACETAMINOPHEN 500 MG/5ML
1000 LIQUID (ML) ORAL ONCE
Refills: 0 | Status: COMPLETED | OUTPATIENT
Start: 2025-04-22 | End: 2025-04-22

## 2025-04-22 RX ORDER — CEFEPIME 2 G/20ML
INJECTION, POWDER, FOR SOLUTION INTRAVENOUS
Refills: 0 | Status: DISCONTINUED | OUTPATIENT
Start: 2025-04-22 | End: 2025-04-23

## 2025-04-22 RX ORDER — VANCOMYCIN HCL IN 5 % DEXTROSE 1.5G/250ML
PLASTIC BAG, INJECTION (ML) INTRAVENOUS
Refills: 0 | Status: DISCONTINUED | OUTPATIENT
Start: 2025-04-22 | End: 2025-04-23

## 2025-04-22 RX ADMIN — Medication 400 MILLIGRAM(S): at 19:11

## 2025-04-22 RX ADMIN — Medication 250 MILLIGRAM(S): at 18:44

## 2025-04-22 RX ADMIN — Medication 100 MILLILITER(S): at 21:41

## 2025-04-22 RX ADMIN — CEFEPIME 100 MILLIGRAM(S): 2 INJECTION, POWDER, FOR SOLUTION INTRAVENOUS at 18:45

## 2025-04-22 RX ADMIN — Medication 1700 MILLILITER(S): at 18:25

## 2025-04-22 NOTE — ED ADULT NURSE NOTE - NSFALLRISKINTERV_ED_ALL_ED
Assistance OOB with selected safe patient handling equipment if applicable/Assistance with ambulation/Communicate fall risk and risk factors to all staff, patient, and family/Monitor gait and stability/Provide visual cue: yellow wristband, yellow gown, etc/Reinforce activity limits and safety measures with patient and family/Call bell, personal items and telephone in reach/Instruct patient to call for assistance before getting out of bed/chair/stretcher/Non-slip footwear applied when patient is off stretcher/[place] to call system/Physically safe environment - no spills, clutter or unnecessary equipment/Purposeful Proactive Rounding/Room/bathroom lighting operational, light cord in reach

## 2025-04-22 NOTE — ED PROVIDER NOTE - CLINICAL SUMMARY MEDICAL DECISION MAKING FREE TEXT BOX
89-year-old male presents for fever, cough.  pe As above.    Will do a sepsis workup and expect admission.

## 2025-04-22 NOTE — ED ADULT TRIAGE NOTE - CHIEF COMPLAINT QUOTE
BIBA from home with worsening AMS x2 days. As per family Pt recently D/C from hospital after admission for BIBA from home with worsening AMS x2 days. As per family Pt recently D/C from hospital after admission for pneumonia.

## 2025-04-22 NOTE — ED PROVIDER NOTE - OBJECTIVE STATEMENT
89 year old male with a PMH of DM, Thalassemia minor/JUSTYNA, HLD, hx stroke with right sided weakness in 2004 and stroke workup (05/24) depression, glaucoma, BPH, s/p PEG placementBrought in by wife for weakness, cough, fevers.  Patient states with fevers for the past few days.  States she is concerned that he has pneumonia.  States that he was recently in the hospital and was treated for pneumonia.  States that he might of had a small episode of diarrhea today.  Patient is nonverbal so no history from the patient.  Wife states that the patient's decubitus ulcer also looks irritated.

## 2025-04-22 NOTE — ED ADULT NURSE NOTE - CHIEF COMPLAINT QUOTE
BIBA from home with worsening AMS x2 days. As per family Pt recently D/C from hospital after admission for pneumonia.

## 2025-04-22 NOTE — ED ADULT NURSE NOTE - ED STAT RN HANDOFF DETAILS
REPORT GIVEN TO ROHITH GIRON AT [facility].  PATIENT STABLE, NO ACUTE DISTRESS NOTED. IVS INTACT.  PATIENT CLEANED PRIOR TO AMBULANCE ARRIVAL. REPORT GIVEN TO AMBULANCE @0019.  HERE TO TRANSPORT PATIENT

## 2025-04-22 NOTE — ED PROVIDER NOTE - PROGRESS NOTE DETAILS
Labs with elevated lactate and elevated white blood cell count.  Chest x-ray consistent with a right-sided.  Urinalysis is negative.  Patient's vitals improved with fluids, antibiotics, antipyretics.  Will admit.

## 2025-04-23 ENCOUNTER — TRANSCRIPTION ENCOUNTER (OUTPATIENT)
Age: 89
End: 2025-04-23

## 2025-04-23 DIAGNOSIS — E78.5 HYPERLIPIDEMIA, UNSPECIFIED: ICD-10-CM

## 2025-04-23 DIAGNOSIS — L89.159 PRESSURE ULCER OF SACRAL REGION, UNSPECIFIED STAGE: ICD-10-CM

## 2025-04-23 DIAGNOSIS — H40.9 UNSPECIFIED GLAUCOMA: ICD-10-CM

## 2025-04-23 DIAGNOSIS — E11.9 TYPE 2 DIABETES MELLITUS WITHOUT COMPLICATIONS: ICD-10-CM

## 2025-04-23 DIAGNOSIS — Z91.89 OTHER SPECIFIED PERSONAL RISK FACTORS, NOT ELSEWHERE CLASSIFIED: ICD-10-CM

## 2025-04-23 DIAGNOSIS — I50.32 CHRONIC DIASTOLIC (CONGESTIVE) HEART FAILURE: ICD-10-CM

## 2025-04-23 DIAGNOSIS — Z86.73 PERSONAL HISTORY OF TRANSIENT ISCHEMIC ATTACK (TIA), AND CEREBRAL INFARCTION WITHOUT RESIDUAL DEFICITS: ICD-10-CM

## 2025-04-23 DIAGNOSIS — Z29.9 ENCOUNTER FOR PROPHYLACTIC MEASURES, UNSPECIFIED: ICD-10-CM

## 2025-04-23 DIAGNOSIS — N17.0 ACUTE KIDNEY FAILURE WITH TUBULAR NECROSIS: ICD-10-CM

## 2025-04-23 DIAGNOSIS — D64.9 ANEMIA, UNSPECIFIED: ICD-10-CM

## 2025-04-23 DIAGNOSIS — J69.0 PNEUMONITIS DUE TO INHALATION OF FOOD AND VOMIT: ICD-10-CM

## 2025-04-23 DIAGNOSIS — N40.0 BENIGN PROSTATIC HYPERPLASIA WITHOUT LOWER URINARY TRACT SYMPTOMS: ICD-10-CM

## 2025-04-23 LAB
ALBUMIN SERPL ELPH-MCNC: 2.4 G/DL — LOW (ref 3.3–5)
ALBUMIN SERPL ELPH-MCNC: 2.6 G/DL — LOW (ref 3.3–5)
ALP SERPL-CCNC: 81 U/L — SIGNIFICANT CHANGE UP (ref 40–120)
ALP SERPL-CCNC: 97 U/L — SIGNIFICANT CHANGE UP (ref 40–120)
ALT FLD-CCNC: 18 U/L — SIGNIFICANT CHANGE UP (ref 10–45)
ALT FLD-CCNC: 20 U/L — SIGNIFICANT CHANGE UP (ref 10–45)
ANION GAP SERPL CALC-SCNC: 10 MMOL/L — SIGNIFICANT CHANGE UP (ref 5–17)
ANION GAP SERPL CALC-SCNC: 12 MMOL/L — SIGNIFICANT CHANGE UP (ref 5–17)
APTT BLD: 25 SEC — LOW (ref 26.1–36.8)
AST SERPL-CCNC: 22 U/L — SIGNIFICANT CHANGE UP (ref 10–40)
AST SERPL-CCNC: 25 U/L — SIGNIFICANT CHANGE UP (ref 10–40)
BASOPHILS # BLD AUTO: 0 K/UL — SIGNIFICANT CHANGE UP (ref 0–0.2)
BASOPHILS # BLD AUTO: 0 K/UL — SIGNIFICANT CHANGE UP (ref 0–0.2)
BASOPHILS NFR BLD AUTO: 0 % — SIGNIFICANT CHANGE UP (ref 0–2)
BASOPHILS NFR BLD AUTO: 0 % — SIGNIFICANT CHANGE UP (ref 0–2)
BILIRUB SERPL-MCNC: 0.4 MG/DL — SIGNIFICANT CHANGE UP (ref 0.2–1.2)
BILIRUB SERPL-MCNC: 0.4 MG/DL — SIGNIFICANT CHANGE UP (ref 0.2–1.2)
BLD GP AB SCN SERPL QL: NEGATIVE — SIGNIFICANT CHANGE UP
BUN SERPL-MCNC: 32 MG/DL — HIGH (ref 7–23)
BUN SERPL-MCNC: 37 MG/DL — HIGH (ref 7–23)
CALCIUM SERPL-MCNC: 8.3 MG/DL — LOW (ref 8.4–10.5)
CALCIUM SERPL-MCNC: 8.3 MG/DL — LOW (ref 8.4–10.5)
CHLORIDE SERPL-SCNC: 106 MMOL/L — SIGNIFICANT CHANGE UP (ref 96–108)
CHLORIDE SERPL-SCNC: 108 MMOL/L — SIGNIFICANT CHANGE UP (ref 96–108)
CO2 SERPL-SCNC: 24 MMOL/L — SIGNIFICANT CHANGE UP (ref 22–31)
CO2 SERPL-SCNC: 25 MMOL/L — SIGNIFICANT CHANGE UP (ref 22–31)
CREAT SERPL-MCNC: 0.35 MG/DL — LOW (ref 0.5–1.3)
CREAT SERPL-MCNC: 0.35 MG/DL — LOW (ref 0.5–1.3)
EGFR: 110 ML/MIN/1.73M2 — SIGNIFICANT CHANGE UP
EOSINOPHIL # BLD AUTO: 0 K/UL — SIGNIFICANT CHANGE UP (ref 0–0.5)
EOSINOPHIL # BLD AUTO: 0.21 K/UL — SIGNIFICANT CHANGE UP (ref 0–0.5)
EOSINOPHIL NFR BLD AUTO: 0 % — SIGNIFICANT CHANGE UP (ref 0–6)
EOSINOPHIL NFR BLD AUTO: 1.8 % — SIGNIFICANT CHANGE UP (ref 0–6)
GAS PNL BLDA: SIGNIFICANT CHANGE UP
GLUCOSE SERPL-MCNC: 306 MG/DL — HIGH (ref 70–99)
GLUCOSE SERPL-MCNC: 375 MG/DL — HIGH (ref 70–99)
GRAM STN FLD: ABNORMAL
HCT VFR BLD CALC: 25.4 % — LOW (ref 39–50)
HCT VFR BLD CALC: 25.7 % — LOW (ref 39–50)
HGB BLD-MCNC: 7.9 G/DL — LOW (ref 13–17)
HGB BLD-MCNC: 8.1 G/DL — LOW (ref 13–17)
INR BLD: 1.16 — SIGNIFICANT CHANGE UP (ref 0.85–1.16)
LACTATE SERPL-SCNC: 2.4 MMOL/L — HIGH (ref 0.5–2)
LYMPHOCYTES # BLD AUTO: 0.81 K/UL — LOW (ref 1–3.3)
LYMPHOCYTES # BLD AUTO: 0.87 K/UL — LOW (ref 1–3.3)
LYMPHOCYTES # BLD AUTO: 6.2 % — LOW (ref 13–44)
LYMPHOCYTES # BLD AUTO: 7.1 % — LOW (ref 13–44)
MAGNESIUM SERPL-MCNC: 1.9 MG/DL — SIGNIFICANT CHANGE UP (ref 1.6–2.6)
MCHC RBC-ENTMCNC: 23 PG — LOW (ref 27–34)
MCHC RBC-ENTMCNC: 23.1 PG — LOW (ref 27–34)
MCHC RBC-ENTMCNC: 31.1 G/DL — LOW (ref 32–36)
MCHC RBC-ENTMCNC: 31.5 G/DL — LOW (ref 32–36)
MCV RBC AUTO: 73.2 FL — LOW (ref 80–100)
MCV RBC AUTO: 74.1 FL — LOW (ref 80–100)
MONOCYTES # BLD AUTO: 0.31 K/UL — SIGNIFICANT CHANGE UP (ref 0–0.9)
MONOCYTES # BLD AUTO: 0.49 K/UL — SIGNIFICANT CHANGE UP (ref 0–0.9)
MONOCYTES NFR BLD AUTO: 2.7 % — SIGNIFICANT CHANGE UP (ref 2–14)
MONOCYTES NFR BLD AUTO: 3.5 % — SIGNIFICANT CHANGE UP (ref 2–14)
NEUTROPHILS # BLD AUTO: 10.14 K/UL — HIGH (ref 1.8–7.4)
NEUTROPHILS # BLD AUTO: 12.26 K/UL — HIGH (ref 1.8–7.4)
NEUTROPHILS NFR BLD AUTO: 81.4 % — HIGH (ref 43–77)
NEUTROPHILS NFR BLD AUTO: 85.7 % — HIGH (ref 43–77)
NRBC BLD AUTO-RTO: SIGNIFICANT CHANGE UP /100 WBCS (ref 0–0)
PHOSPHATE SERPL-MCNC: 2.5 MG/DL — SIGNIFICANT CHANGE UP (ref 2.5–4.5)
PLATELET # BLD AUTO: 186 K/UL — SIGNIFICANT CHANGE UP (ref 150–400)
PLATELET # BLD AUTO: 198 K/UL — SIGNIFICANT CHANGE UP (ref 150–400)
POTASSIUM SERPL-MCNC: 3.2 MMOL/L — LOW (ref 3.5–5.3)
POTASSIUM SERPL-MCNC: 3.3 MMOL/L — LOW (ref 3.5–5.3)
POTASSIUM SERPL-SCNC: 3.2 MMOL/L — LOW (ref 3.5–5.3)
POTASSIUM SERPL-SCNC: 3.3 MMOL/L — LOW (ref 3.5–5.3)
PROT SERPL-MCNC: 6.1 G/DL — SIGNIFICANT CHANGE UP (ref 6–8.3)
PROT SERPL-MCNC: 6.3 G/DL — SIGNIFICANT CHANGE UP (ref 6–8.3)
PROTHROM AB SERPL-ACNC: 13.5 SEC — HIGH (ref 9.9–13.4)
RBC # BLD: 3.43 M/UL — LOW (ref 4.2–5.8)
RBC # BLD: 3.51 M/UL — LOW (ref 4.2–5.8)
RBC # FLD: 19.6 % — HIGH (ref 10.3–14.5)
RBC # FLD: 19.8 % — HIGH (ref 10.3–14.5)
RH IG SCN BLD-IMP: POSITIVE — SIGNIFICANT CHANGE UP
SODIUM SERPL-SCNC: 141 MMOL/L — SIGNIFICANT CHANGE UP (ref 135–145)
SODIUM SERPL-SCNC: 144 MMOL/L — SIGNIFICANT CHANGE UP (ref 135–145)
WBC # BLD: 11.47 K/UL — HIGH (ref 3.8–10.5)
WBC # BLD: 13.99 K/UL — HIGH (ref 3.8–10.5)
WBC # FLD AUTO: 11.47 K/UL — HIGH (ref 3.8–10.5)
WBC # FLD AUTO: 13.99 K/UL — HIGH (ref 3.8–10.5)

## 2025-04-23 PROCEDURE — 71045 X-RAY EXAM CHEST 1 VIEW: CPT | Mod: 26

## 2025-04-23 PROCEDURE — 99223 1ST HOSP IP/OBS HIGH 75: CPT | Mod: GC

## 2025-04-23 PROCEDURE — 99223 1ST HOSP IP/OBS HIGH 75: CPT

## 2025-04-23 PROCEDURE — 99497 ADVNCD CARE PLAN 30 MIN: CPT | Mod: 25

## 2025-04-23 RX ORDER — PIPERACILLIN-TAZO-DEXTROSE,ISO 2.25G/50ML
4.5 IV SOLUTION, PIGGYBACK PREMIX FROZEN(ML) INTRAVENOUS EVERY 8 HOURS
Refills: 0 | Status: DISCONTINUED | OUTPATIENT
Start: 2025-04-23 | End: 2025-04-24

## 2025-04-23 RX ORDER — INSULIN LISPRO 100 U/ML
INJECTION, SOLUTION INTRAVENOUS; SUBCUTANEOUS EVERY 6 HOURS
Refills: 0 | Status: DISCONTINUED | OUTPATIENT
Start: 2025-04-23 | End: 2025-05-01

## 2025-04-23 RX ORDER — PIPERACILLIN-TAZO-DEXTROSE,ISO 2.25G/50ML
4.5 IV SOLUTION, PIGGYBACK PREMIX FROZEN(ML) INTRAVENOUS ONCE
Refills: 0 | Status: COMPLETED | OUTPATIENT
Start: 2025-04-23 | End: 2025-04-23

## 2025-04-23 RX ORDER — LEVETIRACETAM 10 MG/ML
750 INJECTION, SOLUTION INTRAVENOUS EVERY 12 HOURS
Refills: 0 | Status: DISCONTINUED | OUTPATIENT
Start: 2025-04-23 | End: 2025-04-29

## 2025-04-23 RX ORDER — GLUCAGON 3 MG/1
1 POWDER NASAL ONCE
Refills: 0 | Status: DISCONTINUED | OUTPATIENT
Start: 2025-04-23 | End: 2025-05-01

## 2025-04-23 RX ORDER — INSULIN LISPRO 100 U/ML
INJECTION, SOLUTION INTRAVENOUS; SUBCUTANEOUS EVERY 6 HOURS
Refills: 0 | Status: DISCONTINUED | OUTPATIENT
Start: 2025-04-23 | End: 2025-04-23

## 2025-04-23 RX ORDER — VANCOMYCIN HCL IN 5 % DEXTROSE 1.5G/250ML
1000 PLASTIC BAG, INJECTION (ML) INTRAVENOUS EVERY 12 HOURS
Refills: 0 | Status: DISCONTINUED | OUTPATIENT
Start: 2025-04-23 | End: 2025-04-23

## 2025-04-23 RX ORDER — ATORVASTATIN CALCIUM 80 MG/1
40 TABLET, FILM COATED ORAL AT BEDTIME
Refills: 0 | Status: DISCONTINUED | OUTPATIENT
Start: 2025-04-23 | End: 2025-05-01

## 2025-04-23 RX ORDER — SODIUM CHLORIDE 9 G/1000ML
1000 INJECTION, SOLUTION INTRAVENOUS
Refills: 0 | Status: DISCONTINUED | OUTPATIENT
Start: 2025-04-23 | End: 2025-05-01

## 2025-04-23 RX ORDER — ACETAMINOPHEN 500 MG/5ML
1000 LIQUID (ML) ORAL ONCE
Refills: 0 | Status: COMPLETED | OUTPATIENT
Start: 2025-04-23 | End: 2025-04-23

## 2025-04-23 RX ORDER — IPRATROPIUM BROMIDE AND ALBUTEROL SULFATE .5; 2.5 MG/3ML; MG/3ML
3 SOLUTION RESPIRATORY (INHALATION) EVERY 6 HOURS
Refills: 0 | Status: DISCONTINUED | OUTPATIENT
Start: 2025-04-23 | End: 2025-05-01

## 2025-04-23 RX ORDER — FUROSEMIDE 10 MG/ML
20 INJECTION INTRAMUSCULAR; INTRAVENOUS
Refills: 0 | DISCHARGE

## 2025-04-23 RX ORDER — LEVETIRACETAM 10 MG/ML
750 INJECTION, SOLUTION INTRAVENOUS
Refills: 0 | Status: DISCONTINUED | OUTPATIENT
Start: 2025-04-23 | End: 2025-04-23

## 2025-04-23 RX ORDER — TIMOLOL MALEATE 6.8 MG/ML
1 SOLUTION OPHTHALMIC EVERY 12 HOURS
Refills: 0 | Status: DISCONTINUED | OUTPATIENT
Start: 2025-04-23 | End: 2025-05-01

## 2025-04-23 RX ORDER — DEXTROSE 50 % IN WATER 50 %
25 SYRINGE (ML) INTRAVENOUS ONCE
Refills: 0 | Status: DISCONTINUED | OUTPATIENT
Start: 2025-04-23 | End: 2025-05-01

## 2025-04-23 RX ORDER — ASPIRIN 325 MG
81 TABLET ORAL EVERY 24 HOURS
Refills: 0 | Status: DISCONTINUED | OUTPATIENT
Start: 2025-04-23 | End: 2025-05-01

## 2025-04-23 RX ORDER — SODIUM CHLORIDE 9 G/1000ML
1000 INJECTION, SOLUTION INTRAVENOUS
Refills: 0 | Status: DISCONTINUED | OUTPATIENT
Start: 2025-04-23 | End: 2025-04-24

## 2025-04-23 RX ORDER — CYANOCOBALAMIN 1000 UG/ML
1000 INJECTION INTRAMUSCULAR; SUBCUTANEOUS EVERY 24 HOURS
Refills: 0 | Status: DISCONTINUED | OUTPATIENT
Start: 2025-04-23 | End: 2025-05-01

## 2025-04-23 RX ORDER — SODIUM CHLORIDE 9 G/1000ML
1000 INJECTION, SOLUTION INTRAVENOUS
Refills: 0 | Status: DISCONTINUED | OUTPATIENT
Start: 2025-04-23 | End: 2025-04-23

## 2025-04-23 RX ORDER — B1/B2/B3/B5/B6/B12/VIT C/FOLIC 500-0.5 MG
1 TABLET ORAL DAILY
Refills: 0 | Status: DISCONTINUED | OUTPATIENT
Start: 2025-04-23 | End: 2025-05-01

## 2025-04-23 RX ORDER — DEXTROSE 50 % IN WATER 50 %
15 SYRINGE (ML) INTRAVENOUS ONCE
Refills: 0 | Status: DISCONTINUED | OUTPATIENT
Start: 2025-04-23 | End: 2025-05-01

## 2025-04-23 RX ORDER — ACETAMINOPHEN 500 MG/5ML
650 LIQUID (ML) ORAL EVERY 6 HOURS
Refills: 0 | Status: DISCONTINUED | OUTPATIENT
Start: 2025-04-23 | End: 2025-04-29

## 2025-04-23 RX ORDER — DEXTROSE 50 % IN WATER 50 %
12.5 SYRINGE (ML) INTRAVENOUS ONCE
Refills: 0 | Status: DISCONTINUED | OUTPATIENT
Start: 2025-04-23 | End: 2025-05-01

## 2025-04-23 RX ORDER — LEVETIRACETAM 10 MG/ML
750 INJECTION, SOLUTION INTRAVENOUS EVERY 12 HOURS
Refills: 0 | Status: DISCONTINUED | OUTPATIENT
Start: 2025-04-23 | End: 2025-04-23

## 2025-04-23 RX ORDER — SODIUM CHLORIDE 9 G/1000ML
1000 INJECTION, SOLUTION INTRAVENOUS ONCE
Refills: 0 | Status: COMPLETED | OUTPATIENT
Start: 2025-04-23 | End: 2025-04-23

## 2025-04-23 RX ADMIN — Medication 20 MILLIEQUIVALENT(S): at 09:20

## 2025-04-23 RX ADMIN — CYANOCOBALAMIN 1000 MICROGRAM(S): 1000 INJECTION INTRAMUSCULAR; SUBCUTANEOUS at 06:03

## 2025-04-23 RX ADMIN — INSULIN LISPRO 6: 100 INJECTION, SOLUTION INTRAVENOUS; SUBCUTANEOUS at 18:12

## 2025-04-23 RX ADMIN — Medication 25 GRAM(S): at 22:17

## 2025-04-23 RX ADMIN — TIMOLOL MALEATE 1 DROP(S): 6.8 SOLUTION OPHTHALMIC at 06:03

## 2025-04-23 RX ADMIN — SODIUM CHLORIDE 50 MILLILITER(S): 9 INJECTION, SOLUTION INTRAVENOUS at 18:13

## 2025-04-23 RX ADMIN — INSULIN LISPRO 5: 100 INJECTION, SOLUTION INTRAVENOUS; SUBCUTANEOUS at 12:52

## 2025-04-23 RX ADMIN — IPRATROPIUM BROMIDE AND ALBUTEROL SULFATE 3 MILLILITER(S): .5; 2.5 SOLUTION RESPIRATORY (INHALATION) at 09:20

## 2025-04-23 RX ADMIN — Medication 4 MILLILITER(S): at 12:26

## 2025-04-23 RX ADMIN — Medication 200 GRAM(S): at 03:52

## 2025-04-23 RX ADMIN — Medication 1000 MILLIGRAM(S): at 03:20

## 2025-04-23 RX ADMIN — ATORVASTATIN CALCIUM 40 MILLIGRAM(S): 80 TABLET, FILM COATED ORAL at 22:17

## 2025-04-23 RX ADMIN — Medication 1 TABLET(S): at 12:26

## 2025-04-23 RX ADMIN — SODIUM CHLORIDE 1000 MILLILITER(S): 9 INJECTION, SOLUTION INTRAVENOUS at 02:50

## 2025-04-23 RX ADMIN — SODIUM CHLORIDE 50 MILLILITER(S): 9 INJECTION, SOLUTION INTRAVENOUS at 05:21

## 2025-04-23 RX ADMIN — LEVETIRACETAM 750 MILLIGRAM(S): 10 INJECTION, SOLUTION INTRAVENOUS at 06:04

## 2025-04-23 RX ADMIN — LEVETIRACETAM 750 MILLIGRAM(S): 10 INJECTION, SOLUTION INTRAVENOUS at 18:12

## 2025-04-23 RX ADMIN — Medication 25 GRAM(S): at 15:15

## 2025-04-23 RX ADMIN — TIMOLOL MALEATE 1 DROP(S): 6.8 SOLUTION OPHTHALMIC at 18:14

## 2025-04-23 RX ADMIN — IPRATROPIUM BROMIDE AND ALBUTEROL SULFATE 3 MILLILITER(S): .5; 2.5 SOLUTION RESPIRATORY (INHALATION) at 22:17

## 2025-04-23 RX ADMIN — Medication 100 MILLIGRAM(S): at 06:03

## 2025-04-23 RX ADMIN — Medication 81 MILLIGRAM(S): at 06:03

## 2025-04-23 RX ADMIN — Medication 400 MILLIGRAM(S): at 03:05

## 2025-04-23 RX ADMIN — IPRATROPIUM BROMIDE AND ALBUTEROL SULFATE 3 MILLILITER(S): .5; 2.5 SOLUTION RESPIRATORY (INHALATION) at 15:15

## 2025-04-23 RX ADMIN — Medication 25 GRAM(S): at 06:04

## 2025-04-23 RX ADMIN — Medication 250 MILLIGRAM(S): at 05:21

## 2025-04-23 NOTE — DIETITIAN INITIAL EVALUATION ADULT - PERTINENT LABORATORY DATA
04-23    141  |  106  |  32[H]  ----------------------------<  375[H]  3.3[L]   |  25  |  0.35[L]    Ca    8.3[L]      23 Apr 2025 05:30  Phos  2.5     04-23  Mg     1.9     04-23    TPro  6.1  /  Alb  2.4[L]  /  TBili  0.4  /  DBili  x   /  AST  22  /  ALT  18  /  AlkPhos  81  04-23  POCT Blood Glucose.: 359 mg/dL (04-23-25 @ 12:46)  A1C with Estimated Average Glucose Result: 8.1 % (03-28-25 @ 08:30)  A1C with Estimated Average Glucose Result: 6.8 % (12-11-24 @ 06:19)  A1C with Estimated Average Glucose Result: 6.3 % (04-28-24 @ 05:30)

## 2025-04-23 NOTE — DIETITIAN INITIAL EVALUATION ADULT - NSFNSPHYEXAMSKINFT_GEN_A_CORE
Pressure Injury 1: sacrum, Unstageable  Pressure Injury 2: none, none  Pressure Injury 3: none, none  Pressure Injury 4: none, none  Pressure Injury 5: none, none  Pressure Injury 6: none, none  Pressure Injury 7: none, none  Pressure Injury 8: none, none  Pressure Injury 9: none, none  Pressure Injury 10: none, none  Pressure Injury 11: none, none

## 2025-04-23 NOTE — H&P ADULT - ASSESSMENT
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

## 2025-04-23 NOTE — H&P ADULT - CONVERSATION DETAILS
Spoke with wife Farshad regarding code status with . Patient was unable to engage in conversation. She confirmed she would like all life saving  measures including resuscitation and intubation. Confirmed patient is FULL CODE at this time.

## 2025-04-23 NOTE — PROGRESS NOTE ADULT - PROBLEM SELECTOR PLAN 4
UA: spec grav 1.027, trace ketones, granular casts; s/p 3.7 L total.   - f/up repeat urine studies in the am  - likely in the setting of poor free water intake via peg

## 2025-04-23 NOTE — H&P ADULT - PROBLEM SELECTOR PLAN 9
Echo 4/2024; Normal left and right ventricular size and systolic function; EF 56%. Not on any GDMT.  - ctm for signs of overload

## 2025-04-23 NOTE — H&P ADULT - PROBLEM SELECTOR PLAN 1
Patient presenting with lactic acidosis and aspiration PNA; has similar hospitalization in early April 2025. Appeared tachypneic on exam and somnolent which is how he has been at home as well. In ED, had WBC 17.65, , venous lactate 5.7 --> 4.0. On presentation to [facility],  febrile to 101.2 with lactate of 2.4. ABG consistent with respiratory alkalosis and hypoxia.   - f/up blood cultures   - c/w empiric antibiotics given concern for aspiration vs. HAP and positive MRSA cultures from peg site; prior cultures sensitive to zosyn  - c/w zosyn 4.5 mg q8  - c/w vanc 1 g BID   - tylenol for fever and pain Patient presenting with lactic acidosis and aspiration PNA; has similar hospitalization in early April 2025. Appeared tachypneic on exam and somnolent which is how he has been at home as well. In ED, had WBC 17.65, , venous lactate 5.7 --> 4.0. On presentation to [facility],  febrile to 101.2 with lactate of 2.4. ABG consistent with respiratory alkalosis and hypoxia. Improved with ofirmev and NC 4 L (baseline 2 L).   - f/up blood cultures   - c/w empiric antibiotics given concern for aspiration vs. HAP and positive MRSA cultures from peg site; prior cultures sensitive to zosyn  - c/w zosyn 4.5 mg q8  - c/w vanc 1 g BID   - tylenol for fever and pain Patient presenting with lactic acidosis and aspiration PNA; has similar hospitalization in early April 2025. Appeared tachypneic on exam and somnolent which is how he has been at home as well. In ED, had WBC 17.65, , venous lactate 5.7 --> 4.0. On presentation to [facility],  febrile to 101.2 with lactate of 2.4. ABG consistent with respiratory alkalosis and hypoxia. Improved with ofirmev and NC 4 L (baseline 2 L). severe sepsis likely secondary to aspiration PNA and worsening sacral ulcer.   - f/up blood cultures   - c/w empiric antibiotics given concern for aspiration vs. HAP and positive MRSA cultures from peg site; prior cultures sensitive to zosyn  - c/w zosyn 4.5 mg q8  - c/w vanc 1 g BID   - tylenol for fever and pain

## 2025-04-23 NOTE — H&P ADULT - PROBLEM SELECTOR PLAN 5
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   MRI brain on 4/28 with R internal capsule infarct   Head CT 5/5: subacute infarct centered in the genu and posterior limb of the right internal capsule is stable.  A chronic transcortical infarction in the left precentral gyrus and a small chronic infarct in the right cerebellar hemisphere are stable.  Home meds: ASA 81mg, Atorvastatin 40mg, keppra 750mg (seizure ppx), per wife pt never had seizures.  -C/W home meds Pt with a hx of stroke 2004 with residual left-sided weakness.   MRI brain on 4/28 with R internal capsule infarct   Head CT 5/5: subacute infarct centered in the genu and posterior limb of the right internal capsule is stable.  A chronic transcortical infarction in the left precentral gyrus and a small chronic infarct in the right cerebellar hemisphere are stable.  Home meds: ASA 81mg, Atorvastatin 40mg, keppra 750mg (seizure ppx), per wife pt never had seizures.  -C/W home meds

## 2025-04-23 NOTE — DIETITIAN INITIAL EVALUATION ADULT - PROBLEM SELECTOR PLAN 1
Patient presenting with lactic acidosis and aspiration PNA; has similar hospitalization in early April 2025. Appeared tachypneic on exam and somnolent which is how he has been at home as well. In ED, had WBC 17.65, , venous lactate 5.7 --> 4.0. On presentation to [facility],  febrile to 101.2 with lactate of 2.4. ABG consistent with respiratory alkalosis and hypoxia. Improved with ofirmev and NC 4 L (baseline 2 L). severe sepsis likely secondary to aspiration PNA and worsening sacral ulcer.   - f/up blood cultures   - c/w empiric antibiotics given concern for aspiration vs. HAP and positive MRSA cultures from peg site; prior cultures sensitive to zosyn  - c/w zosyn 4.5 mg q8  - c/w vanc 1 g BID   - tylenol for fever and pain

## 2025-04-23 NOTE — H&P ADULT - PROBLEM SELECTOR PLAN 2
Patient has been having worsening cough and unable to expectorate. CXR RLL infiltrate. Secretions audible in upper airways.   - aspiration precautions  - c/w treatment of PNA as above   - palliative consult placed as repeat admission for similar PNA Patient has been having worsening cough and unable to expectorate. CXR RLL infiltrate. Secretions audible in upper airways.   - aspiration precautions  - c/w treatment of PNA as above  - c/w supplemental oxygen; wean as tolerated to baseline 2 L.   - palliative consult placed as repeat admission for similar PNA Patient has been having worsening cough and unable to expectorate. CXR RLL infiltrate. Secretions audible in upper airways.   - aspiration precautions  - c/w treatment of PNA as above  - c/w supplemental oxygen; wean as tolerated to baseline 2 L.   - palliative consult placed as repeat admission for similar PNA  - keeping patient NPO Patient has been having worsening cough and unable to expectorate. CXR RLL infiltrate. Secretions audible in upper airways.   - aspiration precautions  - c/w treatment of PNA as above  - c/w supplemental oxygen; wean as tolerated to baseline 2 L.   - palliative consult placed as repeat admission for similar PNA  - keeping patient NPO  - c/w D5 LR 50 cc/hr Patient has been having worsening cough and unable to expectorate. CXR RLL infiltrate. Secretions audible in upper airways.   - aspiration precautions  - c/w treatment of PNA as above  - c/w supplemental oxygen; wean as tolerated to baseline 2 L (on oxygen ever since developing COVID-19 in a NH)  - palliative consult placed as repeat admission for similar PNA  - keeping patient NPO  - c/w D5 LR 50 cc/hr

## 2025-04-23 NOTE — PROGRESS NOTE ADULT - PROBLEM SELECTOR PLAN 6
Home meds: metformin 500 mg 3 times a day, januvia 50 mg QD; wife does not give medications as prescribed as she was told by PCP that he does not need medication anymore given age and last A1c ~7.   - ctm FS q6 abdi since NPO  - demarcus q6 Home meds: metformin 500 mg 3 times a day, januvia 50 mg QD; wife does not give medications as prescribed as she was told by PCP that he does not need medication anymore given age and last A1c ~7.   - ctm FS q6 abdi since NPO  - Moderate ISS q6

## 2025-04-23 NOTE — H&P ADULT - PROBLEM SELECTOR PLAN 6
Home meds: metformin 500 mg 3 times a day, januvia 50 mg QD; wife does not give medications as prescribed as she was told by PCP that he does not need medication anymore given age and last A1c ~7.   - ctm FS q6 abdi since NPO Home meds: metformin 500 mg 3 times a day, januvia 50 mg QD; wife does not give medications as prescribed as she was told by PCP that he does not need medication anymore given age and last A1c ~7.   - ctm FS q6 abdi since NPO  - demarcus q6

## 2025-04-23 NOTE — PROGRESS NOTE ADULT - PROBLEM SELECTOR PLAN 5
Pt with a hx of stroke 2004 with residual left-sided weakness.   MRI brain on 4/28 with R internal capsule infarct   Head CT 5/5: subacute infarct centered in the genu and posterior limb of the right internal capsule is stable.  A chronic transcortical infarction in the left precentral gyrus and a small chronic infarct in the right cerebellar hemisphere are stable.  Home meds: ASA 81mg, Atorvastatin 40mg, keppra 750mg (seizure ppx), per wife pt never had seizures.  -C/W home meds

## 2025-04-23 NOTE — DIETITIAN INITIAL EVALUATION ADULT - OTHER INFO
Per H&P: Patient is a 89YM with PMH of DM, BPH, glaucoma, deafness, HFpEF (LV 56%), aortic root aneurysm (4.6  cm), hx of CVA with right sided weakness in 2004 and another internal capsule CVA with seizure activity on vEEF in 4/2024 with a recent admission 3/25-4/1/25 for lactate acidosis and AHRF 2/2 to HAP and aspiration PNA due to peg feeds presenting now with severe sepsis likely secondary to aspiration PNA and worsening sacral ulcer.     Patient seen at bedside for nutrition assessment. Patient is A&Ox0 - RD interview conducted with patients wife at bedside with incred[employer]  ID#[id number]. Current diet order: NPO. No known food allergies. Patient with sepsis likely secondary to aspiration pneumonia. Wife reports she was providing the Glucerna feeds previously recommended during previous admission x1 month ago. Wife had questions about Redd supplement since a doctor told her he needed additional protein for wound healing, however RD calculated new tube feed recommendations with updated weight that appropriately meet patients protein needs to support wound healing. Provided education about plan to readjust tube feed regimen based on new weight. Dosing weight: 120 pounds, BMI 20.6, patient weighed 106 pounds x1 month ago indicating a 14 pound desired weight gain. Unstageable pressure ulcer to sacrum. No edema documented. Wife denies nausea/vomiting/diarrhea/constipation. Labs: potassium 3.3, BUN 32, blood sugar 148-376 x24hrs, HgbA1c 8.1% on 3/28. Meds: antibiotic, D5% IVF, insulin sliding scale. Observed with moderate muscle wasting to temples and clavicles. Based on ASPEN guidelines, patient does not meet criteria for malnutrition, however is at high risk. See nutrition recommendations below.  Per H&P: Patient is a 89YM with PMH of DM, BPH, glaucoma, deafness, HFpEF (LV 56%), aortic root aneurysm (4.6  cm), hx of CVA with right sided weakness in 2004 and another internal capsule CVA with seizure activity on vEEF in 4/2024 with a recent admission 3/25-4/1/25 for lactate acidosis and AHRF 2/2 to HAP and aspiration PNA due to peg feeds presenting now with severe sepsis likely secondary to aspiration PNA and worsening sacral ulcer.     Patient seen at bedside for nutrition assessment. Patient is A&Ox0 - RD interview conducted with patients wife at bedside with [employer]  ID#[id number]. Current diet order: NPO. No known food allergies. Patient with sepsis likely secondary to aspiration pneumonia. Wife reports she was providing the Glucerna feeds previously recommended during previous admission x1 month ago. Wife had questions about Redd supplement since a doctor told her he needed additional protein for wound healing, however RD calculated new tube feed recommendations with updated weight that appropriately meet patients protein needs to support wound healing. Provided education about plan to readjust tube feed regimen based on new weight. Bolus feeds not recommended at this time due to high risk for aspiration pneumonia. Dosing weight: 120 pounds, BMI 20.6, patient weighed 106 pounds x1 month ago indicating a 14 pound desired weight gain. Unstageable pressure ulcer to sacrum. No edema documented. Wife denies nausea/vomiting/diarrhea/constipation. Labs: potassium 3.3, BUN 32, blood sugar 148-376 x24hrs, HgbA1c 8.1% on 3/28. Meds: antibiotic, D5% IVF, insulin sliding scale. Observed with moderate muscle wasting to temples and clavicles. Based on ASPEN guidelines, patient does not meet criteria for malnutrition, however is at high risk. See nutrition recommendations below.

## 2025-04-23 NOTE — PROGRESS NOTE ADULT - SUBJECTIVE AND OBJECTIVE BOX
***INCOMPLETE NOTE*** Patient is a 89y old  Male who presents with a chief complaint of AMS     (23 Apr 2025 14:25)       INTERVAL HPI/OVERNIGHT EVENTS: No acute events overnight.    SUBJECTIVE: Patient seen and examined this morning. Patient non-verbal, history obtained from patient's wife at bedside with Cantonese  Tiffanie #[id number]. She reports that he occasionally coughs but isn't able to bring up much sputum. She doesn't think he is in acute pain. She reports that his sacral ulcer has been progressively worsening at home because of limited pressure offloading. They have 24h home health services but the patient doesn't get much repositioning at night.    All other review of systems negative except otherwise noted in HPI above.    MEDICATIONS  (STANDING):  albuterol/ipratropium for Nebulization 3 milliLiter(s) Nebulizer every 6 hours  aspirin  chewable 81 milliGRAM(s) Oral every 24 hours  atorvastatin 40 milliGRAM(s) Oral at bedtime  cyanocobalamin 1000 MICROGram(s) Oral every 24 hours  dextrose 5% + lactated ringers. 1000 milliLiter(s) (50 mL/Hr) IV Continuous <Continuous>  dextrose 5%. 1000 milliLiter(s) (50 mL/Hr) IV Continuous <Continuous>  dextrose 5%. 1000 milliLiter(s) (100 mL/Hr) IV Continuous <Continuous>  dextrose 50% Injectable 25 Gram(s) IV Push once  dextrose 50% Injectable 12.5 Gram(s) IV Push once  dextrose 50% Injectable 25 Gram(s) IV Push once  glucagon  Injectable 1 milliGRAM(s) IntraMuscular once  insulin lispro (ADMELOG) corrective regimen sliding scale   SubCutaneous every 6 hours  levETIRAcetam  Solution 750 milliGRAM(s) Oral every 12 hours  multivitamin 1 Tablet(s) Oral daily  piperacillin/tazobactam IVPB.. 4.5 Gram(s) IV Intermittent every 8 hours  sodium chloride 3%  Inhalation 4 milliLiter(s) Inhalation every 12 hours  thiamine 100 milliGRAM(s) Oral every 24 hours  timolol 0.5% Solution 1 Drop(s) Both EYES every 12 hours    MEDICATIONS  (PRN):  acetaminophen     Tablet .. 650 milliGRAM(s) Oral every 6 hours PRN Temp greater or equal to 38C (100.4F), Mild Pain (1 - 3)  dextrose Oral Gel 15 Gram(s) Oral once PRN Blood Glucose LESS THAN 70 milliGRAM(s)/deciliter    Allergies    No Known Allergies    Intolerances      Vital Signs Last 24 Hrs  T(C): 36.5 (23 Apr 2025 12:30), Max: 39 (22 Apr 2025 18:20)  T(F): 97.7 (23 Apr 2025 12:30), Max: 102.2 (22 Apr 2025 18:20)  HR: 90 (23 Apr 2025 12:30) (90 - 124)  BP: 101/59 (23 Apr 2025 12:30) (98/57 - 122/76)  BP(mean): 73 (23 Apr 2025 12:30) (73 - 73)  RR: 19 (23 Apr 2025 12:30) (18 - 24)  SpO2: 96% (23 Apr 2025 12:30) (91% - 96%)    Parameters below as of 23 Apr 2025 12:30  Patient On (Oxygen Delivery Method): nasal cannula  O2 Flow (L/min): 6    PHYSICAL EXAM:  Constitutional - NAD  HEENT - NCAT  Chest - Breathing comfortably on 3L NC, (+)Right-sided diffuse rhonchi  Cardio - Warm and well perfused, RRR  Abdomen - Soft, non-distended, +BS. PEG site without surrounding erythema, induration, or drainage.  Extremities - No peripheral edema  Neurologic - Non-verbal, making spontaneous movements with the Right upper and lower extremity. Not following commands  Psychiatric - Mood stable, Affect WNL    LABS:                        7.9    13.99 )-----------( 186      ( 23 Apr 2025 05:30 )             25.4     23 Apr 2025 05:30    141    |  106    |  32     ----------------------------<  375    3.3     |  25     |  0.35     Ca    8.3        23 Apr 2025 05:30  Phos  2.5       23 Apr 2025 05:30  Mg     1.9       23 Apr 2025 05:30    TPro  6.1    /  Alb  2.4    /  TBili  0.4    /  DBili  x      /  AST  22     /  ALT  18     /  AlkPhos  81     23 Apr 2025 05:30    PT/INR - ( 23 Apr 2025 05:30 )   PT: 13.5 sec;   INR: 1.16          PTT - ( 23 Apr 2025 05:30 )  PTT:25.0 sec  CAPILLARY BLOOD GLUCOSE      POCT Blood Glucose.: 359 mg/dL (23 Apr 2025 12:46)  POCT Blood Glucose.: 351 mg/dL (23 Apr 2025 05:33)  POCT Blood Glucose.: 376 mg/dL (22 Apr 2025 17:58)    BLOOD CULTURE    RADIOLOGY & ADDITIONAL TESTS:

## 2025-04-23 NOTE — DIETITIAN INITIAL EVALUATION ADULT - PERTINENT MEDS FT
MEDICATIONS  (STANDING):  albuterol/ipratropium for Nebulization 3 milliLiter(s) Nebulizer every 6 hours  aspirin  chewable 81 milliGRAM(s) Oral every 24 hours  atorvastatin 40 milliGRAM(s) Oral at bedtime  cyanocobalamin 1000 MICROGram(s) Oral every 24 hours  dextrose 5% + lactated ringers. 1000 milliLiter(s) (50 mL/Hr) IV Continuous <Continuous>  dextrose 5%. 1000 milliLiter(s) (50 mL/Hr) IV Continuous <Continuous>  dextrose 5%. 1000 milliLiter(s) (100 mL/Hr) IV Continuous <Continuous>  dextrose 50% Injectable 25 Gram(s) IV Push once  dextrose 50% Injectable 12.5 Gram(s) IV Push once  dextrose 50% Injectable 25 Gram(s) IV Push once  glucagon  Injectable 1 milliGRAM(s) IntraMuscular once  insulin lispro (ADMELOG) corrective regimen sliding scale   SubCutaneous every 6 hours  levETIRAcetam  Solution 750 milliGRAM(s) Oral every 12 hours  multivitamin 1 Tablet(s) Oral daily  piperacillin/tazobactam IVPB.. 4.5 Gram(s) IV Intermittent every 8 hours  sodium chloride 3%  Inhalation 4 milliLiter(s) Inhalation every 12 hours  thiamine 100 milliGRAM(s) Oral every 24 hours  timolol 0.5% Solution 1 Drop(s) Both EYES every 12 hours    MEDICATIONS  (PRN):  acetaminophen     Tablet .. 650 milliGRAM(s) Oral every 6 hours PRN Temp greater or equal to 38C (100.4F), Mild Pain (1 - 3)  dextrose Oral Gel 15 Gram(s) Oral once PRN Blood Glucose LESS THAN 70 milliGRAM(s)/deciliter

## 2025-04-23 NOTE — H&P ADULT - PROBLEM SELECTOR PLAN 11
F: s/p 3.7 L  E: replete PRN  N: keep NPO  DVT ppx: lovenox 40 mg QD  Dispo: [facility] Presenting hemoglobin 8.9 reduced to 8.1 after fluids. Was discharged in april with a hemoglobin of 8.6. 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   - maintain active t/s  - transfuse to maintain>7  -C/W cyanocobalamin 1000mg qd, multivitamin, thiamine

## 2025-04-23 NOTE — PROGRESS NOTE ADULT - PROBLEM SELECTOR PLAN 1
Patient presenting with lactic acidosis and aspiration PNA; has similar hospitalization in early April 2025. Appeared tachypneic on exam and somnolent which is how he has been at home as well. In ED, had WBC 17.65, , venous lactate 5.7 --> 4.0. On presentation to [facility],  febrile to 101.2 with lactate of 2.4. ABG consistent with respiratory alkalosis and hypoxia. Improved with ofirmev and NC 4 L (baseline 2 L). severe sepsis likely secondary to aspiration PNA and worsening sacral ulcer.   - f/up blood cultures   - c/w empiric antibiotics given concern for aspiration vs. HAP and positive MRSA cultures from peg site; prior cultures sensitive to zosyn  - c/w zosyn 4.5 mg q8  - c/w vanc 1 g BID   - tylenol for fever and pain Severe sepsis w/ lactic acidosis likely 2/2 aspiration PNA; has similar hospitalization in early April 2025. Appeared tachypneic on exam and somnolent which is how he has been at home as well. In ED, had WBC 17.65, , venous lactate 5.7 --> 4.0. On presentation to [facility],  febrile to 101.2 with lactate of 2.4. ABG consistent with respiratory alkalosis and hypoxia. Improved with ofirmev and NC 4 L (baseline 2 L). severe sepsis likely secondary to aspiration PNA vs. worsening sacral ulcer. Sacral ulcer with areas of eschar but without erythema or purulence. PEG site without infectious signs  D/gokul empiric Vancomycin despite previous +MRSA wound/PEG site culture as currently without signs of acute SSTI    - f/up blood cultures 4/22  - c/w empiric Zosyn 4.5mg q8  - tylenol for fever and pain

## 2025-04-23 NOTE — H&P ADULT - NSHPLABSRESULTS_GEN_ALL_CORE
.  LABS:                         8.1    11.47 )-----------( 198      ( 23 Apr 2025 02:52 )             25.7     04-23    144  |  108  |  37[H]  ----------------------------<  306[H]  3.2[L]   |  24  |  0.35[L]    Ca    8.3[L]      23 Apr 2025 02:52    TPro  6.3  /  Alb  2.6[L]  /  TBili  0.4  /  DBili  x   /  AST  25  /  ALT  20  /  AlkPhos  97  04-23    PT/INR - ( 22 Apr 2025 18:26 )   PT: 13.2 sec;   INR: 1.13          PTT - ( 22 Apr 2025 18:26 )  PTT:28.6 sec  Urinalysis Basic - ( 23 Apr 2025 02:52 )    Color: x / Appearance: x / SG: x / pH: x  Gluc: 306 mg/dL / Ketone: x  / Bili: x / Urobili: x   Blood: x / Protein: x / Nitrite: x   Leuk Esterase: x / RBC: x / WBC x   Sq Epi: x / Non Sq Epi: x / Bacteria: x            Lactate, Blood: 2.4 mmol/L (04-23 @ 02:52)      RADIOLOGY, EKG & ADDITIONAL TESTS: Reviewed.

## 2025-04-23 NOTE — DIETITIAN INITIAL EVALUATION ADULT - OTHER CALCULATIONS
pounds. Patient within % IBW (92%) thus actual body weight used for all calculations. Needs adjusted for advanced age and pressure ulcer. Fluid recs per team due to CHF.

## 2025-04-23 NOTE — DISCHARGE NOTE NURSING/CASE MANAGEMENT/SOCIAL WORK - PATIENT PORTAL LINK FT
You can access the FollowMyHealth Patient Portal offered by [facility] by registering at the following website: [email or web address]. By joining [employer]’s FollowMyHealth portal, you will also be able to view your health information using other applications (apps) compatible with our system.

## 2025-04-23 NOTE — DISCHARGE NOTE NURSING/CASE MANAGEMENT/SOCIAL WORK - FINANCIAL ASSISTANCE
[facility] provides services at a reduced cost to those who are determined to be eligible through [facility]’s financial assistance program. Information regarding [facility]’s financial assistance program can be found by going to [email or web address] or by calling [phone].

## 2025-04-23 NOTE — H&P ADULT - NSHPPHYSICALEXAM_GEN_ALL_CORE
Constitutional: WDWN resting comfortably in bed; NAD  Head: NC/AT  Eyes: PERRL, EOMI, anicteric sclera  ENT: no nasal discharge; uvula midline, no oropharyngeal erythema or exudates; MMM  Neck: supple; no JVD or thyromegaly  Respiratory: CTA B/L; no W/R/R, no retractions  Cardiac: +S1/S2; RRR; no M/R/G; PMI non-displaced  Gastrointestinal: abdomen soft, NT/ND; no rebound or guarding  Back: spine midline, no bony tenderness or step-offs; no CVAT B/L  Extremities: WWP, no clubbing or cyanosis; no peripheral edema  Musculoskeletal: NROM x4; no joint swelling, tenderness or erythema  Vascular: 2+ radial, DP pulses B/L  Dermatologic: skin warm, dry and intact; no rashes, wounds, or scars  Neurologic: AAOx3; CNII-XII grossly intact; no focal deficits  Psychiatric: affect and characteristics of appearance, verbalizations, behaviors are appropriate Constitutional: ill-appearing; somnolent, intermittently blinking and raising R arm.,   Head: NC/AT  Eyes: eyes closed largely on exam  ENT: dry MM  Neck: supple; no JVD  Respiratory: secretions audible   Cardiac: +S1/S2; RRR; no M/R/G  Gastrointestinal: abdomen soft, NT, peg tube in place; clean and dry  Extremities: WWP, no clubbing or cyanosis; no peripheral edema  Musculoskeletal: left sided weakness UE and LE from CVA; raising right arm   Vascular: 2+ radial, DP pulses B/L  Dermatologic: skin warm, dry and intact; no rashes, wounds, or scars  Neurologic: AAOx0; speaking intermittently asking for wife and son Constitutional: ill-appearing; somnolent, intermittently blinking and raising R arm.,   Head: NC/AT  Eyes: eyes closed largely on exam  ENT: dry MM  Neck: supple; no JVD  Respiratory: secretions audible   Cardiac: +S1/S2; RRR; no M/R/G  Gastrointestinal: abdomen soft, NT, peg tube in place; clean and dry  Extremities: WWP, no clubbing or cyanosis; no peripheral edema  Musculoskeletal: left sided weakness UE and LE from CVA; raising right arm   Vascular: 2+ radial, DP pulses B/L  Dermatologic: sacral ulcer unstagable with serosanguinous output, black eschar and malodorous, no purulence  Neurologic: AAOx0; speaking intermittently asking for wife and son

## 2025-04-23 NOTE — DIETITIAN INITIAL EVALUATION ADULT - ADD RECOMMEND
1. When medically able, recommend to restart cyclic nocturnal EN via PEG of Glucerna @ 90mL/hr x 12 hours. This provides: 1080mL total volume, 1620 kcal, 90 g protein, 820mL free water. Meetin kcal/kg, 1.65 g/kg protein based on actual weight of 54.4 kg. Maintain aspiration precautions. Monitor s/s of intolerance; adjust EN as needed.   >>Consider providing free water flushes of 240mL 3x/day when tube feeds are not running in order to prevent dehydration during the day.  2. Encourage pt to meet nutritional needs as able   3. Monitor labs: electrolytes, cbc, fingersticks  4. Monitor weights   5. Pain and bowel regimen per team   7. Monitor adherence to diet education  1. When medically able, recommend to restart cyclic nocturnal EN via PEG of Glucerna 1.5 @ 90mL/hr x 12 hours. This provides: 1080mL total volume, 1620 kcal, 90 g protein, 820mL free water. Meetin kcal/kg, 1.65 g/kg protein based on actual weight of 54.4 kg. Maintain aspiration precautions. Monitor s/s of intolerance; adjust EN as needed.   >>Consider providing free water flushes of 240mL 3x/day when tube feeds are not running in order to prevent dehydration during the day.  2. Encourage pt to meet nutritional needs as able   3. Monitor labs: electrolytes, cbc, fingersticks  4. Monitor weights   5. Pain and bowel regimen per team   7. Monitor adherence to diet education

## 2025-04-23 NOTE — PROGRESS NOTE ADULT - ATTENDING COMMENTS
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  (12/10-12/12/24) for wound care and further nutrition evaluation and management. GI consulted and PEG tube study showed PEG is functioning and no need to change to a new one. WOCN consult appreciated, given Sliver nitrate and aquagel Ag dressing daily to PEG site. Dietitian consult appreciated, TF changed to Glucerna 1.2 @ 83ml/hr from 5pm to 11am x 18hr, total 6 cans per day. Pt recently admitted to ICU for aspiration PNA and had indwelling hudson removed and discharged from [facility] ( 2/25/25) and sent to [facility] and subsequently sent home on home O2 per wife, last admitted [facility] ( 3/26-4/1) for sepsis w/ lactic acidosis and acute hypoxic respiratory failure 2/2 multifocal /likely gram negative PNA( HAP since recent hospitalization and on PEG feeds), stage III sacral ulcer ( presence on admission), and concerns of PEG site infection w/ superficial ulceration and reported purulent discharge. Pt now readmitted to [facility] ( 3/23) for severe sepsis w/ lactic acidosis 2/2 recurrent multifocal HAP likely aspiration/gram negative organism and concerns for infected sacral decubitus (POA).    # Severe sepsis  # lactic acidosis   # recurrent aspiration HAP likely gram negative organism   # Acute hypoxic resp failure   - O2 supplement NC4L, wean as tolerated   - add duoneb and hypertonic saline nebs q12hr for airway clearance   - s/p vancomycin   - c/w zosyn 4.5g IV q8h EI ( 4/23-) for 7 days   - f/u BCx   - trend lactic acid 2.4   - PEG site ok not infected     #  sacral decubitus (POA): wound bed clean not infected, surrounding necrosis, WOCN consult for wound care add collagenase for chemical debridement, add MVI, VIt C and Zince sulfate to promote wound care and dietitian consult add beneprotein   # hx CVA/dysphagia/Sz: NPO/oral hygiene/aspiration precaution c/w PEG feed, c/w ASA/statin/keppra   # Chronic HFpEF: EF 56% ( 4/2024), stable   # ATN: trend Cr   # AOCD: trend Hgb stable , c/w B12, and thiamine supplement   # Glaucoma- timolol 0.5 mg 1 drop BID  # BPH: d/w doxazosin and finasteride, bladder scan q6h, SC for PVR>300ml ,ensure bowel movements   # DM: A1C ~ 7%, FS/NISS -180   # DVT ppx: lovenox 40 mg QD  # GOC: d/w wife and left message for son Hayes Bruce in [place]. Palliative care consult, wife wants to remain pt full code   # SW consult   # Disposition: medically active d/w 7UR1 team     Contact: ([facility] call): Dr. Destin Dillon covering 4/24-4/30/25

## 2025-04-23 NOTE — PROGRESS NOTE ADULT - PROBLEM SELECTOR PLAN 2
Patient has been having worsening cough and unable to expectorate. CXR RLL infiltrate. Secretions audible in upper airways.   - aspiration precautions  - c/w treatment of PNA as above  - c/w supplemental oxygen; wean as tolerated to baseline 2 L (on oxygen ever since developing COVID-19 in a NH)  - palliative consult placed as repeat admission for similar PNA  - keeping patient NPO  - c/w D5 LR 50 cc/hr Patient has been having worsening cough and unable to expectorate. CXR RLL infiltrate. Secretions audible in upper airways.   - aspiration precautions  - c/w treatment of PNA as above  - c/w supplemental oxygen; wean as tolerated to baseline 2 L (on oxygen ever since developing COVID-19 in a NH)  - palliative consult placed as repeat admission for similar PNA  - keeping patient NPO  - c/w D5 LR 50 cc/hr  - Duonebs q6h, hypertonic saline nebs q12h for airway clearance

## 2025-04-23 NOTE — DIETITIAN INITIAL EVALUATION ADULT - NUTRITION CONSULT
Detail Level: Detailed Depth Of Biopsy: dermis Was A Bandage Applied: Yes Size Of Lesion In Cm: 0.4 X Size Of Lesion In Cm: 0.3 Biopsy Type: H and E Biopsy Method: Dermablade Anesthesia Type: 1% lidocaine without epinephrine Anesthesia Volume In Cc: 0.5 Additional Anesthesia Volume In Cc (Will Not Render If 0): 0 Hemostasis: Melissa's Wound Care: Petrolatum Dressing: bandage Destruction After The Procedure: No Type Of Destruction Used: Curettage Curettage Text: The wound bed was treated with curettage after the biopsy was performed. Cryotherapy Text: The wound bed was treated with cryotherapy after the biopsy was performed. Electrodesiccation Text: The wound bed was treated with electrodesiccation after the biopsy was performed. Electrodesiccation And Curettage Text: The wound bed was treated with electrodesiccation and curettage after the biopsy was performed. Silver Nitrate Text: The wound bed was treated with silver nitrate after the biopsy was performed. Lab: [id number] Medical Necessity Information: It is in your best interest to select a reason for this procedure from the list below. All of these items fulfill various CMS LCD requirements except the new and changing color options. Consent: Written consent was obtained and risks were reviewed including but not limited to scarring, infection, bleeding, scabbing, incomplete removal, nerve damage and allergy to anesthesia. Post-Care Instructions: I reviewed with the patient in detail post-care instructions. Patient is to keep the biopsy site dry overnight, and then apply bacitracin twice daily until healed. Patient may apply hydrogen peroxide soaks to remove any crusting. Notification Instructions: Patient will be notified of biopsy results. However, patient instructed to call the office if not contacted within 2 weeks. Billing Type: Third-Party Bill Information: Selecting Yes will display possible errors in your note based on the variables you have selected. This validation is only offered as a suggestion for you. PLEASE NOTE THAT THE VALIDATION TEXT WILL BE REMOVED WHEN YOU FINALIZE YOUR NOTE. IF YOU WANT TO FAX A PRELIMINARY NOTE YOU WILL NEED TO TOGGLE THIS TO 'NO' IF YOU DO NOT WANT IT IN YOUR FAXED NOTE. Lab: [id number] Lab Facility: [id number] yes

## 2025-04-23 NOTE — H&P ADULT - PROBLEM SELECTOR PLAN 4
UA: spec grav 1.027, trace ketones, granular casts; s/p 3.7 L total.   - f/up repeat urine studies in the am  - likely in the setting of PO free water intake UA: spec grav 1.027, trace ketones, granular casts; s/p 3.7 L total.   - f/up repeat urine studies in the am  - likely in the setting of poor free water intake via peg

## 2025-04-23 NOTE — H&P ADULT - HISTORY OF PRESENT ILLNESS
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 Prior cultures from peg site grew MRSA, acetinobacter baumani and enterobactor cloacae, Presenting today with 3-4 days of fever at home with increased cough. Wife at bedside provided full history as patient does not speak. Wife shared he has been coughing a lot lately and it seems like he is trying to expectorate but is unable to do so. She tried to suction him at home without success. At baseline, largely does not speak but she shared he did speak a few words on rare occasion but has been even more confused the past few days. States he is hard of hearing and his hearing aids require a battery replacement. His bowel movements are usually lose as he is fed shakes through his peg tube. He is entirely bedboard since his stroke and has a home visiting nurse who stated his sacral ulcer has some black spots and was worsening. Wife shared the patient has not been vomiting, having overt chest or abdominal pain. She came to the ED given his fevers, worsening mental (though he has been declining gradually and not speaking), worsening sacral ulcer and concern for aspiration PNA.     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 Prior cultures from peg site grew MRSA, acetinobacter baumani and enterobactor cloacae, Presenting today with 3-4 days of fever at home with increased cough. Wife at bedside provided full history as patient does not speak. Wife shared he has been coughing a lot lately and it seems like he is trying to expectorate but is unable to do so. She tried to suction him at home without success. At baseline, largely does not speak a lot but she shared he speaks few words on occasion but has been more confused and non-verbal the past few days. States he is hard of hearing and his hearing aids require a battery replacement. His bowel movements are usually loose as he is fed shakes through his peg tube. He is entirely bedbound since his stroke and has a home visiting nurse who stated his sacral ulcer has some black spots and was worsening. Wife shared the patient has not been vomiting, having overt chest or abdominal pain. She came to the ED given his fevers, worsening mental status, worsening sacral ulcer and concern for aspiration PNA.     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 Prior cultures from peg site grew MRSA, acetinobacter baumani and enterobactor cloacae, Presenting today with 3-4 days of fever at home with increased cough. Wife at bedside provided full history as patient does not speak. Wife shared he has been coughing a lot lately and it seems like he is trying to expectorate but is unable to do so. She tried to suction him at home without success. At baseline, largely does not speak a lot but she shared he speaks few words on occasion but has been more confused and non-verbal the past few days. States he is hard of hearing and his hearing aids require a battery replacement. His bowel movements are usually loose as he is fed shakes through his peg tube. He is entirely bedbound since his stroke and has a home visiting nurse who stated his sacral ulcer has some black spots and was worsening. Wife shared the patient has not been vomiting, having overt chest or abdominal pain. She came to the ED given his fevers, worsening mental status, worsening sacral ulcer and concern for aspiration PNA.     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 Prior cultures from peg site grew MRSA, acetinobacter baumani and enterobactor cloacae, Presenting today with 3-4 days of fever at home with increased cough. Wife at bedside provided full history as patient does not speak. Wife shared he has been coughing a lot lately and it seems like he is trying to expectorate but is unable to do so. She tried to suction him at home without success. At baseline, largely does not speak a lot but she shared he speaks few words on occasion but has been more confused and non-verbal the past few days. States he is hard of hearing and his hearing aids require a battery replacement. His bowel movements are usually loose as he is fed shakes through his peg tube. He is entirely bedbound since his stroke and has a home visiting nurse who stated his sacral ulcer has some black spots and was worsening. Wife shared the patient has not been vomiting, having overt chest or abdominal pain. She came to the ED given his fevers, worsening mental status, worsening sacral ulcer and concern for aspiration PNA.     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 Prior cultures from peg site grew MRSA, acetinobacter baumani and enterobactor cloacae, Presenting today with 3-4 days of fever at home with increased cough. Wife at bedside provided full history as patient does not speak. Wife shared he has been coughing a lot lately and it seems like he is trying to expectorate but is unable to do so. She tried to suction him at home without success. At baseline, largely does not speak a lot but she shared he speaks few words on occasion but has been more confused and non-verbal the past few days. States he is hard of hearing and his hearing aids require a battery replacement. His bowel movements are usually loose as he is fed shakes through his peg tube. He is entirely bedbound since his stroke and has a home visiting nurse who stated his sacral ulcer has some black spots and was worsening. Wife shared the patient has not been vomiting, having overt chest or abdominal pain. She came to the ED given his fevers, worsening mental status, worsening sacral ulcer and concern for aspiration PNA.     ED course:  Vitals: T 98.1, , 122/76, RR 20, 91% RA  Labs: WBC 17.65, hemoglobin 8.9, MCV 73.2, , K 3.8, BUN 43, Cr 0.95,  glucose 363, AG 11, lactate 5.7 --> 4.0  UA: spec grav 1.027, trace ketones, granular casts, BUN 43, Cr 0.95  EKG: sinus tachycardia HR  118 qtc 453  Imaging: CXR: RLL infiltrate   Interventions: cefepime 1g, vanc 1 g, ofirmev 1 g, 2.7 L  Consults:

## 2025-04-23 NOTE — DIETITIAN INITIAL EVALUATION ADULT - PROBLEM SELECTOR PLAN 2
Patient has been having worsening cough and unable to expectorate. CXR RLL infiltrate. Secretions audible in upper airways.   - aspiration precautions  - c/w treatment of PNA as above  - c/w supplemental oxygen; wean as tolerated to baseline 2 L (on oxygen ever since developing COVID-19 in a NH)  - palliative consult placed as repeat admission for similar PNA  - keeping patient NPO  - c/w D5 LR 50 cc/hr

## 2025-04-23 NOTE — PROGRESS NOTE ADULT - PROBLEM SELECTOR PLAN 3
Patient has visiting RN who noted the sacral ulcer is worsening and had black appearance in some areas. Patient is entirely bedboard since stroke   - c/w empiric abx.  - f/up wound care.   - consider debridement pending wound care consult with surgery if aligns with patients GOC Present on admission. Patient has visiting RN who noted the sacral ulcer is worsening and had black appearance in some areas. Patient is entirely bedbound since stroke   - c/w empiric abx as above  - f/up wound care recs  - consider debridement pending wound care consult with surgery if aligns with patients GOC

## 2025-04-23 NOTE — H&P ADULT - PROBLEM SELECTOR PLAN 3
Patient has visiting RN who noted the sacral ulcer is worsening and had black appearance in some areas. Patient is entirely bedboard since stroke   - c/w empiric abx.  - f/up wound care.   - consider debridement pending wound care consult with surgery if aligns with patients GOC

## 2025-04-23 NOTE — DIETITIAN INITIAL EVALUATION ADULT - PERSON TAUGHT/METHOD
Discussed tube feed nutrition plan with patient's wife. Wife verbalized understanding./verbal instruction/spouse instructed

## 2025-04-23 NOTE — PROGRESS NOTE ADULT - PROBLEM SELECTOR PLAN 11
Presenting hemoglobin 8.9 reduced to 8.1 after fluids. Was discharged in april with a hemoglobin of 8.6. 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   - maintain active t/s  - transfuse to maintain>7  -C/W cyanocobalamin 1000mg qd, multivitamin, thiamine

## 2025-04-23 NOTE — PATIENT PROFILE ADULT - FALL HARM RISK - HARM RISK INTERVENTIONS

## 2025-04-23 NOTE — DIETITIAN INITIAL EVALUATION ADULT - PROBLEM SELECTOR PLAN 6
Home meds: metformin 500 mg 3 times a day, januvia 50 mg QD; wife does not give medications as prescribed as she was told by PCP that he does not need medication anymore given age and last A1c ~7.   - ctm FS q6 abdi since NPO  - demarcus q6

## 2025-04-23 NOTE — H&P ADULT - PROBLEM SELECTOR PLAN 7
Patient takes doxazosin 1 mg QHS and finasteride 5 mg QD; did require hudson in the past.   - holding home med in setting of low pressures  - ctm UOP   - bladder scans q6

## 2025-04-24 ENCOUNTER — TRANSCRIPTION ENCOUNTER (OUTPATIENT)
Age: 89
End: 2025-04-24

## 2025-04-24 LAB
-  BACTEROIDES FRAGILIS: SIGNIFICANT CHANGE UP
-  STREPTOCOCCUS SP. (NOT GRP A, B OR S PNEUMONIAE): SIGNIFICANT CHANGE UP
ALBUMIN SERPL ELPH-MCNC: 2.2 G/DL — LOW (ref 3.3–5)
ALP SERPL-CCNC: 98 U/L — SIGNIFICANT CHANGE UP (ref 40–120)
ALT FLD-CCNC: 15 U/L — SIGNIFICANT CHANGE UP (ref 10–45)
ANION GAP SERPL CALC-SCNC: 12 MMOL/L — SIGNIFICANT CHANGE UP (ref 5–17)
AST SERPL-CCNC: 16 U/L — SIGNIFICANT CHANGE UP (ref 10–40)
BASOPHILS # BLD AUTO: 0 K/UL — SIGNIFICANT CHANGE UP (ref 0–0.2)
BASOPHILS NFR BLD AUTO: 0 % — SIGNIFICANT CHANGE UP (ref 0–2)
BILIRUB SERPL-MCNC: 0.4 MG/DL — SIGNIFICANT CHANGE UP (ref 0.2–1.2)
BUN SERPL-MCNC: 23 MG/DL — SIGNIFICANT CHANGE UP (ref 7–23)
CALCIUM SERPL-MCNC: 8.5 MG/DL — SIGNIFICANT CHANGE UP (ref 8.4–10.5)
CHLORIDE SERPL-SCNC: 110 MMOL/L — HIGH (ref 96–108)
CO2 SERPL-SCNC: 23 MMOL/L — SIGNIFICANT CHANGE UP (ref 22–31)
CREAT SERPL-MCNC: 0.35 MG/DL — LOW (ref 0.5–1.3)
EGFR: 110 ML/MIN/1.73M2 — SIGNIFICANT CHANGE UP
EGFR: 110 ML/MIN/1.73M2 — SIGNIFICANT CHANGE UP
EOSINOPHIL # BLD AUTO: 0.28 K/UL — SIGNIFICANT CHANGE UP (ref 0–0.5)
EOSINOPHIL NFR BLD AUTO: 1.7 % — SIGNIFICANT CHANGE UP (ref 0–6)
GLUCOSE SERPL-MCNC: 261 MG/DL — HIGH (ref 70–99)
GRAM STN FLD: ABNORMAL
HCT VFR BLD CALC: 24.7 % — LOW (ref 39–50)
HGB BLD-MCNC: 7.7 G/DL — LOW (ref 13–17)
LACTATE SERPL-SCNC: 1.5 MMOL/L — SIGNIFICANT CHANGE UP (ref 0.5–2)
LYMPHOCYTES # BLD AUTO: 0.72 K/UL — LOW (ref 1–3.3)
LYMPHOCYTES # BLD AUTO: 4.4 % — LOW (ref 13–44)
MAGNESIUM SERPL-MCNC: 1.9 MG/DL — SIGNIFICANT CHANGE UP (ref 1.6–2.6)
MCHC RBC-ENTMCNC: 23.2 PG — LOW (ref 27–34)
MCHC RBC-ENTMCNC: 31.2 G/DL — LOW (ref 32–36)
MCV RBC AUTO: 74.4 FL — LOW (ref 80–100)
METHOD TYPE: SIGNIFICANT CHANGE UP
MONOCYTES # BLD AUTO: 0.3 K/UL — SIGNIFICANT CHANGE UP (ref 0–0.9)
MONOCYTES NFR BLD AUTO: 1.8 % — LOW (ref 2–14)
MRSA PCR RESULT.: NEGATIVE — SIGNIFICANT CHANGE UP
NEUTROPHILS # BLD AUTO: 15.17 K/UL — HIGH (ref 1.8–7.4)
NEUTROPHILS NFR BLD AUTO: 92.1 % — HIGH (ref 43–77)
PHOSPHATE SERPL-MCNC: 2.2 MG/DL — LOW (ref 2.5–4.5)
PLATELET # BLD AUTO: 202 K/UL — SIGNIFICANT CHANGE UP (ref 150–400)
POTASSIUM SERPL-MCNC: 3.1 MMOL/L — LOW (ref 3.5–5.3)
POTASSIUM SERPL-SCNC: 3.1 MMOL/L — LOW (ref 3.5–5.3)
PROT SERPL-MCNC: 5.9 G/DL — LOW (ref 6–8.3)
RBC # BLD: 3.32 M/UL — LOW (ref 4.2–5.8)
RBC # FLD: 19.6 % — HIGH (ref 10.3–14.5)
S AUREUS DNA NOSE QL NAA+PROBE: POSITIVE
SODIUM SERPL-SCNC: 145 MMOL/L — SIGNIFICANT CHANGE UP (ref 135–145)
WBC # BLD: 16.47 K/UL — HIGH (ref 3.8–10.5)
WBC # FLD AUTO: 16.47 K/UL — HIGH (ref 3.8–10.5)

## 2025-04-24 PROCEDURE — 99222 1ST HOSP IP/OBS MODERATE 55: CPT

## 2025-04-24 PROCEDURE — 99233 SBSQ HOSP IP/OBS HIGH 50: CPT

## 2025-04-24 PROCEDURE — G0545: CPT

## 2025-04-24 RX ORDER — PIPERACILLIN-TAZO-DEXTROSE,ISO 2.25G/50ML
3.38 IV SOLUTION, PIGGYBACK PREMIX FROZEN(ML) INTRAVENOUS EVERY 8 HOURS
Refills: 0 | Status: DISCONTINUED | OUTPATIENT
Start: 2025-04-24 | End: 2025-04-28

## 2025-04-24 RX ORDER — POTASSIUM PHOSPHATE, MONOBASIC POTASSIUM PHOSPHATE, DIBASIC INJECTION, 236; 224 MG/ML; MG/ML
30 SOLUTION, CONCENTRATE INTRAVENOUS ONCE
Refills: 0 | Status: COMPLETED | OUTPATIENT
Start: 2025-04-24 | End: 2025-04-24

## 2025-04-24 RX ORDER — FINASTERIDE 1 MG/1
5 TABLET, FILM COATED ORAL AT BEDTIME
Refills: 0 | Status: DISCONTINUED | OUTPATIENT
Start: 2025-04-24 | End: 2025-05-01

## 2025-04-24 RX ORDER — INSULIN GLARGINE-YFGN 100 [IU]/ML
9 INJECTION, SOLUTION SUBCUTANEOUS AT BEDTIME
Refills: 0 | Status: DISCONTINUED | OUTPATIENT
Start: 2025-04-24 | End: 2025-05-01

## 2025-04-24 RX ORDER — SODIUM CHLORIDE 9 G/1000ML
1000 INJECTION, SOLUTION INTRAVENOUS
Refills: 0 | Status: DISCONTINUED | OUTPATIENT
Start: 2025-04-24 | End: 2025-04-24

## 2025-04-24 RX ADMIN — Medication 100 MILLIGRAM(S): at 06:13

## 2025-04-24 RX ADMIN — INSULIN LISPRO 2: 100 INJECTION, SOLUTION INTRAVENOUS; SUBCUTANEOUS at 18:07

## 2025-04-24 RX ADMIN — Medication 81 MILLIGRAM(S): at 06:13

## 2025-04-24 RX ADMIN — INSULIN LISPRO 2: 100 INJECTION, SOLUTION INTRAVENOUS; SUBCUTANEOUS at 23:20

## 2025-04-24 RX ADMIN — POTASSIUM PHOSPHATE, MONOBASIC POTASSIUM PHOSPHATE, DIBASIC INJECTION, 83.33 MILLIMOLE(S): 236; 224 SOLUTION, CONCENTRATE INTRAVENOUS at 10:15

## 2025-04-24 RX ADMIN — Medication 4 MILLILITER(S): at 12:25

## 2025-04-24 RX ADMIN — INSULIN LISPRO 2: 100 INJECTION, SOLUTION INTRAVENOUS; SUBCUTANEOUS at 00:58

## 2025-04-24 RX ADMIN — Medication 1 TABLET(S): at 11:13

## 2025-04-24 RX ADMIN — INSULIN LISPRO 4: 100 INJECTION, SOLUTION INTRAVENOUS; SUBCUTANEOUS at 13:09

## 2025-04-24 RX ADMIN — Medication 4 MILLILITER(S): at 00:51

## 2025-04-24 RX ADMIN — TIMOLOL MALEATE 1 DROP(S): 6.8 SOLUTION OPHTHALMIC at 18:08

## 2025-04-24 RX ADMIN — TIMOLOL MALEATE 1 DROP(S): 6.8 SOLUTION OPHTHALMIC at 07:17

## 2025-04-24 RX ADMIN — Medication 25 GRAM(S): at 06:14

## 2025-04-24 RX ADMIN — CYANOCOBALAMIN 1000 MICROGRAM(S): 1000 INJECTION INTRAMUSCULAR; SUBCUTANEOUS at 06:14

## 2025-04-24 RX ADMIN — LEVETIRACETAM 750 MILLIGRAM(S): 10 INJECTION, SOLUTION INTRAVENOUS at 06:13

## 2025-04-24 RX ADMIN — ATORVASTATIN CALCIUM 40 MILLIGRAM(S): 80 TABLET, FILM COATED ORAL at 22:00

## 2025-04-24 RX ADMIN — INSULIN GLARGINE-YFGN 9 UNIT(S): 100 INJECTION, SOLUTION SUBCUTANEOUS at 23:20

## 2025-04-24 RX ADMIN — INSULIN LISPRO 6: 100 INJECTION, SOLUTION INTRAVENOUS; SUBCUTANEOUS at 06:48

## 2025-04-24 RX ADMIN — IPRATROPIUM BROMIDE AND ALBUTEROL SULFATE 3 MILLILITER(S): .5; 2.5 SOLUTION RESPIRATORY (INHALATION) at 18:07

## 2025-04-24 RX ADMIN — FINASTERIDE 5 MILLIGRAM(S): 1 TABLET, FILM COATED ORAL at 22:01

## 2025-04-24 RX ADMIN — Medication 40 MILLIEQUIVALENT(S): at 10:15

## 2025-04-24 RX ADMIN — IPRATROPIUM BROMIDE AND ALBUTEROL SULFATE 3 MILLILITER(S): .5; 2.5 SOLUTION RESPIRATORY (INHALATION) at 06:15

## 2025-04-24 RX ADMIN — Medication 25 GRAM(S): at 22:01

## 2025-04-24 RX ADMIN — IPRATROPIUM BROMIDE AND ALBUTEROL SULFATE 3 MILLILITER(S): .5; 2.5 SOLUTION RESPIRATORY (INHALATION) at 11:13

## 2025-04-24 RX ADMIN — LEVETIRACETAM 750 MILLIGRAM(S): 10 INJECTION, SOLUTION INTRAVENOUS at 18:07

## 2025-04-24 RX ADMIN — IPRATROPIUM BROMIDE AND ALBUTEROL SULFATE 3 MILLILITER(S): .5; 2.5 SOLUTION RESPIRATORY (INHALATION) at 23:43

## 2025-04-24 RX ADMIN — Medication 25 GRAM(S): at 14:25

## 2025-04-24 NOTE — CONSULT NOTE ADULT - TIME BILLING
Emotional Support/Supportive Care and Clarification of Potential Disease Trajectory related to  Assessment of Symptom [place] and Palliative regimen  Education and Monitoring of Opiates including titration and management of high risk medications  Discharge Facilitation with ongoing coordination  Exploration of GOC/Advanced Directives including HCP designation, code status, and hospice eligibility    Time exclusive of ACP discussion  Time inclusive of chart review, medication ordering, discussion with primary team, clinical documentation, and communication with family/caregiver
sacral wound infection / cellulitis, bacteremia, aspiratipn PNA

## 2025-04-24 NOTE — PROGRESS NOTE ADULT - PROBLEM SELECTOR PLAN 2
Patient has been having worsening cough and unable to expectorate. CXR RLL infiltrate. Secretions audible in upper airways.   - aspiration precautions  - c/w treatment of PNA as above  - c/w supplemental oxygen; wean as tolerated to baseline 2 L (on oxygen ever since developing COVID-19 in a NH)  - palliative consult placed as repeat admission for similar PNA  - keeping patient NPO  - c/w D5 LR 50 cc/hr  - Duonebs q6h, hypertonic saline nebs q12h for airway clearance Patient has been having worsening cough and unable to expectorate. CXR RLL infiltrate. Secretions audible in upper airways.     - aspiration precautions  - c/w treatment of PNA as above  - c/w supplemental oxygen; wean as tolerated to baseline 2 L (on oxygen ever since developing COVID-19 in a NH)  - palliative consult placed as repeat admission for similar PNA  - keeping patient NPO  - c/w D5 LR 50 cc/hr  - Duonebs q6h, hypertonic saline nebs q12h for airway clearance

## 2025-04-24 NOTE — ADVANCED PRACTICE NURSE CONSULT - RECOMMEDATIONS
Recommend wet to damp dressing with Vashe wound cleanser daily, cover with foam dressing. Spoke with RN Jeffrey and house staff. Will follow up for reassessment. Total time spent on encounter=40 minutes

## 2025-04-24 NOTE — PROGRESS NOTE ADULT - PROBLEM SELECTOR PLAN 11
Presenting hemoglobin 8.9 reduced to 8.1 after fluids. Was discharged in april with a hemoglobin of 8.6. 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   - maintain active t/s  - transfuse to maintain>7  -C/W cyanocobalamin 1000mg qd, multivitamin, thiamine Presenting hemoglobin 8.9 reduced to 8.1 after fluids. Was discharged in april with a hemoglobin of 8.6. 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     - maintain active t/s  - transfuse to maintain>7  -C/W cyanocobalamin 1000mg qd, multivitamin, thiamine

## 2025-04-24 NOTE — PROGRESS NOTE ADULT - PROBLEM SELECTOR PLAN 10
Home med: timolol 0.5 mg 1 drop BID  - c/w home med Home med: timolol 0.5 mg 1 drop BID    - c/w home med

## 2025-04-24 NOTE — PROGRESS NOTE ADULT - PROBLEM SELECTOR PLAN 5
Pt with a hx of stroke 2004 with residual left-sided weakness.   MRI brain on 4/28 with R internal capsule infarct   Head CT 5/5: subacute infarct centered in the genu and posterior limb of the right internal capsule is stable.  A chronic transcortical infarction in the left precentral gyrus and a small chronic infarct in the right cerebellar hemisphere are stable.  Home meds: ASA 81mg, Atorvastatin 40mg, keppra 750mg (seizure ppx), per wife pt never had seizures.  -C/W home meds Pt with a hx of stroke 2004 with residual left-sided weakness.   MRI brain on 4/28 with R internal capsule infarct   Head CT 5/5: subacute infarct centered in the genu and posterior limb of the right internal capsule is stable.  A chronic transcortical infarction in the left precentral gyrus and a small chronic infarct in the right cerebellar hemisphere are stable.  Home meds: ASA 81mg, Atorvastatin 40mg, keppra 750mg (seizure ppx), per wife pt never had seizures.    -C/W home meds

## 2025-04-24 NOTE — DISCHARGE NOTE PROVIDER - NSDCCPCAREPLAN_GEN_ALL_CORE_FT
PRINCIPAL DISCHARGE DIAGNOSIS  Diagnosis: Severe sepsis  Assessment and Plan of Treatment: Severe sepsis due to a non-healing sacral wound means that an infection, starting in your sacral wound, has spread throughout your body and is causing your organs to malfunction. This is a serious condition requiring immediate hospital care. Symptoms may include fever, chills, rapid heart rate, rapid breathing, confusion, and low blood pressure. Treatment typically involves intravenous antibiotics to fight the infection, fluids to maintain blood pressure, and possibly medications to support organ function. The sacral wound itself will require careful management, including regular cleaning and dressing changes. Depending on the severity, surgery may be necessary to remove infected tissue. Recovery from severe sepsis can be lengthy, and you may require ongoing care and rehabilitation. It’s crucial to follow your healthcare team’s instructions carefully, including completing the full course of antibiotics and attending follow-up appointments.  Make sure to follow up with Dr. Andrade      SECONDARY DISCHARGE DIAGNOSES  Diagnosis: Pneumonia  Assessment and Plan of Treatment:      PRINCIPAL DISCHARGE DIAGNOSIS  Diagnosis: Severe sepsis  Assessment and Plan of Treatment: Severe sepsis due to a non-healing sacral wound means that an infection, starting in your sacral wound, has spread throughout your body and is causing your organs to malfunction. This is a serious condition requiring immediate hospital care. Symptoms may include fever, chills, rapid heart rate, rapid breathing, confusion, and low blood pressure. Treatment typically involves intravenous antibiotics to fight the infection, fluids to maintain blood pressure, and possibly medications to support organ function. The sacral wound itself will require careful management, including regular cleaning and dressing changes. Depending on the severity, surgery may be necessary to remove infected tissue. Recovery from severe sepsis can be lengthy, and you may require ongoing care and rehabilitation. It’s crucial to follow your healthcare team’s instructions carefully, including completing the full course of antibiotics and attending follow-up appointments.  Make sure to follow up with Dr. Andrade  Wound VAC: VAC needs to be changed every 3 days -- was placed on Tuesday 4/29 can have vac changed on Thursday      SECONDARY DISCHARGE DIAGNOSES  Diagnosis: Pneumonia  Assessment and Plan of Treatment:      PRINCIPAL DISCHARGE DIAGNOSIS  Diagnosis: Severe sepsis  Assessment and Plan of Treatment: Severe sepsis due to a non-healing sacral wound means that an infection, starting in your sacral wound, has spread throughout your body and is causing your organs to malfunction. This is a serious condition requiring immediate hospital care. Symptoms may include fever, chills, rapid heart rate, rapid breathing, confusion, and low blood pressure. Treatment typically involves intravenous antibiotics to fight the infection, fluids to maintain blood pressure, and possibly medications to support organ function. The sacral wound itself will require careful management, including regular cleaning and dressing changes. Depending on the severity, surgery may be necessary to remove infected tissue. Recovery from severe sepsis can be lengthy, and you may require ongoing care and rehabilitation. It’s crucial to follow your healthcare team’s instructions carefully, including completing the full course of antibiotics and attending follow-up appointments.  Please take your antibiotics as prescribed. Yopu will need to take Ceftriaxone 2g once daily intravenously and flagyl 500 mg twice daily via peg tube both until 5/10/25.  Make sure to follow up with Dr. Andrade  Wound VAC: VAC needs to be changed every 3 days -- was placed on Tuesday 4/29 can have vac changed on Thursday

## 2025-04-24 NOTE — PROGRESS NOTE ADULT - PROBLEM SELECTOR PLAN 4
UA: spec grav 1.027, trace ketones, granular casts; s/p 3.7 L total.   - f/up repeat urine studies in the am  - likely in the setting of poor free water intake via peg IMPROVING UA: spec grav 1.027, trace ketones, granular casts; s/p 3.7 L total. Likely in the setting of poor free water intake via peg    - Continue to trend Cr  - Avoid nephrotoxic agents  - Adjust medication dosages for level of renal function

## 2025-04-24 NOTE — PROGRESS NOTE ADULT - PROBLEM SELECTOR PLAN 7
Patient takes doxazosin 1 mg QHS and finasteride 5 mg QD; did require hudson in the past.   - holding home med in setting of low pressures  - ctm UOP   - bladder scans q6 Patient takes doxazosin 1 mg QHS and finasteride 5 mg QD; did require hudson in the past.     - holding home Doxazosin in setting of low pressures/sepsis  - Resume home Finasteride 5mg qd  - ctm UOP   - bladder scans q6

## 2025-04-24 NOTE — DISCHARGE NOTE PROVIDER - NS AS DC PROVIDER CONTACT Y/N MULTI
12 lead ECG critical value noted on 12/31/2018 at 1127.  ECG was given to Coretta RAGLAND at 1129.   Yes

## 2025-04-24 NOTE — ADVANCED PRACTICE NURSE CONSULT - ASSESSMENT
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 Unstageable pressure injury to sacrum measures 7 x 7 cm, approx 40% of wound is soft eschar, remainder of wound is yellow-gray slough. Wound with slight odor, some purulence noted with palpation. Site is slightly larger than previous admit. Pt's spouse at bedside, speaks Cantonese so Cantonese  used to assess if patient is turned at bed and if they have a hospital bed at home. Also explained the current wound regime. Supplies left at bedside.

## 2025-04-24 NOTE — CONSULT NOTE ADULT - ATTENDING COMMENTS
#Blake [id number]    88yo Cantonese speaking M h/o multiple CVA c/b L sided weakness now s/p PEG, bedbound, stage III sacral ulcer, DM, BPH, glaucoma, b/l hearing impairment, aortic root aneurysm p/w fever, AMS, productive cough x 3-4 days. History obtained from wife and chart review.  Patient was admitted in [place] for HAP, and was admitted to [facility] from 3/25-4/1/25 for severe sepsis and AHRF 2/2 HAP/aspiration PNA.  Also was found to have peg site infection - peg side WCx grew MRSA, Acinetobacter baumanii and ECC. Patient improved on Zosyn and was sent home with Bactrim. About a few days PTA, patient started to have productive cough and fever. He was also more altered and less responding. Also wife noted worsening sacral wound over time. Her family physician suggested her to bring patient to the ED.  Upon arrival, afebrile, tachy, lab notable for WBC 17.6, Hgb 8.9, Cr 0.95, glucose 363, lactate 5.7. MRSA nasal swab neg. UA negative, CXR with RLL infiltrate.  Vanc/cefepime were given and then switched to zosyn, was admitted to medicine. ID was consulted for abx rec. BCx growing Strep constellates and B. fragilis. ID was consulted for abx rec. Patient minimally answering questions, poor inspiratory effort, PEG site c/d/i, unstageable sacral wound with mild purulence and malodor, mild erythema.    Source of bacteremia likely from sacral wound infection. She likely also has aspiration PNA.  Cont Zosyn 3.375g IV q8h. If patient decompensate, then add vanco.  Obtain surveillance BCx.  Obtain CT a/p with IV cont to assess sacral wound to r/o abscess.      Team 1 will follow you.  Case d/w primary team.    Sherry Andrade MD, MS  Infectious Disease attending  office phone [phone]  For any questions during evening/weekend/holiday, please page ID on call

## 2025-04-24 NOTE — PROGRESS NOTE ADULT - PROBLEM SELECTOR PLAN 3
Present on admission. Patient has visiting RN who noted the sacral ulcer is worsening and had black appearance in some areas. Patient is entirely bedbound since stroke   - c/w empiric abx as above  - f/up wound care recs  - consider debridement pending wound care consult with surgery if aligns with patients GOC Present on admission. Patient has visiting RN who noted the sacral ulcer is worsening and had black appearance in some areas. Patient is entirely bedbound since stroke    - c/w empiric abx as above  - f/up wound care recs as above  - Per wound care, wound debridement at this point may ultimately cause more harm by increasing area of exposed bone

## 2025-04-24 NOTE — DISCHARGE NOTE PROVIDER - CARE PROVIDER_API CALL
Sherry Andrade  Infectious Disease  [address] [place] [address]  Phone: [phone]  Fax: [phone]  Follow Up Time: 2 weeks

## 2025-04-24 NOTE — PROGRESS NOTE ADULT - PROBLEM SELECTOR PLAN 6
Home meds: metformin 500 mg 3 times a day, januvia 50 mg QD; wife does not give medications as prescribed as she was told by PCP that he does not need medication anymore given age and last A1c ~7.   - ctm FS q6 abdi since NPO  - Moderate ISS q6 Home meds: metformin 500 mg 3 times a day, januvia 50 mg QD; wife does not give medications as prescribed as she was told by PCP that he does not need medication anymore given age and last A1c ~7.     - ctm FS q6 abdi since NPO  - Moderate ISS q6

## 2025-04-24 NOTE — DISCHARGE NOTE PROVIDER - NSDCMRMEDTOKEN_GEN_ALL_CORE_FT
ascorbic acid 500 mg oral tablet: 1 tab(s) by PEG tube once a day  aspirin 81 mg oral tablet, chewable: 1 tab(s) orally once a day  atorvastatin 40 mg oral tablet: 1 tab(s) orally once a day (at bedtime)  cyanocobalamin: 1 tab(s) once a day  doxazosin 1 mg oral tablet: 1 tab(s) orally once a day (at bedtime)  finasteride 5 mg oral tablet: 1 tab(s) orally once a day  folic acid 1 mg oral tablet: 1 tab(s) orally once a day  Home nebulizer machine, to use on home O2: Please dispense 1 nebulizer machine for patient home use on O2 machine per pts insurance  ipratropium-albuterol 0.5 mg-2.5 mg/3 mL inhalation solution: 3 milliliter(s) inhaled every 12 hours  Januvia 50 mg oral tablet: 1 tab(s) orally once a day  LevETIRAcetam: 750 milligram(s) every 12 hours  metFORMIN 500 mg oral tablet: 1 tab(s) orally 3 times a day  Multiple Vitamins oral tablet: 1 tab(s) by PEG tube once a day  Please dispense nebulizer machine compatible with home nasal cannula, per patients insurance. Thank you: Please dispense nebulizer machine compatible with home nasal cannula, per patients insurance. Thank you  sodium chloride 7% inhalation solution: 4 milliliter(s) inhaled every 12 hours  thiamine 100 mg oral capsule: 1 cap(s) orally once a day  timolol maleate 0.5% ophthalmic solution: 1 drop(s) in each affected eye 2 times a day  zinc oxide 20% topical ointment: Apply topically to affected area every 12 hours 1 Apply topically to affected area 2 times a day   acetaminophen 160 mg/5 mL oral suspension: 20.31 milliliter(s) orally every 8 hours  ascorbic acid 500 mg oral tablet: 1 tab(s) by PEG tube once a day  aspirin 81 mg oral tablet, chewable: 1 tab(s) orally once a day  atorvastatin 40 mg oral tablet: 1 tab(s) orally once a day (at bedtime)  cefTRIAXone:   cyanocobalamin: 1 tab(s) once a day  Dextrose 5% in Water intravenous solution: 1000 milliliter(s) intravenous  Dextrose 5% in Water intravenous solution: 1000 milliliter(s) intravenous  doxazosin 1 mg oral tablet: 1 tab(s) orally once a day (at bedtime)  finasteride 5 mg oral tablet: 1 tab(s) orally once a day  folic acid 1 mg oral tablet: 1 tab(s) orally once a day  glucose 40% oral gel: orally  glucose 50% intravenous solution: 50 milliliter(s) intravenous once  glucose 50% intravenous solution: 25 milliliter(s) intravenous once  glucose 50% intravenous solution: 50 milliliter(s) intravenous once  Home nebulizer machine, to use on home O2: Please dispense 1 nebulizer machine for patient home use on O2 machine per pts insurance  insulin glargine 100 units/mL subcutaneous solution: 9 unit(s) subcutaneous once a day (at bedtime)  insulin lispro 100 units/mL injectable solution: injectable  ipratropium-albuterol 0.5 mg-2.5 mg/3 mL inhalation solution: 3 milliliter(s) inhaled every 12 hours  LevETIRAcetam: 750 milligram(s) every 12 hours  metroNIDAZOLE 500 mg/100 mL intravenous solution: 100 milliliter(s) intravenous every 12 hours  Multiple Vitamins oral tablet: 1 tab(s) by PEG tube once a day  Please dispense nebulizer machine compatible with home nasal cannula, per patients insurance. Thank you: Please dispense nebulizer machine compatible with home nasal cannula, per patients insurance. Thank you  sodium chloride 7% inhalation solution: 4 milliliter(s) inhaled every 12 hours  thiamine 100 mg oral capsule: 1 cap(s) orally once a day  timolol maleate 0.5% ophthalmic solution: 1 drop(s) in each affected eye 2 times a day   acetaminophen 160 mg/5 mL oral suspension: 20.31 milliliter(s) orally every 8 hours  ascorbic acid 500 mg oral tablet: 1 tab(s) by PEG tube once a day  aspirin 81 mg oral tablet, chewable: 1 tab(s) orally once a day  atorvastatin 40 mg oral tablet: 1 tab(s) orally once a day (at bedtime)  cefTRIAXone 2 g/50 mL-iso-osmotic dextrose intravenous solution: 2 gram(s) intravenously once a day Until 5/10/25  cyanocobalamin: 1 tab(s) once a day  finasteride 5 mg oral tablet: 1 tab(s) orally once a day  folic acid 1 mg oral tablet: 1 tab(s) orally once a day  Home nebulizer machine, to use on home O2: Please dispense 1 nebulizer machine for patient home use on O2 machine per pts insurance  insulin glargine 100 units/mL subcutaneous solution: 9 unit(s) subcutaneous once a day (at bedtime)  ipratropium-albuterol 0.5 mg-2.5 mg/3 mL inhalation solution: 3 milliliter(s) inhaled every 12 hours  LevETIRAcetam: 750 milligram(s) by PEG tube every 12 hours  metroNIDAZOLE 500 mg oral tablet: 1 tab(s) by PEG tube every 12 hours Until 5/10/25  Multiple Vitamins oral tablet: 1 tab(s) by PEG tube once a day  Please dispense nebulizer machine compatible with home nasal cannula, per patients insurance. Thank you: Please dispense nebulizer machine compatible with home nasal cannula, per patients insurance. Thank you  sodium chloride 7% inhalation solution: 4 milliliter(s) inhaled every 12 hours  thiamine 100 mg oral capsule: 1 cap(s) orally once a day  timolol maleate 0.5% ophthalmic solution: 1 drop(s) in each affected eye 2 times a day

## 2025-04-24 NOTE — PROGRESS NOTE ADULT - ATTENDING COMMENTS
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 GI consulted and PEG tube study showed PEG is functioning and no need to change to a new one. WOCN consult appreciated, given Sliver nitrate and aquagel Ag dressing daily to PEG site. Dietitian consult appreciated, TF changed to Glucerna 1.2 @ 83ml/hr from 5pm to 11am x 18hr, total 6 cans per day. Pt recently admitted to ICU for aspiration PNA and had indwelling hudson removed and discharged from [place] ( 2/25/25) and sent to [facility] and subsequently sent home on home O2 per wife, last admitted [facility] ( 3/26-4/1) for sepsis w/ lactic acidosis and acute hypoxic respiratory failure 2/2 multifocal /likely gram negative PNA( HAP since recent hospitalization and on PEG feeds), stage III sacral ulcer ( presence on admission), and concerns of PEG site infection w/ superficial ulceration and reported purulent discharge. Pt now readmitted to [facility] ( 3/23) for severe sepsis w/ lactic acidosis 2/2 recurrent multifocal HAP likely aspiration/gram negative organism and concerns for infected sacral decubitus (POA)--    pt known to me from prior admission  seen with wife and friend at bedside  stated- he was very uncomfortable at home and appear to be in pain -now looks better to him  he is minimally verbal  tube feeding was held since admission and getting IVF - wife would like to continue Tube feeding knowing aspiration risk  lengthy discussion at bedside with wife and family friend   3 admission in last 3 months.  he has hha 13 hours, no one to help turn or change at night - which is making sacral decub worse.  he is on oxygen nc, make eye contact, stretching out right hand to reach his wife. peg site clean.       # Severe sepsis  # lactic acidosis   # recurrent aspiration HAP likely gram negative organism   # Acute hypoxic resp failure   # sacral decubitus ulcer, likely infected.  # bacteremia -poa ( strep and bacteroides    - oral care, supplement oxygen. to restart Peg tube feeding ( trickle feeding ok - to cut down on IvF- starting to see edema on hands.   ID consult for bacteremia - agree with CT to check for abscess underneath  strick npo, oral care   wound care management   - wife would like to continue full care  - discussed at bedside = introduce home hospice care. I encouraged wife to discuss with her son regarding patient care  she is more focused on requesting hha 12 hours x 2 shift to help patient care ( turning and changing to help healing of sacral decubitus ulcer.   palliative care evaluation please  SW to help with HHA     DM with hyperglycemia - long acting insulin for now   monitor for hypoglycemia.     dw medical team, lengthy discussion with wife regarding goc and disposition  he is at high risk for recurrent aspiration, wound infection , sepsis and high risk of mortality

## 2025-04-24 NOTE — PROGRESS NOTE ADULT - PROBLEM SELECTOR PLAN 1
Severe sepsis w/ lactic acidosis likely 2/2 aspiration PNA; has similar hospitalization in early April 2025. Appeared tachypneic on exam and somnolent which is how he has been at home as well. In ED, had WBC 17.65, , venous lactate 5.7 --> 4.0. On presentation to [facility],  febrile to 101.2 with lactate of 2.4. ABG consistent with respiratory alkalosis and hypoxia. Improved with ofirmev and NC 4 L (baseline 2 L). severe sepsis likely secondary to aspiration PNA vs. worsening sacral ulcer. Sacral ulcer with areas of eschar but without erythema or purulence. PEG site without infectious signs  D/gokul empiric Vancomycin despite previous +MRSA wound/PEG site culture as currently without signs of acute SSTI    - f/up blood cultures 4/22  - c/w empiric Zosyn 4.5mg q8  - tylenol for fever and pain #Lactic acidosis, IMPROVED  Likely 2/2 aspiration PNA vs. infected sacral decubitus ulcer; has similar hospitalization in early April 2025. Sacral ulcer with areas of eschar. PEG site without infectious signs  D/gokul empiric Vancomycin on 4/23 despite previous +MRSA wound/PEG site culture as currently without signs of acute SSTI. However, assessment of sacral decubitus ulcer by wound care on 4/24 revealed foul odor and mild expressed purulence.     - ID, wound care recommendations  - F/u admission BCx 4/22 - +Bacteroides fragilis, +Strep species  - F/u surveillance BCx 4/24  - c/w empiric Zosyn 4.5mg q8  - tylenol for fever and pain

## 2025-04-24 NOTE — PROGRESS NOTE ADULT - SUBJECTIVE AND OBJECTIVE BOX
***INCOMPLETE NOTE*** Patient is a 89y old  Male who presents with a chief complaint of AMS     (23 Apr 2025 14:25)       INTERVAL HPI/OVERNIGHT EVENTS: No acute events overnight.    SUBJECTIVE: Patient seen and examined this morning with Flowere  Vale #[id number]. History obtained from wife at bedside. She states that patient doesn't appear to be in any acute pain or SOB, but he has intermittent coughing. She reported that he had 3 BMs yesterday and is making urine.    All other review of systems negative except otherwise noted in HPI above.    MEDICATIONS  (STANDING):  albuterol/ipratropium for Nebulization 3 milliLiter(s) Nebulizer every 6 hours  aspirin  chewable 81 milliGRAM(s) Oral every 24 hours  atorvastatin 40 milliGRAM(s) Oral at bedtime  cyanocobalamin 1000 MICROGram(s) Oral every 24 hours  dextrose 5% + lactated ringers. 1000 milliLiter(s) (50 mL/Hr) IV Continuous <Continuous>  dextrose 5%. 1000 milliLiter(s) (100 mL/Hr) IV Continuous <Continuous>  dextrose 5%. 1000 milliLiter(s) (50 mL/Hr) IV Continuous <Continuous>  dextrose 50% Injectable 25 Gram(s) IV Push once  dextrose 50% Injectable 12.5 Gram(s) IV Push once  dextrose 50% Injectable 25 Gram(s) IV Push once  glucagon  Injectable 1 milliGRAM(s) IntraMuscular once  insulin lispro (ADMELOG) corrective regimen sliding scale   SubCutaneous every 6 hours  levETIRAcetam  Solution 750 milliGRAM(s) Oral every 12 hours  multivitamin 1 Tablet(s) Oral daily  piperacillin/tazobactam IVPB.. 4.5 Gram(s) IV Intermittent every 8 hours  sodium chloride 3%  Inhalation 4 milliLiter(s) Inhalation every 12 hours  thiamine 100 milliGRAM(s) Oral every 24 hours  timolol 0.5% Solution 1 Drop(s) Both EYES every 12 hours    MEDICATIONS  (PRN):  acetaminophen     Tablet .. 650 milliGRAM(s) Oral every 6 hours PRN Temp greater or equal to 38C (100.4F), Mild Pain (1 - 3)  dextrose Oral Gel 15 Gram(s) Oral once PRN Blood Glucose LESS THAN 70 milliGRAM(s)/deciliter    Allergies    No Known Allergies    Intolerances      Vital Signs Last 24 Hrs  T(C): 36.4 (24 Apr 2025 11:30), Max: 36.7 (23 Apr 2025 22:00)  T(F): 97.6 (24 Apr 2025 11:30), Max: 98.1 (23 Apr 2025 22:00)  HR: 92 (24 Apr 2025 11:30) (90 - 93)  BP: 103/66 (24 Apr 2025 11:30) (101/59 - 114/71)  BP(mean): 78 (24 Apr 2025 11:30) (73 - 78)  RR: 18 (24 Apr 2025 11:30) (18 - 19)  SpO2: 94% (24 Apr 2025 11:30) (94% - 96%)    Parameters below as of 24 Apr 2025 11:30  Patient On (Oxygen Delivery Method): nasal cannula  O2 Flow (L/min): 3    PHYSICAL EXAM:  Constitutional - NAD, Comfortable-appearing  HEENT - NCAT, EOMI, NC in place  Chest - Breathing comfortably on 3L NC, (+)decreased breath sounds in Right lung fields  Cardio - Warm and well perfused, RRR  Abdomen - Soft, NTND, +BS. PEG site without surrounding erythema, induration, or drainage.  Extremities - No peripheral edema, No calf tenderness   Neurologic - Awake and alert, makes eye contact to voice. Nonverbal. Not following commands. Spontaneous movements of Right upper and lower extremity.  Psychiatric - Mood stable, Affect WNL    LABS:                        7.7    16.47 )-----------( 202      ( 24 Apr 2025 07:02 )             24.7     24 Apr 2025 07:02    145    |  110    |  23     ----------------------------<  261    3.1     |  23     |  0.35     Ca    8.5        24 Apr 2025 07:02  Phos  2.2       24 Apr 2025 07:02  Mg     1.9       24 Apr 2025 07:02    TPro  5.9    /  Alb  2.2    /  TBili  0.4    /  DBili  x      /  AST  16     /  ALT  15     /  AlkPhos  98     24 Apr 2025 07:02    PT/INR - ( 23 Apr 2025 05:30 )   PT: 13.5 sec;   INR: 1.16          PTT - ( 23 Apr 2025 05:30 )  PTT:25.0 sec  CAPILLARY BLOOD GLUCOSE      POCT Blood Glucose.: 277 mg/dL (24 Apr 2025 06:47)  POCT Blood Glucose.: 167 mg/dL (24 Apr 2025 00:57)  POCT Blood Glucose.: 273 mg/dL (23 Apr 2025 18:01)  POCT Blood Glucose.: 359 mg/dL (23 Apr 2025 12:46)    BLOOD CULTURE  04-22 @ 18:26   No growth at 24 hours  --  Blood Culture PCR    RADIOLOGY & ADDITIONAL TESTS:

## 2025-04-24 NOTE — DISCHARGE NOTE PROVIDER - HOSPITAL COURSE
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 Patient was started on IV antibiotics and underwent bedside I&D of sacral ulcer with general surgery on 4/27. Pending further GOC discussions given patient’s poor overall prognosis with limited home support.  Hospital course (by problem):     #Severe sepsis. ccb Lactic acidosis - IMPROVED  Likely 2/2 aspiration PNA vs. infected sacral decubitus ulcer; has similar hospitalization in early April 2025. Sacral ulcer with areas of eschar. PEG site without infectious signs  D/gokul empiric Vancomycin on 4/23 despite previous +MRSA wound/PEG site culture as currently without signs of acute SSTI. However, assessment of sacral decubitus ulcer by wound care on 4/24 revealed foul odor and mild expressed purulence.   CT chest/abd/pelvis 4/25: Suspected recurrent aspiration and right lower lobe pneumonia. 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  S/p bedside debridement with gen surg on 4/27. previous on empiric Zosyn 3.375g q8h (4/23 - ). admission BCx 4/22 - +Bacteroides fragilis, +Strep species. surveillance BCx 4/24, 4/25, 4/26 - all NGTD    - CTX 1g (4/29 - ) and Flagyl 500mg PO changed to IV (4/29 - )  - Patient to follow with Dr. Andrade in 2wks on discharge  - no pic needed for continued IV medications at [facility]  - tylenol suspension standing for pain management.    #Aspiration pneumonia.   Patient has been having worsening cough and unable to expectorate. CXR RLL infiltrate. Secretions audible in upper airways.   - c/w treatment of PNA and sacral ulcer infection as above  - c/w supplemental oxygen 2L  - Duonebs q6h, hypertonic saline nebs q12h for airway clearance  - Maintain 30 degree elevation of head during tube feeds  - aspiration precautions      #Sacral pressure ulcer.   Present on admission. Patient has visiting RN who noted the sacral ulcer is worsening and had black appearance in some areas. Patient is entirely bedbound since stroke  - c/w empiric abx as above  - wound care and surgery recs - BID dressing changes wet to dry    #Acute tubular necrosis. - improving  IMPROVING UA: spec grav 1.027, trace ketones, granular casts; s/p 3.7 L total. Likely in the setting of poor free water intake via peg  - Continue to trend Cr  - Avoid nephrotoxic agents  - Adjust medication dosages for level of renal function.    #History of CVA (cerebrovascular accident).   Pt with a hx of stroke 2004 with residual left-sided weakness. S/p PEG placement  MRI brain on 4/28 with R internal capsule infarct   Head CT 5/5: subacute infarct centered in the genu and posterior limb of the right internal capsule is stable.  A chronic transcortical infarction in the left precentral gyrus and a small chronic infarct in the right cerebellar hemisphere are stable.  Home meds: ASA 81mg, Atorvastatin 40mg, keppra 750mg (seizure ppx), per wife pt never had seizures.  - C/W home meds  - Tube feeds resumed, parameters per nutrition recs.    #DM (diabetes mellitus).   Home meds: metformin 500 mg 3 times a day, januvia 50 mg QD; wife does not give medications as prescribed as she was told by PCP that he does not need medication anymore given age and last A1c ~7.   - Lantus 9U qhs  - ctm FS q6 abdi since NPO  - Moderate ISS q6.    #BPH (benign prostatic hyperplasia).   Patient takes doxazosin 1 mg QHS and finasteride 5 mg QD; did require hudson in the past.   - held home Doxazosin in setting of low pressures/sepsis  - Resume home Finasteride 5mg qd    Patient was discharged to: [facility]    New medications:   Changes to old medications:  Medications that were stopped:    Items to follow up as outpatient:    Physical exam at the time of discharge:  Constitutional - NAD, Comfortable-appearing  HEENT - NCAT, EOMI, NC in place  Chest - Breathing comfortably on 3L NC, CTAB  Cardio - Warm and well perfused, RRR  Abdomen - Soft, NTND, +BS. PEG site without surrounding erythema, induration, or drainage.  Extremities - No peripheral edema, No calf tenderness   Neurologic - Awake and alert. Makes eye contact to voice. Not following commands.  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 Patient was started on IV antibiotics and underwent bedside I&D of sacral ulcer with general surgery on 4/27. Pending further GOC discussions given patient’s poor overall prognosis with limited home support.  Hospital course (by problem):     #Severe sepsis. ccb Lactic acidosis - IMPROVED  Likely 2/2 aspiration PNA vs. infected sacral decubitus ulcer; has similar hospitalization in early April 2025. Sacral ulcer with areas of eschar. PEG site without infectious signs  D/gokul empiric Vancomycin on 4/23 despite previous +MRSA wound/PEG site culture as currently without signs of acute SSTI. However, assessment of sacral decubitus ulcer by wound care on 4/24 revealed foul odor and mild expressed purulence.   CT chest/abd/pelvis 4/25: Suspected recurrent aspiration and right lower lobe pneumonia. 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  S/p bedside debridement with gen surg on 4/27. previous on empiric Zosyn 3.375g q8h (4/23 - ). admission BCx 4/22 - +Bacteroides fragilis, +Strep species. surveillance BCx 4/24, 4/25, 4/26 - all NGTD    - CTX 1g (4/29 -5/10 ) and Flagyl 500mg PO changed to IV (4/29 - )  - Patient to follow with Dr. Andrade in 2wks on discharge  - tylenol suspension standing for pain management.    #Aspiration pneumonia.   Patient has been having worsening cough and unable to expectorate. CXR RLL infiltrate. Secretions audible in upper airways.   - c/w treatment of PNA and sacral ulcer infection as above  - c/w supplemental oxygen 2L  - Duonebs q6h, hypertonic saline nebs q12h for airway clearance  - Maintain 30 degree elevation of head during tube feeds  - aspiration precautions      #Sacral pressure ulcer.   Present on admission. Patient has visiting RN who noted the sacral ulcer is worsening and had black appearance in some areas. Patient is entirely bedbound since stroke  - c/w empiric abx as above  - wound care and surgery recs - BID dressing changes wet to dry  - wound vac: VAC needs to be chnaged every 3 days -- was placed on Tuesday 4/29 can have vac changed on Thursday 5/1    #Acute tubular necrosis. - improving  IMPROVING UA: spec grav 1.027, trace ketones, granular casts; s/p 3.7 L total. Likely in the setting of poor free water intake via peg  - Continue to trend Cr  - Avoid nephrotoxic agents  - Adjust medication dosages for level of renal function.    #History of CVA (cerebrovascular accident).   Pt with a hx of stroke 2004 with residual left-sided weakness. S/p PEG placement  MRI brain on 4/28 with R internal capsule infarct   Head CT 5/5: subacute infarct centered in the genu and posterior limb of the right internal capsule is stable.  A chronic transcortical infarction in the left precentral gyrus and a small chronic infarct in the right cerebellar hemisphere are stable.  Home meds: ASA 81mg, Atorvastatin 40mg, keppra 750mg (seizure ppx), per wife pt never had seizures.  - C/W home meds  - Tube feeds resumed, parameters per nutrition recs.    #DM (diabetes mellitus).   Home meds: metformin 500 mg 3 times a day, januvia 50 mg QD; wife does not give medications as prescribed as she was told by PCP that he does not need medication anymore given age and last A1c ~7.   - Lantus 9U qhs  - ctm FS q6 abdi since NPO  - Moderate ISS q6.    #BPH (benign prostatic hyperplasia).   Patient takes doxazosin 1 mg QHS and finasteride 5 mg QD; did require hudson in the past.   - held home Doxazosin in setting of low pressures/sepsis  - Resume home Finasteride 5mg qd    Patient was discharged to: [facility]    New medications:   Changes to old medications:  Medications that were stopped:    Items to follow up as outpatient:    Physical exam at the time of discharge:  Constitutional - NAD, Comfortable-appearing  HEENT - NCAT, EOMI, NC in place  Chest - Breathing comfortably on 3L NC, CTAB  Cardio - Warm and well perfused, RRR  Abdomen - Soft, NTND, +BS. PEG site without surrounding erythema, induration, or drainage.  Extremities - No peripheral edema, No calf tenderness   Neurologic - Awake and alert. Makes eye contact to voice. Not following commands.  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 Patient was started on IV antibiotics and underwent bedside I&D of sacral ulcer with general surgery on 4/27. Pending further GOC discussions given patient’s poor overall prognosis with limited home support.  Hospital course (by problem):     #Severe sepsis. ccb Lactic acidosis - IMPROVED  Likely 2/2 aspiration PNA vs. infected sacral decubitus ulcer; has similar hospitalization in early April 2025. Sacral ulcer with areas of eschar. PEG site without infectious signs  D/gokul empiric Vancomycin on 4/23 despite previous +MRSA wound/PEG site culture as currently without signs of acute SSTI. However, assessment of sacral decubitus ulcer by wound care on 4/24 revealed foul odor and mild expressed purulence.   CT chest/abd/pelvis 4/25: Suspected recurrent aspiration and right lower lobe pneumonia. 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  S/p bedside debridement with gen surg on 4/27. previous on empiric Zosyn 3.375g q8h (4/23 - ). admission BCx 4/22 - +Bacteroides fragilis, +Strep species. surveillance BCx 4/24, 4/25, 4/26 - all NGTD    - CTX 1g (4/29 -5/10 ) and Flagyl 500mg PO (4/29-5/10  - Patient to follow with Dr. Andrade in 2wks on discharge  - tylenol suspension standing for pain management.    #Aspiration pneumonia.   Patient has been having worsening cough and unable to expectorate. CXR RLL infiltrate. Secretions audible in upper airways.   - c/w treatment of PNA and sacral ulcer infection as above  - c/w supplemental oxygen 2L  - Duonebs q6h, hypertonic saline nebs q12h for airway clearance  - Maintain 30 degree elevation of head during tube feeds  - aspiration precautions      #Sacral pressure ulcer.   Present on admission. Patient has visiting RN who noted the sacral ulcer is worsening and had black appearance in some areas. Patient is entirely bedbound since stroke  - c/w empiric abx as above  - wound care and surgery recs - BID dressing changes wet to dry  - wound vac: VAC needs to be chnaged every 3 days -- was placed on Tuesday 4/29 can have vac changed on Thursday 5/1    #Acute tubular necrosis. - improving  IMPROVING UA: spec grav 1.027, trace ketones, granular casts; s/p 3.7 L total. Likely in the setting of poor free water intake via peg  - Continue to trend Cr  - Avoid nephrotoxic agents  - Adjust medication dosages for level of renal function.    #History of CVA (cerebrovascular accident).   Pt with a hx of stroke 2004 with residual left-sided weakness. S/p PEG placement  MRI brain on 4/28 with R internal capsule infarct   Head CT 5/5: subacute infarct centered in the genu and posterior limb of the right internal capsule is stable.  A chronic transcortical infarction in the left precentral gyrus and a small chronic infarct in the right cerebellar hemisphere are stable.  Home meds: ASA 81mg, Atorvastatin 40mg, keppra 750mg (seizure ppx), per wife pt never had seizures.  - C/W home meds  - Tube feeds resumed, parameters per nutrition recs.    #DM (diabetes mellitus).   Home meds: metformin 500 mg 3 times a day, januvia 50 mg QD; wife does not give medications as prescribed as she was told by PCP that he does not need medication anymore given age and last A1c ~7.   - Lantus 9U qhs  - ctm FS q6 abdi since NPO  - Moderate ISS q6.    #BPH (benign prostatic hyperplasia).   Patient takes doxazosin 1 mg QHS and finasteride 5 mg QD; did require hudson in the past.   - held home Doxazosin in setting of low pressures/sepsis  - Resume home Finasteride 5mg qd    Patient was discharged to: [facility]    New medications: CTX, Flagyl    Items to follow up as outpatient:  sepsis    Physical exam at the time of discharge:  Constitutional - NAD, Comfortable-appearing  HEENT - NCAT, EOMI, NC in place  Chest - Breathing comfortably on 3L NC, CTAB  Cardio - Warm and well perfused, RRR  Abdomen - Soft, NTND, +BS. PEG site without surrounding erythema, induration, or drainage.  Extremities - No peripheral edema, No calf tenderness   Neurologic - Awake and alert. Makes eye contact to voice. Not following commands.  Psychiatric - Mood stable, Affect WNL

## 2025-04-24 NOTE — PROGRESS NOTE ADULT - PROBLEM SELECTOR PLAN 9
Echo 4/2024; Normal left and right ventricular size and systolic function; EF 56%. Not on any GDMT.  - ctm for signs of overload Echo 4/2024; Normal left and right ventricular size and systolic function; EF 56%. Not on any GDMT.    - ctm for signs of overload

## 2025-04-24 NOTE — DISCHARGE NOTE PROVIDER - NSDCFUADDAPPT_GEN_ALL_CORE_FT
For Mr. Bruce's toenail care, please follow up with either a podiatrist of your choosing or follow up with our podiatry clinic located at [facility]: [address], [address] [facility]. Phone number [phone].  For Mr. Bruce's toenail care, please follow up with either a podiatrist of your choosing or follow up with our podiatry clinic located at [facility]: [address], [address] [facility]. Phone number [phone].     For management of Mr. Bruce's wound vac, please call the surgery clinic at [phone]

## 2025-04-25 LAB
ANION GAP SERPL CALC-SCNC: 12 MMOL/L — SIGNIFICANT CHANGE UP (ref 5–17)
BASOPHILS # BLD AUTO: 0.14 K/UL — SIGNIFICANT CHANGE UP (ref 0–0.2)
BASOPHILS NFR BLD AUTO: 0.9 % — SIGNIFICANT CHANGE UP (ref 0–2)
BUN SERPL-MCNC: 17 MG/DL — SIGNIFICANT CHANGE UP (ref 7–23)
CALCIUM SERPL-MCNC: 8.3 MG/DL — LOW (ref 8.4–10.5)
CHLORIDE SERPL-SCNC: 106 MMOL/L — SIGNIFICANT CHANGE UP (ref 96–108)
CO2 SERPL-SCNC: 22 MMOL/L — SIGNIFICANT CHANGE UP (ref 22–31)
CREAT SERPL-MCNC: 0.34 MG/DL — LOW (ref 0.5–1.3)
CULTURE RESULTS: ABNORMAL
EGFR: 111 ML/MIN/1.73M2 — SIGNIFICANT CHANGE UP
EGFR: 111 ML/MIN/1.73M2 — SIGNIFICANT CHANGE UP
EOSINOPHIL # BLD AUTO: 0.82 K/UL — HIGH (ref 0–0.5)
EOSINOPHIL NFR BLD AUTO: 5.3 % — SIGNIFICANT CHANGE UP (ref 0–6)
GLUCOSE SERPL-MCNC: 150 MG/DL — HIGH (ref 70–99)
GRAM STN FLD: ABNORMAL
HCT VFR BLD CALC: 22.8 % — LOW (ref 39–50)
HGB BLD-MCNC: 7.3 G/DL — LOW (ref 13–17)
LYMPHOCYTES # BLD AUTO: 1.24 K/UL — SIGNIFICANT CHANGE UP (ref 1–3.3)
LYMPHOCYTES # BLD AUTO: 8 % — LOW (ref 13–44)
MAGNESIUM SERPL-MCNC: 1.8 MG/DL — SIGNIFICANT CHANGE UP (ref 1.6–2.6)
MCHC RBC-ENTMCNC: 23.6 PG — LOW (ref 27–34)
MCHC RBC-ENTMCNC: 32 G/DL — SIGNIFICANT CHANGE UP (ref 32–36)
MCV RBC AUTO: 73.8 FL — LOW (ref 80–100)
MONOCYTES # BLD AUTO: 0.14 K/UL — SIGNIFICANT CHANGE UP (ref 0–0.9)
MONOCYTES NFR BLD AUTO: 0.9 % — LOW (ref 2–14)
NEUTROPHILS # BLD AUTO: 13.11 K/UL — HIGH (ref 1.8–7.4)
NEUTROPHILS NFR BLD AUTO: 84.9 % — HIGH (ref 43–77)
ORGANISM # SPEC MICROSCOPIC CNT: ABNORMAL
ORGANISM # SPEC MICROSCOPIC CNT: SIGNIFICANT CHANGE UP
PHOSPHATE SERPL-MCNC: 3.4 MG/DL — SIGNIFICANT CHANGE UP (ref 2.5–4.5)
PLATELET # BLD AUTO: 200 K/UL — SIGNIFICANT CHANGE UP (ref 150–400)
POTASSIUM SERPL-MCNC: 3.2 MMOL/L — LOW (ref 3.5–5.3)
POTASSIUM SERPL-SCNC: 3.2 MMOL/L — LOW (ref 3.5–5.3)
RBC # BLD: 3.09 M/UL — LOW (ref 4.2–5.8)
RBC # FLD: 19.9 % — HIGH (ref 10.3–14.5)
SODIUM SERPL-SCNC: 140 MMOL/L — SIGNIFICANT CHANGE UP (ref 135–145)
SPECIMEN SOURCE: SIGNIFICANT CHANGE UP
WBC # BLD: 15.44 K/UL — HIGH (ref 3.8–10.5)
WBC # FLD AUTO: 15.44 K/UL — HIGH (ref 3.8–10.5)

## 2025-04-25 PROCEDURE — 74177 CT ABD & PELVIS W/CONTRAST: CPT | Mod: 26

## 2025-04-25 PROCEDURE — 99233 SBSQ HOSP IP/OBS HIGH 50: CPT

## 2025-04-25 PROCEDURE — 71260 CT THORAX DX C+: CPT | Mod: 26

## 2025-04-25 PROCEDURE — G0545: CPT

## 2025-04-25 PROCEDURE — 99232 SBSQ HOSP IP/OBS MODERATE 35: CPT | Mod: GC

## 2025-04-25 RX ORDER — MAGNESIUM SULFATE 500 MG/ML
2 SYRINGE (ML) INJECTION ONCE
Refills: 0 | Status: COMPLETED | OUTPATIENT
Start: 2025-04-25 | End: 2025-04-25

## 2025-04-25 RX ORDER — ACETAMINOPHEN 500 MG/5ML
1000 LIQUID (ML) ORAL ONCE
Refills: 0 | Status: COMPLETED | OUTPATIENT
Start: 2025-04-25 | End: 2025-04-25

## 2025-04-25 RX ADMIN — Medication 25 GRAM(S): at 10:51

## 2025-04-25 RX ADMIN — INSULIN GLARGINE-YFGN 9 UNIT(S): 100 INJECTION, SOLUTION SUBCUTANEOUS at 22:21

## 2025-04-25 RX ADMIN — CYANOCOBALAMIN 1000 MICROGRAM(S): 1000 INJECTION INTRAMUSCULAR; SUBCUTANEOUS at 05:25

## 2025-04-25 RX ADMIN — Medication 4 MILLILITER(S): at 14:05

## 2025-04-25 RX ADMIN — Medication 25 GRAM(S): at 06:07

## 2025-04-25 RX ADMIN — FINASTERIDE 5 MILLIGRAM(S): 1 TABLET, FILM COATED ORAL at 21:48

## 2025-04-25 RX ADMIN — IPRATROPIUM BROMIDE AND ALBUTEROL SULFATE 3 MILLILITER(S): .5; 2.5 SOLUTION RESPIRATORY (INHALATION) at 10:53

## 2025-04-25 RX ADMIN — INSULIN LISPRO 2: 100 INJECTION, SOLUTION INTRAVENOUS; SUBCUTANEOUS at 13:10

## 2025-04-25 RX ADMIN — Medication 40 MILLIEQUIVALENT(S): at 10:52

## 2025-04-25 RX ADMIN — IPRATROPIUM BROMIDE AND ALBUTEROL SULFATE 3 MILLILITER(S): .5; 2.5 SOLUTION RESPIRATORY (INHALATION) at 05:25

## 2025-04-25 RX ADMIN — TIMOLOL MALEATE 1 DROP(S): 6.8 SOLUTION OPHTHALMIC at 18:12

## 2025-04-25 RX ADMIN — Medication 4 MILLILITER(S): at 00:12

## 2025-04-25 RX ADMIN — Medication 40 MILLIEQUIVALENT(S): at 14:05

## 2025-04-25 RX ADMIN — ATORVASTATIN CALCIUM 40 MILLIGRAM(S): 80 TABLET, FILM COATED ORAL at 21:48

## 2025-04-25 RX ADMIN — Medication 81 MILLIGRAM(S): at 05:25

## 2025-04-25 RX ADMIN — LEVETIRACETAM 750 MILLIGRAM(S): 10 INJECTION, SOLUTION INTRAVENOUS at 05:26

## 2025-04-25 RX ADMIN — LEVETIRACETAM 750 MILLIGRAM(S): 10 INJECTION, SOLUTION INTRAVENOUS at 17:48

## 2025-04-25 RX ADMIN — Medication 25 GRAM(S): at 14:05

## 2025-04-25 RX ADMIN — Medication 400 MILLIGRAM(S): at 16:17

## 2025-04-25 RX ADMIN — INSULIN LISPRO 2: 100 INJECTION, SOLUTION INTRAVENOUS; SUBCUTANEOUS at 06:06

## 2025-04-25 RX ADMIN — INSULIN LISPRO 2: 100 INJECTION, SOLUTION INTRAVENOUS; SUBCUTANEOUS at 18:06

## 2025-04-25 RX ADMIN — Medication 1 TABLET(S): at 10:53

## 2025-04-25 RX ADMIN — Medication 25 GRAM(S): at 21:49

## 2025-04-25 RX ADMIN — TIMOLOL MALEATE 1 DROP(S): 6.8 SOLUTION OPHTHALMIC at 06:07

## 2025-04-25 RX ADMIN — Medication 1000 MILLIGRAM(S): at 17:34

## 2025-04-25 RX ADMIN — IPRATROPIUM BROMIDE AND ALBUTEROL SULFATE 3 MILLILITER(S): .5; 2.5 SOLUTION RESPIRATORY (INHALATION) at 23:03

## 2025-04-25 RX ADMIN — IPRATROPIUM BROMIDE AND ALBUTEROL SULFATE 3 MILLILITER(S): .5; 2.5 SOLUTION RESPIRATORY (INHALATION) at 17:47

## 2025-04-25 RX ADMIN — INSULIN LISPRO 4: 100 INJECTION, SOLUTION INTRAVENOUS; SUBCUTANEOUS at 23:02

## 2025-04-25 RX ADMIN — Medication 100 MILLIGRAM(S): at 05:25

## 2025-04-25 NOTE — PROGRESS NOTE ADULT - ATTENDING COMMENTS
#C[place] [id number]    BCx now growing Strep constellatus form 4/4 bottles.  I am worried that patient may have hidden abscess, as strep constellatus is a/w lung/liver abscess.  Cont zosyn 3.375g IV q8h to treat bacteremia due to Strep constellatus and B. fragilis.  Obtain both CT chest and CT a/p to r/o abscess.  Obtain TTE.  Obtain surveillance BCx.  f/u pending cultures.  Overall patient has very poor prognosis - he is bedbound, poor mental status, recurrent hospital admission, at risk of recurrent aspiration PNA and worsening sacral wound, which are not reversible unfortunately.  I think home hospice is probably the best option for him, and I doubt any aggressive therapy will be beneficial to him.  I felt his wife has poor understanding of overall medical condition - case d/w Dr. Dillon, who is in agreement with my impression and has been working on [facility].  Will re-address after the scan.      Team 1 will follow you.  Case d/w primary team.    Sherry Andrade MD, MS  Infectious Disease attending  office phone [phone]  For any questions during evening/weekend/holiday, please page ID on call

## 2025-04-25 NOTE — PROGRESS NOTE ADULT - PROBLEM SELECTOR PLAN 2
"Physical Therapy   Daily Treatment     Patient Name: Francesco Schulte  Age:  73 y.o., Sex:  male  Medical Record #: [id number]  Today's Date: 2/11/2024     Precautions  Precautions: Fall Risk  Comments: Per daughter report, \"underdiagnosed vascular dementia.\" Poor historian.    Subjective    Upon arrival to room pt found scooting to the foot of the bed reaching for his slippers with his feet.          Objective       02/11/24 0831   PT Charge Group   PT Gait Training (Units) 1   PT Therapeutic Activities (Units) 3   Supervising Physical Therapist Viviana Cortez   PT Total Time Spent   PT Individual Total Time Spent (Mins) 60   Pain   Intervention Nurse Notified   Pain 0 - 10 Group   Location Generalized  (\"my heart hurts\")   Location Orientation Mid;Anterior   Comfort Goal Perform Activity;Stay Alert   Therapist Pain Assessment Nurse Notified;Post Activity Pain Same as Prior to Activity   Gait Functional Level of Assist    Gait Level Of Assist Contact Guard Assist   Assistive Device Single Point Cane   Distance (Feet) 20   # of Times Distance was Traveled 2   Deviation Other (Comment);Decreased Base Of Support  (occasional LOBs, impaired RLE motor control causing decreased foot clearance)   Transfer Functional Level of Assist   Bed, Chair, Wheelchair Transfer Minimal Assist   Bed Chair Wheelchair Transfer Description Supervision for safety;Set-up of equipment;Verbal cueing  (attempting to self transfer onto unlocked wc upon arrival)   Toilet Transfers Standby Assist   Toilet Transfer Description Grab bar;Increased time;Supervision for safety  (from ambulatory level)   Bed Mobility    Supine to Sit Supervised   Sit to Supine Supervised   Sit to Stand Standby Assist   Scooting Supervised   Rolling Supervised   Interdisciplinary Plan of Care Collaboration   IDT Collaboration with  Nursing;Therapy Tech   Patient Position at End of Therapy In Bed;Call Light within Reach   Collaboration Comments RN, PEPPER layne, pt " falling asleep, alarming seatbelt       Pt reaching for wc and slippers from seated position at EOB    Therapist retrieved pt's SPC and amb with him to the BR, requires CGA/Amina due to general instability/staggering            02/11/24 0901 02/11/24 0905   Vitals   Pulse 78  --    Patient BP Position Sitting Sitting   Blood Pressure  118/66 120/72     Assessment    Attempted standing balance activity in // to work on dynamic balance, pt reporting he is tred, intermittently closing eyes and has delayed verbal and motor reactions, difficulty keeping eyes open, assisted pt BTB and notified RN of lethargy  Strengths: Willingly participates in therapeutic activities, Motivated for self care and independence  Barriers: Dementia, Impaired balance, Impaired activity tolerance, Fatigue (right foot drag with fatigue)    Plan      Standing balance, general strengthening/conditioning, gait training with SPC, stair/curb negotiation     DME  PT DME Recommendations  Additional Equipment:  (LHS, reacher)    Passport items to be completed:  Get in/out of bed safely, in/out of a vehicle, safely use mobility device, walk or wheel around home/community, navigate up and down stairs, show how to get up/down from the ground, ensure home is accessible, demonstrate HEP, complete caregiver training    Physical Therapy Problems (Active)       Problem: Mobility       Dates: Start:  02/06/24         Goal: STG-Within one week, patient will ambulate community distances 200ft x 2 with left SPC SPV       Dates: Start:  02/06/24            Goal: STG-Within one week, patient will ascend and descend four to six stairs no rails with step over patterning SBA       Dates: Start:  02/06/24               Problem: Mobility Transfers       Dates: Start:  02/06/24         Goal: STG-Within one week, patient will transfer bed to chair SPV SPT       Dates: Start:  02/06/24               Problem: PT-Long Term Goals       Dates: Start:  02/06/24         Goal:  LTG-By discharge, patient will ambulate 400ft left SPC mod I level surfaces, SPV outdoors       Dates: Start:  02/06/24            Goal: LTG-By discharge, patient will transfer one surface to another mod I SPT safely and consistently       Dates: Start:  02/06/24            Goal: LTG-By discharge, patient will perform home exercise program seated/standing for LE strengthening to be done 3x/day in safe, independent home environment       Dates: Start:  02/06/24            Goal: LTG-By discharge, patient will up/down curb step to simulate home entrance with SPC with SPV       Dates: Start:  02/06/24               Patient has been having worsening cough and unable to expectorate. CXR RLL infiltrate. Secretions audible in upper airways.     - aspiration precautions  - c/w treatment of PNA as above  - c/w supplemental oxygen; wean as tolerated to baseline 2 L (on oxygen ever since developing COVID-19 in a NH)  - palliative consult placed as repeat admission for similar PNA  - keeping patient NPO  - c/w D5 LR 50 cc/hr  - Duonebs q6h, hypertonic saline nebs q12h for airway clearance Patient has been having worsening cough and unable to expectorate. CXR RLL infiltrate. Secretions audible in upper airways.     - aspiration precautions  - c/w treatment of PNA as above  - c/w supplemental oxygen; wean as tolerated to baseline 2 L (on oxygen ever since developing COVID-19 in a NH)  - palliative consult placed as repeat admission for similar PNA  - Duonebs q6h, hypertonic saline nebs q12h for airway clearance  - Maintain 30 degree elevation of head during tube feeds

## 2025-04-25 NOTE — PROGRESS NOTE ADULT - PROBLEM SELECTOR PLAN 6
Home meds: metformin 500 mg 3 times a day, januvia 50 mg QD; wife does not give medications as prescribed as she was told by PCP that he does not need medication anymore given age and last A1c ~7.     - ctm FS q6 abdi since NPO  - Moderate ISS q6 Home meds: metformin 500 mg 3 times a day, januvia 50 mg QD; wife does not give medications as prescribed as she was told by PCP that he does not need medication anymore given age and last A1c ~7.     - Lantus 9U qhs  - ctm FS q6 abdi since NPO  - Moderate ISS q6

## 2025-04-25 NOTE — PROGRESS NOTE ADULT - PROBLEM SELECTOR PLAN 1
#Lactic acidosis, IMPROVED  Likely 2/2 aspiration PNA vs. infected sacral decubitus ulcer; has similar hospitalization in early April 2025. Sacral ulcer with areas of eschar. PEG site without infectious signs  D/gokul empiric Vancomycin on 4/23 despite previous +MRSA wound/PEG site culture as currently without signs of acute SSTI. However, assessment of sacral decubitus ulcer by wound care on 4/24 revealed foul odor and mild expressed purulence.     - ID, wound care recommendations  - F/u admission BCx 4/22 - +Bacteroides fragilis, +Strep species  - F/u surveillance BCx 4/24  - c/w empiric Zosyn 4.5mg q8  - tylenol for fever and pain #Lactic acidosis, IMPROVED  Likely 2/2 aspiration PNA vs. infected sacral decubitus ulcer; has similar hospitalization in early April 2025. Sacral ulcer with areas of eschar. PEG site without infectious signs  D/gokul empiric Vancomycin on 4/23 despite previous +MRSA wound/PEG site culture as currently without signs of acute SSTI. However, assessment of sacral decubitus ulcer by wound care on 4/24 revealed foul odor and mild expressed purulence.   CT chest/abd/pelvis 4/25: Suspected recurrent aspiration and right lower lobe pneumonia. 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    - ID, wound care recommendations  - F/u admission BCx 4/22 - +Bacteroides fragilis, +Strep species  - F/u surveillance BCx 4/24, 4/25  - c/w empiric Zosyn 4.5mg q8  - tylenol for fever and pain  - F/u TTE for r/o endocarditis

## 2025-04-25 NOTE — PROVIDER CONTACT NOTE (CRITICAL VALUE NOTIFICATION) - SITUATION
Blood culture from 4/22 positive with growth in the anaerobic bottle showing gram positive cocci in pairs and chains

## 2025-04-25 NOTE — PROGRESS NOTE ADULT - SUBJECTIVE AND OBJECTIVE BOX
O/N Events:    Subjective/ROS: Patient seen and examined at bedside.     Denies Fever/Chills, HA, CP, SOB, n/v, changes in bowel/urinary habits.  12pt ROS otherwise negative.    VITALS  Vital Signs Last 24 Hrs  T(C): 36.6 (25 Apr 2025 05:23), Max: 37.1 (24 Apr 2025 21:10)  T(F): 97.8 (25 Apr 2025 05:23), Max: 98.7 (24 Apr 2025 21:10)  HR: 97 (25 Apr 2025 05:23) (90 - 97)  BP: 106/66 (25 Apr 2025 05:23) (103/66 - 109/65)  BP(mean): 78 (24 Apr 2025 11:30) (78 - 78)  RR: 18 (25 Apr 2025 05:23) (18 - 18)  SpO2: 96% (25 Apr 2025 05:23) (94% - 99%)    Parameters below as of 25 Apr 2025 05:23  Patient On (Oxygen Delivery Method): nasal cannula        CAPILLARY BLOOD GLUCOSE      POCT Blood Glucose.: 174 mg/dL (25 Apr 2025 05:44)  POCT Blood Glucose.: 173 mg/dL (24 Apr 2025 22:52)  POCT Blood Glucose.: 184 mg/dL (24 Apr 2025 17:50)  POCT Blood Glucose.: 240 mg/dL (24 Apr 2025 13:07)      PHYSICAL EXAM  General: NAD  Head: NC/AT; MMM; PERRL; EOMI;  Neck: Supple; no JVD  Respiratory: CTAB; no wheezes/rales/rhonchi  Cardiovascular: Regular rhythm/rate; S1/S2+, no murmurs, rubs gallops   Gastrointestinal: Soft; NTND; bowel sounds normal and present  Extremities: WWP; no edema/cyanosis  Neurological: A&Ox3, CNII-XII grossly intact; no obvious focal deficits    MEDICATIONS  (STANDING):  albuterol/ipratropium for Nebulization 3 milliLiter(s) Nebulizer every 6 hours  aspirin  chewable 81 milliGRAM(s) Oral every 24 hours  atorvastatin 40 milliGRAM(s) Oral at bedtime  cyanocobalamin 1000 MICROGram(s) Oral every 24 hours  dextrose 5%. 1000 milliLiter(s) (100 mL/Hr) IV Continuous <Continuous>  dextrose 5%. 1000 milliLiter(s) (50 mL/Hr) IV Continuous <Continuous>  dextrose 50% Injectable 25 Gram(s) IV Push once  dextrose 50% Injectable 12.5 Gram(s) IV Push once  dextrose 50% Injectable 25 Gram(s) IV Push once  finasteride 5 milliGRAM(s) Oral at bedtime  glucagon  Injectable 1 milliGRAM(s) IntraMuscular once  insulin glargine Injectable (LANTUS) 9 Unit(s) SubCutaneous at bedtime  insulin lispro (ADMELOG) corrective regimen sliding scale   SubCutaneous every 6 hours  levETIRAcetam  Solution 750 milliGRAM(s) Oral every 12 hours  multivitamin 1 Tablet(s) Oral daily  piperacillin/tazobactam IVPB.. 3.375 Gram(s) IV Intermittent every 8 hours  sodium chloride 3%  Inhalation 4 milliLiter(s) Inhalation every 12 hours  thiamine 100 milliGRAM(s) Oral every 24 hours  timolol 0.5% Solution 1 Drop(s) Both EYES every 12 hours    MEDICATIONS  (PRN):  acetaminophen     Tablet .. 650 milliGRAM(s) Oral every 6 hours PRN Temp greater or equal to 38C (100.4F), Mild Pain (1 - 3)  dextrose Oral Gel 15 Gram(s) Oral once PRN Blood Glucose LESS THAN 70 milliGRAM(s)/deciliter      No Known Allergies      LABS                        7.7    16.47 )-----------( 202      ( 24 Apr 2025 07:02 )             24.7     04-24    145  |  110[H]  |  23  ----------------------------<  261[H]  3.1[L]   |  23  |  0.35[L]    Ca    8.5      24 Apr 2025 07:02  Phos  2.2     04-24  Mg     1.9     04-24    TPro  5.9[L]  /  Alb  2.2[L]  /  TBili  0.4  /  DBili  x   /  AST  16  /  ALT  15  /  AlkPhos  98  04-24      Urinalysis Basic - ( 24 Apr 2025 07:02 )    Color: x / Appearance: x / SG: x / pH: x  Gluc: 261 mg/dL / Ketone: x  / Bili: x / Urobili: x   Blood: x / Protein: x / Nitrite: x   Leuk Esterase: x / RBC: x / WBC x   Sq Epi: x / Non Sq Epi: x / Bacteria: x              IMAGING/EKG/ETC   O/N Events: stephanie    Subjective/ROS: Patient seen and examined at bedside. Pt is non-participatory in interview, AOx0    VITALS  Vital Signs Last 24 Hrs  T(C): 36.6 (25 Apr 2025 05:23), Max: 37.1 (24 Apr 2025 21:10)  T(F): 97.8 (25 Apr 2025 05:23), Max: 98.7 (24 Apr 2025 21:10)  HR: 97 (25 Apr 2025 05:23) (90 - 97)  BP: 106/66 (25 Apr 2025 05:23) (103/66 - 109/65)  BP(mean): 78 (24 Apr 2025 11:30) (78 - 78)  RR: 18 (25 Apr 2025 05:23) (18 - 18)  SpO2: 96% (25 Apr 2025 05:23) (94% - 99%)    Parameters below as of 25 Apr 2025 05:23  Patient On (Oxygen Delivery Method): nasal cannula        CAPILLARY BLOOD GLUCOSE      POCT Blood Glucose.: 174 mg/dL (25 Apr 2025 05:44)  POCT Blood Glucose.: 173 mg/dL (24 Apr 2025 22:52)  POCT Blood Glucose.: 184 mg/dL (24 Apr 2025 17:50)  POCT Blood Glucose.: 240 mg/dL (24 Apr 2025 13:07)      PHYSICAL EXAM  General: NAD, lying in bed comfortbly  Head: NC/AT; MMM; PERRL; EOMI;  Neck: Supple; no JVD  Respiratory: CTAB; no wheezes/rales/rhonchi  Cardiovascular: Regular rhythm/rate; S1/S2+, no murmurs, rubs gallops   Gastrointestinal: Soft; NTND; bowel sounds normal and present  MSK: unstagable sacral ulcer  Extremities: WWP; no edema/cyanosis  Neurological: A&Ox0    MEDICATIONS  (STANDING):  albuterol/ipratropium for Nebulization 3 milliLiter(s) Nebulizer every 6 hours  aspirin  chewable 81 milliGRAM(s) Oral every 24 hours  atorvastatin 40 milliGRAM(s) Oral at bedtime  cyanocobalamin 1000 MICROGram(s) Oral every 24 hours  dextrose 5%. 1000 milliLiter(s) (100 mL/Hr) IV Continuous <Continuous>  dextrose 5%. 1000 milliLiter(s) (50 mL/Hr) IV Continuous <Continuous>  dextrose 50% Injectable 25 Gram(s) IV Push once  dextrose 50% Injectable 12.5 Gram(s) IV Push once  dextrose 50% Injectable 25 Gram(s) IV Push once  finasteride 5 milliGRAM(s) Oral at bedtime  glucagon  Injectable 1 milliGRAM(s) IntraMuscular once  insulin glargine Injectable (LANTUS) 9 Unit(s) SubCutaneous at bedtime  insulin lispro (ADMELOG) corrective regimen sliding scale   SubCutaneous every 6 hours  levETIRAcetam  Solution 750 milliGRAM(s) Oral every 12 hours  multivitamin 1 Tablet(s) Oral daily  piperacillin/tazobactam IVPB.. 3.375 Gram(s) IV Intermittent every 8 hours  sodium chloride 3%  Inhalation 4 milliLiter(s) Inhalation every 12 hours  thiamine 100 milliGRAM(s) Oral every 24 hours  timolol 0.5% Solution 1 Drop(s) Both EYES every 12 hours    MEDICATIONS  (PRN):  acetaminophen     Tablet .. 650 milliGRAM(s) Oral every 6 hours PRN Temp greater or equal to 38C (100.4F), Mild Pain (1 - 3)  dextrose Oral Gel 15 Gram(s) Oral once PRN Blood Glucose LESS THAN 70 milliGRAM(s)/deciliter      No Known Allergies      LABS                        7.7    16.47 )-----------( 202      ( 24 Apr 2025 07:02 )             24.7     04-24    145  |  110[H]  |  23  ----------------------------<  261[H]  3.1[L]   |  23  |  0.35[L]    Ca    8.5      24 Apr 2025 07:02  Phos  2.2     04-24  Mg     1.9     04-24    TPro  5.9[L]  /  Alb  2.2[L]  /  TBili  0.4  /  DBili  x   /  AST  16  /  ALT  15  /  AlkPhos  98  04-24      Urinalysis Basic - ( 24 Apr 2025 07:02 )    Color: x / Appearance: x / SG: x / pH: x  Gluc: 261 mg/dL / Ketone: x  / Bili: x / Urobili: x   Blood: x / Protein: x / Nitrite: x   Leuk Esterase: x / RBC: x / WBC x   Sq Epi: x / Non Sq Epi: x / Bacteria: x              IMAGING/EKG/ETC

## 2025-04-25 NOTE — CHART NOTE - NSCHARTNOTEFT_GEN_A_CORE
SUBJECTIVE AND OBJECTIVE:  No acute events overnight.  INTERVAL HPI/OVERNIGHT EVENTS:  None  DNR on chart:   Allergies    No Known Allergies    Intolerances    MEDICATIONS  (STANDING):    MEDICATIONS  (PRN):      ITEMS UNCHECKED ARE NOT PRESENT  PRESENT SYMPTOMS: [x]Unable to obtain due to poor mentation/encephalopathy  Source if other than patient:  []Family   []Team     Pain: [ ] yes [x ] no  QOL impact -   Location -                    Aggravating Factors -  Quality -  Radiation -  Timing -  Severity (0-10 scale) -   Minimal Acceptable Level (0-10 scale) -    PAIN AD Score:  [email or web address] (press ctrl +  left click to view)    Dyspnea:                           [ ]Mild  [ ]Moderate [ ]Severe  Anxiety:                             [ ]Mild [ ]Moderate [ ]Severe  Fatigue:                             [ ]Mild [ ]Moderate [ ]Severe  Nausea:                             [ ]Mild [ ]Moderate [ ]Severe  Loss of Appetite:             [ ]Mild [ ]Moderate [ ]Severe  Constipation:                   [ ]Mild [ ]Moderate [ ]Severe    Other Symptoms:  [ x]All other review of systems negative     Palliative Performance Status Version 2: 20 %    http://npcrc.org/files/news/palliative_performance_scale_ppsv2.pdf    PHYSICAL EXAM:  GENERAL:  [ ]Alert  [ ]Oriented x   [x ]Lethargic  [ ]Cachexia  [ ]Unarousable  [ ]Verbal  [ ]Non-Verbal  Behavioral:   [ ]Anxiety  [ ]Delirium [ ]Agitation [x ]Cooperative  HEENT:  [ ]Normal   [ x]Dry mouth   [ ]ET Tube/Trach  [ ]Oral lesions  PULMONARY:   [ ]Clear []Tachypnea  []Audible excessive secretions   [ x]Rhonchi        [ ]Right [ ]Left [ x]Bilateral  [ ]Crackles        [ ]Right [ ]Left [ ]Bilateral  [ ]Wheezing     [ ]Right [ ]Left [ ]Bilateral  CARDIOVASCULAR:    [ x]Regular [ ]Irregular [ ]Tachy  [ ]Kevin [ ]Murmur [ ]Other  GASTROINTESTINAL:  [ x]Soft  [ ]Distended   [ x]+BS  [ x]Non tender [ ]Tender  [x ]PEG [ ]OGT/ NGT  Last BM:     GENITOURINARY:  [ ]Normal [x ] Incontinent   [ ]Oliguria/Anuria   [ ]Galindo  MUSCULOSKELETAL:   [ ]Normal   [ ]Weakness  [ x]Bed/Wheelchair bound [ ]Edema  NEUROLOGIC:   [ ]No focal deficits  [ ]Cognitive impairment  [ ]Dysphagia [ ]Dysarthria [ ]Paresis [ ]Encephalopathic   SKIN:   [x ]Normal   [ ]Pressure ulcer(s)  [ ]Rash    CRITICAL CARE:  [ ]Shock Present  [ ]Septic [ ]Cardiogenic [ ]Neurologic [ ]Hypovolemic  [ ]Vasopressors [ ]Inotropes   [ ]Respiratory failure present [ ]Mechanical Ventilation [ ]Non-invasive ventilatory support [ ]High-Flow  [ ]Acute  [ ]Chronic [ ]Hypoxic  [ ]Hypercarbic  [ ]Other organ failure    Vital Signs Last 24 Hrs  T(C): 36.6 (25 Apr 2025 05:23), Max: 37.1 (24 Apr 2025 21:10)  T(F): 97.8 (25 Apr 2025 05:23), Max: 98.7 (24 Apr 2025 21:10)  HR: 97 (25 Apr 2025 05:23) (90 - 97)  BP: 106/66 (25 Apr 2025 05:23) (103/66 - 109/65)  BP(mean): 78 (24 Apr 2025 11:30) (78 - 78)  RR: 18 (25 Apr 2025 05:23) (18 - 18)  SpO2: 96% (25 Apr 2025 05:23) (94% - 99%)    Parameters below as of 25 Apr 2025 05:23  Patient On (Oxygen Delivery Method): nasal cannula     I&O's Summary    24 Apr 2025 07:01  -  25 Apr 2025 07:00  --------------------------------------------------------  IN: 0 mL / OUT: 420 mL / NET: -420 mL        LABS:                        7.3    15.44 )-----------( 200      ( 25 Apr 2025 05:30 )             22.8   04-25    140  |  106  |  17  ----------------------------<  150[H]  3.2[L]   |  22  |  0.34[L]    Ca    8.3[L]      25 Apr 2025 05:30  Phos  3.4     04-25  Mg     1.8     04-25    TPro  5.9[L]  /  Alb  2.2[L]  /  TBili  0.4  /  DBili  x   /  AST  16  /  ALT  15  /  AlkPhos  98  04-24    Urinalysis Basic - ( 25 Apr 2025 05:30 )    Color: x / Appearance: x / SG: x / pH: x  Gluc: 150 mg/dL / Ketone: x  / Bili: x / Urobili: x   Blood: x / Protein: x / Nitrite: x   Leuk Esterase: x / RBC: x / WBC x   Sq Epi: x / Non Sq Epi: x / Bacteria: x      RADIOLOGY & ADDITIONAL STUDIES:      PROTEIN CALORIE MALNUTRITION PRESENT: [ ]mild [ ]moderate [ ]severe [ ]underweight [ ]morbid obesity  [ ]PPSV2 < or = to 30% [ ]significant weight loss  [ ]poor nutritional intake [ ]catabolic state [ ]anasarca     Artificial Nutrition [ ]     REFERRALS:  [x]Social Work  [ ]Case management [ ]PT/OT [ ]Chaplaincy  [ ]Hospice  [ ]Patient/Family Support    DISCUSSION OF CASE: Family - to obtain additional history and to provide emotional support; ( ) -         Assessment and Recommendation:   · Assessment	    89y M Cantonese speaking, wheelchair bound, R hand dominant, b/l  hearing impairment ( b/l hearing aids) with PMHx of DM, Thalassemia minor/JUSTYNA, HLD, hx stroke with right sided weakness in 2004 and stroke workup (05/24) depression, glaucoma, BPH, s/p PEG placement on 5/7 presents with severe sepsis 2/2 multifocal PNA     1) Sepsis:  - Likely PNaA.   - Remains on broad spectrum antibiotics.  - Workup ongoing.  - After discussion with wife, currently goals have not changed and there is a desire for ongoing medical interventions, without a thought of de-escalation or comfort.  2) Decubitus Ulcer:  - Stage III decubitus ulcer due to immobility, poor wound healing, as patient is fully functionally dependent on all ADLs.  - Poor prognostic sign, even with PEG feeds.  3) Functional Quadriplegia:  - PPSV2 is 20-30%.  - Full assist with all ADLs.  4) Encounter for palliative care:  - .  Complex medical decision making / symptom management in the setting of bedbound nature, with advanced illness. .    Will continue to follow for ongoing GOC discussion / titration of palliative regimen. Emotional support provided, questions answered.  Active Psychosocial Referrals:  [x]Social Work/Case management []PT/OT []Chaplaincy []Hospice  []Patient/Family Support []Holistic RN []Massage Therapy []Music Therapy []Ethics  Coping: [] well [] with difficulty [] poor coping [] unable to assess  Support system: [] strong [] adequate [] inadequate    For new or uncontrolled symptoms, please call Palliative Care at [phone]). The service is available 24/7 (including nights & weekends) to provide symptom management recommendations over the phone as appropriate        Attestation Statements:    Attestation Statements:  Time-based billing (NON-critical care).     81 minutes spent on total encounter. The necessity of the time spent during the encounter on this date of service was due to:     Emotional Support/Supportive Care and Clarification of Potential Disease Trajectory related to  Assessment of Symptom [place] and Palliative regimen  Education and Monitoring of Opiates including titration and management of high risk medications  Discharge Facilitation with ongoing coordination  Exploration of GOC/Advanced Directives including HCP designation, code status, and hospice eligibility    Time exclusive of ACP discussion  Time inclusive of chart review, medication ordering, discussion with primary team, clinical documentation, and communication with family/caregiver.

## 2025-04-25 NOTE — PROGRESS NOTE ADULT - PROBLEM SELECTOR PLAN 4
IMPROVING UA: spec grav 1.027, trace ketones, granular casts; s/p 3.7 L total. Likely in the setting of poor free water intake via peg    - Continue to trend Cr  - Avoid nephrotoxic agents  - Adjust medication dosages for level of renal function

## 2025-04-25 NOTE — PROGRESS NOTE ADULT - PROBLEM SELECTOR PLAN 7
Patient takes doxazosin 1 mg QHS and finasteride 5 mg QD; did require [place]udson in the past.     - holding home Doxazosin in setting of low pressures/sepsis  - Resume home Finasteride 5mg qd  - ctm UOP   - bladder scans q6

## 2025-04-25 NOTE — PROGRESS NOTE ADULT - SUBJECTIVE AND OBJECTIVE BOX
***INCOMPLETE NOTE*** Patient is a 89y old  Male who presents with a chief complaint of AMS     (23 Apr 2025 14:25)       INTERVAL HPI/OVERNIGHT EVENTS: No acute events overnight.    SUBJECTIVE: Patient seen and examined this morning with Cantonese  Rajesh High #[id number]. History obtained from patient's wife as patient is non-verbal. She reports that he still has an intermittent cough     All other review of systems negative except otherwise noted in HPI above.    MEDICATIONS  (STANDING):  albuterol/ipratropium for Nebulization 3 milliLiter(s) Nebulizer every 6 hours  aspirin  chewable 81 milliGRAM(s) Oral every 24 hours  atorvastatin 40 milliGRAM(s) Oral at bedtime  cyanocobalamin 1000 MICROGram(s) Oral every 24 hours  dextrose 5%. 1000 milliLiter(s) (100 mL/Hr) IV Continuous <Continuous>  dextrose 5%. 1000 milliLiter(s) (50 mL/Hr) IV Continuous <Continuous>  dextrose 50% Injectable 25 Gram(s) IV Push once  dextrose 50% Injectable 12.5 Gram(s) IV Push once  dextrose 50% Injectable 25 Gram(s) IV Push once  finasteride 5 milliGRAM(s) Oral at bedtime  glucagon  Injectable 1 milliGRAM(s) IntraMuscular once  insulin glargine Injectable (LANTUS) 9 Unit(s) SubCutaneous at bedtime  insulin lispro (ADMELOG) corrective regimen sliding scale   SubCutaneous every 6 hours  levETIRAcetam  Solution 750 milliGRAM(s) Oral every 12 hours  multivitamin 1 Tablet(s) Oral daily  piperacillin/tazobactam IVPB.. 3.375 Gram(s) IV Intermittent every 8 hours  potassium chloride   Powder 40 milliEquivalent(s) Oral every 4 hours  sodium chloride 3%  Inhalation 4 milliLiter(s) Inhalation every 12 hours  thiamine 100 milliGRAM(s) Oral every 24 hours  timolol 0.5% Solution 1 Drop(s) Both EYES every 12 hours    MEDICATIONS  (PRN):  acetaminophen     Tablet .. 650 milliGRAM(s) Oral every 6 hours PRN Temp greater or equal to 38C (100.4F), Mild Pain (1 - 3)  dextrose Oral Gel 15 Gram(s) Oral once PRN Blood Glucose LESS THAN 70 milliGRAM(s)/deciliter    Allergies    No Known Allergies    Intolerances      Vital Signs Last 24 Hrs  T(C): 36.6 (25 Apr 2025 05:23), Max: 37.1 (24 Apr 2025 21:10)  T(F): 97.8 (25 Apr 2025 05:23), Max: 98.7 (24 Apr 2025 21:10)  HR: 97 (25 Apr 2025 05:23) (90 - 97)  BP: 106/66 (25 Apr 2025 05:23) (106/66 - 109/65)  BP(mean): --  RR: 18 (25 Apr 2025 05:23) (18 - 18)  SpO2: 96% (25 Apr 2025 05:23) (96% - 99%)    Parameters below as of 25 Apr 2025 05:23  Patient On (Oxygen Delivery Method): nasal cannula      PHYSICAL EXAM:  Constitutional - NAD, Comfortable  HEENT - NCAT, EOMI  Neck - No limited ROM  Chest - Breathing comfortably on room air, CTAB   Cardio - Warm and well perfused, RRR  Abdomen - Soft, NTND, +BS  Extremities - No peripheral edema, No calf tenderness   Neurologic Exam:                    Cognitive -             Orientation: Awake, Alert, AAO to self, place, date, year, situation            Attention:  Days of week, recall 3 objects without cuing            Memory: Recent/ remote memory intact            Thought: process, content appropriate     Speech - Fluent, Comprehensible, No dysarthria, No aphasia      Cranial Nerves - EOMI, No facial asymmetry, Symmetrical palatal elevation, Shoulder shrug intact, Tongue midline     Motor -                      LEFT    UE - ShAB 5/5, EF 5/5, EE 5/5, WE 5/5,  WNL                    RIGHT UE - ShAB 5/5, EF 5/5, EE 5/5, WE 5/5,  WNL                    LEFT    LE - HF 5/5, KE 5/5, DF 5/5, PF 5/5                    RIGHT LE - HF 5/5, KE 5/5, DF 5/5, PF 5/5        Sensory - Intact to LT bilateral     Reflexes - DTR _ / 4 , neg Del Rio's b/l, neg Babinski's b/l     Coordination - FTN / HTS intact     OculoVestibular -  No nystagmus  Psychiatric - Mood stable, Affect WNL  Skin on admission:    LABS:                        7.3    15.44 )-----------( 200      ( 25 Apr 2025 05:30 )             22.8     25 Apr 2025 05:30    140    |  106    |  17     ----------------------------<  150    3.2     |  22     |  0.34     Ca    8.3        25 Apr 2025 05:30  Phos  3.4       25 Apr 2025 05:30  Mg     1.8       25 Apr 2025 05:30        CAPILLARY BLOOD GLUCOSE      POCT Blood Glucose.: 174 mg/dL (25 Apr 2025 05:44)  POCT Blood Glucose.: 173 mg/dL (24 Apr 2025 22:52)  POCT Blood Glucose.: 184 mg/dL (24 Apr 2025 17:50)  POCT Blood Glucose.: 240 mg/dL (24 Apr 2025 13:07)    BLOOD CULTURE  04-22 @ 18:26   Growth in anaerobic bottle: Gram Positive Cocci in Pairs and Chains  --  Blood Culture PCR    RADIOLOGY & ADDITIONAL TESTS: Patient is a 89y old  Male who presents with a chief complaint of AMS     (23 Apr 2025 14:25)       INTERVAL HPI/OVERNIGHT EVENTS: No acute events overnight.    SUBJECTIVE: Patient seen and examined this morning with Cantonese  Rajesh High #[id number]. History obtained from patient's wife as patient is non-verbal. She reports that he still has an intermittent cough but does not appear to be having trouble breathing. He does not appear to be in pain. He had a BM yesterday.     All other review of systems negative except otherwise noted in HPI above.    MEDICATIONS  (STANDING):  albuterol/ipratropium for Nebulization 3 milliLiter(s) Nebulizer every 6 hours  aspirin  chewable 81 milliGRAM(s) Oral every 24 hours  atorvastatin 40 milliGRAM(s) Oral at bedtime  cyanocobalamin 1000 MICROGram(s) Oral every 24 hours  dextrose 5%. 1000 milliLiter(s) (100 mL/Hr) IV Continuous <Continuous>  dextrose 5%. 1000 milliLiter(s) (50 mL/Hr) IV Continuous <Continuous>  dextrose 50% Injectable 25 Gram(s) IV Push once  dextrose 50% Injectable 12.5 Gram(s) IV Push once  dextrose 50% Injectable 25 Gram(s) IV Push once  finasteride 5 milliGRAM(s) Oral at bedtime  glucagon  Injectable 1 milliGRAM(s) IntraMuscular once  insulin glargine Injectable (LANTUS) 9 Unit(s) SubCutaneous at bedtime  insulin lispro (ADMELOG) corrective regimen sliding scale   SubCutaneous every 6 hours  levETIRAcetam  Solution 750 milliGRAM(s) Oral every 12 hours  multivitamin 1 Tablet(s) Oral daily  piperacillin/tazobactam IVPB.. 3.375 Gram(s) IV Intermittent every 8 hours  potassium chloride   Powder 40 milliEquivalent(s) Oral every 4 hours  sodium chloride 3%  Inhalation 4 milliLiter(s) Inhalation every 12 hours  thiamine 100 milliGRAM(s) Oral every 24 hours  timolol 0.5% Solution 1 Drop(s) Both EYES every 12 hours    MEDICATIONS  (PRN):  acetaminophen     Tablet .. 650 milliGRAM(s) Oral every 6 hours PRN Temp greater or equal to 38C (100.4F), Mild Pain (1 - 3)  dextrose Oral Gel 15 Gram(s) Oral once PRN Blood Glucose LESS THAN 70 milliGRAM(s)/deciliter    Allergies    No Known Allergies    Intolerances      Vital Signs Last 24 Hrs  T(C): 36.6 (25 Apr 2025 05:23), Max: 37.1 (24 Apr 2025 21:10)  T(F): 97.8 (25 Apr 2025 05:23), Max: 98.7 (24 Apr 2025 21:10)  HR: 97 (25 Apr 2025 05:23) (90 - 97)  BP: 106/66 (25 Apr 2025 05:23) (106/66 - 109/65)  BP(mean): --  RR: 18 (25 Apr 2025 05:23) (18 - 18)  SpO2: 96% (25 Apr 2025 05:23) (96% - 99%)    Parameters below as of 25 Apr 2025 05:23  Patient On (Oxygen Delivery Method): nasal cannula      PHYSICAL EXAM:  Constitutional - NAD, Comfortable-appearing  HEENT - NCAT, EOMI, NC in place  Chest - Breathing comfortably on 3L NC, CTAB  Cardio - Warm and well perfused, RRR  Abdomen - Soft, NTND, +BS. PEG site with (+)foul odor but without surrounding erythema, induration, or drainage.  Extremities - No peripheral edema, No calf tenderness   Neurologic - Sleeping  Psychiatric - Mood stable, Affect WNL    LABS:                        7.3    15.44 )-----------( 200      ( 25 Apr 2025 05:30 )             22.8     25 Apr 2025 05:30    140    |  106    |  17     ----------------------------<  150    3.2     |  22     |  0.34     Ca    8.3        25 Apr 2025 05:30  Phos  3.4       25 Apr 2025 05:30  Mg     1.8       25 Apr 2025 05:30        CAPILLARY BLOOD GLUCOSE      POCT Blood Glucose.: 174 mg/dL (25 Apr 2025 05:44)  POCT Blood Glucose.: 173 mg/dL (24 Apr 2025 22:52)  POCT Blood Glucose.: 184 mg/dL (24 Apr 2025 17:50)  POCT Blood Glucose.: 240 mg/dL (24 Apr 2025 13:07)    BLOOD CULTURE  04-22 @ 18:26   Growth in anaerobic bottle: Gram Positive Cocci in Pairs and Chains  --  Blood Culture PCR    RADIOLOGY & ADDITIONAL TESTS:

## 2025-04-25 NOTE — PROGRESS NOTE ADULT - ATTENDING COMMENTS
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 GI consulted and PEG tube study showed PEG is functioning and no need to change to a new one. WOCN consult appreciated, given Sliver nitrate and aquagel Ag dressing daily to PEG site. Dietitian consult appreciated, TF changed to Glucerna 1.2 @ 83ml/hr from 5pm to 11am x 18hr, total 6 cans per day. Pt recently admitted to ICU for aspiration PNA and had indwelling hudson removed and discharged from [place] ( 2/25/25) and sent to [facility] and subsequently sent home on home O2 per wife, last admitted [facility] ( 3/26-4/1) for sepsis w/ lactic acidosis and acute hypoxic respiratory failure 2/2 multifocal /likely gram negative PNA( HAP since recent hospitalization and on PEG feeds), stage III sacral ulcer ( presence on admission), and concerns of PEG site infection w/ superficial ulceration and reported purulent discharge. Pt now readmitted to [facility] ( 3/23) for severe sepsis w/ lactic acidosis 2/2 recurrent multifocal HAP likely aspiration/gram negative organism and concerns for infected sacral decubitus (POA)--    seen with wife at bedside, had CT this am   he is resting now, baseline minimally verbal, wife reported his right hand was shaking after he came back from CT ( ? pain )   tolerating tube feeding,   eyes closed.    CT findings discussed with wife "Since March 26, 2025,  ** new posterior presacral   fluid and gas containing abscess with rim of enhancement due to worsening   decubitus ulcer, approximately 7 x 3 cm. New cortical attenuation of   underlying coccyx, suspicious for osteomyelitis.  **suspected recurrent aspiration and right lower lobe pneumonia.      # Severe sepsis/ bacteremia/sacral decubitus ulcer (POA) with abscess 7x3 cm, RLL pneumonia.   # lactic acidosis   # recurrent aspiration HAP likely gram negative organism   # Acute hypoxic resp failure   # bacteremia -poa ( strep and bacteroides)    - dw wife, decubitus ulcer are very difficult to treat given his bedbound status, abscess, wife asked " can you clean/drain this abscess" she strongly felt she need to give him chance, we talked about risk of procedure, concern wound healing and more infection, he is high risk for procedure under GA, she asked whether things could be done under local anesthesia -  to get surgery consult - pt is at high risk for procedure, high aspiration risk,   -continue with antibiotics for now.   - she is happy that he is back on nutrition via Peg feeding, she told me patient speak few words last night when he started on peg feeding.   - wife would like to continue full care  - dw ID Dr. Andrade    - discussed at bedside with wife and family friend 4/24-= introduce home hospice care.   4/25/25 spoke with wife at length-pt is in serious infection, decubitus ulcer with absess, bacteremia,  I offered to call their son -she stated it is too late in [place]  , I encouraged wife to discuss with her son regarding patient care-     DM with hyperglycemia - long acting insulin for now   monitor for hypoglycemia.     dw medical team, lengthy discussion with wife regarding goc and disposition  he is at high risk for recurrent aspiration, wound infection , sepsis and high risk of mortality

## 2025-04-25 NOTE — PROGRESS NOTE ADULT - ASSESSMENT
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  ID consulted for management     Recommendations:   - bcx with Bacteroides species as such can c/w non psn Zosyn at this time with likely source of infection being that of sacral ulcer  - appreciate wound care recs   - obtain MRSA Nares   - f/u bcx   - can consider obtaining CT A/P for further eval     ID team 1 to follow  Patient is a 89YM with PMH of DM, BPH, glaucoma, deafness, HFpEF (LV 56%), aortic root aneurysm (4.6  cm), hx of CVA with right sided weakness in 2004 and another internal capsule CVA with seizure activity on vEEF in 4/2024 with a recent admission 3/25-4/1/25 for lactate acidosis and AHRF 2/2 to HAP and aspiration PNA due to peg feeds presenting now with severe sepsis likely secondary to aspiration PNA and worsening sacral ulcer.  CT AP showing recurrent aspiration. 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    Recommendations:   - Cont zosyn 3.375g IV q8h to treat bacteremia due to Strep constellatus and B. 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 Andrade and primary team

## 2025-04-25 NOTE — PROGRESS NOTE ADULT - PROBLEM SELECTOR PLAN 5
Pt with a hx of stroke 2004 with residual left-sided weakness.   MRI brain on 4/28 with R internal capsule infarct   Head CT 5/5: subacute infarct centered in the genu and posterior limb of the right internal capsule is stable.  A chronic transcortical infarction in the left precentral gyrus and a small chronic infarct in the right cerebellar hemisphere are stable.  Home meds: ASA 81mg, Atorvastatin 40mg, keppra 750mg (seizure ppx), per wife pt never had seizures.    -C/W home meds Pt with a hx of stroke 2004 with residual left-sided weakness. S/p PEG placement  MRI brain on 4/28 with R internal capsule infarct   Head CT 5/5: subacute infarct centered in the genu and posterior limb of the right internal capsule is stable.  A chronic transcortical infarction in the left precentral gyrus and a small chronic infarct in the right cerebellar hemisphere are stable.  Home meds: ASA 81mg, Atorvastatin 40mg, keppra 750mg (seizure ppx), per wife pt never had seizures.    - C/W home meds  - Tube feeds resumed, parameters per nutrition recs

## 2025-04-25 NOTE — PROGRESS NOTE ADULT - PROBLEM SELECTOR PLAN 3
Present on admission. Patient has visiting RN who noted the sacral ulcer is worsening and had black appearance in some areas. Patient is entirely bedbound since stroke    - c/w empiric abx as above  - f/up wound care recs as above  - Per wound care, wound debridement at this point may ultimately cause more harm by increasing area of exposed bone

## 2025-04-25 NOTE — CHART NOTE - NSCHARTNOTEFT_GEN_A_CORE
ADMITTING DIAGNOSIS:   Patient is a 89y old  Male who presents with a chief complaint of AMS   (2025 14:25)    PAST MEDICAL & SURGICAL HISTORY:  Diabetes  HLD (hyperlipidemia)  BPH (benign prostatic hyperplasia)  JUSTYNA (iron deficiency anemia)  Stroke  No significant past surgical history    CURRENT DIET ORDER:   Diet, NPO with Tube Feed:   Tube Feeding Modality: Gastrostomy  Glucerna 1.5 Latanya (GLUCERNA1.5)  Total Volume for 24 Hours (mL): 1080  Total Number of Cans: 5  Continuous  Starting Tube Feed Rate {mL per Hour}: 10  Increase Tube Feed Rate by (mL): 10     Every 6 hours  Until Goal Tube Feed Rate (mL per Hour): 90  Tube Feed Duration (in Hours): 12  Tube Feed Start Time: 22:00  Free Water Flush  Bolus   Total Volume per Flush (mL): 240   Frequency: Every 4 Hours   Total Daily Volume of Flush (mL): 720    Start Time: 12:00  Free Water Flush Instructions:  ONLY WHEN TUBE FEEDS ARE OFF DURING THE DAY. Please administer free water flushes at 12:00, 16:00, and 20:00. Thank you (25 @ 14:28)    PO INTAKE: NPO    GI ISSUES: none reported at this time    PAIN: 0 (no pain) per RN flowsheets    SKIN: unstageable sacrum pressure injury per RN flowsheets    25 @ 07:01  -  25 @ 07:00  --------------------------------------------------------  IN: 0 mL / OUT: 420 mL / NET: -420 mL    EDEMA: no edema per RN flowsheets     LABS:       140  |  106  |  17  ----------------------------<  150[H]  3.2[L]   |  22  |  0.34[L]    Ca    8.3[L]      2025 05:30  Phos  3.4       Mg     1.8         TPro  5.9[L]  /  Alb  2.2[L]  /  TBili  0.4  /  DBili  x   /  AST  16  /  ALT  15  /  AlkPhos  98      CAPILLARY BLOOD GLUCOSE  POCT Blood Glucose.: 178 mg/dL (2025 12:56)  POCT Blood Glucose.: 174 mg/dL (2025 05:44)  POCT Blood Glucose.: 173 mg/dL (2025 22:52)  POCT Blood Glucose.: 184 mg/dL (2025 17:50)    MEDICATIONS:  MEDICATIONS  (STANDING):  albuterol/ipratropium for Nebulization 3 milliLiter(s) Nebulizer every 6 hours  aspirin  chewable 81 milliGRAM(s) Oral every 24 hours  atorvastatin 40 milliGRAM(s) Oral at bedtime  cyanocobalamin 1000 MICROGram(s) Oral every 24 hours  dextrose 5%. 1000 milliLiter(s) (100 mL/Hr) IV Continuous <Continuous>  dextrose 5%. 1000 milliLiter(s) (50 mL/Hr) IV Continuous <Continuous>  dextrose 50% Injectable 25 Gram(s) IV Push once  dextrose 50% Injectable 12.5 Gram(s) IV Push once  dextrose 50% Injectable 25 Gram(s) IV Push once  finasteride 5 milliGRAM(s) Oral at bedtime  glucagon  Injectable 1 milliGRAM(s) IntraMuscular once  insulin glargine Injectable (LANTUS) 9 Unit(s) SubCutaneous at bedtime  insulin lispro (ADMELOG) corrective regimen sliding scale   SubCutaneous every 6 hours  levETIRAcetam  Solution 750 milliGRAM(s) Oral every 12 hours  multivitamin 1 Tablet(s) Oral daily  piperacillin/tazobactam IVPB.. 3.375 Gram(s) IV Intermittent every 8 hours  potassium chloride   Powder 40 milliEquivalent(s) Oral every 4 hours  sodium chloride 3%  Inhalation 4 milliLiter(s) Inhalation every 12 hours  thiamine 100 milliGRAM(s) Oral every 24 hours  timolol 0.5% Solution 1 Drop(s) Both EYES every 12 hours    MEDICATIONS  (PRN):  acetaminophen     Tablet .. 650 milliGRAM(s) Oral every 6 hours PRN Temp greater or equal to 38C (100.4F), Mild Pain (1 - 3)  dextrose Oral Gel 15 Gram(s) Oral once PRN Blood Glucose LESS THAN 70 milliGRAM(s)/deciliter    WEIGHT: ()  Height for BMI (FEET)	5 Feet  Height for BMI (INCHES)	4 Inch(s)  Height for BMI (CENTIMETERS)	162.56 Centimeter(s)  Weight for BMI (lbs)	120 lb  Weight for BMI (kg)	54.4 kg  Body Mass Index	20.5    WEIGHT CHANGE: no updated weight since admission. Appreciate updated weight and weekly weight trends.    NUTRITION FOCUSED PHYSICAL EXAM: Completed [ - see below]; Not Pertinent at this time  Nutriton Focused Physical Exam	yes...  Muscle Mass (Loss of Muscle)	Temples...  Clavicles...  Temples Depletion is	moderate  Clavicles Depletion is	moderate    ESTIMATED ENERGY NEEDS:   Weight used for calculations	current weight  Estimated Energy Needs Weight (lbs)	130 lb  Estimated Energy Needs Weight (kg)	58.9 kg  Estimated Energy Needs From (latanya/kg)	27  Estimated Energy Needs To (latanya/kg)	32  Estimated Energy Needs Calculated From (latanya/kg)	1590  Estimated Energy Needs Calculated To (latanya/kg)	1884    Weight used for calculations	current weight  Estimated Protein Needs Weight (lbs)	130 lb  Estimated Protein Needs Weight (kg)	58.9 kg  Estimated Protein Needs From (g/kg)	1.3  Estimated Protein Needs To (g/kg)	1.6  Estimated Protein Needs Calculated From (g/kg)	76.57  Estimated Protein Needs Calculated To (g/kg)	94.24  **Other Calculations	 pounds. Patient within % IBW (92%) thus actual body weight used for all calculations. Needs adjusted for advanced age and pressure ulcer. Fluid recs per team due to CHF.    SUBJECTIVE:   Per H&P: Patient is a 89YM with PMH of DM, BPH, glaucoma, deafness, HFpEF (LV 56%), aortic root aneurysm (4.6  cm), hx of CVA with right sided weakness in  and another internal capsule CVA with seizure activity on vEEF in 2024 with a recent admission 3/25-25 for lactate acidosis and AHRF 2/2 to HAP and aspiration PNA due to peg feeds presenting now with severe sepsis likely secondary to aspiration PNA and worsening sacral ulcer.     () Patient seen at bedside for nutrition assessment. Patient is A&Ox0 - RD interview conducted with patients wife at bedside with Algisyse  ID#[id number]. Current diet order: NPO. No known food allergies. Patient with sepsis likely secondary to aspiration pneumonia. Wife reports she was providing the Glucerna feeds previously recommended during previous admission x1 month ago. Wife had questions about Redd supplement since a doctor told her he needed additional protein for wound healing, however RD calculated new tube feed recommendations with updated weight that appropriately meet patients protein needs to support wound healing. Provided education about plan to readjust tube feed regimen based on new weight. Bolus feeds not recommended at this time due to high risk for aspiration pneumonia. Dosing weight: 120 pounds, BMI 20.6, patient weighed 106 pounds x1 month ago indicating a 14 pound desired weight gain. Unstageable pressure ulcer to sacrum. No edema documented. Wife denies nausea/vomiting/diarrhea/constipation. Labs: potassium 3.3, BUN 32, blood sugar 148-376 x24hrs, HgbA1c 8.1% on 3/28. Meds: antibiotic, D5% IVF, insulin sliding scale. Observed with moderate muscle wasting to temples and clavicles. Based on ASPEN guidelines, patient does not meet criteria for malnutrition, however is at high risk. See nutrition recommendations below.    () No acute nutrition events since prior nutrition visit. Appreciate conversation with RN. Per RN, pt tolerating feeds well, advanced to 30mL/hr. Rn denied any note of nausea/vomiting/diarrhea/constipation, last BM  per RN flowsheets. RD will continue to monitor and remain available, if needed.    *Note:  - K+ 3.2 mmol/L ()  - Ca 8.3 mg/dL (RD unable to correct Ca as no updated albumin on file)    PREVIOUS NUTRITION DIAGNOSIS:  Increased Nutrient Needs (protein/calories) related to increased physiologic demand for nutrients as evidenced by pressure ulcer    Active [ X ]  Resolved [   ]    IF RESOLVED, NEW PES: N/A    GOAL: Patient to meet at least 75% of nutritional needs consistently via most feasible route    MONITORING AND EVALUATION:   Current diet order is appropriate and is well tolerated, will continue to monitor:  - Food and nutrient intake/POs  - Nutrition related lab value, specifically lytes  - Weight/weight trends  - GI functions    NUTRITION RECOMMENDATIONS:   1. When medically able, recommend to restart cyclic nocturnal EN via PEG of Glucerna 1.5 @ 90mL/hr x 12 hours. Start at 10 mL/hr and advance by 10 mL/hr every 6 hours, as tolerated.  - This provides: 1080mL total volume, 1620 kcal, 90 g protein, 820mL free water. Meetin kcal/kg, 1.65 g/kg protein based on actual weight of 54.4 kg. Maintain aspiration precautions.   - Continue to monitor s/s of intolerance; adjust EN as needed.   - Consider providing free water flushes of 240mL 3x/day when tube feeds are not running in order to prevent dehydration during the day.  2. Encourage pt to meet nutritional needs as able   3. Monitor labs: electrolytes, cbc, fingersticks  - Specifically continue to monitor lytes, Mg, K+ and Phos for refeeding, replete prn  4. Monitor weights   5. Pain and bowel regimen per team   6. Continue thiamin 100 mg/d   7. Monitor adherence to diet education    RISK LEVEL: High [ X ] Moderate [   ] Low [   ]    Ally Carlin RDN also available via TEAMS

## 2025-04-26 LAB
-  CEFTRIAXONE: SIGNIFICANT CHANGE UP
-  PENICILLIN: SIGNIFICANT CHANGE UP
-  VANCOMYCIN: SIGNIFICANT CHANGE UP
ANION GAP SERPL CALC-SCNC: 9 MMOL/L — SIGNIFICANT CHANGE UP (ref 5–17)
BASOPHILS # BLD AUTO: 0 K/UL — SIGNIFICANT CHANGE UP (ref 0–0.2)
BASOPHILS NFR BLD AUTO: 0 % — SIGNIFICANT CHANGE UP (ref 0–2)
BLD GP AB SCN SERPL QL: NEGATIVE — SIGNIFICANT CHANGE UP
BUN SERPL-MCNC: 18 MG/DL — SIGNIFICANT CHANGE UP (ref 7–23)
CALCIUM SERPL-MCNC: 7.9 MG/DL — LOW (ref 8.4–10.5)
CHLORIDE SERPL-SCNC: 108 MMOL/L — SIGNIFICANT CHANGE UP (ref 96–108)
CO2 SERPL-SCNC: 23 MMOL/L — SIGNIFICANT CHANGE UP (ref 22–31)
CREAT SERPL-MCNC: 0.35 MG/DL — LOW (ref 0.5–1.3)
CULTURE RESULTS: ABNORMAL
EGFR: 110 ML/MIN/1.73M2 — SIGNIFICANT CHANGE UP
EGFR: 110 ML/MIN/1.73M2 — SIGNIFICANT CHANGE UP
EOSINOPHIL # BLD AUTO: 0.41 K/UL — SIGNIFICANT CHANGE UP (ref 0–0.5)
EOSINOPHIL # BLD AUTO: 0.46 K/UL — SIGNIFICANT CHANGE UP (ref 0–0.5)
EOSINOPHIL # BLD AUTO: 1.13 K/UL — HIGH (ref 0–0.5)
EOSINOPHIL NFR BLD AUTO: 2.6 % — SIGNIFICANT CHANGE UP (ref 0–6)
EOSINOPHIL NFR BLD AUTO: 2.6 % — SIGNIFICANT CHANGE UP (ref 0–6)
EOSINOPHIL NFR BLD AUTO: 7.1 % — HIGH (ref 0–6)
GLUCOSE SERPL-MCNC: 242 MG/DL — HIGH (ref 70–99)
HCT VFR BLD CALC: 21.2 % — LOW (ref 39–50)
HCT VFR BLD CALC: 21.8 % — LOW (ref 39–50)
HCT VFR BLD CALC: 27.4 % — LOW (ref 39–50)
HGB BLD-MCNC: 6.7 G/DL — CRITICAL LOW (ref 13–17)
HGB BLD-MCNC: 6.8 G/DL — CRITICAL LOW (ref 13–17)
HGB BLD-MCNC: 9 G/DL — LOW (ref 13–17)
LYMPHOCYTES # BLD AUTO: 0.7 K/UL — LOW (ref 1–3.3)
LYMPHOCYTES # BLD AUTO: 0.86 K/UL — LOW (ref 1–3.3)
LYMPHOCYTES # BLD AUTO: 0.91 K/UL — LOW (ref 1–3.3)
LYMPHOCYTES # BLD AUTO: 4.4 % — LOW (ref 13–44)
LYMPHOCYTES # BLD AUTO: 5.2 % — LOW (ref 13–44)
LYMPHOCYTES # BLD AUTO: 5.4 % — LOW (ref 13–44)
MAGNESIUM SERPL-MCNC: 2.3 MG/DL — SIGNIFICANT CHANGE UP (ref 1.6–2.6)
MCHC RBC-ENTMCNC: 22.7 PG — LOW (ref 27–34)
MCHC RBC-ENTMCNC: 22.8 PG — LOW (ref 27–34)
MCHC RBC-ENTMCNC: 25 PG — LOW (ref 27–34)
MCHC RBC-ENTMCNC: 31.2 G/DL — LOW (ref 32–36)
MCHC RBC-ENTMCNC: 31.6 G/DL — LOW (ref 32–36)
MCHC RBC-ENTMCNC: 32.8 G/DL — SIGNIFICANT CHANGE UP (ref 32–36)
MCV RBC AUTO: 71.9 FL — LOW (ref 80–100)
MCV RBC AUTO: 73.2 FL — LOW (ref 80–100)
MCV RBC AUTO: 76.1 FL — LOW (ref 80–100)
METHOD TYPE: SIGNIFICANT CHANGE UP
MONOCYTES # BLD AUTO: 0 K/UL — SIGNIFICANT CHANGE UP (ref 0–0.9)
MONOCYTES # BLD AUTO: 0.14 K/UL — SIGNIFICANT CHANGE UP (ref 0–0.9)
MONOCYTES # BLD AUTO: 0.46 K/UL — SIGNIFICANT CHANGE UP (ref 0–0.9)
MONOCYTES NFR BLD AUTO: 0 % — LOW (ref 2–14)
MONOCYTES NFR BLD AUTO: 0.9 % — LOW (ref 2–14)
MONOCYTES NFR BLD AUTO: 2.6 % — SIGNIFICANT CHANGE UP (ref 2–14)
NEUTROPHILS # BLD AUTO: 13.73 K/UL — HIGH (ref 1.8–7.4)
NEUTROPHILS # BLD AUTO: 14.58 K/UL — HIGH (ref 1.8–7.4)
NEUTROPHILS # BLD AUTO: 15.75 K/UL — HIGH (ref 1.8–7.4)
NEUTROPHILS NFR BLD AUTO: 86.6 % — HIGH (ref 43–77)
NEUTROPHILS NFR BLD AUTO: 88.7 % — HIGH (ref 43–77)
NEUTROPHILS NFR BLD AUTO: 92.1 % — HIGH (ref 43–77)
ORGANISM # SPEC MICROSCOPIC CNT: ABNORMAL
ORGANISM # SPEC MICROSCOPIC CNT: SIGNIFICANT CHANGE UP
PHOSPHATE SERPL-MCNC: 2.4 MG/DL — LOW (ref 2.5–4.5)
PLATELET # BLD AUTO: 208 K/UL — SIGNIFICANT CHANGE UP (ref 150–400)
PLATELET # BLD AUTO: 220 K/UL — SIGNIFICANT CHANGE UP (ref 150–400)
PLATELET # BLD AUTO: 220 K/UL — SIGNIFICANT CHANGE UP (ref 150–400)
POTASSIUM SERPL-MCNC: 4.2 MMOL/L — SIGNIFICANT CHANGE UP (ref 3.5–5.3)
POTASSIUM SERPL-SCNC: 4.2 MMOL/L — SIGNIFICANT CHANGE UP (ref 3.5–5.3)
RBC # BLD: 2.95 M/UL — LOW (ref 4.2–5.8)
RBC # BLD: 2.98 M/UL — LOW (ref 4.2–5.8)
RBC # BLD: 3.6 M/UL — LOW (ref 4.2–5.8)
RBC # FLD: 19.8 % — HIGH (ref 10.3–14.5)
RBC # FLD: 20.1 % — HIGH (ref 10.3–14.5)
RBC # FLD: 22.1 % — HIGH (ref 10.3–14.5)
RH IG SCN BLD-IMP: POSITIVE — SIGNIFICANT CHANGE UP
SODIUM SERPL-SCNC: 140 MMOL/L — SIGNIFICANT CHANGE UP (ref 135–145)
SPECIMEN SOURCE: SIGNIFICANT CHANGE UP
WBC # BLD: 15.83 K/UL — HIGH (ref 3.8–10.5)
WBC # BLD: 15.85 K/UL — HIGH (ref 3.8–10.5)
WBC # BLD: 17.58 K/UL — HIGH (ref 3.8–10.5)
WBC # FLD AUTO: 15.83 K/UL — HIGH (ref 3.8–10.5)
WBC # FLD AUTO: 15.85 K/UL — HIGH (ref 3.8–10.5)
WBC # FLD AUTO: 17.58 K/UL — HIGH (ref 3.8–10.5)

## 2025-04-26 PROCEDURE — 99233 SBSQ HOSP IP/OBS HIGH 50: CPT

## 2025-04-26 RX ADMIN — INSULIN GLARGINE-YFGN 9 UNIT(S): 100 INJECTION, SOLUTION SUBCUTANEOUS at 22:32

## 2025-04-26 RX ADMIN — INSULIN LISPRO 8: 100 INJECTION, SOLUTION INTRAVENOUS; SUBCUTANEOUS at 12:43

## 2025-04-26 RX ADMIN — ATORVASTATIN CALCIUM 40 MILLIGRAM(S): 80 TABLET, FILM COATED ORAL at 22:32

## 2025-04-26 RX ADMIN — IPRATROPIUM BROMIDE AND ALBUTEROL SULFATE 3 MILLILITER(S): .5; 2.5 SOLUTION RESPIRATORY (INHALATION) at 05:11

## 2025-04-26 RX ADMIN — Medication 4 MILLILITER(S): at 00:16

## 2025-04-26 RX ADMIN — FINASTERIDE 5 MILLIGRAM(S): 1 TABLET, FILM COATED ORAL at 22:33

## 2025-04-26 RX ADMIN — Medication 25 GRAM(S): at 06:24

## 2025-04-26 RX ADMIN — TIMOLOL MALEATE 1 DROP(S): 6.8 SOLUTION OPHTHALMIC at 06:24

## 2025-04-26 RX ADMIN — CYANOCOBALAMIN 1000 MICROGRAM(S): 1000 INJECTION INTRAMUSCULAR; SUBCUTANEOUS at 05:11

## 2025-04-26 RX ADMIN — IPRATROPIUM BROMIDE AND ALBUTEROL SULFATE 3 MILLILITER(S): .5; 2.5 SOLUTION RESPIRATORY (INHALATION) at 12:56

## 2025-04-26 RX ADMIN — Medication 1 TABLET(S): at 12:56

## 2025-04-26 RX ADMIN — Medication 4 MILLILITER(S): at 12:56

## 2025-04-26 RX ADMIN — LEVETIRACETAM 750 MILLIGRAM(S): 10 INJECTION, SOLUTION INTRAVENOUS at 05:11

## 2025-04-26 RX ADMIN — INSULIN LISPRO 2: 100 INJECTION, SOLUTION INTRAVENOUS; SUBCUTANEOUS at 05:10

## 2025-04-26 RX ADMIN — Medication 100 MILLIGRAM(S): at 05:10

## 2025-04-26 RX ADMIN — Medication 81 MILLIGRAM(S): at 05:10

## 2025-04-26 RX ADMIN — IPRATROPIUM BROMIDE AND ALBUTEROL SULFATE 3 MILLILITER(S): .5; 2.5 SOLUTION RESPIRATORY (INHALATION) at 17:53

## 2025-04-26 RX ADMIN — LEVETIRACETAM 750 MILLIGRAM(S): 10 INJECTION, SOLUTION INTRAVENOUS at 17:52

## 2025-04-26 RX ADMIN — TIMOLOL MALEATE 1 DROP(S): 6.8 SOLUTION OPHTHALMIC at 17:53

## 2025-04-26 NOTE — PROGRESS NOTE ADULT - PROBLEM SELECTOR PLAN 3
Present on admission. Patient has visiting RN who noted the sacral ulcer is worsening and had black appearance in some areas. Patient is entirely bedbound since stroke    - c/w empiric abx as above  - f/up wound care recs as above  - Per wound care, wound debridement at this point may ultimately cause more harm by increasing area of exposed bone no

## 2025-04-26 NOTE — PROGRESS NOTE ADULT - PROBLEM SELECTOR PLAN 5
Pt with a hx of stroke 2004 with residual left-sided weakness. S/p PEG placement  MRI brain on 4/28 with R internal capsule infarct   Head CT 5/5: subacute infarct centered in the genu and posterior limb of the right internal capsule is stable.  A chronic transcortical infarction in the left precentral gyrus and a small chronic infarct in the right cerebellar hemisphere are stable.  Home meds: ASA 81mg, Atorvastatin 40mg, keppra 750mg (seizure ppx), per wife pt never had seizures.    - C/W home meds  - Tube feeds resumed, parameters per nutrition recs

## 2025-04-26 NOTE — PROGRESS NOTE ADULT - PROBLEM SELECTOR PLAN 6
Home meds: metformin 500 mg 3 times a day, januvia 50 mg QD; wife does not give medications as prescribed as she was told by PCP that he does not need medication anymore given age and last A1c ~7.     - Lantus 9U qhs  - ctm FS q6 abdi since NPO  - Moderate ISS q6

## 2025-04-26 NOTE — PROGRESS NOTE ADULT - SUBJECTIVE AND OBJECTIVE BOX
SUBJECTIVE: Pt seen and examined at bedside this am; discussed with wife via Cantonese . Wife expresses that she is amendable to bedside debridement today.       MEDICATIONS  (STANDING):  albuterol/ipratropium for Nebulization 3 milliLiter(s) Nebulizer every 6 hours  aspirin  chewable 81 milliGRAM(s) Oral every 24 hours  atorvastatin 40 milliGRAM(s) Oral at bedtime  cyanocobalamin 1000 MICROGram(s) Oral every 24 hours  dextrose 5%. 1000 milliLiter(s) (50 mL/Hr) IV Continuous <Continuous>  dextrose 5%. 1000 milliLiter(s) (100 mL/Hr) IV Continuous <Continuous>  dextrose 50% Injectable 25 Gram(s) IV Push once  dextrose 50% Injectable 12.5 Gram(s) IV Push once  dextrose 50% Injectable 25 Gram(s) IV Push once  finasteride 5 milliGRAM(s) Oral at bedtime  glucagon  Injectable 1 milliGRAM(s) IntraMuscular once  insulin glargine Injectable (LANTUS) 9 Unit(s) SubCutaneous at bedtime  insulin lispro (ADMELOG) corrective regimen sliding scale   SubCutaneous every 6 hours  levETIRAcetam  Solution 750 milliGRAM(s) Oral every 12 hours  multivitamin 1 Tablet(s) Oral daily  piperacillin/tazobactam IVPB.. 3.375 Gram(s) IV Intermittent every 8 hours  sodium chloride 3%  Inhalation 4 milliLiter(s) Inhalation every 12 hours  thiamine 100 milliGRAM(s) Oral every 24 hours  timolol 0.5% Solution 1 Drop(s) Both EYES every 12 hours    MEDICATIONS  (PRN):  acetaminophen     Tablet .. 650 milliGRAM(s) Oral every 6 hours PRN Temp greater or equal to 38C (100.4F), Mild Pain (1 - 3)  dextrose Oral Gel 15 Gram(s) Oral once PRN Blood Glucose LESS THAN 70 milliGRAM(s)/deciliter      Vital Signs Last 24 Hrs  T(C): 36.4 (26 Apr 2025 05:41), Max: 36.7 (25 Apr 2025 12:00)  T(F): 97.6 (26 Apr 2025 05:41), Max: 98.1 (25 Apr 2025 12:00)  HR: 83 (26 Apr 2025 05:41) (83 - 91)  BP: 99/63 (26 Apr 2025 05:41) (94/56 - 111/69)  BP(mean): 75 (26 Apr 2025 05:41) (69 - 75)  RR: 18 (26 Apr 2025 05:41) (18 - 18)  SpO2: 99% (26 Apr 2025 05:41) (94% - 99%)    Parameters below as of 26 Apr 2025 05:41  Patient On (Oxygen Delivery Method): nasal cannula        CONSTITUTIONAL:  no apparent distress  EYES: PERRLA and symmetric, EOMI, No conjunctival or scleral injection, non-icteric  ENMT: Oral mucosa with moist membranes.   RESP: No respiratory distress, no use of accessory muscles  CV: RRR, +S1S2  GI: Soft, NT, ND, no rebound, no guarding, PEG in place without surrounding erythema  SACRUM: 6cm unstageable sacral decubitus ulcer with foul smelling drainage and purulence with necrotic wound edge  PSYCH: unable to assess                I&O's Detail    25 Apr 2025 07:01  -  26 Apr 2025 07:00  --------------------------------------------------------  IN:  Total IN: 0 mL    OUT:    Voided (mL): 785 mL  Total OUT: 785 mL    Total NET: -785 mL          LABS:                        6.8    15.85 )-----------( 220      ( 26 Apr 2025 05:30 )             21.8     04-26    140  |  108  |  18  ----------------------------<  242[H]  4.2   |  23  |  0.35[L]    Ca    7.9[L]      26 Apr 2025 05:30  Phos  2.4     04-26  Mg     2.3     04-26        Urinalysis Basic - ( 26 Apr 2025 05:30 )    Color: x / Appearance: x / SG: x / pH: x  Gluc: 242 mg/dL / Ketone: x  / Bili: x / Urobili: x   Blood: x / Protein: x / Nitrite: x   Leuk Esterase: x / RBC: x / WBC x   Sq Epi: x / Non Sq Epi: x / Bacteria: x        RADIOLOGY & ADDITIONAL STUDIES:

## 2025-04-26 NOTE — PROGRESS NOTE ADULT - ATTENDING COMMENTS
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 GI consulted and PEG tube study showed PEG is functioning and no need to change to a new one. WOCN consult appreciated, given Sliver nitrate and aquagel Ag dressing daily to PEG site. Dietitian consult appreciated, TF changed to Glucerna 1.2 @ 83ml/hr from 5pm to 11am x 18hr, total 6 cans per day. Pt recently admitted to ICU for aspiration PNA and had indwelling hudson removed and discharged from [place] ( 2/25/25) and sent to [facility] and subsequently sent home on home O2 per wife, last admitted [facility] ( 3/26-4/1) for sepsis w/ lactic acidosis and acute hypoxic respiratory failure 2/2 multifocal /likely gram negative PNA( HAP since recent hospitalization and on PEG feeds), stage III sacral ulcer ( presence on admission), and concerns of PEG site infection w/ superficial ulceration and reported purulent discharge. Pt now readmitted to [facility] ( 3/23) for severe sepsis w/ lactic acidosis 2/2 recurrent multifocal HAP likely aspiration/gram negative organism and concerns for infected sacral decubitus (POA)--    pt is resting, open eyes at time , appear comfortable, he could not provide history.   seen with wife at bedside-            # Severe sepsis/ bacteremia/sacral decubitus ulcer (POA) with abscess 7x3 cm, RLL pneumonia.   # lactic acidosis   # recurrent aspiration HAP likely gram negative organism   # Acute hypoxic resp failure   # bacteremia -poa ( strep and bacteroides)     surgery evaluated patient yesterday and offered bedside wound debridement, we talked about wound debridement might help with pain, he was hypotensive last night ( explained that could be sign of sepsis-   she is agreeing to have wound debridement ( stated she spoke with her son about that - and they both agree on to pursue treatment for him to give him chance to recover   we talked about team will request for Special bed for him - unclear when the bed will arrive , she agree to have wound debridement before the bed change can happen  - surgery evaluation appreciated for wound debridement with local anesthesia ( per wife request ) -not candidate and high risk for General anesthesia and high risk of aspiration and recurrent aspiration pneumonia.  -continue with antibiotics for now.   - cw peg feeding as tolerated ( wife would like to continue with feeding. .   - wife would like to continue full care  - discussed at bedside with wife and family friend 4/24-= introduce home hospice care.   4/25/25 spoke with wife at length-pt is in serious infection, decubitus ulcer with absess, bacteremia,  I offered to call their son -she stated it is too late in [place]  , I encouraged wife to discuss with her son regarding patient care-   4/26/25- - we talked extensively about complication of aspiration pneumonia, infected sacral ulcer with abscess with possible underlying OM - risk of mortality , talked about comfort approach , hospice care, she is not ready for comfort care, stated she spoke with her son last night who is in agreement with her. we tried to call him from bedside- not able to reach ( Son is in [place] )     DM with hyperglycemia - long acting insulin 9 units and sliding scale.   monitor for hypoglycemia.     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 GI consulted and PEG tube study showed PEG is functioning and no need to change to a new one. WOCN consult appreciated, given Sliver nitrate and aquagel Ag dressing daily to PEG site. Dietitian consult appreciated, TF changed to Glucerna 1.2 @ 83ml/hr from 5pm to 11am x 18hr, total 6 cans per day. Pt recently admitted to ICU for aspiration PNA and had indwelling hudson removed and discharged from [place] ( 2/25/25) and sent to [facility] and subsequently sent home on home O2 per wife, last admitted [facility] ( 3/26-4/1) for sepsis w/ lactic acidosis and acute hypoxic respiratory failure 2/2 multifocal /likely gram negative PNA( HAP since recent hospitalization and on PEG feeds), stage III sacral ulcer ( presence on admission), and concerns of PEG site infection w/ superficial ulceration and reported purulent discharge. Pt now readmitted to [facility] ( 3/23) for severe sepsis w/ lactic acidosis 2/2 recurrent multifocal HAP likely aspiration/gram negative organism and concerns for infected sacral decubitus (POA)--    pt is resting, open eyes at time , appear comfortable, he could not provide history.   seen with wife at bedside-            # Severe sepsis/ bacteremia/sacral decubitus ulcer (POA) with abscess 7x3 cm, RLL pneumonia.   # lactic acidosis   # recurrent aspiration HAP likely gram negative organism   # Acute hypoxic resp failure   # bacteremia -poa ( strep and bacteroides)     surgery evaluated patient yesterday and offered bedside wound debridement, we talked about wound debridement might help with pain, he was hypotensive last night ( explained that could be sign of sepsis-   she is agreeing to have wound debridement ( stated she spoke with her son about that - and they both agree on to pursue treatment for him to give him chance to recover   we talked about team will request for Special bed for him - unclear when the bed will arrive , she agree to have wound debridement before the bed change can happen  - surgery evaluation appreciated for wound debridement with local anesthesia ( per wife request ) -not candidate and high risk for General anesthesia and high risk of aspiration and recurrent aspiration pneumonia.  -continue with antibiotics for now.     # Anemia = to transfuse   no sign of active bleeding   - cw peg feeding as tolerated ( wife would like to continue with feeding. .   - wife would like to continue full care  - discussed at bedside with wife and family friend 4/24-= introduce home hospice care.   4/25/25 spoke with wife at length-pt is in serious infection, decubitus ulcer with absess, bacteremia,  I offered to call their son -she stated it is too late [place]  , I encouraged wife to discuss with her son regarding patient care-   4/26/25- - we talked extensively about complication of aspiration pneumonia, infected sacral ulcer with abscess with possible underlying OM - risk of mortality , talked about comfort approach , hospice care, she is not ready for comfort care, stated she spoke with her son last night who is in agreement with her. we tried to call him from bedside- not able to reach ( Son is in [place] )     DM with hyperglycemia - long acting insulin 9 units and sliding scale.   monitor for hypoglycemia.     dw medical team, lengthy discussion with wife regarding goc and disposition  he is at high risk for recurrent aspiration, wound infection , sepsis and high risk of mortality

## 2025-04-26 NOTE — PROGRESS NOTE ADULT - PROBLEM SELECTOR PLAN 11
Presenting hemoglobin 8.9 reduced to 8.1 after fluids. Was discharged in april with a hemoglobin of 8.6. 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     - maintain active t/s  - transfuse to maintain>7  -C/W cyanocobalamin 1000mg qd, multivitamin, thiamine Presenting hemoglobin 8.9 reduced to 8.1 after fluids. Was discharged in april with a hemoglobin of 8.6. 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   s/p 1u pRBC 4/26     - maintain active t/s  - transfuse to maintain>7  -C/W cyanocobalamin 1000mg qd, multivitamin, thiamine

## 2025-04-26 NOTE — PROGRESS NOTE ADULT - PROBLEM SELECTOR PLAN 1
#Lactic acidosis, IMPROVED  Likely 2/2 aspiration PNA vs. infected sacral decubitus ulcer; has similar hospitalization in early April 2025. Sacral ulcer with areas of eschar. PEG site without infectious signs  D/gokul empiric Vancomycin on 4/23 despite previous +MRSA wound/PEG site culture as currently without signs of acute SSTI. However, assessment of sacral decubitus ulcer by wound care on 4/24 revealed foul odor and mild expressed purulence.   CT chest/abd/pelvis 4/25: Suspected recurrent aspiration and right lower lobe pneumonia. 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    - ID, wound care recommendations  - F/u admission BCx 4/22 - +Bacteroides fragilis, +Strep species  - F/u surveillance BCx 4/24, 4/25  - c/w empiric Zosyn 4.5mg q8  - tylenol for fever and pain  - F/u TTE for r/o endocarditis #Lactic acidosis, IMPROVED  Likely 2/2 aspiration PNA vs. infected sacral decubitus ulcer; has similar hospitalization in early April 2025. Sacral ulcer with areas of eschar. PEG site without infectious signs  D/gokul empiric Vancomycin on 4/23 despite previous +MRSA wound/PEG site culture as currently without signs of acute SSTI. However, assessment of sacral decubitus ulcer by wound care on 4/24 revealed foul odor and mild expressed purulence.   CT chest/abd/pelvis 4/25: Suspected recurrent aspiration and right lower lobe pneumonia. 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    - ID, wound care recommendations  - Gen surg to debride sacral wound, consented wife   - F/u admission BCx 4/22 - +Bacteroides fragilis, +Strep species  - F/u surveillance BCx 4/24, 4/25  - c/w empiric Zosyn 4.5mg q8  - tylenol for fever and pain  - F/u TTE for r/o endocarditis

## 2025-04-26 NOTE — PROGRESS NOTE ADULT - ASSESSMENT
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 CTAP with new posterior presacral fluid and gas containing abscess with rim of enhancement due to worsening decubitus ulcer, approximately 7 x 3 cm - new cortical attenuation of underlying coccyx, suspicious for osteomyelitis. General Surgery consulted for evaluation of sacral decubitus ulcer. Pt's wife amenable to bedside debridement today.     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 CTAP with new posterior presacral fluid and gas containing abscess with rim of enhancement due to worsening decubitus ulcer, approximately 7 x 3 cm - new cortical attenuation of underlying coccyx, suspicious for osteomyelitis. General Surgery consulted for evaluation of sacral decubitus ulcer. Pt's wife amenable to bedside debridement today.     - General surgery to perform bedside debridement, pending Hgb >7.5   - Please order 20cc lidocaine   - Please pause tube feeds prior debridement

## 2025-04-26 NOTE — PROGRESS NOTE ADULT - PROBLEM SELECTOR PLAN 2
Patient has been having worsening cough and unable to expectorate. CXR RLL infiltrate. Secretions audible in upper airways.     - aspiration precautions  - c/w treatment of PNA as above  - c/w supplemental oxygen; wean as tolerated to baseline 2 L (on oxygen ever since developing COVID-19 in a NH)  - palliative consult placed as repeat admission for similar PNA  - Duonebs q6h, hypertonic saline nebs q12h for airway clearance  - Maintain 30 degree elevation of head during tube feeds

## 2025-04-26 NOTE — PROGRESS NOTE ADULT - SUBJECTIVE AND OBJECTIVE BOX
**INCOMPLETE NOTE    INTERVAL/OVERNIGHT EVENTS: None    SUBJECTIVE:  Patient seen and examined at bedside, comfortable, NAD. Denied fever, chest pain, dyspnea, abdominal pain.     Vital Signs Last 12 Hrs  T(F): 97.6 (04-26-25 @ 05:41), Max: 97.6 (04-26-25 @ 05:41)  HR: 83 (04-26-25 @ 05:41) (83 - 88)  BP: 99/63 (04-26-25 @ 05:41) (94/56 - 99/63)  BP(mean): 75 (04-26-25 @ 05:41) (69 - 75)  RR: 18 (04-26-25 @ 05:41) (18 - 18)  SpO2: 99% (04-26-25 @ 05:41) (94% - 99%)  I&O's Summary    24 Apr 2025 07:01  -  25 Apr 2025 07:00  --------------------------------------------------------  IN: 0 mL / OUT: 420 mL / NET: -420 mL    25 Apr 2025 07:01  -  26 Apr 2025 06:59  --------------------------------------------------------  IN: 0 mL / OUT: 375 mL / NET: -375 mL        PHYSICAL EXAM:  General: NAD  HEENT: PERRL, EOM intact, sclera anicteric, MMM  Cardiovascular: RRR; no MRG; no JVD  Respiratory: CTAB; no WRR  GI/: soft; NTND; BS x4  Extremities: WWP; 2+ peripheral pulses bilaterally; no LE edema  Skin: normal color & turgor; no rash  Neurologic: aox3; no focal deficits    LABS:                        7.3    15.44 )-----------( 200      ( 25 Apr 2025 05:30 )             22.8     04-25    140  |  106  |  17  ----------------------------<  150[H]  3.2[L]   |  22  |  0.34[L]    Ca    8.3[L]      25 Apr 2025 05:30  Phos  3.4     04-25  Mg     1.8     04-25    TPro  5.9[L]  /  Alb  2.2[L]  /  TBili  0.4  /  DBili  x   /  AST  16  /  ALT  15  /  AlkPhos  98  04-24      Urinalysis Basic - ( 25 Apr 2025 05:30 )    Color: x / Appearance: x / SG: x / pH: x  Gluc: 150 mg/dL / Ketone: x  / Bili: x / Urobili: x   Blood: x / Protein: x / Nitrite: x   Leuk Esterase: x / RBC: x / WBC x   Sq Epi: x / Non Sq Epi: x / Bacteria: x          RADIOLOGY & ADDITIONAL TESTS:    MEDICATIONS  (STANDING):  albuterol/ipratropium for Nebulization 3 milliLiter(s) Nebulizer every 6 hours  aspirin  chewable 81 milliGRAM(s) Oral every 24 hours  atorvastatin 40 milliGRAM(s) Oral at bedtime  cyanocobalamin 1000 MICROGram(s) Oral every 24 hours  dextrose 5%. 1000 milliLiter(s) (50 mL/Hr) IV Continuous <Continuous>  dextrose 5%. 1000 milliLiter(s) (100 mL/Hr) IV Continuous <Continuous>  dextrose 50% Injectable 25 Gram(s) IV Push once  dextrose 50% Injectable 12.5 Gram(s) IV Push once  dextrose 50% Injectable 25 Gram(s) IV Push once  finasteride 5 milliGRAM(s) Oral at bedtime  glucagon  Injectable 1 milliGRAM(s) IntraMuscular once  insulin glargine Injectable (LANTUS) 9 Unit(s) SubCutaneous at bedtime  insulin lispro (ADMELOG) corrective regimen sliding scale   SubCutaneous every 6 hours  levETIRAcetam  Solution 750 milliGRAM(s) Oral every 12 hours  multivitamin 1 Tablet(s) Oral daily  piperacillin/tazobactam IVPB.. 3.375 Gram(s) IV Intermittent every 8 hours  sodium chloride 3%  Inhalation 4 milliLiter(s) Inhalation every 12 hours  thiamine 100 milliGRAM(s) Oral every 24 hours  timolol 0.5% Solution 1 Drop(s) Both EYES every 12 hours    MEDICATIONS  (PRN):  acetaminophen     Tablet .. 650 milliGRAM(s) Oral every 6 hours PRN Temp greater or equal to 38C (100.4F), Mild Pain (1 - 3)  dextrose Oral Gel 15 Gram(s) Oral once PRN Blood Glucose LESS THAN 70 milliGRAM(s)/deciliter   INTERVAL/OVERNIGHT EVENTS: None    SUBJECTIVE:  Patient seen and examined at bedside with Flower  Emmanuelle [id number]. Wife noting increased work of breathing overnight associated w/ cough.     Vital Signs Last 12 Hrs  T(F): 97.6 (04-26-25 @ 05:41), Max: 97.6 (04-26-25 @ 05:41)  HR: 83 (04-26-25 @ 05:41) (83 - 88)  BP: 99/63 (04-26-25 @ 05:41) (94/56 - 99/63)  BP(mean): 75 (04-26-25 @ 05:41) (69 - 75)  RR: 18 (04-26-25 @ 05:41) (18 - 18)  SpO2: 99% (04-26-25 @ 05:41) (94% - 99%)  I&O's Summary    24 Apr 2025 07:01  -  25 Apr 2025 07:00  --------------------------------------------------------  IN: 0 mL / OUT: 420 mL / NET: -420 mL    25 Apr 2025 07:01  -  26 Apr 2025 06:59  --------------------------------------------------------  IN: 0 mL / OUT: 375 mL / NET: -375 mL        PHYSICAL EXAM:  General: NAD  HEENT: PERRL, EOM intact, sclera anicteric, MMM  Cardiovascular: RRR; no MRG; no JVD  Respiratory: CTAB; no WRR  GI/: soft; NTND; BS x4  Extremities: WWP; 2+ peripheral pulses bilaterally; no LE edema  Skin: normal color & turgor; no rash  Neurologic: aox2-3; no focal deficits    LABS:                        7.3    15.44 )-----------( 200      ( 25 Apr 2025 05:30 )             22.8     04-25    140  |  106  |  17  ----------------------------<  150[H]  3.2[L]   |  22  |  0.34[L]    Ca    8.3[L]      25 Apr 2025 05:30  Phos  3.4     04-25  Mg     1.8     04-25    TPro  5.9[L]  /  Alb  2.2[L]  /  TBili  0.4  /  DBili  x   /  AST  16  /  ALT  15  /  AlkPhos  98  04-24      Urinalysis Basic - ( 25 Apr 2025 05:30 )    Color: x / Appearance: x / SG: x / pH: x  Gluc: 150 mg/dL / Ketone: x  / Bili: x / Urobili: x   Blood: x / Protein: x / Nitrite: x   Leuk Esterase: x / RBC: x / WBC x   Sq Epi: x / Non Sq Epi: x / Bacteria: x          RADIOLOGY & ADDITIONAL TESTS:    MEDICATIONS  (STANDING):  albuterol/ipratropium for Nebulization 3 milliLiter(s) Nebulizer every 6 hours  aspirin  chewable 81 milliGRAM(s) Oral every 24 hours  atorvastatin 40 milliGRAM(s) Oral at bedtime  cyanocobalamin 1000 MICROGram(s) Oral every 24 hours  dextrose 5%. 1000 milliLiter(s) (50 mL/Hr) IV Continuous <Continuous>  dextrose 5%. 1000 milliLiter(s) (100 mL/Hr) IV Continuous <Continuous>  dextrose 50% Injectable 25 Gram(s) IV Push once  dextrose 50% Injectable 12.5 Gram(s) IV Push once  dextrose 50% Injectable 25 Gram(s) IV Push once  finasteride 5 milliGRAM(s) Oral at bedtime  glucagon  Injectable 1 milliGRAM(s) IntraMuscular once  insulin glargine Injectable (LANTUS) 9 Unit(s) SubCutaneous at bedtime  insulin lispro (ADMELOG) corrective regimen sliding scale   SubCutaneous every 6 hours  levETIRAcetam  Solution 750 milliGRAM(s) Oral every 12 hours  multivitamin 1 Tablet(s) Oral daily  piperacillin/tazobactam IVPB.. 3.375 Gram(s) IV Intermittent every 8 hours  sodium chloride 3%  Inhalation 4 milliLiter(s) Inhalation every 12 hours  thiamine 100 milliGRAM(s) Oral every 24 hours  timolol 0.5% Solution 1 Drop(s) Both EYES every 12 hours    MEDICATIONS  (PRN):  acetaminophen     Tablet .. 650 milliGRAM(s) Oral every 6 hours PRN Temp greater or equal to 38C (100.4F), Mild Pain (1 - 3)  dextrose Oral Gel 15 Gram(s) Oral once PRN Blood Glucose LESS THAN 70 milliGRAM(s)/deciliter

## 2025-04-27 LAB
ANION GAP SERPL CALC-SCNC: 11 MMOL/L — SIGNIFICANT CHANGE UP (ref 5–17)
BASOPHILS # BLD AUTO: 0.04 K/UL — SIGNIFICANT CHANGE UP (ref 0–0.2)
BASOPHILS NFR BLD AUTO: 0.2 % — SIGNIFICANT CHANGE UP (ref 0–2)
BUN SERPL-MCNC: 12 MG/DL — SIGNIFICANT CHANGE UP (ref 7–23)
CALCIUM SERPL-MCNC: 7.9 MG/DL — LOW (ref 8.4–10.5)
CHLORIDE SERPL-SCNC: 103 MMOL/L — SIGNIFICANT CHANGE UP (ref 96–108)
CO2 SERPL-SCNC: 24 MMOL/L — SIGNIFICANT CHANGE UP (ref 22–31)
CREAT SERPL-MCNC: 0.29 MG/DL — LOW (ref 0.5–1.3)
EGFR: 116 ML/MIN/1.73M2 — SIGNIFICANT CHANGE UP
EGFR: 116 ML/MIN/1.73M2 — SIGNIFICANT CHANGE UP
EOSINOPHIL # BLD AUTO: 0.56 K/UL — HIGH (ref 0–0.5)
EOSINOPHIL NFR BLD AUTO: 3.4 % — SIGNIFICANT CHANGE UP (ref 0–6)
GLUCOSE SERPL-MCNC: 162 MG/DL — HIGH (ref 70–99)
HCT VFR BLD CALC: 27.1 % — LOW (ref 39–50)
HGB BLD-MCNC: 8.9 G/DL — LOW (ref 13–17)
IMM GRANULOCYTES NFR BLD AUTO: 1.5 % — HIGH (ref 0–0.9)
LYMPHOCYTES # BLD AUTO: 1.4 K/UL — SIGNIFICANT CHANGE UP (ref 1–3.3)
LYMPHOCYTES # BLD AUTO: 8.5 % — LOW (ref 13–44)
MAGNESIUM SERPL-MCNC: 1.8 MG/DL — SIGNIFICANT CHANGE UP (ref 1.6–2.6)
MCHC RBC-ENTMCNC: 25.6 PG — LOW (ref 27–34)
MCHC RBC-ENTMCNC: 32.8 G/DL — SIGNIFICANT CHANGE UP (ref 32–36)
MCV RBC AUTO: 77.9 FL — LOW (ref 80–100)
MONOCYTES # BLD AUTO: 0.7 K/UL — SIGNIFICANT CHANGE UP (ref 0–0.9)
MONOCYTES NFR BLD AUTO: 4.3 % — SIGNIFICANT CHANGE UP (ref 2–14)
NEUTROPHILS # BLD AUTO: 13.52 K/UL — HIGH (ref 1.8–7.4)
NEUTROPHILS NFR BLD AUTO: 82.1 % — HIGH (ref 43–77)
NRBC BLD AUTO-RTO: 0 /100 WBCS — SIGNIFICANT CHANGE UP (ref 0–0)
PHOSPHATE SERPL-MCNC: 1.9 MG/DL — LOW (ref 2.5–4.5)
PLATELET # BLD AUTO: 204 K/UL — SIGNIFICANT CHANGE UP (ref 150–400)
POTASSIUM SERPL-MCNC: 3.7 MMOL/L — SIGNIFICANT CHANGE UP (ref 3.5–5.3)
POTASSIUM SERPL-SCNC: 3.7 MMOL/L — SIGNIFICANT CHANGE UP (ref 3.5–5.3)
RBC # BLD: 3.48 M/UL — LOW (ref 4.2–5.8)
RBC # FLD: 21.8 % — HIGH (ref 10.3–14.5)
SODIUM SERPL-SCNC: 138 MMOL/L — SIGNIFICANT CHANGE UP (ref 135–145)
WBC # BLD: 16.47 K/UL — HIGH (ref 3.8–10.5)
WBC # FLD AUTO: 16.47 K/UL — HIGH (ref 3.8–10.5)

## 2025-04-27 PROCEDURE — 99233 SBSQ HOSP IP/OBS HIGH 50: CPT

## 2025-04-27 PROCEDURE — G0545: CPT

## 2025-04-27 PROCEDURE — 99232 SBSQ HOSP IP/OBS MODERATE 35: CPT

## 2025-04-27 RX ADMIN — IPRATROPIUM BROMIDE AND ALBUTEROL SULFATE 3 MILLILITER(S): .5; 2.5 SOLUTION RESPIRATORY (INHALATION) at 06:11

## 2025-04-27 RX ADMIN — Medication 4 MILLILITER(S): at 00:14

## 2025-04-27 RX ADMIN — Medication 25 GRAM(S): at 12:45

## 2025-04-27 RX ADMIN — Medication 100 MILLIGRAM(S): at 06:11

## 2025-04-27 RX ADMIN — LEVETIRACETAM 750 MILLIGRAM(S): 10 INJECTION, SOLUTION INTRAVENOUS at 18:39

## 2025-04-27 RX ADMIN — IPRATROPIUM BROMIDE AND ALBUTEROL SULFATE 3 MILLILITER(S): .5; 2.5 SOLUTION RESPIRATORY (INHALATION) at 18:38

## 2025-04-27 RX ADMIN — Medication 25 GRAM(S): at 20:02

## 2025-04-27 RX ADMIN — Medication 1 TABLET(S): at 12:46

## 2025-04-27 RX ADMIN — INSULIN LISPRO 2: 100 INJECTION, SOLUTION INTRAVENOUS; SUBCUTANEOUS at 18:39

## 2025-04-27 RX ADMIN — INSULIN GLARGINE-YFGN 9 UNIT(S): 100 INJECTION, SOLUTION SUBCUTANEOUS at 22:24

## 2025-04-27 RX ADMIN — ATORVASTATIN CALCIUM 40 MILLIGRAM(S): 80 TABLET, FILM COATED ORAL at 22:25

## 2025-04-27 RX ADMIN — INSULIN LISPRO 2: 100 INJECTION, SOLUTION INTRAVENOUS; SUBCUTANEOUS at 06:36

## 2025-04-27 RX ADMIN — TIMOLOL MALEATE 1 DROP(S): 6.8 SOLUTION OPHTHALMIC at 18:42

## 2025-04-27 RX ADMIN — CYANOCOBALAMIN 1000 MICROGRAM(S): 1000 INJECTION INTRAMUSCULAR; SUBCUTANEOUS at 06:11

## 2025-04-27 RX ADMIN — FINASTERIDE 5 MILLIGRAM(S): 1 TABLET, FILM COATED ORAL at 22:25

## 2025-04-27 RX ADMIN — Medication 81 MILLIGRAM(S): at 06:11

## 2025-04-27 RX ADMIN — IPRATROPIUM BROMIDE AND ALBUTEROL SULFATE 3 MILLILITER(S): .5; 2.5 SOLUTION RESPIRATORY (INHALATION) at 00:14

## 2025-04-27 RX ADMIN — INSULIN LISPRO 6: 100 INJECTION, SOLUTION INTRAVENOUS; SUBCUTANEOUS at 12:54

## 2025-04-27 RX ADMIN — LEVETIRACETAM 750 MILLIGRAM(S): 10 INJECTION, SOLUTION INTRAVENOUS at 06:10

## 2025-04-27 RX ADMIN — INSULIN LISPRO 2: 100 INJECTION, SOLUTION INTRAVENOUS; SUBCUTANEOUS at 00:21

## 2025-04-27 RX ADMIN — TIMOLOL MALEATE 1 DROP(S): 6.8 SOLUTION OPHTHALMIC at 06:11

## 2025-04-27 RX ADMIN — Medication 25 GRAM(S): at 03:07

## 2025-04-27 NOTE — PROGRESS NOTE ADULT - SUBJECTIVE AND OBJECTIVE BOX
LARRY BURNS , [id number],  [facility] [id number]    Time of encounter : 9:45 am  wife at bedside  resting, easy to wake up -open eyes, he would  move right hand, hold hands with wife  given blood transfusion yesterday - hb 9  tolerating feeding at rate 80 cc per hour.  so far hemodynamically stable.   wife agree for wound debridement -- now that hb is above target - to communicate with surgery /would need to hold tube feed prior to debridement- diana RN and wife.         T(C): 36.4 (04-27-25 @ 06:00), Max: 37.2 (04-26-25 @ 21:06)  HR: 81 (04-27-25 @ 06:00) (81 - 100)  BP: 112/67 (04-27-25 @ 06:00) (100/56 - 119/73)  RR: 18 (04-27-25 @ 06:00) (17 - 20)  SpO2: 95% (04-27-25 @ 06:00) (94% - 95%)    acetaminophen     Tablet .. 650 milliGRAM(s) Oral every 6 hours PRN  albuterol/ipratropium for Nebulization 3 milliLiter(s) Nebulizer every 6 hours  aspirin  chewable 81 milliGRAM(s) Oral every 24 hours  atorvastatin 40 milliGRAM(s) Oral at bedtime  cyanocobalamin 1000 MICROGram(s) Oral every 24 hours  dextrose 5%. 1000 milliLiter(s) IV Continuous <Continuous>  dextrose 5%. 1000 milliLiter(s) IV Continuous <Continuous>  dextrose 50% Injectable 25 Gram(s) IV Push once  dextrose 50% Injectable 12.5 Gram(s) IV Push once  dextrose 50% Injectable 25 Gram(s) IV Push once  dextrose Oral Gel 15 Gram(s) Oral once PRN  finasteride 5 milliGRAM(s) Oral at bedtime  glucagon  Injectable 1 milliGRAM(s) IntraMuscular once  insulin glargine Injectable (LANTUS) 9 Unit(s) SubCutaneous at bedtime  insulin lispro (ADMELOG) corrective regimen sliding scale   SubCutaneous every 6 hours  levETIRAcetam  Solution 750 milliGRAM(s) Oral every 12 hours  multivitamin 1 Tablet(s) Oral daily  piperacillin/tazobactam IVPB.. 3.375 Gram(s) IV Intermittent every 8 hours  sodium chloride 3%  Inhalation 4 milliLiter(s) Inhalation every 12 hours  thiamine 100 milliGRAM(s) Oral every 24 hours  timolol 0.5% Solution 1 Drop(s) Both EYES every 12 hours      Physical Exam :    General exam :  baseline non-verbal. open eyes and move right hand.  CVS : RRR no murmur  Lungs : good air entry bilaterally on upper lung zone.   Abdomen : BS present, soft, not tender, not distended.- peg in place-   Extremities: no edema, no tenderness.  Neuro : bed=bound move right hand only.    :  no hudson.      CBC Full  -  ( 27 Apr 2025 05:30 )  WBC Count : 16.47 K/uL  RBC Count : 3.48 M/uL  Hemoglobin : 8.9 g/dL  Hematocrit : 27.1 %  Platelet Count - Automated : 204 K/uL  Mean Cell Volume : 77.9 fl  Mean Cell Hemoglobin : 25.6 pg  Mean Cell Hemoglobin Concentration : 32.8 g/dL  Auto Neutrophil # : x  Auto Lymphocyte # : x  Auto Monocyte # : x  Auto Eosinophil # : x  Auto Basophil # : x  Auto Neutrophil % : x  Auto Lymphocyte % : x  Auto Monocyte % : x  Auto Eosinophil % : x  Auto Basophil % : x        04-27    138  |  103  |  12  ----------------------------<  162[H]  3.7   |  24  |  0.29[L]    Ca    7.9[L]      27 Apr 2025 05:30  Phos  1.9     04-27  Mg     1.8     04-27      Daily     Daily   CAPILLARY BLOOD GLUCOSE      POCT Blood Glucose.: 176 mg/dL (27 Apr 2025 06:35)      Culture - Blood (collected 25 Apr 2025 18:41)  Source: Blood Blood  Preliminary Report (27 Apr 2025 01:02):    No growth at 24 hours      Urinalysis Basic - ( 27 Apr 2025 05:30 )    Color: x / Appearance: x / SG: x / pH: x  Gluc: 162 mg/dL / Ketone: x  / Bili: x / Urobili: x   Blood: x / Protein: x / Nitrite: x   Leuk Esterase: x / RBC: x / WBC x   Sq Epi: x / Non Sq Epi: x / Bacteria: x

## 2025-04-27 NOTE — PROCEDURE NOTE - ADDITIONAL PROCEDURE DETAILS
Wound was initially unstageable, then unroofed and noted ulcer extending to bone. Wound became stage 4.

## 2025-04-27 NOTE — PROGRESS NOTE ADULT - SUBJECTIVE AND OBJECTIVE BOX
INFECTIOUS DISEASES CONSULT FOLLOW-UP NOTE    INTERVAL HPI/OVERNIGHT EVENTS:  Todd [id number]    no event overnight  patient couldn't get debridement yesterday since Hgb dropped  plan for debridement today   wife at bedside, patient nonverbal     ROS:   unable to obtain       ANTIBIOTICS/RELEVANT:    MEDICATIONS  (STANDING):  albuterol/ipratropium for Nebulization 3 milliLiter(s) Nebulizer every 6 hours  aspirin  chewable 81 milliGRAM(s) Oral every 24 hours  atorvastatin 40 milliGRAM(s) Oral at bedtime  cyanocobalamin 1000 MICROGram(s) Oral every 24 hours  dextrose 5%. 1000 milliLiter(s) (100 mL/Hr) IV Continuous <Continuous>  dextrose 5%. 1000 milliLiter(s) (50 mL/Hr) IV Continuous <Continuous>  dextrose 50% Injectable 25 Gram(s) IV Push once  dextrose 50% Injectable 12.5 Gram(s) IV Push once  dextrose 50% Injectable 25 Gram(s) IV Push once  finasteride 5 milliGRAM(s) Oral at bedtime  glucagon  Injectable 1 milliGRAM(s) IntraMuscular once  insulin glargine Injectable (LANTUS) 9 Unit(s) SubCutaneous at bedtime  insulin lispro (ADMELOG) corrective regimen sliding scale   SubCutaneous every 6 hours  levETIRAcetam  Solution 750 milliGRAM(s) Oral every 12 hours  multivitamin 1 Tablet(s) Oral daily  piperacillin/tazobactam IVPB.. 3.375 Gram(s) IV Intermittent every 8 hours  sodium chloride 3%  Inhalation 4 milliLiter(s) Inhalation every 12 hours  thiamine 100 milliGRAM(s) Oral every 24 hours  timolol 0.5% Solution 1 Drop(s) Both EYES every 12 hours    MEDICATIONS  (PRN):  acetaminophen     Tablet .. 650 milliGRAM(s) Oral every 6 hours PRN Temp greater or equal to 38C (100.4F), Mild Pain (1 - 3)  dextrose Oral Gel 15 Gram(s) Oral once PRN Blood Glucose LESS THAN 70 milliGRAM(s)/deciliter        Vital Signs Last 24 Hrs  T(C): 36.4 (27 Apr 2025 06:00), Max: 37.2 (26 Apr 2025 21:06)  T(F): 97.5 (27 Apr 2025 06:00), Max: 99 (26 Apr 2025 21:06)  HR: 81 (27 Apr 2025 06:00) (81 - 100)  BP: 112/67 (27 Apr 2025 06:00) (100/56 - 119/73)  BP(mean): 82 (27 Apr 2025 06:00) (72 - 82)  RR: 18 (27 Apr 2025 06:00) (17 - 20)  SpO2: 95% (27 Apr 2025 06:00) (94% - 95%)    Parameters below as of 27 Apr 2025 06:00  Patient On (Oxygen Delivery Method): nasal cannula        04-26-25 @ 07:01  -  04-27-25 @ 07:00  --------------------------------------------------------  IN: 410 mL / OUT: 1350 mL / NET: -940 mL      PHYSICAL EXAM:  Constitutional: awake, NAD  Eyes: the sclera and conjunctiva were normal.   ENT: the ears and nose were normal in appearance.   Neck: the appearance of the neck was normal and the neck was supple.   Pulmonary: no respiratory distress and lungs were clear to auscultation bilaterally.   Heart: heart rate was normal and rhythm regular, normal S1 and S2  Abdomen: normal bowel sounds, soft, non-tender          LABS:                        8.9    16.47 )-----------( 204      ( 27 Apr 2025 05:30 )             27.1     04-27    138  |  103  |  12  ----------------------------<  162[H]  3.7   |  24  |  0.29[L]    Ca    7.9[L]      27 Apr 2025 05:30  Phos  1.9     04-27  Mg     1.8     04-27        Urinalysis Basic - ( 27 Apr 2025 05:30 )    Color: x / Appearance: x / SG: x / pH: x  Gluc: 162 mg/dL / Ketone: x  / Bili: x / Urobili: x   Blood: x / Protein: x / Nitrite: x   Leuk Esterase: x / RBC: x / WBC x   Sq Epi: x / Non Sq Epi: x / Bacteria: x        MICROBIOLOGY:      RADIOLOGY & ADDITIONAL STUDIES:  Reviewed

## 2025-04-27 NOTE — PROGRESS NOTE ADULT - ASSESSMENT
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  (12/10-12/12/24) for wound care and further nutrition evaluation and management. GI consulted and PEG tube study showed PEG is functioning and no need to change to a new one. WOCN consult appreciated, given Sliver nitrate and aquagel Ag dressing daily to PEG site. Dietitian consult appreciated, TF changed to Glucerna 1.2 @ 83ml/hr from 5pm to 11am x 18hr, total 6 cans per day. Pt recently admitted to ICU for aspiration PNA and had indwelling hudson removed and discharged from [facility] ( 2/25/25) and sent to [facility] and subsequently sent home on home O2 per wife, last admitted [facility] ( 3/26-4/1) for sepsis w/ lactic acidosis and acute hypoxic respiratory failure 2/2 multifocal /likely gram negative PNA( HAP since recent hospitalization and on PEG feeds), stage III sacral ulcer ( presence on admission), and concerns of PEG site infection w/ superficial ulceration and reported purulent discharge. Pt now readmitted to [facility] ( 3/23) for severe sepsis w/ lactic acidosis 2/2 recurrent multifocal HAP likely aspiration/gram negative organism and concerns for infected sacral decubitus (POA)--    # Severe sepsis/ bacteremia/sacral decubitus ulcer (POA) with abscess 7x3 cm, RLL pneumonia.   # lactic acidosis   # recurrent aspiration HAP likely gram negative organism   # Acute hypoxic resp failure   # bacteremia -poa ( strep and bacteroides)  # bed-bound , paraplegic   # dysphagia, dependent on PEG tube feeding.       - surgery evaluation appreciated for wound debridement with local anesthesia ( per wife request ) -not candidate and high risk for General anesthesia and high risk of aspiration and recurrent aspiration pneumonia.  -continue with antibiotics for now as dw ID Dr. Andrade= aiming for 2 weeks of iv antibiotics    # Anemia = sp transfusion, hb now at 9  no sign of active bleeding   - cw peg feeding as tolerated ( wife would like to continue with feeding.   - wife would like to continue full care  - discussed at bedside with wife and family friend 4/24-= introduce home hospice care.   4/25/25 spoke with wife at length-pt is in serious infection, decubitus ulcer with absess, bacteremia,  I offered to call their son -she stated it is too late in [place]  , I encouraged wife to discuss with her son regarding patient care-   4/26/25- - we talked extensively about complication of aspiration pneumonia, infected sacral ulcer with abscess with possible underlying OM - risk of mortality , talked about comfort approach , hospice care, she is not ready for comfort care, stated she spoke with her son last night who is in agreement with her. we tried to call him from bedside- not able to reach ( Son is in [place] )     DM with hyperglycemia - long acting insulin 9 units and sliding scale.   monitor for hypoglycemia.     dw medical team, lengthy discussion with wife regarding goc and disposition, Dw RN  - awaiting for hospital bed for sacral ulcer -   - frequent turning to off load pressure on the ulcer.  - wound debridement as per surgery -to be done bedside, wife in agreement.   - for dispo: She could not to antibiotics infusion at home - to have SW and CM for evaluation for infusion and wound care- might need to go to rehab place.    he is at high risk for recurrent aspiration, wound infection , sepsis and high risk of mortality, and high risk of aspiration.   Time-based billing (NON-critical care).  51 minutes spent on total encounter. The necessity of the time spent during the encounter on this date of service was due to:  preparation to see patient, history taking, physical exam, discussion with patient, discussion with other care providers, independent reviewing and interpretation of the data, care coordination.

## 2025-04-27 NOTE — PROCEDURE NOTE - NSFINDINGS_GEN_A_CORE
infection Necrotic, devitalized tissue. Purulence drained from inferior aspect of wound./necrosis/infection

## 2025-04-27 NOTE — PROGRESS NOTE ADULT - ASSESSMENT
88yo Cantonese speaking M h/o multiple CVA c/b L sided weakness now s/p PEG, bedbound, stage III sacral ulcer, DM, BPH, glaucoma, b/l hearing impairment, aortic root aneurysm p/w fever, AMS, productive cough x 3-4 days, found to have Strep constellatus and B. gragilis bacteremia 2/2 presacral abscess (7x3cm) and and sacral OM/cellulitis and recurrent aspiration PNA.  Unfortunately patient is bedbound, non-verbal, with progressive decline with frequent hospital admission.  This infection is incurable - he will remain at high risk of recurrent aspiration PNA, will continue to have sacral OM/infection despite abx course.  Dr. Dillon and I think the best option for him is home hospice; however wife is not ready and wants aggressive treatment.  Patient will undergo wound debridement.  Can offer 2 weeks of abx after the surgical debridement to treat sacral cellulitis - bacterial will eventually develops resistance and no clinical benefits shown with longer course of abx.  Can do PO vs IV depending on family preference.  In either case, patient is at high risk of readmission with recurrent infection.    - cont zosyn 3.375g IV q8h  - f/u surveillance BCx      Team 1 will follow you.  Case d/w primary team.    Sherry Andrade MD, MS  Infectious Disease attending  office phone [phone]  For any questions during evening/weekend/holiday, please page ID on call

## 2025-04-28 LAB
ALBUMIN SERPL ELPH-MCNC: 2.2 G/DL — LOW (ref 3.3–5)
ALP SERPL-CCNC: 111 U/L — SIGNIFICANT CHANGE UP (ref 40–120)
ALT FLD-CCNC: 20 U/L — SIGNIFICANT CHANGE UP (ref 10–45)
ANION GAP SERPL CALC-SCNC: 12 MMOL/L — SIGNIFICANT CHANGE UP (ref 5–17)
APTT BLD: 27.8 SEC — SIGNIFICANT CHANGE UP (ref 26.1–36.8)
AST SERPL-CCNC: 34 U/L — SIGNIFICANT CHANGE UP (ref 10–40)
BASOPHILS # BLD AUTO: 0.03 K/UL — SIGNIFICANT CHANGE UP (ref 0–0.2)
BASOPHILS NFR BLD AUTO: 0.2 % — SIGNIFICANT CHANGE UP (ref 0–2)
BILIRUB SERPL-MCNC: 0.3 MG/DL — SIGNIFICANT CHANGE UP (ref 0.2–1.2)
BUN SERPL-MCNC: 14 MG/DL — SIGNIFICANT CHANGE UP (ref 7–23)
CALCIUM SERPL-MCNC: 7.8 MG/DL — LOW (ref 8.4–10.5)
CHLORIDE SERPL-SCNC: 103 MMOL/L — SIGNIFICANT CHANGE UP (ref 96–108)
CO2 SERPL-SCNC: 21 MMOL/L — LOW (ref 22–31)
CREAT SERPL-MCNC: 0.33 MG/DL — LOW (ref 0.5–1.3)
EGFR: 112 ML/MIN/1.73M2 — SIGNIFICANT CHANGE UP
EGFR: 112 ML/MIN/1.73M2 — SIGNIFICANT CHANGE UP
EOSINOPHIL # BLD AUTO: 0.28 K/UL — SIGNIFICANT CHANGE UP (ref 0–0.5)
EOSINOPHIL NFR BLD AUTO: 1.9 % — SIGNIFICANT CHANGE UP (ref 0–6)
GLUCOSE SERPL-MCNC: 245 MG/DL — HIGH (ref 70–99)
HCT VFR BLD CALC: 23.8 % — LOW (ref 39–50)
HCT VFR BLD CALC: 32.4 % — LOW (ref 39–50)
HGB BLD-MCNC: 10.4 G/DL — LOW (ref 13–17)
HGB BLD-MCNC: 7.7 G/DL — LOW (ref 13–17)
IMM GRANULOCYTES NFR BLD AUTO: 1.3 % — HIGH (ref 0–0.9)
INR BLD: 1.11 — SIGNIFICANT CHANGE UP (ref 0.85–1.16)
LYMPHOCYTES # BLD AUTO: 1.27 K/UL — SIGNIFICANT CHANGE UP (ref 1–3.3)
LYMPHOCYTES # BLD AUTO: 8.8 % — LOW (ref 13–44)
MAGNESIUM SERPL-MCNC: 1.8 MG/DL — SIGNIFICANT CHANGE UP (ref 1.6–2.6)
MCHC RBC-ENTMCNC: 24.5 PG — LOW (ref 27–34)
MCHC RBC-ENTMCNC: 25.4 PG — LOW (ref 27–34)
MCHC RBC-ENTMCNC: 32.1 G/DL — SIGNIFICANT CHANGE UP (ref 32–36)
MCHC RBC-ENTMCNC: 32.4 G/DL — SIGNIFICANT CHANGE UP (ref 32–36)
MCV RBC AUTO: 75.8 FL — LOW (ref 80–100)
MCV RBC AUTO: 79 FL — LOW (ref 80–100)
MONOCYTES # BLD AUTO: 0.65 K/UL — SIGNIFICANT CHANGE UP (ref 0–0.9)
MONOCYTES NFR BLD AUTO: 4.5 % — SIGNIFICANT CHANGE UP (ref 2–14)
NEUTROPHILS # BLD AUTO: 12.02 K/UL — HIGH (ref 1.8–7.4)
NEUTROPHILS NFR BLD AUTO: 83.3 % — HIGH (ref 43–77)
NRBC BLD AUTO-RTO: 0 /100 WBCS — SIGNIFICANT CHANGE UP (ref 0–0)
NRBC BLD AUTO-RTO: 0 /100 WBCS — SIGNIFICANT CHANGE UP (ref 0–0)
PHOSPHATE SERPL-MCNC: 2 MG/DL — LOW (ref 2.5–4.5)
PLATELET # BLD AUTO: 211 K/UL — SIGNIFICANT CHANGE UP (ref 150–400)
PLATELET # BLD AUTO: 216 K/UL — SIGNIFICANT CHANGE UP (ref 150–400)
POTASSIUM SERPL-MCNC: 3.6 MMOL/L — SIGNIFICANT CHANGE UP (ref 3.5–5.3)
POTASSIUM SERPL-SCNC: 3.6 MMOL/L — SIGNIFICANT CHANGE UP (ref 3.5–5.3)
PROT SERPL-MCNC: 5.4 G/DL — LOW (ref 6–8.3)
PROTHROM AB SERPL-ACNC: 12.7 SEC — SIGNIFICANT CHANGE UP (ref 9.9–13.4)
RBC # BLD: 3.14 M/UL — LOW (ref 4.2–5.8)
RBC # BLD: 4.1 M/UL — LOW (ref 4.2–5.8)
RBC # FLD: 21.1 % — HIGH (ref 10.3–14.5)
RBC # FLD: 21.8 % — HIGH (ref 10.3–14.5)
SODIUM SERPL-SCNC: 136 MMOL/L — SIGNIFICANT CHANGE UP (ref 135–145)
WBC # BLD: 14.1 K/UL — HIGH (ref 3.8–10.5)
WBC # BLD: 14.44 K/UL — HIGH (ref 3.8–10.5)
WBC # FLD AUTO: 14.1 K/UL — HIGH (ref 3.8–10.5)
WBC # FLD AUTO: 14.44 K/UL — HIGH (ref 3.8–10.5)

## 2025-04-28 PROCEDURE — 99233 SBSQ HOSP IP/OBS HIGH 50: CPT

## 2025-04-28 PROCEDURE — G0545: CPT

## 2025-04-28 PROCEDURE — 99232 SBSQ HOSP IP/OBS MODERATE 35: CPT

## 2025-04-28 RX ORDER — MAGNESIUM SULFATE 500 MG/ML
2 SYRINGE (ML) INJECTION ONCE
Refills: 0 | Status: COMPLETED | OUTPATIENT
Start: 2025-04-28 | End: 2025-04-28

## 2025-04-28 RX ORDER — POTASSIUM PHOSPHATE, MONOBASIC POTASSIUM PHOSPHATE, DIBASIC INJECTION, 236; 224 MG/ML; MG/ML
30 SOLUTION, CONCENTRATE INTRAVENOUS ONCE
Refills: 0 | Status: COMPLETED | OUTPATIENT
Start: 2025-04-28 | End: 2025-04-28

## 2025-04-28 RX ORDER — METRONIDAZOLE 250 MG
500 TABLET ORAL EVERY 12 HOURS
Refills: 0 | Status: DISCONTINUED | OUTPATIENT
Start: 2025-04-29 | End: 2025-04-29

## 2025-04-28 RX ORDER — CEFTRIAXONE 500 MG/1
1000 INJECTION, POWDER, FOR SOLUTION INTRAMUSCULAR; INTRAVENOUS EVERY 24 HOURS
Refills: 0 | Status: DISCONTINUED | OUTPATIENT
Start: 2025-04-29 | End: 2025-05-01

## 2025-04-28 RX ADMIN — POTASSIUM PHOSPHATE, MONOBASIC POTASSIUM PHOSPHATE, DIBASIC INJECTION, 83.33 MILLIMOLE(S): 236; 224 SOLUTION, CONCENTRATE INTRAVENOUS at 13:03

## 2025-04-28 RX ADMIN — IPRATROPIUM BROMIDE AND ALBUTEROL SULFATE 3 MILLILITER(S): .5; 2.5 SOLUTION RESPIRATORY (INHALATION) at 23:57

## 2025-04-28 RX ADMIN — Medication 25 GRAM(S): at 11:18

## 2025-04-28 RX ADMIN — INSULIN LISPRO 2: 100 INJECTION, SOLUTION INTRAVENOUS; SUBCUTANEOUS at 00:29

## 2025-04-28 RX ADMIN — Medication 25 GRAM(S): at 06:44

## 2025-04-28 RX ADMIN — Medication 650 MILLIGRAM(S): at 00:29

## 2025-04-28 RX ADMIN — INSULIN LISPRO 4: 100 INJECTION, SOLUTION INTRAVENOUS; SUBCUTANEOUS at 17:59

## 2025-04-28 RX ADMIN — IPRATROPIUM BROMIDE AND ALBUTEROL SULFATE 3 MILLILITER(S): .5; 2.5 SOLUTION RESPIRATORY (INHALATION) at 11:18

## 2025-04-28 RX ADMIN — ATORVASTATIN CALCIUM 40 MILLIGRAM(S): 80 TABLET, FILM COATED ORAL at 22:08

## 2025-04-28 RX ADMIN — TIMOLOL MALEATE 1 DROP(S): 6.8 SOLUTION OPHTHALMIC at 06:45

## 2025-04-28 RX ADMIN — LEVETIRACETAM 750 MILLIGRAM(S): 10 INJECTION, SOLUTION INTRAVENOUS at 18:00

## 2025-04-28 RX ADMIN — INSULIN LISPRO 8: 100 INJECTION, SOLUTION INTRAVENOUS; SUBCUTANEOUS at 13:04

## 2025-04-28 RX ADMIN — IPRATROPIUM BROMIDE AND ALBUTEROL SULFATE 3 MILLILITER(S): .5; 2.5 SOLUTION RESPIRATORY (INHALATION) at 00:30

## 2025-04-28 RX ADMIN — Medication 4 MILLILITER(S): at 00:30

## 2025-04-28 RX ADMIN — CYANOCOBALAMIN 1000 MICROGRAM(S): 1000 INJECTION INTRAMUSCULAR; SUBCUTANEOUS at 06:45

## 2025-04-28 RX ADMIN — FINASTERIDE 5 MILLIGRAM(S): 1 TABLET, FILM COATED ORAL at 22:07

## 2025-04-28 RX ADMIN — Medication 100 MILLIGRAM(S): at 06:45

## 2025-04-28 RX ADMIN — Medication 25 GRAM(S): at 19:00

## 2025-04-28 RX ADMIN — INSULIN LISPRO 4: 100 INJECTION, SOLUTION INTRAVENOUS; SUBCUTANEOUS at 06:43

## 2025-04-28 RX ADMIN — Medication 650 MILLIGRAM(S): at 23:57

## 2025-04-28 RX ADMIN — IPRATROPIUM BROMIDE AND ALBUTEROL SULFATE 3 MILLILITER(S): .5; 2.5 SOLUTION RESPIRATORY (INHALATION) at 17:59

## 2025-04-28 RX ADMIN — TIMOLOL MALEATE 1 DROP(S): 6.8 SOLUTION OPHTHALMIC at 18:00

## 2025-04-28 RX ADMIN — IPRATROPIUM BROMIDE AND ALBUTEROL SULFATE 3 MILLILITER(S): .5; 2.5 SOLUTION RESPIRATORY (INHALATION) at 06:45

## 2025-04-28 RX ADMIN — Medication 81 MILLIGRAM(S): at 06:46

## 2025-04-28 RX ADMIN — Medication 1 TABLET(S): at 11:18

## 2025-04-28 RX ADMIN — INSULIN GLARGINE-YFGN 9 UNIT(S): 100 INJECTION, SOLUTION SUBCUTANEOUS at 22:08

## 2025-04-28 RX ADMIN — Medication 4 MILLILITER(S): at 12:05

## 2025-04-28 RX ADMIN — LEVETIRACETAM 750 MILLIGRAM(S): 10 INJECTION, SOLUTION INTRAVENOUS at 06:45

## 2025-04-28 RX ADMIN — Medication 650 MILLIGRAM(S): at 01:29

## 2025-04-28 NOTE — PROGRESS NOTE ADULT - SUBJECTIVE AND OBJECTIVE BOX
INFECTIOUS DISEASES CONSULT FOLLOW-UP NOTE    INTERVAL HPI/OVERNIGHT EVENTS:  #Cantonese [id number]    no event overnight  patient lying on bed comfortably  wife at bedside    ROS:   Constitutional, eyes, ENT, cardiovascular, respiratory, gastrointestinal, genitourinary, integumentary, neurological, psychiatric and heme/lymph are otherwise negative other than noted above       ANTIBIOTICS/RELEVANT:    MEDICATIONS  (STANDING):  albuterol/ipratropium for Nebulization 3 milliLiter(s) Nebulizer every 6 hours  aspirin  chewable 81 milliGRAM(s) Oral every 24 hours  atorvastatin 40 milliGRAM(s) Oral at bedtime  cyanocobalamin 1000 MICROGram(s) Oral every 24 hours  dextrose 5%. 1000 milliLiter(s) (50 mL/Hr) IV Continuous <Continuous>  dextrose 5%. 1000 milliLiter(s) (100 mL/Hr) IV Continuous <Continuous>  dextrose 50% Injectable 25 Gram(s) IV Push once  dextrose 50% Injectable 12.5 Gram(s) IV Push once  dextrose 50% Injectable 25 Gram(s) IV Push once  finasteride 5 milliGRAM(s) Oral at bedtime  glucagon  Injectable 1 milliGRAM(s) IntraMuscular once  insulin glargine Injectable (LANTUS) 9 Unit(s) SubCutaneous at bedtime  insulin lispro (ADMELOG) corrective regimen sliding scale   SubCutaneous every 6 hours  levETIRAcetam  Solution 750 milliGRAM(s) Oral every 12 hours  multivitamin 1 Tablet(s) Oral daily  piperacillin/tazobactam IVPB.. 3.375 Gram(s) IV Intermittent every 8 hours  sodium chloride 3%  Inhalation 4 milliLiter(s) Inhalation every 12 hours  thiamine 100 milliGRAM(s) Oral every 24 hours  timolol 0.5% Solution 1 Drop(s) Both EYES every 12 hours    MEDICATIONS  (PRN):  acetaminophen     Tablet .. 650 milliGRAM(s) Oral every 6 hours PRN Temp greater or equal to 38C (100.4F), Mild Pain (1 - 3)  dextrose Oral Gel 15 Gram(s) Oral once PRN Blood Glucose LESS THAN 70 milliGRAM(s)/deciliter        Vital Signs Last 24 Hrs  T(C): 36.4 (28 Apr 2025 12:20), Max: 36.6 (27 Apr 2025 21:22)  T(F): 97.6 (28 Apr 2025 12:20), Max: 97.8 (27 Apr 2025 21:22)  HR: 83 (28 Apr 2025 12:20) (83 - 97)  BP: 107/64 (28 Apr 2025 12:20) (100/57 - 128/75)  BP(mean): 72 (28 Apr 2025 05:52) (72 - 92)  RR: 17 (28 Apr 2025 12:20) (17 - 18)  SpO2: 97% (28 Apr 2025 12:20) (95% - 97%)    Parameters below as of 28 Apr 2025 12:20  Patient On (Oxygen Delivery Method): nasal cannula  O2 Flow (L/min): 3      04-27-25 @ 07:01  -  04-28-25 @ 07:00  --------------------------------------------------------  IN: 0 mL / OUT: 750 mL / NET: -750 mL      PHYSICAL EXAM:  Constitutional: alert, NAD  Eyes: the sclera and conjunctiva were normal.   ENT: the ears and nose were normal in appearance.   Neck: the appearance of the neck was normal and the neck was supple.   Pulmonary: no respiratory distress and lungs were clear to auscultation bilaterally.   Heart: heart rate was normal and rhythm regular, normal S1 and S2  Abdomen: normal bowel sounds, soft, non-tender  Derm: Sacral wound with eschar, minimal oozing, with packing material, minimal surrounding erythema         LABS:                        7.7    14.44 )-----------( 216      ( 28 Apr 2025 08:46 )             23.8     04-28    136  |  103  |  14  ----------------------------<  245[H]  3.6   |  21[L]  |  0.33[L]    Ca    7.8[L]      28 Apr 2025 08:46  Phos  2.0     04-28  Mg     1.8     04-28    TPro  5.4[L]  /  Alb  2.2[L]  /  TBili  0.3  /  DBili  x   /  AST  34  /  ALT  20  /  AlkPhos  111  04-28    PT/INR - ( 28 Apr 2025 08:46 )   PT: 12.7 sec;   INR: 1.11          PTT - ( 28 Apr 2025 08:46 )  PTT:27.8 sec  Urinalysis Basic - ( 28 Apr 2025 08:46 )    Color: x / Appearance: x / SG: x / pH: x  Gluc: 245 mg/dL / Ketone: x  / Bili: x / Urobili: x   Blood: x / Protein: x / Nitrite: x   Leuk Esterase: x / RBC: x / WBC x   Sq Epi: x / Non Sq Epi: x / Bacteria: x        MICROBIOLOGY:      RADIOLOGY & ADDITIONAL STUDIES:  Reviewed

## 2025-04-28 NOTE — PROGRESS NOTE ADULT - ATTENDING COMMENTS
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 GI consulted and PEG tube study showed PEG is functioning and no need to change to a new one. WOCN consult appreciated, given Sliver nitrate and aquagel Ag dressing daily to PEG site. Dietitian consult appreciated, TF changed to Glucerna 1.2 @ 83ml/hr from 5pm to 11am x 18hr, total 6 cans per day. Pt recently admitted to ICU for aspiration PNA and had indwelling hudson removed and discharged from [facility] ( 2/25/25) and sent to [facility] and subsequently sent home on home O2 per wife, last admitted [facility] ( 3/26-4/1) for sepsis w/ lactic acidosis and acute hypoxic respiratory failure 2/2 multifocal /likely gram negative PNA( HAP since recent hospitalization and on PEG feeds), stage III sacral ulcer ( presence on admission), and concerns of PEG site infection w/ superficial ulceration and reported purulent discharge. Pt now readmitted to [facility] ( 3/23) for severe sepsis w/ lactic acidosis 2/2 recurrent multifocal HAP likely aspiration/gram negative organism and concerns for infected sacral decubitus (POA)--bacteremia, sacral ucler with abscess sp bedside debridement by surgery 4/27- bleeding required electrocautery , given one unit of prbc before debridement - now hb at 7.7         # Severe sepsis/ bacteremia/sacral decubitus ulcer (POA) with abscess 7x3 cm- sp debridement unstagable to stage 4 ulcer ( 7x7 with depth 2cm post debridement -  #, RLL pneumonia.   # lactic acidosis   # recurrent aspiration HAP likely gram negative organism   # Acute hypoxic resp failure   # bacteremia -poa ( strep and bacteroides)  # bed-bound , paraplegic   # dysphagia, dependent on PEG tube feeding.       - surgery evaluation appreciated for wound debridement with local anesthesia ( per wife request ) -not candidate and high risk for General anesthesia and high risk of aspiration and recurrent aspiration pneumonia.  - sp debridement   - stage 4 ulcer   - dressing, wound care as per surgery -need to keep the area clean, need frequent turning and to avoid soiling.   -continue with antibiotics for now as dw ID Dr. Andrade= aiming for 2 weeks of iv antibiotics    # Anemia = sp transfusion before debridement - given one unit, hb 9--> 7.7-   to give one unit of prbc.   - cw peg feeding as tolerated ( wife would like to continue with feeding) rate at 60 cc per hour, extending the hours to 18 hours ( lower rate preferred to reduce risk of aspiration)   - wife would like to continue full care  - discussed at bedside with wife and family friend 4/24-= introduce home hospice care.   4/25/25 spoke with wife at length-pt is in serious infection, decubitus ulcer with absess, bacteremia,  I offered to call their son -she stated it is too late in [place]  , I encouraged wife to discuss with her son regarding patient care-   4/26/25- - we talked extensively about complication of aspiration pneumonia, infected sacral ulcer with abscess with possible underlying OM - risk of mortality , talked about comfort approach , hospice care, she is not ready for comfort care, stated she spoke with her son last night who is in agreement with her. we tried to call him from bedside- not able to reach ( Son is in [place] )   4/28/25- wife stated she sent the picture of wound after debridement to her son, we talked about dispo -she could not take care of him at home , a is 24/7 one person works 13 hours, with no one to help her at night time, she would like to pursue with iv antibiotics and wound care , we talked about caring at the facility , she is open to facility.     DM with hyperglycemia - long acting insulin 9 units and sliding scale.   monitor for hypoglycemia.     [facility] medical team, lengthy discussion with wife regarding goc and disposition, [facility] RN  - awaiting for hospital bed for sacral ulcer -   - frequent turning to off load pressure on the ulcer.  - sp wound debridement 4/27- wound dressing and care as per surgery rec.   - for dispo: wife could not take care of him at home with wound and antibiotics - SW and CM evaluation for [facility]     he is at high risk for recurrent aspiration, wound infection , sepsis and high risk of mortality, and high risk of aspiration.

## 2025-04-28 NOTE — PROGRESS NOTE ADULT - PROBLEM SELECTOR PLAN 1
#Lactic acidosis, IMPROVED  Likely 2/2 aspiration PNA vs. infected sacral decubitus ulcer; has similar hospitalization in early April 2025. Sacral ulcer with areas of eschar. PEG site without infectious signs  D/gokul empiric Vancomycin on 4/23 despite previous +MRSA wound/PEG site culture as currently without signs of acute SSTI. However, assessment of sacral decubitus ulcer by wound care on 4/24 revealed foul odor and mild expressed purulence.   CT chest/abd/pelvis 4/25: Suspected recurrent aspiration and right lower lobe pneumonia. 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    - ID, wound care recommendations  - Gen surg to debride sacral wound, consented wife   - F/u admission BCx 4/22 - +Bacteroides fragilis, +Strep species  - F/u surveillance BCx 4/24, 4/25  - c/w empiric Zosyn 4.5mg q8  - tylenol for fever and pain  - F/u TTE for r/o endocarditis #Lactic acidosis, IMPROVED  Likely 2/2 aspiration PNA vs. infected sacral decubitus ulcer; has similar hospitalization in early April 2025. Sacral ulcer with areas of eschar. PEG site without infectious signs  D/gokul empiric Vancomycin on 4/23 despite previous +MRSA wound/PEG site culture as currently without signs of acute SSTI. However, assessment of sacral decubitus ulcer by wound care on 4/24 revealed foul odor and mild expressed purulence.   CT chest/abd/pelvis 4/25: Suspected recurrent aspiration and right lower lobe pneumonia. 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  S/p bedside debridement with gen surg on 4/27    - ID, wound care, gen surg recommendations  - F/u admission BCx 4/22 - +Bacteroides fragilis, +Strep species  - F/u surveillance BCx 4/24, 4/25, 4/26 - all NGTD  - c/w empiric Zosyn 3.375g q8h (4/23 - )  - tylenol for fever and pain

## 2025-04-28 NOTE — PROGRESS NOTE ADULT - PROBLEM SELECTOR PLAN 10
Home med: timolol 0.5 mg 1 drop BID    - c/w home med Echo 4/2024; Normal left and right ventricular size and systolic function; EF 56%. Not on any GDMT.    - ctm for signs of overload

## 2025-04-28 NOTE — PROGRESS NOTE ADULT - PROBLEM SELECTOR PLAN 8
Home med: atorvastatin 40 mg QD    - c/w home med Patient takes doxazosin 1 mg QHS and finasteride 5 mg QD; did require hudson in the past.     - holding home Doxazosin in setting of low pressures/sepsis  - Resume home Finasteride 5mg qd  - ctm UOP   - bladder scans q6

## 2025-04-28 NOTE — CONSULT NOTE ADULT - CONSULT REASON
Advanced Care Planning in the setting of vascular dementia and frequent aspiration
Sacral decubitus ulcer
sepsis
PM&R

## 2025-04-28 NOTE — CHART NOTE - NSCHARTNOTEFT_GEN_A_CORE
Patient seen and examined s/p bedside debridement of sacral decubitus ulcer. Dressing noted to be saturated with blood upon removal. Area of bleeding at skin level identified to left lateral margin of wound. This bleeding was not able to be controlled with pressure. Handheld bovie electrocautery used to control bleeding in this area. The rest of the wound was gently debrided with gauze and the prior surgicel was removed. No further areas of gross bleeding identified. Surgicel placed on wound bed with a small amount of surgicel packed in right lateral border of wound. Wet to dry dressing and abdominal pad secured in place on top. Will continue to monitor for bleeding and trend hemoglobin.

## 2025-04-28 NOTE — PROGRESS NOTE ADULT - ASSESSMENT
88yo Cantonese speaking M h/o multiple CVA c/b L sided weakness now s/p PEG, bedbound, stage III sacral ulcer, DM, BPH, glaucoma, b/l hearing impairment, aortic root aneurysm p/w fever, AMS, productive cough x 3-4 days, found to have Strep constellatus and B. gragilis bacteremia 2/2 presacral abscess (7x3cm) and and sacral OM/cellulitis and recurrent aspiration PNA.  Unfortunately patient is bedbound, non-verbal, with progressive decline with frequent hospital admission.  This infection is incurable - he will remain at high risk of recurrent aspiration PNA, will continue to have sacral OM/infection despite abx course, and wants him to go to [facility] since she can't take care of him at home.  Dr. Dillon and I think the best option for him is home hospice; however wife is not ready and wants aggressive treatment and hoping that he will recover.  Patient is s/p wound debridement yesterday.  Will do 2 weeks of IV abx after the surgical debridement to treat sacral cellulitis and Strep bacteremia. Bacterial will eventually develops resistance and no clinical benefits shown with longer course of abx in this setting. I had extensive discussion with patient's wife, that despite abx therapy and wound debridement, patient remains at very high risk of recurrent decubitus ulcer infection with further worsening, as well as recurrent aspiration PNA.  Patient is at very high risk of recurrent admission and overall steady decline.      - stop zosyn  - start ceftriaxone 1g IV q24h  - start flagyl 500mg PEG q12h (crush and give)  - duration of abx is 2 weeks (4/27 - 5/10/25)  - Place single lumen midline   - Weekly labs: CMP, CBC faxed to ID office at [phone]  - Patient to follow up with Dr. andrade in 2 weeks ([address], [address], [phone]), ID office will call patient to schedule     Thank you for your consult.  Please re-consult us or call us with questions.  Case d/w primary team.    Sherry Andrade MD, MS  Infectious Disease attending  office phone [phone]  For any questions during evening/weekend/holiday, please page ID on call

## 2025-04-28 NOTE — CONSULT NOTE ADULT - SUBJECTIVE AND OBJECTIVE BOX
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 Prior cultures from peg site grew MRSA, acetinobacter baumani and enterobactor cloacae, Presenting today with 3-4 days of fever at home with increased cough. Wife at bedside provided full history as patient does not speak. Wife shared he has been coughing a lot lately and it seems like he is trying to expectorate but is unable to do so. She tried to suction him at home without success. At baseline, largely does not speak a lot but she shared he speaks few words on occasion but has been more confused and non-verbal the past few days. States he is hard of hearing and his hearing aids require a battery replacement. His bowel movements are usually loose as he is fed shakes through his peg tube. He is entirely bedbound since his stroke and has a home visiting nurse who stated his sacral ulcer has some black spots and was worsening. Wife shared the patient has not been vomiting, having overt chest or abdominal pain. She came to the ED given his fevers, worsening mental status, worsening sacral ulcer and concern for aspiration PNA.     ED course:  Vitals: T 98.1, , 122/76, RR 20, 91% RA  Labs: WBC 17.65, hemoglobin 8.9, MCV 73.2, , K 3.8, BUN 43, Cr 0.95,  glucose 363, AG 11, lactate 5.7 --> 4.0  UA: spec grav 1.027, trace ketones, granular casts, BUN 43, Cr 0.95  EKG: sinus tachycardia HR  118 qtc 453  Imaging: CXR: RLL infiltrate   Interventions: cefepime 1g, vanc 1 g, ofirmev 1 g, 2.7 L  Consults:  (23 Apr 2025 03:04)    PAST MEDICAL & SURGICAL HISTORY:  Diabetes      HLD (hyperlipidemia)      BPH (benign prostatic hyperplasia)      JUSTYNA (iron deficiency anemia)      Stroke      No significant past surgical history        MEDICATIONS  (STANDING):  albuterol/ipratropium for Nebulization 3 milliLiter(s) Nebulizer every 6 hours  aspirin  chewable 81 milliGRAM(s) Oral every 24 hours  atorvastatin 40 milliGRAM(s) Oral at bedtime  cyanocobalamin 1000 MICROGram(s) Oral every 24 hours  dextrose 5%. 1000 milliLiter(s) (50 mL/Hr) IV Continuous <Continuous>  dextrose 5%. 1000 milliLiter(s) (100 mL/Hr) IV Continuous <Continuous>  dextrose 50% Injectable 25 Gram(s) IV Push once  dextrose 50% Injectable 12.5 Gram(s) IV Push once  dextrose 50% Injectable 25 Gram(s) IV Push once  finasteride 5 milliGRAM(s) Oral at bedtime  glucagon  Injectable 1 milliGRAM(s) IntraMuscular once  insulin glargine Injectable (LANTUS) 9 Unit(s) SubCutaneous at bedtime  insulin lispro (ADMELOG) corrective regimen sliding scale   SubCutaneous every 6 hours  levETIRAcetam  Solution 750 milliGRAM(s) Oral every 12 hours  multivitamin 1 Tablet(s) Oral daily  piperacillin/tazobactam IVPB.. 3.375 Gram(s) IV Intermittent every 8 hours  sodium chloride 3%  Inhalation 4 milliLiter(s) Inhalation every 12 hours  thiamine 100 milliGRAM(s) Oral every 24 hours  timolol 0.5% Solution 1 Drop(s) Both EYES every 12 hours    MEDICATIONS  (PRN):  acetaminophen     Tablet .. 650 milliGRAM(s) Oral every 6 hours PRN Temp greater or equal to 38C (100.4F), Mild Pain (1 - 3)  dextrose Oral Gel 15 Gram(s) Oral once PRN Blood Glucose LESS THAN 70 milliGRAM(s)/deciliter      FAMILY HISTORY:  No pertinent family history in first degree relatives        CBC Full  -  ( 27 Apr 2025 05:30 )  WBC Count : 16.47 K/uL  RBC Count : 3.48 M/uL  Hemoglobin : 8.9 g/dL  Hematocrit : 27.1 %  Platelet Count - Automated : 204 K/uL  Mean Cell Volume : 77.9 fl  Mean Cell Hemoglobin : 25.6 pg  Mean Cell Hemoglobin Concentration : 32.8 g/dL  Auto Neutrophil # : x  Auto Lymphocyte # : x  Auto Monocyte # : x  Auto Eosinophil # : x  Auto Basophil # : x  Auto Neutrophil % : x  Auto Lymphocyte % : x  Auto Monocyte % : x  Auto Eosinophil % : x  Auto Basophil % : x      04-27    138  |  103  |  12  ----------------------------<  162[H]  3.7   |  24  |  0.29[L]    Ca    7.9[L]      27 Apr 2025 05:30  Phos  1.9     04-27  Mg     1.8     04-27        Urinalysis Basic - ( 27 Apr 2025 05:30 )    Color: x / Appearance: x / SG: x / pH: x  Gluc: 162 mg/dL / Ketone: x  / Bili: x / Urobili: x   Blood: x / Protein: x / Nitrite: x   Leuk Esterase: x / RBC: x / WBC x   Sq Epi: x / Non Sq Epi: x / Bacteria: x        Radiology :       ACC: [id number] EXAM:  CT ABDOMEN AND PELVIS IC   ORDERED BY: ISSA GUZMAN     ACC: [id number] EXAM:  CT CHEST IC   ORDERED BY: KIEL BELLE     PROCEDURE DATE:  04/25/2025          INTERPRETATION:  CLINICAL INFORMATION: Bacteremia    COMPARISON: March 26, 2025    CONTRAST/COMPLICATIONS:  IV Contrast: IV contrast documented in unlinked concurrent exam   (accession [id number]), Isovue 370 (accession [id number])  90 cc administered     10 cc discarded  Oral Contrast: NONE  .    PROCEDURE:  CT of the Chest, Abdomen and Pelvis was performed.  Sagittal and coronal reformats were performed.    FINDINGS:  CHEST:  LUNGS AND LARGE AIRWAYS: Patent central airways. Previously described   right upper and middle lobe peribronchial opacities have improved. New   right lower lobe consolidation. Increased left lower lobe atelectasis or   consolidation.  PLEURA: Small right and trace left effusions.  VESSELS: Mild coronary calcifications.  HEART: Enlarged. Small pericardial effusion.  MEDIASTINUM AND MATHIEU: No lymphadenopathy.  CHEST WALL AND LOWER NECK: Within normal limits.    ABDOMEN AND PELVIS:  LIVER: Within normal limits.  BILE DUCTS: Normal caliber.  GALLBLADDER: Within normal limits.  SPLEEN: Within normal limits.  PANCREAS: Within normal limits.  ADRENALS: Within normal limits.  KIDNEYS/URETERS: Few cortical cysts. No hydronephrosis.    BLADDER: Small layering stones.  REPRODUCTIVE ORGANS: Prostatomegaly    BOWEL: No bowel obstruction or overt acute inflammation. Percutaneous   gastrostomy in distal gastric body.  PERITONEUM/RETROPERITONEUM: No ascites  VESSELS: Atherosclerotic changes.  LYMPH NODES: No adenopathy    BONES AND SOFT TISSUES: Since March 26, 2025, new posterior presacral   fluid and gas containing abscess with rim of enhancement due to worsening   decubitus ulcer, approximately 7 x 3 cm. New cortical attenuation of   underlying coccyx, suspicious for osteomyelitis.    IMPRESSION:    1. Since March 26, 2025, suspected recurrent aspiration and right lower   lobe pneumonia.  2. 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        Review of Systems : per HPI         Vital Signs Last 24 Hrs  T(C): 36.4 (28 Apr 2025 05:52), Max: 36.6 (27 Apr 2025 21:22)  T(F): 97.5 (28 Apr 2025 05:52), Max: 97.8 (27 Apr 2025 21:22)  HR: 97 (28 Apr 2025 05:52) (88 - 97)  BP: 100/57 (28 Apr 2025 05:52) (100/57 - 128/75)  BP(mean): 72 (28 Apr 2025 05:52) (72 - 92)  RR: 18 (28 Apr 2025 05:52) (18 - 18)  SpO2: 97% (28 Apr 2025 05:52) (95% - 97%)    Parameters below as of 28 Apr 2025 05:52  Patient On (Oxygen Delivery Method): nasal cannula            Physical Exam:   89 y o man lying comfortably in semi Cisneros's position , awake , NAD     Head: normocephalic , atraumatic    Eyes: PERRLA , EOMI , no nystagmus , sclera anicteric    ENT / FACE: neg nasal discharge , uvula midline , no oropharyngeal erythema / exudate    Neck: supple , negative JVD , negative carotid bruits , no thyromegaly    Chest: CTA bilaterally , neg wheeze / rhonchi / rales / crackles / egophany    Cardiovascular: regular rate and rhythm , neg murmurs / rubs / gallops    Abdomen: peg in place , soft , non distended      Extremities: WWP , neg cyanosis /clubbing / edema , sacral dressing     Neurologic Exam:     awake , non verbal , does not follow commands     Motor Exam:         R appears paretic , minimal movement     Sensation:         winces to pinch x 4 ext    DTR:           biceps/brachioradialis: equal                            patella/ankle: equal          neg Babinski        Gait:  not tested         PM&R Impression: admitted for severe sepsis likely secondary to aspiration PNA and worsening sacral ulcer    - h/o CVA with right sided weakness in 2004 and 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    2) Current disposition plan recommendation:   d/c home with home physical and occupational therapy , lives with his wife , has 24/7 HHA      
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 Prior cultures from peg site grew MRSA, acetinobacter baumani and enterobactor cloacae, Presenting today with 3-4 days of fever at home with increased cough. Wife at bedside provided full history as patient does not speak. Wife shared he has been coughing a lot lately and it seems like he is trying to expectorate but is unable to do so. She tried to suction him at home without success. At baseline, largely does not speak a lot but she shared he speaks few words on occasion but has been more confused and non-verbal the past few days. States he is hard of hearing and his hearing aids require a battery replacement. His bowel movements are usually loose as he is fed shakes through his peg tube. He is entirely bedbound since his stroke and has a home visiting nurse who stated his sacral ulcer has some black spots and was worsening. Wife shared the patient has not been vomiting, having overt chest or abdominal pain. She came to the ED given his fevers, worsening mental status, worsening sacral ulcer and concern for aspiration PNA.     ED course:  Vitals: T 98.1, , 122/76, RR 20, 91% RA  Labs: WBC 17.65, hemoglobin 8.9, MCV 73.2, , K 3.8, BUN 43, Cr 0.95,  glucose 363, AG 11, lactate 5.7 --> 4.0  UA: spec grav 1.027, trace ketones, granular casts, BUN 43, Cr 0.95  EKG: sinus tachycardia HR  118 qtc 453  Imaging: CXR: RLL infiltrate   Interventions: cefepime 1g, vanc 1 g, ofirmev 1 g, 2.7 L        SURGERY ADDENDUM:  88 yo M with PMH DM, BPH, glaucoma, HFpEFm aortic root aneurysm (4.6cm), CVA with left-sided weakness (2004) and additional internal capsule CVA with seizure activity with previous admissions for AHRF secondary to HAP and aspiration PNA and sepsis secondary to infection at PEG site, now admitted after 3-4 days of fever, cough, and worsening mental status. The patient's wife at bedside states that over the past month the sacral ulcer has had increasing foul smelling drainage and additional black spots. Per the patient's wife the patient has not experienced any recent vomiting or abdominal pain.    During his admission he has been febrile to 101.2F, tachcyardic to 115, normotensive, with O2 requirement of 3L. On exam patient has an unstageable sacral ulcer 6cm in diameter with foul smelling, purulent drainage. PEG without surrounding erythema or purulence. Labs with persistent leukocytosis to 15.44 with a left shift. CTAP with new posterior presacral fluid and gas containing abscess with rim of enhancement due to worsening decubitus ulcer, approximately 7 x 3 cm - new cortical attenuation of underlying coccyx, suspicious for osteomyelitis. Patient started on Vancomycin and Cefepime, now on Zosyn with ID following. General Surgery consulted for evaluation of sacral decubitus ulcer.         Vital Signs Last 24 Hrs  T(C): 36.4 (25 Apr 2025 21:19), Max: 36.7 (25 Apr 2025 12:00)  T(F): 97.5 (25 Apr 2025 21:19), Max: 98.1 (25 Apr 2025 12:00)  HR: 88 (25 Apr 2025 21:19) (88 - 97)  BP: 94/56 (25 Apr 2025 21:19) (94/56 - 111/69)  BP(mean): 69 (25 Apr 2025 21:19) (69 - 69)  RR: 18 (25 Apr 2025 21:19) (18 - 18)  SpO2: 94% (25 Apr 2025 21:19) (94% - 96%)    Parameters below as of 25 Apr 2025 21:19  Patient On (Oxygen Delivery Method): nasal cannula  O2 Flow (L/min): 3      PHYSICAL EXAM  T(C): 36.4 (04-25-25 @ 21:19), Max: 36.7 (04-25-25 @ 12:00)  HR: 88 (04-25-25 @ 21:19) (88 - 97)  BP: 94/56 (04-25-25 @ 21:19) (94/56 - 111/69)  RR: 18 (04-25-25 @ 21:19) (18 - 18)  SpO2: 94% (04-25-25 @ 21:19) (94% - 96%)    CONSTITUTIONAL:  no apparent distress  EYES: PERRLA and symmetric, EOMI, No conjunctival or scleral injection, non-icteric  ENMT: Oral mucosa with moist membranes.   RESP: No respiratory distress, no use of accessory muscles  CV: RRR, +S1S2  GI: Soft, NT, ND, no rebound, no guarding, PEG in place without surrounding erythema  SACRUM: 6cm unstageable sacral decubitus ulcer with foul smelling drainage and purulence with necrotic wound edge  PSYCH: unable to assess      LABS:                        7.3    15.44 )-----------( 200      ( 25 Apr 2025 05:30 )             22.8     04-25    140  |  106  |  17  ----------------------------<  150[H]  3.2[L]   |  22  |  0.34[L]    Ca    8.3[L]      25 Apr 2025 05:30  Phos  3.4     04-25  Mg     1.8     04-25    TPro  5.9[L]  /  Alb  2.2[L]  /  TBili  0.4  /  DBili  x   /  AST  16  /  ALT  15  /  AlkPhos  98  04-24      Urinalysis Basic - ( 25 Apr 2025 05:30 )    Color: x / Appearance: x / SG: x / pH: x  Gluc: 150 mg/dL / Ketone: x  / Bili: x / Urobili: x   Blood: x / Protein: x / Nitrite: x   Leuk Esterase: x / RBC: x / WBC x   Sq Epi: x / Non Sq Epi: x / Bacteria: x        RADIOLOGY & ADDITIONAL STUDIES:    ACC: [id number] EXAM:  CT ABDOMEN AND PELVIS IC   ORDERED BY: ISSA GUZMAN     ACC: [id number] EXAM:  CT CHEST IC   ORDERED BY: KIEL BELLE     PROCEDURE DATE:  04/25/2025          INTERPRETATION:  CLINICAL INFORMATION: Bacteremia    COMPARISON: March 26, 2025    CONTRAST/COMPLICATIONS:  IV Contrast: IV contrast documented in unlinked concurrent exam   (accession [id number]), Isovue 370 (accession [id number])  90 cc administered     10 cc discarded  Oral Contrast: NONE  .    PROCEDURE:  CT of the Chest, Abdomen and Pelvis was performed.  Sagittal and coronal reformats were performed.    FINDINGS:  CHEST:  LUNGS AND LARGE AIRWAYS: Patent central airways. Previously described   right upper and middle lobe peribronchial opacities have improved. New   right lower lobe consolidation. Increased left lower lobe atelectasis or   consolidation.  PLEURA: Small right and trace left effusions.  VESSELS: Mild coronary calcifications.  HEART: Enlarged. Small pericardial effusion.  MEDIASTINUM AND MATHIEU: No lymphadenopathy.  CHEST WALL AND LOWER NECK: Within normal limits.    ABDOMEN AND PELVIS:  LIVER: Within normal limits.  BILE DUCTS: Normal caliber.  GALLBLADDER: Within normal limits.  SPLEEN: Within normal limits.  PANCREAS: Within normal limits.  ADRENALS: Within normal limits.  KIDNEYS/URETERS: Few cortical cysts. No hydronephrosis.    BLADDER: Small layering stones.  REPRODUCTIVE ORGANS: Prostatomegaly    BOWEL: No bowel obstruction or overt acute inflammation. Percutaneous   gastrostomy in distal gastric body.  PERITONEUM/RETROPERITONEUM: No ascites  VESSELS: Atherosclerotic changes.  LYMPH NODES: No adenopathy    BONES AND SOFT TISSUES: Since March 26, 2025, new posterior presacral   fluid and gas containing abscess with rim of enhancement due to worsening   decubitus ulcer, approximately 7 x 3 cm. New cortical attenuation of   underlying coccyx, suspicious for osteomyelitis.    IMPRESSION:    1. Since March 26, 2025, suspected recurrent aspiration and right lower   lobe pneumonia.  2. 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        --- End of Report ---            BIRGIT BAZZI MD; Attending Radiologist  This document has been electronically signed. Apr 25 2025  1:02PM
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 Prior cultures from peg site grew MRSA, acetinobacter baumani and enterobactor cloacae, Presenting today with 3-4 days of fever at home with increased cough. Wife at bedside provided full history as patient does not speak. Wife shared he has been coughing a lot lately and it seems like he is trying to expectorate but is unable to do so. She tried to suction him at home without success. At baseline, largely does not speak a lot but she shared he speaks few words on occasion but has been more confused and non-verbal the past few days. States he is hard of hearing and his hearing aids require a battery replacement. His bowel movements are usually loose as he is fed shakes through his peg tube. He is entirely bedbound since his stroke and has a home visiting nurse who stated his sacral ulcer has some black spots and was worsening. Wife shared the patient has not been vomiting, having overt chest or abdominal pain. She came to the ED given his fevers, worsening mental status, worsening sacral ulcer and concern for aspiration PNA.   ID consulted for sepsis managemnet     AT time of evlauation patients wife (caretaker) at bedside stating that patients status has changed from baseline. She reports him being febrile, tachycardic and sluggish at home. Health aides noticed worsening status of sacral ulcer       PAST MEDICAL & SURGICAL HISTORY:  Diabetes      HLD (hyperlipidemia)      BPH (benign prostatic hyperplasia)      JUSTYNA (iron deficiency anemia)      Stroke      No significant past surgical history            Review of Systems:   cannot ellicit from patient given nonverbal status       ANTIBIOTICS:  MEDICATIONS  (STANDING):  albuterol/ipratropium for Nebulization 3 milliLiter(s) Nebulizer every 6 hours  aspirin  chewable 81 milliGRAM(s) Oral every 24 hours  atorvastatin 40 milliGRAM(s) Oral at bedtime  cyanocobalamin 1000 MICROGram(s) Oral every 24 hours  dextrose 5%. 1000 milliLiter(s) (100 mL/Hr) IV Continuous <Continuous>  dextrose 5%. 1000 milliLiter(s) (50 mL/Hr) IV Continuous <Continuous>  dextrose 50% Injectable 25 Gram(s) IV Push once  dextrose 50% Injectable 12.5 Gram(s) IV Push once  dextrose 50% Injectable 25 Gram(s) IV Push once  finasteride 5 milliGRAM(s) Oral at bedtime  glucagon  Injectable 1 milliGRAM(s) IntraMuscular once  insulin glargine Injectable (LANTUS) 9 Unit(s) SubCutaneous at bedtime  insulin lispro (ADMELOG) corrective regimen sliding scale   SubCutaneous every 6 hours  levETIRAcetam  Solution 750 milliGRAM(s) Oral every 12 hours  multivitamin 1 Tablet(s) Oral daily  piperacillin/tazobactam IVPB.. 3.375 Gram(s) IV Intermittent every 8 hours  sodium chloride 3%  Inhalation 4 milliLiter(s) Inhalation every 12 hours  thiamine 100 milliGRAM(s) Oral every 24 hours  timolol 0.5% Solution 1 Drop(s) Both EYES every 12 hours    MEDICATIONS  (PRN):  acetaminophen     Tablet .. 650 milliGRAM(s) Oral every 6 hours PRN Temp greater or equal to 38C (100.4F), Mild Pain (1 - 3)  dextrose Oral Gel 15 Gram(s) Oral once PRN Blood Glucose LESS THAN 70 milliGRAM(s)/deciliter      Allergies    No Known Allergies    Intolerances        SOCIAL HISTORY:    FAMILY HISTORY:  No pertinent family history in first degree relatives     no FH leading to current infection    Vital Signs Last 24 Hrs  T(C): 36.4 (24 Apr 2025 11:30), Max: 36.7 (23 Apr 2025 22:00)  T(F): 97.6 (24 Apr 2025 11:30), Max: 98.1 (23 Apr 2025 22:00)  HR: 92 (24 Apr 2025 11:30) (91 - 93)  BP: 103/66 (24 Apr 2025 11:30) (103/63 - 114/71)  BP(mean): 78 (24 Apr 2025 11:30) (78 - 78)  RR: 18 (24 Apr 2025 11:30) (18 - 18)  SpO2: 94% (24 Apr 2025 11:30) (94% - 96%)    Parameters below as of 24 Apr 2025 11:30  Patient On (Oxygen Delivery Method): nasal cannula  O2 Flow (L/min): 3        PHYSICAL EXAM:  Constitutional: NAD laying in bed   Pulmonary: no respiratory distress, CTA in anterior lung fields on 3L NC   Heart: heart rate was normal and rhythm regular  Vascular:. there was no peripheral edema  Abdomen: soft, non-tender  Neurological: bedbound, nonverbal, tracks with eyes   sacrum: wound evaluated, unstagable ulcer       LABS:                        7.7    16.47 )-----------( 202      ( 24 Apr 2025 07:02 )             24.7     04-24    145  |  110[H]  |  23  ----------------------------<  261[H]  3.1[L]   |  23  |  0.35[L]    Ca    8.5      24 Apr 2025 07:02  Phos  2.2     04-24  Mg     1.9     04-24    TPro  5.9[L]  /  Alb  2.2[L]  /  TBili  0.4  /  DBili  x   /  AST  16  /  ALT  15  /  AlkPhos  98  04-24    PT/INR - ( 23 Apr 2025 05:30 )   PT: 13.5 sec;   INR: 1.16          PTT - ( 23 Apr 2025 05:30 )  PTT:25.0 sec  Urinalysis Basic - ( 24 Apr 2025 07:02 )    Color: x / Appearance: x / SG: x / pH: x  Gluc: 261 mg/dL / Ketone: x  / Bili: x / Urobili: x   Blood: x / Protein: x / Nitrite: x   Leuk Esterase: x / RBC: x / WBC x   Sq Epi: x / Non Sq Epi: x / Bacteria: x        MICROBIOLOGY:  Reviewed    RADIOLOGY & ADDITIONAL STUDIES:  Reviewed
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 Prior cultures from peg site grew MRSA, acetinobacter baumani and enterobactor cloacae, Presenting today with 3-4 days of fever at home with increased cough. Wife at bedside provided full history as patient does not speak. Wife shared he has been coughing a lot lately and it seems like he is trying to expectorate but is unable to do so. She tried to suction him at home without success. At baseline, largely does not speak a lot but she shared he speaks few words on occasion but has been more confused and non-verbal the past few days. States he is hard of hearing and his hearing aids require a battery replacement. His bowel movements are usually loose as he is fed shakes through his peg tube. He is entirely bedbound since his stroke and has a home visiting nurse who stated his sacral ulcer has some black spots and was worsening. Wife shared the patient has not been vomiting, having overt chest or abdominal pain. She came to the ED given his fevers, worsening mental status, worsening sacral ulcer and concern for aspiration PNA.     ED course:  Vitals: T 98.1, , 122/76, RR 20, 91% RA  Labs: WBC 17.65, hemoglobin 8.9, MCV 73.2, , K 3.8, BUN 43, Cr 0.95,  glucose 363, AG 11, lactate 5.7 --> 4.0  UA: spec grav 1.027, trace ketones, granular casts, BUN 43, Cr 0.95  EKG: sinus tachycardia HR  118 qtc 453  Imaging: CXR: RLL infiltrate   Interventions: cefepime 1g, vanc 1 g, ofirmev 1 g, 2.7 L  Consults:  (23 Apr 2025 03:04)    PERTINENT PM/SXH:   Diabetes    HLD (hyperlipidemia)    BPH (benign prostatic hyperplasia)    JUSTYNA (iron deficiency anemia)    Stroke      No significant past surgical history      FAMILY HISTORY:  No pertinent family history in first degree relatives      ITEMS NOT CHECKED ARE NOT PRESENT    SOCIAL HISTORY:   Significant other/partner:  []  Children:  []   Substance hx:  []   Tobacco hx:  []   Alcohol hx: []   Home Opioid hx:  [] I-Stop Reference No:  - no active Rx's / see chart note  Living Situation: []Home  []Long term care  []Rehab []Other  Oriental orthodox/Spiritual practice: ; Role of organized Shinto [ ] important [ ] some [ ] unable to assess  Coping: [ ] well [ ] with difficulty [ ] poor coping [ ] unable to assess  Support system: [ ] strong [ ] adequate [ ] inadequate    ADVANCE DIRECTIVES:    []MOLST  []Living Will  DECISION MAKER(s):  [] Health Care Proxy(s)  [] Surrogate(s)  [] Guardian           Name(s)/Phone Number(s):     BASELINE (I)ADLs (prior to admission):  [place]: []Total  [] Moderate []Dependent    ALLERGIES:  No Known Allergies    MEDICATIONS  (STANDING):  albuterol/ipratropium for Nebulization 3 milliLiter(s) Nebulizer every 6 hours  aspirin  chewable 81 milliGRAM(s) Oral every 24 hours  atorvastatin 40 milliGRAM(s) Oral at bedtime  cyanocobalamin 1000 MICROGram(s) Oral every 24 hours  dextrose 5%. 1000 milliLiter(s) (100 mL/Hr) IV Continuous <Continuous>  dextrose 5%. 1000 milliLiter(s) (50 mL/Hr) IV Continuous <Continuous>  dextrose 50% Injectable 25 Gram(s) IV Push once  dextrose 50% Injectable 12.5 Gram(s) IV Push once  dextrose 50% Injectable 25 Gram(s) IV Push once  finasteride 5 milliGRAM(s) Oral at bedtime  glucagon  Injectable 1 milliGRAM(s) IntraMuscular once  insulin glargine Injectable (LANTUS) 9 Unit(s) SubCutaneous at bedtime  insulin lispro (ADMELOG) corrective regimen sliding scale   SubCutaneous every 6 hours  levETIRAcetam  Solution 750 milliGRAM(s) Oral every 12 hours  multivitamin 1 Tablet(s) Oral daily  piperacillin/tazobactam IVPB.. 3.375 Gram(s) IV Intermittent every 8 hours  sodium chloride 3%  Inhalation 4 milliLiter(s) Inhalation every 12 hours  thiamine 100 milliGRAM(s) Oral every 24 hours  timolol 0.5% Solution 1 Drop(s) Both EYES every 12 hours    MEDICATIONS  (PRN):  acetaminophen     Tablet .. 650 milliGRAM(s) Oral every 6 hours PRN Temp greater or equal to 38C (100.4F), Mild Pain (1 - 3)  dextrose Oral Gel 15 Gram(s) Oral once PRN Blood Glucose LESS THAN 70 milliGRAM(s)/deciliter    PRESENT SYMPTOMS: []Unable to obtain due to poor mentation/encephalopathy  Source if other than patient:  []Family   []Team     Pain: [ ] yes [ ] no  QOL impact -   Location -                    Aggravating Factors -  Quality -  Radiation -  Timing -  Severity (0-10 scale) -   Minimal Acceptable Level (0-10 scale) -    PAIN AD Score:  [email or web address] (press ctrl +  left click to view)    Dyspnea:                           [ ]Mild  [ ]Moderate [ ]Severe  Anxiety:                             [ ]Mild [ ]Moderate [ ]Severe  Fatigue:                             [ ]Mild [ ]Moderate [ ]Severe  Nausea:                             [ ]Mild [ ]Moderate [ ]Severe  Loss of Appetite:             [ ]Mild [ ]Moderate [ ]Severe  Constipation:                   [ ]Mild [ ]Moderate [ ]Severe    Other Symptoms:  [ ]All other review of systems negative     Palliative Performance Status Version 2:  %    [email or web address]    PHYSICAL EXAM:  GENERAL:  [ ]Alert  [ ]Oriented x   [ ]Lethargic  [ ]Cachexia  [ ]Unarousable  [ ]Verbal  [ ]Non-Verbal  Behavioral:   [ ]Anxiety  [ ]Delirium [ ]Agitation [ ]Cooperative  HEENT:  [ ]Normal   [ ]Dry mouth   [ ]ET Tube/Trach  [ ]Oral lesions  PULMONARY:   [ ]Clear []Tachypnea  []Audible excessive secretions   [ ]Rhonchi        [ ]Right [ ]Left [ ]Bilateral  [ ]Crackles        [ ]Right [ ]Left [ ]Bilateral  [ ]Wheezing     [ ]Right [ ]Left [ ]Bilateral  CARDIOVASCULAR:    [ ]Regular [ ]Irregular [ ]Tachy  [ ]Kevin [ ]Murmur [ ]Other  GASTROINTESTINAL:  [ ]Soft  [ ]Distended   [ ]+BS  [ ]Non tender [ ]Tender  [ ]PEG [ ]OGT/ NGT  Last BM:     GENITOURINARY:  [ ]Normal [ ] Incontinent   [ ]Oliguria/Anuria   [ ]Galindo  MUSCULOSKELETAL:   [ ]Normal   [ ]Weakness  [ ]Bed/Wheelchair bound [ ]Edema  NEUROLOGIC:   [ ]No focal deficits  [ ]Cognitive impairment  [ ]Dysphagia [ ]Dysarthria [ ]Paresis [ ]Encephalopathic   SKIN:   [ ]Normal   [ ]Pressure ulcer(s)  [ ]Rash    CRITICAL CARE:  [ ]Shock Present  [ ]Septic [ ]Cardiogenic [ ]Neurologic [ ]Hypovolemic  [ ]Vasopressors [ ]Inotropes   [ ]Respiratory failure present [ ]Mechanical Ventilation [ ]Non-invasive ventilatory support [ ]High-Flow  [ ]Acute  [ ]Chronic [ ]Hypoxic  [ ]Hypercarbic  [ ]Other organ failure    Vital Signs Last 24 Hrs  T(C): 36.6 (25 Apr 2025 05:23), Max: 37.1 (24 Apr 2025 21:10)  T(F): 97.8 (25 Apr 2025 05:23), Max: 98.7 (24 Apr 2025 21:10)  HR: 97 (25 Apr 2025 05:23) (90 - 97)  BP: 106/66 (25 Apr 2025 05:23) (103/66 - 109/65)  BP(mean): 78 (24 Apr 2025 11:30) (78 - 78)  RR: 18 (25 Apr 2025 05:23) (18 - 18)  SpO2: 96% (25 Apr 2025 05:23) (94% - 99%)    Parameters below as of 25 Apr 2025 05:23  Patient On (Oxygen Delivery Method): nasal cannula     I&O's Summary    24 Apr 2025 07:01  -  25 Apr 2025 07:00  --------------------------------------------------------  IN: 0 mL / OUT: 420 mL / NET: -420 mL        LABS:                        7.3    15.44 )-----------( 200      ( 25 Apr 2025 05:30 )             22.8   04-25    140  |  106  |  17  ----------------------------<  150[H]  3.2[L]   |  22  |  0.34[L]    Ca    8.3[L]      25 Apr 2025 05:30  Phos  3.4     04-25  Mg     1.8     04-25    TPro  5.9[L]  /  Alb  2.2[L]  /  TBili  0.4  /  DBili  x   /  AST  16  /  ALT  15  /  AlkPhos  98  04-24    Urinalysis Basic - ( 25 Apr 2025 05:30 )    Color: x / Appearance: x / SG: x / pH: x  Gluc: 150 mg/dL / Ketone: x  / Bili: x / Urobili: x   Blood: x / Protein: x / Nitrite: x   Leuk Esterase: x / RBC: x / WBC x   Sq Epi: x / Non Sq Epi: x / Bacteria: x      RADIOLOGY & ADDITIONAL STUDIES:      PROTEIN CALORIE MALNUTRITION PRESENT: [ ]mild [ ]moderate [ ]severe [ ]underweight [ ]morbid obesity  [ ]PPSV2 < or = to 30% [ ]significant weight loss  [ ]poor nutritional intake [ ]catabolic state [ ]anasarca     Artificial Nutrition [ ]     REFERRALS:  [x]Social Work  [ ]Case management [ ]PT/OT [ ]Chaplaincy  [ ]Hospice  [ ]Patient/Family Support    DISCUSSION OF CASE: Family - to obtain additional history and to provide emotional support; ( ) -

## 2025-04-28 NOTE — PROGRESS NOTE ADULT - PROBLEM SELECTOR PLAN 9
Echo 4/2024; Normal left and right ventricular size and systolic function; EF 56%. Not on any GDMT.    - ctm for signs of overload Home med: atorvastatin 40 mg QD    - c/w home med

## 2025-04-28 NOTE — PROGRESS NOTE ADULT - PROBLEM SELECTOR PLAN 3
Present on admission. Patient has visiting RN who noted the sacral ulcer is worsening and had black appearance in some areas. Patient is entirely bedbound since stroke    - c/w empiric abx as above  - f/up wound care recs as above  - Per wound care, wound debridement at this point may ultimately cause more harm by increasing area of exposed bone Present on admission. Patient has visiting RN who noted the sacral ulcer is worsening and had black appearance in some areas. Patient is entirely bedbound since stroke    - c/w empiric abx as above  - f/up wound care and surgery recs as above

## 2025-04-28 NOTE — PROGRESS NOTE ADULT - PROBLEM SELECTOR PLAN 11
Presenting hemoglobin 8.9 reduced to 8.1 after fluids. Was discharged in april with a hemoglobin of 8.6. 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   s/p 1u pRBC 4/26     - maintain active t/s  - transfuse to maintain>7  -C/W cyanocobalamin 1000mg qd, multivitamin, thiamine Home med: timolol 0.5 mg 1 drop BID    - c/w home med

## 2025-04-28 NOTE — PROGRESS NOTE ADULT - ASSESSMENT
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

## 2025-04-28 NOTE — PROGRESS NOTE ADULT - SUBJECTIVE AND OBJECTIVE BOX
***INCOMPLETE NOTE*** Patient is a 89y old  Male who presents with a chief complaint of sepsis (26 Apr 2025 06:59)       INTERVAL HPI/OVERNIGHT EVENTS: No acute events overnight.     SUBJECTIVE: Patient seen and examined this morning with Cantevertone  Rachana #[id number]. History obtained from patient's wife as patient is non-verbal. Patient's wife states that he appears to be breathing comfortably and not displaying signs of pain/discomfort. She reports that his LBM was 2 days ago.     All other review of systems negative except otherwise noted in HPI above.    MEDICATIONS  (STANDING):  albuterol/ipratropium for Nebulization 3 milliLiter(s) Nebulizer every 6 hours  aspirin  chewable 81 milliGRAM(s) Oral every 24 hours  atorvastatin 40 milliGRAM(s) Oral at bedtime  cyanocobalamin 1000 MICROGram(s) Oral every 24 hours  dextrose 5%. 1000 milliLiter(s) (50 mL/Hr) IV Continuous <Continuous>  dextrose 5%. 1000 milliLiter(s) (100 mL/Hr) IV Continuous <Continuous>  dextrose 50% Injectable 25 Gram(s) IV Push once  dextrose 50% Injectable 12.5 Gram(s) IV Push once  dextrose 50% Injectable 25 Gram(s) IV Push once  finasteride 5 milliGRAM(s) Oral at bedtime  glucagon  Injectable 1 milliGRAM(s) IntraMuscular once  insulin glargine Injectable (LANTUS) 9 Unit(s) SubCutaneous at bedtime  insulin lispro (ADMELOG) corrective regimen sliding scale   SubCutaneous every 6 hours  levETIRAcetam  Solution 750 milliGRAM(s) Oral every 12 hours  multivitamin 1 Tablet(s) Oral daily  piperacillin/tazobactam IVPB.. 3.375 Gram(s) IV Intermittent every 8 hours  sodium chloride 3%  Inhalation 4 milliLiter(s) Inhalation every 12 hours  thiamine 100 milliGRAM(s) Oral every 24 hours  timolol 0.5% Solution 1 Drop(s) Both EYES every 12 hours    MEDICATIONS  (PRN):  acetaminophen     Tablet .. 650 milliGRAM(s) Oral every 6 hours PRN Temp greater or equal to 38C (100.4F), Mild Pain (1 - 3)  dextrose Oral Gel 15 Gram(s) Oral once PRN Blood Glucose LESS THAN 70 milliGRAM(s)/deciliter    Allergies    No Known Allergies    Intolerances      Vital Signs Last 24 Hrs  T(C): 36.4 (28 Apr 2025 12:20), Max: 36.6 (27 Apr 2025 21:22)  T(F): 97.6 (28 Apr 2025 12:20), Max: 97.8 (27 Apr 2025 21:22)  HR: 83 (28 Apr 2025 12:20) (83 - 97)  BP: 107/64 (28 Apr 2025 12:20) (100/57 - 128/75)  BP(mean): 72 (28 Apr 2025 05:52) (72 - 92)  RR: 17 (28 Apr 2025 12:20) (17 - 18)  SpO2: 97% (28 Apr 2025 12:20) (95% - 97%)    Parameters below as of 28 Apr 2025 12:20  Patient On (Oxygen Delivery Method): nasal cannula  O2 Flow (L/min): 3    PHYSICAL EXAM:  Constitutional - NAD, Comfortable-appearing  HEENT - NCAT, EOMI, NC in place  Chest - Breathing comfortably on 3L NC, CTAB  Cardio - Warm and well perfused, RRR  Abdomen - Soft, NTND, +BS. PEG site without surrounding erythema, induration, or drainage.  Extremities - No peripheral edema, No calf tenderness   Neurologic - Awake and alert. Makes eye contact to voice. Not following commands.  Psychiatric - Mood stable, Affect WNL    LABS:                        7.7    14.44 )-----------( 216      ( 28 Apr 2025 08:46 )             23.8     28 Apr 2025 08:46    136    |  103    |  14     ----------------------------<  245    3.6     |  21     |  0.33     Ca    7.8        28 Apr 2025 08:46  Phos  2.0       28 Apr 2025 08:46  Mg     1.8       28 Apr 2025 08:46    TPro  5.4    /  Alb  2.2    /  TBili  0.3    /  DBili  x      /  AST  34     /  ALT  20     /  AlkPhos  111    28 Apr 2025 08:46    PT/INR - ( 28 Apr 2025 08:46 )   PT: 12.7 sec;   INR: 1.11          PTT - ( 28 Apr 2025 08:46 )  PTT:27.8 sec  CAPILLARY BLOOD GLUCOSE      POCT Blood Glucose.: 304 mg/dL (28 Apr 2025 12:44)  POCT Blood Glucose.: 220 mg/dL (28 Apr 2025 06:30)  POCT Blood Glucose.: 173 mg/dL (28 Apr 2025 00:09)  POCT Blood Glucose.: 145 mg/dL (27 Apr 2025 22:18)  POCT Blood Glucose.: 168 mg/dL (27 Apr 2025 18:01)    BLOOD CULTURE  04-26 @ 12:30   No growth at 24 hours  --  --  04-25 @ 18:41   No growth at 48 Hours  --  --    RADIOLOGY & ADDITIONAL TESTS:

## 2025-04-28 NOTE — PROGRESS NOTE ADULT - SUBJECTIVE AND OBJECTIVE BOX
SUBJECTIVE:   Patient was seen and examined in the room this morning with the surgery team. Patient is non-verbal, grimaces and grunts to palpation of the sacrum     Vital Signs Last 24 Hrs  T(C): 36.4 (28 Apr 2025 05:52), Max: 36.6 (27 Apr 2025 21:22)  T(F): 97.5 (28 Apr 2025 05:52), Max: 97.8 (27 Apr 2025 21:22)  HR: 97 (28 Apr 2025 05:52) (88 - 97)  BP: 100/57 (28 Apr 2025 05:52) (100/57 - 128/75)  BP(mean): 72 (28 Apr 2025 05:52) (72 - 92)  RR: 18 (28 Apr 2025 05:52) (18 - 18)  SpO2: 97% (28 Apr 2025 05:52) (95% - 97%)    Parameters below as of 28 Apr 2025 05:52  Patient On (Oxygen Delivery Method): nasal cannula          04-27-25 @ 07:01  -  04-28-25 @ 07:00  --------------------------------------------------------  IN:  Total IN: 0 mL    OUT:    Incontinent per Condom Catheter (mL): 300 mL    Voided (mL): 450 mL  Total OUT: 750 mL    Total NET: -750 mL        Physical Exam:    General: AAOx3, NAD, laying comfortably in bed  Cardio: S1,S2, normotensive; normal HR   Pulm: Nonlabored breathing  Abdomen: soft; non-distended; non-tender  Sacrum: eschar in the sacral decub from debridement yesterday. Hemostatic with minimal to no ooze from left lateral edge. Packed with wet to dry. 1cm tunneling in the left lateral and inferior wound. No obvious purulence, minimal surrounding reactive erythema     acetaminophen     Tablet .. 650 milliGRAM(s) Oral every 6 hours PRN  albuterol/ipratropium for Nebulization 3 milliLiter(s) Nebulizer every 6 hours  aspirin  chewable 81 milliGRAM(s) Oral every 24 hours  atorvastatin 40 milliGRAM(s) Oral at bedtime  cyanocobalamin 1000 MICROGram(s) Oral every 24 hours  dextrose 5%. 1000 milliLiter(s) IV Continuous <Continuous>  dextrose 5%. 1000 milliLiter(s) IV Continuous <Continuous>  dextrose 50% Injectable 25 Gram(s) IV Push once  dextrose 50% Injectable 12.5 Gram(s) IV Push once  dextrose 50% Injectable 25 Gram(s) IV Push once  dextrose Oral Gel 15 Gram(s) Oral once PRN  finasteride 5 milliGRAM(s) Oral at bedtime  glucagon  Injectable 1 milliGRAM(s) IntraMuscular once  insulin glargine Injectable (LANTUS) 9 Unit(s) SubCutaneous at bedtime  insulin lispro (ADMELOG) corrective regimen sliding scale   SubCutaneous every 6 hours  levETIRAcetam  Solution 750 milliGRAM(s) Oral every 12 hours  multivitamin 1 Tablet(s) Oral daily  piperacillin/tazobactam IVPB.. 3.375 Gram(s) IV Intermittent every 8 hours  sodium chloride 3%  Inhalation 4 milliLiter(s) Inhalation every 12 hours  thiamine 100 milliGRAM(s) Oral every 24 hours  timolol 0.5% Solution 1 Drop(s) Both EYES every 12 hours      LABS:                        8.9    16.47 )-----------( 204      ( 27 Apr 2025 05:30 )             27.1     04-27    138  |  103  |  12  ----------------------------<  162[H]  3.7   |  24  |  0.29[L]    Ca    7.9[L]      27 Apr 2025 05:30  Phos  1.9     04-27  Mg     1.8     04-27        Urinalysis Basic - ( 27 Apr 2025 05:30 )    Color: x / Appearance: x / SG: x / pH: x  Gluc: 162 mg/dL / Ketone: x  / Bili: x / Urobili: x   Blood: x / Protein: x / Nitrite: x   Leuk Esterase: x / RBC: x / WBC x   Sq Epi: x / Non Sq Epi: x / Bacteria: x

## 2025-04-28 NOTE — PROGRESS NOTE ADULT - PROBLEM SELECTOR PLAN 7
Patient takes doxazosin 1 mg QHS and finasteride 5 mg QD; did require hudson in the past.     - holding home Doxazosin in setting of low pressures/sepsis  - Resume home Finasteride 5mg qd  - ctm UOP   - bladder scans q6 Home meds: metformin 500 mg 3 times a day, januvia 50 mg QD; wife does not give medications as prescribed as she was told by PCP that he does not need medication anymore given age and last A1c ~7.     - Lantus 9U qhs  - ctm FS q6 abdi since NPO  - Moderate ISS q6

## 2025-04-28 NOTE — PROGRESS NOTE ADULT - ASSESSMENT
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 CTAP with new posterior presacral fluid and gas containing abscess with rim of enhancement due to worsening decubitus ulcer, approximately 7 x 3 cm - new cortical attenuation of underlying coccyx, suspicious for osteomyelitis. General Surgery consulted for evaluation of sacral decubitus ulcer. Now s/p bedside debridement 4/27, post procedure bleeding that was controlled with electrocautery. No acute events overnight otherwise.     Recommendations   - Antibiotics per ID   - BID dressing changes wet to dry  - Rest of the care as per primary

## 2025-04-28 NOTE — PROGRESS NOTE ADULT - PROBLEM SELECTOR PLAN 2
Patient has been having worsening cough and unable to expectorate. CXR RLL infiltrate. Secretions audible in upper airways.     - aspiration precautions  - c/w treatment of PNA as above  - c/w supplemental oxygen; wean as tolerated to baseline 2 L (on oxygen ever since developing COVID-19 in a NH)  - palliative consult placed as repeat admission for similar PNA  - Duonebs q6h, hypertonic saline nebs q12h for airway clearance  - Maintain 30 degree elevation of head during tube feeds Patient has been having worsening cough and unable to expectorate. CXR RLL infiltrate. Secretions audible in upper airways.     - aspiration precautions  - c/w treatment of PNA and sacral ulcer infection as above  - c/w supplemental oxygen; wean as tolerated to baseline 2L (on oxygen ever since developing COVID-19 in a NH)  - palliative consult placed as repeat admission for similar PNA  - Duonebs q6h, hypertonic saline nebs q12h for airway clearance  - Maintain 30 degree elevation of head during tube feeds

## 2025-04-28 NOTE — PROGRESS NOTE ADULT - PROBLEM SELECTOR PLAN 5
Pt with a hx of stroke 2004 with residual left-sided weakness. S/p PEG placement  MRI brain on 4/28 with R internal capsule infarct   Head CT 5/5: subacute infarct centered in the genu and posterior limb of the right internal capsule is stable.  A chronic transcortical infarction in the left precentral gyrus and a small chronic infarct in the right cerebellar hemisphere are stable.  Home meds: ASA 81mg, Atorvastatin 40mg, keppra 750mg (seizure ppx), per wife pt never had seizures.    - C/W home meds  - Tube feeds resumed, parameters per nutrition recs Presenting hemoglobin 8.9 reduced to 8.1 after fluids. Was discharged in april with a hemoglobin of 8.6. 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   Now with acute bleeding s/p bedside debridement  s/p 1u pRBC 4/26, 1u pRBC 4/28    - maintain active t/s  - transfuse to maintain>7  -C/W cyanocobalamin 1000mg qd, multivitamin, thiamine

## 2025-04-28 NOTE — PROGRESS NOTE ADULT - PROBLEM SELECTOR PLAN 6
Home meds: metformin 500 mg 3 times a day, januvia 50 mg QD; wife does not give medications as prescribed as she was told by PCP that he does not need medication anymore given age and last A1c ~7.     - Lantus 9U qhs  - ctm FS q6 abdi since NPO  - Moderate ISS q6 Pt with a hx of stroke 2004 with residual left-sided weakness. S/p PEG placement  MRI brain on 4/28 with R internal capsule infarct   Head CT 5/5: subacute infarct centered in the genu and posterior limb of the right internal capsule is stable.  A chronic transcortical infarction in the left precentral gyrus and a small chronic infarct in the right cerebellar hemisphere are stable.  Home meds: ASA 81mg, Atorvastatin 40mg, keppra 750mg (seizure ppx), per wife pt never had seizures.    - C/W home meds  - Tube feeds resumed, parameters per nutrition recs

## 2025-04-28 NOTE — CONSULT NOTE ADULT - ASSESSMENT
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     FULL CONSULT TO FOLLOW
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       Problem/Plan - 1:  ·  Problem: Severe sepsis.   ·  Plan: #Lactic acidosis, IMPROVED  Likely 2/2 aspiration PNA vs. infected sacral decubitus ulcer; has similar hospitalization in early April 2025. Sacral ulcer with areas of eschar. PEG site without infectious signs  D/gokul empiric Vancomycin on 4/23 despite previous +MRSA wound/PEG site culture as currently without signs of acute SSTI. However, assessment of sacral decubitus ulcer by wound care on 4/24 revealed foul odor and mild expressed purulence.   CT chest/abd/pelvis 4/25: Suspected recurrent aspiration and right lower lobe pneumonia. 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    - ID, wound care recommendations  - Gen surg to debride sacral wound, consented wife   - F/u admission BCx 4/22 - +Bacteroides fragilis, +Strep species  - F/u surveillance BCx 4/24, 4/25  - c/w empiric Zosyn 4.5mg q8  - tylenol for fever and pain  - F/u TTE for r/o endocarditis.    Problem/Plan - 2:  ·  Problem: Aspiration pneumonia.   ·  Plan: Patient has been having worsening cough and unable to expectorate. CXR RLL infiltrate. Secretions audible in upper airways.     - aspiration precautions  - c/w treatment of PNA as above  - c/w supplemental oxygen; wean as tolerated to baseline 2 L (on oxygen ever since developing COVID-19 in a NH)  - palliative consult placed as repeat admission for similar PNA  - Duonebs q6h, hypertonic saline nebs q12h for airway clearance  - Maintain 30 degree elevation of head during tube feeds.    Problem/Plan - 3:  ·  Problem: Sacral pressure ulcer.   ·  Plan: Present on admission. Patient has visiting RN who noted the sacral ulcer is worsening and had black appearance in some areas. Patient is entirely bedbound since stroke    - c/w empiric abx as above  - f/up wound care recs as above  - Per wound care, wound debridement at this point may ultimately cause more harm by increasing area of exposed bone.    Problem/Plan - 4:  ·  Problem: Acute tubular necrosis.   ·  Plan: IMPROVING UA: spec grav 1.027, trace ketones, granular casts; s/p 3.7 L total. Likely in the setting of poor free water intake via peg    - Continue to trend Cr  - Avoid nephrotoxic agents  - Adjust medication dosages for level of renal function.    Problem/Plan - 5:  ·  Problem: History of CVA (cerebrovascular accident).   ·  Plan: Pt with a hx of stroke 2004 with residual left-sided weakness. S/p PEG placement  MRI brain on 4/28 with R internal capsule infarct   Head CT 5/5: subacute infarct centered in the genu and posterior limb of the right internal capsule is stable.  A chronic transcortical infarction in the left precentral gyrus and a small chronic infarct in the right cerebellar hemisphere are stable.  Home meds: ASA 81mg, Atorvastatin 40mg, keppra 750mg (seizure ppx), per wife pt never had seizures.    - C/W home meds  - Tube feeds resumed, parameters per nutrition recs.    Problem/Plan - 6:  ·  Problem: DM (diabetes mellitus).   ·  Plan: Home meds: metformin 500 mg 3 times a day, januvia 50 mg QD; wife does not give medications as prescribed as she was told by PCP that he does not need medication anymore given age and last A1c ~7.     - Lantus 9U qhs  - ctm FS q6 abdi since NPO  - Moderate ISS q6.    Problem/Plan - 7:  ·  Problem: BPH (benign prostatic hyperplasia).   ·  Plan: Patient takes doxazosin 1 mg QHS and finasteride 5 mg QD; did require hudson in the past.     - holding home Doxazosin in setting of low pressures/sepsis  - Resume home Finasteride 5mg qd  - ctm UOP   - bladder scans q6.    Problem/Plan - 8:  ·  Problem: HLD (hyperlipidemia).   ·  Plan: Home med: atorvastatin 40 mg QD    - c/w home med.    Problem/Plan - 9:  ·  Problem: Chronic heart failure with preserved ejection fraction (HFpEF, >= 50%).   ·  Plan: Echo 4/2024; Normal left and right ventricular size and systolic function; EF 56%. Not on any GDMT.    - ctm for signs of overload.    Problem/Plan - 10:  ·  Problem: Glaucoma.   ·  Plan; Home med: timolol 0.5 mg 1 drop BID    - c/w home med.    Problem/Plan - 11:  ·  Problem: Anemia.   ·  Plan: Presenting hemoglobin 8.9 reduced to 8.1 after fluids. Was discharged in april with a hemoglobin of 8.6. 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   s/p 1u pRBC 4/26     - maintain active t/s  - transfuse to maintain>7  -C/W cyanocobalamin 1000mg qd, multivitamin, thiamine.    Problem/Plan - 12:  ·  Problem: Prophylactic measure.   ·  Plan: F: s/p 3.7 L  E: replete PRN  N: keep NPO  DVT ppx: lovenox 40 mg QD  Dispo: [facility].  
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 CTAP with new posterior presacral fluid and gas containing abscess with rim of enhancement due to worsening decubitus ulcer, approximately 7 x 3 cm - new cortical attenuation of underlying coccyx, suspicious for osteomyelitis. General Surgery consulted for evaluation of sacral decubitus ulcer.     Recommendations:  - Discussed with patient and wife, offered bedside debridement of sacral decubitus ulcer. Wife initially agreed, though now would like to wait for offloading mattress if available  - Request discussed with primary team, will continue to follow when/if wife amenable to bedside debridement  - Thorough discussion had with wife regarding patient overall status. Discussed that patient is not currently an operative candidate given comorbidities and active pneumonia. Discussed that even with debridement, extent of patient's infection including likely osteomyelitis, is unlikely to completely resolve with debridement  - Continue with abx  - Continue nutritional optimization  - Continue intermittent weight offloading   General Surgery team 4c will continue to follow.       
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  ID consulted for management     Recommendations:   - bcx with Bacteroides species as such can c/w non psn Zosyn at this time with likely source of infection being that of sacral ulcer  - appreciate wound care recs   - obtain MRSA Nares   - f/u bcx   - can consider obtaining CT A/P for further eval     ID team 1 to follow

## 2025-04-29 LAB
ANION GAP SERPL CALC-SCNC: 11 MMOL/L — SIGNIFICANT CHANGE UP (ref 5–17)
BUN SERPL-MCNC: 13 MG/DL — SIGNIFICANT CHANGE UP (ref 7–23)
CALCIUM SERPL-MCNC: 7.9 MG/DL — LOW (ref 8.4–10.5)
CHLORIDE SERPL-SCNC: 106 MMOL/L — SIGNIFICANT CHANGE UP (ref 96–108)
CO2 SERPL-SCNC: 19 MMOL/L — LOW (ref 22–31)
CREAT SERPL-MCNC: 0.28 MG/DL — LOW (ref 0.5–1.3)
CULTURE RESULTS: SIGNIFICANT CHANGE UP
EGFR: 118 ML/MIN/1.73M2 — SIGNIFICANT CHANGE UP
EGFR: 118 ML/MIN/1.73M2 — SIGNIFICANT CHANGE UP
GLUCOSE SERPL-MCNC: 178 MG/DL — HIGH (ref 70–99)
MAGNESIUM SERPL-MCNC: 2.1 MG/DL — SIGNIFICANT CHANGE UP (ref 1.6–2.6)
PHOSPHATE SERPL-MCNC: 2.4 MG/DL — LOW (ref 2.5–4.5)
POTASSIUM SERPL-MCNC: 4.4 MMOL/L — SIGNIFICANT CHANGE UP (ref 3.5–5.3)
POTASSIUM SERPL-SCNC: 4.4 MMOL/L — SIGNIFICANT CHANGE UP (ref 3.5–5.3)
SODIUM SERPL-SCNC: 136 MMOL/L — SIGNIFICANT CHANGE UP (ref 135–145)
SPECIMEN SOURCE: SIGNIFICANT CHANGE UP

## 2025-04-29 PROCEDURE — 99233 SBSQ HOSP IP/OBS HIGH 50: CPT

## 2025-04-29 RX ORDER — LEVETIRACETAM 10 MG/ML
750 INJECTION, SOLUTION INTRAVENOUS EVERY 12 HOURS
Refills: 0 | Status: DISCONTINUED | OUTPATIENT
Start: 2025-04-29 | End: 2025-04-29

## 2025-04-29 RX ORDER — LIPASE/PROTEASE/AMYLASE 10K-37.5K
1 CAPSULE,DELAYED RELEASE (ENTERIC COATED) ORAL ONCE
Refills: 0 | Status: DISCONTINUED | OUTPATIENT
Start: 2025-04-29 | End: 2025-04-29

## 2025-04-29 RX ORDER — ACETAMINOPHEN 500 MG/5ML
650 LIQUID (ML) ORAL EVERY 8 HOURS
Refills: 0 | Status: DISCONTINUED | OUTPATIENT
Start: 2025-04-29 | End: 2025-05-01

## 2025-04-29 RX ORDER — SODIUM BICARBONATE 1 MEQ/ML
650 SYRINGE (ML) INTRAVENOUS ONCE
Refills: 0 | Status: DISCONTINUED | OUTPATIENT
Start: 2025-04-29 | End: 2025-04-29

## 2025-04-29 RX ORDER — METRONIDAZOLE 250 MG
500 TABLET ORAL ONCE
Refills: 0 | Status: COMPLETED | OUTPATIENT
Start: 2025-04-29 | End: 2025-04-29

## 2025-04-29 RX ORDER — METRONIDAZOLE 250 MG
500 TABLET ORAL EVERY 12 HOURS
Refills: 0 | Status: DISCONTINUED | OUTPATIENT
Start: 2025-04-29 | End: 2025-04-30

## 2025-04-29 RX ORDER — LEVETIRACETAM 10 MG/ML
750 INJECTION, SOLUTION INTRAVENOUS EVERY 12 HOURS
Refills: 0 | Status: DISCONTINUED | OUTPATIENT
Start: 2025-04-29 | End: 2025-04-30

## 2025-04-29 RX ADMIN — Medication 4 MILLILITER(S): at 12:24

## 2025-04-29 RX ADMIN — FINASTERIDE 5 MILLIGRAM(S): 1 TABLET, FILM COATED ORAL at 21:27

## 2025-04-29 RX ADMIN — INSULIN LISPRO 4: 100 INJECTION, SOLUTION INTRAVENOUS; SUBCUTANEOUS at 06:45

## 2025-04-29 RX ADMIN — IPRATROPIUM BROMIDE AND ALBUTEROL SULFATE 3 MILLILITER(S): .5; 2.5 SOLUTION RESPIRATORY (INHALATION) at 12:07

## 2025-04-29 RX ADMIN — INSULIN GLARGINE-YFGN 9 UNIT(S): 100 INJECTION, SOLUTION SUBCUTANEOUS at 22:13

## 2025-04-29 RX ADMIN — Medication 4 MILLILITER(S): at 00:14

## 2025-04-29 RX ADMIN — INSULIN LISPRO 4: 100 INJECTION, SOLUTION INTRAVENOUS; SUBCUTANEOUS at 13:26

## 2025-04-29 RX ADMIN — IPRATROPIUM BROMIDE AND ALBUTEROL SULFATE 3 MILLILITER(S): .5; 2.5 SOLUTION RESPIRATORY (INHALATION) at 06:37

## 2025-04-29 RX ADMIN — TIMOLOL MALEATE 1 DROP(S): 6.8 SOLUTION OPHTHALMIC at 19:27

## 2025-04-29 RX ADMIN — Medication 100 MILLIGRAM(S): at 21:27

## 2025-04-29 RX ADMIN — Medication 1 TABLET(S): at 12:07

## 2025-04-29 RX ADMIN — LEVETIRACETAM 750 MILLIGRAM(S): 10 INJECTION, SOLUTION INTRAVENOUS at 06:37

## 2025-04-29 RX ADMIN — Medication 650 MILLIGRAM(S): at 19:26

## 2025-04-29 RX ADMIN — ATORVASTATIN CALCIUM 40 MILLIGRAM(S): 80 TABLET, FILM COATED ORAL at 21:27

## 2025-04-29 RX ADMIN — LEVETIRACETAM 750 MILLIGRAM(S): 10 INJECTION, SOLUTION INTRAVENOUS at 19:26

## 2025-04-29 RX ADMIN — TIMOLOL MALEATE 1 DROP(S): 6.8 SOLUTION OPHTHALMIC at 06:46

## 2025-04-29 RX ADMIN — Medication 650 MILLIGRAM(S): at 00:57

## 2025-04-29 RX ADMIN — IPRATROPIUM BROMIDE AND ALBUTEROL SULFATE 3 MILLILITER(S): .5; 2.5 SOLUTION RESPIRATORY (INHALATION) at 23:33

## 2025-04-29 RX ADMIN — LEVETIRACETAM 750 MILLIGRAM(S): 10 INJECTION, SOLUTION INTRAVENOUS at 09:57

## 2025-04-29 RX ADMIN — Medication 100 MILLIGRAM(S): at 09:11

## 2025-04-29 RX ADMIN — INSULIN LISPRO 2: 100 INJECTION, SOLUTION INTRAVENOUS; SUBCUTANEOUS at 18:07

## 2025-04-29 RX ADMIN — Medication 650 MILLIGRAM(S): at 13:34

## 2025-04-29 RX ADMIN — IPRATROPIUM BROMIDE AND ALBUTEROL SULFATE 3 MILLILITER(S): .5; 2.5 SOLUTION RESPIRATORY (INHALATION) at 18:08

## 2025-04-29 RX ADMIN — Medication 650 MILLIGRAM(S): at 12:24

## 2025-04-29 RX ADMIN — CEFTRIAXONE 100 MILLIGRAM(S): 500 INJECTION, POWDER, FOR SOLUTION INTRAMUSCULAR; INTRAVENOUS at 07:01

## 2025-04-29 NOTE — PROGRESS NOTE ADULT - SUBJECTIVE AND OBJECTIVE BOX
SUBJECTIVE:   no acute events overnight     Vital Signs Last 24 Hrs  T(C): 36.5 (29 Apr 2025 12:18), Max: 36.5 (29 Apr 2025 05:56)  T(F): 97.7 (29 Apr 2025 12:18), Max: 97.7 (29 Apr 2025 05:56)  HR: 90 (29 Apr 2025 12:18) (84 - 90)  BP: 119/73 (29 Apr 2025 12:18) (115/64 - 119/73)  BP(mean): --  RR: 16 (29 Apr 2025 12:18) (16 - 18)  SpO2: 97% (29 Apr 2025 12:18) (97% - 98%)    Parameters below as of 29 Apr 2025 12:18  Patient On (Oxygen Delivery Method): nasal cannula  O2 Flow (L/min): 3        04-28-25 @ 07:01  -  04-29-25 @ 07:00  --------------------------------------------------------  IN:  Total IN: 0 mL    OUT:    Voided (mL): 900 mL  Total OUT: 900 mL    Total NET: -900 mL      04-29-25 @ 07:01  -  04-29-25 @ 21:31  --------------------------------------------------------  IN:  Total IN: 0 mL    OUT:    Voided (mL): 1600 mL  Total OUT: 1600 mL    Total NET: -1600 mL              Physical Exam:    General: AAOx3, NAD, laying comfortably in bed  Cardio: S1,S2, normotensive; normal HR   Pulm: Nonlabored breathing  Abdomen: soft; non-distended;  Extremities: WWP, peripheral pulses appreciated  Sacral decubitus ulcer: healthy appearing bleeding, eschar reduced from yesterday secondary to mechanical auto debridement with wet to dry dressing, no purulence, no cellulitis     acetaminophen   Oral Liquid .. 650 milliGRAM(s) Enteral Tube every 8 hours  albuterol/ipratropium for Nebulization 3 milliLiter(s) Nebulizer every 6 hours  aspirin  chewable 81 milliGRAM(s) Oral every 24 hours  atorvastatin 40 milliGRAM(s) Oral at bedtime  cefTRIAXone   IVPB 1000 milliGRAM(s) IV Intermittent every 24 hours  cyanocobalamin 1000 MICROGram(s) Oral every 24 hours  dextrose 5%. 1000 milliLiter(s) IV Continuous <Continuous>  dextrose 5%. 1000 milliLiter(s) IV Continuous <Continuous>  dextrose 50% Injectable 25 Gram(s) IV Push once  dextrose 50% Injectable 12.5 Gram(s) IV Push once  dextrose 50% Injectable 25 Gram(s) IV Push once  dextrose Oral Gel 15 Gram(s) Oral once PRN  finasteride 5 milliGRAM(s) Oral at bedtime  glucagon  Injectable 1 milliGRAM(s) IntraMuscular once  insulin glargine Injectable (LANTUS) 9 Unit(s) SubCutaneous at bedtime  insulin lispro (ADMELOG) corrective regimen sliding scale   SubCutaneous every 6 hours  levETIRAcetam 750 milliGRAM(s) Oral every 12 hours  metroNIDAZOLE  IVPB 500 milliGRAM(s) IV Intermittent every 12 hours  multivitamin 1 Tablet(s) Oral daily  sodium chloride 3%  Inhalation 4 milliLiter(s) Inhalation every 12 hours  thiamine 100 milliGRAM(s) Oral every 24 hours  timolol 0.5% Solution 1 Drop(s) Both EYES every 12 hours      LABS:                        9.7    13.06 )-----------( 242      ( 29 Apr 2025 11:28 )             29.3     04-29    136  |  106  |  13  ----------------------------<  178[H]  4.4   |  19[L]  |  0.28[L]    Ca    7.9[L]      29 Apr 2025 08:09  Phos  2.4     04-29  Mg     2.1     04-29    TPro  5.4[L]  /  Alb  2.2[L]  /  TBili  0.3  /  DBili  x   /  AST  34  /  ALT  20  /  AlkPhos  111  04-28    PT/INR - ( 28 Apr 2025 08:46 )   PT: 12.7 sec;   INR: 1.11          PTT - ( 28 Apr 2025 08:46 )  PTT:27.8 sec  Urinalysis Basic - ( 29 Apr 2025 08:09 )    Color: x / Appearance: x / SG: x / pH: x  Gluc: 178 mg/dL / Ketone: x  / Bili: x / Urobili: x   Blood: x / Protein: x / Nitrite: x   Leuk Esterase: x / RBC: x / WBC x   Sq Epi: x / Non Sq Epi: x / Bacteria: x       SUBJECTIVE:   no acute events overnight     Vital Signs Last 24 Hrs  T(C): 36.5 (29 Apr 2025 12:18), Max: 36.5 (29 Apr 2025 05:56)  T(F): 97.7 (29 Apr 2025 12:18), Max: 97.7 (29 Apr 2025 05:56)  HR: 90 (29 Apr 2025 12:18) (84 - 90)  BP: 119/73 (29 Apr 2025 12:18) (115/64 - 119/73)  BP(mean): --  RR: 16 (29 Apr 2025 12:18) (16 - 18)  SpO2: 97% (29 Apr 2025 12:18) (97% - 98%)    Parameters below as of 29 Apr 2025 12:18  Patient On (Oxygen Delivery Method): nasal cannula  O2 Flow (L/min): 3        04-28-25 @ 07:01  -  04-29-25 @ 07:00  --------------------------------------------------------  IN:  Total IN: 0 mL    OUT:    Voided (mL): 900 mL  Total OUT: 900 mL    Total NET: -900 mL      04-29-25 @ 07:01  -  04-29-25 @ 21:31  --------------------------------------------------------  IN:  Total IN: 0 mL    OUT:    Voided (mL): 1600 mL  Total OUT: 1600 mL    Total NET: -1600 mL              Physical Exam:    General: NAD, laying comfortably in bed  Cardio: S1,S2, normotensive; normal HR   Pulm: Nonlabored breathing  Abdomen: soft; non-distended;  Extremities: WWP, peripheral pulses appreciated  Sacral decubitus ulcer: healthy appearing bleeding, eschar reduced from yesterday secondary to mechanical auto debridement with wet to dry dressing, no purulence, no cellulitis     acetaminophen   Oral Liquid .. 650 milliGRAM(s) Enteral Tube every 8 hours  albuterol/ipratropium for Nebulization 3 milliLiter(s) Nebulizer every 6 hours  aspirin  chewable 81 milliGRAM(s) Oral every 24 hours  atorvastatin 40 milliGRAM(s) Oral at bedtime  cefTRIAXone   IVPB 1000 milliGRAM(s) IV Intermittent every 24 hours  cyanocobalamin 1000 MICROGram(s) Oral every 24 hours  dextrose 5%. 1000 milliLiter(s) IV Continuous <Continuous>  dextrose 5%. 1000 milliLiter(s) IV Continuous <Continuous>  dextrose 50% Injectable 25 Gram(s) IV Push once  dextrose 50% Injectable 12.5 Gram(s) IV Push once  dextrose 50% Injectable 25 Gram(s) IV Push once  dextrose Oral Gel 15 Gram(s) Oral once PRN  finasteride 5 milliGRAM(s) Oral at bedtime  glucagon  Injectable 1 milliGRAM(s) IntraMuscular once  insulin glargine Injectable (LANTUS) 9 Unit(s) SubCutaneous at bedtime  insulin lispro (ADMELOG) corrective regimen sliding scale   SubCutaneous every 6 hours  levETIRAcetam 750 milliGRAM(s) Oral every 12 hours  metroNIDAZOLE  IVPB 500 milliGRAM(s) IV Intermittent every 12 hours  multivitamin 1 Tablet(s) Oral daily  sodium chloride 3%  Inhalation 4 milliLiter(s) Inhalation every 12 hours  thiamine 100 milliGRAM(s) Oral every 24 hours  timolol 0.5% Solution 1 Drop(s) Both EYES every 12 hours      LABS:                        9.7    13.06 )-----------( 242      ( 29 Apr 2025 11:28 )             29.3     04-29    136  |  106  |  13  ----------------------------<  178[H]  4.4   |  19[L]  |  0.28[L]    Ca    7.9[L]      29 Apr 2025 08:09  Phos  2.4     04-29  Mg     2.1     04-29    TPro  5.4[L]  /  Alb  2.2[L]  /  TBili  0.3  /  DBili  x   /  AST  34  /  ALT  20  /  AlkPhos  111  04-28    PT/INR - ( 28 Apr 2025 08:46 )   PT: 12.7 sec;   INR: 1.11          PTT - ( 28 Apr 2025 08:46 )  PTT:27.8 sec  Urinalysis Basic - ( 29 Apr 2025 08:09 )    Color: x / Appearance: x / SG: x / pH: x  Gluc: 178 mg/dL / Ketone: x  / Bili: x / Urobili: x   Blood: x / Protein: x / Nitrite: x   Leuk Esterase: x / RBC: x / WBC x   Sq Epi: x / Non Sq Epi: x / Bacteria: x

## 2025-04-29 NOTE — PROGRESS NOTE ADULT - PROBLEM SELECTOR PLAN 1
#Lactic acidosis, IMPROVED  Likely 2/2 aspiration PNA vs. infected sacral decubitus ulcer; has similar hospitalization in early April 2025. Sacral ulcer with areas of eschar. PEG site without infectious signs  D/gokul empiric Vancomycin on 4/23 despite previous +MRSA wound/PEG site culture as currently without signs of acute SSTI. However, assessment of sacral decubitus ulcer by wound care on 4/24 revealed foul odor and mild expressed purulence.   CT chest/abd/pelvis 4/25: Suspected recurrent aspiration and right lower lobe pneumonia. 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  S/p bedside debridement with gen surg on 4/27. previous on empiric Zosyn 3.375g q8h (4/23 - )    - ID, wound care, gen surg recommendations  - F/u admission BCx 4/22 - +Bacteroides fragilis, +Strep species  - F/u surveillance BCx 4/24, 4/25, 4/26 - all NGTD  - CTX 1g (4/29 - ) and Flagyl 500mg PO (4/29 - )  - pending duration of antibiotics may need to concerns picc if indicated for long term management  - tylenol for fever and pain #Lactic acidosis, IMPROVED  Likely 2/2 aspiration PNA vs. infected sacral decubitus ulcer; has similar hospitalization in early April 2025. Sacral ulcer with areas of eschar. PEG site without infectious signs  D/gokul empiric Vancomycin on 4/23 despite previous +MRSA wound/PEG site culture as currently without signs of acute SSTI. However, assessment of sacral decubitus ulcer by wound care on 4/24 revealed foul odor and mild expressed purulence.   CT chest/abd/pelvis 4/25: Suspected recurrent aspiration and right lower lobe pneumonia. 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  S/p bedside debridement with gen surg on 4/27. previous on empiric Zosyn 3.375g q8h (4/23 - )    - ID, wound care, gen surg recommendations  - F/u admission BCx 4/22 - +Bacteroides fragilis, +Strep species  - F/u surveillance BCx 4/24, 4/25, 4/26 - all NGTD  - CTX 1g (4/29 - ) and Flagyl 500mg PO changed to IV (4/29 - )  - pending duration of antibiotics may need to concerns picc if indicated for long term management  - tylenol suspension standing for pain management.

## 2025-04-29 NOTE — PROGRESS NOTE ADULT - PROBLEM SELECTOR PLAN 3
Present on admission. Patient has visiting RN who noted the sacral ulcer is worsening and had black appearance in some areas. Patient is entirely bedbound since stroke    - c/w empiric abx as above  - f/up wound care and surgery recs as above  - pallative care recommendation give likely poor wound healing Present on admission. Patient has visiting RN who noted the sacral ulcer is worsening and had black appearance in some areas. Patient is entirely bedbound since stroke    - c/w empiric abx as above  - f/up wound care and surgery recs as above  - palliative care recommendation give likely poor wound healing

## 2025-04-29 NOTE — PROGRESS NOTE ADULT - ASSESSMENT
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

## 2025-04-29 NOTE — PROGRESS NOTE ADULT - ASSESSMENT
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 CTAP with new posterior presacral fluid and gas containing abscess with rim of enhancement due to worsening decubitus ulcer, approximately 7 x 3 cm - new cortical attenuation of underlying coccyx, suspicious for osteomyelitis. General Surgery consulted for evaluation of sacral decubitus ulcer. s/p bedside debridement 4/27. No acute events overnight. Exam reassuring with no concerning findings of infection at this given time     Recommendations   - Antibiotics per ID   - Wound Vac dressing today   - Rest of the care as per primary

## 2025-04-29 NOTE — PROGRESS NOTE ADULT - SUBJECTIVE AND OBJECTIVE BOX
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 Patient was started on IV antibiotics and underwent bedside I&D of sacral ulcer with general surgery on 4/27. Pending further GOC discussions given patient’s poor overall prognosis with limited home support.    OVERNIGHT EVENTS: NAEON: peg tube clogged per night RN asa flagyl b12 and thiamine were not given.     SUBJECTIVE: Patient examined at the bedside this morning with his [facility] [id number]. A patient was unable to provide the majority of his ROS this morning however, she states that he’s been having pain when they turn him. We discussed the importance of turning him in to prevent additional wound development. We reassured her that wound carrot seeing the patient this morning. We discussed the indications for continuing antibiotics and the updates regarding the infectious disease team For over. She wanted to know if the patient would be able to use a wheelchair in the future, we discussed at this time that is not likely she would like to have further conversations with Dr. Dillon regarding overall prognosis   ROS: otherwise negative      T(C): 36.5 (04-29-25 @ 05:56), Max: 37 (04-28-25 @ 20:50)  HR: 84 (04-29-25 @ 05:56) (83 - 84)  BP: 115/64 (04-29-25 @ 05:56) (107/64 - 115/64)  RR: 18 (04-29-25 @ 05:56) (17 - 18)  SpO2: 98% (04-29-25 @ 05:56) (97% - 98%)  Wt(kg): --Vital Signs Last 24 Hrs  T(C): 36.5 (29 Apr 2025 05:56), Max: 37 (28 Apr 2025 20:50)  T(F): 97.7 (29 Apr 2025 05:56), Max: 98.6 (28 Apr 2025 20:50)  HR: 84 (29 Apr 2025 05:56) (83 - 84)  BP: 115/64 (29 Apr 2025 05:56) (107/64 - 115/64)  BP(mean): 84 (28 Apr 2025 20:50) (84 - 84)  RR: 18 (29 Apr 2025 05:56) (17 - 18)  SpO2: 98% (29 Apr 2025 05:56) (97% - 98%)    Parameters below as of 29 Apr 2025 05:56  Patient On (Oxygen Delivery Method): nasal cannula  O2 Flow (L/min): 3      PHYSICAL EXAM:  Constitutional - NAD, Comfortable-appearing  HEENT - NCAT, EOMI, NC in place  Chest - Breathing comfortably on 3L NC, CTAB  Cardio - Warm and well perfused, RRR  Abdomen - Soft, NTND, +BS. PEG site without surrounding erythema, induration, or drainage.  Extremities - No peripheral edema, No calf tenderness   Neurologic - Awake and alert. Makes eye contact to voice. Not following commands.  Psychiatric - Mood stable, Affect WNL    LABS:                        10.4   14.10 )-----------( 211      ( 28 Apr 2025 18:00 )             32.4     04-28    136  |  103  |  14  ----------------------------<  245[H]  3.6   |  21[L]  |  0.33[L]    Ca    7.8[L]      28 Apr 2025 08:46  Phos  2.0     04-28  Mg     1.8     04-28    TPro  5.4[L]  /  Alb  2.2[L]  /  TBili  0.3  /  DBili  x   /  AST  34  /  ALT  20  /  AlkPhos  111  04-28      PT/INR - ( 28 Apr 2025 08:46 )   PT: 12.7 sec;   INR: 1.11          PTT - ( 28 Apr 2025 08:46 )  PTT:27.8 sec  Urinalysis Basic - ( 28 Apr 2025 08:46 )    Color: x / Appearance: x / SG: x / pH: x  Gluc: 245 mg/dL / Ketone: x  / Bili: x / Urobili: x   Blood: x / Protein: x / Nitrite: x   Leuk Esterase: x / RBC: x / WBC x   Sq Epi: x / Non Sq Epi: x / Bacteria: x      CAPILLARY BLOOD GLUCOSE      POCT Blood Glucose.: 231 mg/dL (29 Apr 2025 06:36)  POCT Blood Glucose.: 141 mg/dL (28 Apr 2025 23:59)  POCT Blood Glucose.: 122 mg/dL (28 Apr 2025 22:07)  POCT Blood Glucose.: 233 mg/dL (28 Apr 2025 17:41)  POCT Blood Glucose.: 304 mg/dL (28 Apr 2025 12:44)        Urinalysis Basic - ( 28 Apr 2025 08:46 )    Color: x / Appearance: x / SG: x / pH: x  Gluc: 245 mg/dL / Ketone: x  / Bili: x / Urobili: x   Blood: x / Protein: x / Nitrite: x   Leuk Esterase: x / RBC: x / WBC x   Sq Epi: x / Non Sq Epi: x / Bacteria: x        MEDICATIONS  (STANDING):  albuterol/ipratropium for Nebulization 3 milliLiter(s) Nebulizer every 6 hours  aspirin  chewable 81 milliGRAM(s) Oral every 24 hours  atorvastatin 40 milliGRAM(s) Oral at bedtime  cefTRIAXone   IVPB 1000 milliGRAM(s) IV Intermittent every 24 hours  cyanocobalamin 1000 MICROGram(s) Oral every 24 hours  dextrose 5%. 1000 milliLiter(s) (50 mL/Hr) IV Continuous <Continuous>  dextrose 5%. 1000 milliLiter(s) (100 mL/Hr) IV Continuous <Continuous>  dextrose 50% Injectable 25 Gram(s) IV Push once  dextrose 50% Injectable 12.5 Gram(s) IV Push once  dextrose 50% Injectable 25 Gram(s) IV Push once  finasteride 5 milliGRAM(s) Oral at bedtime  glucagon  Injectable 1 milliGRAM(s) IntraMuscular once  insulin glargine Injectable (LANTUS) 9 Unit(s) SubCutaneous at bedtime  insulin lispro (ADMELOG) corrective regimen sliding scale   SubCutaneous every 6 hours  levETIRAcetam  Solution 750 milliGRAM(s) Oral every 12 hours  metroNIDAZOLE    Tablet 500 milliGRAM(s) Oral every 12 hours  multivitamin 1 Tablet(s) Oral daily  sodium chloride 3%  Inhalation 4 milliLiter(s) Inhalation every 12 hours  thiamine 100 milliGRAM(s) Oral every 24 hours  timolol 0.5% Solution 1 Drop(s) Both EYES every 12 hours    MEDICATIONS  (PRN):  acetaminophen     Tablet .. 650 milliGRAM(s) Oral every 6 hours PRN Temp greater or equal to 38C (100.4F), Mild Pain (1 - 3)  dextrose Oral Gel 15 Gram(s) Oral once PRN Blood Glucose LESS THAN 70 milliGRAM(s)/deciliter      RADIOLOGY & ADDITIONAL TESTS: Reviewed

## 2025-04-29 NOTE — PROGRESS NOTE ADULT - ATTENDING COMMENTS
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 GI consulted and PEG tube study showed PEG is functioning and no need to change to a new one. WOCN consult appreciated, given Sliver nitrate and aquagel Ag dressing daily to PEG site. Dietitian consult appreciated, TF changed to Glucerna 1.2 @ 83ml/hr from 5pm to 11am x 18hr, total 6 cans per day. Pt recently admitted to ICU for aspiration PNA and had indwelling hudson removed and discharged from [place] ( 2/25/25) and sent to [facility] and subsequently sent home on home O2 per wife, last admitted [facility] ( 3/26-4/1) for sepsis w/ lactic acidosis and acute hypoxic respiratory failure 2/2 multifocal /likely gram negative PNA( HAP since recent hospitalization and on PEG feeds), stage III sacral ulcer ( presence on admission), and concerns of PEG site infection w/ superficial ulceration and reported purulent discharge. Pt now readmitted to [facility] ( 3/23) for severe sepsis w/ lactic acidosis 2/2 recurrent multifocal HAP likely aspiration/gram negative organism and concerns for infected sacral decubitus (POA)--bacteremia, sacral ucler with abscess sp bedside debridement by surgery 4/27- bleeding required electrocautery , given one unit of prbc before debridement - now hb at 7.7 --> given a unit 4/28- hb above 9 -    more awake today , open eyes make eyes contact   wife at bedside   RRR, good air entry bilaterally   moves right hand -     # Severe sepsis/ bacteremia/sacral decubitus ulcer (POA) with abscess 7x3 cm- sp debridement unstagable to stage 4 ulcer ( 7x7 with depth 2cm post debridement -  #, RLL pneumonia.  # recurrent aspiration HAP likely gram negative organism   # Acute hypoxic resp failure   # bacteremia -poa ( strep and bacteroides)  # bed-bound , paraplegic   # dysphagia, dependent on PEG tube feeding.   # stage 4 decubitus ulcer with abscess.       - surgery evaluation appreciated for wound debridement with local anesthesia ( per wife request ) -not candidate and high risk for General anesthesia and high risk of aspiration and recurrent aspiration pneumonia.  - sp debridement   - stage 4 ulcer   - dressing, wound care as per surgery -need to keep the area clean, need frequent turning and to avoid soiling.   -continue with antibiotics for now as dw ID Dr. Andrade= aiming for 2 weeks of iv antibiotics    # Anemia = sp transfusion before debridement - given one unit, hb 9--> 7.7-   to give one unit of prbc.   - cw peg feeding as tolerated ( wife would like to continue with feeding) rate at 60 cc per hour, extending the hours to 18 hours ( lower rate preferred to reduce risk of aspiration)   - wife would like to continue full care  - discussed at bedside with wife and family friend 4/24-= introduce home hospice care.   4/25/25 spoke with wife at length-pt is in serious infection, decubitus ulcer with absess, bacteremia,  I offered to call their son -she stated [place] is too late [place]  , I encouraged wife to discuss with her son regarding patient care-   4/26/25- - we talked extensively about complication of aspiration pneumonia, infected sacral ulcer with abscess with possible underlying OM - risk of mortality , talked about comfort approach , hospice care, she is not ready for comfort care, stated she spoke with her son last night who is in agreement with her. we tried to call him from bedside- not able to reach ( Son is in [place] )   4/28/25- wife stated she sent the picture of wound after debridement to her son, we talked about dispo -she could not take care of him at home , hha is 24/7 one person works 13 hours, with no one to help her at night time, she would like to pursue with iv antibiotics and wound care , we talked about caring at the facility , she is open to facility.     DM with hyperglycemia - basal bolus insulin   monitor for hypoglycemia.     [facility] team, lengthy discussion with wife regarding goc and disposition, [facility] RN  - awaiting for hospital bed for sacral ulcer -   - frequent turning to off load pressure on the ulcer.  - sp wound debridement 4/27- wound dressing and care as per surgery rec.   - for dispo: wife could not take care of him at home with wound and antibiotics - SW and CM evaluation for BRENDA   - given 2 units of blood thus far, antibiotics as per ID --    he is at high risk for recurrent aspiration, wound infection , sepsis and high risk of mortality, and high risk of aspiration.

## 2025-04-29 NOTE — PROGRESS NOTE ADULT - PROBLEM SELECTOR PLAN 5
Presenting hemoglobin 8.9 reduced to 8.1 after fluids. Was discharged in april with a hemoglobin of 8.6. 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   Now with acute bleeding s/p bedside debridement  s/p 1u pRBC 4/26, 1u pRBC 4/28    - maintain active t/s  - transfuse to maintain>7  -C/W cyanocobalamin 1000mg qd, multivitamin, thiamine Presenting hemoglobin 8.9 reduced to 8.1 after fluids. Was discharged in april with a hemoglobin of 8.6. 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   Now with acute bleeding s/p bedside debridement  s/p 1u pRBC 4/26, 1u pRBC 4/28    - maintain active t/s last 4/26  - transfuse to maintain>7  -C/W cyanocobalamin 1000mg qd, multivitamin, thiamine

## 2025-04-29 NOTE — PROGRESS NOTE ADULT - PROBLEM SELECTOR PLAN 2
Patient has been having worsening cough and unable to expectorate. CXR RLL infiltrate. Secretions audible in upper airways.     - aspiration precautions  - c/w treatment of PNA and sacral ulcer infection as above  - c/w supplemental oxygen; wean as tolerated to baseline 2L (on oxygen ever since developing COVID-19 in a NH)  - palliative consult placed as repeat admission for similar PNA  - Duonebs q6h, hypertonic saline nebs q12h for airway clearance  - Maintain 30 degree elevation of head during tube feeds Patient has been having worsening cough and unable to expectorate. CXR RLL infiltrate. Secretions audible in upper airways.     - c/w treatment of PNA and sacral ulcer infection as above  - c/w supplemental oxygen; wean as tolerated to baseline 2L (on oxygen ever since developing COVID-19 in a NH)  - Duonebs q6h, hypertonic saline nebs q12h for airway clearance  - Maintain 30 degree elevation of head during tube feeds  - aspiration precautions  - palliative consult placed as repeat admission for similar PNA

## 2025-04-30 LAB
ALBUMIN SERPL ELPH-MCNC: 2.3 G/DL — LOW (ref 3.3–5)
ALP SERPL-CCNC: 117 U/L — SIGNIFICANT CHANGE UP (ref 40–120)
ALT FLD-CCNC: 20 U/L — SIGNIFICANT CHANGE UP (ref 10–45)
ANION GAP SERPL CALC-SCNC: 12 MMOL/L — SIGNIFICANT CHANGE UP (ref 5–17)
AST SERPL-CCNC: 29 U/L — SIGNIFICANT CHANGE UP (ref 10–40)
BASOPHILS # BLD AUTO: 0.03 K/UL — SIGNIFICANT CHANGE UP (ref 0–0.2)
BASOPHILS NFR BLD AUTO: 0.2 % — SIGNIFICANT CHANGE UP (ref 0–2)
BILIRUB SERPL-MCNC: 0.2 MG/DL — SIGNIFICANT CHANGE UP (ref 0.2–1.2)
BUN SERPL-MCNC: 12 MG/DL — SIGNIFICANT CHANGE UP (ref 7–23)
CALCIUM SERPL-MCNC: 8 MG/DL — LOW (ref 8.4–10.5)
CHLORIDE SERPL-SCNC: 104 MMOL/L — SIGNIFICANT CHANGE UP (ref 96–108)
CO2 SERPL-SCNC: 20 MMOL/L — LOW (ref 22–31)
CREAT SERPL-MCNC: 0.24 MG/DL — LOW (ref 0.5–1.3)
EGFR: 123 ML/MIN/1.73M2 — SIGNIFICANT CHANGE UP
EGFR: 123 ML/MIN/1.73M2 — SIGNIFICANT CHANGE UP
EOSINOPHIL # BLD AUTO: 0.35 K/UL — SIGNIFICANT CHANGE UP (ref 0–0.5)
EOSINOPHIL NFR BLD AUTO: 2.7 % — SIGNIFICANT CHANGE UP (ref 0–6)
GLUCOSE SERPL-MCNC: 241 MG/DL — HIGH (ref 70–99)
HCT VFR BLD CALC: 27.9 % — LOW (ref 39–50)
HGB BLD-MCNC: 9.2 G/DL — LOW (ref 13–17)
IMM GRANULOCYTES NFR BLD AUTO: 0.9 % — SIGNIFICANT CHANGE UP (ref 0–0.9)
LYMPHOCYTES # BLD AUTO: 1.33 K/UL — SIGNIFICANT CHANGE UP (ref 1–3.3)
LYMPHOCYTES # BLD AUTO: 10.2 % — LOW (ref 13–44)
MAGNESIUM SERPL-MCNC: 1.9 MG/DL — SIGNIFICANT CHANGE UP (ref 1.6–2.6)
MCHC RBC-ENTMCNC: 25.8 PG — LOW (ref 27–34)
MCHC RBC-ENTMCNC: 33 G/DL — SIGNIFICANT CHANGE UP (ref 32–36)
MCV RBC AUTO: 78.2 FL — LOW (ref 80–100)
MONOCYTES # BLD AUTO: 0.7 K/UL — SIGNIFICANT CHANGE UP (ref 0–0.9)
MONOCYTES NFR BLD AUTO: 5.4 % — SIGNIFICANT CHANGE UP (ref 2–14)
NEUTROPHILS # BLD AUTO: 10.5 K/UL — HIGH (ref 1.8–7.4)
NEUTROPHILS NFR BLD AUTO: 80.6 % — HIGH (ref 43–77)
NRBC BLD AUTO-RTO: 0 /100 WBCS — SIGNIFICANT CHANGE UP (ref 0–0)
PHOSPHATE SERPL-MCNC: 2 MG/DL — LOW (ref 2.5–4.5)
PLATELET # BLD AUTO: 244 K/UL — SIGNIFICANT CHANGE UP (ref 150–400)
POTASSIUM SERPL-MCNC: 4.1 MMOL/L — SIGNIFICANT CHANGE UP (ref 3.5–5.3)
POTASSIUM SERPL-SCNC: 4.1 MMOL/L — SIGNIFICANT CHANGE UP (ref 3.5–5.3)
PROT SERPL-MCNC: 5.8 G/DL — LOW (ref 6–8.3)
RBC # BLD: 3.57 M/UL — LOW (ref 4.2–5.8)
RBC # FLD: 21.4 % — HIGH (ref 10.3–14.5)
SODIUM SERPL-SCNC: 136 MMOL/L — SIGNIFICANT CHANGE UP (ref 135–145)
WBC # BLD: 13.03 K/UL — HIGH (ref 3.8–10.5)
WBC # FLD AUTO: 13.03 K/UL — HIGH (ref 3.8–10.5)

## 2025-04-30 PROCEDURE — 76937 US GUIDE VASCULAR ACCESS: CPT | Mod: 26

## 2025-04-30 PROCEDURE — 99233 SBSQ HOSP IP/OBS HIGH 50: CPT

## 2025-04-30 PROCEDURE — 36410 VNPNXR 3YR/> PHY/QHP DX/THER: CPT

## 2025-04-30 RX ORDER — INSULIN GLARGINE-YFGN 100 [IU]/ML
10 INJECTION, SOLUTION SUBCUTANEOUS
Qty: 0 | Refills: 0 | DISCHARGE
Start: 2025-04-30

## 2025-04-30 RX ORDER — POTASSIUM PHOSPHATE, MONOBASIC POTASSIUM PHOSPHATE, DIBASIC INJECTION, 236; 224 MG/ML; MG/ML
30 SOLUTION, CONCENTRATE INTRAVENOUS ONCE
Refills: 0 | Status: COMPLETED | OUTPATIENT
Start: 2025-04-30 | End: 2025-04-30

## 2025-04-30 RX ORDER — METRONIDAZOLE 250 MG
500 TABLET ORAL EVERY 12 HOURS
Refills: 0 | Status: DISCONTINUED | OUTPATIENT
Start: 2025-04-30 | End: 2025-05-01

## 2025-04-30 RX ORDER — DEXTROSE 50 % IN WATER 50 %
50 SYRINGE (ML) INTRAVENOUS
Qty: 0 | Refills: 0 | DISCHARGE
Start: 2025-04-30

## 2025-04-30 RX ORDER — LEVETIRACETAM 10 MG/ML
750 INJECTION, SOLUTION INTRAVENOUS EVERY 12 HOURS
Refills: 0 | Status: DISCONTINUED | OUTPATIENT
Start: 2025-04-30 | End: 2025-05-01

## 2025-04-30 RX ORDER — DEXTROSE 50 % IN WATER 50 %
25 SYRINGE (ML) INTRAVENOUS
Qty: 0 | Refills: 0 | DISCHARGE
Start: 2025-04-30

## 2025-04-30 RX ORDER — SODIUM CHLORIDE 9 G/1000ML
0 INJECTION, SOLUTION INTRAVENOUS
Qty: 0 | Refills: 0 | DISCHARGE
Start: 2025-04-30

## 2025-04-30 RX ORDER — CEFTRIAXONE 500 MG/1
0 INJECTION, POWDER, FOR SOLUTION INTRAMUSCULAR; INTRAVENOUS
Qty: 0 | Refills: 0 | DISCHARGE
Start: 2025-04-30

## 2025-04-30 RX ORDER — METRONIDAZOLE 250 MG
1 TABLET ORAL
Qty: 0 | Refills: 0 | DISCHARGE
Start: 2025-04-30

## 2025-04-30 RX ORDER — INSULIN LISPRO 100 U/ML
0 INJECTION, SOLUTION INTRAVENOUS; SUBCUTANEOUS
Qty: 0 | Refills: 0 | DISCHARGE
Start: 2025-04-30

## 2025-04-30 RX ORDER — METRONIDAZOLE 250 MG
100 TABLET ORAL
Qty: 0 | Refills: 0 | DISCHARGE
Start: 2025-04-30

## 2025-04-30 RX ORDER — ACETAMINOPHEN 500 MG/5ML
20.31 LIQUID (ML) ORAL
Qty: 0 | Refills: 0 | DISCHARGE
Start: 2025-04-30

## 2025-04-30 RX ORDER — DEXTROSE 50 % IN WATER 50 %
0 SYRINGE (ML) INTRAVENOUS
Qty: 0 | Refills: 0 | DISCHARGE
Start: 2025-04-30

## 2025-04-30 RX ADMIN — Medication 4 MILLILITER(S): at 00:19

## 2025-04-30 RX ADMIN — POTASSIUM PHOSPHATE, MONOBASIC POTASSIUM PHOSPHATE, DIBASIC INJECTION, 83.33 MILLIMOLE(S): 236; 224 SOLUTION, CONCENTRATE INTRAVENOUS at 16:40

## 2025-04-30 RX ADMIN — Medication 650 MILLIGRAM(S): at 03:25

## 2025-04-30 RX ADMIN — Medication 1 TABLET(S): at 11:33

## 2025-04-30 RX ADMIN — IPRATROPIUM BROMIDE AND ALBUTEROL SULFATE 3 MILLILITER(S): .5; 2.5 SOLUTION RESPIRATORY (INHALATION) at 06:31

## 2025-04-30 RX ADMIN — CEFTRIAXONE 100 MILLIGRAM(S): 500 INJECTION, POWDER, FOR SOLUTION INTRAMUSCULAR; INTRAVENOUS at 06:32

## 2025-04-30 RX ADMIN — Medication 4 MILLILITER(S): at 12:35

## 2025-04-30 RX ADMIN — IPRATROPIUM BROMIDE AND ALBUTEROL SULFATE 3 MILLILITER(S): .5; 2.5 SOLUTION RESPIRATORY (INHALATION) at 18:11

## 2025-04-30 RX ADMIN — Medication 100 MILLIGRAM(S): at 08:56

## 2025-04-30 RX ADMIN — LEVETIRACETAM 750 MILLIGRAM(S): 10 INJECTION, SOLUTION INTRAVENOUS at 21:42

## 2025-04-30 RX ADMIN — Medication 650 MILLIGRAM(S): at 18:11

## 2025-04-30 RX ADMIN — Medication 650 MILLIGRAM(S): at 11:33

## 2025-04-30 RX ADMIN — ATORVASTATIN CALCIUM 40 MILLIGRAM(S): 80 TABLET, FILM COATED ORAL at 21:43

## 2025-04-30 RX ADMIN — LEVETIRACETAM 750 MILLIGRAM(S): 10 INJECTION, SOLUTION INTRAVENOUS at 08:11

## 2025-04-30 RX ADMIN — Medication 500 MILLIGRAM(S): at 19:08

## 2025-04-30 RX ADMIN — TIMOLOL MALEATE 1 DROP(S): 6.8 SOLUTION OPHTHALMIC at 08:11

## 2025-04-30 RX ADMIN — Medication 650 MILLIGRAM(S): at 04:25

## 2025-04-30 RX ADMIN — INSULIN GLARGINE-YFGN 9 UNIT(S): 100 INJECTION, SOLUTION SUBCUTANEOUS at 23:12

## 2025-04-30 RX ADMIN — INSULIN LISPRO 6: 100 INJECTION, SOLUTION INTRAVENOUS; SUBCUTANEOUS at 12:35

## 2025-04-30 RX ADMIN — CYANOCOBALAMIN 1000 MICROGRAM(S): 1000 INJECTION INTRAMUSCULAR; SUBCUTANEOUS at 06:31

## 2025-04-30 RX ADMIN — TIMOLOL MALEATE 1 DROP(S): 6.8 SOLUTION OPHTHALMIC at 18:11

## 2025-04-30 RX ADMIN — IPRATROPIUM BROMIDE AND ALBUTEROL SULFATE 3 MILLILITER(S): .5; 2.5 SOLUTION RESPIRATORY (INHALATION) at 23:12

## 2025-04-30 RX ADMIN — Medication 81 MILLIGRAM(S): at 06:31

## 2025-04-30 RX ADMIN — IPRATROPIUM BROMIDE AND ALBUTEROL SULFATE 3 MILLILITER(S): .5; 2.5 SOLUTION RESPIRATORY (INHALATION) at 12:27

## 2025-04-30 RX ADMIN — FINASTERIDE 5 MILLIGRAM(S): 1 TABLET, FILM COATED ORAL at 21:43

## 2025-04-30 RX ADMIN — Medication 100 MILLIGRAM(S): at 06:31

## 2025-04-30 RX ADMIN — INSULIN LISPRO 2: 100 INJECTION, SOLUTION INTRAVENOUS; SUBCUTANEOUS at 18:10

## 2025-04-30 RX ADMIN — INSULIN LISPRO 4: 100 INJECTION, SOLUTION INTRAVENOUS; SUBCUTANEOUS at 06:33

## 2025-04-30 NOTE — PROGRESS NOTE ADULT - SUBJECTIVE AND OBJECTIVE BOX
SUBJECTIVE:   no acute events overnight. non verbal pt.     Vital Signs Last 24 Hrs  T(C): 36.4 (30 Apr 2025 12:09), Max: 36.4 (30 Apr 2025 05:58)  T(F): 97.6 (30 Apr 2025 12:09), Max: 97.6 (30 Apr 2025 12:09)  HR: 81 (30 Apr 2025 12:09) (81 - 96)  BP: 130/72 (30 Apr 2025 12:09) (124/74 - 130/72)  BP(mean): --  RR: 16 (30 Apr 2025 12:09) (16 - 17)  SpO2: 93% (30 Apr 2025 12:09) (93% - 98%)    Parameters below as of 30 Apr 2025 12:09  Patient On (Oxygen Delivery Method): nasal cannula  O2 Flow (L/min): 3        04-29-25 @ 07:01  -  04-30-25 @ 07:00  --------------------------------------------------------  IN:  Total IN: 0 mL    OUT:    Voided (mL): 1600 mL  Total OUT: 1600 mL    Total NET: -1600 mL      04-30-25 @ 07:01  -  04-30-25 @ 20:19  --------------------------------------------------------  IN:  Total IN: 0 mL    OUT:    Voided (mL): 520 mL  Total OUT: 520 mL    Total NET: -520 mL              Physical Exam:    General: laying comfortably in bed  Cardio: S1,S2, normotensive; normal HR   Pulm: Nonlabored breathing  Abdomen: soft; non-distended;  Extremities: WWP, peripheral pulses appreciated  Sacral decubitus ulcer: Wound vac in place, holding good suction, no surrounding erythema or fluctuance, minimal tenderness     acetaminophen   Oral Liquid .. 650 milliGRAM(s) Enteral Tube every 8 hours  albuterol/ipratropium for Nebulization 3 milliLiter(s) Nebulizer every 6 hours  aspirin  chewable 81 milliGRAM(s) Oral every 24 hours  atorvastatin 40 milliGRAM(s) Oral at bedtime  cefTRIAXone   IVPB 1000 milliGRAM(s) IV Intermittent every 24 hours  chlorhexidine 2% Cloths 1 Application(s) Topical <User Schedule>  cyanocobalamin 1000 MICROGram(s) Oral every 24 hours  dextrose 5%. 1000 milliLiter(s) IV Continuous <Continuous>  dextrose 5%. 1000 milliLiter(s) IV Continuous <Continuous>  dextrose 50% Injectable 25 Gram(s) IV Push once  dextrose 50% Injectable 12.5 Gram(s) IV Push once  dextrose 50% Injectable 25 Gram(s) IV Push once  dextrose Oral Gel 15 Gram(s) Oral once PRN  finasteride 5 milliGRAM(s) Oral at bedtime  glucagon  Injectable 1 milliGRAM(s) IntraMuscular once  insulin glargine Injectable (LANTUS) 9 Unit(s) SubCutaneous at bedtime  insulin lispro (ADMELOG) corrective regimen sliding scale   SubCutaneous every 6 hours  levETIRAcetam  Solution 750 milliGRAM(s) Oral every 12 hours  metroNIDAZOLE    Tablet 500 milliGRAM(s) Oral every 12 hours  multivitamin 1 Tablet(s) Oral daily  sodium chloride 0.9% lock flush 10 milliLiter(s) IV Push every 1 hour PRN  sodium chloride 3%  Inhalation 4 milliLiter(s) Inhalation every 12 hours  thiamine 100 milliGRAM(s) Oral every 24 hours  timolol 0.5% Solution 1 Drop(s) Both EYES every 12 hours      LABS:                        9.2    13.03 )-----------( 244      ( 30 Apr 2025 05:30 )             27.9     04-30    136  |  104  |  12  ----------------------------<  241[H]  4.1   |  20[L]  |  0.24[L]    Ca    8.0[L]      30 Apr 2025 05:30  Phos  2.0     04-30  Mg     1.9     04-30    TPro  5.8[L]  /  Alb  2.3[L]  /  TBili  0.2  /  DBili  x   /  AST  29  /  ALT  20  /  AlkPhos  117  04-30      Urinalysis Basic - ( 30 Apr 2025 05:30 )    Color: x / Appearance: x / SG: x / pH: x  Gluc: 241 mg/dL / Ketone: x  / Bili: x / Urobili: x   Blood: x / Protein: x / Nitrite: x   Leuk Esterase: x / RBC: x / WBC x   Sq Epi: x / Non Sq Epi: x / Bacteria: x

## 2025-04-30 NOTE — PROGRESS NOTE ADULT - PROBLEM SELECTOR PLAN 5
Presenting hemoglobin 8.9 reduced to 8.1 after fluids. Was discharged in april with a hemoglobin of 8.6. 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   Now with acute bleeding s/p bedside debridement  s/p 1u pRBC 4/26, 1u pRBC 4/28    - maintain active t/s last 4/26  - transfuse to maintain>7  -C/W cyanocobalamin 1000mg qd, multivitamin, thiamine

## 2025-04-30 NOTE — PROGRESS NOTE ADULT - TIME BILLING
presacral abscess, bacteremia.
presacral abscess, bacteremia
bacteremia, aspiration PNA, cellulitis
preparation to see patient, history taking, physical exam, discussion with family,  independent reviewing and interpretation of the data, care coordination.
reviewed chart, interviewed wife, examined, reviewed vitals, labs, imaging, interpretation of data, documentation of encounter, discussion with caretaker, and primary team.Excluded teaching, separately reported services
preparation to see patient, history taking, physical exam, discussion with patient, discussion with other care providers, independent reviewing and interpretation of the data, care coordination.

## 2025-04-30 NOTE — PROGRESS NOTE ADULT - PROBLEM SELECTOR PLAN 2
Patient has been having worsening cough and unable to expectorate. CXR RLL infiltrate. Secretions audible in upper airways.     - c/w treatment of PNA and sacral ulcer infection as above  - c/w supplemental oxygen; wean as tolerated to baseline 2L (on oxygen ever since developing COVID-19 in a NH)  - Duonebs q6h, hypertonic saline nebs q12h for airway clearance  - Maintain 30 degree elevation of head during tube feeds  - aspiration precautions  - palliative consult placed as repeat admission for similar PNA

## 2025-04-30 NOTE — PROGRESS NOTE ADULT - ATTENDING COMMENTS
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  (12/10-12/12/24) for wound care and further nutrition evaluation and management. GI consulted and PEG tube study showed PEG is functioning and no need to change to a new one. WOCN consult appreciated, given Sliver nitrate and aquagel Ag dressing daily to PEG site. Dietitian consult appreciated, TF changed to Glucerna 1.2 @ 83ml/hr from 5pm to 11am x 18hr, total 6 cans per day. Pt recently admitted to ICU for aspiration PNA and had indwelling hudson removed and discharged from [place] ( 2/25/25) and sent to [facility] and subsequently sent home on home O2 per wife, last admitted [facility] ( 3/26-4/1) for sepsis w/ lactic acidosis and acute hypoxic respiratory failure 2/2 multifocal /likely gram negative PNA( HAP since recent hospitalization and on PEG feeds), stage III sacral ulcer ( presence on admission), and concerns of PEG site infection w/ superficial ulceration and reported purulent discharge. Pt now readmitted to [facility] ( 3/23) for severe sepsis w/ lactic acidosis 2/2 recurrent multifocal HAP likely aspiration/gram negative organism and concerns for infected sacral decubitus (POA)--bacteremia, sacral ucler with abscess sp bedside debridement by surgery 4/27- bleeding required electrocautery , given one unit of prbc before debridement - now hb at 7.7 --> given a unit 4/28- hb above 9 - wound vac placed by team 4 surgery on 4/29- he is more awake now and open eyes , make eye contact, wife mentioned that he would speak to her ( few words ) - plan for [facility] to complete 2 weeks of antibiotics with ceftriazone/flagyl ( 4/27- 5/10 )- to follow up with surgery team 4 for wound vac ( to discharge to [facility] with wound vac )       awake, alert, verablizing one to two words, open eyes make eyes contact and track   wife at bedside   RRR, good air entry bilaterally   moves right hand -       # Severe sepsis/ bacteremia/sacral decubitus ulcer (POA) with abscess 7x3 cm- sp debridement unstagable to stage 4 ulcer ( 7x7 with depth 2cm post debridement -  #, RLL pneumonia.  # recurrent aspiration HAP likely gram negative organism   # Acute hypoxic resp failure   # bacteremia -poa ( strep and bacteroides)  # bed-bound , paraplegic   # dysphagia, dependent on PEG tube feeding.   # stage 4 decubitus ulcer with abscess.       - surgery evaluation appreciated for wound debridement with local anesthesia ( per wife request ) -not candidate and high risk for General anesthesia and high risk of aspiration and recurrent aspiration pneumonia.  - sp debridement - wound vac placed 4/29-   - stage 4 ulcer   - dressing, wound care as per surgery -need to keep the area clean, need frequent turning and to avoid soiling.   -continue with antibiotics for now as dw ID Dr. Andrade= aiming for 2 weeks of iv antibiotics ( 4/27- 5/10 )  - tylenol standing for pain control     # Anemia = sp transfusion before debridement - given one unit, hb 9--> 7.7- and 2 nd unit after debridement - hb stable     - cw peg feeding as tolerated ( wife would like to continue with feeding) rate at 60 cc per hour, extending the hours to 18 hours ( lower rate preferred to reduce risk of aspiration)   - wife would like to continue full care  - discussed at bedside with wife and family friend 4/24-= introduce home hospice care.   4/25/25 spoke with wife at length-pt is in serious infection, decubitus ulcer with absess, bacteremia,  I offered to call their son -she stated it is too late [place]  , I encouraged wife to discuss with her son regarding patient care-   4/26/25- - we talked extensively about complication of aspiration pneumonia, infected sacral ulcer with abscess with possible underlying OM - risk of mortality , talked about comfort approach , hospice care, she is not ready for comfort care, stated she spoke with her son last night who is in agreement with her. we tried to call him from bedside- not able to reach ( Son is in [place] )   4/28/25- wife stated she sent the picture of wound after debridement to her son, we talked about dispo -she could not take care of him at home , hha is 24/7 one person works 13 hours, with no one to help her at night time, she would like to pursue with iv antibiotics and wound care , we talked about caring at the facility , she is open to facility.   4/30/25- wound vac in place, pt is awake, alert ,     DM with hyperglycemia - basal bolus insulin   monitor for hypoglycemia.     [facility] team, lengthy discussion with wife regarding goc and disposition, [facility] RN  - awaiting for hospital bed for sacral ulcer -   - frequent turning to off load pressure on the ulcer.  - for dispo: wife could not take care of him at home with wound and antibiotics - SW and CM evaluation for BRENDA   - pmd is home visit, need follow up with team 4 general surgery for wound vac- pt to be discharged to [facility] with wound vac.     Dr. Rajesh Villalba covering 5/1-5/11/25    he is at high risk for recurrent aspiration, wound infection , sepsis and high risk of mortality, and high risk of aspiration.

## 2025-04-30 NOTE — PROGRESS NOTE ADULT - PROBLEM SELECTOR PLAN 3
Present on admission. Patient has visiting RN who noted the sacral ulcer is worsening and had black appearance in some areas. Patient is entirely bedbound since stroke    - c/w empiric abx as above  - f/up wound care and surgery recs as above  - palliative care recommendation give likely poor wound healing

## 2025-04-30 NOTE — PROGRESS NOTE ADULT - ASSESSMENT
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 CTAP with new posterior presacral fluid and gas containing abscess with rim of enhancement due to worsening decubitus ulcer, approximately 7 x 3 cm - new cortical attenuation of underlying coccyx, suspicious for osteomyelitis. General Surgery consulted for evaluation of sacral decubitus ulcer.     s/p bedside debridement 4/27. No acute events overnight. PT had wound Vac placed yesterday and no surrounding erythema or tenderness or fluctuance, afebrile, vitals WNL     Recommendations   - Antibiotics per ID   - Wound Vac dressing change tomorrow   - Can DC to [facility] with Vac  - F/u with [facility] outpatient when DC to [facility]   - Rest of the care as per primary   - Team 4c will follow

## 2025-04-30 NOTE — PROGRESS NOTE ADULT - SUBJECTIVE AND OBJECTIVE BOX
INTERVAL EVENTS:   stephanie  SUBJECTIVE:   Patient seen and examined at bedside. Condition largely unchanged from yesterday. No acute complaints at this time.    ROS:  Negative unless otherwise stated above.    PHYSICAL EXAM:  General: Alert and oriented x 3. No acute distress.   HEENT: Moist mucous membranes. Anicteric. No cervical lymphadenopathy.  Cardiovascular: Regular rate and rhythm. No murmur. Normal JVP.  Lungs: Clear to auscultation bilaterally. No accessory muscle use.  Abdomen: Soft, non-tender and non-distended. No palpable masses.  Extremities: No edema. Non-tender. Warm.  Skin: No rashes or lesions.   Neurologic: No apparent focal neurological deficits. CN II-XII grossly intact, but not individually tested.  Psychiatric: Cooperative. Appropriate mood and affect.    VITAL SIGNS:  Vital Signs Last 24 Hrs  T(C): 36.4 (30 Apr 2025 05:58), Max: 36.5 (29 Apr 2025 12:18)  T(F): 97.5 (30 Apr 2025 05:58), Max: 97.7 (29 Apr 2025 12:18)  HR: 96 (30 Apr 2025 05:58) (81 - 96)  BP: 125/72 (30 Apr 2025 05:58) (119/73 - 125/72)  BP(mean): --  RR: 17 (30 Apr 2025 05:58) (16 - 17)  SpO2: 97% (30 Apr 2025 05:58) (97% - 98%)    Parameters below as of 30 Apr 2025 05:58  Patient On (Oxygen Delivery Method): nasal cannula      INPATIENT MEDICATIONS:   MEDICATIONS  (STANDING):  acetaminophen   Oral Liquid .. 650 milliGRAM(s) Enteral Tube every 8 hours  albuterol/ipratropium for Nebulization 3 milliLiter(s) Nebulizer every 6 hours  aspirin  chewable 81 milliGRAM(s) Oral every 24 hours  atorvastatin 40 milliGRAM(s) Oral at bedtime  cefTRIAXone   IVPB 1000 milliGRAM(s) IV Intermittent every 24 hours  cyanocobalamin 1000 MICROGram(s) Oral every 24 hours  dextrose 5%. 1000 milliLiter(s) (50 mL/Hr) IV Continuous <Continuous>  dextrose 5%. 1000 milliLiter(s) (100 mL/Hr) IV Continuous <Continuous>  dextrose 50% Injectable 25 Gram(s) IV Push once  dextrose 50% Injectable 12.5 Gram(s) IV Push once  dextrose 50% Injectable 25 Gram(s) IV Push once  finasteride 5 milliGRAM(s) Oral at bedtime  glucagon  Injectable 1 milliGRAM(s) IntraMuscular once  insulin glargine Injectable (LANTUS) 9 Unit(s) SubCutaneous at bedtime  insulin lispro (ADMELOG) corrective regimen sliding scale   SubCutaneous every 6 hours  levETIRAcetam 750 milliGRAM(s) Oral every 12 hours  metroNIDAZOLE  IVPB 500 milliGRAM(s) IV Intermittent every 12 hours  multivitamin 1 Tablet(s) Oral daily  sodium chloride 3%  Inhalation 4 milliLiter(s) Inhalation every 12 hours  thiamine 100 milliGRAM(s) Oral every 24 hours  timolol 0.5% Solution 1 Drop(s) Both EYES every 12 hours    MEDICATIONS  (PRN):  dextrose Oral Gel 15 Gram(s) Oral once PRN Blood Glucose LESS THAN 70 milliGRAM(s)/deciliter    ALLERGIES:  Allergies    No Known Allergies    Intolerances    LABS:                       9.2    13.03 )-----------( 244      ( 30 Apr 2025 05:30 )             27.9     04-30    136  |  104  |  12  ----------------------------<  241[H]  4.1   |  20[L]  |  0.24[L]    Ca    8.0[L]      30 Apr 2025 05:30  Phos  2.0     04-30  Mg     1.9     04-30    TPro  5.8[L]  /  Alb  2.3[L]  /  TBili  0.2  /  DBili  x   /  AST  29  /  ALT  20  /  AlkPhos  117  04-30      Urinalysis Basic - ( 30 Apr 2025 05:30 )    Color: x / Appearance: x / SG: x / pH: x  Gluc: 241 mg/dL / Ketone: x  / Bili: x / Urobili: x   Blood: x / Protein: x / Nitrite: x   Leuk Esterase: x / RBC: x / WBC x   Sq Epi: x / Non Sq Epi: x / Bacteria: x      CAPILLARY BLOOD GLUCOSE      POCT Blood Glucose.: 228 mg/dL (30 Apr 2025 06:26)    RADIOLOGY & ADDITIONAL TESTS: Reviewed.

## 2025-04-30 NOTE — CHART NOTE - NSCHARTNOTEFT_GEN_A_CORE
Admitting Diagnosis:   Patient is a 89y old  Male who presents with a chief complaint of sepsis (26 Apr 2025 06:59)      PAST MEDICAL & SURGICAL HISTORY:  Diabetes  HLD (hyperlipidemia)  BPH (benign prostatic hyperplasia)  JUSTYNA (iron deficiency anemia)  Stroke  No significant past surgical history    Current Nutrition Order: NPO with tube feeds via PEG of Glucerna 1.5 @60mL/hr x18hrs with free water flushes of 240mL 3x/day       PO Intake: Good (%) [   ]  Fair (50-75%) [   ] Poor (<25%) [   ] - NPO    GI Issues: RN reports diarrhea overnight and this afternoon    Pain: non-verbal indicators absent    Skin Integrity: +2 edema to BL hands, unstageable pressure ulcer to sacrum    04-29-25 @ 07:01  -  04-30-25 @ 07:00  --------------------------------------------------------  IN: 0 mL / OUT: 1600 mL / NET: -1600 mL    Labs:   04-30    136  |  104  |  12  ----------------------------<  241[H]  4.1   |  20[L]  |  0.24[L]    Ca    8.0[L]      30 Apr 2025 05:30  Phos  2.0     04-30  Mg     1.9     04-30    TPro  5.8[L]  /  Alb  2.3[L]  /  TBili  0.2  /  DBili  x   /  AST  29  /  ALT  20  /  AlkPhos  117  04-30    CAPILLARY BLOOD GLUCOSE      POCT Blood Glucose.: 265 mg/dL (30 Apr 2025 12:28)  POCT Blood Glucose.: 228 mg/dL (30 Apr 2025 06:26)  POCT Blood Glucose.: 107 mg/dL (29 Apr 2025 22:08)  POCT Blood Glucose.: 172 mg/dL (29 Apr 2025 17:51)      Medications:  MEDICATIONS  (STANDING):  acetaminophen   Oral Liquid .. 650 milliGRAM(s) Enteral Tube every 8 hours  albuterol/ipratropium for Nebulization 3 milliLiter(s) Nebulizer every 6 hours  aspirin  chewable 81 milliGRAM(s) Oral every 24 hours  atorvastatin 40 milliGRAM(s) Oral at bedtime  cefTRIAXone   IVPB 1000 milliGRAM(s) IV Intermittent every 24 hours  chlorhexidine 2% Cloths 1 Application(s) Topical <User Schedule>  cyanocobalamin 1000 MICROGram(s) Oral every 24 hours  dextrose 5%. 1000 milliLiter(s) (50 mL/Hr) IV Continuous <Continuous>  dextrose 5%. 1000 milliLiter(s) (100 mL/Hr) IV Continuous <Continuous>  dextrose 50% Injectable 25 Gram(s) IV Push once  dextrose 50% Injectable 12.5 Gram(s) IV Push once  dextrose 50% Injectable 25 Gram(s) IV Push once  finasteride 5 milliGRAM(s) Oral at bedtime  glucagon  Injectable 1 milliGRAM(s) IntraMuscular once  insulin glargine Injectable (LANTUS) 9 Unit(s) SubCutaneous at bedtime  insulin lispro (ADMELOG) corrective regimen sliding scale   SubCutaneous every 6 hours  levETIRAcetam  Solution 750 milliGRAM(s) Oral every 12 hours  metroNIDAZOLE    Tablet 500 milliGRAM(s) Oral every 12 hours  multivitamin 1 Tablet(s) Oral daily  sodium chloride 3%  Inhalation 4 milliLiter(s) Inhalation every 12 hours  thiamine 100 milliGRAM(s) Oral every 24 hours  timolol 0.5% Solution 1 Drop(s) Both EYES every 12 hours    MEDICATIONS  (PRN):  dextrose Oral Gel 15 Gram(s) Oral once PRN Blood Glucose LESS THAN 70 milliGRAM(s)/deciliter  sodium chloride 0.9% lock flush 10 milliLiter(s) IV Push every 1 hour PRN Pre/post blood products, medications, blood draw, and to maintain line patency      Weight:  Height for BMI (FEET)	5 Feet  Height for BMI (INCHES)	4 Inch(s)  Height for BMI (CENTIMETERS)	162.56 Centimeter(s)  Weight for BMI (lbs)	120 lb  Weight for BMI (kg)	54.4 kg  Body Mass Index	20.5    Weight Change: no updated weight since admission. Appreciate updated weight and weekly weight trends.    NUTRITION FOCUSED PHYSICAL EXAM: Completed [4/23 - see below]; Not Pertinent at this time  Nutriton Focused Physical Exam	yes...  Muscle Mass (Loss of Muscle)	Temples...  Clavicles...  Temples Depletion is	moderate  Clavicles Depletion is	moderate    Estimated energy needs:   Weight used for calculations	current weight  Estimated Energy Needs Weight (lbs)	130 lb  Estimated Energy Needs Weight (kg)	58.9 kg  Estimated Energy Needs From (latanya/kg)	27  Estimated Energy Needs To (latanya/kg)	32  Estimated Energy Needs Calculated From (latanya/kg)	1590  Estimated Energy Needs Calculated To (latanya/kg)	1884    Weight used for calculations	current weight  Estimated Protein Needs Weight (lbs)	130 lb  Estimated Protein Needs Weight (kg)	58.9 kg  Estimated Protein Needs From (g/kg)	1.3  Estimated Protein Needs To (g/kg)	1.6  Estimated Protein Needs Calculated From (g/kg)	76.57  Estimated Protein Needs Calculated To (g/kg)	94.24  **Other Calculations	 pounds. Patient within % IBW (92%) thus actual body weight used for all calculations. Needs adjusted for advanced age and pressure ulcer. Fluid recs per team due to CHF.    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     (4/23) Patient seen at bedside for nutrition assessment. Patient is A&Ox0 - RD interview conducted with patients wife at bedside with [employer]  ID#[id number]. Current diet order: NPO. No known food allergies. Patient with sepsis likely secondary to aspiration pneumonia. Wife reports she was providing the Glucerna feeds previously recommended during previous admission x1 month ago. Wife had questions about Redd supplement since a doctor told her he needed additional protein for wound healing, however RD calculated new tube feed recommendations with updated weight that appropriately meet patients protein needs to support wound healing. Provided education about plan to readjust tube feed regimen based on new weight. Bolus feeds not recommended at this time due to high risk for aspiration pneumonia. Dosing weight: 120 pounds, BMI 20.6, patient weighed 106 pounds x1 month ago indicating a 14 pound desired weight gain. Unstageable pressure ulcer to sacrum. No edema documented. Wife denies nausea/vomiting/diarrhea/constipation. Labs: potassium 3.3, BUN 32, blood sugar 148-376 x24hrs, HgbA1c 8.1% on 3/28. Meds: antibiotic, D5% IVF, insulin sliding scale. Observed with moderate muscle wasting to temples and clavicles. Based on ASPEN guidelines, patient does not meet criteria for malnutrition, however is at high risk. See nutrition recommendations below.    (4/25) No acute nutrition events since prior nutrition visit. Appreciate conversation with RN. Per RN, pt tolerating feeds well, advanced to 30mL/hr. Rn denied any note of nausea/vomiting/diarrhea/constipation, last BM 4/23 per RN flowsheets. RD will continue to monitor and remain available, if needed.    (4/30): Patient seen for follow up assessment. Current diet order: NPO with tube feeds via PEG of Glucerna 1.5 @60mL/hr x18hrs with free water flushes of 240mL 3x/day. RN reports diarrhea overnight and this afternoon - discussed with medical team recommendation for Banatrol if diarrhea persists. Tube feeds changed from 12hrs to 18hrs with documented reasoning as high aspiration risk. Current rate remains appropriate to meet desired volume and nutrient needs. Labs: blood sugar 107-265 x24hrs, Cr 0.24, phos 2.0. Meds: antibiotics, insulin, potassium phosphate, vitamin B12, MVI, thiamine. RD to f/u prn.    Previous Nutrition Diagnosis:    Active [   ]  Resolved [   ]    If resolved, new PES:     Goal:    Recommendations:    Education:     Risk Level: High [   ] Moderate [   ] Low [   ] Admitting Diagnosis:   Patient is a 89y old  Male who presents with a chief complaint of sepsis (2025 06:59)      PAST MEDICAL & SURGICAL HISTORY:  Diabetes  HLD (hyperlipidemia)  BPH (benign prostatic hyperplasia)  JUSTYNA (iron deficiency anemia)  Stroke  No significant past surgical history    Current Nutrition Order: NPO with tube feeds via PEG of Glucerna 1.5 @60mL/hr x18hrs with free water flushes of 240mL 3x/day       PO Intake: Good (%) [   ]  Fair (50-75%) [   ] Poor (<25%) [   ] - NPO    GI Issues: RN reports diarrhea overnight and this afternoon    Pain: non-verbal indicators absent    Skin Integrity: +2 edema to BL hands, unstageable pressure ulcer to sacrum    25 @ 07:01  -  25 @ 07:00  --------------------------------------------------------  IN: 0 mL / OUT: 1600 mL / NET: -1600 mL    Labs:       136  |  104  |  12  ----------------------------<  241[H]  4.1   |  20[L]  |  0.24[L]    Ca    8.0[L]      2025 05:30  Phos  2.0       Mg     1.9         TPro  5.8[L]  /  Alb  2.3[L]  /  TBili  0.2  /  DBili  x   /  AST  29  /  ALT  20  /  AlkPhos  117      CAPILLARY BLOOD GLUCOSE      POCT Blood Glucose.: 265 mg/dL (2025 12:28)  POCT Blood Glucose.: 228 mg/dL (2025 06:26)  POCT Blood Glucose.: 107 mg/dL (2025 22:08)  POCT Blood Glucose.: 172 mg/dL (2025 17:51)      Medications:  MEDICATIONS  (STANDING):  acetaminophen   Oral Liquid .. 650 milliGRAM(s) Enteral Tube every 8 hours  albuterol/ipratropium for Nebulization 3 milliLiter(s) Nebulizer every 6 hours  aspirin  chewable 81 milliGRAM(s) Oral every 24 hours  atorvastatin 40 milliGRAM(s) Oral at bedtime  cefTRIAXone   IVPB 1000 milliGRAM(s) IV Intermittent every 24 hours  chlorhexidine 2% Cloths 1 Application(s) Topical <User Schedule>  cyanocobalamin 1000 MICROGram(s) Oral every 24 hours  dextrose 5%. 1000 milliLiter(s) (50 mL/Hr) IV Continuous <Continuous>  dextrose 5%. 1000 milliLiter(s) (100 mL/Hr) IV Continuous <Continuous>  dextrose 50% Injectable 25 Gram(s) IV Push once  dextrose 50% Injectable 12.5 Gram(s) IV Push once  dextrose 50% Injectable 25 Gram(s) IV Push once  finasteride 5 milliGRAM(s) Oral at bedtime  glucagon  Injectable 1 milliGRAM(s) IntraMuscular once  insulin glargine Injectable (LANTUS) 9 Unit(s) SubCutaneous at bedtime  insulin lispro (ADMELOG) corrective regimen sliding scale   SubCutaneous every 6 hours  levETIRAcetam  Solution 750 milliGRAM(s) Oral every 12 hours  metroNIDAZOLE    Tablet 500 milliGRAM(s) Oral every 12 hours  multivitamin 1 Tablet(s) Oral daily  sodium chloride 3%  Inhalation 4 milliLiter(s) Inhalation every 12 hours  thiamine 100 milliGRAM(s) Oral every 24 hours  timolol 0.5% Solution 1 Drop(s) Both EYES every 12 hours    MEDICATIONS  (PRN):  dextrose Oral Gel 15 Gram(s) Oral once PRN Blood Glucose LESS THAN 70 milliGRAM(s)/deciliter  sodium chloride 0.9% lock flush 10 milliLiter(s) IV Push every 1 hour PRN Pre/post blood products, medications, blood draw, and to maintain line patency      Weight:  Height for BMI (FEET)	5 Feet  Height for BMI (INCHES)	4 Inch(s)  Height for BMI (CENTIMETERS)	162.56 Centimeter(s)  Weight for BMI (lbs)	120 lb  Weight for BMI (kg)	54.4 kg  Body Mass Index	20.5    Weight Change: no updated weight since admission. Appreciate updated weight and weekly weight trends.    NUTRITION FOCUSED PHYSICAL EXAM: Completed [ - see below]; Not Pertinent at this time  Nutriton Focused Physical Exam	yes...  Muscle Mass (Loss of Muscle)	Temples...  Clavicles...  Temples Depletion is	moderate  Clavicles Depletion is	moderate    Estimated energy needs:   Weight used for calculations	current weight  Estimated Energy Needs Weight (lbs)	130 lb  Estimated Energy Needs Weight (kg)	58.9 kg  Estimated Energy Needs From (latanya/kg)	27  Estimated Energy Needs To (latanya/kg)	32  Estimated Energy Needs Calculated From (latanya/kg)	1590  Estimated Energy Needs Calculated To (latanya/kg)	1884    Weight used for calculations	current weight  Estimated Protein Needs Weight (lbs)	130 lb  Estimated Protein Needs Weight (kg)	58.9 kg  Estimated Protein Needs From (g/kg)	1.3  Estimated Protein Needs To (g/kg)	1.6  Estimated Protein Needs Calculated From (g/kg)	76.57  Estimated Protein Needs Calculated To (g/kg)	94.24  **Other Calculations	 pounds. Patient within % IBW (92%) thus actual body weight used for all calculations. Needs adjusted for advanced age and pressure ulcer. Fluid recs per team due to CHF.    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     () Patient seen at bedside for nutrition assessment. Patient is A&Ox0 - RD interview conducted with patients wife at bedside with Flowere  ID#[id number]. Current diet order: NPO. No known food allergies. Patient with sepsis likely secondary to aspiration pneumonia. Wife reports she was providing the Glucerna feeds previously recommended during previous admission x1 month ago. Wife had questions about Redd supplement since a doctor told her he needed additional protein for wound healing, however RD calculated new tube feed recommendations with updated weight that appropriately meet patients protein needs to support wound healing. Provided education about plan to readjust tube feed regimen based on new weight. Bolus feeds not recommended at this time due to high risk for aspiration pneumonia. Dosing weight: 120 pounds, BMI 20.6, patient weighed 106 pounds x1 month ago indicating a 14 pound desired weight gain. Unstageable pressure ulcer to sacrum. No edema documented. Wife denies nausea/vomiting/diarrhea/constipation. Labs: potassium 3.3, BUN 32, blood sugar 148-376 x24hrs, HgbA1c 8.1% on 3/28. Meds: antibiotic, D5% IVF, insulin sliding scale. Observed with moderate muscle wasting to temples and clavicles. Based on ASPEN guidelines, patient does not meet criteria for malnutrition, however is at high risk. See nutrition recommendations below.    () No acute nutrition events since prior nutrition visit. Appreciate conversation with RN. Per RN, pt tolerating feeds well, advanced to 30mL/hr. Rn denied any note of nausea/vomiting/diarrhea/constipation, last BM  per RN flowsheets. RD will continue to monitor and remain available, if needed.    (): Patient seen for follow up assessment. Current diet order: NPO with tube feeds via PEG of Glucerna 1.5 @60mL/hr x18hrs with free water flushes of 240mL 3x/day. RN reports diarrhea overnight and this afternoon - discussed with medical team recommendation for Banatrol if diarrhea persists. Tube feeds changed from 12hrs to 18hrs with documented reasoning as high aspiration risk. Current rate remains appropriate to meet desired volume and nutrient needs. Labs: blood sugar 107-265 x24hrs, Cr 0.24, phos 2.0. Meds: antibiotics, insulin, potassium phosphate, vitamin B12, MVI, thiamine. RD to f/u prn.    Previous Nutrition Diagnosis: Increased Nutrient Needs (protein/calories) related to increased physiologic demand for nutrients as evidenced by pressure ulcer    Active [  x ]  Resolved [   ]    Goal: Patient to meet at least 75% of nutritional needs consistently via most feasible route    Recommendations:  1. Recommend to continue EN via PEG of Glucerna 1.5 @60mL/hr x18hrs to provide 1080mL total volume, 1620 kcal, 90 g protein, 820mL free water. Meetin kcal/kg, 1.65 g/kg protein based on actual weight of 54.4 kg. Maintain aspiration precautions.   2. Encourage pt to meet nutritional needs as able   3. Monitor labs: electrolytes, cbc, fingersticks  - Specifically continue to monitor lytes, Mg, K+ and Phos for refeeding, replete prn  4. Monitor weights   5. Pain and bowel regimen per team   6. Continue thiamin 100 mg/d   7. Monitor adherence to diet education    Education: not appropriate at this time due to patients mental status    Risk Level: High [ x  ] Moderate [   ] Low [   ]

## 2025-04-30 NOTE — PROGRESS NOTE ADULT - PROBLEM SELECTOR PLAN 1
#Lactic acidosis, IMPROVED  Likely 2/2 aspiration PNA vs. infected sacral decubitus ulcer; has similar hospitalization in early April 2025. Sacral ulcer with areas of eschar. PEG site without infectious signs  D/gokul empiric Vancomycin on 4/23 despite previous +MRSA wound/PEG site culture as currently without signs of acute SSTI. However, assessment of sacral decubitus ulcer by wound care on 4/24 revealed foul odor and mild expressed purulence.   CT chest/abd/pelvis 4/25: Suspected recurrent aspiration and right lower lobe pneumonia. 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  S/p bedside debridement with gen surg on 4/27. previous on empiric Zosyn 3.375g q8h (4/23 - )    - ID, wound care, gen surg recommendations  - F/u admission BCx 4/22 - +Bacteroides fragilis, +Strep species  - F/u surveillance BCx 4/24, 4/25, 4/26 - all NGTD  - CTX 1g (4/29 - ) and Flagyl 500mg PO changed to IV (4/29 - )  - pending duration of antibiotics may need to concerns picc if indicated for long term management  - tylenol suspension standing for pain management.

## 2025-05-01 VITALS
DIASTOLIC BLOOD PRESSURE: 77 MMHG | HEART RATE: 86 BPM | SYSTOLIC BLOOD PRESSURE: 128 MMHG | TEMPERATURE: 97 F | OXYGEN SATURATION: 98 % | RESPIRATION RATE: 17 BRPM

## 2025-05-01 LAB
ALBUMIN SERPL ELPH-MCNC: 2.1 G/DL — LOW (ref 3.3–5)
ALP SERPL-CCNC: 114 U/L — SIGNIFICANT CHANGE UP (ref 40–120)
ALT FLD-CCNC: 18 U/L — SIGNIFICANT CHANGE UP (ref 10–45)
ANION GAP SERPL CALC-SCNC: 10 MMOL/L — SIGNIFICANT CHANGE UP (ref 5–17)
AST SERPL-CCNC: 24 U/L — SIGNIFICANT CHANGE UP (ref 10–40)
BASOPHILS # BLD AUTO: 0 K/UL — SIGNIFICANT CHANGE UP (ref 0–0.2)
BASOPHILS NFR BLD AUTO: 0 % — SIGNIFICANT CHANGE UP (ref 0–2)
BILIRUB SERPL-MCNC: 0.2 MG/DL — SIGNIFICANT CHANGE UP (ref 0.2–1.2)
BUN SERPL-MCNC: 11 MG/DL — SIGNIFICANT CHANGE UP (ref 7–23)
CALCIUM SERPL-MCNC: 7.8 MG/DL — LOW (ref 8.4–10.5)
CHLORIDE SERPL-SCNC: 105 MMOL/L — SIGNIFICANT CHANGE UP (ref 96–108)
CO2 SERPL-SCNC: 20 MMOL/L — LOW (ref 22–31)
CREAT SERPL-MCNC: 0.23 MG/DL — LOW (ref 0.5–1.3)
CULTURE RESULTS: SIGNIFICANT CHANGE UP
CULTURE RESULTS: SIGNIFICANT CHANGE UP
EGFR: 125 ML/MIN/1.73M2 — SIGNIFICANT CHANGE UP
EGFR: 125 ML/MIN/1.73M2 — SIGNIFICANT CHANGE UP
EOSINOPHIL # BLD AUTO: 0.13 K/UL — SIGNIFICANT CHANGE UP (ref 0–0.5)
EOSINOPHIL NFR BLD AUTO: 1 % — SIGNIFICANT CHANGE UP (ref 0–6)
GLUCOSE SERPL-MCNC: 248 MG/DL — HIGH (ref 70–99)
HCT VFR BLD CALC: 29 % — LOW (ref 39–50)
HGB BLD-MCNC: 9.4 G/DL — LOW (ref 13–17)
LYMPHOCYTES # BLD AUTO: 0.77 K/UL — LOW (ref 1–3.3)
LYMPHOCYTES # BLD AUTO: 6 % — LOW (ref 13–44)
MAGNESIUM SERPL-MCNC: 1.8 MG/DL — SIGNIFICANT CHANGE UP (ref 1.6–2.6)
MCHC RBC-ENTMCNC: 25.5 PG — LOW (ref 27–34)
MCHC RBC-ENTMCNC: 32.4 G/DL — SIGNIFICANT CHANGE UP (ref 32–36)
MCV RBC AUTO: 78.6 FL — LOW (ref 80–100)
MONOCYTES # BLD AUTO: 0.77 K/UL — SIGNIFICANT CHANGE UP (ref 0–0.9)
MONOCYTES NFR BLD AUTO: 6 % — SIGNIFICANT CHANGE UP (ref 2–14)
NEUTROPHILS # BLD AUTO: 11.1 K/UL — HIGH (ref 1.8–7.4)
NEUTROPHILS NFR BLD AUTO: 86 % — HIGH (ref 43–77)
NRBC BLD AUTO-RTO: SIGNIFICANT CHANGE UP /100 WBCS (ref 0–0)
PHOSPHATE SERPL-MCNC: 2.5 MG/DL — SIGNIFICANT CHANGE UP (ref 2.5–4.5)
PLATELET # BLD AUTO: 192 K/UL — SIGNIFICANT CHANGE UP (ref 150–400)
POTASSIUM SERPL-MCNC: 4.5 MMOL/L — SIGNIFICANT CHANGE UP (ref 3.5–5.3)
POTASSIUM SERPL-SCNC: 4.5 MMOL/L — SIGNIFICANT CHANGE UP (ref 3.5–5.3)
PROT SERPL-MCNC: 5.6 G/DL — LOW (ref 6–8.3)
RBC # BLD: 3.69 M/UL — LOW (ref 4.2–5.8)
RBC # FLD: 22.1 % — HIGH (ref 10.3–14.5)
SODIUM SERPL-SCNC: 135 MMOL/L — SIGNIFICANT CHANGE UP (ref 135–145)
SPECIMEN SOURCE: SIGNIFICANT CHANGE UP
SPECIMEN SOURCE: SIGNIFICANT CHANGE UP
WBC # BLD: 12.91 K/UL — HIGH (ref 3.8–10.5)
WBC # FLD AUTO: 12.91 K/UL — HIGH (ref 3.8–10.5)

## 2025-05-01 PROCEDURE — 86923 COMPATIBILITY TEST ELECTRIC: CPT

## 2025-05-01 PROCEDURE — 84100 ASSAY OF PHOSPHORUS: CPT

## 2025-05-01 PROCEDURE — 96375 TX/PRO/DX INJ NEW DRUG ADDON: CPT

## 2025-05-01 PROCEDURE — 85610 PROTHROMBIN TIME: CPT

## 2025-05-01 PROCEDURE — 86901 BLOOD TYPING SEROLOGIC RH(D): CPT

## 2025-05-01 PROCEDURE — 87637 SARSCOV2&INF A&B&RSV AMP PRB: CPT

## 2025-05-01 PROCEDURE — P9040: CPT

## 2025-05-01 PROCEDURE — 36430 TRANSFUSION BLD/BLD COMPNT: CPT

## 2025-05-01 PROCEDURE — 87040 BLOOD CULTURE FOR BACTERIA: CPT

## 2025-05-01 PROCEDURE — 76937 US GUIDE VASCULAR ACCESS: CPT

## 2025-05-01 PROCEDURE — 93005 ELECTROCARDIOGRAM TRACING: CPT

## 2025-05-01 PROCEDURE — 86850 RBC ANTIBODY SCREEN: CPT

## 2025-05-01 PROCEDURE — 85025 COMPLETE CBC W/AUTO DIFF WBC: CPT

## 2025-05-01 PROCEDURE — 71260 CT THORAX DX C+: CPT | Mod: MC

## 2025-05-01 PROCEDURE — 71045 X-RAY EXAM CHEST 1 VIEW: CPT

## 2025-05-01 PROCEDURE — 82330 ASSAY OF CALCIUM: CPT

## 2025-05-01 PROCEDURE — 85730 THROMBOPLASTIN TIME PARTIAL: CPT

## 2025-05-01 PROCEDURE — 84132 ASSAY OF SERUM POTASSIUM: CPT

## 2025-05-01 PROCEDURE — 99239 HOSP IP/OBS DSCHRG MGMT >30: CPT

## 2025-05-01 PROCEDURE — 87640 STAPH A DNA AMP PROBE: CPT

## 2025-05-01 PROCEDURE — 87186 SC STD MICRODIL/AGAR DIL: CPT

## 2025-05-01 PROCEDURE — 83735 ASSAY OF MAGNESIUM: CPT

## 2025-05-01 PROCEDURE — 94640 AIRWAY INHALATION TREATMENT: CPT

## 2025-05-01 PROCEDURE — 86900 BLOOD TYPING SEROLOGIC ABO: CPT

## 2025-05-01 PROCEDURE — 82803 BLOOD GASES ANY COMBINATION: CPT

## 2025-05-01 PROCEDURE — 87641 MR-STAPH DNA AMP PROBE: CPT

## 2025-05-01 PROCEDURE — 99285 EMERGENCY DEPT VISIT HI MDM: CPT

## 2025-05-01 PROCEDURE — 80048 BASIC METABOLIC PNL TOTAL CA: CPT

## 2025-05-01 PROCEDURE — 74177 CT ABD & PELVIS W/CONTRAST: CPT | Mod: MC

## 2025-05-01 PROCEDURE — 36415 COLL VENOUS BLD VENIPUNCTURE: CPT

## 2025-05-01 PROCEDURE — 96374 THER/PROPH/DIAG INJ IV PUSH: CPT

## 2025-05-01 PROCEDURE — 36410 VNPNXR 3YR/> PHY/QHP DX/THER: CPT

## 2025-05-01 PROCEDURE — 81001 URINALYSIS AUTO W/SCOPE: CPT

## 2025-05-01 PROCEDURE — 84295 ASSAY OF SERUM SODIUM: CPT

## 2025-05-01 PROCEDURE — 85027 COMPLETE CBC AUTOMATED: CPT

## 2025-05-01 PROCEDURE — 87150 DNA/RNA AMPLIFIED PROBE: CPT

## 2025-05-01 PROCEDURE — 87077 CULTURE AEROBIC IDENTIFY: CPT

## 2025-05-01 PROCEDURE — 82962 GLUCOSE BLOOD TEST: CPT

## 2025-05-01 PROCEDURE — 80053 COMPREHEN METABOLIC PANEL: CPT

## 2025-05-01 PROCEDURE — 83605 ASSAY OF LACTIC ACID: CPT

## 2025-05-01 RX ADMIN — TIMOLOL MALEATE 1 DROP(S): 6.8 SOLUTION OPHTHALMIC at 06:24

## 2025-05-01 RX ADMIN — Medication 500 MILLIGRAM(S): at 06:20

## 2025-05-01 RX ADMIN — Medication 81 MILLIGRAM(S): at 06:20

## 2025-05-01 RX ADMIN — INSULIN LISPRO 6: 100 INJECTION, SOLUTION INTRAVENOUS; SUBCUTANEOUS at 07:10

## 2025-05-01 RX ADMIN — LEVETIRACETAM 750 MILLIGRAM(S): 10 INJECTION, SOLUTION INTRAVENOUS at 09:27

## 2025-05-01 RX ADMIN — Medication 1 APPLICATION(S): at 06:23

## 2025-05-01 RX ADMIN — Medication 650 MILLIGRAM(S): at 03:48

## 2025-05-01 RX ADMIN — IPRATROPIUM BROMIDE AND ALBUTEROL SULFATE 3 MILLILITER(S): .5; 2.5 SOLUTION RESPIRATORY (INHALATION) at 06:20

## 2025-05-01 RX ADMIN — CYANOCOBALAMIN 1000 MICROGRAM(S): 1000 INJECTION INTRAMUSCULAR; SUBCUTANEOUS at 06:20

## 2025-05-01 RX ADMIN — Medication 100 MILLIGRAM(S): at 06:20

## 2025-05-01 RX ADMIN — CEFTRIAXONE 100 MILLIGRAM(S): 500 INJECTION, POWDER, FOR SOLUTION INTRAMUSCULAR; INTRAVENOUS at 06:20

## 2025-05-01 RX ADMIN — Medication 4 MILLILITER(S): at 00:45

## 2025-05-01 NOTE — PROGRESS NOTE ADULT - SUBJECTIVE AND OBJECTIVE BOX
INTERVAL EVENTS:   No acute events overnight.    SUBJECTIVE:   Patient seen and examined at bedside. Condition largely unchanged from yesterday. No acute complaints at this time.    PHYSICAL EXAM:  Constitutional: resting comfortably in bed; NAD  Eyes: anicteric sclera  Respiratory: CTA B/L; no W/R/R, no retractions  Cardiac: +S1/S2; RRR  Gastrointestinal: soft, NT/ND; no rebound or guarding;  Extremities: no edema    VITAL SIGNS:  Vital Signs Last 24 Hrs  T(C): 37 (01 May 2025 06:11), Max: 37 (01 May 2025 06:11)  T(F): 98.6 (01 May 2025 06:11), Max: 98.6 (01 May 2025 06:11)  HR: 76 (01 May 2025 06:11) (76 - 85)  BP: 129/74 (01 May 2025 06:11) (123/74 - 130/72)  BP(mean): --  RR: 18 (01 May 2025 06:11) (16 - 18)  SpO2: 95% (01 May 2025 06:11) (93% - 95%)    Parameters below as of 01 May 2025 06:11  Patient On (Oxygen Delivery Method): nasal cannula      INPATIENT MEDICATIONS:   MEDICATIONS  (STANDING):  acetaminophen   Oral Liquid .. 650 milliGRAM(s) Enteral Tube every 8 hours  albuterol/ipratropium for Nebulization 3 milliLiter(s) Nebulizer every 6 hours  aspirin  chewable 81 milliGRAM(s) Oral every 24 hours  atorvastatin 40 milliGRAM(s) Oral at bedtime  cefTRIAXone   IVPB 1000 milliGRAM(s) IV Intermittent every 24 hours  chlorhexidine 2% Cloths 1 Application(s) Topical <User Schedule>  cyanocobalamin 1000 MICROGram(s) Oral every 24 hours  dextrose 5%. 1000 milliLiter(s) (100 mL/Hr) IV Continuous <Continuous>  dextrose 5%. 1000 milliLiter(s) (50 mL/Hr) IV Continuous <Continuous>  dextrose 50% Injectable 25 Gram(s) IV Push once  dextrose 50% Injectable 12.5 Gram(s) IV Push once  dextrose 50% Injectable 25 Gram(s) IV Push once  finasteride 5 milliGRAM(s) Oral at bedtime  glucagon  Injectable 1 milliGRAM(s) IntraMuscular once  insulin glargine Injectable (LANTUS) 9 Unit(s) SubCutaneous at bedtime  insulin lispro (ADMELOG) corrective regimen sliding scale   SubCutaneous every 6 hours  levETIRAcetam  Solution 750 milliGRAM(s) Oral every 12 hours  metroNIDAZOLE    Tablet 500 milliGRAM(s) Oral every 12 hours  multivitamin 1 Tablet(s) Oral daily  sodium chloride 3%  Inhalation 4 milliLiter(s) Inhalation every 12 hours  thiamine 100 milliGRAM(s) Oral every 24 hours  timolol 0.5% Solution 1 Drop(s) Both EYES every 12 hours    MEDICATIONS  (PRN):  dextrose Oral Gel 15 Gram(s) Oral once PRN Blood Glucose LESS THAN 70 milliGRAM(s)/deciliter  sodium chloride 0.9% lock flush 10 milliLiter(s) IV Push every 1 hour PRN Pre/post blood products, medications, blood draw, and to maintain line patency    ALLERGIES:  Allergies    No Known Allergies    Intolerances    LABS:                       9.4    12.91 )-----------( 192      ( 01 May 2025 05:30 )             29.0     05-01    135  |  105  |  11  ----------------------------<  248[H]  4.5   |  20[L]  |  0.23[L]    Ca    7.8[L]      01 May 2025 05:30  Phos  2.5     05-01  Mg     1.8     05-01    TPro  5.6[L]  /  Alb  2.1[L]  /  TBili  0.2  /  DBili  x   /  AST  24  /  ALT  18  /  AlkPhos  114  05-01      Urinalysis Basic - ( 01 May 2025 05:30 )    Color: x / Appearance: x / SG: x / pH: x  Gluc: 248 mg/dL / Ketone: x  / Bili: x / Urobili: x   Blood: x / Protein: x / Nitrite: x   Leuk Esterase: x / RBC: x / WBC x   Sq Epi: x / Non Sq Epi: x / Bacteria: x      CAPILLARY BLOOD GLUCOSE      POCT Blood Glucose.: 262 mg/dL (01 May 2025 07:04)    RADIOLOGY & ADDITIONAL TESTS: Reviewed.

## 2025-05-01 NOTE — PROGRESS NOTE ADULT - PROBLEM SELECTOR PROBLEM 4
Acute tubular necrosis

## 2025-05-01 NOTE — PROGRESS NOTE ADULT - PROVIDER SPECIALTY LIST ADULT
Hospitalist
Infectious Disease
Internal Medicine
Surgery
Infectious Disease
Internal Medicine

## 2025-05-01 NOTE — PROGRESS NOTE ADULT - PROBLEM SELECTOR PROBLEM 1
Severe sepsis
Patient/Caregiver provided printed discharge information.

## 2025-05-01 NOTE — PROGRESS NOTE ADULT - PROBLEM SELECTOR PLAN 12
F: s/p 3.7 L  E: replete PRN  N: keep NPO  DVT ppx: lovenox 40 mg QD  Dispo: RMF
F: s/p 3.7 L  E: replete PRN  N: NPO with tube feeds  DVT ppx: none, on asa 81mg   Dispo: RMF
F: s/p 3.7 L  E: replete PRN  N: NPO with tube feeds  DVT ppx: none, on asa 81mg   Dispo: RMF
F: s/p 3.7 L  E: replete PRN  N: keep NPO  DVT ppx: lovenox 40 mg QD  Dispo: RMF
F: s/p 3.7 L  E: replete PRN  N: NPO with tube feeds  DVT ppx: none, on asa 81mg   Dispo: RMF
F: s/p 3.7 L  E: replete PRN  N: keep NPO  DVT ppx: lovenox 40 mg QD  Dispo: RMF

## 2025-05-01 NOTE — PROGRESS NOTE ADULT - ATTENDING COMMENTS
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 GI consulted and PEG tube study showed PEG is functioning and no need to change to a new one. WOCN consult appreciated, given Sliver nitrate and aquagel Ag dressing daily to PEG site. Dietitian consult appreciated, TF changed to Glucerna 1.2 @ 83ml/hr from 5pm to 11am x 18hr, total 6 cans per day. Pt recently admitted to ICU for aspiration PNA and had indwelling hudson removed and discharged from [facility] ( 2/25/25) and sent to [facility] and subsequently sent home on home O2 per wife, last admitted [facility] ( 3/26-4/1) for sepsis w/ lactic acidosis and acute hypoxic respiratory failure 2/2 multifocal /likely gram negative PNA( HAP since recent hospitalization and on PEG feeds), stage III sacral ulcer ( presence on admission), and concerns of PEG site infection w/ superficial ulceration and reported purulent discharge. Pt now readmitted to [facility] ( 3/23) for severe sepsis w/ lactic acidosis 2/2 recurrent multifocal HAP likely aspiration/gram negative organism and concerns for infected sacral decubitus (POA)--Strep bacteremia, sacral ulcer with abscess sp bedside debridement by surgery 4/27- bleeding required electrocautery , given one unit of prbc before debridement - now hb at 7.7 --> given a unit 4/28- hb above 9 - wound vac placed by team 4 surgery on 4/29- he is more awake now and open eyes , make eye contact, wife mentioned that he would speak to her ( few words ) - plan for [facility] to complete 2 weeks of antibiotics with ceftriaxone/flagyl ( 4/27- 5/10 )- to follow up with surgery team 4 for wound vac ( to discharge to [facility] with wound vac )       awake, alert, verbalizing one to two words, open eyes make eyes contact and track   wife at bedside   RRR, good air entry bilaterally   moves right hand -     # Severe sepsis/ Strep bacteremia/sacral decubitus ulcer (POA) with abscess 7x3 cm- sp debridement (4/27) unstageable to stage 4 ulcer ( 7x7 with depth 2cm post debridement -  #, RLL pneumonia.  # recurrent aspiration HAP likely gram negative organism   # Acute hypoxic resp failure   # bacteremia -poa ( strep and bacteroides)  # bed-bound , paraplegic   # dysphagia, dependent on PEG tube feeding.   # stage 4 decubitus ulcer with abscess.       - surgery (ACS team 4) evaluation appreciated for wound debridement with local anesthesia ( per wife request ) -not candidate and high risk for General anesthesia and high risk of aspiration and recurrent aspiration pneumonia.  - sp debridement - wound vac placed 4/29-   - stage 4 ulcer   - dressing, wound care as per surgery -need to keep the area clean, need frequent turning and to avoid soiling.   -continue with antibiotics for now as dw ID Dr. Andrade= aiming for 2 weeks of iv antibiotics ( 4/27- 5/10 )w/ Cefriaxone 1g IV q24hr and Flagyl  - tylenol standing for pain control     # Anemia = sp transfusion before debridement - given one unit, hb 9--> 7.7- and 2 nd unit after debridement - hb stable at 9.4( 5/1)     - cw peg feeding as tolerated ( wife would like to continue with feeding) rate at 60 cc per hour, extending the hours to 18 hours ( lower rate preferred to reduce risk of aspiration)   - wife would like to continue full care  - discussed at bedside with wife and family friend 4/24-= introduce home hospice care.   4/25/25 spoke with wife at length-pt is in serious infection, decubitus ulcer with abscess, bacteremia,  I offered to call their son -she stated it is too late in [place]  , I encouraged wife to discuss with her son regarding patient care-   4/26/25- - we talked extensively about complication of aspiration pneumonia, infected sacral ulcer with abscess with possible underlying OM - risk of mortality , talked about comfort approach , hospice care, she is not ready for comfort care, stated she spoke with her son last night who is in agreement with her. we tried to call him from bedside- not able to reach ( Son is in [place] )   4/28/25- wife stated she sent the picture of wound after debridement to her son, we talked about dispo -she could not take care of him at home , a is 24/7 one person works 13 hours, with no one to help her at night time, she would like to pursue with iv antibiotics and wound care , we talked about caring at the facility , she is open to facility.   4/30/25- wound vac in place, pt is awake, alert ,     DM with hyperglycemia - basal bolus insulin   monitor for hypoglycemia.     [facility] medical team, lengthy discussion with wife regarding goc and disposition, [facility] RN  - awaiting for hospital bed for sacral ulcer -   - frequent turning to off load pressure on the ulcer.  - for dispo: wife could not take care of him at home with wound and antibiotics - SW and CM evaluation for [facility]   - pmd is home visit, need follow up with team 4 general surgery for wound vac- pt to be discharged to [facility] with wound vac.   - Wife requested for a letter requesting split shift for 24/7 [facility] services upon returning home.  - PMD from [facility] Home visit: Dr. Destin Roach     he is at high risk for recurrent aspiration, wound infection , sepsis and high risk of mortality, and high risk of aspiration.      Time-based billing (NON-critical care).     >30 minutes spent on total encounter. The necessity of the time spent during the encounter on this date of service was due to:     preparation to see patient, history taking, physical exam, discussion with family,  independent reviewing and interpretation of the data, care coordination.

## 2025-05-01 NOTE — PROGRESS NOTE ADULT - PROBLEM SELECTOR PROBLEM 2
Aspiration pneumonia
Aspiration pneumonia
[Very Good] : ~his/her~  mood as very good
Aspiration pneumonia
[Yes] : Yes
[Monthly or less (1 pt)] : Monthly or less (1 point)
[1 or 2 (0 pts)] : 1 or 2 (0 points)
[Never (0 pts)] : Never (0 points)
[No] : In the past 12 months have you used drugs other than those required for medical reasons? No
[No falls in past year] : Patient reported no falls in the past year
[0] : 2) Feeling down, depressed, or hopeless: Not at all (0)
[PHQ-2 Negative - No further assessment needed] : PHQ-2 Negative - No further assessment needed
Aspiration pneumonia
[Patient reported colonoscopy was normal] : Patient reported colonoscopy was normal
[HIV test declined] : HIV test declined
[Hepatitis C test declined] : Hepatitis C test declined
Aspiration pneumonia
[None] : None
[With Family] : lives with family
Aspiration pneumonia
[# of Members in Household ___] :  household currently consist of [unfilled] member(s)
[Employed] : employed
[] : 
[Sexually Active] : sexually active
[Feels Safe at Home] : Feels safe at home
Aspiration pneumonia
[Fully functional (bathing, dressing, toileting, transferring, walking, feeding)] : Fully functional (bathing, dressing, toileting, transferring, walking, feeding)
[Fully functional (using the telephone, shopping, preparing meals, housekeeping, doing laundry, using] : Fully functional and needs no help or supervision to perform IADLs (using the telephone, shopping, preparing meals, housekeeping, doing laundry, using transportation, managing medications and managing finances)
[With Patient/Caregiver] : , with patient/caregiver
[Reviewed updated] : Reviewed, updated
[Designated Healthcare Proxy] : Designated healthcare proxy
Aspiration pneumonia
[Name: ___] : Health Care Proxy's Name: [unfilled] 
[Relationship: ___] : Relationship: [unfilled]
[Aggressive treatment] : aggressive treatment
[I will adhere to the patient's wishes.] : I will adhere to the patient's wishes.
[Never] : Never
[Audit-CScore] : 1
[de-identified] : 3x a week gym- cardio and strength trainging 
[de-identified] : 5/10- could improve 
[MAT3Daqnw] : 0
[Change in mental status noted] : No change in mental status noted
[Language] : denies difficulty with language
[Behavior] : denies difficulty with behavior
[Learning/Retaining New Information] : denies difficulty learning/retaining new information
[Handling Complex Tasks] : denies difficulty handling complex tasks
[Reasoning] : denies difficulty with reasoning
[Spatial Ability and Orientation] : denies difficulty with spatial ability and orientation
[High Risk Behavior] : no high risk behavior
[Reports changes in hearing] : Reports no changes in hearing
[Reports changes in vision] : Reports no changes in vision
[ColonoscopyDate] : 10/2020
[FreeTextEntry2] : it 
[FreeTextEntry3] : Has 3 strep children 
[AdvancecareDate] : 4/2024

## 2025-05-07 DIAGNOSIS — Z79.84 LONG TERM (CURRENT) USE OF ORAL HYPOGLYCEMIC DRUGS: ICD-10-CM

## 2025-05-07 DIAGNOSIS — Z79.2 LONG TERM (CURRENT) USE OF ANTIBIOTICS: ICD-10-CM

## 2025-05-07 DIAGNOSIS — Z74.01 BED CONFINEMENT STATUS: ICD-10-CM

## 2025-05-07 DIAGNOSIS — J96.01 ACUTE RESPIRATORY FAILURE WITH HYPOXIA: ICD-10-CM

## 2025-05-07 DIAGNOSIS — I69.354 HEMIPLEGIA AND HEMIPARESIS FOLLOWING CEREBRAL INFARCTION AFFECTING LEFT NON-DOMINANT SIDE: ICD-10-CM

## 2025-05-07 DIAGNOSIS — G82.20 PARAPLEGIA, UNSPECIFIED: ICD-10-CM

## 2025-05-07 DIAGNOSIS — Z86.14 PERSONAL HISTORY OF METHICILLIN RESISTANT STAPHYLOCOCCUS AUREUS INFECTION: ICD-10-CM

## 2025-05-07 DIAGNOSIS — Z86.16 PERSONAL HISTORY OF COVID-19: ICD-10-CM

## 2025-05-07 DIAGNOSIS — I71.9 AORTIC ANEURYSM OF UNSPECIFIED SITE, WITHOUT RUPTURE: ICD-10-CM

## 2025-05-07 DIAGNOSIS — N40.0 BENIGN PROSTATIC HYPERPLASIA WITHOUT LOWER URINARY TRACT SYMPTOMS: ICD-10-CM

## 2025-05-07 DIAGNOSIS — E87.20 ACIDOSIS, UNSPECIFIED: ICD-10-CM

## 2025-05-07 DIAGNOSIS — D63.8 ANEMIA IN OTHER CHRONIC DISEASES CLASSIFIED ELSEWHERE: ICD-10-CM

## 2025-05-07 DIAGNOSIS — A40.8 OTHER STREPTOCOCCAL SEPSIS: ICD-10-CM

## 2025-05-07 DIAGNOSIS — Z11.52 ENCOUNTER FOR SCREENING FOR COVID-19: ICD-10-CM

## 2025-05-07 DIAGNOSIS — E11.69 TYPE 2 DIABETES MELLITUS WITH OTHER SPECIFIED COMPLICATION: ICD-10-CM

## 2025-05-07 DIAGNOSIS — Z79.82 LONG TERM (CURRENT) USE OF ASPIRIN: ICD-10-CM

## 2025-05-07 DIAGNOSIS — L03.818 CELLULITIS OF OTHER SITES: ICD-10-CM

## 2025-05-07 DIAGNOSIS — Z79.899 OTHER LONG TERM (CURRENT) DRUG THERAPY: ICD-10-CM

## 2025-05-07 DIAGNOSIS — H40.9 UNSPECIFIED GLAUCOMA: ICD-10-CM

## 2025-05-07 DIAGNOSIS — M46.28 OSTEOMYELITIS OF VERTEBRA, SACRAL AND SACROCOCCYGEAL REGION: ICD-10-CM

## 2025-05-07 DIAGNOSIS — I50.32 CHRONIC DIASTOLIC (CONGESTIVE) HEART FAILURE: ICD-10-CM

## 2025-05-07 DIAGNOSIS — Z97.4 PRESENCE OF EXTERNAL HEARING-AID: ICD-10-CM

## 2025-05-07 DIAGNOSIS — J69.0 PNEUMONITIS DUE TO INHALATION OF FOOD AND VOMIT: ICD-10-CM

## 2025-05-07 DIAGNOSIS — E11.65 TYPE 2 DIABETES MELLITUS WITH HYPERGLYCEMIA: ICD-10-CM

## 2025-05-07 DIAGNOSIS — H91.93 UNSPECIFIED HEARING LOSS, BILATERAL: ICD-10-CM

## 2025-05-07 DIAGNOSIS — L89.154 PRESSURE ULCER OF SACRAL REGION, STAGE 4: ICD-10-CM

## 2025-05-07 DIAGNOSIS — Z93.1 GASTROSTOMY STATUS: ICD-10-CM

## 2025-05-07 DIAGNOSIS — R65.20 SEVERE SEPSIS WITHOUT SEPTIC SHOCK: ICD-10-CM

## 2025-05-07 DIAGNOSIS — N17.0 ACUTE KIDNEY FAILURE WITH TUBULAR NECROSIS: ICD-10-CM

## 2025-05-07 DIAGNOSIS — E78.5 HYPERLIPIDEMIA, UNSPECIFIED: ICD-10-CM

## 2025-05-07 DIAGNOSIS — D56.3 THALASSEMIA MINOR: ICD-10-CM

## 2025-05-07 DIAGNOSIS — R13.10 DYSPHAGIA, UNSPECIFIED: ICD-10-CM

## 2025-05-07 DIAGNOSIS — A41.9 SEPSIS, UNSPECIFIED ORGANISM: ICD-10-CM

## 2025-05-07 DIAGNOSIS — D50.9 IRON DEFICIENCY ANEMIA, UNSPECIFIED: ICD-10-CM

## 2025-05-09 ENCOUNTER — INPATIENT (INPATIENT)
Facility: HOSPITAL | Age: 89
LOS: 11 days | Discharge: NURSING, REHAB OR LONG-TERM CARE | DRG: 177 | End: 2025-05-21
Attending: HOSPITALIST | Admitting: HOSPITALIST
Payer: MEDICARE

## 2025-05-09 VITALS
SYSTOLIC BLOOD PRESSURE: 126 MMHG | DIASTOLIC BLOOD PRESSURE: 82 MMHG | HEART RATE: 101 BPM | RESPIRATION RATE: 17 BRPM | TEMPERATURE: 98 F | HEIGHT: 64 IN | OXYGEN SATURATION: 98 %

## 2025-05-09 DIAGNOSIS — Z29.9 ENCOUNTER FOR PROPHYLACTIC MEASURES, UNSPECIFIED: ICD-10-CM

## 2025-05-09 DIAGNOSIS — Z87.898 PERSONAL HISTORY OF OTHER SPECIFIED CONDITIONS: ICD-10-CM

## 2025-05-09 DIAGNOSIS — R76.12 NONSPECIFIC REACTION TO CELL MEDIATED IMMUNITY MEASUREMENT OF GAMMA INTERFERON ANTIGEN RESPONSE WITHOUT ACTIVE TUBERCULOSIS: ICD-10-CM

## 2025-05-09 DIAGNOSIS — E11.9 TYPE 2 DIABETES MELLITUS WITHOUT COMPLICATIONS: ICD-10-CM

## 2025-05-09 DIAGNOSIS — S31.000A UNSPECIFIED OPEN WOUND OF LOWER BACK AND PELVIS WITHOUT PENETRATION INTO RETROPERITONEUM, INITIAL ENCOUNTER: ICD-10-CM

## 2025-05-09 DIAGNOSIS — H40.9 UNSPECIFIED GLAUCOMA: ICD-10-CM

## 2025-05-09 DIAGNOSIS — Z86.73 PERSONAL HISTORY OF TRANSIENT ISCHEMIC ATTACK (TIA), AND CEREBRAL INFARCTION WITHOUT RESIDUAL DEFICITS: ICD-10-CM

## 2025-05-09 LAB
ANION GAP SERPL CALC-SCNC: 9 MMOL/L — SIGNIFICANT CHANGE UP (ref 5–17)
BASOPHILS # BLD AUTO: 0.05 K/UL — SIGNIFICANT CHANGE UP (ref 0–0.2)
BASOPHILS NFR BLD AUTO: 0.5 % — SIGNIFICANT CHANGE UP (ref 0–2)
BUN SERPL-MCNC: 15 MG/DL — SIGNIFICANT CHANGE UP (ref 7–23)
CALCIUM SERPL-MCNC: 8.7 MG/DL — SIGNIFICANT CHANGE UP (ref 8.4–10.5)
CHLORIDE SERPL-SCNC: 100 MMOL/L — SIGNIFICANT CHANGE UP (ref 96–108)
CO2 SERPL-SCNC: 25 MMOL/L — SIGNIFICANT CHANGE UP (ref 22–31)
CREAT SERPL-MCNC: 0.25 MG/DL — LOW (ref 0.5–1.3)
EGFR: 122 ML/MIN/1.73M2 — SIGNIFICANT CHANGE UP
EGFR: 122 ML/MIN/1.73M2 — SIGNIFICANT CHANGE UP
EOSINOPHIL # BLD AUTO: 0.54 K/UL — HIGH (ref 0–0.5)
EOSINOPHIL NFR BLD AUTO: 5.8 % — SIGNIFICANT CHANGE UP (ref 0–6)
GLUCOSE SERPL-MCNC: 187 MG/DL — HIGH (ref 70–99)
HCT VFR BLD CALC: 30.5 % — LOW (ref 39–50)
HGB BLD-MCNC: 10 G/DL — LOW (ref 13–17)
IMM GRANULOCYTES NFR BLD AUTO: 0.4 % — SIGNIFICANT CHANGE UP (ref 0–0.9)
LYMPHOCYTES # BLD AUTO: 1.32 K/UL — SIGNIFICANT CHANGE UP (ref 1–3.3)
LYMPHOCYTES # BLD AUTO: 14.2 % — SIGNIFICANT CHANGE UP (ref 13–44)
MCHC RBC-ENTMCNC: 25.2 PG — LOW (ref 27–34)
MCHC RBC-ENTMCNC: 32.8 G/DL — SIGNIFICANT CHANGE UP (ref 32–36)
MCV RBC AUTO: 76.8 FL — LOW (ref 80–100)
MONOCYTES # BLD AUTO: 1.08 K/UL — HIGH (ref 0–0.9)
MONOCYTES NFR BLD AUTO: 11.6 % — SIGNIFICANT CHANGE UP (ref 2–14)
NEUTROPHILS # BLD AUTO: 6.25 K/UL — SIGNIFICANT CHANGE UP (ref 1.8–7.4)
NEUTROPHILS NFR BLD AUTO: 67.5 % — SIGNIFICANT CHANGE UP (ref 43–77)
NRBC BLD AUTO-RTO: 0 /100 WBCS — SIGNIFICANT CHANGE UP (ref 0–0)
PLATELET # BLD AUTO: 254 K/UL — SIGNIFICANT CHANGE UP (ref 150–400)
POTASSIUM SERPL-MCNC: 4.2 MMOL/L — SIGNIFICANT CHANGE UP (ref 3.5–5.3)
POTASSIUM SERPL-SCNC: 4.2 MMOL/L — SIGNIFICANT CHANGE UP (ref 3.5–5.3)
RBC # BLD: 3.97 M/UL — LOW (ref 4.2–5.8)
RBC # FLD: 22.9 % — HIGH (ref 10.3–14.5)
SODIUM SERPL-SCNC: 134 MMOL/L — LOW (ref 135–145)
WBC # BLD: 9.28 K/UL — SIGNIFICANT CHANGE UP (ref 3.8–10.5)
WBC # FLD AUTO: 9.28 K/UL — SIGNIFICANT CHANGE UP (ref 3.8–10.5)

## 2025-05-09 PROCEDURE — 99222 1ST HOSP IP/OBS MODERATE 55: CPT | Mod: GC

## 2025-05-09 PROCEDURE — 99285 EMERGENCY DEPT VISIT HI MDM: CPT

## 2025-05-09 PROCEDURE — 71045 X-RAY EXAM CHEST 1 VIEW: CPT | Mod: 26

## 2025-05-09 RX ORDER — LEVETIRACETAM 10 MG/ML
750 INJECTION, SOLUTION INTRAVENOUS EVERY 12 HOURS
Refills: 0 | Status: DISCONTINUED | OUTPATIENT
Start: 2025-05-09 | End: 2025-05-21

## 2025-05-09 RX ORDER — DEXTROSE 50 % IN WATER 50 %
25 SYRINGE (ML) INTRAVENOUS ONCE
Refills: 0 | Status: DISCONTINUED | OUTPATIENT
Start: 2025-05-09 | End: 2025-05-21

## 2025-05-09 RX ORDER — INSULIN GLARGINE-YFGN 100 [IU]/ML
9 INJECTION, SOLUTION SUBCUTANEOUS AT BEDTIME
Refills: 0 | Status: DISCONTINUED | OUTPATIENT
Start: 2025-05-09 | End: 2025-05-12

## 2025-05-09 RX ORDER — ACETAMINOPHEN 500 MG/5ML
650 LIQUID (ML) ORAL EVERY 6 HOURS
Refills: 0 | Status: DISCONTINUED | OUTPATIENT
Start: 2025-05-09 | End: 2025-05-15

## 2025-05-09 RX ORDER — IPRATROPIUM BROMIDE AND ALBUTEROL SULFATE .5; 2.5 MG/3ML; MG/3ML
3 SOLUTION RESPIRATORY (INHALATION) EVERY 6 HOURS
Refills: 0 | Status: DISCONTINUED | OUTPATIENT
Start: 2025-05-09 | End: 2025-05-21

## 2025-05-09 RX ORDER — INSULIN LISPRO 100 U/ML
INJECTION, SOLUTION INTRAVENOUS; SUBCUTANEOUS
Refills: 0 | Status: DISCONTINUED | OUTPATIENT
Start: 2025-05-09 | End: 2025-05-21

## 2025-05-09 RX ORDER — ENOXAPARIN SODIUM 100 MG/ML
40 INJECTION SUBCUTANEOUS EVERY 24 HOURS
Refills: 0 | Status: DISCONTINUED | OUTPATIENT
Start: 2025-05-09 | End: 2025-05-21

## 2025-05-09 RX ORDER — SODIUM CHLORIDE 9 G/1000ML
1000 INJECTION, SOLUTION INTRAVENOUS
Refills: 0 | Status: DISCONTINUED | OUTPATIENT
Start: 2025-05-09 | End: 2025-05-21

## 2025-05-09 RX ORDER — FINASTERIDE 1 MG/1
5 TABLET, FILM COATED ORAL DAILY
Refills: 0 | Status: DISCONTINUED | OUTPATIENT
Start: 2025-05-09 | End: 2025-05-21

## 2025-05-09 RX ORDER — GLUCAGON 3 MG/1
1 POWDER NASAL ONCE
Refills: 0 | Status: DISCONTINUED | OUTPATIENT
Start: 2025-05-09 | End: 2025-05-21

## 2025-05-09 RX ORDER — METRONIDAZOLE 250 MG
500 TABLET ORAL EVERY 12 HOURS
Refills: 0 | Status: COMPLETED | OUTPATIENT
Start: 2025-05-09 | End: 2025-05-11

## 2025-05-09 RX ORDER — TIMOLOL MALEATE 6.8 MG/ML
1 SOLUTION OPHTHALMIC
Refills: 0 | Status: DISCONTINUED | OUTPATIENT
Start: 2025-05-09 | End: 2025-05-21

## 2025-05-09 RX ORDER — DEXTROSE 50 % IN WATER 50 %
15 SYRINGE (ML) INTRAVENOUS ONCE
Refills: 0 | Status: DISCONTINUED | OUTPATIENT
Start: 2025-05-09 | End: 2025-05-21

## 2025-05-09 RX ORDER — FOLIC ACID 1 MG/1
1 TABLET ORAL DAILY
Refills: 0 | Status: DISCONTINUED | OUTPATIENT
Start: 2025-05-10 | End: 2025-05-21

## 2025-05-09 RX ORDER — ASPIRIN 325 MG
81 TABLET ORAL DAILY
Refills: 0 | Status: DISCONTINUED | OUTPATIENT
Start: 2025-05-10 | End: 2025-05-21

## 2025-05-09 RX ORDER — ATORVASTATIN CALCIUM 80 MG/1
40 TABLET, FILM COATED ORAL AT BEDTIME
Refills: 0 | Status: DISCONTINUED | OUTPATIENT
Start: 2025-05-10 | End: 2025-05-21

## 2025-05-09 RX ORDER — DEXTROSE 50 % IN WATER 50 %
12.5 SYRINGE (ML) INTRAVENOUS ONCE
Refills: 0 | Status: DISCONTINUED | OUTPATIENT
Start: 2025-05-09 | End: 2025-05-21

## 2025-05-09 RX ORDER — CEFTRIAXONE 500 MG/1
1000 INJECTION, POWDER, FOR SOLUTION INTRAMUSCULAR; INTRAVENOUS EVERY 24 HOURS
Refills: 0 | Status: COMPLETED | OUTPATIENT
Start: 2025-05-09 | End: 2025-05-10

## 2025-05-09 RX ADMIN — IPRATROPIUM BROMIDE AND ALBUTEROL SULFATE 3 MILLILITER(S): .5; 2.5 SOLUTION RESPIRATORY (INHALATION) at 22:40

## 2025-05-09 RX ADMIN — CEFTRIAXONE 100 MILLIGRAM(S): 500 INJECTION, POWDER, FOR SOLUTION INTRAMUSCULAR; INTRAVENOUS at 17:11

## 2025-05-09 RX ADMIN — Medication 500 MILLIGRAM(S): at 17:11

## 2025-05-09 RX ADMIN — TIMOLOL MALEATE 1 DROP(S): 6.8 SOLUTION OPHTHALMIC at 19:29

## 2025-05-09 RX ADMIN — INSULIN GLARGINE-YFGN 9 UNIT(S): 100 INJECTION, SOLUTION SUBCUTANEOUS at 22:39

## 2025-05-09 RX ADMIN — LEVETIRACETAM 750 MILLIGRAM(S): 10 INJECTION, SOLUTION INTRAVENOUS at 19:29

## 2025-05-09 RX ADMIN — INSULIN LISPRO 2: 100 INJECTION, SOLUTION INTRAVENOUS; SUBCUTANEOUS at 19:31

## 2025-05-09 RX ADMIN — ENOXAPARIN SODIUM 40 MILLIGRAM(S): 100 INJECTION SUBCUTANEOUS at 22:40

## 2025-05-09 RX ADMIN — Medication 4 MILLILITER(S): at 22:40

## 2025-05-09 NOTE — H&P ADULT - PROBLEM SELECTOR PLAN 5
As above, on keppra 750mg (seizure ppx after CVA event), per wife pt never had seizures.    -Continue home med as above

## 2025-05-09 NOTE — ED ADULT NURSE NOTE - NSFALLRISKINTERV_ED_ALL_ED
Assistance OOB with selected safe patient handling equipment if applicable/Communicate fall risk and risk factors to all staff, patient, and family/Provide patient with walking aids/Provide visual cue: yellow wristband, yellow gown, etc/Reinforce activity limits and safety measures with patient and family/Call bell, personal items and telephone in reach/Instruct patient to call for assistance before getting out of bed/chair/stretcher/Non-slip footwear applied when patient is off stretcher/[place] to call system/Physically safe environment - no spills, clutter or unnecessary equipment/Purposeful Proactive Rounding/Room/bathroom lighting operational, light cord in reach

## 2025-05-09 NOTE — H&P ADULT - NSHPLABSRESULTS_GEN_ALL_CORE
LABS:                         10.0   9.28  )-----------( 254      ( 09 May 2025 15:19 )             30.5     05-09    134[L]  |  100  |  15  ----------------------------<  187[H]  4.2   |  25  |  0.25[L]    Ca    8.7      09 May 2025 15:19        Urinalysis Basic - ( 09 May 2025 15:19 )    Color: x / Appearance: x / SG: x / pH: x  Gluc: 187 mg/dL / Ketone: x  / Bili: x / Urobili: x   Blood: x / Protein: x / Nitrite: x   Leuk Esterase: x / RBC: x / WBC x   Sq Epi: x / Non Sq Epi: x / Bacteria: x                RADIOLOGY, EKG & ADDITIONAL TESTS:

## 2025-05-09 NOTE — ED PROVIDER NOTE - PROGRESS NOTE DETAILS
Spoke with Dr Jin in NH. They also repeat an xray in NH that showed consolidation in the RLL and TB cannot be ruled out. patient cannot return to the NH until he has 3 negative AFB. will admit patient.

## 2025-05-09 NOTE — ED ADULT TRIAGE NOTE - CHIEF COMPLAINT QUOTE
Pt BIBEMS from [facility] for + QuantiFeron, + pneumonia on chest XR. Pt currently being treated for bacteremia via RUE midline with ceftriaxone as of 5/3. Pt received on 2L NC, placed in isolation.

## 2025-05-09 NOTE — H&P ADULT - NSHPPHYSICALEXAM_GEN_ALL_CORE
PHYSICAL EXAM:  Constitutional - NAD, Comfortable-appearing, largely non-verbal except for 1-2 word sentences on ocassion  HEENT - NCAT, EOMI, NC in place  Chest - Breathing comfortably on 3L NC, CTAB  Cardio - Warm and well perfused, RRR  Abdomen - Soft, NTND, +BS. PEG site without surrounding erythema, induration, or drainage.  Extremities - No peripheral edema, No calf tenderness   Neurologic - Awake and alert. Makes eye contact to voice. Not following commands.  Psychiatric - Mood stable, Affect WNL PHYSICAL EXAM:  Constitutional - NAD, Comfortable-appearing, nonverbal, intermittently with wet cough   HEENT - NCAT, EOMI, NC in place  Chest - Breathing comfortably on 2L NC, CTAB  Cardio - Warm and well perfused, RRR  Abdomen - Soft, NTND, +BS. PEG site without surrounding erythema, induration, or drainage.  Extremities - No peripheral edema, No calf tenderness   Neurologic - Awake and alert. Makes eye contact to voice. Not following commands.  Psychiatric - Mood stable, Affect WNL

## 2025-05-09 NOTE — ED ADULT NURSE NOTE - CHIEF COMPLAINT QUOTE
Pt BIBEMS from [facility] for + QuantiFeron, + pneumonia on chest XR. Pt currently being treated for bacteremia via RUE midline with ceftriaxone as of 5/3. Pt received on 2L NC, placed in isolation.

## 2025-05-09 NOTE — ED PROVIDER NOTE - CLINICAL SUMMARY MEDICAL DECISION MAKING FREE TEXT BOX
here with quantgold + result. However, no recent fever and acute weight loss. Pt was just discharged for infected ulcer and PNA. 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 however, will discuss with NH physician regarding treatment since patient lives in a group home. will get basic and repeat xray. pt appears comfortable, doesn't require O2.

## 2025-05-09 NOTE — H&P ADULT - PROBLEM SELECTOR PLAN 4
Pt with a hx of stroke 2004 with residual left-sided weakness. S/p PEG placement 05/2024   MRI brain on 4/28 with R internal capsule infarct   Head CT 5/5: subacute infarct centered in the genu and posterior limb of the right internal capsule is stable.  A chronic transcortical infarction in the left precentral gyrus and a small chronic infarct in the right cerebellar hemisphere are stable.  Home meds: ASA 81mg, Atorvastatin 40mg, keppra 750mg (seizure ppx), per wife pt never had seizures.    - C/W home meds  - Tube feeds resumed, parameters per nutrition recs. Nutrition consulted

## 2025-05-09 NOTE — H&P ADULT - PROBLEM SELECTOR PLAN 3
Home meds: metformin 500 mg 3 times a day, januvia 50 mg QD; wife does not give medications as prescribed as she was told by PCP that he does not need medication anymore given age and last A1c ~7.     - Lantus 9U qhs  - ctm FS q6 abdi since NPO  - Moderate ISS q6.

## 2025-05-09 NOTE — H&P ADULT - TIME BILLING
Bedside interview and examine   chart review including vitals, labs and imaging   documentation of encounter   Discussion w/ caregiver and housestaff   Excluded teaching

## 2025-05-09 NOTE — ED PROVIDER NOTE - PHYSICAL EXAMINATION
VITAL SIGNS: I have reviewed nursing notes and confirm.  CONSTITUTIONAL: Well appearing, in no acute distress.   SKIN:  warm and dry, no acute rash.   HEAD:  normocephalic, atraumatic.  EYES: EOM intact; conjunctiva and sclera clear.  ENT: No nasal discharge; airway clear.   NECK: Supple.  CARD: S1, S2, Regular rate and rhythm.   RESP:  Clear to auscultation b/l, no wheezes, rales or rhonchi.  ABD: Normal bowel sounds; soft; non-distended; non-tender; no guarding/ rebound.  EXT: Normal ROM. No peripheral edema. Pulses intact and equal b/l.  NEURO: Alert, oriented, grossly unremarkable VITAL SIGNS: I have reviewed nursing notes and confirm.  CONSTITUTIONAL: frail appearing, in no acute distress.   SKIN:  warm and dry, no acute rash.   HEAD:  normocephalic, atraumatic.  EYES: EOM intact; conjunctiva and sclera clear.  ENT: No nasal discharge; airway clear.   NECK: Supple.  CARD: S1, S2, Regular rate and rhythm.   RESP:  Clear to auscultation b/l, no wheezes, rales or rhonchi.  ABD: Normal bowel sounds; soft; non-distended; non-tender; no guarding/ rebound. PEG tube in place. indwelling hudson in place. stage 4 decub ulcer w/ wound vac  EXT: Normal ROM. trace LE pitting edema.   NEURO: opens eye to stimuli. nonverbal.

## 2025-05-09 NOTE — ED ADULT NURSE NOTE - OBJECTIVE STATEMENT
pt bibems for +quantiferon test from NH. EMS states pt was recently diagnosed by bacteremia and was d/c with iv ceftriaxone via midline on the right arm. Pt moved to iso room and placed on 2L NC and on continuos monitoring. Pt placed on iso air borne precautions. Pt noted to be awake, non verbal, able to maintain airway, having non labored breathing, no retractions were observed and in no acute distress.

## 2025-05-09 NOTE — H&P ADULT - ASSESSMENT
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/bed bound and Sz like activity on vEEG (04/24), dysphagia s/p PEG placement on (5/7/24), p/w +quantgold test resulting in NH with otherwise improving PNA from recent admission and no new systemic symptoms or s/s sepsis, now being admitted for TB r/o with AFB confirmation to return to nursing facility     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

## 2025-05-09 NOTE — PATIENT PROFILE ADULT - FALL HARM RISK - HARM RISK INTERVENTIONS

## 2025-05-09 NOTE — ED ADULT TRIAGE NOTE - GLASGOW COMA SCALE: BEST VERBAL RESPONSE, MLM
Bone Marrow Transplant Progress Note      Patient Name: Trevor Holden  MRN: [id number]  Date: 7/31/2024    Autologous D+6     Diagnosis: Multiple Myeloma w/ plasmacytoma IgG Kappa  Induction:   Joan-RVD with Revlimid maintenance  Conditioning: Melphalan 200  Day 0: 7/25/24  Stem Cell Dose:  5.0 x10^6 CD34+ cells  Day of Engraftment:  Complications:     History of Present Illness  Trevor is a 63 year old  male patient of Dr. Travis Collins with past history of hypercholesterolemia, and newly diagnosed Multiple Myeloma with plasmacytoma in spine. He reported having pain in the middle of the back just between the shoulder blades for months starting around August 2023, he had been seeing physical therapy and was told he had some ribs out of place in the back which they have been trying to relocate and doing massage and electrical stimulation. He presented to the [facility] on 11/1/23 with worsening back pain,  PET/CT confirmed multiple thoracic lesions with pathologic compression fracture at T6, lesion in his cervical spine. Serum protein electrophoresis showed biochemical evidence of plasmacytoma with a gamma globinopathy. He had a T6 kyphoplasty on 11/16/23 and surgical pathology revealed sheets of plasma cells 95% cellularity. Bone marrow biopsy on 11/28/23 had 10 -15% plasma cells, FISH with gain 1q, monosomy 13, and hyperdiploidy. He received induction with Joan-RVD, repeat bone marrow biopsy post-induction on 4/30/24 documented no evidence of disease. He had a second kyphoplasty on 5/23/24 which also was negative for malignancy.   He started maintenance therapy with Revlimid and Dexamethasone. He was referred to BMT Dr. Honorio Mace for Autologous SCT consultation.      7/23/24 Auto D-2: Trevor is admitted today to start conditioning for autologous stem cell transplant. Dry weight 86.9kg and comorbidity score 5. Denies fevers, chills, night sweats, chest pain, GI issues,  issues or respiratory complaints.  Clinical history, medications, pertinent lab and radiographic studies reviewed. He will transplant in 2 days with plan to discharge home with his wife Jj as the caregiver. Plan for melphalan to start today.    7/24/24 Auto D-1: Trevor is doing well with no complaints. He tolerated melphalan yesterday and we will continue with conditioning chemotherapy. Denies fevers, chills, night sweats, chest pain, GI issues,  issues or respiratory complaints. Clinical history, medications, pertinent lab and radiographic studies reviewed. Will discontinue lovenox due to adequate physical activity.      7/25/24: Auto Day 0: Trevor is doing very well. Walking ~4 miles per day. Denies fevers, chills, nausea,vomiting, diarrhea. PO intake great; will hold maintenance fluids. Plan for autologous stem cell transplant today.        7/26/24: Auto Day+1: Trevor continues to do well with no complaints. Denies fevers, chills, nausea,vomiting, diarrhea. He is asymptomatically orthostatic and will encourage fluid intake. He had 1 liquid stool and we will continue to monitor. Will monitor Tbili.    7/27/24: Auto Day+2: Trevor eating and drinking well while staying physically active. Denies fevers, chills, nausea,vomiting, diarrhea. He is asymptomatically orthostatic and will encourage fluid intake. Will monitor Tbili.       7/28/24: Auto Day+3: Trevor continues to do well with no complaints. Denies fevers, chills, nausea,vomiting, diarrhea. He is having formed bowel movements and continues walking. He is eating less and experiencing mild fatigue; Will suppliment nutritional shakes.    07/29/24: Autologous SCT D+4: Trevor experienced an episode of dizziness with orthostatic vital check this morning. His blood pressure dropped to 52/36, he was given a 500 mL bolus, afterwhich vitals had normalized and he had no other complaints of dizziness. He continues to ambulate around the halls. He does report mild nausea, encouraged to use prn  medication and will schedule Zofran prior to meals.     07/30/24: Autologous SCT D+5: Andres reports feeling fatigued, but continues to eat well and ambulate around the unit. He was orthostatic this morning, ordered a 500 ml bolus. He denies nausea, vomiting, fever, chills, and pain.     07/31/24: Autologous SCT D+6: Trevor continues to walk laps around the unit. He remains afebrile and denies vomiting, chills, shortness of breath, or chest pain. His nausea is controlled with Zofran and IV compazine and diarrhea is controlled with Imodium. Encouraged to continue current level of activity and eating 3 meals a day.       Review of Systems   Constitutional:  Positive for activity change and appetite change. Negative for chills, diaphoresis, fatigue, fever and unexpected weight change.   HENT:  Negative for mouth sores, nosebleeds and sore throat.    Respiratory:  Negative for cough, shortness of breath and wheezing.    Cardiovascular:  Negative for chest pain, palpitations and leg swelling.   Gastrointestinal:  Negative for abdominal distention, abdominal pain, blood in stool, constipation, diarrhea, nausea and vomiting.   Genitourinary:  Negative for difficulty urinating and dysuria.   Musculoskeletal:  Negative for joint swelling and myalgias.   Skin:  Negative for pallor and rash.   Allergic/Immunologic: Positive for immunocompromised state.   Neurological:  Positive for dizziness. Negative for tremors, syncope, weakness, numbness and headaches.   Hematological:  Bruises/bleeds easily.   Psychiatric/Behavioral:  Negative for confusion and sleep disturbance. The patient is not nervous/anxious.    All other systems reviewed and are negative.    ALLERGIES:   Allergen Reactions    Penicillins RASH     Current Facility-Administered Medications   Medication Dose Route Frequency Provider Last Rate Last Admin    ondansetron (ZOFRAN) tablet 8 mg  8 mg Oral TID Lyric Mancia PA-C   8 mg at 07/31/24 0511    loperamide  (IMODIUM) capsule 2 mg  2 mg Oral PRN Shanice Coronado MD   2 mg at 07/31/24 0511    sodium chloride 0.9 % flush bag 25 mL  25 mL Intravenous PRN Allison Krueger CNP        sodium chloride 0.9 % injection 2 mL  2 mL Intracatheter 2 times per day Allison Krueger CNP   2 mL at 07/30/24 2032    prochlorperazine (COMPAZINE) tablet 10 mg  10 mg Oral Q6H PRN Honorio Mace MD   10 mg at 07/29/24 0512    prochlorperazine (COMPAZINE) injection 10 mg  10 mg Intravenous Q6H PRN Honorio Mace MD   10 mg at 07/30/24 1634    fluconazole (DIFLUCAN) tablet 400 mg  400 mg Oral Daily Honorio Mace MD   400 mg at 07/30/24 0842    levoFLOXacin (LEVAQUIN) tablet 500 mg  500 mg Oral QAM AC Honorio Mace MD   500 mg at 07/31/24 0507    famotidine (PEPCID) tablet 20 mg  20 mg Oral 2 times per day Honorio Mace MD   20 mg at 07/30/24 2031    LORazepam (ATIVAN) tablet 1 mg  1 mg Oral Q6H PRN Honorio Mace MD        filgrastim-sndz (G-CSF) (ZARXIO) injection 480 mcg  5 mcg/kg (Treatment Plan Recorded) Subcutaneous Daily at noon Honorio Mace MD   480 mcg at 07/30/24 1210    valACYclovir (VALTREX) tablet 500 mg  500 mg Oral BID Honorio Mace MD   500 mg at 07/30/24 2031    heparin (porcine) 100 UNIT/ML lock flush 80 Units  80 Units Intracatheter PRN Guy, Jean A, CNP        heparin (porcine) 100 UNIT/ML lock flush 90 Units  90 Units Intracatheter PRN Smart, Jean A, CNP        calcium gluconate 4 g in sodium chloride 0.9 % 250 mL IVPB   Intravenous PRN Smart, Jean A, CNP        potassium phosphate 20 mmol in sodium chloride 0.9 % 250 mL IVPB  20 mmol Intravenous PRN Smart, Jean A, CNP        potassium phosphate 40 mmol in sodium chloride 0.9 % 500 mL IVPB  40 mmol Intravenous PRN Smart, Jean A, CNP        magnesium sulfate 4 g in sterile water IVPB  4 g Intravenous PRN Jean Tovar, CNP        potassium CHLORIDE 40 mEq/100 mL IVPB premix  40 mEq Intravenous PRN Jean Tovar CNP        sodium PHOSPHATE  20 mmol in sodium chloride 0.9 % 250 mL IVPB  20 mmol Intravenous PRN Smart, Jean A, CNP        sodium PHOSPHATE 40 mmol in sodium chloride 0.9 % 500 mL IVPB  40 mmol Intravenous PRN Smart, Jean A, CNP        heparin (porcine) 100 UNIT/ML lock flush 80 Units  80 Units Intracatheter PRN Smart, Jean A, CNP        tetrahydrozoline 0.05 % ophthalmic solution 1 drop  1 drop Both Eyes TID PRN Andrei Micah M   1 drop at 07/26/24 0626       Vital Last Value 24 Hour Range   Temperature 97.5 °F (36.4 °C) (07/31/24 0507) Temp  Min: 97.5 °F (36.4 °C)  Max: 98.6 °F (37 °C)   Pulse 98 (07/31/24 0510) Pulse  Min: 72  Max: 104   Respiratory 18 (07/31/24 0510) Resp  Min: 17  Max: 18   Non-Invasive  Blood Pressure 103/61 (07/31/24 0510) BP  Min: 103/61  Max: 129/83   Pulse Oximetry 97 % (07/31/24 0510) SpO2  Min: 96 %  Max: 100 %   Arterial   Blood Pressure   No data recorded      Wt Readings from Last 1 Encounters:   07/31/24 83.3 kg (183 lb 10.3 oz)        Weight change: -0.1 kg (-3.5 oz)       Intake/Output Summary (Last 24 hours) at 7/31/2024 0740  Last data filed at 7/31/2024 0620  Gross per 24 hour   Intake 1460.15 ml   Output 1775 ml   Net -314.85 ml     Physical Exam  Vitals and nursing note reviewed.   Constitutional:       General: He is not in acute distress.     Appearance: Normal appearance. He is not ill-appearing.   HENT:      Head: Normocephalic and atraumatic.      Right Ear: External ear normal.      Left Ear: External ear normal.      Nose: Nose normal.      Mouth/Throat:      Mouth: Mucous membranes are moist.      Pharynx: Oropharynx is clear.      Neck: Normal range of motion.   Eyes:      Extraocular Movements: Extraocular movements intact.      Pupils: Pupils are equal, round, and reactive to light.   Cardiovascular:      Rate and Rhythm: Normal rate and regular rhythm.      Pulses: Normal pulses.      Heart sounds: Normal heart sounds.   Pulmonary:      Effort: Pulmonary effort is normal.      Breath  sounds: Normal breath sounds.   Abdominal:      General: There is no distension.      Tenderness: There is no abdominal tenderness.   Musculoskeletal:         General: Normal range of motion.   Skin:     General: Skin is warm and dry.   Neurological:      General: No focal deficit present.      Mental Status: He is alert and oriented to person, place, and time.   Psychiatric:         Mood and Affect: Mood normal.         Behavior: Behavior normal.         Thought Content: Thought content normal.         Judgment: Judgment normal.     Laboratory Results  Recent Results (from the past 24 hour(s))   Magnesium    Collection Time: 07/31/24  3:00 AM   Result Value Ref Range    Magnesium 2.6 (H) 1.7 - 2.4 mg/dL   Phosphorus    Collection Time: 07/31/24  3:00 AM   Result Value Ref Range    Phosphorus 3.4 2.4 - 4.7 mg/dL   Comprehensive Metabolic Panel    Collection Time: 07/31/24  3:00 AM   Result Value Ref Range    Fasting Status      Sodium 136 135 - 145 mmol/L    Potassium 3.6 3.4 - 5.1 mmol/L    Chloride 105 97 - 110 mmol/L    Carbon Dioxide 26 21 - 32 mmol/L    Anion Gap 9 7 - 19 mmol/L    Glucose 108 (H) 70 - 99 mg/dL    BUN 15 6 - 20 mg/dL    Creatinine 0.93 0.67 - 1.17 mg/dL    Glomerular Filtration Rate >90 >=60    BUN/Cr 16 7 - 25    Calcium 8.4 8.4 - 10.2 mg/dL    Bilirubin, Total 0.9 0.2 - 1.0 mg/dL    GOT/AST 15 <=37 Units/L    GPT/ALT 41 <64 Units/L    Alkaline Phosphatase 68 45 - 117 Units/L    Albumin 3.7 3.6 - 5.1 g/dL    Protein, Total 5.9 (L) 6.4 - 8.2 g/dL    Globulin 2.2 2.0 - 4.0 g/dL    A/G Ratio 1.7 1.0 - 2.4   CBC with Automated Differential (performable only)    Collection Time: 07/31/24  3:00 AM   Result Value Ref Range    WBC 2.6 (L) 4.2 - 11.0 K/mcL    RBC 4.59 4.50 - 5.90 mil/mcL    HGB 14.1 13.0 - 17.0 g/dL    HCT 39.0 39.0 - 51.0 %    MCV 85.0 78.0 - 100.0 fl    MCH 30.7 26.0 - 34.0 pg    MCHC 36.2 32.0 - 36.5 g/dL    RDW-CV 12.4 11.0 - 15.0 %    RDW-SD 38.1 (L) 39.0 - 50.0 fL    PLT 94 (L)  140 - 450 K/mcL    NRBC 0 <=0 /100 WBC   Manual Differential    Collection Time: 07/31/24  3:00 AM   Result Value Ref Range    Neutrophil, Percent 94 %    Lymphocytes, Percent 2 %    Eosinophils, Percent 1 %    Basophils, Percent 1 %    Bands, Percent 2 0 - 10 %    Absolute Neutrophil 2.5 1.8 - 7.7 K/mcL    Absolute Lymphocytes 0.1 (L) 1.0 - 4.0 K/mcL    Absolute Eosinophils 0.0 0.0 - 0.5 K/mcL    Absolute Basophils 0.0 0.0 - 0.3 K/mcL    RBC Morphology Normal Normal    WBC Morphology Normal Normal    Platelet Morphology Normal Normal       Imaging  XR CHEST PA AND LATERAL 2 VIEWS   Final Result   No acute abnormality. See above.            Electronically Signed by: FAMILIA MILLER M.D.    Signed on: 7/23/2024 10:35 AM    Workstation ID: [id number]        Assessment and Plan  There are no active hospital problems to display for this patient.    IgG Kappa Multiple Myeloma with T6 Plasmacytoma:  - T6 surgical pathology 11/16/23: sheets of plasma cells 95% cellularity   - Bone Marrow Biopsy 11/28/23: plasma cell neoplasm 10-15%; FISH with gain 1q, monosomy 13, and hyperdiploidy   - Bone Marrow Biopsy 4/30/24: normocellular, negative for plasma cells   - T3 surgical pathology 5/23/24: negative for plasma cells or malignancy   - hx of tT6 kyphoplasty  - Induction Rvd and daratumumab   - Maintenance Revlimid 25mg and dexamethasone 4mg   - Melphalan conditioning     Melphalan  Autologous Stem Cell Transplant   - Day -2      - Melphalan  - Day -1      - Melphalan  - Day 0     - Stem Cell Reinfusion     - Infectious Disease Prophylaxis        - Levofloxacin 500 mg PO Daily        - Fluconazole 400 mg PO Daily        - Valtrex 500 mg PO Q12H  - Day +5     - Growth Factors        - Neupogen     Hematology:  - Transfuse 1u pRBCs for hemoglobin < 7.0  - Transfuse 1u platelets for platelets < 10, or <20 if actively bleeding      Infectious Disease Prophylaxis:  - Levofloxacin 500 mg PO Daily  - Fluconazole 400 mg PO Daily  -  Valtrex 500 mg PO Q12H     FEN/GI:  - Replete electrolytes per paper orders in patient's chart  - Fluids per chemotherapy protocol   Nausea  - Zofran 8 mg po TID before meals  - Compazine IV prn nausea  - Ativan 1 mg po q6h prn nausea     DVT Prophylaxis:  - Lovenox 40mg daily- discontinued as pt ambulatory  - Discontinue when platelets < 50      KPS Score 80%     Time  Total time I spent today on this visit is 45 minutes. This includes pre-charting, chart review, face-to-face visit, documenting, and referring/communicating with other health care professionals.      Case discussed with Dr. Honorio Mace.      Lyric Patterson PA-C   (V5) oriented

## 2025-05-09 NOTE — ED PROVIDER NOTE - OBJECTIVE STATEMENT
89y M non-verbal ( understands Cantonese), wheelchair/bed bound, R hand dominant, b/l  hearing impairment ( b/l hearing aids) with PMHx of Low BMI( 18.2-> 20.6)/severe protein calorie malnutrition, Depression, glaucoma, HLD, uncontrolled T2DM( A1C 8.1%), Thalassemia minor/JUSTYNA, BPH/hx indwelling hudson 2/2 chronic urinary retention , HFpEF (LVEF 56%, grade I diastolic dysfunction), aortic root aneurysm (4.6 cm), hx CVA with right sided weakness in 2004 and new R internal capsule CVA with L HP/WC/bed bound and Sz like activity on vEEG (04/24), dysphagia, s/p PEG placement on (5/7/24), p/w +quantgold test at NH     pt was recently admitted for severe sepsis iso infected decubitus ulcer and RLL PNA. dc back to  with PICC line for abx. found to have +quantgold test. however, no new fever, weight loss. per wife by bedside, patient likely has the bcg vaccine in [place].

## 2025-05-09 NOTE — H&P ADULT - PROBLEM SELECTOR PLAN 1
Recent admission 4/22-5/1 for which patient was admitted for severe sepsis and found to have RLL aspiration PNA and bacteroides+ and strep+ species with subsequent negative bcx; discharged to NH with PICC to complete abx (CTX 1 g QD and PO HAJI, end date 5/10/25); now returning d/t +quantiferon test; CXR with improving opacities and no cavitations or c/f TB on previous imaging (CT chest 04/2025), no fever/weight loss or systemic symptoms; WBC 9 w/o bandemia; pt originally from [place]; ID called in ED and 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 requiring outpatient f/u however admitted for AFB x3 to r/o active TB given NH would not accept patient w/o sputum cx    -AFB culture x3   -Airborne isolation  -Duonebs Q6H and hypertonic saline   -Aspiration precautions   -Continue CTX 1 g QD until 5/10 for completion of underlying recent CAP   -ID consult if AFB+ Recent admission 4/22-5/1 for which patient was admitted for severe sepsis and found to have RLL aspiration PNA and bacteroides+ and strep+ species with subsequent negative bcx; discharged to NH with PICC to complete abx (CTX 1 g QD and PO HAJI, end date 5/10/25); now returning d/t +quantiferon test; CXR with improving opacities and no cavitations or c/f TB on previous imaging (CT chest 04/2025), no fever/weight loss or systemic symptoms except for intermittent cough with whitish sputum which pt had on prior admission (recovering from CAP); WBC 9 w/o bandemia; pt originally from [place]; ID called in ED and 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 requiring outpatient f/u however admitted for AFB x3 to r/o active TB given NH would not accept patient w/o sputum cx    -AFB culture x3   -Airborne isolation  -Duonebs Q6H and hypertonic saline   -Aspiration precautions   -Continue CTX 1 g QD until 5/10 for completion of underlying recent CAP   -ID consult if AFB+

## 2025-05-09 NOTE — H&P ADULT - PROBLEM SELECTOR PLAN 6
Present on admission. Last admission requiring debridement with surgery however no changes in exam since last admission.  Patient is entirely bedbound since stroke    - Wound care consult   - Frequent turning and nursing care

## 2025-05-09 NOTE — H&P ADULT - PROBLEM SELECTOR PLAN 2
4/22-5/1 admission for severe sepsis revealing BCx 4/22 - +Bacteroides fragilis, +Strep species with subsequent bcx clearance, in addition to RLL PNA suspected to aspiration PNA at that time; CTX 1g (4/29 -5/10) and Flagyl 500mg PO  (4/29 -5/10 ) recommended by ID service at that time; discharged to NH with PICC for completion of abx regimen (no reported missed doses); pt currently afebrile, breathing comfortably saturating 98% on 2L w/o leukocytosis and CXR with improving opacities     -Repeat bcx if reveals evidence of sepsis   -Complete abx course as indicated on discharge: CTX 1g IV QD (4/29 -5/10) and Flagyl 500mg PO Q12H (4/29 -5/10 )

## 2025-05-09 NOTE — H&P ADULT - HISTORY OF PRESENT ILLNESS
89M non-verbal (understands Cantonese), wheelchair/bed bound, R hand dominant, b/l  hearing impairment (b/l hearing aids) with PMHx of Low BMI( 18.2-> 20.6)/severe protein calorie malnutrition, Depression, glaucoma, HLD, uncontrolled T2DM( A1C 8.1%), Thalassemia minor/JUSTYNA, BPH/hx indwelling hudson 2/2 chronic urinary retention , HFpEF (LVEF 56%, grade I diastolic dysfunction), aortic root aneurysm (4.6 cm), hx CVA with right sided weakness in 2004 and new R internal capsule CVA with L HP/WC/bed bound and Sz like activity on vEEG (04/24), dysphagia, s/p PEG placement on (5/7/24), presents after quaniferon+ test resulting in NH.     Of note, was recently admitted 4/22-5/1 for severe sepsis iso infected decubitus ulcer and RLL PNA. Discharged back to nursing home with PICC line for CTX and HAJI (end date 5/10) to cover PNA and +Bacteroides fragilis, +Strep species bacteremia. Nursing to have +quantgold test. No new reported fever, weight loss per wife at bedside. Reportedly has had BCG vaccination in [place] previously. Has been adherent with antibiotics and otherwise has had no systemic symptoms. 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     In ED:   VS: T 98.4, /82, , RR 17, O2 98% on 2L  Labs: WBC 9 w/o bandemia, H/H 10/30.5, Plts 254, Na 134, K 4.2, Cr 0.25, Glucose 187  Imaging: CXR: Improvement in aeration of the lower lung zones with probable persistent left-sided pleural effusion. There is a right-sided PICC the tip located in the region of   the right axilla.  Interventions: None    89M non-verbal (understands Cantonese), wheelchair/bed bound, R hand dominant, b/l  hearing impairment (b/l hearing aids) with PMHx of Low BMI( 18.2-> 20.6)/severe protein calorie malnutrition, Depression, glaucoma, HLD, uncontrolled T2DM( A1C 8.1%), Thalassemia minor/JUSTYNA, BPH/hx indwelling hudson 2/2 chronic urinary retention , HFpEF (LVEF 56%, grade I diastolic dysfunction), aortic root aneurysm (4.6 cm), hx CVA with right sided weakness in 2004 and new R internal capsule CVA with L HP/WC/bed bound and Sz like activity on vEEG (04/24), dysphagia, s/p PEG placement on (5/7/24), presents after quantiferon+ test resulting in NH.     Of note, was recently admitted 4/22-5/1 for severe sepsis iso infected decubitus ulcer and RLL PNA. Discharged back to nursing home with PICC line for CTX and HAJI (end date 5/10) to cover PNA and +Bacteroides fragilis, +Strep species bacteremia. Per wife, she is unsure why a quantiferon test was done. No previous TB diagnosis per wife. States she thinks he has been improving since admission, mental status is getting better and able to speak more. Reports  has whitish sputum with occasional cough that is also stable-improving from discharge. Uses wound vac for sacral wound on last admission and in nursing home. No new reported fever, weight loss, or worsening breathing status per wife. Reportedly has had BCG vaccination in [place] previously. Has been adherent with antibiotics and otherwise has had no systemic symptoms. 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     In ED:   VS: T 98.4, /82, , RR 17, O2 98% on 2L  Labs: WBC 9 w/o bandemia, H/H 10/30.5, Plts 254, Na 134, K 4.2, Cr 0.25, Glucose 187  Imaging: CXR: Improvement in aeration of the lower lung zones with probable persistent left-sided pleural effusion. There is a right-sided PICC the tip located in the region of   the right axilla.  Interventions: None

## 2025-05-09 NOTE — H&P ADULT - ATTENDING COMMENTS
89y M non-verbal ( understands Cantonese), wheelchair/bed bound, R hand dominant, b/l  hearing impairment ( b/l hearing aids) with PMHx of Low BMI( 18.2-> 20.6)/severe protein calorie malnutrition, Depression, glaucoma, HLD, uncontrolled T2DM( A1C 8.1%), Thalassemia minor/JUSTYNA, BPH/hx indwelling hudson 2/2 chronic urinary retention , HFpEF (LVEF 56%, grade I diastolic dysfunction), aortic root aneurysm (4.6 cm), hx CVA with right sided weakness in 2004 and new R internal capsule CVA with L HP/WC/bed bound and Sz like activity on vEEG (04/24), dysphagia, s/p PEG placement on (5/7/24) , hx freq hospitalization at [facility]  (12/10-12/12/24) for wound care and further nutrition evaluation and management. GI consulted and PEG tube study showed PEG is functioning and no need to change to a new one. WOCN consult appreciated, given Sliver nitrate and aquagel Ag dressing daily to PEG site. Dietitian consult appreciated, TF changed to Glucerna 1.2 @ 83ml/hr from 5pm to 11am x 18hr, total 6 cans per day. Pt recently admitted to ICU for aspiration PNA and had indwelling hudson removed and discharged from [place] ( 2/25/25) and sent to [facility] and subsequently sent home on home O2 per wife, admitted [facility] ( 3/26-4/1)  for severe sepsis w/ lactic acidosis 2/2 recurrent multifocal HAP likely aspiration/gram negative organism and and last admitted [facility] ( 4/22-5/1)  infected sacral decubitus (POA)--Strep bacteremia, sacral ulcer with abscess sp bedside debridement by surgery 4/27- bleeding required electrocautery , given one unit of prbc before debridement - now hb at 7.7 --> given a unit 4/28- hb above 9 - wound vac placed by team 4 surgery on 4/29, discharged on 5/1 to [facility] for 2 weeks of antibiotics with ceftriaxone/flagyl ( 4/27- 5/10 ). Pt now presented to [facility] ( 5/9) for +quantgold test resulting in NH with otherwise improving PNA from recent admission and no worsening systemic symptoms or s/s sepsis, now being admitted for TB r/o with AFB confirmation to return to nursing facility.   Pt interviewed  and examined at ED, wife at bedside. Reports mild cough, no hemoptysis, no SOB, fever, chills,etc. Explained to wife admission to rule out TB given positive quangold.    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  - airborne isolation   - induced sputum for AFB smear and AFb cutlure x3   - Reviewed previous CXR and CT Chest showed no granuloma  - airway clerance:may change duoneb followed by hypertonic saline q12hr   - NPO/ oral hygiene q shift/aspiration precautions     # Recent strep bacteremia w/ sacral ucler   - complete Ceftriaxone and flagyl ( EOT 5/10)   - Surgery T4C consult for sacral wound and if need to continue wound vac     # Dysphagia  - c/w TF 7 free H2O via PEG     # T2DM  - Lantus 9U qHS, NISS/goal -180     # DVT ppx     # FULL CODE- GOC as discussed wife at bedside   # Palliative care consult in am     The rest per resident's H&P

## 2025-05-10 LAB
ALBUMIN SERPL ELPH-MCNC: 2.6 G/DL — LOW (ref 3.3–5)
ALP SERPL-CCNC: 98 U/L — SIGNIFICANT CHANGE UP (ref 40–120)
ALT FLD-CCNC: 15 U/L — SIGNIFICANT CHANGE UP (ref 10–45)
ANION GAP SERPL CALC-SCNC: 10 MMOL/L — SIGNIFICANT CHANGE UP (ref 5–17)
AST SERPL-CCNC: 31 U/L — SIGNIFICANT CHANGE UP (ref 10–40)
BASOPHILS # BLD AUTO: 0.06 K/UL — SIGNIFICANT CHANGE UP (ref 0–0.2)
BASOPHILS NFR BLD AUTO: 0.7 % — SIGNIFICANT CHANGE UP (ref 0–2)
BILIRUB SERPL-MCNC: 0.4 MG/DL — SIGNIFICANT CHANGE UP (ref 0.2–1.2)
BUN SERPL-MCNC: 11 MG/DL — SIGNIFICANT CHANGE UP (ref 7–23)
CALCIUM SERPL-MCNC: 8.7 MG/DL — SIGNIFICANT CHANGE UP (ref 8.4–10.5)
CHLORIDE SERPL-SCNC: 99 MMOL/L — SIGNIFICANT CHANGE UP (ref 96–108)
CO2 SERPL-SCNC: 24 MMOL/L — SIGNIFICANT CHANGE UP (ref 22–31)
CREAT SERPL-MCNC: 0.27 MG/DL — LOW (ref 0.5–1.3)
EGFR: 119 ML/MIN/1.73M2 — SIGNIFICANT CHANGE UP
EGFR: 119 ML/MIN/1.73M2 — SIGNIFICANT CHANGE UP
EOSINOPHIL # BLD AUTO: 0.55 K/UL — HIGH (ref 0–0.5)
EOSINOPHIL NFR BLD AUTO: 6 % — SIGNIFICANT CHANGE UP (ref 0–6)
GLUCOSE SERPL-MCNC: 153 MG/DL — HIGH (ref 70–99)
HCT VFR BLD CALC: 29.8 % — LOW (ref 39–50)
HGB BLD-MCNC: 9.7 G/DL — LOW (ref 13–17)
IMM GRANULOCYTES NFR BLD AUTO: 0.3 % — SIGNIFICANT CHANGE UP (ref 0–0.9)
LYMPHOCYTES # BLD AUTO: 1.68 K/UL — SIGNIFICANT CHANGE UP (ref 1–3.3)
LYMPHOCYTES # BLD AUTO: 18.3 % — SIGNIFICANT CHANGE UP (ref 13–44)
MAGNESIUM SERPL-MCNC: 1.9 MG/DL — SIGNIFICANT CHANGE UP (ref 1.6–2.6)
MCHC RBC-ENTMCNC: 25.1 PG — LOW (ref 27–34)
MCHC RBC-ENTMCNC: 32.6 G/DL — SIGNIFICANT CHANGE UP (ref 32–36)
MCV RBC AUTO: 77.2 FL — LOW (ref 80–100)
MONOCYTES # BLD AUTO: 1.19 K/UL — HIGH (ref 0–0.9)
MONOCYTES NFR BLD AUTO: 12.9 % — SIGNIFICANT CHANGE UP (ref 2–14)
NEUTROPHILS # BLD AUTO: 5.69 K/UL — SIGNIFICANT CHANGE UP (ref 1.8–7.4)
NEUTROPHILS NFR BLD AUTO: 61.8 % — SIGNIFICANT CHANGE UP (ref 43–77)
NRBC BLD AUTO-RTO: 0 /100 WBCS — SIGNIFICANT CHANGE UP (ref 0–0)
PHOSPHATE SERPL-MCNC: 2.9 MG/DL — SIGNIFICANT CHANGE UP (ref 2.5–4.5)
PLATELET # BLD AUTO: 280 K/UL — SIGNIFICANT CHANGE UP (ref 150–400)
POTASSIUM SERPL-MCNC: 4.2 MMOL/L — SIGNIFICANT CHANGE UP (ref 3.5–5.3)
POTASSIUM SERPL-SCNC: 4.2 MMOL/L — SIGNIFICANT CHANGE UP (ref 3.5–5.3)
PROT SERPL-MCNC: 7.1 G/DL — SIGNIFICANT CHANGE UP (ref 6–8.3)
RBC # BLD: 3.86 M/UL — LOW (ref 4.2–5.8)
RBC # FLD: 22.8 % — HIGH (ref 10.3–14.5)
SODIUM SERPL-SCNC: 133 MMOL/L — LOW (ref 135–145)
WBC # BLD: 9.2 K/UL — SIGNIFICANT CHANGE UP (ref 3.8–10.5)
WBC # FLD AUTO: 9.2 K/UL — SIGNIFICANT CHANGE UP (ref 3.8–10.5)

## 2025-05-10 PROCEDURE — 99233 SBSQ HOSP IP/OBS HIGH 50: CPT

## 2025-05-10 RX ORDER — ZINC SULFATE 50(220)MG
220 CAPSULE ORAL DAILY
Refills: 0 | Status: DISCONTINUED | OUTPATIENT
Start: 2025-05-10 | End: 2025-05-15

## 2025-05-10 RX ADMIN — INSULIN LISPRO 4: 100 INJECTION, SOLUTION INTRAVENOUS; SUBCUTANEOUS at 18:12

## 2025-05-10 RX ADMIN — FINASTERIDE 5 MILLIGRAM(S): 1 TABLET, FILM COATED ORAL at 12:04

## 2025-05-10 RX ADMIN — IPRATROPIUM BROMIDE AND ALBUTEROL SULFATE 3 MILLILITER(S): .5; 2.5 SOLUTION RESPIRATORY (INHALATION) at 22:28

## 2025-05-10 RX ADMIN — CEFTRIAXONE 100 MILLIGRAM(S): 500 INJECTION, POWDER, FOR SOLUTION INTRAMUSCULAR; INTRAVENOUS at 17:00

## 2025-05-10 RX ADMIN — Medication 4 MILLILITER(S): at 12:18

## 2025-05-10 RX ADMIN — TIMOLOL MALEATE 1 DROP(S): 6.8 SOLUTION OPHTHALMIC at 17:00

## 2025-05-10 RX ADMIN — FOLIC ACID 1 MILLIGRAM(S): 1 TABLET ORAL at 12:04

## 2025-05-10 RX ADMIN — IPRATROPIUM BROMIDE AND ALBUTEROL SULFATE 3 MILLILITER(S): .5; 2.5 SOLUTION RESPIRATORY (INHALATION) at 12:19

## 2025-05-10 RX ADMIN — INSULIN LISPRO 2: 100 INJECTION, SOLUTION INTRAVENOUS; SUBCUTANEOUS at 09:06

## 2025-05-10 RX ADMIN — IPRATROPIUM BROMIDE AND ALBUTEROL SULFATE 3 MILLILITER(S): .5; 2.5 SOLUTION RESPIRATORY (INHALATION) at 17:00

## 2025-05-10 RX ADMIN — ENOXAPARIN SODIUM 40 MILLIGRAM(S): 100 INJECTION SUBCUTANEOUS at 22:28

## 2025-05-10 RX ADMIN — Medication 4 MILLILITER(S): at 22:28

## 2025-05-10 RX ADMIN — INSULIN GLARGINE-YFGN 9 UNIT(S): 100 INJECTION, SOLUTION SUBCUTANEOUS at 22:28

## 2025-05-10 RX ADMIN — LEVETIRACETAM 750 MILLIGRAM(S): 10 INJECTION, SOLUTION INTRAVENOUS at 17:01

## 2025-05-10 RX ADMIN — Medication 4 MILLILITER(S): at 18:18

## 2025-05-10 RX ADMIN — IPRATROPIUM BROMIDE AND ALBUTEROL SULFATE 3 MILLILITER(S): .5; 2.5 SOLUTION RESPIRATORY (INHALATION) at 03:55

## 2025-05-10 RX ADMIN — Medication 81 MILLIGRAM(S): at 12:04

## 2025-05-10 RX ADMIN — ATORVASTATIN CALCIUM 40 MILLIGRAM(S): 80 TABLET, FILM COATED ORAL at 22:28

## 2025-05-10 RX ADMIN — Medication 500 MILLIGRAM(S): at 17:01

## 2025-05-10 RX ADMIN — TIMOLOL MALEATE 1 DROP(S): 6.8 SOLUTION OPHTHALMIC at 06:51

## 2025-05-10 RX ADMIN — Medication 500 MILLIGRAM(S): at 06:44

## 2025-05-10 RX ADMIN — LEVETIRACETAM 750 MILLIGRAM(S): 10 INJECTION, SOLUTION INTRAVENOUS at 06:44

## 2025-05-10 NOTE — DIETITIAN INITIAL EVALUATION ADULT - OTHER CALCULATIONS
Pt's ideal body weight used for all calculations related to dosing weight not available, nursing to obtain. Needs adjusted for advanced age, infection/illness and pressure ulcer status, fluids per medical team related to CHF.

## 2025-05-10 NOTE — PROGRESS NOTE ADULT - ASSESSMENT
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

## 2025-05-10 NOTE — DIETITIAN INITIAL EVALUATION ADULT - PERTINENT MEDS FT
MEDICATIONS  (STANDING):  albuterol/ipratropium for Nebulization 3 milliLiter(s) Nebulizer every 6 hours  ascorbic acid 500 milliGRAM(s) Oral daily  aspirin  chewable 81 milliGRAM(s) Oral daily  atorvastatin 40 milliGRAM(s) Oral at bedtime  cefTRIAXone   IVPB 1000 milliGRAM(s) IV Intermittent every 24 hours  dextrose 5%. 1000 milliLiter(s) (50 mL/Hr) IV Continuous <Continuous>  dextrose 5%. 1000 milliLiter(s) (100 mL/Hr) IV Continuous <Continuous>  dextrose 50% Injectable 25 Gram(s) IV Push once  dextrose 50% Injectable 12.5 Gram(s) IV Push once  dextrose 50% Injectable 25 Gram(s) IV Push once  enoxaparin Injectable 40 milliGRAM(s) SubCutaneous every 24 hours  finasteride 5 milliGRAM(s) Oral daily  folic acid 1 milliGRAM(s) Oral daily  glucagon  Injectable 1 milliGRAM(s) IntraMuscular once  insulin glargine Injectable (LANTUS) 9 Unit(s) SubCutaneous at bedtime  insulin lispro (ADMELOG) corrective regimen sliding scale   SubCutaneous three times a day before meals  levETIRAcetam 750 milliGRAM(s) Oral every 12 hours  metroNIDAZOLE    Tablet 500 milliGRAM(s) Oral every 12 hours  sodium chloride 3%  Inhalation 4 milliLiter(s) Inhalation every 8 hours  timolol 0.5% Solution 1 Drop(s) Both EYES two times a day  zinc sulfate 220 milliGRAM(s) Oral daily    MEDICATIONS  (PRN):  acetaminophen   Oral Liquid .. 650 milliGRAM(s) Oral every 6 hours PRN Mild Pain (1 - 3)  dextrose Oral Gel 15 Gram(s) Oral once PRN Blood Glucose LESS THAN 70 milliGRAM(s)/deciliter

## 2025-05-10 NOTE — DIETITIAN INITIAL EVALUATION ADULT - PROBLEM SELECTOR PLAN 1
Recent admission 4/22-5/1 for which patient was admitted for severe sepsis and found to have RLL aspiration PNA and bacteroides+ and strep+ species with subsequent negative bcx; discharged to NH with PICC to complete abx (CTX 1 g QD and PO HAJI, end date 5/10/25); now returning d/t +quantiferon test; CXR with improving opacities and no cavitations or c/f TB on previous imaging (CT chest 04/2025), no fever/weight loss or systemic symptoms except for intermittent cough with whitish sputum which pt had on prior admission (recovering from CAP); WBC 9 w/o bandemia; pt originally from [place]; ID called in ED and 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 requiring outpatient f/u however admitted for AFB x3 to r/o active TB given NH would not accept patient w/o sputum cx    -AFB culture x3   -Airborne isolation  -Duonebs Q6H and hypertonic saline   -Aspiration precautions   -Continue CTX 1 g QD until 5/10 for completion of underlying recent CAP   -ID consult if AFB+

## 2025-05-10 NOTE — PROGRESS NOTE ADULT - PROBLEM SELECTOR PLAN 1
Recent admission 4/22-5/1 for which patient was admitted for severe sepsis and found to have RLL aspiration PNA and bacteroides+ and strep+ species with subsequent negative bcx; discharged to NH with PICC to complete abx (CTX 1 g QD and PO HAJI, end date 5/10/25); now returning d/t +quantiferon test; CXR with improving opacities and no cavitations or c/f TB on previous imaging (CT chest 04/2025), no fever/weight loss or systemic symptoms except for intermittent cough with whitish sputum which pt had on prior admission (recovering from CAP); WBC 9 w/o bandemia; pt originally from [place]; ID called in ED and 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 requiring outpatient f/u however admitted for AFB x3 to r/o active TB given NH would not accept patient w/o sputum cx    -AFB culture x3   -Airborne isolation  -Duonebs Q6H and hypertonic saline   -Aspiration precautions   -Continue CTX 1 g QD until 5/10 for completion of underlying recent CAP   -ID consult if AFB+

## 2025-05-10 NOTE — PROGRESS NOTE ADULT - PROBLEM SELECTOR PLAN 2
4/22-5/1 admission for severe sepsis revealing BCx 4/22 - +Bacteroides fragilis, +Strep species with subsequent bcx clearance, in addition to RLL PNA suspected to aspiration PNA at that time; CTX 1g (4/29 -5/10) and Flagyl 500mg PO  (4/29 -5/10 ) recommended by ID service at that time; discharged to NH with PICC for completion of abx regimen (no reported missed doses); pt currently afebrile, breathing comfortably saturating 98% on 2L w/o leukocytosis and CXR with improving opacities     -Repeat bcx if reveals evidence of sepsis   -Complete abx course as indicated on discharge: CTX 1g IV QD (4/29 -5/10) and Flagyl 500mg PO Q12H (4/29 -5/10 ) Tissue Cultured Epidermal Autograft Text: The defect edges were debeveled with a #15 scalpel blade.  Given the location of the defect, shape of the defect and the proximity to free margins a tissue cultured epidermal autograft was deemed most appropriate.  The graft was then trimmed to fit the size of the defect.  The graft was then placed in the primary defect and oriented appropriately.

## 2025-05-10 NOTE — DIETITIAN INITIAL EVALUATION ADULT - PERTINENT LABORATORY DATA
05-10    133[L]  |  99  |  11  ----------------------------<  153[H]  4.2   |  24  |  0.27[L]    Ca    8.7      10 May 2025 07:51  Phos  2.9     05-10  Mg     1.9     05-10    TPro  7.1  /  Alb  2.6[L]  /  TBili  0.4  /  DBili  x   /  AST  31  /  ALT  15  /  AlkPhos  98  05-10  POCT Blood Glucose.: 142 mg/dL (05-10-25 @ 12:07)  A1C with Estimated Average Glucose Result: 8.1 % (03-28-25 @ 08:30)  A1C with Estimated Average Glucose Result: 6.8 % (12-11-24 @ 06:19)

## 2025-05-10 NOTE — CONSULT NOTE ADULT - ASSESSMENT
89YM with known sacral decubitis ulcer, s/p debridment and wound vac application w/ general surgery on prior admission, re-admitted to medicine for r/o PNA. General surgery re-consulted for evaluation of wound, which overall appears to be well healing, light bleeding, no necrotic tissue that requires further debridement Recommend continued wound care.    -No acute surgical intervention  -Keep sacral dressing applied pending possible wound vac re-application  -Wound care per wound nursing, consider re-application of wound vac, was sent from NH without it

## 2025-05-10 NOTE — DIETITIAN INITIAL EVALUATION ADULT - NS FNS DIET ORDER
Diet, NPO with Tube Feed:   Tube Feeding Modality: Gastrostomy  Glucerna 1.5 Nikolai (GLUCERNA1.5)  Total Volume for 24 Hours (mL): 2160  Total Number of Cans: 9  Continuous  Starting Tube Feed Rate {mL per Hour}: 10  Increase Tube Feed Rate by (mL): 10     Every 6 hours  Until Goal Tube Feed Rate (mL per Hour): 90  Tube Feed Duration (in Hours): 24  Tube Feed Start Time: 22:00  Free Water Flush   Total Volume per Flush (mL): 240   Frequency: Every 4 Hours   Total Daily Volume of Flush (mL): 720    Start Time: 12:00 (05-09-25 @ 18:05)

## 2025-05-10 NOTE — PROGRESS NOTE ADULT - ATTENDING COMMENTS
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 GI consulted and PEG tube study showed PEG is functioning and no need to change to a new one. WOCN consult appreciated, given Sliver nitrate and aquagel Ag dressing daily to PEG site. Dietitian consult appreciated, TF changed to Glucerna 1.2 @ 83ml/hr from 5pm to 11am x 18hr, total 6 cans per day. Pt recently admitted to ICU for aspiration PNA and had indwelling hudson removed and discharged from [place] ( 2/25/25) and sent to [facility] and subsequently sent home on home O2 per wife, admitted [facility] ( 3/26-4/1)  for severe sepsis w/ lactic acidosis 2/2 recurrent multifocal HAP likely aspiration/gram negative organism and and last admitted [facility] ( 4/22-5/1)  infected sacral decubitus (POA)--Strep bacteremia, sacral ulcer with abscess sp bedside debridement by surgery 4/27- bleeding required electrocautery , given one unit of prbc before debridement - now hb at 7.7 --> given a unit 4/28- hb above 9 - wound vac placed by team 4 surgery on 4/29, discharged on 5/1 to [facility] for 2 weeks of antibiotics with ceftriaxone/flagyl ( 4/27- 5/10 ). Pt now presented to [facility] ( 5/9) for +quantgold test resulting in NH with otherwise improving PNA from recent admission and no worsening systemic symptoms or s/s sepsis, now being admitted for TB r/o with AFB confirmation to return to nursing facility.   Pt interviewed  and examined at ED, wife at bedside. Reports mild cough, no hemoptysis, no SOB, fever, chills,etc. Explained to wife admission to rule out TB given positive quangold.    CC: Pt seen w. wife at bedside, no event overnight.     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  - airborne isolation   - induced sputum for AFB smear and AFb cutlure x3   - Reviewed previous CXR and CT Chest showed no granuloma  - airway clerance: may change duoneb followed by hypertonic saline q12hr   - NPO/ oral hygiene q shift/aspiration precautions     # Recent strep bacteremia w/ sacral ucler   - complete Ceftriaxone and flagyl ( EOT 5/10)   - Surgery T4C consult for sacral wound and if need further debridement or to continue wound vac   - add MVI, VIt C and ZnSo4     # Dysphagia  - c/w TF & free H2O via PEG     # T2DM  - Lantus 9U qHS, NISS/goal -180     # DVT ppx     # FULL CODE- GOC as discussed wife at bedside .Both wife and son wants full code     The rest per resident's H&P .     Time-based billing (NON-critical care).     55 minutes spent on total encounter. The necessity of the time spent during the encounter on this date of service was due to:     Bedside interview and examine   chart review including vitals, labs and imaging   documentation of encounter   Discussion w/ caregiver and housestaff   Excluded teaching. 89y M non-verbal ( understands Cantonese), wheelchair/bed bound, R hand dominant, b/l  hearing impairment ( b/l hearing aids) with PMHx of Low BMI( 18.2-> 20.6)/severe protein calorie malnutrition, Depression, glaucoma, HLD, uncontrolled T2DM( A1C 8.1%), Thalassemia minor/JUSTYNA, BPH/hx indwelling hudsno 2/2 chronic urinary retention , HFpEF (LVEF 56%, grade I diastolic dysfunction), aortic root aneurysm (4.6 cm), hx CVA with right sided weakness in 2004 and new R internal capsule CVA with L HP/WC/bed bound and Sz like activity on vEEG (04/24), dysphagia, s/p PEG placement on (5/7/24) , hx freq hospitalization at [facility]  (12/10-12/12/24) for wound care and further nutrition evaluation and management. GI consulted and PEG tube study showed PEG is functioning and no need to change to a new one. WOCN consult appreciated, given Sliver nitrate and aquagel Ag dressing daily to PEG site. Dietitian consult appreciated, TF changed to Glucerna 1.2 @ 83ml/hr from 5pm to 11am x 18hr, total 6 cans per day. Pt recently admitted to ICU for aspiration PNA and had indwelling hudson removed and discharged from [place] ( 2/25/25) and sent to [facility] and subsequently sent home on home O2 per wife, admitted [facility] ( 3/26-4/1)  for severe sepsis w/ lactic acidosis 2/2 recurrent multifocal HAP likely aspiration/gram negative organism and and last admitted [facility] ( 4/22-5/1)  infected sacral decubitus (POA)--Strep bacteremia, sacral ulcer with abscess sp bedside debridement by surgery 4/27- bleeding required electrocautery , given one unit of prbc before debridement - now hb at 7.7 --> given a unit 4/28- hb above 9 - wound vac placed by team 4 surgery on 4/29, discharged on 5/1 to [facility] for 2 weeks of antibiotics with ceftriaxone/flagyl ( 4/27- 5/10 ). Pt now presented to [facility] ( 5/9) for +quantgold test resulting in NH with otherwise improving PNA from recent admission and no worsening systemic symptoms or s/s sepsis, now being admitted for TB r/o with AFB confirmation to return to nursing facility.   Pt interviewed  and examined at ED, wife at bedside. Reports mild cough, no hemoptysis, no SOB, fever, chills,etc. Explained to wife admission to rule out TB given positive quangold.    CC: Pt seen w. wife at bedside, no event overnight.     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  - airborne isolation   - induced sputum for AFB smear and AFb cutlure x3   - Reviewed previous CXR and CT Chest showed no granuloma  - airway clerance: may change duoneb followed by hypertonic saline q12hr   - NPO/ oral hygiene q shift/aspiration precautions     # Recent strep bacteremia w/ sacral ucler Stage IV   - complete Ceftriaxone and flagyl ( EOT 5/10)   - Surgery T4C consult for sacral wound and if need further debridement or to continue wound vac   - add MVI, VIt C 500mg daily and ZnSo4 220mg po daily     # Dysphagia  - c/w TF & free H2O via PEG     # T2DM  - Lantus 9U qHS, NISS/goal -180     # DVT ppx     # FULL CODE- GOC as discussed wife at bedside .Both wife and son wants full code     The rest per resident's H&P .     Time-based billing (NON-critical care).     55 minutes spent on total encounter. The necessity of the time spent during the encounter on this date of service was due to:     Bedside interview and examine   chart review including vitals, labs and imaging   documentation of encounter   Discussion w/ caregiver and housestaff   Excluded teaching.

## 2025-05-10 NOTE — PROGRESS NOTE ADULT - SUBJECTIVE AND OBJECTIVE BOX
INTERVAL/OVERNIGHT EVENTS: None    SUBJECTIVE:  Patient seen and examined at bedside, comfortable, NAD. Patient non-verbal but appears comfortable.    Vital Signs Last 12 Hrs  T(F): 97.9 (05-10-25 @ 06:03), Max: 97.9 (05-10-25 @ 06:03)  HR: 105 (05-10-25 @ 06:03) (105 - 105)  BP: 124/75 (05-10-25 @ 06:03) (124/75 - 124/75)  BP(mean): 91 (05-10-25 @ 06:03) (91 - 91)  RR: 16 (05-10-25 @ 06:03) (16 - 16)  SpO2: 97% (05-10-25 @ 06:03) (97% - 97%)  I&O's Summary      PHYSICAL EXAM:  Constitutional - NAD, Comfortable-appearing, nonverbal, intermittently with wet cough   HEENT - NCAT, EOMI, NC in place  Chest - Breathing comfortably on 2L NC, CTAB  Cardio - Warm and well perfused, RRR  Abdomen - Soft, NTND, +BS. PEG site without surrounding erythema, induration, or drainage.  Extremities - No peripheral edema, No calf tenderness   Neurologic - Awake and alert. Makes eye contact to voice. Not following commands.  Psychiatric - Mood stable, Affect WNL  LABS:                        9.7    9.20  )-----------( 280      ( 10 May 2025 07:51 )             29.8     05-10    133[L]  |  99  |  11  ----------------------------<  153[H]  4.2   |  24  |  0.27[L]    Ca    8.7      10 May 2025 07:51  Phos  2.9     05-10  Mg     1.9     05-10    TPro  7.1  /  Alb  2.6[L]  /  TBili  0.4  /  DBili  x   /  AST  31  /  ALT  15  /  AlkPhos  98  05-10      Urinalysis Basic - ( 10 May 2025 07:51 )    Color: x / Appearance: x / SG: x / pH: x  Gluc: 153 mg/dL / Ketone: x  / Bili: x / Urobili: x   Blood: x / Protein: x / Nitrite: x   Leuk Esterase: x / RBC: x / WBC x   Sq Epi: x / Non Sq Epi: x / Bacteria: x          RADIOLOGY & ADDITIONAL TESTS:    MEDICATIONS  (STANDING):  albuterol/ipratropium for Nebulization 3 milliLiter(s) Nebulizer every 6 hours  aspirin  chewable 81 milliGRAM(s) Oral daily  atorvastatin 40 milliGRAM(s) Oral at bedtime  cefTRIAXone   IVPB 1000 milliGRAM(s) IV Intermittent every 24 hours  dextrose 5%. 1000 milliLiter(s) (50 mL/Hr) IV Continuous <Continuous>  dextrose 5%. 1000 milliLiter(s) (100 mL/Hr) IV Continuous <Continuous>  dextrose 50% Injectable 25 Gram(s) IV Push once  dextrose 50% Injectable 12.5 Gram(s) IV Push once  dextrose 50% Injectable 25 Gram(s) IV Push once  enoxaparin Injectable 40 milliGRAM(s) SubCutaneous every 24 hours  finasteride 5 milliGRAM(s) Oral daily  folic acid 1 milliGRAM(s) Oral daily  glucagon  Injectable 1 milliGRAM(s) IntraMuscular once  insulin glargine Injectable (LANTUS) 9 Unit(s) SubCutaneous at bedtime  insulin lispro (ADMELOG) corrective regimen sliding scale   SubCutaneous three times a day before meals  levETIRAcetam 750 milliGRAM(s) Oral every 12 hours  metroNIDAZOLE    Tablet 500 milliGRAM(s) Oral every 12 hours  sodium chloride 3%  Inhalation 4 milliLiter(s) Inhalation every 8 hours  timolol 0.5% Solution 1 Drop(s) Both EYES two times a day    MEDICATIONS  (PRN):  acetaminophen   Oral Liquid .. 650 milliGRAM(s) Oral every 6 hours PRN Mild Pain (1 - 3)  dextrose Oral Gel 15 Gram(s) Oral once PRN Blood Glucose LESS THAN 70 milliGRAM(s)/deciliter

## 2025-05-10 NOTE — DIETITIAN INITIAL EVALUATION ADULT - NSFNSPHYEXAMSKINFT_GEN_A_CORE
Pressure Injury 1: sacrum, Stage IV  Pressure Injury 2: none, none  Pressure Injury 3: none, none  Pressure Injury 4: none, none  Pressure Injury 5: none, none  Pressure Injury 6: none, none  Pressure Injury 7: none, none  Pressure Injury 8: none, none  Pressure Injury 9: none, none  Pressure Injury 10: none, none  Pressure Injury 11: none, none, Pressure Injury 1: sacrum, Stage IV  Pressure Injury 2: none, none  Pressure Injury 3: none, none  Pressure Injury 4: none, none  Pressure Injury 5: none, none  Pressure Injury 6: none, none  Pressure Injury 7: none, none  Pressure Injury 8: none, none  Pressure Injury 9: none, none  Pressure Injury 10: none, none  Pressure Injury 11: none, none, Pressure Injury 1: sacrum, Stage IV  Pressure Injury 2: none, none  Pressure Injury 3: none, none  Pressure Injury 4: none, none  Pressure Injury 5: none, none  Pressure Injury 6: none, none  Pressure Injury 7: none, none  Pressure Injury 8: none, none  Pressure Injury 9: none, none  Pressure Injury 10: none, none  Pressure Injury 11: none, none

## 2025-05-10 NOTE — CONSULT NOTE ADULT - SUBJECTIVE AND OBJECTIVE BOX
89YM with PMH of DM, BPH, glaucoma, b/l hearing impairment, HFpEF (LV 56%), aortic root aneurysm (4.6  cm), hx of CVA with left-sided weakness in 2004 and another internal capsule CVA with seizure activity on vEEG in 4/2024 with a recent admission 3/25-4/1/25 for lactate acidosis and AHRF 2/2 to HAP and aspiration PNA due to peg feeds. Patient admitted to medicine service for positive quantgold test at nursing home, otherwise improving PNA from recent admission w/o worsening symptoms, admitted to r/o TB w/ AFB confirmation to return to nursing facility, currently on CTX regimen until 5/10 for CAP.    Prior consult to general surgery on 4/25/25 for sacral decub ulcer, noted to be 6 cm unstageable sacral ulcer w/ fould smelling drainage, s/p debridement on 4/27, wound vac applied, was discharged to [facility] w/ vac in place.    T(C): 36.6 (05-10-25 @ 06:03), Max: 36.9 (05-09-25 @ 14:29)  HR: 105 (05-10-25 @ 06:03) (99 - 105)  BP: 124/75 (05-10-25 @ 06:03) (122/75 - 133/72)  RR: 16 (05-10-25 @ 06:03) (16 - 19)  SpO2: 97% (05-10-25 @ 06:03) (95% - 99%)    Physical Exam  General: NAD, non-verbal  Cardio: S1,S2, No MRG  Pulm: Nonlabored breathing  Sacrum: Large 8-9 cm sacral decubitus ulcer, light bleeding, fibrinous tissue, no appreciable necrotic tissue

## 2025-05-10 NOTE — DIETITIAN INITIAL EVALUATION ADULT - OTHER INFO
This is an 89 year old male, non-verbal (understands Cantonese), wheelchair/bed bound, right hand dominant, bilateral hearing impairment (bilateral hearing aids) with past medical history significant for Low BMI(18.2-> 20.6)/severe protein calorie malnutrition, Depression, glaucoma, HLD, uncontrolled Type 2 diabetes( A1C 8.1%), Thalassemia minor/JUSTYNA, BPH/history indwelling hudson secondary to chronic urinary retention , HFpEF (LVEF 56%, grade I diastolic dysfunction), aortic root aneurysm (4.6 cm), history of CVA with right sided weakness in 2004 and new right internal capsule CVA with L HP/WC/bed bound and seizure-like activity on vEEG (04/24), dysphagia, status post PEG placement on (5/7/24), presents after quantiferon+ test resulting in NH.  Of note, was recently admitted 4/22-5/1 for "severe sepsis in the setting of infected decubitus ulcer and RLL PNA. Discharged back to nursing home with PICC line for CTX and HAJI (end date 5/10) to cover PNA and +Bacteroides fragilis, +Strep species bacteremia. Per wife, she is unsure why a quantiferon test was done. No previous TB diagnosis per wife. States she thinks he has been improving since admission, mental status is getting better and able to speak more. Reports  has whitish sputum with occasional cough that is also stable-improving from discharge. Uses wound vac for sacral wound on last admission and in nursing home. No new reported fever, weight loss, or worsening breathing status per wife. Reportedly has had BCG vaccination in [place] previously. Has been adherent with antibiotics and otherwise has had no systemic symptoms. ID was called in the ED who stated likely latent TB as diagnosis but NH declined to accept pt back to facility until patient had 3 negative AFB sputum samples."    Pt seen in room for nutrition assessment. Pt known to nutrition service related to recent admissions. RD spoke to nursing at-length regarding pt's overall status since his readmission. Pt ordered for the same tube feeding formula as previous admission, except current order by medical team is for 90mL/hour x 24 hours, which is NOT consistent with previous dietitian recommendations and could be overfeeding pt. No noted issues purchasing/procuring food. Currently NPO with tube feedings via PEG, Glucerna 1.5 formula, goal of 90mL/hour x 24 hours, which is 2160mL total volume, 3240 calories, 179g protein, 1639mL free water. No cultural, Sabianism, or ethnic food preferences noted. No noted food allergies. Per [facility] HIE data, pt noted with some wt changes overtime, weight was 155 pounds in May 2024, then 132 pounds weight in March 2025, then 120 pounds in April (4/22/25) 2025. No weight documented this admission, RD has communicated with nursing about this. Dosing wt: not available, RD to follow up with nursing. Ideal body weight: 130 pounds, % of ideal body weight not available. No noted major GI upset such as nausea, vomiting, constipation; pt noted with small, soft, brown fecal incontinence, BM on 5/10/25. Noted with bilateral leg trace edema, knee, ankle, foot edema. Skin: left upper quadrant gastrostomy tube; sacrum stage IV pressure ulcer noted; no other incisions noted by nursing. Leonel: 9. Nonverbal indicators of pain absent. Labs reviewed: elevated POCT glucose (169; 200), low sodium (133), Creatinine (0.27), medical team is aware; RD to continue to monitor trends. Nutritionally pertinent medications/supplements: IV antibiotics, insulin, keppra, folic acid. RD observed mild to moderate temporal muscle loss, could potentially be age-related sarcopenia, unable to conduct full nutrition focused physical exam at the time of assessment, RD to follow up prn. Education deferred at this time; RD to follow up prn, as pt's wife noted to be quite involved with care. RD will continue to follow. Additional nutrition recommendations below to follow.

## 2025-05-11 LAB
ANION GAP SERPL CALC-SCNC: 7 MMOL/L — SIGNIFICANT CHANGE UP (ref 5–17)
BASOPHILS # BLD AUTO: 0.05 K/UL — SIGNIFICANT CHANGE UP (ref 0–0.2)
BASOPHILS NFR BLD AUTO: 0.6 % — SIGNIFICANT CHANGE UP (ref 0–2)
BLD GP AB SCN SERPL QL: NEGATIVE — SIGNIFICANT CHANGE UP
BUN SERPL-MCNC: 13 MG/DL — SIGNIFICANT CHANGE UP (ref 7–23)
CALCIUM SERPL-MCNC: 8.3 MG/DL — LOW (ref 8.4–10.5)
CHLORIDE SERPL-SCNC: 99 MMOL/L — SIGNIFICANT CHANGE UP (ref 96–108)
CO2 SERPL-SCNC: 25 MMOL/L — SIGNIFICANT CHANGE UP (ref 22–31)
CREAT SERPL-MCNC: 0.26 MG/DL — LOW (ref 0.5–1.3)
EGFR: 120 ML/MIN/1.73M2 — SIGNIFICANT CHANGE UP
EGFR: 120 ML/MIN/1.73M2 — SIGNIFICANT CHANGE UP
EOSINOPHIL # BLD AUTO: 0.54 K/UL — HIGH (ref 0–0.5)
EOSINOPHIL NFR BLD AUTO: 6.8 % — HIGH (ref 0–6)
GLUCOSE SERPL-MCNC: 211 MG/DL — HIGH (ref 70–99)
HCT VFR BLD CALC: 28.9 % — LOW (ref 39–50)
HGB BLD-MCNC: 9.2 G/DL — LOW (ref 13–17)
IMM GRANULOCYTES NFR BLD AUTO: 0.4 % — SIGNIFICANT CHANGE UP (ref 0–0.9)
LYMPHOCYTES # BLD AUTO: 1.27 K/UL — SIGNIFICANT CHANGE UP (ref 1–3.3)
LYMPHOCYTES # BLD AUTO: 16 % — SIGNIFICANT CHANGE UP (ref 13–44)
MAGNESIUM SERPL-MCNC: 1.8 MG/DL — SIGNIFICANT CHANGE UP (ref 1.6–2.6)
MCHC RBC-ENTMCNC: 24.7 PG — LOW (ref 27–34)
MCHC RBC-ENTMCNC: 31.8 G/DL — LOW (ref 32–36)
MCV RBC AUTO: 77.7 FL — LOW (ref 80–100)
MONOCYTES # BLD AUTO: 1.01 K/UL — HIGH (ref 0–0.9)
MONOCYTES NFR BLD AUTO: 12.7 % — SIGNIFICANT CHANGE UP (ref 2–14)
NEUTROPHILS # BLD AUTO: 5.04 K/UL — SIGNIFICANT CHANGE UP (ref 1.8–7.4)
NEUTROPHILS NFR BLD AUTO: 63.5 % — SIGNIFICANT CHANGE UP (ref 43–77)
NRBC BLD AUTO-RTO: 0 /100 WBCS — SIGNIFICANT CHANGE UP (ref 0–0)
PHOSPHATE SERPL-MCNC: 2.7 MG/DL — SIGNIFICANT CHANGE UP (ref 2.5–4.5)
PLATELET # BLD AUTO: 264 K/UL — SIGNIFICANT CHANGE UP (ref 150–400)
POTASSIUM SERPL-MCNC: 4.2 MMOL/L — SIGNIFICANT CHANGE UP (ref 3.5–5.3)
POTASSIUM SERPL-SCNC: 4.2 MMOL/L — SIGNIFICANT CHANGE UP (ref 3.5–5.3)
RBC # BLD: 3.72 M/UL — LOW (ref 4.2–5.8)
RBC # FLD: 22.8 % — HIGH (ref 10.3–14.5)
RH IG SCN BLD-IMP: POSITIVE — SIGNIFICANT CHANGE UP
SODIUM SERPL-SCNC: 131 MMOL/L — LOW (ref 135–145)
WBC # BLD: 7.94 K/UL — SIGNIFICANT CHANGE UP (ref 3.8–10.5)
WBC # FLD AUTO: 7.94 K/UL — SIGNIFICANT CHANGE UP (ref 3.8–10.5)

## 2025-05-11 PROCEDURE — 99233 SBSQ HOSP IP/OBS HIGH 50: CPT | Mod: GC

## 2025-05-11 RX ADMIN — ATORVASTATIN CALCIUM 40 MILLIGRAM(S): 80 TABLET, FILM COATED ORAL at 21:52

## 2025-05-11 RX ADMIN — FINASTERIDE 5 MILLIGRAM(S): 1 TABLET, FILM COATED ORAL at 11:08

## 2025-05-11 RX ADMIN — IPRATROPIUM BROMIDE AND ALBUTEROL SULFATE 3 MILLILITER(S): .5; 2.5 SOLUTION RESPIRATORY (INHALATION) at 21:52

## 2025-05-11 RX ADMIN — FOLIC ACID 1 MILLIGRAM(S): 1 TABLET ORAL at 11:08

## 2025-05-11 RX ADMIN — IPRATROPIUM BROMIDE AND ALBUTEROL SULFATE 3 MILLILITER(S): .5; 2.5 SOLUTION RESPIRATORY (INHALATION) at 05:54

## 2025-05-11 RX ADMIN — INSULIN LISPRO 4: 100 INJECTION, SOLUTION INTRAVENOUS; SUBCUTANEOUS at 18:03

## 2025-05-11 RX ADMIN — Medication 500 MILLIGRAM(S): at 05:54

## 2025-05-11 RX ADMIN — IPRATROPIUM BROMIDE AND ALBUTEROL SULFATE 3 MILLILITER(S): .5; 2.5 SOLUTION RESPIRATORY (INHALATION) at 11:08

## 2025-05-11 RX ADMIN — IPRATROPIUM BROMIDE AND ALBUTEROL SULFATE 3 MILLILITER(S): .5; 2.5 SOLUTION RESPIRATORY (INHALATION) at 18:01

## 2025-05-11 RX ADMIN — INSULIN LISPRO 2: 100 INJECTION, SOLUTION INTRAVENOUS; SUBCUTANEOUS at 09:06

## 2025-05-11 RX ADMIN — ENOXAPARIN SODIUM 40 MILLIGRAM(S): 100 INJECTION SUBCUTANEOUS at 21:52

## 2025-05-11 RX ADMIN — Medication 500 MILLIGRAM(S): at 11:08

## 2025-05-11 RX ADMIN — Medication 4 MILLILITER(S): at 05:54

## 2025-05-11 RX ADMIN — LEVETIRACETAM 750 MILLIGRAM(S): 10 INJECTION, SOLUTION INTRAVENOUS at 05:54

## 2025-05-11 RX ADMIN — LEVETIRACETAM 750 MILLIGRAM(S): 10 INJECTION, SOLUTION INTRAVENOUS at 18:01

## 2025-05-11 RX ADMIN — INSULIN GLARGINE-YFGN 9 UNIT(S): 100 INJECTION, SOLUTION SUBCUTANEOUS at 21:53

## 2025-05-11 RX ADMIN — TIMOLOL MALEATE 1 DROP(S): 6.8 SOLUTION OPHTHALMIC at 18:04

## 2025-05-11 RX ADMIN — Medication 4 MILLILITER(S): at 21:52

## 2025-05-11 RX ADMIN — Medication 81 MILLIGRAM(S): at 11:08

## 2025-05-11 RX ADMIN — TIMOLOL MALEATE 1 DROP(S): 6.8 SOLUTION OPHTHALMIC at 05:55

## 2025-05-11 RX ADMIN — INSULIN LISPRO 2: 100 INJECTION, SOLUTION INTRAVENOUS; SUBCUTANEOUS at 12:38

## 2025-05-11 RX ADMIN — Medication 220 MILLIGRAM(S): at 11:08

## 2025-05-11 NOTE — PROGRESS NOTE ADULT - SUBJECTIVE AND OBJECTIVE BOX
SUBJECTIVE: Patient seen and examined bedside, with wife present. Patient's wife refers he has been doing okay, but she is worried about the wound.    aspirin  chewable 81 milliGRAM(s) Oral daily  enoxaparin Injectable 40 milliGRAM(s) SubCutaneous every 24 hours    MEDICATIONS  (PRN):  acetaminophen   Oral Liquid .. 650 milliGRAM(s) Oral every 6 hours PRN Mild Pain (1 - 3)  dextrose Oral Gel 15 Gram(s) Oral once PRN Blood Glucose LESS THAN 70 milliGRAM(s)/deciliter      I&O's Detail    10 May 2025 07:01  -  11 May 2025 07:00  --------------------------------------------------------  IN:  Total IN: 0 mL    OUT:    Voided (mL): 1000 mL  Total OUT: 1000 mL    Total NET: -1000 mL          Vital Signs Last 24 Hrs  T(C): 36.7 (11 May 2025 14:03), Max: 36.7 (10 May 2025 21:06)  T(F): 98.1 (11 May 2025 14:03), Max: 98.1 (11 May 2025 14:03)  HR: 101 (11 May 2025 14:03) (101 - 111)  BP: 100/64 (11 May 2025 14:03) (100/64 - 115/63)  BP(mean): 93 (11 May 2025 05:47) (83 - 93)  RR: 16 (11 May 2025 14:03) (16 - 16)  SpO2: 97% (11 May 2025 14:03) (97% - 99%)    Parameters below as of 11 May 2025 14:03  Patient On (Oxygen Delivery Method): nasal cannula  O2 Flow (L/min): 2      PHYSICAL EXAM:   Gen: NAD, resting comfortably   CV: NSR  Pulm: no respiratory distress on RA, CTAB   Abd: Soft, ND, NTTP  acrum: Large 8-9 cm sacral decubitus ulcer, no bleeding, fibrinous tissue, no appreciable necrotic tissue   Ext: WWP, no edema       LABS:                        9.2    7.94  )-----------( 264      ( 11 May 2025 05:30 )             28.9     05-11    131[L]  |  99  |  13  ----------------------------<  211[H]  4.2   |  25  |  0.26[L]    Ca    8.3[L]      11 May 2025 05:30  Phos  2.7     05-11  Mg     1.8     05-11    TPro  7.1  /  Alb  2.6[L]  /  TBili  0.4  /  DBili  x   /  AST  31  /  ALT  15  /  AlkPhos  98  05-10    LIVER FUNCTIONS - ( 10 May 2025 07:51 )  Alb: 2.6 g/dL / Pro: 7.1 g/dL / ALK PHOS: 98 U/L / ALT: 15 U/L / AST: 31 U/L / GGT: x             CAPILLARY BLOOD GLUCOSE      POCT Blood Glucose.: 155 mg/dL (11 May 2025 12:21)  POCT Blood Glucose.: 191 mg/dL (11 May 2025 08:58)  POCT Blood Glucose.: 172 mg/dL (10 May 2025 22:14)  POCT Blood Glucose.: 208 mg/dL (10 May 2025 17:56)        Urinalysis Basic - ( 11 May 2025 05:30 )    Color: x / Appearance: x / SG: x / pH: x  Gluc: 211 mg/dL / Ketone: x  / Bili: x / Urobili: x   Blood: x / Protein: x / Nitrite: x   Leuk Esterase: x / RBC: x / WBC x   Sq Epi: x / Non Sq Epi: x / Bacteria: x             RADIOLOGY & ADDITIONAL STUDIES:

## 2025-05-11 NOTE — PROGRESS NOTE ADULT - ASSESSMENT
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  (12/10-12/12/24) for wound care and further nutrition evaluation and management. GI consulted and PEG tube study showed PEG is functioning and no need to change to a new one. WOCN consult appreciated, given Sliver nitrate and aquagel Ag dressing daily to PEG site. Dietitian consult appreciated, TF changed to Glucerna 1.2 @ 83ml/hr from 5pm to 11am x 18hr, total 6 cans per day. Pt recently admitted to ICU for aspiration PNA and had indwelling hudson removed and discharged from [facility] ( 2/25/25) and sent to [facility] and subsequently sent home on home O2 per wife, admitted [facility] ( 3/26-4/1)  for severe sepsis w/ lactic acidosis 2/2 recurrent multifocal HAP likely aspiration/gram negative organism and and last admitted [facility] ( 4/22-5/1)  infected sacral decubitus (POA)--Strep bacteremia, sacral ulcer with abscess sp bedside debridement by surgery 4/27- bleeding required electrocautery , given one unit of prbc before debridement - now hb at 7.7 --> given a unit 4/28- hb above 9 - wound vac placed by team 4 surgery on 4/29, discharged on 5/1 to [facility] for 2 weeks of antibiotics with ceftriaxone/flagyl ( 4/27- 5/10 ). Pt now presented to [facility] ( 5/9) for +quantgold test resulting in NH with otherwise improving PNA from recent admission and no worsening systemic symptoms or s/s sepsis, now being admitted for TB r/o with AFB confirmation to return to nursing facility.   Pt interviewed  and examined at ED, wife at bedside. Reports mild cough, no hemoptysis, no SOB, fever, chills,etc. Explained to wife admission to rule out TB given positive quangold.    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  - airborne isolation   - Please co-ordinate with RT to induce sputum for AFB smear and AFB culture x3, so pt can be cleared to return to [facility]   - Reviewed previous CXR and CT Chest showed no granuloma ( low clinical suspicion for PTB)   - airway clearance: may change duoneb followed by hypertonic saline q12hr, remains on NC2L as baseline for chronic hypoxic resp failure    - NPO/ oral hygiene q shift/aspiration precautions     # Recent strep bacteremia w/ sacral ucler Stage IV   - completed Ceftriaxone and flagyl ( EOT 5/10)   - Surgery consult appreciated, no need for debridement of sacral wound  - WOCN consult for placing wound vac on Monday   - added MVI, VIt C 500mg daily and ZnSo4 220mg po daily     # Dysphagia  # Hyponatremia   - c/w TF//glucerna 5 cans a day  & increase free H2O via PEG   - monitor serum sodium 131 today, if down trending check urine lytes for probable SIADH    # T2DM  - Lantus 9U qHS, NISS/goal -180     # DVT ppx     # FULL CODE- GOC as discussed wife at bedside .Both wife and son wants full code     # Disposition: medically stable, awaiting AFBx3 then return to [facility]    Dr. Karen Dillon covering 5/12-5/18/25    The rest per resident's H&P .     Time-based billing (NON-critical care).     55 minutes spent on total encounter. The necessity of the time spent during the encounter on this date of service was due to:     Bedside interview and examine   chart review including vitals, labs and imaging   documentation of encounter   Discussion w/ caregiver and housestaff   Excluded teaching.

## 2025-05-11 NOTE — PROGRESS NOTE ADULT - SUBJECTIVE AND OBJECTIVE BOX
Patient is a 89y old  Male who presents with a chief complaint of +Quantiferon Test (11 May 2025 10:11)    INTERVAL EVENTS: NAEON    SUBJECTIVE:  Patient was seen and examined at bedside. Wife at bedside. Unable to provide sputum samples. no SOB, awake, alert, NAD     Review of systems: No fever, chills, dizziness, HA, Changes in vision, CP, dyspnea, nausea or vomiting, dysuria, changes in bowel movements, LE edema. Rest of 12 point Review of systems negative unless otherwise documented elsewhere in note.     Diet, NPO with Tube Feed:   Tube Feeding Modality: Gastrostomy  Glucerna 1.5 Nikolai (GLUCERNA1.5)  Total Volume for 24 Hours (mL): 1080  Total Number of Cans: 5  Continuous  Starting Tube Feed Rate mL per Hour: 20  Increase Tube Feed Rate by (mL): 10     Every 4 hours  Until Goal Tube Feed Rate (mL per Hour): 60  Tube Feed Duration (in Hours): 18  Tube Feed Start Time: 11:00  Tube Feed Stop Time: 05:00 (05-10-25 @ 15:12) [Active]      MEDICATIONS:  MEDICATIONS  (STANDING):  albuterol/ipratropium for Nebulization 3 milliLiter(s) Nebulizer every 6 hours  ascorbic acid 500 milliGRAM(s) Oral daily  aspirin  chewable 81 milliGRAM(s) Oral daily  atorvastatin 40 milliGRAM(s) Oral at bedtime  dextrose 5%. 1000 milliLiter(s) (50 mL/Hr) IV Continuous <Continuous>  dextrose 5%. 1000 milliLiter(s) (100 mL/Hr) IV Continuous <Continuous>  dextrose 50% Injectable 25 Gram(s) IV Push once  dextrose 50% Injectable 12.5 Gram(s) IV Push once  dextrose 50% Injectable 25 Gram(s) IV Push once  enoxaparin Injectable 40 milliGRAM(s) SubCutaneous every 24 hours  finasteride 5 milliGRAM(s) Oral daily  folic acid 1 milliGRAM(s) Oral daily  glucagon  Injectable 1 milliGRAM(s) IntraMuscular once  insulin glargine Injectable (LANTUS) 9 Unit(s) SubCutaneous at bedtime  insulin lispro (ADMELOG) corrective regimen sliding scale   SubCutaneous three times a day before meals  levETIRAcetam 750 milliGRAM(s) Oral every 12 hours  sodium chloride 3%  Inhalation 4 milliLiter(s) Inhalation every 8 hours  timolol 0.5% Solution 1 Drop(s) Both EYES two times a day  zinc sulfate 220 milliGRAM(s) Oral daily    MEDICATIONS  (PRN):  acetaminophen   Oral Liquid .. 650 milliGRAM(s) Oral every 6 hours PRN Mild Pain (1 - 3)  dextrose Oral Gel 15 Gram(s) Oral once PRN Blood Glucose LESS THAN 70 milliGRAM(s)/deciliter      Allergies    No Known Allergies    Intolerances        OBJECTIVE:  Vital Signs Last 24 Hrs  T(C): 36.7 (10 May 2025 21:06), Max: 36.7 (10 May 2025 21:06)  T(F): 98 (10 May 2025 21:06), Max: 98 (10 May 2025 21:06)  HR: 111 (11 May 2025 05:47) (97 - 111)  BP: 115/63 (11 May 2025 05:47) (110/70 - 124/77)  BP(mean): 93 (11 May 2025 05:47) (83 - 93)  RR: 16 (11 May 2025 05:47) (16 - 16)  SpO2: 99% (11 May 2025 05:47) (97% - 99%)    Parameters below as of 11 May 2025 05:47  Patient On (Oxygen Delivery Method): nasal cannula  O2 Flow (L/min): 2    I&O's Summary    10 May 2025 07:01  -  11 May 2025 07:00  --------------------------------------------------------  IN: 0 mL / OUT: 1000 mL / NET: -1000 mL        PHYSICAL EXAM:  Gen: Reclining in bed at time of exam, appears stated age  HEENT: NCAT, MMM, clear OP  Neck: supple, trachea at midline  CV: RRR, +S1/S2  Pulm: adequate respiratory effort, no increase in work of breathing  Abd: soft, NTND, PEG in place   Skin: warm and dry,   Ext: WWP, no LE edema  Neuro: awake, alert,, no gross focal neurological deficits  Psych: affect and behavior appropriate, pleasant at time of interview  :     LABS:                        9.2    7.94  )-----------( 264      ( 11 May 2025 05:30 )             28.9     05-11    131[L]  |  99  |  13  ----------------------------<  211[H]  4.2   |  25  |  0.26[L]    Ca    8.3[L]      11 May 2025 05:30  Phos  2.7     05-11  Mg     1.8     05-11    TPro  7.1  /  Alb  2.6[L]  /  TBili  0.4  /  DBili  x   /  AST  31  /  ALT  15  /  AlkPhos  98  05-10    LIVER FUNCTIONS - ( 10 May 2025 07:51 )  Alb: 2.6 g/dL / Pro: 7.1 g/dL / ALK PHOS: 98 U/L / ALT: 15 U/L / AST: 31 U/L / GGT: x             CAPILLARY BLOOD GLUCOSE      POCT Blood Glucose.: 155 mg/dL (11 May 2025 12:21)  POCT Blood Glucose.: 191 mg/dL (11 May 2025 08:58)  POCT Blood Glucose.: 172 mg/dL (10 May 2025 22:14)  POCT Blood Glucose.: 208 mg/dL (10 May 2025 17:56)    Urinalysis Basic - ( 11 May 2025 05:30 )    Color: x / Appearance: x / SG: x / pH: x  Gluc: 211 mg/dL / Ketone: x  / Bili: x / Urobili: x   Blood: x / Protein: x / Nitrite: x   Leuk Esterase: x / RBC: x / WBC x   Sq Epi: x / Non Sq Epi: x / Bacteria: x        MICRODATA:      RADIOLOGY/OTHER STUDIES:    PCP  Pharmacy:   Emergency contact:

## 2025-05-11 NOTE — PROGRESS NOTE ADULT - ASSESSMENT
89YM with known sacral decubitis ulcer, s/p debridment and wound vac application w/ general surgery on prior admission, re-admitted to medicine for r/o PNA. General surgery re-consulted for evaluation of wound, which overall appears to be well healing, light bleeding, no necrotic tissue that requires further debridement Recommend continued wound care.    -No acute surgical intervention  -Keep sacral dressing applied pending possible wound vac re-application  -Wound care per wound nursing, consider re-application of wound vac, was sent from NH without it 89YM with known sacral decubitis ulcer, s/p debridment and wound vac application w/ general surgery on prior admission, re-admitted to medicine for r/o PNA. General surgery re-consulted for evaluation of wound, which overall appears to be well healing, light bleeding, no necrotic tissue that requires further debridement. Wound looks unchanged. Recommend continued wound care.    -No acute surgical intervention  -Keep sacral dressing applied pending possible wound vac re-application  -Wound care per wound nursing, consider re-application of wound vac, was sent from NH without it      Plan discussed with attending and chief resident.   ____________________________________________________  Marcela Poe - Resident   Surgery

## 2025-05-12 LAB
ANION GAP SERPL CALC-SCNC: 10 MMOL/L — SIGNIFICANT CHANGE UP (ref 5–17)
BASOPHILS # BLD AUTO: 0.04 K/UL — SIGNIFICANT CHANGE UP (ref 0–0.2)
BASOPHILS NFR BLD AUTO: 0.5 % — SIGNIFICANT CHANGE UP (ref 0–2)
BUN SERPL-MCNC: 14 MG/DL — SIGNIFICANT CHANGE UP (ref 7–23)
CALCIUM SERPL-MCNC: 8.3 MG/DL — LOW (ref 8.4–10.5)
CHLORIDE SERPL-SCNC: 100 MMOL/L — SIGNIFICANT CHANGE UP (ref 96–108)
CO2 SERPL-SCNC: 24 MMOL/L — SIGNIFICANT CHANGE UP (ref 22–31)
CREAT SERPL-MCNC: 0.23 MG/DL — LOW (ref 0.5–1.3)
EGFR: 125 ML/MIN/1.73M2 — SIGNIFICANT CHANGE UP
EGFR: 125 ML/MIN/1.73M2 — SIGNIFICANT CHANGE UP
EOSINOPHIL # BLD AUTO: 0.63 K/UL — HIGH (ref 0–0.5)
EOSINOPHIL NFR BLD AUTO: 8.1 % — HIGH (ref 0–6)
GLUCOSE SERPL-MCNC: 222 MG/DL — HIGH (ref 70–99)
HCT VFR BLD CALC: 28.3 % — LOW (ref 39–50)
HGB BLD-MCNC: 9.1 G/DL — LOW (ref 13–17)
IMM GRANULOCYTES NFR BLD AUTO: 0.4 % — SIGNIFICANT CHANGE UP (ref 0–0.9)
LYMPHOCYTES # BLD AUTO: 1.4 K/UL — SIGNIFICANT CHANGE UP (ref 1–3.3)
LYMPHOCYTES # BLD AUTO: 18.1 % — SIGNIFICANT CHANGE UP (ref 13–44)
MAGNESIUM SERPL-MCNC: 1.7 MG/DL — SIGNIFICANT CHANGE UP (ref 1.6–2.6)
MCHC RBC-ENTMCNC: 25.2 PG — LOW (ref 27–34)
MCHC RBC-ENTMCNC: 32.2 G/DL — SIGNIFICANT CHANGE UP (ref 32–36)
MCV RBC AUTO: 78.4 FL — LOW (ref 80–100)
MONOCYTES # BLD AUTO: 0.99 K/UL — HIGH (ref 0–0.9)
MONOCYTES NFR BLD AUTO: 12.8 % — SIGNIFICANT CHANGE UP (ref 2–14)
NEUTROPHILS # BLD AUTO: 4.66 K/UL — SIGNIFICANT CHANGE UP (ref 1.8–7.4)
NEUTROPHILS NFR BLD AUTO: 60.1 % — SIGNIFICANT CHANGE UP (ref 43–77)
NRBC BLD AUTO-RTO: 0 /100 WBCS — SIGNIFICANT CHANGE UP (ref 0–0)
PHOSPHATE SERPL-MCNC: 2.3 MG/DL — LOW (ref 2.5–4.5)
PLATELET # BLD AUTO: 271 K/UL — SIGNIFICANT CHANGE UP (ref 150–400)
POTASSIUM SERPL-MCNC: 4.2 MMOL/L — SIGNIFICANT CHANGE UP (ref 3.5–5.3)
POTASSIUM SERPL-SCNC: 4.2 MMOL/L — SIGNIFICANT CHANGE UP (ref 3.5–5.3)
RBC # BLD: 3.61 M/UL — LOW (ref 4.2–5.8)
RBC # FLD: 22.8 % — HIGH (ref 10.3–14.5)
SODIUM SERPL-SCNC: 134 MMOL/L — LOW (ref 135–145)
WBC # BLD: 7.75 K/UL — SIGNIFICANT CHANGE UP (ref 3.8–10.5)
WBC # FLD AUTO: 7.75 K/UL — SIGNIFICANT CHANGE UP (ref 3.8–10.5)

## 2025-05-12 PROCEDURE — 99233 SBSQ HOSP IP/OBS HIGH 50: CPT

## 2025-05-12 RX ORDER — SODIUM PHOSPHATE,DIBASIC DIHYD
30 POWDER (GRAM) MISCELLANEOUS ONCE
Refills: 0 | Status: COMPLETED | OUTPATIENT
Start: 2025-05-12 | End: 2025-05-12

## 2025-05-12 RX ORDER — INSULIN GLARGINE-YFGN 100 [IU]/ML
10 INJECTION, SOLUTION SUBCUTANEOUS AT BEDTIME
Refills: 0 | Status: DISCONTINUED | OUTPATIENT
Start: 2025-05-12 | End: 2025-05-21

## 2025-05-12 RX ADMIN — IPRATROPIUM BROMIDE AND ALBUTEROL SULFATE 3 MILLILITER(S): .5; 2.5 SOLUTION RESPIRATORY (INHALATION) at 05:52

## 2025-05-12 RX ADMIN — TIMOLOL MALEATE 1 DROP(S): 6.8 SOLUTION OPHTHALMIC at 18:30

## 2025-05-12 RX ADMIN — ENOXAPARIN SODIUM 40 MILLIGRAM(S): 100 INJECTION SUBCUTANEOUS at 22:09

## 2025-05-12 RX ADMIN — LEVETIRACETAM 750 MILLIGRAM(S): 10 INJECTION, SOLUTION INTRAVENOUS at 17:56

## 2025-05-12 RX ADMIN — Medication 220 MILLIGRAM(S): at 11:16

## 2025-05-12 RX ADMIN — TIMOLOL MALEATE 1 DROP(S): 6.8 SOLUTION OPHTHALMIC at 05:54

## 2025-05-12 RX ADMIN — Medication 4 MILLILITER(S): at 17:56

## 2025-05-12 RX ADMIN — FINASTERIDE 5 MILLIGRAM(S): 1 TABLET, FILM COATED ORAL at 11:15

## 2025-05-12 RX ADMIN — Medication 4 MILLILITER(S): at 09:27

## 2025-05-12 RX ADMIN — Medication 500 MILLIGRAM(S): at 11:15

## 2025-05-12 RX ADMIN — INSULIN LISPRO 2: 100 INJECTION, SOLUTION INTRAVENOUS; SUBCUTANEOUS at 13:00

## 2025-05-12 RX ADMIN — INSULIN LISPRO 4: 100 INJECTION, SOLUTION INTRAVENOUS; SUBCUTANEOUS at 17:55

## 2025-05-12 RX ADMIN — ATORVASTATIN CALCIUM 40 MILLIGRAM(S): 80 TABLET, FILM COATED ORAL at 22:09

## 2025-05-12 RX ADMIN — IPRATROPIUM BROMIDE AND ALBUTEROL SULFATE 3 MILLILITER(S): .5; 2.5 SOLUTION RESPIRATORY (INHALATION) at 11:16

## 2025-05-12 RX ADMIN — LEVETIRACETAM 750 MILLIGRAM(S): 10 INJECTION, SOLUTION INTRAVENOUS at 05:54

## 2025-05-12 RX ADMIN — INSULIN LISPRO 2: 100 INJECTION, SOLUTION INTRAVENOUS; SUBCUTANEOUS at 08:57

## 2025-05-12 RX ADMIN — Medication 81 MILLIGRAM(S): at 11:15

## 2025-05-12 RX ADMIN — IPRATROPIUM BROMIDE AND ALBUTEROL SULFATE 3 MILLILITER(S): .5; 2.5 SOLUTION RESPIRATORY (INHALATION) at 17:56

## 2025-05-12 RX ADMIN — INSULIN GLARGINE-YFGN 10 UNIT(S): 100 INJECTION, SOLUTION SUBCUTANEOUS at 21:58

## 2025-05-12 RX ADMIN — Medication 85 MILLIMOLE(S): at 09:26

## 2025-05-12 RX ADMIN — FOLIC ACID 1 MILLIGRAM(S): 1 TABLET ORAL at 11:16

## 2025-05-12 RX ADMIN — Medication 4 MILLILITER(S): at 05:53

## 2025-05-12 NOTE — PROGRESS NOTE ADULT - ASSESSMENT
89YM with known sacral decubitis ulcer, s/p debridment and wound vac application w/ general surgery on prior admission, re-admitted to medicine for r/o PNA. General surgery re-consulted for evaluation of wound, which overall appears to be well healing, light bleeding, no necrotic tissue that requires further debridement. Wound looks unchanged. Recommend continued wound care.    Recommendations    -No acute surgical intervention  -Daily sacral dressing changes. Wet-to-Dry.  -Wound care per wound nursing.    General Surgery Team 4c will sign off on this patient.

## 2025-05-12 NOTE — PROGRESS NOTE ADULT - ATTENDING COMMENTS
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 GI consulted and PEG tube study showed PEG is functioning and no need to change to a new one. WOCN consult appreciated, given Sliver nitrate and aquagel Ag dressing daily to PEG site. Dietitian consult appreciated, TF changed to Glucerna 1.2 @ 83ml/hr from 5pm to 11am x 18hr, total 6 cans per day. Pt recently admitted to ICU for aspiration PNA and had indwelling hudson removed and discharged from [facility] ( 2/25/25) and sent to [facility] and subsequently sent home on home O2 per wife, admitted [facility] ( 3/26-4/1)  for severe sepsis w/ lactic acidosis 2/2 recurrent multifocal HAP likely aspiration/gram negative organism and and last admitted [facility] ( 4/22-5/1)  infected sacral decubitus (POA)--Strep bacteremia, sacral ulcer with abscess sp bedside debridement by surgery 4/27- bleeding required electrocautery , given one unit of prbc before debridement - now hb at 7.7 --> given a unit 4/28- hb above 9 - wound vac placed by team 4 surgery on 4/29, discharged on 5/1 to [facility] for 2 weeks of antibiotics with ceftriaxone/flagyl ( 4/27- 5/10 ). Pt now presented to [facility] ( 5/9) for +quantgold test resulting in NH with otherwise improving PNA from recent admission and no worsening systemic symptoms or s/s sepsis, now being admitted for TB r/o with AFB confirmation to return to nursing facility.     pt known to me from prior admission  wife at bedside-  we talk about needing sample, wife state he would cough up after saline nebs- she is to notify RN to collect sample  she is concerned about the sacral wound - need to have wound vac replaced.  tolerating tube feeding, otherwise appear comfortable.     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  - airborne isolation   - Please co-ordinate with RT to induce sputum for AFB smear and AFB culture x3, so pt can be cleared to return to [facility]   - airway clearance: may change duoneb followed by hypertonic saline q12hr, remains on NC2L as baseline for chronic hypoxic resp failure    - NPO/ oral hygiene q shift/aspiration precautions     # Recent strep bacteremia w/ sacral ucler Stage IV   - completed Ceftriaxone and flagyl ( EOT 5/10) - can remove the midline   - Surgery consult appreciated, no need for debridement of sacral wound  - WOCN consult for placing wound vac on Monday   - added MVI, VIt C 500mg daily and ZnSo4 220mg po daily     # Dysphagia  # Hyponatremia   - c/w TF//glucerna 5 cans a day  & increase free H2O via PEG     # Hyponatremia- sodium is 134 - ( Glucose is 222 )  # mild Hypophosphatemia- being replaced.     # T2DM- with hyperglycemia   -currently on  Lantus 9U qHS, NISS/goal -180 --   to increase lantus to 10 Units Q hs - continue with FS and SS     # DVT ppx     # FULL CODE- GOC as discussed wife at bedside .Both wife and son wants full code     # Disposition: medically stable, awaiting AFBx3 then return to [facility]  diana RN and medical team Dr. Tracey Ahuja.   wife request to have HHA 12 hours 2 shifts.

## 2025-05-12 NOTE — PROGRESS NOTE ADULT - PROBLEM SELECTOR PLAN 1
Recent admission 4/22-5/1 for which patient was admitted for severe sepsis and found to have RLL aspiration PNA and bacteroides+ and strep+ species with subsequent negative bcx; discharged to NH with PICC to complete abx (CTX 1 g QD and PO HAJI, end date 5/10/25); now returning d/t +quantiferon test; CXR with improving opacities and no cavitations or c/f TB on previous imaging (CT chest 04/2025), no fever/weight loss or systemic symptoms except for intermittent cough with whitish sputum which pt had on prior admission (recovering from CAP); WBC 9 w/o bandemia; pt originally from [place]; ID called in ED and 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

## 2025-05-12 NOTE — PROGRESS NOTE ADULT - SUBJECTIVE AND OBJECTIVE BOX
INTERVAL/OVERNIGHT EVENTS: None    SUBJECTIVE:  Patient seen and examined at bedside, comfortable, NAD. Resting comfortably. Per wife at bedside, patient coughed up some sputum but sample was not enough.     Vital Signs Last 12 Hrs  T(F): 97.3 (05-12-25 @ 05:49), Max: 97.3 (05-12-25 @ 05:49)  HR: 104 (05-12-25 @ 05:49) (104 - 104)  BP: 124/70 (05-12-25 @ 05:49) (124/70 - 124/70)  BP(mean): 88 (05-12-25 @ 05:49) (88 - 88)  RR: 16 (05-12-25 @ 05:49) (16 - 16)  SpO2: 96% (05-12-25 @ 05:49) (96% - 96%)  I&O's Summary      PHYSICAL EXAM:  Constitutional - NAD, Comfortable-appearing, nonverbal, intermittently with wet cough   HEENT - NCAT, EOMI, NC in place  Chest - Breathing comfortably on 2L NC, CTAB  Cardio - Warm and well perfused, RRR  Abdomen - Soft, NTND, +BS. PEG site without surrounding erythema, induration, or drainage.  Extremities - No peripheral edema, No calf tenderness   Neurologic - Awake and alert. Makes eye contact to voice. Not following commands.  Psychiatric - Mood stable, Affect WNL    LABS:                        9.1    7.75  )-----------( 271      ( 12 May 2025 06:38 )             28.3     05-12    134[L]  |  100  |  14  ----------------------------<  222[H]  4.2   |  24  |  0.23[L]    Ca    8.3[L]      12 May 2025 06:38  Phos  2.3     05-12  Mg     1.7     05-12        Urinalysis Basic - ( 12 May 2025 06:38 )    Color: x / Appearance: x / SG: x / pH: x  Gluc: 222 mg/dL / Ketone: x  / Bili: x / Urobili: x   Blood: x / Protein: x / Nitrite: x   Leuk Esterase: x / RBC: x / WBC x   Sq Epi: x / Non Sq Epi: x / Bacteria: x          RADIOLOGY & ADDITIONAL TESTS:    MEDICATIONS  (STANDING):  albuterol/ipratropium for Nebulization 3 milliLiter(s) Nebulizer every 6 hours  ascorbic acid 500 milliGRAM(s) Oral daily  aspirin  chewable 81 milliGRAM(s) Oral daily  atorvastatin 40 milliGRAM(s) Oral at bedtime  dextrose 5%. 1000 milliLiter(s) (50 mL/Hr) IV Continuous <Continuous>  dextrose 5%. 1000 milliLiter(s) (100 mL/Hr) IV Continuous <Continuous>  dextrose 50% Injectable 25 Gram(s) IV Push once  dextrose 50% Injectable 12.5 Gram(s) IV Push once  dextrose 50% Injectable 25 Gram(s) IV Push once  enoxaparin Injectable 40 milliGRAM(s) SubCutaneous every 24 hours  finasteride 5 milliGRAM(s) Oral daily  folic acid 1 milliGRAM(s) Oral daily  glucagon  Injectable 1 milliGRAM(s) IntraMuscular once  insulin glargine Injectable (LANTUS) 9 Unit(s) SubCutaneous at bedtime  insulin lispro (ADMELOG) corrective regimen sliding scale   SubCutaneous every 6 hours  levETIRAcetam 750 milliGRAM(s) Oral every 12 hours  sodium chloride 7% Inhalation 4 milliLiter(s) Inhalation every 8 hours  timolol 0.5% Solution 1 Drop(s) Both EYES two times a day  zinc sulfate 220 milliGRAM(s) Oral daily    MEDICATIONS  (PRN):  acetaminophen   Oral Liquid .. 650 milliGRAM(s) Oral every 6 hours PRN Mild Pain (1 - 3)  dextrose Oral Gel 15 Gram(s) Oral once PRN Blood Glucose LESS THAN 70 milliGRAM(s)/deciliter

## 2025-05-12 NOTE — PROGRESS NOTE ADULT - PROBLEM SELECTOR PLAN 2
4/22-5/1 admission for severe sepsis revealing BCx 4/22 - +Bacteroides fragilis, +Strep species with subsequent bcx clearance, in addition to RLL PNA suspected to aspiration PNA at that time; CTX 1g (4/29 -5/10) and Flagyl 500mg PO  (4/29 -5/10 ) recommended by ID service at that time; discharged to NH with PICC for completion of abx regimen (no reported missed doses); pt currently afebrile, breathing comfortably saturating 98% on 2L w/o leukocytosis and CXR with improving opacities     -Repeat bcx if reveals evidence of sepsis   -Completed abx course pt was discharged on: CTX 1g IV QD (4/29 -5/10) and Flagyl 500mg PO Q12H (4/29 -5/10); will pull PICC line

## 2025-05-12 NOTE — PROGRESS NOTE ADULT - SUBJECTIVE AND OBJECTIVE BOX
INTERVAL HPI/OVERNIGHT EVENTS: Patient admitted for CAP, now with Quantiferon +. Pending sputum samples, but not able to get it.     SUBJECTIVE:  Patient seen and examined on AM rounds. Patient afebrile, tachycardic. Ulcer looking well, no foul smelling, fibrinous tissue at base.     MEDICATIONS  (STANDING):  albuterol/ipratropium for Nebulization 3 milliLiter(s) Nebulizer every 6 hours  ascorbic acid 500 milliGRAM(s) Oral daily  aspirin  chewable 81 milliGRAM(s) Oral daily  atorvastatin 40 milliGRAM(s) Oral at bedtime  dextrose 5%. 1000 milliLiter(s) (50 mL/Hr) IV Continuous <Continuous>  dextrose 5%. 1000 milliLiter(s) (100 mL/Hr) IV Continuous <Continuous>  dextrose 50% Injectable 25 Gram(s) IV Push once  dextrose 50% Injectable 12.5 Gram(s) IV Push once  dextrose 50% Injectable 25 Gram(s) IV Push once  enoxaparin Injectable 40 milliGRAM(s) SubCutaneous every 24 hours  finasteride 5 milliGRAM(s) Oral daily  folic acid 1 milliGRAM(s) Oral daily  glucagon  Injectable 1 milliGRAM(s) IntraMuscular once  insulin glargine Injectable (LANTUS) 9 Unit(s) SubCutaneous at bedtime  insulin lispro (ADMELOG) corrective regimen sliding scale   SubCutaneous every 6 hours  levETIRAcetam 750 milliGRAM(s) Oral every 12 hours  sodium chloride 7% Inhalation 4 milliLiter(s) Inhalation every 8 hours  timolol 0.5% Solution 1 Drop(s) Both EYES two times a day  zinc sulfate 220 milliGRAM(s) Oral daily    MEDICATIONS  (PRN):  acetaminophen   Oral Liquid .. 650 milliGRAM(s) Oral every 6 hours PRN Mild Pain (1 - 3)  dextrose Oral Gel 15 Gram(s) Oral once PRN Blood Glucose LESS THAN 70 milliGRAM(s)/deciliter      Vital Signs Last 24 Hrs  T(C): 36.7 (12 May 2025 13:33), Max: 36.8 (11 May 2025 21:24)  T(F): 98 (12 May 2025 13:33), Max: 98.2 (11 May 2025 21:24)  HR: 108 (12 May 2025 13:33) (104 - 108)  BP: 118/73 (12 May 2025 13:33) (111/71 - 124/70)  BP(mean): 88 (12 May 2025 05:49) (84 - 88)  RR: 16 (12 May 2025 13:33) (16 - 16)  SpO2: 96% (12 May 2025 13:33) (94% - 96%)    Parameters below as of 12 May 2025 13:33  Patient On (Oxygen Delivery Method): nasal cannula  O2 Flow (L/min): 2      PHYSICAL EXAM:    Gen: NAD, resting comfortably   CV: NSR  Pulm: no respiratory distress on RA, CTAB   Abd: Soft, ND, NTTP  acrum: Large 8-9 cm sacral decubitus ulcer, no bleeding, fibrinous tissue, no appreciable necrotic tissue   Ext: WWP, no edema         I&O's Detail    12 May 2025 07:01  -  12 May 2025 14:20  --------------------------------------------------------  IN:    Glucerna 1.5: 180 mL  Total IN: 180 mL    OUT:  Total OUT: 0 mL    Total NET: 180 mL          LABS:                        9.1    7.75  )-----------( 271      ( 12 May 2025 06:38 )             28.3     05-12    134[L]  |  100  |  14  ----------------------------<  222[H]  4.2   |  24  |  0.23[L]    Ca    8.3[L]      12 May 2025 06:38  Phos  2.3     05-12  Mg     1.7     05-12        Urinalysis Basic - ( 12 May 2025 06:38 )    Color: x / Appearance: x / SG: x / pH: x  Gluc: 222 mg/dL / Ketone: x  / Bili: x / Urobili: x   Blood: x / Protein: x / Nitrite: x   Leuk Esterase: x / RBC: x / WBC x   Sq Epi: x / Non Sq Epi: x / Bacteria: x        RADIOLOGY & ADDITIONAL STUDIES:

## 2025-05-13 ENCOUNTER — TRANSCRIPTION ENCOUNTER (OUTPATIENT)
Age: 89
End: 2025-05-13

## 2025-05-13 LAB
ANION GAP SERPL CALC-SCNC: 7 MMOL/L — SIGNIFICANT CHANGE UP (ref 5–17)
BASOPHILS # BLD AUTO: 0.04 K/UL — SIGNIFICANT CHANGE UP (ref 0–0.2)
BASOPHILS NFR BLD AUTO: 0.5 % — SIGNIFICANT CHANGE UP (ref 0–2)
BUN SERPL-MCNC: 12 MG/DL — SIGNIFICANT CHANGE UP (ref 7–23)
CALCIUM SERPL-MCNC: 8.3 MG/DL — LOW (ref 8.4–10.5)
CHLORIDE SERPL-SCNC: 101 MMOL/L — SIGNIFICANT CHANGE UP (ref 96–108)
CO2 SERPL-SCNC: 27 MMOL/L — SIGNIFICANT CHANGE UP (ref 22–31)
CREAT SERPL-MCNC: 0.27 MG/DL — LOW (ref 0.5–1.3)
EGFR: 119 ML/MIN/1.73M2 — SIGNIFICANT CHANGE UP
EGFR: 119 ML/MIN/1.73M2 — SIGNIFICANT CHANGE UP
EOSINOPHIL # BLD AUTO: 0.59 K/UL — HIGH (ref 0–0.5)
EOSINOPHIL NFR BLD AUTO: 7.4 % — HIGH (ref 0–6)
GLUCOSE SERPL-MCNC: 145 MG/DL — HIGH (ref 70–99)
HCT VFR BLD CALC: 27.8 % — LOW (ref 39–50)
HGB BLD-MCNC: 8.9 G/DL — LOW (ref 13–17)
IMM GRANULOCYTES NFR BLD AUTO: 0.4 % — SIGNIFICANT CHANGE UP (ref 0–0.9)
LYMPHOCYTES # BLD AUTO: 1.51 K/UL — SIGNIFICANT CHANGE UP (ref 1–3.3)
LYMPHOCYTES # BLD AUTO: 19 % — SIGNIFICANT CHANGE UP (ref 13–44)
MAGNESIUM SERPL-MCNC: 1.7 MG/DL — SIGNIFICANT CHANGE UP (ref 1.6–2.6)
MCHC RBC-ENTMCNC: 24.6 PG — LOW (ref 27–34)
MCHC RBC-ENTMCNC: 32 G/DL — SIGNIFICANT CHANGE UP (ref 32–36)
MCV RBC AUTO: 76.8 FL — LOW (ref 80–100)
MONOCYTES # BLD AUTO: 0.99 K/UL — HIGH (ref 0–0.9)
MONOCYTES NFR BLD AUTO: 12.4 % — SIGNIFICANT CHANGE UP (ref 2–14)
NEUTROPHILS # BLD AUTO: 4.8 K/UL — SIGNIFICANT CHANGE UP (ref 1.8–7.4)
NEUTROPHILS NFR BLD AUTO: 60.3 % — SIGNIFICANT CHANGE UP (ref 43–77)
NRBC BLD AUTO-RTO: 0 /100 WBCS — SIGNIFICANT CHANGE UP (ref 0–0)
PHOSPHATE SERPL-MCNC: 2.4 MG/DL — LOW (ref 2.5–4.5)
PLATELET # BLD AUTO: 253 K/UL — SIGNIFICANT CHANGE UP (ref 150–400)
POTASSIUM SERPL-MCNC: 3.8 MMOL/L — SIGNIFICANT CHANGE UP (ref 3.5–5.3)
POTASSIUM SERPL-SCNC: 3.8 MMOL/L — SIGNIFICANT CHANGE UP (ref 3.5–5.3)
RBC # BLD: 3.62 M/UL — LOW (ref 4.2–5.8)
RBC # FLD: 22.5 % — HIGH (ref 10.3–14.5)
SODIUM SERPL-SCNC: 135 MMOL/L — SIGNIFICANT CHANGE UP (ref 135–145)
WBC # BLD: 7.96 K/UL — SIGNIFICANT CHANGE UP (ref 3.8–10.5)
WBC # FLD AUTO: 7.96 K/UL — SIGNIFICANT CHANGE UP (ref 3.8–10.5)

## 2025-05-13 PROCEDURE — 99233 SBSQ HOSP IP/OBS HIGH 50: CPT

## 2025-05-13 PROCEDURE — 99222 1ST HOSP IP/OBS MODERATE 55: CPT

## 2025-05-13 PROCEDURE — G0545: CPT

## 2025-05-13 RX ORDER — SODIUM PHOSPHATE,DIBASIC DIHYD
30 POWDER (GRAM) MISCELLANEOUS ONCE
Refills: 0 | Status: COMPLETED | OUTPATIENT
Start: 2025-05-13 | End: 2025-05-13

## 2025-05-13 RX ADMIN — TIMOLOL MALEATE 1 DROP(S): 6.8 SOLUTION OPHTHALMIC at 17:51

## 2025-05-13 RX ADMIN — Medication 500 MILLIGRAM(S): at 11:17

## 2025-05-13 RX ADMIN — Medication 81 MILLIGRAM(S): at 11:16

## 2025-05-13 RX ADMIN — IPRATROPIUM BROMIDE AND ALBUTEROL SULFATE 3 MILLILITER(S): .5; 2.5 SOLUTION RESPIRATORY (INHALATION) at 00:54

## 2025-05-13 RX ADMIN — Medication 4 MILLILITER(S): at 17:45

## 2025-05-13 RX ADMIN — Medication 4 MILLILITER(S): at 00:54

## 2025-05-13 RX ADMIN — IPRATROPIUM BROMIDE AND ALBUTEROL SULFATE 3 MILLILITER(S): .5; 2.5 SOLUTION RESPIRATORY (INHALATION) at 05:14

## 2025-05-13 RX ADMIN — INSULIN LISPRO 6: 100 INJECTION, SOLUTION INTRAVENOUS; SUBCUTANEOUS at 17:46

## 2025-05-13 RX ADMIN — Medication 220 MILLIGRAM(S): at 11:16

## 2025-05-13 RX ADMIN — LEVETIRACETAM 750 MILLIGRAM(S): 10 INJECTION, SOLUTION INTRAVENOUS at 05:14

## 2025-05-13 RX ADMIN — ATORVASTATIN CALCIUM 40 MILLIGRAM(S): 80 TABLET, FILM COATED ORAL at 22:06

## 2025-05-13 RX ADMIN — INSULIN GLARGINE-YFGN 10 UNIT(S): 100 INJECTION, SOLUTION SUBCUTANEOUS at 22:07

## 2025-05-13 RX ADMIN — FOLIC ACID 1 MILLIGRAM(S): 1 TABLET ORAL at 11:16

## 2025-05-13 RX ADMIN — Medication 650 MILLIGRAM(S): at 17:44

## 2025-05-13 RX ADMIN — INSULIN LISPRO 4: 100 INJECTION, SOLUTION INTRAVENOUS; SUBCUTANEOUS at 13:31

## 2025-05-13 RX ADMIN — INSULIN LISPRO 2: 100 INJECTION, SOLUTION INTRAVENOUS; SUBCUTANEOUS at 22:07

## 2025-05-13 RX ADMIN — ENOXAPARIN SODIUM 40 MILLIGRAM(S): 100 INJECTION SUBCUTANEOUS at 22:06

## 2025-05-13 RX ADMIN — IPRATROPIUM BROMIDE AND ALBUTEROL SULFATE 3 MILLILITER(S): .5; 2.5 SOLUTION RESPIRATORY (INHALATION) at 11:16

## 2025-05-13 RX ADMIN — IPRATROPIUM BROMIDE AND ALBUTEROL SULFATE 3 MILLILITER(S): .5; 2.5 SOLUTION RESPIRATORY (INHALATION) at 17:44

## 2025-05-13 RX ADMIN — LEVETIRACETAM 750 MILLIGRAM(S): 10 INJECTION, SOLUTION INTRAVENOUS at 17:45

## 2025-05-13 RX ADMIN — FINASTERIDE 5 MILLIGRAM(S): 1 TABLET, FILM COATED ORAL at 11:17

## 2025-05-13 RX ADMIN — Medication 4 MILLILITER(S): at 08:00

## 2025-05-13 RX ADMIN — TIMOLOL MALEATE 1 DROP(S): 6.8 SOLUTION OPHTHALMIC at 05:25

## 2025-05-13 RX ADMIN — INSULIN LISPRO 4: 100 INJECTION, SOLUTION INTRAVENOUS; SUBCUTANEOUS at 00:54

## 2025-05-13 RX ADMIN — INSULIN LISPRO 6: 100 INJECTION, SOLUTION INTRAVENOUS; SUBCUTANEOUS at 05:22

## 2025-05-13 RX ADMIN — Medication 85 MILLIMOLE(S): at 09:57

## 2025-05-13 NOTE — PROGRESS NOTE ADULT - PROBLEM SELECTOR PLAN 6
Present on admission. Last admission requiring debridement with surgery however no changes in exam since last admission.  Patient is entirely bedbound since stroke    - Wet to dry dressing  - surgery consulted who stated no further debridement necessary, no wound vac necessary  - Frequent turning and nursing care

## 2025-05-13 NOTE — PROGRESS NOTE ADULT - PROBLEM SELECTOR PLAN 2
4/22-5/1 admission for severe sepsis revealing BCx 4/22 - +Bacteroides fragilis, +Strep species with subsequent bcx clearance, in addition to RLL PNA suspected to aspiration PNA at that time; CTX 1g (4/29 -5/10) and Flagyl 500mg PO  (4/29 -5/10 ) recommended by ID service at that time; discharged to NH with PICC for completion of abx regimen (no reported missed doses); pt currently afebrile, breathing comfortably saturating 98% on 2L w/o leukocytosis and CXR with improving opacities     -Repeat bcx if reveals evidence of sepsis   -Completed abx course pt was discharged on: CTX 1g IV QD (4/29 -5/10) and Flagyl 500mg PO Q12H (4/29 -5/10); PICC line removed on 5/12

## 2025-05-13 NOTE — PHYSICAL THERAPY INITIAL EVALUATION ADULT - MODALITIES TREATMENT COMMENTS
Pt dangled at EOB for~ 7 mins with max A x 1. Seated ther ex: LAQ x 7 rep RLE, shoulder flex, elbow flex/ext x 5 rep RUE AAROM

## 2025-05-13 NOTE — DISCHARGE NOTE PROVIDER - NSDCCPCAREPLAN_GEN_ALL_CORE_FT
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 However, your sputum was taken which did not show that you have active tuberculosis.      SECONDARY DISCHARGE DIAGNOSES  Diagnosis: History of seizure  Assessment and Plan of Treatment: A seizure disorder is a condition where a person experiences recurrent seizures due to abnormal electrical discharges in the brain. Please continue to take your seizure medications as prescribed.    Diagnosis: Glaucoma  Assessment and Plan of Treatment: Glaucoma is an eye condition that damages the optic nerve, which is crucial for good vision. This damage can lead to vision loss or blindness, often associated with high pressure in the eye, although it can occur even with normal eye pressure. There are different types of glaucoma, including open-angle glaucoma, which typically has no early warning signs, making it known as the "silent thief of sight". Symptoms may include blind spots in peripheral vision, and it is important to have regular eye exams for early detection. Please continue to take your glaucoma medication as prescribed.     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 However, your sputum was taken which did not show that you have active tuberculosis. You should be sure to follow up with your outpatient infectious disease doctor at your scheduled appointment.      SECONDARY DISCHARGE DIAGNOSES  Diagnosis: History of seizure  Assessment and Plan of Treatment: A seizure disorder is a condition where a person experiences recurrent seizures due to abnormal electrical discharges in the brain. Please continue to take your seizure medications as prescribed.    Diagnosis: Glaucoma  Assessment and Plan of Treatment: Glaucoma is an eye condition that damages the optic nerve, which is crucial for good vision. This damage can lead to vision loss or blindness, often associated with high pressure in the eye, although it can occur even with normal eye pressure. There are different types of glaucoma, including open-angle glaucoma, which typically has no early warning signs, making it known as the "silent thief of sight". Symptoms may include blind spots in peripheral vision, and it is important to have regular eye exams for early detection. Please continue to take your glaucoma medication as prescribed.    Diagnosis: Diabetes  Assessment and Plan of Treatment: Diabetes mellitus refers to a group of diseases that affect how the body uses blood sugar (glucose). Glucose is an important source of energy for the cells that make up the muscles and tissues. It's also the brain's main source of fuel. Please continue to use the insulin sliding scale at [facility] as well as 10 units of lantus at bedtime.    Diagnosis: Tachycardia  Assessment and Plan of Treatment: Tachycardia means fast heart rate. You were noted to have a fast heart rate during your hospital stay and were started on Lopressor 12.5 mg twice a day, please continue this medication on discharge to prevent your heart rate from becoming too high.

## 2025-05-13 NOTE — CONSULT NOTE ADULT - SUBJECTIVE AND OBJECTIVE BOX
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     Of note, was recently admitted 4/22-5/1 for severe sepsis iso infected decubitus ulcer and RLL PNA. Discharged back to nursing home with PICC line for CTX and HAJI (end date 5/10) to cover PNA and +Bacteroides fragilis, +Strep species bacteremia. Per wife, she is unsure why a quantiferon test was done. No previous TB diagnosis per wife. States she thinks he has been improving since admission, mental status is getting better and able to speak more. Reports  has whitish sputum with occasional cough that is also stable-improving from discharge. Uses wound vac for sacral wound on last admission and in nursing home. No new reported fever, weight loss, or worsening breathing status per wife. Reportedly has had BCG vaccination in [place] previously. Has been adherent with antibiotics and otherwise has had no systemic symptoms. 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     INTERVAL/OVERNIGHT EVENTS: None    SUBJECTIVE:  Patient seen and examined at bedside, comfortable-appearing, in NAD.     Vital Signs Last 12 Hrs  T(F): 100.8 (05-13-25 @ 12:24), Max: 100.8 (05-13-25 @ 12:24)  HR: 120 (05-13-25 @ 12:09) (120 - 120)  BP: 120/72 (05-13-25 @ 12:09) (120/72 - 120/72)  BP(mean): --  RR: 18 (05-13-25 @ 12:09) (18 - 18)  SpO2: 96% (05-13-25 @ 12:09) (96% - 96%)  I&O's Summary    12 May 2025 07:01  -  13 May 2025 07:00  --------------------------------------------------------  IN: 480 mL / OUT: 300 mL / NET: 180 mL    13 May 2025 07:01  -  13 May 2025 19:58  --------------------------------------------------------  IN: 480 mL / OUT: 400 mL / NET: 80 mL        PHYSICAL EXAM:  General: NAD, comfortable-appearing  HEENT: PERRL, EOM intact, sclera anicteric, MMM  Cardiovascular: RRR; no MRG;   Respiratory: CTAB; no WRR  GI/: soft; NTND; BS x4  Extremities: WWP; 2+ peripheral pulses bilaterally; no LE edema  Skin: normal color & turgor; no rash  Neurologic: alert, makes appropriate eye contact, unable to assess level of orientation    LABS:                        8.9    7.96  )-----------( 253      ( 13 May 2025 06:50 )             27.8     05-13    135  |  101  |  12  ----------------------------<  145[H]  3.8   |  27  |  0.27[L]    Ca    8.3[L]      13 May 2025 06:50  Phos  2.4     05-13  Mg     1.7     05-13        Urinalysis Basic - ( 13 May 2025 06:50 )    Color: x / Appearance: x / SG: x / pH: x  Gluc: 145 mg/dL / Ketone: x  / Bili: x / Urobili: x   Blood: x / Protein: x / Nitrite: x   Leuk Esterase: x / RBC: x / WBC x   Sq Epi: x / Non Sq Epi: x / Bacteria: x          RADIOLOGY & ADDITIONAL TESTS:    MEDICATIONS  (STANDING):  albuterol/ipratropium for Nebulization 3 milliLiter(s) Nebulizer every 6 hours  ascorbic acid 500 milliGRAM(s) Oral daily  aspirin  chewable 81 milliGRAM(s) Oral daily  atorvastatin 40 milliGRAM(s) Oral at bedtime  dextrose 5%. 1000 milliLiter(s) (50 mL/Hr) IV Continuous <Continuous>  dextrose 5%. 1000 milliLiter(s) (100 mL/Hr) IV Continuous <Continuous>  dextrose 50% Injectable 25 Gram(s) IV Push once  dextrose 50% Injectable 12.5 Gram(s) IV Push once  dextrose 50% Injectable 25 Gram(s) IV Push once  enoxaparin Injectable 40 milliGRAM(s) SubCutaneous every 24 hours  finasteride 5 milliGRAM(s) Oral daily  folic acid 1 milliGRAM(s) Oral daily  glucagon  Injectable 1 milliGRAM(s) IntraMuscular once  insulin glargine Injectable (LANTUS) 10 Unit(s) SubCutaneous at bedtime  insulin lispro (ADMELOG) corrective regimen sliding scale   SubCutaneous every 6 hours  levETIRAcetam 750 milliGRAM(s) Oral every 12 hours  sodium chloride 7% Inhalation 4 milliLiter(s) Inhalation every 8 hours  timolol 0.5% Solution 1 Drop(s) Both EYES two times a day  zinc sulfate 220 milliGRAM(s) Oral daily    MEDICATIONS  (PRN):  acetaminophen   Oral Liquid .. 650 milliGRAM(s) Oral every 6 hours PRN Mild Pain (1 - 3)  dextrose Oral Gel 15 Gram(s) Oral once PRN Blood Glucose LESS THAN 70 milliGRAM(s)/deciliter

## 2025-05-13 NOTE — PROGRESS NOTE ADULT - PROBLEM SELECTOR PROBLEM 3
Inpatient RN   Plan of Care Update  ________________________________________________    Patient Name:  Cytnhia Ledezma  YOB: 1983  MRN: [id number]  Admit Date:  9/10/2021      Assessment Date:  9/13/2021    Vital Signs stable, Sinus Rythym, Pt currently on room air Pt has been sleeping well with a Pain status of no pain and finding no nausea and no vomiting.    Pt orientated to person, place, time and situation with LOC of alert.    UOP adequate.    Pt has no requests at this time.   Will continue to monitor      Electronically signed by:  RAMU WORTHINGTON RN  09/13/21 05:27 EDT    Problem: Adult Inpatient Plan of Care  Goal: Plan of Care Review  Outcome: Ongoing, Progressing  Goal: Patient-Specific Goal (Individualized)  Outcome: Ongoing, Progressing  Goal: Absence of Hospital-Acquired Illness or Injury  Outcome: Ongoing, Progressing  Intervention: Identify and Manage Fall Risk  Recent Flowsheet Documentation  Taken 9/13/2021 0400 by Ramu Worthington, RN  Safety Promotion/Fall Prevention:   activity supervised   fall prevention program maintained   nonskid shoes/slippers when out of bed   room organization consistent   safety round/check completed  Taken 9/13/2021 0200 by Ramu Worthington RN  Safety Promotion/Fall Prevention:   activity supervised   assistive device/personal items within reach   clutter free environment maintained   fall prevention program maintained   nonskid shoes/slippers when out of bed   room organization consistent   safety round/check completed  Taken 9/13/2021 0000 by Ramu Worthington RN  Safety Promotion/Fall Prevention:   activity supervised   assistive device/personal items within reach   clutter free environment maintained   fall prevention program maintained   nonskid shoes/slippers when out of bed   room organization consistent   safety round/check completed  Taken 9/12/2021 2200 by Ramu Worthington RN  Safety Promotion/Fall Prevention:   activity supervised   assistive  device/personal items within reach   clutter free environment maintained   fall prevention program maintained   nonskid shoes/slippers when out of bed   room organization consistent   safety round/check completed  Taken 9/12/2021 2000 by Diamante Mckenna RN  Safety Promotion/Fall Prevention:   activity supervised   assistive device/personal items within reach   clutter free environment maintained   fall prevention program maintained   nonskid shoes/slippers when out of bed   room organization consistent   safety round/check completed  Intervention: Prevent Skin Injury  Recent Flowsheet Documentation  Taken 9/13/2021 0400 by Diamante Mckenna RN  Body Position: position changed independently  Skin Protection:   adhesive use limited   incontinence pads utilized   transparent dressing maintained   tubing/devices free from skin contact  Taken 9/13/2021 0200 by Diamante Mckenna RN  Body Position: position changed independently  Skin Protection:   adhesive use limited   incontinence pads utilized   transparent dressing maintained   tubing/devices free from skin contact  Taken 9/13/2021 0000 by Diamante Mckenna RN  Body Position: position changed independently  Skin Protection:   adhesive use limited   incontinence pads utilized   transparent dressing maintained   tubing/devices free from skin contact  Taken 9/12/2021 2200 by Diamante Mckenna RN  Body Position: position changed independently  Skin Protection:   adhesive use limited   incontinence pads utilized   transparent dressing maintained   tubing/devices free from skin contact  Taken 9/12/2021 2000 by Diamante Mckenna RN  Body Position: position changed independently  Goal: Optimal Comfort and Wellbeing  Outcome: Ongoing, Progressing  Intervention: Provide Person-Centered Care  Recent Flowsheet Documentation  Taken 9/12/2021 2000 by Diamante Mckenna RN  Trust Relationship/Rapport:   care explained   choices provided   emotional support provided   empathic listening provided   questions  answered   questions encouraged   reassurance provided   thoughts/feelings acknowledged  Goal: Readiness for Transition of Care  Outcome: Ongoing, Progressing     Problem: Hypertension Acute  Goal: Blood Pressure Within Desired Range  Outcome: Ongoing, Progressing  Intervention: Normalize Blood Pressure  Recent Flowsheet Documentation  Taken 9/13/2021 0400 by Diamante Mckenna, RN  Medication Review/Management: medications reviewed  Taken 9/13/2021 0200 by Diamante Mckenna, RN  Medication Review/Management: medications reviewed  Taken 9/13/2021 0000 by Diamante Mckenna, RN  Medication Review/Management: medications reviewed  Taken 9/12/2021 2200 by Diamante Mckenna, MY  Medication Review/Management: medications reviewed  Taken 9/12/2021 2000 by Diamante Mckenna, RN  Medication Review/Management: medications reviewed   Goal Outcome Evaluation:                  Diabetes type 2

## 2025-05-13 NOTE — DISCHARGE NOTE PROVIDER - NSDCMRMEDTOKEN_GEN_ALL_CORE_FT
acetaminophen 160 mg/5 mL oral suspension: 20.31 milliliter(s) orally every 8 hours  ascorbic acid 500 mg oral tablet: 1 tab(s) by PEG tube once a day  aspirin 81 mg oral tablet, chewable: 1 tab(s) orally once a day  atorvastatin 40 mg oral tablet: 1 tab(s) orally once a day (at bedtime)  cefTRIAXone 2 g/50 mL-iso-osmotic dextrose intravenous solution: 2 gram(s) intravenously once a day Until 5/10/25  cyanocobalamin: 1 tab(s) once a day  finasteride 5 mg oral tablet: 1 tab(s) orally once a day  folic acid 1 mg oral tablet: 1 tab(s) orally once a day  Home nebulizer machine, to use on home O2: Please dispense 1 nebulizer machine for patient home use on O2 machine per pts insurance  insulin glargine 100 units/mL subcutaneous solution: 9 unit(s) subcutaneous once a day (at bedtime)  ipratropium-albuterol 0.5 mg-2.5 mg/3 mL inhalation solution: 3 milliliter(s) inhaled every 12 hours  LevETIRAcetam: 750 milligram(s) by PEG tube every 12 hours  metroNIDAZOLE 500 mg oral tablet: 1 tab(s) by PEG tube every 12 hours Until 5/10/25  Multiple Vitamins oral tablet: 1 tab(s) by PEG tube once a day  Please dispense nebulizer machine compatible with home nasal cannula, per patients insurance. Thank you: Please dispense nebulizer machine compatible with home nasal cannula, per patients insurance. Thank you  sodium chloride 7% inhalation solution: 4 milliliter(s) inhaled every 12 hours  thiamine 100 mg oral capsule: 1 cap(s) orally once a day  timolol maleate 0.5% ophthalmic solution: 1 drop(s) in each affected eye 2 times a day   acetaminophen 160 mg/5 mL oral suspension: 20.31 milliliter(s) orally every 8 hours  ascorbic acid 500 mg oral tablet: 1 tab(s) by PEG tube once a day  aspirin 81 mg oral tablet, chewable: 1 tab(s) orally once a day  atorvastatin 40 mg oral tablet: 1 tab(s) orally once a day (at bedtime)  cefTRIAXone 2 g/50 mL-iso-osmotic dextrose intravenous solution: 2 gram(s) intravenously once a day Until 5/10/25  cyanocobalamin: 1 tab(s) by PEG tube once a day  enoxaparin: 40 milligram(s) intramuscular once a day  finasteride 5 mg oral tablet: 1 tab(s) orally once a day  folic acid 1 mg oral tablet: 1 tab(s) by PEG tube once a day  Home nebulizer machine, to use on home O2: Please dispense 1 nebulizer machine for patient home use on O2 machine per pts insurance  insulin glargine 100 units/mL subcutaneous solution: 9 unit(s) subcutaneous once a day (at bedtime)  ipratropium-albuterol 0.5 mg-2.5 mg/3 mL inhalation solution: 3 milliliter(s) inhaled every 12 hours  LevETIRAcetam: 750 milligram(s) by PEG tube every 12 hours  loperamide 2 mg oral capsule: 1 cap(s) orally every 12 hours As needed Diarrhea  metroNIDAZOLE 500 mg oral tablet: 1 tab(s) by PEG tube every 12 hours Until 5/10/25  Multiple Vitamins oral tablet: 1 tab(s) by PEG tube once a day  sodium chloride 7% inhalation solution: 4 milliliter(s) inhaled every 12 hours  thiamine 100 mg oral capsule: 1 cap(s) by PEG tube once a day  timolol maleate 0.5% ophthalmic solution: 1 drop(s) in each affected eye 2 times a day   acetaminophen 160 mg/5 mL oral suspension: 20.31 milliliter(s) orally every 8 hours  ascorbic acid 500 mg oral tablet: 1 tab(s) by PEG tube once a day  aspirin 81 mg oral tablet, chewable: 1 tab(s) orally once a day  atorvastatin 40 mg oral tablet: 1 tab(s) orally once a day (at bedtime)  cyanocobalamin: 1 tab(s) by PEG tube once a day  enoxaparin: 40 milligram(s) intramuscular once a day  finasteride 5 mg oral tablet: 1 tab(s) orally once a day  folic acid 1 mg oral tablet: 1 tab(s) by PEG tube once a day  Home nebulizer machine, to use on home O2: Please dispense 1 nebulizer machine for patient home use on O2 machine per pts insurance  insulin glargine 100 units/mL subcutaneous solution: 10 unit(s) subcutaneous once a day (at bedtime)  ipratropium-albuterol 0.5 mg-2.5 mg/3 mL inhalation solution: 3 milliliter(s) inhaled every 12 hours  LevETIRAcetam: 750 milligram(s) by PEG tube every 12 hours  loperamide 2 mg oral capsule: 1 cap(s) orally every 12 hours As needed Diarrhea  Multiple Vitamins oral tablet: 1 tab(s) by PEG tube once a day  sodium chloride 7% inhalation solution: 4 milliliter(s) inhaled every 12 hours  thiamine 100 mg oral capsule: 1 cap(s) by PEG tube once a day  timolol maleate 0.5% ophthalmic solution: 1 drop(s) in each affected eye 2 times a day

## 2025-05-13 NOTE — DISCHARGE NOTE PROVIDER - INSTRUCTIONS
Continue NPO with Tube Feed via PEG: Glucerna 1.5 at 60mL/hr x18hr (11AM-5AM) + Redd x2/day to provide 1080mL total volume, 1620kcal (27kcal/kg IBW 59.1kg), 89g protein (1.5g/kg IBW 59.1kg), and 820mL free water. Hold if patient has >3 bowel movements a day and restart as appropriate.

## 2025-05-13 NOTE — PROGRESS NOTE ADULT - ATTENDING COMMENTS
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  (12/10-12/12/24) for wound care and further nutrition evaluation and management. GI consulted and PEG tube study showed PEG is functioning and no need to change to a new one. WOCN consult appreciated, given Sliver nitrate and aquagel Ag dressing daily to PEG site. Dietitian consult appreciated, TF changed to Glucerna 1.2 @ 83ml/hr from 5pm to 11am x 18hr, total 6 cans per day. Pt recently admitted to ICU for aspiration PNA and had indwelling hudson removed and discharged from [facility] ( 2/25/25) and sent to [facility] and subsequently sent home on home O2 per wife, admitted [facility] ( 3/26-4/1)  for severe sepsis w/ lactic acidosis 2/2 recurrent multifocal HAP likely aspiration/gram negative organism and and last admitted [facility] ( 4/22-5/1)  infected sacral decubitus (POA)--Strep bacteremia, sacral ulcer with abscess sp bedside debridement by surgery 4/27- bleeding required electrocautery , given one unit of prbc before debridement - now hb at 7.7 --> given a unit 4/28- hb above 9 - wound vac placed by team 4 surgery on 4/29, discharged on 5/1 to [facility] for 2 weeks of antibiotics with ceftriaxone/flagyl ( 4/27- 5/10 ). Pt now presented to [facility] ( 5/9) for +quantgold test resulting in NH with otherwise improving PNA from recent admission and no worsening systemic symptoms or s/s sepsis, now being admitted for TB r/o with AFB confirmation to return to nursing facility.     pt known to me from prior admission  pt is mostly non-verbal, looks comfortable, tolerating tube feeding  diana wife- sputum sample x 2 received, need one more. .     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   - Surgery consult appreciated, no need for debridement of sacral wound, wound vac was placed by surgery during last admission - surgery follow up determine no further debridement, no wound vac  = would care   - added MVI, VIt C 500mg daily and ZnSo4 220mg po daily     # Dysphagia  # Hyponatremia -mild  - c/w TF//glucerna 5 cans a day and free water via PEG.    # T2DM- with hyperglycemia   - lantus to 10 Units Q hs - continue with FS and SS goal glucose 100-180  - hypophos -to replace.     # DVT ppx     # FULL CODE- GOC as discussed wife at bedside .Both wife and son wants full code     # Disposition: medically stable, awaiting AFBx3 then return to [facility]  diana RN and medical team Dr. Tracey Ahuja.   questions from wife answered, emotional support provided. explained that sacral ulcer are very difficult, he is at risk for recurrent aspiration as well.   wife request to have HHA 12 hours 2 shifts.

## 2025-05-13 NOTE — PROGRESS NOTE ADULT - SUBJECTIVE AND OBJECTIVE BOX
INTERVAL/OVERNIGHT EVENTS: None    SUBJECTIVE:  Patient seen and examined at bedside, comfortable, NAD. Appears comfortable. Per patient's wife he has collected 1 sample but often not able to cough.     Vital Signs Last 12 Hrs  T(F): 99.4 (05-13-25 @ 05:25), Max: 99.4 (05-13-25 @ 05:25)  HR: 118 (05-13-25 @ 05:25) (102 - 118)  BP: 115/70 (05-13-25 @ 05:25) (109/72 - 115/70)  BP(mean): 85 (05-13-25 @ 05:25) (85 - 85)  RR: 18 (05-13-25 @ 05:25) (16 - 18)  SpO2: 96% (05-13-25 @ 05:25) (96% - 99%)  I&O's Summary    12 May 2025 07:01  -  13 May 2025 07:00  --------------------------------------------------------  IN: 480 mL / OUT: 300 mL / NET: 180 mL        PHYSICAL EXAM:  Constitutional - NAD, Comfortable-appearing, nonverbal, intermittently with wet cough   HEENT - NCAT, EOMI, NC in place  Chest - Breathing comfortably on 2L NC, CTAB  Cardio - Warm and well perfused, RRR  Abdomen - Soft, NTND, +BS. PEG site without surrounding erythema, induration, or drainage.  Extremities - No peripheral edema, No calf tenderness   Neurologic - Awake and alert. Makes eye contact to voice. Not following commands.  Psychiatric - Mood stable, Affect WNL      LABS:                        8.9    7.96  )-----------( 253      ( 13 May 2025 06:50 )             27.8     05-13    135  |  101  |  12  ----------------------------<  145[H]  3.8   |  27  |  0.27[L]    Ca    8.3[L]      13 May 2025 06:50  Phos  2.4     05-13  Mg     1.7     05-13        Urinalysis Basic - ( 13 May 2025 06:50 )    Color: x / Appearance: x / SG: x / pH: x  Gluc: 145 mg/dL / Ketone: x  / Bili: x / Urobili: x   Blood: x / Protein: x / Nitrite: x   Leuk Esterase: x / RBC: x / WBC x   Sq Epi: x / Non Sq Epi: x / Bacteria: x          RADIOLOGY & ADDITIONAL TESTS:    MEDICATIONS  (STANDING):  albuterol/ipratropium for Nebulization 3 milliLiter(s) Nebulizer every 6 hours  ascorbic acid 500 milliGRAM(s) Oral daily  aspirin  chewable 81 milliGRAM(s) Oral daily  atorvastatin 40 milliGRAM(s) Oral at bedtime  dextrose 5%. 1000 milliLiter(s) (50 mL/Hr) IV Continuous <Continuous>  dextrose 5%. 1000 milliLiter(s) (100 mL/Hr) IV Continuous <Continuous>  dextrose 50% Injectable 25 Gram(s) IV Push once  dextrose 50% Injectable 12.5 Gram(s) IV Push once  dextrose 50% Injectable 25 Gram(s) IV Push once  enoxaparin Injectable 40 milliGRAM(s) SubCutaneous every 24 hours  finasteride 5 milliGRAM(s) Oral daily  folic acid 1 milliGRAM(s) Oral daily  glucagon  Injectable 1 milliGRAM(s) IntraMuscular once  insulin glargine Injectable (LANTUS) 10 Unit(s) SubCutaneous at bedtime  insulin lispro (ADMELOG) corrective regimen sliding scale   SubCutaneous every 6 hours  levETIRAcetam 750 milliGRAM(s) Oral every 12 hours  sodium chloride 7% Inhalation 4 milliLiter(s) Inhalation every 8 hours  sodium phosphate 30 milliMole(s)/500 mL IVPB 30 milliMole(s) IV Intermittent once  timolol 0.5% Solution 1 Drop(s) Both EYES two times a day  zinc sulfate 220 milliGRAM(s) Oral daily    MEDICATIONS  (PRN):  acetaminophen   Oral Liquid .. 650 milliGRAM(s) Oral every 6 hours PRN Mild Pain (1 - 3)  dextrose Oral Gel 15 Gram(s) Oral once PRN Blood Glucose LESS THAN 70 milliGRAM(s)/deciliter

## 2025-05-13 NOTE — DISCHARGE NOTE PROVIDER - REASON FOR ADMISSION
+Quantiferon Test Calcipotriene Counseling:  I discussed with the patient the risks of calcipotriene including but not limited to erythema, scaling, itching, and irritation.

## 2025-05-13 NOTE — DISCHARGE NOTE PROVIDER - HOSPITAL COURSE
#Discharge: do not delete    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       Problem/Plan - 1:  ·  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   ·  Plan: Patient got Quantiferon Gold test for unclear reasons. 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   ·  Plan: 4/22-5/1 admission for severe sepsis revealing BCx 4/22 - +Bacteroides fragilis, +Strep species with subsequent bcx clearance, in addition to RLL PNA suspected to aspiration PNA at that time; CTX 1g (4/29 -5/10) and Flagyl 500mg PO  (4/29 -5/10 ) recommended by ID service at that time; discharged to NH with PICC for completion of abx regimen (no reported missed doses); pt currently afebrile, breathing comfortably saturating 98% on 2L w/o leukocytosis and CXR with improving opacities   PICC line removed on   -Repeat bcx if reveals evidence of sepsis   -Completed abx course pt was discharged on: CTX 1g IV QD (4/29 -5/10) and Flagyl 500mg PO Q12H (4/29 -5/10); PICC line removed on 5/12.     Problem/Plan - 3:  ·  Problem: Diabetes type 2.   ·  Plan: Home meds: metformin 500 mg 3 times a day, januvia 50 mg QD; wife does not give medications as prescribed as she was told by PCP that he does not need medication anymore given age and last A1c ~7.     - Lantus 9U qhs  - ctm FS q6 abdi since NPO  - Moderate ISS q6.     Problem/Plan - 4:  ·  Problem: History of CVA in adulthood.   ·  Plan: Pt with a hx of stroke 2004 with residual left-sided weakness. S/p PEG placement 05/2024   MRI brain on 4/28 with R internal capsule infarct   Head CT 5/5: subacute infarct centered in the genu and posterior limb of the right internal capsule is stable.  A chronic transcortical infarction in the left precentral gyrus and a small chronic infarct in the right cerebellar hemisphere are stable.  Home meds: ASA 81mg, Atorvastatin 40mg, keppra 750mg (seizure ppx), per wife pt never had seizures.    - C/W home meds  - Tube feeds resumed, parameters per nutrition recs. Nutrition consulted.     Problem/Plan - 5:  ·  Problem: History of seizure.   ·  Plan: As above, on keppra 750mg (seizure ppx after CVA event), per wife pt never had seizures.    -Continue home med as above.     Problem/Plan - 6:  ·  Problem: Sacral wound.   ·  Plan: Present on admission. Last admission requiring debridement with surgery however no changes in exam since last admission.  Patient is entirely bedbound since stroke    - Wet to dry dressing  - surgery consulted who stated no further debridement necessary, no wound vac necessary  - Frequent turning and nursing care.     Problem/Plan - 7:  ·  Problem: Glaucoma.   ·  Plan: Home med: timolol 0.5 mg 1 drop BID    - c/w home med.    Patient was discharged to: (home/[facility]/acute rehab/hospice, etc, and with what services – home health PT/RN? Home O2?)    New medications: --  Changes to old medications: --  Medications that were stopped: --    Items to follow up as outpatient:     Physical exam at the time of discharge: AAOx3; NAD; RRR, no murmurs; lungs CLAB; abd NT/ND; extremities wwp, no peripheral edema.     Patient was medically optimized, stable and ready for discharge. Plan of care and return precautions were discussed with the patient who verbally stated understanding. On the day of discharge, the patient was seen and examined. Symptoms improved. Vital signs are stable. Labs and imaging reviewed. Patient is medically optimized and hemodynamically stable. Return precautions discussed, medication teach back done, and importance of physician follow up emphasized. The patient verbalized understanding. #Discharge: do not delete    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      Disposition: [facility] vs home with 12 hours x 2 shifts requested by wife- she is elderly and could not take care of him - pt need fulls assist to turn and change position frequently, and care for bowel function to keep sacral area clean to prevent recurrent infection to sacral wound.   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   ·  Plan: Patient got Quantiferon Gold test for unclear reasons. 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   ·  Plan: 4/22-5/1 admission for severe sepsis revealing BCx 4/22 - +Bacteroides fragilis, +Strep species with subsequent bcx clearance, in addition to RLL PNA suspected to aspiration PNA at that time; CTX 1g (4/29 -5/10) and Flagyl 500mg PO  (4/29 -5/10 ) recommended by ID service at that time; discharged to NH with PICC for completion of abx regimen (no reported missed doses); pt currently afebrile, breathing comfortably saturating 98% on 2L w/o leukocytosis and CXR with improving opacities   PICC line removed on   -Repeat bcx if reveals evidence of sepsis   -Completed abx course pt was discharged on: CTX 1g IV QD (4/29 -5/10) and Flagyl 500mg PO Q12H (4/29 -5/10); PICC line removed on 5/12.     Problem/Plan - 3:  ·  Problem: Diabetes type 2.   ·  Plan: Home meds: metformin 500 mg 3 times a day, januvia 50 mg QD; wife does not give medications as prescribed as she was told by PCP that he does not need medication anymore given age and last A1c ~7.     - Lantus 9U qhs  - ctm FS q6 abdi since NPO  - Moderate ISS q6.     Problem/Plan - 4:  ·  Problem: History of CVA in adulthood.   ·  Plan: Pt with a hx of stroke 2004 with residual left-sided weakness. S/p PEG placement 05/2024   MRI brain on 4/28 with R internal capsule infarct   Head CT 5/5: subacute infarct centered in the genu and posterior limb of the right internal capsule is stable.  A chronic transcortical infarction in the left precentral gyrus and a small chronic infarct in the right cerebellar hemisphere are stable.  Home meds: ASA 81mg, Atorvastatin 40mg, keppra 750mg (seizure ppx), per wife pt never had seizures.    - C/W home meds  - Tube feeds resumed, parameters per nutrition recs. Nutrition consulted.     Problem/Plan - 5:  ·  Problem: History of seizure.   ·  Plan: As above, on keppra 750mg (seizure ppx after CVA event), per wife pt never had seizures.    -Continue home med as above.     Problem/Plan - 6:  ·  Problem: Sacral wound.   ·  Plan: Present on admission. Last admission requiring debridement with surgery however no changes in exam since last admission.  Patient is entirely bedbound since stroke    - Wet to dry dressing  - surgery consulted who stated no further debridement necessary, no wound vac necessary  - Frequent turning and nursing care.     Problem/Plan - 7:  ·  Problem: Glaucoma.   ·  Plan: Home med: timolol 0.5 mg 1 drop BID    - c/w home med.    Patient was discharged to: (home/[facility]/acute rehab/hospice, etc, and with what services – home health PT/RN? Home O2?)    New medications: --  Changes to old medications: --  Medications that were stopped: --    Items to follow up as outpatient:     Physical exam at the time of discharge: AAOx3; NAD; RRR, no murmurs; lungs CLAB; abd NT/ND; extremities wwp, no peripheral edema.     Patient was medically optimized, stable and ready for discharge. Plan of care and return precautions were discussed with the patient who verbally stated understanding. On the day of discharge, the patient was seen and examined. Symptoms improved. Vital signs are stable. Labs and imaging reviewed. Patient is medically optimized and hemodynamically stable. Return precautions discussed, medication teach back done, and importance of physician follow up emphasized. The patient verbalized understanding.   #Discharge: do not delete    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      Disposition: [facility] vs home with 12 hours x 2 shifts requested by wife- she is elderly and could not take care of him - pt need fulls assist to turn and change position frequently, and care for bowel function to keep sacral area clean to prevent recurrent infection to sacral wound.   Patient would benefit from increased HHA hours; would benefit from 12 hours x 2 shift ( to have 24 hours care )  Wound care should be done daily.     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   ·  Plan: Patient got Quantiferon Gold test for unclear reasons. 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   ·  Plan: 4/22-5/1 admission for severe sepsis revealing BCx 4/22 - +Bacteroides fragilis, +Strep species with subsequent bcx clearance, in addition to RLL PNA suspected to aspiration PNA at that time; CTX 1g (4/29 -5/10) and Flagyl 500mg PO  (4/29 -5/10 ) recommended by ID service at that time; discharged to NH with PICC for completion of abx regimen (no reported missed doses); pt currently afebrile, breathing comfortably saturating 98% on 2L w/o leukocytosis and CXR with improving opacities   PICC line removed on   -Repeat bcx if reveals evidence of sepsis   -Completed abx course pt was discharged on: CTX 1g IV QD (4/29 -5/10) and Flagyl 500mg PO Q12H (4/29 -5/10); PICC line removed on 5/12.     Problem/Plan - 3:  ·  Problem: Diabetes type 2.   ·  Plan: Home meds: metformin 500 mg 3 times a day, januvia 50 mg QD; wife does not give medications as prescribed as she was told by PCP that he does not need medication anymore given age and last A1c ~7.     - Lantus 9U qhs  - ctm FS q6 abdi since NPO  - Moderate ISS q6.     Problem/Plan - 4:  ·  Problem: History of CVA in adulthood.   ·  Plan: Pt with a hx of stroke 2004 with residual left-sided weakness. S/p PEG placement 05/2024   MRI brain on 4/28 with R internal capsule infarct   Head CT 5/5: subacute infarct centered in the genu and posterior limb of the right internal capsule is stable.  A chronic transcortical infarction in the left precentral gyrus and a small chronic infarct in the right cerebellar hemisphere are stable.  Home meds: ASA 81mg, Atorvastatin 40mg, keppra 750mg (seizure ppx), per wife pt never had seizures.    - C/W home meds  - Tube feeds resumed, parameters per nutrition recs. Nutrition consulted.     Problem/Plan - 5:  ·  Problem: History of seizure.   ·  Plan: As above, on keppra 750mg (seizure ppx after CVA event), per wife pt never had seizures.    -Continue home med as above.     Problem/Plan - 6:  ·  Problem: Sacral wound.   ·  Plan: Present on admission. Last admission requiring debridement with surgery however no changes in exam since last admission.  Patient is entirely bedbound since stroke    - Wet to dry dressing  - surgery consulted who stated no further debridement necessary, no wound vac necessary  - Frequent turning and nursing care.     Problem/Plan - 7:  ·  Problem: Glaucoma.   ·  Plan: Home med: timolol 0.5 mg 1 drop BID    - c/w home med.    Patient was discharged to: (home/[facility]/acute rehab/hospice, etc, and with what services – home health PT/RN? Home O2?)    New medications: --  Changes to old medications: --  Medications that were stopped: --    Items to follow up as outpatient:     Physical exam at the time of discharge: AAOx3; NAD; RRR, no murmurs; lungs CLAB; abd NT/ND; extremities wwp, no peripheral edema.     Patient was medically optimized, stable and ready for discharge. Plan of care and return precautions were discussed with the patient who verbally stated understanding. On the day of discharge, the patient was seen and examined. Symptoms improved. Vital signs are stable. Labs and imaging reviewed. Patient is medically optimized and hemodynamically stable. Return precautions discussed, medication teach back done, and importance of physician follow up emphasized. The patient verbalized understanding.   #Discharge: do not delete    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      Disposition: [facility] vs home with 12 hours x 2 shifts requested by wife- she is elderly and could not take care of him - pt need fulls assist to turn and change position frequently, and care for bowel function to keep sacral area clean to prevent recurrent infection to sacral wound.   Patient would benefit from increased HHA hours; would benefit from 12 hours x 2 shift ( to have 24 hours care )  Wound care should be done daily.    # Positive QuantiFERON-TB Gold test.   Patient got Quantiferon Gold test for unclear reasons. 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   4/22-5/1 admission for severe sepsis revealing BCx 4/22 - +Bacteroides fragilis, +Strep species with subsequent bcx clearance, in addition to RLL PNA suspected to aspiration PNA at that time; CTX 1g (4/29 -5/10) and Flagyl 500mg PO  (4/29 -5/10 ) recommended by ID service at that time; discharged to NH with PICC for completion of abx regimen (no reported missed doses); pt currently afebrile, breathing comfortably saturating 98% on 2L w/o leukocytosis and CXR with improving opacities   PICC line removed on   -Repeat bcx if reveals evidence of sepsis   -Completed abx course pt was discharged on: CTX 1g IV QD (4/29 -5/10) and Flagyl 500mg PO Q12H (4/29 -5/10); PICC line removed on 5/12.    #Diabetes type 2.   Home meds: metformin 500 mg 3 times a day, januvia 50 mg QD; wife does not give medications as prescribed as she was told by PCP that he does not need medication anymore given age and last A1c ~7.     - Lantus 9U qhs  - ctm FS q6 abdi since NPO  - Moderate ISS q6.    # History of CVA in adulthood.   Pt with a hx of stroke 2004 with residual left-sided weakness. S/p PEG placement 05/2024   MRI brain on 4/28 with R internal capsule infarct   Head CT 5/5: subacute infarct centered in the genu and posterior limb of the right internal capsule is stable.  A chronic transcortical infarction in the left precentral gyrus and a small chronic infarct in the right cerebellar hemisphere are stable.  Home meds: ASA 81mg, Atorvastatin 40mg, keppra 750mg (seizure ppx), per wife pt never had seizures.    - C/W home meds  - Tube feeds resumed, parameters per nutrition recs. Nutrition consulted.    #History of seizure.   As above, on keppra 750mg (seizure ppx after CVA event), per wife pt never had seizures.    -Continue home med as above.    #Sacral wound.   Present on admission. Last admission requiring debridement with surgery however no changes in exam since last admission.  Patient is entirely bedbound since stroke    - Wet to dry dressing  - surgery consulted who stated no further debridement necessary, no wound vac necessary  - Frequent turning and nursing care.     # Glaucoma.   ·  Plan: Home med: timolol 0.5 mg 1 drop BID    - c/w home med.    Patient was discharged to: (home/[facility]/acute rehab/hospice, etc, and with what services – home health PT/RN? Home O2?)    New medications: --  Changes to old medications: --  Medications that were stopped: --    Items to follow up as outpatient:     Physical exam at the time of discharge: AAOx3; NAD; RRR, no murmurs; lungs CLAB; abd NT/ND; extremities wwp, no peripheral edema.     Patient was medically optimized, stable and ready for discharge. Plan of care and return precautions were discussed with the patient who verbally stated understanding. On the day of discharge, the patient was seen and examined. Symptoms improved. Vital signs are stable. Labs and imaging reviewed. Patient is medically optimized and hemodynamically stable. Return precautions discussed, medication teach back done, and importance of physician follow up emphasized. The patient verbalized understanding.   #Discharge: do not delete    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      Disposition: [facility] requested by wife- she is elderly and could not take care of him - pt need fulls assist to turn and change position frequently, and care for bowel function to keep sacral area clean to prevent recurrent infection to sacral wound.   Patient would benefit from increased HHA hours; would benefit from 12 hours x 2 shift ( to have 24 hours care )  Wound care should be done daily.    # Positive QuantiFERON-TB Gold test.   Patient got Quantiferon Gold test for unclear reasons. 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   4/22-5/1 admission for severe sepsis revealing BCx 4/22 - +Bacteroides fragilis, +Strep species with subsequent bcx clearance, in addition to RLL PNA suspected to aspiration PNA at that time; CTX 1g (4/29 -5/10) and Flagyl 500mg PO  (4/29 -5/10 ) recommended by ID service at that time; discharged to NH with PICC for completion of abx regimen (no reported missed doses); pt currently afebrile, breathing comfortably saturating 98% on 2L w/o leukocytosis and CXR with improving opacities   PICC line removed on . Completed abx course pt was discharged on: CTX 1g IV QD (4/29 -5/10) and Flagyl 500mg PO Q12H (4/29 -5/10); PICC line removed on 5/12.    - monitor off abx     #Diabetes type 2.   Home meds: metformin 500 mg 3 times a day, januvia 50 mg QD; wife does not give medications as prescribed as she was told by PCP that he does not need medication anymore given age and last A1c ~7.     - Lantus 10U qhs  - ctm FS q6 abdi since NPO  - Moderate ISS q6.    # History of CVA in adulthood.   Pt with a hx of stroke 2004 with residual left-sided weakness. S/p PEG placement 05/2024   MRI brain on 4/28 with R internal capsule infarct   Head CT 5/5: subacute infarct centered in the genu and posterior limb of the right internal capsule is stable.  A chronic transcortical infarction in the left precentral gyrus and a small chronic infarct in the right cerebellar hemisphere are stable.  Home meds: ASA 81mg, Atorvastatin 40mg, keppra 750mg (seizure ppx), per wife pt never had seizures.    - C/W home meds  - Tube feeds resumed, parameters per nutrition recs. Nutrition consulted.    #History of seizure.   As above, on keppra 750mg (seizure ppx after CVA event), per wife pt never had seizures.    -Continue home med as above.    #Sacral wound.   Present on admission. Last admission requiring debridement with surgery however no changes in exam since last admission.  Patient is entirely bedbound since stroke    - Wet to dry dressing  - surgery consulted who stated no further debridement necessary, no wound vac necessary  - Frequent turning and nursing care.     # Glaucoma.   ·  Plan: Home med: timolol 0.5 mg 1 drop BID    - c/w home med.    #Diarrhea   - c/w loperamide PRN   - hold tube feeds when pt experiencing diarrhea     Patient was discharged to: [facility]    New medications: none   Changes to old medications: none  Medications that were stopped: none     Items to follow up as outpatient: ID outpatient appointment     Physical exam at the time of discharge: AAOx3; NAD; RRR, no murmurs; lungs CLAB; abd NT/ND; extremities wwp, no peripheral edema.     Patient was medically optimized, stable and ready for discharge. Plan of care and return precautions were discussed with the patient who verbally stated understanding. On the day of discharge, the patient was seen and examined. Symptoms improved. Vital signs are stable. Labs and imaging reviewed. Patient is medically optimized and hemodynamically stable. Return precautions discussed, medication teach back done, and importance of physician follow up emphasized. The patient verbalized understanding.   #Discharge: do not delete    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      Disposition: [facility] requested by wife- she is elderly and could not take care of him - pt need fulls assist to turn and change position frequently, and care for bowel function to keep sacral area clean to prevent recurrent infection to sacral wound.   Patient would benefit from increased HHA hours; would benefit from 12 hours x 2 shift ( to have 24 hours care )  Wound care should be done daily.    # Positive QuantiFERON-TB Gold test.   Patient got Quantiferon Gold test for unclear reasons. 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   4/22-5/1 admission for severe sepsis revealing BCx 4/22 - +Bacteroides fragilis, +Strep species with subsequent bcx clearance, in addition to RLL PNA suspected to aspiration PNA at that time; CTX 1g (4/29 -5/10) and Flagyl 500mg PO  (4/29 -5/10 ) recommended by ID service at that time; discharged to NH with PICC for completion of abx regimen (no reported missed doses); pt currently afebrile, breathing comfortably saturating 98% on 2L w/o leukocytosis and CXR with improving opacities   PICC line removed on . Completed abx course pt was discharged on: CTX 1g IV QD (4/29 -5/10) and Flagyl 500mg PO Q12H (4/29 -5/10); PICC line removed on 5/12.    - monitor off abx     #Diabetes type 2.   Home meds: metformin 500 mg 3 times a day, januvia 50 mg QD; wife does not give medications as prescribed as she was told by PCP that he does not need medication anymore given age and last A1c ~7.     - Lantus 10U qhs  - ctm FS q6 abdi since NPO  - Moderate ISS q6.    # History of CVA in adulthood.   Pt with a hx of stroke 2004 with residual left-sided weakness. S/p PEG placement 05/2024   MRI brain on 4/28 with R internal capsule infarct   Head CT 5/5: subacute infarct centered in the genu and posterior limb of the right internal capsule is stable.  A chronic transcortical infarction in the left precentral gyrus and a small chronic infarct in the right cerebellar hemisphere are stable.  Home meds: ASA 81mg, Atorvastatin 40mg, keppra 750mg (seizure ppx), per wife pt never had seizures.    - C/W home meds  - Tube feeds resumed, parameters per nutrition recs. Nutrition consulted.    #History of seizure.   As above, on keppra 750mg (seizure ppx after CVA event), per wife pt never had seizures.    -Continue home med as above.    #Sacral wound.   Present on admission. Last admission requiring debridement with surgery however no changes in exam since last admission.  Patient is entirely bedbound since stroke    - Wet to dry dressing  - surgery consulted who stated no further debridement necessary, no wound vac necessary  - Frequent turning and nursing care.     # Glaucoma.   ·  Plan: Home med: timolol 0.5 mg 1 drop BID    - c/w home med.    #Diarrhea   - c/w loperamide PRN   - hold tube feeds when pt experiencing diarrhea     Patient was discharged to: [facility]    New medications: none   Changes to old medications: none  Medications that were stopped: none     Items to follow up as outpatient: ID outpatient appointment     Physical exam at the time of discharge: AAOx3; NAD; RRR, no murmurs; lungs CLAB; abd NT/ND; extremities wwp, no peripheral edema.     Patient was medically optimized, stable and ready for discharge. Plan of care and return precautions were discussed with the patient who verbally stated understanding. On the day of discharge, the patient was seen and examined. Symptoms improved. Vital signs are stable. Labs and imaging reviewed. Patient is medically optimized and hemodynamically stable. Return precautions discussed, medication teach back done, and importance of physician follow up emphasized. The patient verbalized understanding.

## 2025-05-13 NOTE — PHYSICAL THERAPY INITIAL EVALUATION ADULT - IMPAIRMENTS FOUND, PT EVAL
aerobic capacity/endurance/fine motor/gait, locomotion, and balance/gross motor/muscle strength/poor safety awareness/posture/ROM/tone

## 2025-05-13 NOTE — PHYSICAL THERAPY INITIAL EVALUATION ADULT - GENERAL OBSERVATIONS, REHAB EVAL
Pt received semi supine in bed with +PEG(hold), +IV, +[place], +NC~2L, NAD, spouse present. Pt left as found, NAD, call bell in reach, tube and line intact, ROHITH Steele made aware.

## 2025-05-13 NOTE — PHYSICAL THERAPY INITIAL EVALUATION ADULT - ADDITIONAL COMMENTS
Pt lives with spouse in an apt with elevator. As per pt's spouse, pt was wheelchair and bed bound, Pt has 24/7 help. Prior to admission, pt came from rehab. Pt's spouse unsure if pt was getting therapy at the rehab

## 2025-05-13 NOTE — DISCHARGE NOTE PROVIDER - NSDCFUSCHEDAPPT_GEN_ALL_CORE_FT
Sherry Andrade  [facility]  [address]  Scheduled Appointment: 05/16/2025     Sherry Andrade  [facility]  [address]  Scheduled Appointment: 06/05/2025

## 2025-05-13 NOTE — CONSULT NOTE ADULT - ATTENDING COMMENTS
89M non-verbal (understands Cantonese), wheelchair/bed bound w pmhx b/l  hearing impairment, FTT, dysphagia s/p PEG,, Depression, glaucoma, HLD, DM,  Thalassemia minor/JUSTYNA, BPH/hx indwelling hudson 2/2 chronic urinary retention , HFpEF, aortic root aneurysm, hx CVA with R sided residual weakness in 2004 and new R internal capsule CVA with Sz like activity on vEEG (04/24), sent from NH to  [facility] ED after he was found to be Quantiferon positive. 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   Pt was recently admitted 4/22-5/1 for severe sepsis iso infected decubitus ulcer and RLL PNA. Found to have Strep constellatus and B frag bacteremia, 2/2 presacral abscess/sacral OM rx'ed with IV CTX and HAJI x 2 wks 4/27 - 5/10/25, EOT 5/10/25.   Born in [place], received BCG vaccine there. Wife was not at bedside when I saw him.   ID consulted after pt had a temp 100.8F.  - Check BCx  - Monitor off Abx  - Contingency Abx plan as above  - Airborne isolation  - Check sputum AFB x 3, MTB PCR  - If neg, can DC isolation  ID Team 1 will follow.

## 2025-05-13 NOTE — PROGRESS NOTE ADULT - PROBLEM SELECTOR PLAN 1
Recent admission 4/22-5/1 for which patient was admitted for severe sepsis and found to have RLL aspiration PNA and bacteroides+ and strep+ species with subsequent negative bcx; discharged to NH with PICC to complete abx (CTX 1 g QD and PO HAJI, end date 5/10/25); now returning d/t +quantiferon test; CXR with improving opacities and no cavitations or c/f TB on previous imaging (CT chest 04/2025), no fever/weight loss or systemic symptoms except for intermittent cough with whitish sputum which pt had on prior admission (recovering from CAP); WBC 9 w/o bandemia; pt originally from [place]; ID called in ED and 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

## 2025-05-13 NOTE — PHYSICAL THERAPY INITIAL EVALUATION ADULT - PERTINENT HX OF CURRENT PROBLEM, REHAB EVAL
Pt is an 89 yo male non-verbal ( understands Cantonese), wheelchair/bed bound, R hand dominant, b/l  hearing impairment ( b/l hearing aids) with PMHx of Low BMI( 18.2-> 20.6)/severe protein calorie malnutrition, Depression, glaucoma, HLD, uncontrolled T2DM( A1C 8.1%), Thalassemia minor/JUSTYNA, BPH/hx indwelling hudson 2/2 chronic urinary retention , HFpEF (LVEF 56%, grade I diastolic dysfunction), aortic root aneurysm (4.6 cm), hx CVA with right sided weakness in 2004 and 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

## 2025-05-13 NOTE — CONSULT NOTE ADULT - ASSESSMENT
10yo Cantonese speaking M h/o multiple CVA c/b L sided weakness now s/p PEG, bedbound, stage III sacral ulcer, DM, BPH, glaucoma, b/l hearing impairment, aortic root aneurysm p/w fever, AMS, productive cough x 3-4 days, found to have Strep constellatus and B. gragilis bacteremia 2/2 presacral abscess (7x3cm) and and sacral OM/cellulitis and recurrent aspiration PNA.  Unfortunately patient is bedbound, non-verbal, with progressive decline with frequent hospital admission.  This infection is incurable - he will remain at high risk of recurrent aspiration PNA, will continue to have sacral OM/infection despite abx course, and wants him to go to [facility] since she can't take care of him at home.  Dr. Dillon and I think the best option for him is home hospice; however wife is not ready and wants aggressive treatment and hoping that he will recover.  Patient is s/p wound debridement yesterday.  Will do 2 weeks of IV abx after the surgical debridement to treat sacral cellulitis and Strep bacteremia. Bacterial will eventually develops resistance and no clinical benefits shown with longer course of abx in this setting. I had extensive discussion with patient's wife, that despite abx therapy and wound debridement, patient remains at very high risk of recurrent decubitus ulcer infection with further worsening, as well as recurrent aspiration PNA.  Patient is at very high risk of recurrent admission and overall steady decline.     89M, nonverbal (understands cantonese) w PMHx multiple CVA c/b L sided weakness now bedbound, s/p PEG, stage III sacral ulcer, DM, BPH, glaucoma, b/l hearing impairment, aortic root aneurysm, mandibular mass, recent admission for Strep constellatus and B. gragilis bacteremia 2/2 presacral absces, sacral OM/cellulitis, & hx recurrent aspiration PNA, admitted from [facility] due to positive QuantiFeron test and concomitant pna on cxr (was on iv abx at the time, course completed on current admission) for which pt could not be readmitted to [facility] without 3 negative afb tests.  ID consulted 5/13 1x fever to 100.8 (bcx collected), w cbc differential not concerning for new infection - 1x temperature possibly secondary to aspiration pneumonitis.    - continue to monitor off of antibiotics  - please obtain urinalysis & ucx  - follow up bcx collected 5/13  - if pt has another fever (temperature of 100.4 sustained for minimum 1h or 1x 100.8) please obtain repeat bcx, ua + ucx, cxr (rule out asp), & tx empirically w vancomycin & zosyn  - please obtain repeat weight for medication dosing (54.4 kg 1mo ago)   89M, nonverbal (understands cantonese) w PMHx multiple CVA c/b L sided weakness now bedbound, s/p PEG, stage III sacral ulcer, DM, BPH, glaucoma, b/l hearing impairment, aortic root aneurysm, recent admission for Strep constellatus and B. gragilis bacteremia 2/2 presacral abscess sacral OM/cellulitis, & hx recurrent aspiration PNA, admitted from [facility] due to positive QuantiFeron test and concomitant pna on cxr (was on iv abx at the time, course completed on current admission) for which pt could not be readmitted to [facility] without 3 negative afb tests.  ID consulted 5/13 1x fever to 100.8 (bcx collected), w cbc differential not concerning for new infection - 1x temperature possibly secondary to aspiration pneumonitis.    - continue to monitor off of antibiotics  - please obtain urinalysis & ucx  - follow up bcx collected 5/13  - if pt has another fever (temperature of 100.4 sustained for minimum 1h or 1x 100.8) please obtain repeat bcx, ua + ucx, cxr (rule out asp), & tx empirically w vancomycin & zosyn  - please obtain repeat weight for medication dosing (54.4 kg 1mo ago)

## 2025-05-14 LAB
ANION GAP SERPL CALC-SCNC: 9 MMOL/L — SIGNIFICANT CHANGE UP (ref 5–17)
APPEARANCE UR: ABNORMAL
BASOPHILS # BLD AUTO: 0.04 K/UL — SIGNIFICANT CHANGE UP (ref 0–0.2)
BASOPHILS NFR BLD AUTO: 0.5 % — SIGNIFICANT CHANGE UP (ref 0–2)
BILIRUB UR-MCNC: NEGATIVE — SIGNIFICANT CHANGE UP
BLD GP AB SCN SERPL QL: NEGATIVE — SIGNIFICANT CHANGE UP
BUN SERPL-MCNC: 13 MG/DL — SIGNIFICANT CHANGE UP (ref 7–23)
CALCIUM SERPL-MCNC: 8.4 MG/DL — SIGNIFICANT CHANGE UP (ref 8.4–10.5)
CHLORIDE SERPL-SCNC: 100 MMOL/L — SIGNIFICANT CHANGE UP (ref 96–108)
CO2 SERPL-SCNC: 26 MMOL/L — SIGNIFICANT CHANGE UP (ref 22–31)
COLOR SPEC: YELLOW — SIGNIFICANT CHANGE UP
CREAT SERPL-MCNC: 0.26 MG/DL — LOW (ref 0.5–1.3)
DIFF PNL FLD: ABNORMAL
EGFR: 120 ML/MIN/1.73M2 — SIGNIFICANT CHANGE UP
EGFR: 120 ML/MIN/1.73M2 — SIGNIFICANT CHANGE UP
EOSINOPHIL # BLD AUTO: 0.58 K/UL — HIGH (ref 0–0.5)
EOSINOPHIL NFR BLD AUTO: 7.4 % — HIGH (ref 0–6)
GLUCOSE SERPL-MCNC: 162 MG/DL — HIGH (ref 70–99)
GLUCOSE UR QL: NEGATIVE MG/DL — SIGNIFICANT CHANGE UP
HCT VFR BLD CALC: 27.7 % — LOW (ref 39–50)
HGB BLD-MCNC: 9 G/DL — LOW (ref 13–17)
IMM GRANULOCYTES NFR BLD AUTO: 0.3 % — SIGNIFICANT CHANGE UP (ref 0–0.9)
KETONES UR QL: NEGATIVE MG/DL — SIGNIFICANT CHANGE UP
LEUKOCYTE ESTERASE UR-ACNC: ABNORMAL
LYMPHOCYTES # BLD AUTO: 1.39 K/UL — SIGNIFICANT CHANGE UP (ref 1–3.3)
LYMPHOCYTES # BLD AUTO: 17.7 % — SIGNIFICANT CHANGE UP (ref 13–44)
MAGNESIUM SERPL-MCNC: 1.8 MG/DL — SIGNIFICANT CHANGE UP (ref 1.6–2.6)
MCHC RBC-ENTMCNC: 24.9 PG — LOW (ref 27–34)
MCHC RBC-ENTMCNC: 32.5 G/DL — SIGNIFICANT CHANGE UP (ref 32–36)
MCV RBC AUTO: 76.5 FL — LOW (ref 80–100)
MONOCYTES # BLD AUTO: 0.94 K/UL — HIGH (ref 0–0.9)
MONOCYTES NFR BLD AUTO: 12 % — SIGNIFICANT CHANGE UP (ref 2–14)
NEUTROPHILS # BLD AUTO: 4.89 K/UL — SIGNIFICANT CHANGE UP (ref 1.8–7.4)
NEUTROPHILS NFR BLD AUTO: 62.1 % — SIGNIFICANT CHANGE UP (ref 43–77)
NIGHT BLUE STAIN TISS: SIGNIFICANT CHANGE UP
NIGHT BLUE STAIN TISS: SIGNIFICANT CHANGE UP
NITRITE UR-MCNC: NEGATIVE — SIGNIFICANT CHANGE UP
NRBC BLD AUTO-RTO: 0 /100 WBCS — SIGNIFICANT CHANGE UP (ref 0–0)
PH UR: 6.5 — SIGNIFICANT CHANGE UP (ref 5–8)
PHOSPHATE SERPL-MCNC: 2.6 MG/DL — SIGNIFICANT CHANGE UP (ref 2.5–4.5)
PLATELET # BLD AUTO: 272 K/UL — SIGNIFICANT CHANGE UP (ref 150–400)
POTASSIUM SERPL-MCNC: 3.9 MMOL/L — SIGNIFICANT CHANGE UP (ref 3.5–5.3)
POTASSIUM SERPL-SCNC: 3.9 MMOL/L — SIGNIFICANT CHANGE UP (ref 3.5–5.3)
PROT UR-MCNC: 30 MG/DL
RBC # BLD: 3.62 M/UL — LOW (ref 4.2–5.8)
RBC # FLD: 22.4 % — HIGH (ref 10.3–14.5)
RH IG SCN BLD-IMP: POSITIVE — SIGNIFICANT CHANGE UP
SODIUM SERPL-SCNC: 135 MMOL/L — SIGNIFICANT CHANGE UP (ref 135–145)
SP GR SPEC: 1.01 — SIGNIFICANT CHANGE UP (ref 1–1.03)
SPECIMEN SOURCE: SIGNIFICANT CHANGE UP
SPECIMEN SOURCE: SIGNIFICANT CHANGE UP
UROBILINOGEN FLD QL: 0.2 MG/DL — SIGNIFICANT CHANGE UP (ref 0.2–1)
WBC # BLD: 7.86 K/UL — SIGNIFICANT CHANGE UP (ref 3.8–10.5)
WBC # FLD AUTO: 7.86 K/UL — SIGNIFICANT CHANGE UP (ref 3.8–10.5)

## 2025-05-14 PROCEDURE — G0545: CPT

## 2025-05-14 PROCEDURE — 99232 SBSQ HOSP IP/OBS MODERATE 35: CPT | Mod: GC

## 2025-05-14 PROCEDURE — 99233 SBSQ HOSP IP/OBS HIGH 50: CPT

## 2025-05-14 RX ORDER — MAGNESIUM SULFATE 500 MG/ML
2 SYRINGE (ML) INJECTION ONCE
Refills: 0 | Status: COMPLETED | OUTPATIENT
Start: 2025-05-14 | End: 2025-05-14

## 2025-05-14 RX ADMIN — INSULIN GLARGINE-YFGN 10 UNIT(S): 100 INJECTION, SOLUTION SUBCUTANEOUS at 22:07

## 2025-05-14 RX ADMIN — Medication 4 MILLILITER(S): at 00:30

## 2025-05-14 RX ADMIN — TIMOLOL MALEATE 1 DROP(S): 6.8 SOLUTION OPHTHALMIC at 17:41

## 2025-05-14 RX ADMIN — ATORVASTATIN CALCIUM 40 MILLIGRAM(S): 80 TABLET, FILM COATED ORAL at 22:06

## 2025-05-14 RX ADMIN — LEVETIRACETAM 750 MILLIGRAM(S): 10 INJECTION, SOLUTION INTRAVENOUS at 05:49

## 2025-05-14 RX ADMIN — Medication 81 MILLIGRAM(S): at 10:59

## 2025-05-14 RX ADMIN — IPRATROPIUM BROMIDE AND ALBUTEROL SULFATE 3 MILLILITER(S): .5; 2.5 SOLUTION RESPIRATORY (INHALATION) at 10:58

## 2025-05-14 RX ADMIN — ENOXAPARIN SODIUM 40 MILLIGRAM(S): 100 INJECTION SUBCUTANEOUS at 22:06

## 2025-05-14 RX ADMIN — TIMOLOL MALEATE 1 DROP(S): 6.8 SOLUTION OPHTHALMIC at 05:49

## 2025-05-14 RX ADMIN — Medication 4 MILLILITER(S): at 17:42

## 2025-05-14 RX ADMIN — FINASTERIDE 5 MILLIGRAM(S): 1 TABLET, FILM COATED ORAL at 10:58

## 2025-05-14 RX ADMIN — Medication 220 MILLIGRAM(S): at 10:59

## 2025-05-14 RX ADMIN — INSULIN LISPRO 6: 100 INJECTION, SOLUTION INTRAVENOUS; SUBCUTANEOUS at 17:47

## 2025-05-14 RX ADMIN — IPRATROPIUM BROMIDE AND ALBUTEROL SULFATE 3 MILLILITER(S): .5; 2.5 SOLUTION RESPIRATORY (INHALATION) at 00:30

## 2025-05-14 RX ADMIN — IPRATROPIUM BROMIDE AND ALBUTEROL SULFATE 3 MILLILITER(S): .5; 2.5 SOLUTION RESPIRATORY (INHALATION) at 17:42

## 2025-05-14 RX ADMIN — Medication 4 MILLILITER(S): at 10:57

## 2025-05-14 RX ADMIN — INSULIN LISPRO 4: 100 INJECTION, SOLUTION INTRAVENOUS; SUBCUTANEOUS at 06:03

## 2025-05-14 RX ADMIN — FOLIC ACID 1 MILLIGRAM(S): 1 TABLET ORAL at 11:32

## 2025-05-14 RX ADMIN — Medication 25 GRAM(S): at 09:51

## 2025-05-14 RX ADMIN — IPRATROPIUM BROMIDE AND ALBUTEROL SULFATE 3 MILLILITER(S): .5; 2.5 SOLUTION RESPIRATORY (INHALATION) at 05:49

## 2025-05-14 RX ADMIN — Medication 500 MILLIGRAM(S): at 10:58

## 2025-05-14 RX ADMIN — LEVETIRACETAM 750 MILLIGRAM(S): 10 INJECTION, SOLUTION INTRAVENOUS at 17:43

## 2025-05-14 NOTE — PROGRESS NOTE ADULT - PROBLEM SELECTOR PLAN 6
Present on admission. Last admission requiring debridement with surgery however no changes in exam since last admission.  Patient is entirely bedbound since stroke    - Wet to dry dressing  - surgery consulted who stated no further debridement necessary, no wound vac necessary  - Frequent turning and nursing care Present on admission. Last admission requiring debridement with surgery however no changes in exam since last admission.  Patient is entirely bedbound since stroke    - wet to dry dressing  - surgery consulted who stated no further debridement necessary, no wound vac necessary  - frequent turning and nursing care  - wound care consulted

## 2025-05-14 NOTE — PROGRESS NOTE ADULT - SUBJECTIVE AND OBJECTIVE BOX
**INCOMPLETE NOTE    INTERVAL/OVERNIGHT EVENTS: None    SUBJECTIVE:  Patient seen and examined at bedside, comfortable, NAD. Denied fever, chest pain, dyspnea, abdominal pain.     Vital Signs Last 12 Hrs  T(F): 98.8 (05-14-25 @ 06:27), Max: 98.8 (05-14-25 @ 06:27)  HR: 118 (05-14-25 @ 06:27) (118 - 118)  BP: 114/73 (05-14-25 @ 06:27) (114/73 - 114/73)  BP(mean): 86 (05-14-25 @ 06:27) (86 - 86)  RR: 18 (05-14-25 @ 06:27) (18 - 18)  SpO2: 96% (05-14-25 @ 06:27) (96% - 96%)  I&O's Summary    13 May 2025 07:01  -  14 May 2025 07:00  --------------------------------------------------------  IN: 480 mL / OUT: 400 mL / NET: 80 mL        PHYSICAL EXAM:  General: NAD  HEENT: PERRL, EOM intact, sclera anicteric, MMM  Cardiovascular: RRR; no MRG;   Respiratory: CTAB; no WRR  GI/: soft; NTND; BS x4  Extremities: WWP; 2+ peripheral pulses bilaterally; no LE edema  Skin: normal color & turgor; no rash  Neurologic: aox3; no focal deficits    LABS:                        9.0    7.86  )-----------( 272      ( 14 May 2025 05:30 )             27.7     05-14    135  |  100  |  13  ----------------------------<  162[H]  3.9   |  26  |  0.26[L]    Ca    8.4      14 May 2025 05:30  Phos  2.6     05-14  Mg     1.8     05-14        Urinalysis Basic - ( 14 May 2025 05:30 )    Color: x / Appearance: x / SG: x / pH: x  Gluc: 162 mg/dL / Ketone: x  / Bili: x / Urobili: x   Blood: x / Protein: x / Nitrite: x   Leuk Esterase: x / RBC: x / WBC x   Sq Epi: x / Non Sq Epi: x / Bacteria: x          RADIOLOGY & ADDITIONAL TESTS:    MEDICATIONS  (STANDING):  albuterol/ipratropium for Nebulization 3 milliLiter(s) Nebulizer every 6 hours  ascorbic acid 500 milliGRAM(s) Oral daily  aspirin  chewable 81 milliGRAM(s) Oral daily  atorvastatin 40 milliGRAM(s) Oral at bedtime  dextrose 5%. 1000 milliLiter(s) (50 mL/Hr) IV Continuous <Continuous>  dextrose 5%. 1000 milliLiter(s) (100 mL/Hr) IV Continuous <Continuous>  dextrose 50% Injectable 25 Gram(s) IV Push once  dextrose 50% Injectable 12.5 Gram(s) IV Push once  dextrose 50% Injectable 25 Gram(s) IV Push once  enoxaparin Injectable 40 milliGRAM(s) SubCutaneous every 24 hours  finasteride 5 milliGRAM(s) Oral daily  folic acid 1 milliGRAM(s) Oral daily  glucagon  Injectable 1 milliGRAM(s) IntraMuscular once  insulin glargine Injectable (LANTUS) 10 Unit(s) SubCutaneous at bedtime  insulin lispro (ADMELOG) corrective regimen sliding scale   SubCutaneous every 6 hours  levETIRAcetam 750 milliGRAM(s) Oral every 12 hours  sodium chloride 7% Inhalation 4 milliLiter(s) Inhalation every 8 hours  timolol 0.5% Solution 1 Drop(s) Both EYES two times a day  zinc sulfate 220 milliGRAM(s) Oral daily    MEDICATIONS  (PRN):  acetaminophen   Oral Liquid .. 650 milliGRAM(s) Oral every 6 hours PRN Mild Pain (1 - 3)  dextrose Oral Gel 15 Gram(s) Oral once PRN Blood Glucose LESS THAN 70 milliGRAM(s)/deciliter   INTERVAL/OVERNIGHT EVENTS: None    SUBJECTIVE:  Patient seen and examined at bedside, comfortable-appearing, NAD.    Vital Signs Last 12 Hrs  T(F): 98.8 (05-14-25 @ 06:27), Max: 98.8 (05-14-25 @ 06:27)  HR: 118 (05-14-25 @ 06:27) (118 - 118)  BP: 114/73 (05-14-25 @ 06:27) (114/73 - 114/73)  BP(mean): 86 (05-14-25 @ 06:27) (86 - 86)  RR: 18 (05-14-25 @ 06:27) (18 - 18)  SpO2: 96% (05-14-25 @ 06:27) (96% - 96%)  I&O's Summary    13 May 2025 07:01  -  14 May 2025 07:00  --------------------------------------------------------  IN: 480 mL / OUT: 400 mL / NET: 80 mL        PHYSICAL EXAM:  General: NAD  HEENT: PERRL, EOM intact, sclera anicteric, MMM  Cardiovascular: RRR; no MRG;   Respiratory: CTAB; no WRR  GI/: soft; NTND; BS x4  Extremities: WWP; 2+ peripheral pulses bilaterally; no LE edema  Skin: normal color & turgor; no rash  LABS:                        9.0    7.86  )-----------( 272      ( 14 May 2025 05:30 )             27.7     05-14    135  |  100  |  13  ----------------------------<  162[H]  3.9   |  26  |  0.26[L]    Ca    8.4      14 May 2025 05:30  Phos  2.6     05-14  Mg     1.8     05-14        Urinalysis Basic - ( 14 May 2025 05:30 )    Color: x / Appearance: x / SG: x / pH: x  Gluc: 162 mg/dL / Ketone: x  / Bili: x / Urobili: x   Blood: x / Protein: x / Nitrite: x   Leuk Esterase: x / RBC: x / WBC x   Sq Epi: x / Non Sq Epi: x / Bacteria: x          RADIOLOGY & ADDITIONAL TESTS:    MEDICATIONS  (STANDING):  albuterol/ipratropium for Nebulization 3 milliLiter(s) Nebulizer every 6 hours  ascorbic acid 500 milliGRAM(s) Oral daily  aspirin  chewable 81 milliGRAM(s) Oral daily  atorvastatin 40 milliGRAM(s) Oral at bedtime  dextrose 5%. 1000 milliLiter(s) (50 mL/Hr) IV Continuous <Continuous>  dextrose 5%. 1000 milliLiter(s) (100 mL/Hr) IV Continuous <Continuous>  dextrose 50% Injectable 25 Gram(s) IV Push once  dextrose 50% Injectable 12.5 Gram(s) IV Push once  dextrose 50% Injectable 25 Gram(s) IV Push once  enoxaparin Injectable 40 milliGRAM(s) SubCutaneous every 24 hours  finasteride 5 milliGRAM(s) Oral daily  folic acid 1 milliGRAM(s) Oral daily  glucagon  Injectable 1 milliGRAM(s) IntraMuscular once  insulin glargine Injectable (LANTUS) 10 Unit(s) SubCutaneous at bedtime  insulin lispro (ADMELOG) corrective regimen sliding scale   SubCutaneous every 6 hours  levETIRAcetam 750 milliGRAM(s) Oral every 12 hours  sodium chloride 7% Inhalation 4 milliLiter(s) Inhalation every 8 hours  timolol 0.5% Solution 1 Drop(s) Both EYES two times a day  zinc sulfate 220 milliGRAM(s) Oral daily    MEDICATIONS  (PRN):  acetaminophen   Oral Liquid .. 650 milliGRAM(s) Oral every 6 hours PRN Mild Pain (1 - 3)  dextrose Oral Gel 15 Gram(s) Oral once PRN Blood Glucose LESS THAN 70 milliGRAM(s)/deciliter

## 2025-05-14 NOTE — PROGRESS NOTE ADULT - ATTENDING COMMENTS
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

## 2025-05-14 NOTE — PROGRESS NOTE ADULT - SUBJECTIVE AND OBJECTIVE BOX
*****INCOMPLETE NOTE*****    INTERVAL HPI/OVERNIGHT EVENTS:    SUBJECTIVE: Patient seen and examined at bedside, resting comfortably in bed, and does not appear to be in any acute distress. Patient reports    Vital Signs Last 24 Hrs  T(C): 37.1 (14 May 2025 06:27), Max: 38.2 (13 May 2025 12:24)  T(F): 98.8 (14 May 2025 06:27), Max: 100.8 (13 May 2025 12:24)  HR: 118 (14 May 2025 06:27) (96 - 120)  BP: 114/73 (14 May 2025 06:27) (101/66 - 120/72)  BP(mean): 86 (14 May 2025 06:27) (86 - 86)  RR: 18 (14 May 2025 06:27) (18 - 18)  SpO2: 96% (14 May 2025 06:27) (96% - 99%)    Parameters below as of 14 May 2025 06:27  Patient On (Oxygen Delivery Method): nasal cannula  O2 Flow (L/min): 2      PHYSICAL EXAM:  General: in no acute distress  Eyes: EOMI intact bilaterally. Anicteric sclerae, moist conjunctivae  HENT: Moist mucous membranes  Neck: Trachea midline, supple  Lungs: CTA B/L. No wheezes, rales, or rhonchi  Cardiovascular: RRR. No murmurs, rubs, or gallops  Abdomen: Soft, non-tender non-distended; No rebound or guarding  Extremities: WWP, No clubbing, cyanosis or edema  Neurological: Alert and oriented  Skin: Warm and dry. No obvious rash     LABS:                        9.0    7.86  )-----------( 272      ( 14 May 2025 05:30 )             27.7     05-13    135  |  101  |  12  ----------------------------<  145[H]  3.8   |  27  |  0.27[L]    Ca    8.3[L]      13 May 2025 06:50  Phos  2.4     05-13  Mg     1.7     05-13     INTERVAL HPI/OVERNIGHT EVENTS: PEDRO o/n    SUBJECTIVE: Patient seen and examined at bedside, resting comfortably in bed, and does not appear to be in any acute distress. Per wife, no acute concerns at this time, patient has had occasional dry cough but has not been able to produce sputum.     Vital Signs Last 24 Hrs  T(C): 37.1 (14 May 2025 06:27), Max: 38.2 (13 May 2025 12:24)  T(F): 98.8 (14 May 2025 06:27), Max: 100.8 (13 May 2025 12:24)  HR: 118 (14 May 2025 06:27) (96 - 120)  BP: 114/73 (14 May 2025 06:27) (101/66 - 120/72)  BP(mean): 86 (14 May 2025 06:27) (86 - 86)  RR: 18 (14 May 2025 06:27) (18 - 18)  SpO2: 96% (14 May 2025 06:27) (96% - 99%)    Parameters below as of 14 May 2025 06:27  Patient On (Oxygen Delivery Method): nasal cannula  O2 Flow (L/min): 2      PHYSICAL EXAM:  General: in no acute distress  Eyes: Anicteric sclerae, moist conjunctivae  HENT: Moist mucous membranes  Neck: Trachea midline, supple  Lungs: CTA B/L. No wheezes, rales, or rhonchi  Cardiovascular: RRR. No murmurs, rubs, or gallops  Abdomen: Soft, non-tender non-distended; No rebound or guarding. PEG site clean and dry.   Extremities: WWP, No clubbing, cyanosis or edema  Neurological: Alert, tracks with eyes, not oriented  Skin: Warm and dry. No obvious rash     LABS:                        9.0    7.86  )-----------( 272      ( 14 May 2025 05:30 )             27.7     05-13    135  |  101  |  12  ----------------------------<  145[H]  3.8   |  27  |  0.27[L]    Ca    8.3[L]      13 May 2025 06:50  Phos  2.4     05-13  Mg     1.7     05-13

## 2025-05-14 NOTE — PROGRESS NOTE ADULT - PROBLEM SELECTOR PLAN 3
Home meds: metformin 500 mg 3 times a day, januvia 50 mg QD; wife does not give medications as prescribed as she was told by PCP that he does not need medication anymore given age and last A1c ~7.     - Lantus 9U qhs  - ctm FS q6 abdi since NPO  - Moderate ISS q6. Home meds: metformin 500 mg 3 times a day, januvia 50 mg QD; wife does not give medications as prescribed as she was told by PCP that he does not need medication anymore given age and last A1c ~7.     - Lantus 10U qhs  - ctm FS q6 abdi since NPO  - Moderate ISS q6.

## 2025-05-14 NOTE — PROGRESS NOTE ADULT - PROBLEM SELECTOR PLAN 1
Recent admission 4/22-5/1 for which patient was admitted for severe sepsis and found to have RLL aspiration PNA and bacteroides+ and strep+ species with subsequent negative bcx; discharged to NH with PICC to complete abx (CTX 1 g QD and PO HAJI, end date 5/10/25); now returning d/t +quantiferon test; CXR with improving opacities and no cavitations or c/f TB on previous imaging (CT chest 04/2025), no fever/weight loss or systemic symptoms except for intermittent cough with whitish sputum which pt had on prior admission (recovering from CAP); WBC 9 w/o bandemia; pt originally from [place]; ID called in ED and 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

## 2025-05-14 NOTE — PROGRESS NOTE ADULT - ATTENDING COMMENTS
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  (12/10-12/12/24) for wound care and further nutrition evaluation and management. GI consulted and PEG tube study showed PEG is functioning and no need to change to a new one. WOCN consult appreciated, given Sliver nitrate and aquagel Ag dressing daily to PEG site. Dietitian consult appreciated, TF changed to Glucerna 1.2 @ 83ml/hr from 5pm to 11am x 18hr, total 6 cans per day. Pt recently admitted to ICU for aspiration PNA and had indwelling hudson removed and discharged from [facility] ( 2/25/25) and sent to [facility] and subsequently sent home on home O2 per wife, admitted [facility] ( 3/26-4/1)  for severe sepsis w/ lactic acidosis 2/2 recurrent multifocal HAP likely aspiration/gram negative organism and and last admitted [facility] ( 4/22-5/1)  infected sacral decubitus (POA)--Strep bacteremia, sacral ulcer with abscess sp bedside debridement by surgery 4/27- bleeding required electrocautery , given one unit of prbc before debridement - now hb at 7.7 --> given a unit 4/28- hb above 9 - wound vac placed by team 4 surgery on 4/29, discharged on 5/1 to [facility] for 2 weeks of antibiotics with ceftriaxone/flagyl ( 4/27- 5/10 ). Pt now presented to [facility] ( 5/9) for +quantgold test resulting in NH with otherwise improving PNA from recent admission and no worsening systemic symptoms or s/s sepsis, now being admitted for TB r/o with AFB confirmation to return to nursing facility.     no more fever overnight  he would open eyes make eye contact,    looks comfortable, tolerating tube feeding  wife at bedside- sputum x 1 negative for AFB   pending for two more to be resulted.    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  - airborne isolation   - fu sputum for AFB ( 1 negative - two more pending-   - airway clearance: may change duoneb followed by hypertonic saline q12hr, remains on NC2L as baseline for chronic hypoxic resp failure    - NPO/ oral hygiene q shift/aspiration precautions     # Recent strep bacteremia w/ sacral ucler Stage IV   - completed Ceftriaxone and flagyl ( EOT 5/10) - midline removal 5/12-   low grade temp 5/13- ID reconsulted, monitor off antibitics , bc 5/13-   - Surgery consult appreciated, no need for debridement of sacral wound, wound vac was placed by surgery during last admission - surgery follow up determine no further debridement, no wound vac   = would care evaluation - to have dressing change frequently.   - added MVI, VIt C 500mg daily and ZnSo4 220mg po daily     # Dysphagia  # Hyponatremia -mild  - c/w TF//glucerna 5 cans a day and free water via PEG.    # T2DM- with hyperglycemia   - lantus to 10 Units Q hs - continue with FS and SS goal glucose 100-180  - hypophos -to replace.     # DVT ppx     # FULL CODE- GOC as discussed wife at bedside .Both wife and son wants full code     # Disposition: medically stable, awaiting AFBx3 then return to [facility]  diana RN and medical team  questions from wife answered, emotional support provided. explained that sacral ulcer are very difficult, he is at risk for recurrent aspiration as well.   wife request to have HHA 12 hours 2 shifts.

## 2025-05-14 NOTE — PROGRESS NOTE ADULT - ASSESSMENT
89M, nonverbal (understands cantonese) w PMHx multiple CVA c/b L sided weakness now bedbound, s/p PEG, stage III sacral ulcer, DM, BPH, glaucoma, b/l hearing impairment, aortic root aneurysm, mandibular mass, recent admission for Strep constellatus and B. gragilis bacteremia 2/2 presacral absces, sacral OM/cellulitis, & hx recurrent aspiration PNA, admitted from [facility] due to positive QuantiFeron test and concomitant pna on cxr (was on iv abx at the time, course completed on current admission) for which pt could not be readmitted to [facility] without 3 negative afb tests.  ID consulted 5/13 1x fever to 100.8 (bcx collected), w cbc differential not concerning for new infection - 1x temperature possibly secondary to aspiration pneumonitis.    - continue to monitor off of antibiotics  - please obtain urinalysis & ucx  - follow up bcx collected 5/13  - if pt has another fever (temperature of 100.4 sustained for minimum 1h or 1x 100.8) please obtain repeat bcx, ua + ucx, cxr (rule out asp), & tx empirically w vancomycin & zosyn  - please obtain repeat weight for medication dosing (54.4 kg 1mo ago)   89M, nonverbal (understands cantonese) w PMHx multiple CVA c/b L sided weakness now bedbound, s/p PEG, stage III sacral ulcer, DM, BPH, glaucoma, b/l hearing impairment, aortic root aneurysm, recent admission for Strep constellatus and B. gragilis bacteremia 2/2 presacral absces, sacral OM/cellulitis, & hx recurrent aspiration PNA, admitted from [facility] due to positive QuantiFeron test and concomitant pna on cxr (was on iv abx at the time, course completed on current admission) for which pt could not be readmitted to [facility] without 3 negative afb tests.  ID consulted 5/13 1x fever to 100.8 (bcx collected), w cbc differential not concerning for new infection - 1x temperature possibly secondary to aspiration pneumonitis.    - continue to monitor off of antibiotics  - please obtain urinalysis & ucx  - follow up bcx collected 5/13  - if pt has another fever (temperature of 100.4 sustained for minimum 1h or 1x 100.8) please obtain repeat bcx, ua + ucx, cxr (rule out asp), & tx empirically w vancomycin & zosyn  - please obtain repeat weight for medication dosing (54.4 kg 1mo ago)   89M, nonverbal (understands cantonese) w PMHx multiple CVA c/b L sided weakness now bedbound, s/p PEG, stage III sacral ulcer, DM, BPH, glaucoma, b/l hearing impairment, aortic root aneurysm, recent admission for Strep constellatus and B. gragilis bacteremia 2/2 presacral absces, sacral OM/cellulitis, & hx recurrent aspiration PNA, admitted from [facility] due to positive QuantiFeron test and concomitant pna on cxr (was on iv abx at the time, course completed on current admission) for which pt could not be readmitted to [facility] without 3 negative afb tests.  ID consulted 5/13 1x fever to 100.8 (bcx collected), w cbc differential not concerning for new infection - 1x temperature possibly secondary to aspiration pneumonitis, afebrile ovn    - continue to monitor off of antibiotics  - follow up urinalysis & ucx, bcx collected 5/13  - if pt has another fever (temperature of 100.4 sustained for minimum 1h or 1x 100.8) please obtain repeat bcx, ua + ucx, cxr (rule out asp), & tx empirically w vancomycin & zosyn

## 2025-05-14 NOTE — PROGRESS NOTE ADULT - PROBLEM SELECTOR PLAN 2
4/22-5/1 admission for severe sepsis revealing BCx 4/22 - +Bacteroides fragilis, +Strep species with subsequent bcx clearance, in addition to RLL PNA suspected to aspiration PNA at that time; CTX 1g (4/29 -5/10) and Flagyl 500mg PO  (4/29 -5/10 ) recommended by ID service at that time; discharged to NH with PICC for completion of abx regimen (no reported missed doses); pt currently afebrile, breathing comfortably saturating 98% on 2L w/o leukocytosis and CXR with improving opacities     -Repeat bcx if reveals evidence of sepsis   -Completed abx course pt was discharged on: CTX 1g IV QD (4/29 -5/10) and Flagyl 500mg PO Q12H (4/29 -5/10); PICC line removed on 5/12 4/22-5/1 admission for severe sepsis revealing BCx 4/22 - +Bacteroides fragilis, +Strep species with subsequent bcx clearance, in addition to RLL PNA suspected to aspiration PNA at that time; CTX 1g (4/29 -5/10) and Flagyl 500mg PO  (4/29 -5/10 ) recommended by ID service at that time; discharged to NH with PICC for completion of abx regimen (no reported missed doses); pt currently afebrile, breathing comfortably saturating 98% on 2L w/o leukocytosis and CXR with improving opacities     -Repeat bcx if reveals evidence of sepsis   -Completed abx course that pt was previously discharged on: CTX 1g IV QD (4/29 -5/10) and Flagyl 500mg PO Q12H (4/29 -5/10); PICC line removed on 5/12

## 2025-05-15 DIAGNOSIS — R00.0 TACHYCARDIA, UNSPECIFIED: ICD-10-CM

## 2025-05-15 LAB
ANION GAP SERPL CALC-SCNC: 11 MMOL/L — SIGNIFICANT CHANGE UP (ref 5–17)
BUN SERPL-MCNC: 15 MG/DL — SIGNIFICANT CHANGE UP (ref 7–23)
CALCIUM SERPL-MCNC: 8.8 MG/DL — SIGNIFICANT CHANGE UP (ref 8.4–10.5)
CHLORIDE SERPL-SCNC: 101 MMOL/L — SIGNIFICANT CHANGE UP (ref 96–108)
CO2 SERPL-SCNC: 25 MMOL/L — SIGNIFICANT CHANGE UP (ref 22–31)
CREAT SERPL-MCNC: 0.24 MG/DL — LOW (ref 0.5–1.3)
EGFR: 123 ML/MIN/1.73M2 — SIGNIFICANT CHANGE UP
EGFR: 123 ML/MIN/1.73M2 — SIGNIFICANT CHANGE UP
GLUCOSE SERPL-MCNC: 183 MG/DL — HIGH (ref 70–99)
HCT VFR BLD CALC: 28.6 % — LOW (ref 39–50)
HGB BLD-MCNC: 9.1 G/DL — LOW (ref 13–17)
MAGNESIUM SERPL-MCNC: 2 MG/DL — SIGNIFICANT CHANGE UP (ref 1.6–2.6)
MCHC RBC-ENTMCNC: 24.7 PG — LOW (ref 27–34)
MCHC RBC-ENTMCNC: 31.8 G/DL — LOW (ref 32–36)
MCV RBC AUTO: 77.5 FL — LOW (ref 80–100)
NRBC BLD AUTO-RTO: 0 /100 WBCS — SIGNIFICANT CHANGE UP (ref 0–0)
PHOSPHATE SERPL-MCNC: 2.7 MG/DL — SIGNIFICANT CHANGE UP (ref 2.5–4.5)
PLATELET # BLD AUTO: 291 K/UL — SIGNIFICANT CHANGE UP (ref 150–400)
POTASSIUM SERPL-MCNC: 4.2 MMOL/L — SIGNIFICANT CHANGE UP (ref 3.5–5.3)
POTASSIUM SERPL-SCNC: 4.2 MMOL/L — SIGNIFICANT CHANGE UP (ref 3.5–5.3)
RBC # BLD: 3.69 M/UL — LOW (ref 4.2–5.8)
RBC # FLD: 22.3 % — HIGH (ref 10.3–14.5)
SODIUM SERPL-SCNC: 137 MMOL/L — SIGNIFICANT CHANGE UP (ref 135–145)
WBC # BLD: 9.34 K/UL — SIGNIFICANT CHANGE UP (ref 3.8–10.5)
WBC # FLD AUTO: 9.34 K/UL — SIGNIFICANT CHANGE UP (ref 3.8–10.5)

## 2025-05-15 PROCEDURE — 99233 SBSQ HOSP IP/OBS HIGH 50: CPT

## 2025-05-15 RX ORDER — METOPROLOL SUCCINATE 50 MG/1
12.5 TABLET, EXTENDED RELEASE ORAL EVERY 12 HOURS
Refills: 0 | Status: DISCONTINUED | OUTPATIENT
Start: 2025-05-15 | End: 2025-05-15

## 2025-05-15 RX ORDER — ACETAMINOPHEN 500 MG/5ML
650 LIQUID (ML) ORAL EVERY 12 HOURS
Refills: 0 | Status: DISCONTINUED | OUTPATIENT
Start: 2025-05-15 | End: 2025-05-15

## 2025-05-15 RX ORDER — METOPROLOL SUCCINATE 50 MG/1
12.5 TABLET, EXTENDED RELEASE ORAL EVERY 12 HOURS
Refills: 0 | Status: DISCONTINUED | OUTPATIENT
Start: 2025-05-15 | End: 2025-05-21

## 2025-05-15 RX ORDER — ACETAMINOPHEN 500 MG/5ML
650 LIQUID (ML) ORAL EVERY 12 HOURS
Refills: 0 | Status: DISCONTINUED | OUTPATIENT
Start: 2025-05-15 | End: 2025-05-21

## 2025-05-15 RX ORDER — ZINC SULFATE 50(220)MG
220 CAPSULE ORAL EVERY 24 HOURS
Refills: 0 | Status: COMPLETED | OUTPATIENT
Start: 2025-05-16 | End: 2025-05-20

## 2025-05-15 RX ADMIN — FOLIC ACID 1 MILLIGRAM(S): 1 TABLET ORAL at 11:06

## 2025-05-15 RX ADMIN — Medication 650 MILLIGRAM(S): at 07:04

## 2025-05-15 RX ADMIN — Medication 220 MILLIGRAM(S): at 11:07

## 2025-05-15 RX ADMIN — INSULIN LISPRO 4: 100 INJECTION, SOLUTION INTRAVENOUS; SUBCUTANEOUS at 00:40

## 2025-05-15 RX ADMIN — IPRATROPIUM BROMIDE AND ALBUTEROL SULFATE 3 MILLILITER(S): .5; 2.5 SOLUTION RESPIRATORY (INHALATION) at 00:00

## 2025-05-15 RX ADMIN — INSULIN LISPRO 4: 100 INJECTION, SOLUTION INTRAVENOUS; SUBCUTANEOUS at 18:21

## 2025-05-15 RX ADMIN — METOPROLOL SUCCINATE 12.5 MILLIGRAM(S): 50 TABLET, EXTENDED RELEASE ORAL at 11:07

## 2025-05-15 RX ADMIN — ENOXAPARIN SODIUM 40 MILLIGRAM(S): 100 INJECTION SUBCUTANEOUS at 22:07

## 2025-05-15 RX ADMIN — Medication 81 MILLIGRAM(S): at 11:07

## 2025-05-15 RX ADMIN — LEVETIRACETAM 750 MILLIGRAM(S): 10 INJECTION, SOLUTION INTRAVENOUS at 07:05

## 2025-05-15 RX ADMIN — METOPROLOL SUCCINATE 12.5 MILLIGRAM(S): 50 TABLET, EXTENDED RELEASE ORAL at 19:05

## 2025-05-15 RX ADMIN — INSULIN LISPRO 4: 100 INJECTION, SOLUTION INTRAVENOUS; SUBCUTANEOUS at 06:29

## 2025-05-15 RX ADMIN — Medication 650 MILLIGRAM(S): at 19:04

## 2025-05-15 RX ADMIN — Medication 500 MILLIGRAM(S): at 11:06

## 2025-05-15 RX ADMIN — LEVETIRACETAM 750 MILLIGRAM(S): 10 INJECTION, SOLUTION INTRAVENOUS at 19:05

## 2025-05-15 RX ADMIN — IPRATROPIUM BROMIDE AND ALBUTEROL SULFATE 3 MILLILITER(S): .5; 2.5 SOLUTION RESPIRATORY (INHALATION) at 19:05

## 2025-05-15 RX ADMIN — IPRATROPIUM BROMIDE AND ALBUTEROL SULFATE 3 MILLILITER(S): .5; 2.5 SOLUTION RESPIRATORY (INHALATION) at 11:06

## 2025-05-15 RX ADMIN — TIMOLOL MALEATE 1 DROP(S): 6.8 SOLUTION OPHTHALMIC at 19:06

## 2025-05-15 RX ADMIN — Medication 4 MILLILITER(S): at 01:00

## 2025-05-15 RX ADMIN — IPRATROPIUM BROMIDE AND ALBUTEROL SULFATE 3 MILLILITER(S): .5; 2.5 SOLUTION RESPIRATORY (INHALATION) at 07:05

## 2025-05-15 RX ADMIN — INSULIN LISPRO 2: 100 INJECTION, SOLUTION INTRAVENOUS; SUBCUTANEOUS at 13:37

## 2025-05-15 RX ADMIN — Medication 650 MILLIGRAM(S): at 11:07

## 2025-05-15 RX ADMIN — INSULIN GLARGINE-YFGN 10 UNIT(S): 100 INJECTION, SOLUTION SUBCUTANEOUS at 22:07

## 2025-05-15 RX ADMIN — ATORVASTATIN CALCIUM 40 MILLIGRAM(S): 80 TABLET, FILM COATED ORAL at 22:07

## 2025-05-15 RX ADMIN — FINASTERIDE 5 MILLIGRAM(S): 1 TABLET, FILM COATED ORAL at 11:07

## 2025-05-15 RX ADMIN — Medication 650 MILLIGRAM(S): at 12:53

## 2025-05-15 NOTE — PROGRESS NOTE ADULT - SUBJECTIVE AND OBJECTIVE BOX
**INCOMPLETE NOTE    INTERVAL/OVERNIGHT EVENTS: None    SUBJECTIVE:  Patient seen and examined at bedside, comfortable, NAD. Denied fever, chest pain, dyspnea, abdominal pain.     Vital Signs Last 12 Hrs  T(F): 98.2 (05-15-25 @ 06:09), Max: 98.2 (05-14-25 @ 22:32)  HR: 108 (05-15-25 @ 06:09) (103 - 108)  BP: 131/74 (05-15-25 @ 06:09) (118/72 - 131/74)  BP(mean): 93 (05-15-25 @ 06:09) (93 - 93)  RR: 17 (05-15-25 @ 06:09) (17 - 18)  SpO2: 98% (05-15-25 @ 06:09) (97% - 98%)  I&O's Summary    14 May 2025 07:01  -  15 May 2025 07:00  --------------------------------------------------------  IN: 0 mL / OUT: 800 mL / NET: -800 mL        PHYSICAL EXAM:  General: NAD  HEENT: PERRL, EOM intact, sclera anicteric, MMM  Cardiovascular: RRR; no MRG;   Respiratory: CTAB; no WRR  GI/: soft; NTND; BS x4  Extremities: WWP; 2+ peripheral pulses bilaterally; no LE edema  Skin: normal color & turgor; no rash  Neurologic: aox3; no focal deficits    LABS:                        9.1    9.34  )-----------( 291      ( 15 May 2025 07:30 )             28.6     05-15    137  |  101  |  15  ----------------------------<  183[H]  4.2   |  25  |  0.24[L]    Ca    8.8      15 May 2025 07:30  Phos  2.7     05-15  Mg     2.0     05-15        Urinalysis Basic - ( 15 May 2025 07:30 )    Color: x / Appearance: x / SG: x / pH: x  Gluc: 183 mg/dL / Ketone: x  / Bili: x / Urobili: x   Blood: x / Protein: x / Nitrite: x   Leuk Esterase: x / RBC: x / WBC x   Sq Epi: x / Non Sq Epi: x / Bacteria: x          RADIOLOGY & ADDITIONAL TESTS:    MEDICATIONS  (STANDING):  acetaminophen   Oral Liquid .. 650 milliGRAM(s) Oral every 12 hours  albuterol/ipratropium for Nebulization 3 milliLiter(s) Nebulizer every 6 hours  ascorbic acid 500 milliGRAM(s) Oral daily  aspirin  chewable 81 milliGRAM(s) Oral daily  atorvastatin 40 milliGRAM(s) Oral at bedtime  dextrose 5%. 1000 milliLiter(s) (100 mL/Hr) IV Continuous <Continuous>  dextrose 5%. 1000 milliLiter(s) (50 mL/Hr) IV Continuous <Continuous>  dextrose 50% Injectable 25 Gram(s) IV Push once  dextrose 50% Injectable 12.5 Gram(s) IV Push once  dextrose 50% Injectable 25 Gram(s) IV Push once  enoxaparin Injectable 40 milliGRAM(s) SubCutaneous every 24 hours  finasteride 5 milliGRAM(s) Oral daily  folic acid 1 milliGRAM(s) Oral daily  glucagon  Injectable 1 milliGRAM(s) IntraMuscular once  insulin glargine Injectable (LANTUS) 10 Unit(s) SubCutaneous at bedtime  insulin lispro (ADMELOG) corrective regimen sliding scale   SubCutaneous every 6 hours  levETIRAcetam 750 milliGRAM(s) Oral every 12 hours  metoprolol tartrate 12.5 milliGRAM(s) Oral every 12 hours  sodium chloride 7% Inhalation 4 milliLiter(s) Inhalation every 8 hours  timolol 0.5% Solution 1 Drop(s) Both EYES two times a day  zinc sulfate 220 milliGRAM(s) Oral daily    MEDICATIONS  (PRN):  dextrose Oral Gel 15 Gram(s) Oral once PRN Blood Glucose LESS THAN 70 milliGRAM(s)/deciliter

## 2025-05-15 NOTE — PROGRESS NOTE ADULT - PROBLEM SELECTOR PLAN 4
Pt with a hx of stroke 2004 with residual left-sided weakness. S/p PEG placement 05/2024   MRI brain on 4/28 with R internal capsule infarct   Head CT 5/5: subacute infarct centered in the genu and posterior limb of the right internal capsule is stable.  A chronic transcortical infarction in the left precentral gyrus and a small chronic infarct in the right cerebellar hemisphere are stable.  Home meds: ASA 81mg, Atorvastatin 40mg, keppra 750mg (seizure ppx), per wife pt never had seizures.    - C/W home meds  - Tube feeds resumed, parameters per nutrition recs. Nutrition consulted Home meds: metformin 500 mg 3 times a day, januvia 50 mg QD; wife does not give medications as prescribed as she was told by PCP that he does not need medication anymore given age and last A1c ~7.     - Lantus 10U qhs  - ctm FS q6 abdi since NPO  - Moderate ISS q6.

## 2025-05-15 NOTE — CHART NOTE - NSCHARTNOTEFT_GEN_A_CORE
Admitting Diagnosis:   Patient is a 89y old  Male who presents with a chief complaint of +Quantiferon Test (15 May 2025 07:49)    PAST MEDICAL & SURGICAL HISTORY:  Diabetes  HLD (hyperlipidemia)  BPH (benign prostatic hyperplasia)  JUSTYNA (iron deficiency anemia)  Stroke  No significant past surgical history    Current Nutrition Order:  NPO with Tube Feed via PEG: Glucerna 1.5 at 60mL/hr x18hr to provide 1080mL total volume, 1620kcal, 89g protein, and 820mL free water.   >>FWF 240mL q8hr    PO Intake: Good (%) [   ]  Fair (50-75%) [   ] Poor (<25%) [   ] - N/A NPO    GI Issues:   Wife denies pt with nausea/vomiting  Reports pt with x2-4 loose BMs daily  No abdominal distension/discomfort noted    Pain:  No nonverbal indicators of pain noted    Skin Integrity:  No edema documented  Sacral stage IV pressure injury noted   Leonel score 8    25 @ 07:01  -  05-15-25 @ 07:00  --------------------------------------------------------  IN: 0 mL / OUT: 800 mL / NET: -800 mL    Labs:   05-15    137  |  101  |  15  ----------------------------<  183[H]  4.2   |  25  |  0.24[L]    Ca    8.8      15 May 2025 07:30  Phos  2.7     05-15  Mg     2.0     05-15    CAPILLARY BLOOD GLUCOSE  POCT Blood Glucose.: 220 mg/dL (15 May 2025 05:39)  POCT Blood Glucose.: 216 mg/dL (15 May 2025 00:38)  POCT Blood Glucose.: 257 mg/dL (14 May 2025 17:37)    Medications:  MEDICATIONS  (STANDING):  acetaminophen   Oral Liquid .. 650 milliGRAM(s) Oral every 12 hours  albuterol/ipratropium for Nebulization 3 milliLiter(s) Nebulizer every 6 hours  ascorbic acid 500 milliGRAM(s) Oral daily  aspirin  chewable 81 milliGRAM(s) Oral daily  atorvastatin 40 milliGRAM(s) Oral at bedtime  dextrose 5%. 1000 milliLiter(s) (50 mL/Hr) IV Continuous <Continuous>  dextrose 5%. 1000 milliLiter(s) (100 mL/Hr) IV Continuous <Continuous>  dextrose 50% Injectable 25 Gram(s) IV Push once  dextrose 50% Injectable 12.5 Gram(s) IV Push once  dextrose 50% Injectable 25 Gram(s) IV Push once  enoxaparin Injectable 40 milliGRAM(s) SubCutaneous every 24 hours  finasteride 5 milliGRAM(s) Oral daily  folic acid 1 milliGRAM(s) Oral daily  glucagon  Injectable 1 milliGRAM(s) IntraMuscular once  insulin glargine Injectable (LANTUS) 10 Unit(s) SubCutaneous at bedtime  insulin lispro (ADMELOG) corrective regimen sliding scale   SubCutaneous every 6 hours  levETIRAcetam 750 milliGRAM(s) Oral every 12 hours  metoprolol tartrate 12.5 milliGRAM(s) Oral every 12 hours  sodium chloride 7% Inhalation 4 milliLiter(s) Inhalation every 8 hours  timolol 0.5% Solution 1 Drop(s) Both EYES two times a day  zinc sulfate 220 milliGRAM(s) Oral daily    MEDICATIONS  (PRN):  dextrose Oral Gel 15 Gram(s) Oral once PRN Blood Glucose LESS THAN 70 milliGRAM(s)/deciliter    Anthropometrics:  Height: 5'4"  No dosing wt available.  IBW: 130 pounds / 59.1 kilograms               Weight Change:   No new weights obtained since admission. Recommend nursing to trend weekly weights. RD to continue monitoring weights as able.     Estimated energy needs:   Calories: 27-32kcal/k-1884kcal/d  Protein: 1.3-1.6g/k-94g/d  Defer fluids to team.   Pt's ideal body weight used for all calculations related to dosing weight not available. Needs adjusted for advanced age, infection/illness and pressure ulcer status.    Subjective:   89M non-verbal (understands Cantonese), wheelchair/bed bound, R hand dominant, bilateral hearing impairment (bilateral hearing aids) with PMHx of depression, glaucoma, HLD, uncontrolled T2DM( A1C 8.1%), thalassemia minor/JUSTYNA, BPH/hx indwelling hudson secondary to chronic urinary retention, HFpEF (LVEF 56%, grade I diastolic dysfunction), aortic root aneurysm (4.6 cm), hx CVA with right sided weakness in  and new R internal capsule CVA with L HP/WC/bed bound and seizure like activity on vEEG (), and dysphagia status post PEG placement on (24) who presents with +quantgold test resulting in NH with otherwise improving PNA from recent admission and no worsening systemic symptoms or signs and symptoms sepsis, now being admitted for TB r/o with AFB confirmation to return to nursing facility.    Pt seen on 7WO for follow-up. Labs and medication orders reviewed. Ordered for 10U lantus, vitamin C, folic acid, zinc sulfate. NPO with tube feed via PEG. Pt resting in bed, interview deferred to wife at bedside. RD utilized [employer]  (ID #[id number]). RD observed tube feed off this AM as per order x18hr 11AM-5AM. 24hr tube feed provision per pump: 920mL; indicates tube feed held for additional ~x2.5hr. Wife reports pt with ongoing tube feed tolerance. Requesting added nutrients/modular components due to concern for wound healing. MD reports will not increase tube feed rate >60mL/hr due to aspiration risk, receptive to Redd. See nutrition recommendations - RD communicated to team. RD to remain available.       Previous Nutrition Diagnosis: updated 5/15  Increased nutrient needs related to increased physiological demand as evidenced by pressure injury, HF.     Active [ x ]  Resolved [   ]    Goal:  Pt to consistently meet >75% nutrient needs via most appropriate route for nutrition.     Recommendations:  1. Continue NPO with Tube Feed via PEG: Glucerna 1.5 at 60mL/hr x18hr (11AM-5AM) + Redd x2/day to provide 1080mL total volume, 1620kcal, 89g protein, and 820mL free water.   >>Defer free water flushes to team.  >>Monitor GI tolerance and maintain aspiration precautions. RD to remain available to adjust EN regimen prn.   2. Recommend d/c additional micronutrient supplementation as Glucerna formula providing >100% RDI/DRI and Redd providing specific nutrients for wound healing. If to continue zinc sulfate, recommend d/c after x10d to avoid alterations in copper status.   3. Monitor weight trends, labs, skin integrity, & hydration status.  4. Pain and bowel regimens per team.  5. RD remains available for dietary education/intervention prn.     Education:  Discussed extensively with pt's wife tube feed regimen, protein provision, and modulars to promote wound healing per MD discretion. Educated on importance of avoiding supplements via PEG without first discussing with team. RD answered all questions, pt's wife aware RD remains available for additional questions/concerns. Admitting Diagnosis:   Patient is a 89y old  Male who presents with a chief complaint of +Quantiferon Test (15 May 2025 07:49)    PAST MEDICAL & SURGICAL HISTORY:  Diabetes  HLD (hyperlipidemia)  BPH (benign prostatic hyperplasia)  JUSTYNA (iron deficiency anemia)  Stroke  No significant past surgical history    Current Nutrition Order:  NPO with Tube Feed via PEG: Glucerna 1.5 at 60mL/hr x18hr to provide 1080mL total volume, 1620kcal, 89g protein, and 820mL free water.   >>FWF 240mL q8hr    PO Intake: Good (%) [   ]  Fair (50-75%) [   ] Poor (<25%) [   ] - N/A NPO    GI Issues:   Wife denies pt with nausea/vomiting  Reports pt with x2-4 loose BMs daily  No abdominal distension/discomfort noted    Pain:  No nonverbal indicators of pain noted    Skin Integrity:  No edema documented  Sacral stage IV pressure injury noted   Leonel score 8    25 @ 07:01  -  05-15-25 @ 07:00  --------------------------------------------------------  IN: 0 mL / OUT: 800 mL / NET: -800 mL    Labs:   05-15    137  |  101  |  15  ----------------------------<  183[H]  4.2   |  25  |  0.24[L]    Ca    8.8      15 May 2025 07:30  Phos  2.7     05-15  Mg     2.0     05-15    CAPILLARY BLOOD GLUCOSE  POCT Blood Glucose.: 220 mg/dL (15 May 2025 05:39)  POCT Blood Glucose.: 216 mg/dL (15 May 2025 00:38)  POCT Blood Glucose.: 257 mg/dL (14 May 2025 17:37)    Medications:  MEDICATIONS  (STANDING):  acetaminophen   Oral Liquid .. 650 milliGRAM(s) Oral every 12 hours  albuterol/ipratropium for Nebulization 3 milliLiter(s) Nebulizer every 6 hours  ascorbic acid 500 milliGRAM(s) Oral daily  aspirin  chewable 81 milliGRAM(s) Oral daily  atorvastatin 40 milliGRAM(s) Oral at bedtime  dextrose 5%. 1000 milliLiter(s) (50 mL/Hr) IV Continuous <Continuous>  dextrose 5%. 1000 milliLiter(s) (100 mL/Hr) IV Continuous <Continuous>  dextrose 50% Injectable 25 Gram(s) IV Push once  dextrose 50% Injectable 12.5 Gram(s) IV Push once  dextrose 50% Injectable 25 Gram(s) IV Push once  enoxaparin Injectable 40 milliGRAM(s) SubCutaneous every 24 hours  finasteride 5 milliGRAM(s) Oral daily  folic acid 1 milliGRAM(s) Oral daily  glucagon  Injectable 1 milliGRAM(s) IntraMuscular once  insulin glargine Injectable (LANTUS) 10 Unit(s) SubCutaneous at bedtime  insulin lispro (ADMELOG) corrective regimen sliding scale   SubCutaneous every 6 hours  levETIRAcetam 750 milliGRAM(s) Oral every 12 hours  metoprolol tartrate 12.5 milliGRAM(s) Oral every 12 hours  sodium chloride 7% Inhalation 4 milliLiter(s) Inhalation every 8 hours  timolol 0.5% Solution 1 Drop(s) Both EYES two times a day  zinc sulfate 220 milliGRAM(s) Oral daily    MEDICATIONS  (PRN):  dextrose Oral Gel 15 Gram(s) Oral once PRN Blood Glucose LESS THAN 70 milliGRAM(s)/deciliter    Anthropometrics:  Height: 5'4"  No dosing wt available.  IBW: 130 pounds / 59.1 kilograms               Weight Change:   No new weights obtained since admission. Recommend nursing to trend weekly weights. RD to continue monitoring weights as able.     Estimated energy needs:   Calories: 27-32kcal/k-1884kcal/d  Protein: 1.3-1.6g/k-94g/d  Defer fluids to team.   Pt's ideal body weight used for all calculations related to dosing weight not available. Needs adjusted for advanced age, infection/illness and pressure injury.    Subjective:   89M non-verbal (understands Cantonese), wheelchair/bed bound, R hand dominant, bilateral hearing impairment (bilateral hearing aids) with PMHx of depression, glaucoma, HLD, uncontrolled T2DM( A1C 8.1%), thalassemia minor/JUSTYNA, BPH/hx indwelling hudson secondary to chronic urinary retention, HFpEF (LVEF 56%, grade I diastolic dysfunction), aortic root aneurysm (4.6 cm), hx CVA with right sided weakness in  and new R internal capsule CVA with L HP/WC/bed bound and seizure like activity on vEEG (), and dysphagia status post PEG placement on (24) who presents with +quantgold test resulting in NH with otherwise improving PNA from recent admission and no worsening systemic symptoms or signs and symptoms sepsis, now being admitted for TB r/o with AFB confirmation to return to nursing facility.    Pt seen on 7WO for follow-up. Labs and medication orders reviewed. Ordered for 10U lantus, vitamin C, folic acid, zinc sulfate. NPO with tube feed via PEG. Pt resting in bed, interview deferred to wife at bedside. RD utilized [employer]  (ID #[id number]). RD observed tube feed off this AM as per order x18hr 11AM-5AM. 24hr tube feed provision per pump: 920mL; indicates tube feed held for additional ~x2.5hr. Wife reports pt with ongoing tube feed tolerance. Requesting added nutrients/modular components due to concern for wound healing. MD reports will not increase tube feed rate >60mL/hr due to aspiration risk, receptive to Redd. See nutrition recommendations - RD communicated to team. RD to remain available.       Previous Nutrition Diagnosis: updated 5/15  Increased nutrient needs related to increased physiological demand as evidenced by pressure injury, HF.     Active [ x ]  Resolved [   ]    Goal:  Pt to consistently meet >75% nutrient needs via most appropriate route for nutrition.     Recommendations:  1. Continue NPO with Tube Feed via PEG: Glucerna 1.5 at 60mL/hr x18hr (11AM-5AM) + Redd x2/day to provide 1080mL total volume, 1620kcal (27kcal/kg IBW 59.1kg), 89g protein (1.5g/kg IBW 59.1kg), and 820mL free water.   >>Defer free water flushes to team.  >>Monitor GI tolerance and maintain aspiration precautions. RD to remain available to adjust EN regimen prn.   2. Recommend d/c additional micronutrient supplementation as Glucerna formula providing >100% RDI/DRI and Redd providing specific nutrients for wound healing. If to continue zinc sulfate, recommend d/c after x10d to avoid alterations in copper status.   3. Monitor weight trends, labs, skin integrity, & hydration status.  4. Pain and bowel regimens per team.  5. RD remains available for dietary education/intervention prn.     Education:  Discussed extensively with pt's wife tube feed regimen, protein provision, and modulars to promote wound healing per MD discretion. Educated on importance of avoiding supplements via PEG without first discussing with team. RD answered all questions, pt's wife aware RD remains available for additional questions/concerns.

## 2025-05-15 NOTE — PROGRESS NOTE ADULT - ASSESSMENT
89M, nonverbal (understands cantonese) w PMHx multiple CVA c/b L sided weakness now bedbound, s/p PEG, stage III sacral ulcer, DM, BPH, glaucoma, b/l hearing impairment, aortic root aneurysm, recent admission for Strep constellatus and B. gragilis bacteremia 2/2 presacral absces, sacral OM/cellulitis, & hx recurrent aspiration PNA, admitted from [facility] due to positive QuantiFeron test and concomitant pna on cxr (was on iv abx at the time, course completed on current admission) for which pt could not be readmitted to [facility] without 3 negative afb tests.  ID consulted 5/13 1x fever to 100.8 (bcx collected), w cbc differential not concerning for new infection - 1x temperature possibly secondary to aspiration pneumonitis, afebrile ovn    - continue to monitor off of antibiotics  - follow up urinalysis & ucx, bcx collected 5/13  - if pt has another fever (temperature of 100.4 sustained for minimum 1h or 1x 100.8) please obtain repeat bcx, ua + ucx, cxr (rule out asp), & tx empirically w vancomycin & zosyn

## 2025-05-15 NOTE — PROGRESS NOTE ADULT - PROBLEM SELECTOR PLAN 1
Recent admission 4/22-5/1 for which patient was admitted for severe sepsis and found to have RLL aspiration PNA and bacteroides+ and strep+ species with subsequent negative bcx; discharged to NH with PICC to complete abx (CTX 1 g QD and PO HAJI, end date 5/10/25); now returning d/t +quantiferon test; CXR with improving opacities and no cavitations or c/f TB on previous imaging (CT chest 04/2025), no fever/weight loss or systemic symptoms except for intermittent cough with whitish sputum which pt had on prior admission (recovering from CAP); WBC 9 w/o bandemia; pt originally from [place]; ID called in ED and 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

## 2025-05-15 NOTE — PROGRESS NOTE ADULT - PROBLEM SELECTOR PLAN 3
Home meds: metformin 500 mg 3 times a day, januvia 50 mg QD; wife does not give medications as prescribed as she was told by PCP that he does not need medication anymore given age and last A1c ~7.     - Lantus 10U qhs  - ctm FS q6 abdi since NPO  - Moderate ISS q6.

## 2025-05-15 NOTE — PROGRESS NOTE ADULT - SUBJECTIVE AND OBJECTIVE BOX
*****INCOMPLETE NOTE*****    INTERVAL HPI/OVERNIGHT EVENTS:    SUBJECTIVE: Patient seen and examined at bedside, resting comfortably in bed, and does not appear to be in any acute distress. Patient reports    Vital Signs Last 24 Hrs  T(C): 36.8 (15 May 2025 06:09), Max: 37.2 (14 May 2025 11:32)  T(F): 98.2 (15 May 2025 06:09), Max: 98.9 (14 May 2025 11:32)  HR: 108 (15 May 2025 06:09) (103 - 113)  BP: 131/74 (15 May 2025 06:09) (114/71 - 131/74)  BP(mean): 93 (15 May 2025 06:09) (93 - 93)  RR: 17 (15 May 2025 06:09) (17 - 19)  SpO2: 98% (15 May 2025 06:09) (97% - 99%)    Parameters below as of 15 May 2025 06:09  Patient On (Oxygen Delivery Method): nasal cannula  O2 Flow (L/min): 2      PHYSICAL EXAM:  General: in no acute distress  Eyes: EOMI intact bilaterally. Anicteric sclerae, moist conjunctivae  HENT: Moist mucous membranes  Neck: Trachea midline, supple  Lungs: CTA B/L. No wheezes, rales, or rhonchi  Cardiovascular: RRR. No murmurs, rubs, or gallops  Abdomen: Soft, non-tender non-distended; No rebound or guarding  Extremities: WWP, No clubbing, cyanosis or edema  Neurological: Alert and oriented  Skin: Warm and dry. No obvious rash     LABS:                        9.0    7.86  )-----------( 272      ( 14 May 2025 05:30 )             27.7     05-14    135  |  100  |  13  ----------------------------<  162[H]  3.9   |  26  |  0.26[L]    Ca    8.4      14 May 2025 05:30  Phos  2.6     05-14  Mg     1.8     05-14

## 2025-05-15 NOTE — PROGRESS NOTE ADULT - PROBLEM SELECTOR PLAN 7
Home med: timolol 0.5 mg 1 drop BID    - c/w home med Present on admission. Last admission requiring debridement with surgery however no changes in exam since last admission.  Patient is entirely bedbound since stroke    - wet to dry dressing  - surgery consulted who stated no further debridement necessary, no wound vac necessary  - frequent turning and nursing care  - wound care consulted

## 2025-05-15 NOTE — PROGRESS NOTE ADULT - PROBLEM SELECTOR PLAN 1
Recent admission 4/22-5/1 for which patient was admitted for severe sepsis and found to have RLL aspiration PNA and bacteroides+ and strep+ species with subsequent negative bcx; discharged to NH with PICC to complete abx (CTX 1 g QD and PO HAJI, end date 5/10/25); now returning d/t +quantiferon test; CXR with improving opacities and no cavitations or c/f TB on previous imaging (CT chest 04/2025), no fever/weight loss or systemic symptoms except for intermittent cough with whitish sputum which pt had on prior admission (recovering from CAP); WBC 9 w/o bandemia; pt originally from [place]; ID called in ED and 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 Recent admission 4/22-5/1 for which patient was admitted for severe sepsis and found to have RLL aspiration PNA and bacteroides+ and strep+ species with subsequent negative bcx; discharged to NH with PICC to complete abx (CTX 1 g QD and PO HAJI, end date 5/10/25); now returning d/t +quantiferon test; CXR with improving opacities and no cavitations or c/f TB on previous imaging (CT chest 04/2025), no fever/weight loss or systemic symptoms except for intermittent cough with whitish sputum which pt had on prior admission (recovering from CAP); WBC 9 w/o bandemia; pt originally from [place]; ID called in ED and 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

## 2025-05-15 NOTE — PROGRESS NOTE ADULT - ATTENDING COMMENTS
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 GI consulted and PEG tube study showed PEG is functioning and no need to change to a new one. WOCN consult appreciated, given Sliver nitrate and aquagel Ag dressing daily to PEG site. Dietitian consult appreciated, TF changed to Glucerna 1.2 @ 83ml/hr from 5pm to 11am x 18hr, total 6 cans per day. Pt recently admitted to ICU for aspiration PNA and had indwelling hudson removed and discharged from [facility] ( 2/25/25) and sent to [facility] and subsequently sent home on home O2 per wife, admitted [facility] ( 3/26-4/1)  for severe sepsis w/ lactic acidosis 2/2 recurrent multifocal HAP likely aspiration/gram negative organism and and last admitted [facility] ( 4/22-5/1)  infected sacral decubitus (POA)--Strep bacteremia, sacral ulcer with abscess sp bedside debridement by surgery 4/27- bleeding required electrocautery , given one unit of prbc before debridement - now hb at 7.7 --> given a unit 4/28- hb above 9 - wound vac placed by team 4 surgery on 4/29, discharged on 5/1 to [facility] for 2 weeks of antibiotics with ceftriaxone/flagyl ( 4/27- 5/10 ). Pt now presented to [facility] ( 5/9) for +quantgold test resulting in NH with otherwise improving PNA from recent admission and no worsening systemic symptoms or s/s sepsis, now being admitted for TB r/o with AFB confirmation to return to nursing facility.     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   - airway clearance: bj-nebs,  remains on NC2L as baseline for chronic hypoxic resp failure    - NPO/ oral hygiene q shift/aspiration precautions     # Recent strep bacteremia w/ sacral ucler Stage IV   - completed Ceftriaxone and flagyl ( 5/10) - midline removal 5/12-   low grade temp 5/13- ID reconsulted, monitor off antibitics , bc 5/13- ntd, remain afebrile afterwards.  - Surgery consult appreciated, no need for debridement of sacral wound, wound vac was placed by surgery during last admission - surgery follow up determine no further debridement, no wound vac   = would care evaluation - to have dressing change frequently.   - added MVI, VIt C 500mg daily and ZnSo4 220mg po daily   - nutrition evauation for supplement.     # Dysphagia  # Hyponatremia -mild  - c/w TF//glucerna 5 cans a day and free water via PEG.    # T2DM- with hyperglycemia   - lantus to 10 Units Q hs - continue with FS and SS goal glucose 100-180  - hypophos -to replace.     # tachycardia- record reviewed- pt was on Metoprolol 50 mg bid on 12/12/24- ( was held during last admission due to relative hypotension from sepsis, now with improved BP and tachycardia to restart metoprolol tartrate 12.5 mg via peg BID and to titrate up.   # DVT ppx     # FULL CODE- GOC as discussed wife at bedside    # Disposition: medically stable, AFB x 3 negative.   dispo : [facility] vs home with 12 hours x 2 shifts requested by wife- she is elderly and could not take care of him - pt need full assist to turn and change position frequent , and care for bowel function to keep sacral area clean to prevent recurrent infection to sacral wound.   he would benefit from 12 hours x 2 shift ( to have 24 hours care )   questions from wife answered, emotional support provided.   SW help appreciated.    Pt is followed by home visit physician from [facility]   medically stable, at risk of recurrent aspiration pnueumonia and wound infection.

## 2025-05-15 NOTE — PROGRESS NOTE ADULT - PROBLEM SELECTOR PLAN 6
Present on admission. Last admission requiring debridement with surgery however no changes in exam since last admission.  Patient is entirely bedbound since stroke    - wet to dry dressing  - surgery consulted who stated no further debridement necessary, no wound vac necessary  - frequent turning and nursing care  - wound care consulted As above, on keppra 750mg (seizure ppx after CVA event), per wife pt never had seizures.    -Continue home med as above

## 2025-05-15 NOTE — PROGRESS NOTE ADULT - PROBLEM SELECTOR PLAN 2
4/22-5/1 admission for severe sepsis revealing BCx 4/22 - +Bacteroides fragilis, +Strep species with subsequent bcx clearance, in addition to RLL PNA suspected to aspiration PNA at that time; CTX 1g (4/29 -5/10) and Flagyl 500mg PO  (4/29 -5/10 ) recommended by ID service at that time; discharged to NH with PICC for completion of abx regimen (no reported missed doses); pt currently afebrile, breathing comfortably saturating 98% on 2L w/o leukocytosis and CXR with improving opacities     -Repeat bcx if reveals evidence of sepsis   -Completed abx course that pt was previously discharged on: CTX 1g IV QD (4/29 -5/10) and Flagyl 500mg PO Q12H (4/29 -5/10); PICC line removed on 5/12

## 2025-05-15 NOTE — PROGRESS NOTE ADULT - SUBJECTIVE AND OBJECTIVE BOX
*****INCOMPLETE NOTE*****    INTERVAL HPI/OVERNIGHT EVENTS:    SUBJECTIVE: Patient seen and examined at bedside, resting comfortably in bed, and does not appear to be in any acute distress. Patient reports    Vital Signs Last 24 Hrs  T(C): 36.8 (15 May 2025 06:09), Max: 37.2 (14 May 2025 11:32)  T(F): 98.2 (15 May 2025 06:09), Max: 98.9 (14 May 2025 11:32)  HR: 108 (15 May 2025 06:09) (103 - 113)  BP: 131/74 (15 May 2025 06:09) (114/71 - 131/74)  BP(mean): 93 (15 May 2025 06:09) (93 - 93)  RR: 17 (15 May 2025 06:09) (17 - 19)  SpO2: 98% (15 May 2025 06:09) (97% - 99%)    Parameters below as of 15 May 2025 06:09  Patient On (Oxygen Delivery Method): nasal cannula  O2 Flow (L/min): 2      PHYSICAL EXAM:  General: in no acute distress  Eyes: EOMI intact bilaterally. Anicteric sclerae, moist conjunctivae  HENT: Moist mucous membranes  Neck: Trachea midline, supple  Lungs: CTA B/L. No wheezes, rales, or rhonchi  Cardiovascular: RRR. No murmurs, rubs, or gallops  Abdomen: Soft, non-tender non-distended; No rebound or guarding  Extremities: WWP, No clubbing, cyanosis or edema  Neurological: Alert and oriented  Skin: Warm and dry. No obvious rash     LABS:                        9.0    7.86  )-----------( 272      ( 14 May 2025 05:30 )             27.7     05-14    135  |  100  |  13  ----------------------------<  162[H]  3.9   |  26  |  0.26[L]    Ca    8.4      14 May 2025 05:30  Phos  2.6     05-14  Mg     1.8     05-14     INTERVAL HPI/OVERNIGHT EVENTS: PEDRO o/n    SUBJECTIVE: Patient seen and examined at bedside, resting comfortably in bed, and does not appear to be in any acute distress. Patient's wife has no acute concerns at this time.     Vital Signs Last 24 Hrs  T(C): 36.8 (15 May 2025 06:09), Max: 37.2 (14 May 2025 11:32)  T(F): 98.2 (15 May 2025 06:09), Max: 98.9 (14 May 2025 11:32)  HR: 108 (15 May 2025 06:09) (103 - 113)  BP: 131/74 (15 May 2025 06:09) (114/71 - 131/74)  BP(mean): 93 (15 May 2025 06:09) (93 - 93)  RR: 17 (15 May 2025 06:09) (17 - 19)  SpO2: 98% (15 May 2025 06:09) (97% - 99%)    Parameters below as of 15 May 2025 06:09  Patient On (Oxygen Delivery Method): nasal cannula  O2 Flow (L/min): 2      PHYSICAL EXAM:  General: in no acute distress  Eyes: Anicteric sclerae, moist conjunctivae  HENT: Moist mucous membranes  Neck: Trachea midline, supple  Lungs: CTA B/L. No wheezes, rales, or rhonchi  Cardiovascular: RRR. No murmurs, rubs, or gallops  Abdomen: Soft, non-tender non-distended; No rebound or guarding. PEG site clean and dry.   Extremities: WWP, No clubbing, cyanosis or edema  Neurological: Alert, tracks with eyes, not oriented  Skin: Warm and dry. No obvious rash     LABS:                        9.0    7.86  )-----------( 272      ( 14 May 2025 05:30 )             27.7     05-14    135  |  100  |  13  ----------------------------<  162[H]  3.9   |  26  |  0.26[L]    Ca    8.4      14 May 2025 05:30  Phos  2.6     05-14  Mg     1.8     05-14

## 2025-05-15 NOTE — PROGRESS NOTE ADULT - PROBLEM SELECTOR PLAN 5
As above, on keppra 750mg (seizure ppx after CVA event), per wife pt never had seizures.    -Continue home med as above Pt with a hx of stroke 2004 with residual left-sided weakness. S/p PEG placement 05/2024   MRI brain on 4/28 with R internal capsule infarct   Head CT 5/5: subacute infarct centered in the genu and posterior limb of the right internal capsule is stable.  A chronic transcortical infarction in the left precentral gyrus and a small chronic infarct in the right cerebellar hemisphere are stable.  Home meds: ASA 81mg, Atorvastatin 40mg, keppra 750mg (seizure ppx), per wife pt never had seizures.    - C/w home meds  - Tube feeds resumed, parameters per nutrition recs. Nutrition consulted

## 2025-05-15 NOTE — PROGRESS NOTE ADULT - PROBLEM SELECTOR PLAN 6
Present on admission. Last admission requiring debridement with surgery however no changes in exam since last admission.  Patient is entirely bedbound since stroke    - wet to dry dressing  - surgery consulted who stated no further debridement necessary, no wound vac necessary  - frequent turning and nursing care  - wound care consulted

## 2025-05-15 NOTE — PROGRESS NOTE ADULT - PROBLEM SELECTOR PLAN 3
Home meds: metformin 500 mg 3 times a day, januvia 50 mg QD; wife does not give medications as prescribed as she was told by PCP that he does not need medication anymore given age and last A1c ~7.     - Lantus 10U qhs  - ctm FS q6 abdi since NPO  - Moderate ISS q6. Patient with tachycardia in low 100s, was previously on Lopressor 50 mg bid but discontinued when patient was in septic shock and has since not been restarted.     - Restart lopressor 12.5 mg q12h and slowly uptitrate as needed  - Start tylenol 650 mg q12h atc as patient is not able to verbalize pain

## 2025-05-15 NOTE — PROGRESS NOTE ADULT - PROBLEM SELECTOR PLAN 8
F: none   E: replete PRN  N: NPO with tube feeds  DVT ppx: lovenox, on asa 81mg   Dispo: [facility] Home med: timolol 0.5 mg 1 drop BID    - c/w home med

## 2025-05-16 PROCEDURE — 99233 SBSQ HOSP IP/OBS HIGH 50: CPT

## 2025-05-16 PROCEDURE — 99497 ADVNCD CARE PLAN 30 MIN: CPT | Mod: 25

## 2025-05-16 PROCEDURE — 99223 1ST HOSP IP/OBS HIGH 75: CPT

## 2025-05-16 PROCEDURE — 99498 ADVNCD CARE PLAN ADDL 30 MIN: CPT | Mod: 25

## 2025-05-16 RX ADMIN — IPRATROPIUM BROMIDE AND ALBUTEROL SULFATE 3 MILLILITER(S): .5; 2.5 SOLUTION RESPIRATORY (INHALATION) at 00:41

## 2025-05-16 RX ADMIN — FOLIC ACID 1 MILLIGRAM(S): 1 TABLET ORAL at 11:59

## 2025-05-16 RX ADMIN — Medication 220 MILLIGRAM(S): at 06:15

## 2025-05-16 RX ADMIN — Medication 500 MILLIGRAM(S): at 11:59

## 2025-05-16 RX ADMIN — INSULIN GLARGINE-YFGN 10 UNIT(S): 100 INJECTION, SOLUTION SUBCUTANEOUS at 21:29

## 2025-05-16 RX ADMIN — LEVETIRACETAM 750 MILLIGRAM(S): 10 INJECTION, SOLUTION INTRAVENOUS at 06:16

## 2025-05-16 RX ADMIN — LEVETIRACETAM 750 MILLIGRAM(S): 10 INJECTION, SOLUTION INTRAVENOUS at 17:59

## 2025-05-16 RX ADMIN — METOPROLOL SUCCINATE 12.5 MILLIGRAM(S): 50 TABLET, EXTENDED RELEASE ORAL at 06:11

## 2025-05-16 RX ADMIN — TIMOLOL MALEATE 1 DROP(S): 6.8 SOLUTION OPHTHALMIC at 18:00

## 2025-05-16 RX ADMIN — ATORVASTATIN CALCIUM 40 MILLIGRAM(S): 80 TABLET, FILM COATED ORAL at 21:29

## 2025-05-16 RX ADMIN — IPRATROPIUM BROMIDE AND ALBUTEROL SULFATE 3 MILLILITER(S): .5; 2.5 SOLUTION RESPIRATORY (INHALATION) at 06:15

## 2025-05-16 RX ADMIN — Medication 4 MILLILITER(S): at 19:18

## 2025-05-16 RX ADMIN — Medication 4 MILLILITER(S): at 00:41

## 2025-05-16 RX ADMIN — ENOXAPARIN SODIUM 40 MILLIGRAM(S): 100 INJECTION SUBCUTANEOUS at 21:29

## 2025-05-16 RX ADMIN — FINASTERIDE 5 MILLIGRAM(S): 1 TABLET, FILM COATED ORAL at 11:59

## 2025-05-16 RX ADMIN — TIMOLOL MALEATE 1 DROP(S): 6.8 SOLUTION OPHTHALMIC at 06:11

## 2025-05-16 RX ADMIN — INSULIN LISPRO 2: 100 INJECTION, SOLUTION INTRAVENOUS; SUBCUTANEOUS at 06:48

## 2025-05-16 RX ADMIN — Medication 650 MILLIGRAM(S): at 17:59

## 2025-05-16 RX ADMIN — INSULIN LISPRO 2: 100 INJECTION, SOLUTION INTRAVENOUS; SUBCUTANEOUS at 17:59

## 2025-05-16 RX ADMIN — Medication 81 MILLIGRAM(S): at 11:59

## 2025-05-16 RX ADMIN — Medication 650 MILLIGRAM(S): at 06:11

## 2025-05-16 RX ADMIN — IPRATROPIUM BROMIDE AND ALBUTEROL SULFATE 3 MILLILITER(S): .5; 2.5 SOLUTION RESPIRATORY (INHALATION) at 19:18

## 2025-05-16 RX ADMIN — INSULIN LISPRO 4: 100 INJECTION, SOLUTION INTRAVENOUS; SUBCUTANEOUS at 00:41

## 2025-05-16 NOTE — PROGRESS NOTE ADULT - SUBJECTIVE AND OBJECTIVE BOX
INTERVAL HPI/OVERNIGHT EVENTS: stephanie     SUBJECTIVE: Patient seen and examined at bedside, resting comfortably in bed, and does not appear to be in any acute distress. Patient unable to communicate needs. Per wife, patient is excessively sweating throughout his hospital stay.     Vital Signs Last 24 Hrs  T(C): 36.9 (16 May 2025 12:04), Max: 36.9 (16 May 2025 05:57)  T(F): 98.5 (16 May 2025 12:04), Max: 98.5 (16 May 2025 12:04)  HR: 95 (16 May 2025 12:04) (93 - 111)  BP: 109/66 (16 May 2025 12:04) (102/61 - 125/74)  BP(mean): 91 (16 May 2025 05:57) (91 - 91)  RR: 18 (16 May 2025 12:04) (18 - 18)  SpO2: 98% (16 May 2025 12:04) (96% - 99%)    Parameters below as of 16 May 2025 12:04  Patient On (Oxygen Delivery Method): room air        PHYSICAL EXAM:  General: in no acute distress. Diaphoretic  Eyes: EOMI intact bilaterally. Anicteric sclerae, moist conjunctivae  HENT: Moist mucous membranes  Neck: Trachea midline, supple  Lungs: CTA B/L. No wheezes, rales, or rhonchi  Cardiovascular: RRR. No murmurs, rubs, or gallops  Abdomen: PEG site CDI   Extremities: WWP, No clubbing, cyanosis or edema  Neurological: Alert and orientedx0  Skin: Warm and dry. No obvious rash     LABS:                        9.1    9.34  )-----------( 291      ( 15 May 2025 07:30 )             28.6     05-15    137  |  101  |  15  ----------------------------<  183[H]  4.2   |  25  |  0.24[L]    Ca    8.8      15 May 2025 07:30  Phos  2.7     05-15  Mg     2.0     05-15

## 2025-05-16 NOTE — PROGRESS NOTE ADULT - ATTENDING COMMENTS
89y M non-verbal ( understands Cantonese), wheelchair/bed bound, R hand dominant, b/l  hearing impairment ( b/l hearing aids) with PMHx of Low BMI( 18.2-> 20.6)/severe protein calorie malnutrition, Depression, glaucoma, HLD, uncontrolled T2DM( A1C 8.1%), Thalassemia minor/JUSTYNA, BPH/hx indwelling hudson 2/2 chronic urinary retention , HFpEF (LVEF 56%, grade I diastolic dysfunction), aortic root aneurysm (4.6 cm), hx CVA with right sided weakness in 2004 and new R internal capsule CVA with L HP/WC/bed bound and Sz like activity on vEEG (04/24), dysphagia, s/p PEG placement on (5/7/24) , hx freq hospitalization at [facility]  (12/10-12/12/24) for wound care and further nutrition evaluation and management. GI consulted and PEG tube study showed PEG is functioning and no need to change to a new one. WOCN consult appreciated, given Sliver nitrate and aquagel Ag dressing daily to PEG site. Dietitian consult appreciated, TF changed to Glucerna 1.2 @ 83ml/hr from 5pm to 11am x 18hr, total 6 cans per day. Pt recently admitted to ICU for aspiration PNA and had indwelling hudson removed and discharged from [place] ( 2/25/25) and sent to [facility] and subsequently sent home on home O2 per wife, admitted [facility] ( 3/26-4/1)  for severe sepsis w/ lactic acidosis 2/2 recurrent multifocal HAP likely aspiration/gram negative organism and and last admitted [facility] ( 4/22-5/1)  infected sacral decubitus (POA)--Strep bacteremia, sacral ulcer with abscess sp bedside debridement by surgery 4/27- bleeding required electrocautery , given one unit of prbc before debridement - now hb at 7.7 --> given a unit 4/28- hb above 9 - wound vac placed by team 4 surgery on 4/29, discharged on 5/1 to [facility] for 2 weeks of antibiotics with ceftriaxone/flagyl ( 4/27- 5/10 ). Pt now presented to [facility] ( 5/9) for +quantgold test resulting in NH with otherwise improving PNA from recent admission and no worsening systemic symptoms or s/s sepsis, now being admitted for TB r/o with AFB confirmation to return to nursing facility.     appear comfortable, make eye contact, tolerating diet.       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   - airway clearance: bj-nebs,  remains on NC2L as baseline for chronic hypoxic resp failure    - NPO/ oral hygiene q shift/aspiration precautions     # Recent strep bacteremia w/ sacral ucler Stage IV   - completed Ceftriaxone and flagyl ( 5/10) - midline removal 5/12-   low grade temp 5/13- ID reconsulted, monitor off antibitics , bc 5/13- ntd, remain afebrile afterwards.  - Surgery consult appreciated, no need for debridement of sacral wound, wound vac was placed by surgery during last admission - surgery follow up determine no further debridement, no wound vac   = would care evaluation - to have dressing change frequently.   - added MVI, VIt C 500mg daily and ZnSo4 220mg po daily   - nutrition evauation for supplement.     # Dysphagia  # Hyponatremia -mild  - c/w TF//glucerna 5 cans a day and free water via PEG.    # T2DM- with hyperglycemia   - lantus to 10 Units Q hs - continue with FS and SS goal glucose 100-180  - hypophos -to replace.     # tachycardia- record reviewed- pt was on Metoprolol 50 mg bid on 12/12/24- ( was held during last admission due to relative hypotension from sepsis, now with improved BP and tachycardia to restart metoprolol tartrate 12.5 mg via peg BID and to titrate up.   # DVT ppx     # FULL CODE- GOC as discussed wife at bedside    # Disposition: medically stable, AFB x 3 negative.   dispo : [facility] vs home with 12 hours x 2 shifts requested by wife- she is elderly and could not take care of him - pt need full assist to turn and change position frequent , and care for bowel function to keep sacral area clean to prevent recurrent infection to sacral wound.   he would benefit from 12 hours x 2 shift ( to have 24 hours care )   questions from wife answered, emotional support provided.   SW help appreciated.    Pt is followed by home visit physician from [facility]   medically stable, at risk of recurrent aspiration pnueumonia and wound infection.    5/16: dispo : conflicting messages-- per SW team will told pt has to go home ( insurance wont cover for [facility] ) -- Wife reported per her conversation with rep from [employer] they were told insurance will cover for his [facility] and [facility] also told them they will accept him   -- clarifying with SW and CM for dispo -- wife could not take care of patient at home with 13 hours hha ( though is 24 hours lives in -work only 13 hours - she would need 12 hours x 2 person for 24 hours coverage and she prefer he goes to [facility]   she is worried about wound not healing - fixated on wound ( explained again that surgery re=evaluated and dose not require further wound debridement.

## 2025-05-16 NOTE — CONSULT NOTE ADULT - ASSESSMENT
89y M Cantonese speaking, wheelchair bound, R hand dominant, b/l  hearing impairment ( b/l hearing aids) with PMHx of DM, Thalassemia minor/JUSTYNA, HLD, hx stroke with right sided weakness in 2004 and stroke workup (05/24) depression, glaucoma, BPH, s/p PEG placement on 5/7 + Quanteferon.

## 2025-05-16 NOTE — CONSULT NOTE ADULT - TIME BILLING
Fever, positive quantiferon
Emotional Support/Supportive Care and Clarification of Potential Disease Trajectory related to  Assessment of Symptom [place] and Palliative regimen  Education and Monitoring of Opiates including titration and management of high risk medications  Discharge Facilitation with ongoing coordination  Exploration of GOC/Advanced Directives including HCP designation, code status, and hospice eligibility    Time exclusive of ACP discussion  Time inclusive of chart review, medication ordering, discussion with primary team, clinical documentation, and communication with family/caregiver

## 2025-05-16 NOTE — PROGRESS NOTE ADULT - ASSESSMENT
Ashley Whelan MD 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

## 2025-05-16 NOTE — PROGRESS NOTE ADULT - PROBLEM SELECTOR PLAN 3
Patient with tachycardia in low 100s, was previously on Lopressor 50 mg bid but discontinued when patient was in septic shock and has since not been restarted.     - Restart lopressor 12.5 mg q12h and slowly uptitrate as needed  - Start tylenol 650 mg q12h atc as patient is not able to verbalize pain Patient with tachycardia in low 100s, was previously on Lopressor 50 mg bid but discontinued when patient was in septic shock and has since not been restarted.     - c/w lopressor 12.5 mg q12h and slowly uptitrate as needed  - Start Tylenol 650 mg q12h atc as patient is not able to verbalize pain

## 2025-05-16 NOTE — PROGRESS NOTE ADULT - PROBLEM SELECTOR PLAN 1
Recent admission 4/22-5/1 for which patient was admitted for severe sepsis and found to have RLL aspiration PNA and bacteroides+ and strep+ species with subsequent negative bcx; discharged to NH with PICC to complete abx (CTX 1 g QD and PO HAJI, end date 5/10/25); now returning d/t +quantiferon test; CXR with improving opacities and no cavitations or c/f TB on previous imaging (CT chest 04/2025), no fever/weight loss or systemic symptoms except for intermittent cough with whitish sputum which pt had on prior admission (recovering from CAP); WBC 9 w/o bandemia; pt originally from [place]; ID called in ED and 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 Recent admission 4/22-5/1 for which patient was admitted for severe sepsis and found to have RLL aspiration PNA and bacteroides+ and strep+ species with subsequent negative bcx; discharged to NH with PICC to complete abx (CTX 1 g QD and PO HAJI, end date 5/10/25); now returning d/t +quantiferon test; CXR with improving opacities and no cavitations or c/f TB on previous imaging (CT chest 04/2025), no fever/weight loss or systemic symptoms except for intermittent cough with whitish sputum which pt had on prior admission (recovering from CAP); WBC 9 w/o bandemia; pt originally from [place]; ID called in ED and 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

## 2025-05-16 NOTE — CHART NOTE - NSCHARTNOTEFT_GEN_A_CORE
Patient is bedbound, nonverbal, with a stage 4 sacral ulcer that requires daily wound care and dressing changes. Additionally, patient is getting tube feeds and is on supplemental oxygen, and would therefore benefit from [facility] due to daily skilled nursing needs.

## 2025-05-16 NOTE — CONSULT NOTE ADULT - SUBJECTIVE AND OBJECTIVE BOX
[facility]  John Hernandez, Palliative Care Attending  Contact Info: Call [phone] (HEAL Line) or message on Microsoft Teams    HPI:  89M non-verbal (understands Cantonese), wheelchair/bed bound, R hand dominant, b/l  hearing impairment (b/l hearing aids) with PMHx of Low BMI( 18.2-> 20.6)/severe protein calorie malnutrition, Depression, glaucoma, HLD, uncontrolled T2DM( A1C 8.1%), Thalassemia minor/JUSTYNA, BPH/hx indwelling hudson 2/2 chronic urinary retention , HFpEF (LVEF 56%, grade I diastolic dysfunction), aortic root aneurysm (4.6 cm), hx CVA with right sided weakness in 2004 and new R internal capsule CVA with L HP/WC/bed bound and Sz like activity on vEEG (04/24), dysphagia, s/p PEG placement on (5/7/24), presents after quantiferon+ test resulting in NH.     Of note, was recently admitted 4/22-5/1 for severe sepsis iso infected decubitus ulcer and RLL PNA. Discharged back to nursing home with PICC line for CTX and HAJI (end date 5/10) to cover PNA and +Bacteroides fragilis, +Strep species bacteremia. Per wife, she is unsure why a quantiferon test was done. No previous TB diagnosis per wife. States she thinks he has been improving since admission, mental status is getting better and able to speak more. Reports  has whitish sputum with occasional cough that is also stable-improving from discharge. Uses wound vac for sacral wound on last admission and in nursing home. No new reported fever, weight loss, or worsening breathing status per wife. Reportedly has had BCG vaccination in [place] previously. Has been adherent with antibiotics and otherwise has had no systemic symptoms. 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     In ED:   VS: T 98.4, /82, , RR 17, O2 98% on 2L  Labs: WBC 9 w/o bandemia, H/H 10/30.5, Plts 254, Na 134, K 4.2, Cr 0.25, Glucose 187  Imaging: CXR: Improvement in aeration of the lower lung zones with probable persistent left-sided pleural effusion. There is a right-sided PICC the tip located in the region of   the right axilla.  Interventions: None    (09 May 2025 18:05)    PERTINENT PM/SXH:   Diabetes    HLD (hyperlipidemia)    BPH (benign prostatic hyperplasia)    JUSTYNA (iron deficiency anemia)    Stroke      No significant past surgical history      FAMILY HISTORY:  No pertinent family history in first degree relatives      ITEMS NOT CHECKED ARE NOT PRESENT    SOCIAL HISTORY:   Significant other/partner:  [x]  Children:  []   Substance hx:  []   Tobacco hx:  []   Alcohol hx: []   Home Opioid hx:  [] I-Stop Reference No:  - no active Rx's / see chart note  Living Situation: [x]Home  []Long term care  []Rehab []Other  Zoroastrian/Spiritual practice: ; Role of organized Nondenominational [ ] important [ ] some [x ] unable to assess  Coping: [ ] well [ ] with difficulty [ ] poor coping [ x] unable to assess  Support system: [ ] strong [ ] adequate [x ] inadequate    ADVANCE DIRECTIVES:    []MOLST  []Living Will  DECISION MAKER(s):  [] Health Care Proxy(s)  [x] Surrogate(s)  [] Guardian           Name(s)/Phone Number(s): Saji    BASELINE (I)ADLs (prior to admission):  [place]: []Total  [] Moderate [x]Dependent    ALLERGIES:  No Known Allergies    MEDICATIONS  (STANDING):  acetaminophen   Oral Liquid .. 650 milliGRAM(s) Oral every 12 hours  albuterol/ipratropium for Nebulization 3 milliLiter(s) Nebulizer every 6 hours  ascorbic acid 500 milliGRAM(s) Oral daily  aspirin  chewable 81 milliGRAM(s) Oral daily  atorvastatin 40 milliGRAM(s) Oral at bedtime  dextrose 5%. 1000 milliLiter(s) (100 mL/Hr) IV Continuous <Continuous>  dextrose 5%. 1000 milliLiter(s) (50 mL/Hr) IV Continuous <Continuous>  dextrose 50% Injectable 25 Gram(s) IV Push once  dextrose 50% Injectable 12.5 Gram(s) IV Push once  dextrose 50% Injectable 25 Gram(s) IV Push once  enoxaparin Injectable 40 milliGRAM(s) SubCutaneous every 24 hours  finasteride 5 milliGRAM(s) Oral daily  folic acid 1 milliGRAM(s) Oral daily  glucagon  Injectable 1 milliGRAM(s) IntraMuscular once  insulin glargine Injectable (LANTUS) 10 Unit(s) SubCutaneous at bedtime  insulin lispro (ADMELOG) corrective regimen sliding scale   SubCutaneous every 6 hours  levETIRAcetam 750 milliGRAM(s) Oral every 12 hours  metoprolol tartrate 12.5 milliGRAM(s) Oral every 12 hours  sodium chloride 7% Inhalation 4 milliLiter(s) Inhalation every 8 hours  timolol 0.5% Solution 1 Drop(s) Both EYES two times a day  zinc sulfate 220 milliGRAM(s) Oral every 24 hours    MEDICATIONS  (PRN):  dextrose Oral Gel 15 Gram(s) Oral once PRN Blood Glucose LESS THAN 70 milliGRAM(s)/deciliter  loperamide 2 milliGRAM(s) Oral every 12 hours PRN Diarrhea    PRESENT SYMPTOMS: [x]Unable to obtain due to poor mentation/encephalopathy  Source if other than patient:  []Family   []Team     Pain: [ ] yes [x ] no  QOL impact -   Location -                    Aggravating Factors -  Quality -  Radiation -  Timing -  Severity (0-10 scale) -   Minimal Acceptable Level (0-10 scale) -    PAIN AD Score:  [email or web address] (press ctrl +  left click to view)    Dyspnea:                           [ ]Mild  [ ]Moderate [ ]Severe  Anxiety:                             [ ]Mild [ ]Moderate [ ]Severe  Fatigue:                             [ ]Mild [ ]Moderate [ ]Severe  Nausea:                             [ ]Mild [ ]Moderate [ ]Severe  Loss of Appetite:             [ ]Mild [ ]Moderate [ ]Severe  Constipation:                   [ ]Mild [ ]Moderate [ ]Severe    Other Symptoms:  [ x]All other review of systems negative     Palliative Performance Status Version 2:  20-30%    http://npcrc.org/files/news/palliative_performance_scale_[email or web address].pdf    PHYSICAL EXAM:  GENERAL:  [ ]Alert  [ ]Oriented x   [x ]Lethargic  [ ]Cachexia  [ ]Unarousable  [ ]Verbal  [x ]Non-Verbal  Behavioral:   [ ]Anxiety  [ ]Delirium [ ]Agitation x[ ]Cooperative  HEENT:  [ ]Normal   [x ]Dry mouth   [ ]ET Tube/Trach  [ ]Oral lesions  PULMONARY:   [x ]Clear []Tachypnea  []Audible excessive secretions   [ ]Rhonchi        [ ]Right [ ]Left [ ]Bilateral  [ ]Crackles        [ ]Right [ ]Left [ ]Bilateral  [ ]Wheezing     [ ]Right [ ]Left [ ]Bilateral  CARDIOVASCULAR:    [ x]Regular [ ]Irregular [ ]Tachy  [ ]Kevin [ ]Murmur [ ]Other  GASTROINTESTINAL:  [x ]Soft  [ ]Distended   [x ]+BS  [ x]Non tender [ ]Tender  [ x]PEG [ ]OGT/ NGT  Last BM:     GENITOURINARY:  [ ]Normal [ x] Incontinent   [ ]Oliguria/Anuria   [ ]Hudson  MUSCULOSKELETAL:   [ ]Normal   [ ]Weakness  [x ]Bed/Wheelchair bound [ ]Edema  NEUROLOGIC:   [ ]No focal deficits  [x ]Cognitive impairment  [ ]Dysphagia [ ]Dysarthria [ ]Paresis [ ]Encephalopathic   SKIN:   [ ]Normal   [x ]Pressure ulcer(s)  [ ]Rash    CRITICAL CARE:  [ ]Shock Present  [ ]Septic [ ]Cardiogenic [ ]Neurologic [ ]Hypovolemic  [ ]Vasopressors [ ]Inotropes   [ ]Respiratory failure present [ ]Mechanical Ventilation [ ]Non-invasive ventilatory support [ ]High-Flow  [ ]Acute  [ ]Chronic [ ]Hypoxic  [ ]Hypercarbic  [ ]Other organ failure    Vital Signs Last 24 Hrs  T(C): 36.3 (19 May 2025 12:09), Max: 36.4 (19 May 2025 06:55)  T(F): 97.3 (19 May 2025 12:09), Max: 97.6 (19 May 2025 06:55)  HR: 104 (19 May 2025 12:09) (96 - 127)  BP: 103/65 (19 May 2025 12:09) (103/65 - 119/78)  BP(mean): 92 (19 May 2025 06:55) (92 - 92)  RR: 17 (19 May 2025 12:09) (16 - 17)  SpO2: 96% (19 May 2025 12:09) (95% - 96%)    Parameters below as of 19 May 2025 12:09  Patient On (Oxygen Delivery Method): nasal cannula  O2 Flow (L/min): 2   I&O's Summary    18 May 2025 07:01  -  19 May 2025 07:00  --------------------------------------------------------  IN: 0 mL / OUT: 250 mL / NET: -250 mL        LABS:                        9.2    12.25 )-----------( 349      ( 18 May 2025 06:29 )             28.7   05-18    137  |  101  |  20  ----------------------------<  206[H]  4.1   |  24  |  0.25[L]    Ca    8.4      18 May 2025 06:29  Phos  2.4     05-18  Mg     1.7     05-18      Urinalysis Basic - ( 18 May 2025 06:29 )    Color: x / Appearance: x / SG: x / pH: x  Gluc: 206 mg/dL / Ketone: x  / Bili: x / Urobili: x   Blood: x / Protein: x / Nitrite: x   Leuk Esterase: x / RBC: x / WBC x   Sq Epi: x / Non Sq Epi: x / Bacteria: x      RADIOLOGY & ADDITIONAL STUDIES:      PROTEIN CALORIE MALNUTRITION PRESENT: [ ]mild [ ]moderate [ ]severe [ ]underweight [ ]morbid obesity  [ ]PPSV2 < or = to 30% [ ]significant weight loss  [ ]poor nutritional intake [ ]catabolic state [ ]anasarca     Artificial Nutrition [ ]     REFERRALS:  [x]Social Work  [ ]Case management [ ]PT/OT [ ]Chaplaincy  [ ]Hospice  [ ]Patient/Family Support    DISCUSSION OF CASE: Family - to obtain additional history and to provide emotional support; ( ) -

## 2025-05-16 NOTE — CONSULT NOTE ADULT - PROBLEM SELECTOR RECOMMENDATION 5
.  Complex medical decision making / symptom management in the setting of advanced illness.    Will continue to follow for ongoing GOC discussion / titration of palliative regimen. Emotional support provided, questions answered.  Active Psychosocial Referrals:  [x]Social Work/Case management []PT/OT []Chaplaincy []Hospice  []Patient/Family Support []Holistic RN []Massage Therapy []Music Therapy []Ethics  Coping: [] well [] with difficulty [] poor coping [] unable to assess  Support system: [] strong [] adequate [] inadequate    For new or uncontrolled symptoms, please call Palliative Care at [phone]). The service is available 24/7 (including nights & weekends) to provide symptom management recommendations over the phone as appropriate

## 2025-05-17 PROCEDURE — 99233 SBSQ HOSP IP/OBS HIGH 50: CPT

## 2025-05-17 RX ADMIN — Medication 650 MILLIGRAM(S): at 18:09

## 2025-05-17 RX ADMIN — LEVETIRACETAM 750 MILLIGRAM(S): 10 INJECTION, SOLUTION INTRAVENOUS at 07:26

## 2025-05-17 RX ADMIN — IPRATROPIUM BROMIDE AND ALBUTEROL SULFATE 3 MILLILITER(S): .5; 2.5 SOLUTION RESPIRATORY (INHALATION) at 00:11

## 2025-05-17 RX ADMIN — IPRATROPIUM BROMIDE AND ALBUTEROL SULFATE 3 MILLILITER(S): .5; 2.5 SOLUTION RESPIRATORY (INHALATION) at 06:00

## 2025-05-17 RX ADMIN — Medication 500 MILLIGRAM(S): at 12:29

## 2025-05-17 RX ADMIN — Medication 650 MILLIGRAM(S): at 18:59

## 2025-05-17 RX ADMIN — INSULIN LISPRO 4: 100 INJECTION, SOLUTION INTRAVENOUS; SUBCUTANEOUS at 10:07

## 2025-05-17 RX ADMIN — Medication 81 MILLIGRAM(S): at 12:29

## 2025-05-17 RX ADMIN — METOPROLOL SUCCINATE 12.5 MILLIGRAM(S): 50 TABLET, EXTENDED RELEASE ORAL at 18:08

## 2025-05-17 RX ADMIN — Medication 650 MILLIGRAM(S): at 06:00

## 2025-05-17 RX ADMIN — TIMOLOL MALEATE 1 DROP(S): 6.8 SOLUTION OPHTHALMIC at 19:00

## 2025-05-17 RX ADMIN — TIMOLOL MALEATE 1 DROP(S): 6.8 SOLUTION OPHTHALMIC at 06:00

## 2025-05-17 RX ADMIN — FOLIC ACID 1 MILLIGRAM(S): 1 TABLET ORAL at 12:29

## 2025-05-17 RX ADMIN — FINASTERIDE 5 MILLIGRAM(S): 1 TABLET, FILM COATED ORAL at 12:29

## 2025-05-17 RX ADMIN — INSULIN LISPRO 4: 100 INJECTION, SOLUTION INTRAVENOUS; SUBCUTANEOUS at 18:07

## 2025-05-17 RX ADMIN — Medication 4 MILLILITER(S): at 00:11

## 2025-05-17 RX ADMIN — METOPROLOL SUCCINATE 12.5 MILLIGRAM(S): 50 TABLET, EXTENDED RELEASE ORAL at 07:26

## 2025-05-17 RX ADMIN — Medication 220 MILLIGRAM(S): at 08:53

## 2025-05-17 RX ADMIN — IPRATROPIUM BROMIDE AND ALBUTEROL SULFATE 3 MILLILITER(S): .5; 2.5 SOLUTION RESPIRATORY (INHALATION) at 00:00

## 2025-05-17 RX ADMIN — Medication 650 MILLIGRAM(S): at 07:00

## 2025-05-17 RX ADMIN — LEVETIRACETAM 750 MILLIGRAM(S): 10 INJECTION, SOLUTION INTRAVENOUS at 18:09

## 2025-05-17 RX ADMIN — INSULIN LISPRO 4: 100 INJECTION, SOLUTION INTRAVENOUS; SUBCUTANEOUS at 00:25

## 2025-05-17 NOTE — PROGRESS NOTE ADULT - ATTENDING COMMENTS
89y M non-verbal ( understands Cantonese), wheelchair/bed bound, R hand dominant, b/l  hearing impairment ( b/l hearing aids) with PMHx of Low BMI( 18.2-> 20.6)/severe protein calorie malnutrition, Depression, glaucoma, HLD, uncontrolled T2DM( A1C 8.1%), Thalassemia minor/JUSTYNA, BPH/hx indwelling hudson 2/2 chronic urinary retention , HFpEF (LVEF 56%, grade I diastolic dysfunction), aortic root aneurysm (4.6 cm), hx CVA with right sided weakness in 2004 and new R internal capsule CVA with L HP/WC/bed bound and Sz like activity on vEEG (04/24), dysphagia, s/p PEG placement on (5/7/24) , hx freq hospitalization at [facility]  (12/10-12/12/24) for wound care and further nutrition evaluation and management. GI consulted and PEG tube study showed PEG is functioning and no need to change to a new one. WOCN consult appreciated, given Sliver nitrate and aquagel Ag dressing daily to PEG site. Dietitian consult appreciated, TF changed to Glucerna 1.2 @ 83ml/hr from 5pm to 11am x 18hr, total 6 cans per day. Pt recently admitted to ICU for aspiration PNA and had indwelling hudson removed and discharged from [facility] ( 2/25/25) and sent to [facility] and subsequently sent home on home O2 per wife, admitted [facility] ( 3/26-4/1)  for severe sepsis w/ lactic acidosis 2/2 recurrent multifocal HAP likely aspiration/gram negative organism and and last admitted [facility] ( 4/22-5/1)  infected sacral decubitus (POA)--Strep bacteremia, sacral ulcer with abscess sp bedside debridement by surgery 4/27- bleeding required electrocautery , given one unit of prbc before debridement - now hb at 7.7 --> given a unit 4/28- hb above 9 - wound vac placed by team 4 surgery on 4/29, discharged on 5/1 to [facility] for 2 weeks of antibiotics with ceftriaxone/flagyl ( 4/27- 5/10 ). 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   - airway clearance: bj-nebs,  remains on NC2L as baseline for chronic hypoxic resp failure    - NPO/ oral hygiene q shift/aspiration precautions     # Recent strep bacteremia w/ sacral ucler Stage IV   - completed Ceftriaxone and flagyl ( 5/10) - midline removal 5/12-   low grade temp 5/13- ID reconsulted, monitor off antibitics , bc 5/13- ntd, remain afebrile afterwards.  - Surgery consult appreciated, no need for debridement of sacral wound, wound vac was placed by surgery during last admission - surgery follow up determine no further debridement, no wound vac   = would care evaluation - to have dressing change frequently.   - added MVI, VIt C 500mg daily and ZnSo4 220mg po daily   - nutrition evauation for supplement.     # Dysphagia  # Hyponatremia -mild  - c/w TF//glucerna 5 cans a day and free water via PEG.    # T2DM- with hyperglycemia   - lantus to 10 Units Q hs - continue with FS and SS goal glucose 100-180  - hypophos -to replace.     # tachycardia- record reviewed- pt was on Metoprolol 50 mg bid on 12/12/24- ( was held during last admission due to relative hypotension from sepsis, now with improved BP and tachycardia to restart metoprolol tartrate 12.5 mg via peg BID and to titrate up.   # DVT ppx     # FULL CODE- GOC as discussed wife at bedside    # Disposition: medically stable, AFB x 3 negative.   lmiit blood draw.   5/16- confliction info- SW mentioned insurance declined return to [facility], per wife [facility] rep told her insurance will pay, Teodoro told wife he is accepted back to facility - SW started appeal process - waiting for decision.   he need wound dressing change, need tube feeding , elderly wife could not care for him with 13 hours hha.     Pt is followed by home visit physician from [facility]   medically stable, at risk of recurrent aspiration pnueumonia and wound infection.

## 2025-05-17 NOTE — PROGRESS NOTE ADULT - SUBJECTIVE AND OBJECTIVE BOX
LARRY BURNS , [id number],  [facility] [id number]    Time of encounter :  11 am   vitals stable  wife at bedside  reported sweating after tylenol ( given for pain )   tolerating tube feeding  stated night RN changed wound dressing ( she is very happy for that )         T(C): 36.2 (05-17-25 @ 06:02), Max: 36.7 (05-16-25 @ 21:35)  HR: 107 (05-17-25 @ 06:02) (96 - 107)  BP: 121/73 (05-17-25 @ 06:02) (108/68 - 121/73)  RR: 18 (05-17-25 @ 06:02) (18 - 18)  SpO2: 96% (05-17-25 @ 06:02) (96% - 100%)    acetaminophen   Oral Liquid .. 650 milliGRAM(s) Oral every 12 hours  albuterol/ipratropium for Nebulization 3 milliLiter(s) Nebulizer every 6 hours  ascorbic acid 500 milliGRAM(s) Oral daily  aspirin  chewable 81 milliGRAM(s) Oral daily  atorvastatin 40 milliGRAM(s) Oral at bedtime  dextrose 5%. 1000 milliLiter(s) IV Continuous <Continuous>  dextrose 5%. 1000 milliLiter(s) IV Continuous <Continuous>  dextrose 50% Injectable 25 Gram(s) IV Push once  dextrose 50% Injectable 12.5 Gram(s) IV Push once  dextrose 50% Injectable 25 Gram(s) IV Push once  dextrose Oral Gel 15 Gram(s) Oral once PRN  enoxaparin Injectable 40 milliGRAM(s) SubCutaneous every 24 hours  finasteride 5 milliGRAM(s) Oral daily  folic acid 1 milliGRAM(s) Oral daily  glucagon  Injectable 1 milliGRAM(s) IntraMuscular once  insulin glargine Injectable (LANTUS) 10 Unit(s) SubCutaneous at bedtime  insulin lispro (ADMELOG) corrective regimen sliding scale   SubCutaneous every 6 hours  levETIRAcetam 750 milliGRAM(s) Oral every 12 hours  metoprolol tartrate 12.5 milliGRAM(s) Oral every 12 hours  sodium chloride 7% Inhalation 4 milliLiter(s) Inhalation every 8 hours  timolol 0.5% Solution 1 Drop(s) Both EYES two times a day  zinc sulfate 220 milliGRAM(s) Oral every 24 hours      Physical Exam :    General exam :  open eyes, make eye contact.   CVS : RRR no murmur,  Lungs : good air entry bilaterally   Abdomen : BS present, soft, peg in place, area clean.   Extremities: no edema, no tenderness.  Neuro : awake, baseline non-verbal -he moves  right upper ext, he would make eye contact,   Skin : warm and dry.          Daily     Daily   CAPILLARY BLOOD GLUCOSE      POCT Blood Glucose.: 209 mg/dL (17 May 2025 09:14)

## 2025-05-18 LAB
ANION GAP SERPL CALC-SCNC: 12 MMOL/L — SIGNIFICANT CHANGE UP (ref 5–17)
BASOPHILS # BLD AUTO: 0.05 K/UL — SIGNIFICANT CHANGE UP (ref 0–0.2)
BASOPHILS NFR BLD AUTO: 0.4 % — SIGNIFICANT CHANGE UP (ref 0–2)
BUN SERPL-MCNC: 20 MG/DL — SIGNIFICANT CHANGE UP (ref 7–23)
CALCIUM SERPL-MCNC: 8.4 MG/DL — SIGNIFICANT CHANGE UP (ref 8.4–10.5)
CHLORIDE SERPL-SCNC: 101 MMOL/L — SIGNIFICANT CHANGE UP (ref 96–108)
CO2 SERPL-SCNC: 24 MMOL/L — SIGNIFICANT CHANGE UP (ref 22–31)
CREAT SERPL-MCNC: 0.25 MG/DL — LOW (ref 0.5–1.3)
CULTURE RESULTS: SIGNIFICANT CHANGE UP
EGFR: 122 ML/MIN/1.73M2 — SIGNIFICANT CHANGE UP
EGFR: 122 ML/MIN/1.73M2 — SIGNIFICANT CHANGE UP
EOSINOPHIL # BLD AUTO: 0.34 K/UL — SIGNIFICANT CHANGE UP (ref 0–0.5)
EOSINOPHIL NFR BLD AUTO: 2.8 % — SIGNIFICANT CHANGE UP (ref 0–6)
GLUCOSE SERPL-MCNC: 206 MG/DL — HIGH (ref 70–99)
HCT VFR BLD CALC: 28.7 % — LOW (ref 39–50)
HGB BLD-MCNC: 9.2 G/DL — LOW (ref 13–17)
IMM GRANULOCYTES NFR BLD AUTO: 0.6 % — SIGNIFICANT CHANGE UP (ref 0–0.9)
LYMPHOCYTES # BLD AUTO: 1.66 K/UL — SIGNIFICANT CHANGE UP (ref 1–3.3)
LYMPHOCYTES # BLD AUTO: 13.6 % — SIGNIFICANT CHANGE UP (ref 13–44)
MAGNESIUM SERPL-MCNC: 1.7 MG/DL — SIGNIFICANT CHANGE UP (ref 1.6–2.6)
MCHC RBC-ENTMCNC: 24.7 PG — LOW (ref 27–34)
MCHC RBC-ENTMCNC: 32.1 G/DL — SIGNIFICANT CHANGE UP (ref 32–36)
MCV RBC AUTO: 77.2 FL — LOW (ref 80–100)
MONOCYTES # BLD AUTO: 1.01 K/UL — HIGH (ref 0–0.9)
MONOCYTES NFR BLD AUTO: 8.2 % — SIGNIFICANT CHANGE UP (ref 2–14)
NEUTROPHILS # BLD AUTO: 9.12 K/UL — HIGH (ref 1.8–7.4)
NEUTROPHILS NFR BLD AUTO: 74.4 % — SIGNIFICANT CHANGE UP (ref 43–77)
NRBC BLD AUTO-RTO: 0 /100 WBCS — SIGNIFICANT CHANGE UP (ref 0–0)
PHOSPHATE SERPL-MCNC: 2.4 MG/DL — LOW (ref 2.5–4.5)
PLATELET # BLD AUTO: 349 K/UL — SIGNIFICANT CHANGE UP (ref 150–400)
POTASSIUM SERPL-MCNC: 4.1 MMOL/L — SIGNIFICANT CHANGE UP (ref 3.5–5.3)
POTASSIUM SERPL-SCNC: 4.1 MMOL/L — SIGNIFICANT CHANGE UP (ref 3.5–5.3)
RBC # BLD: 3.72 M/UL — LOW (ref 4.2–5.8)
RBC # FLD: 22.3 % — HIGH (ref 10.3–14.5)
SODIUM SERPL-SCNC: 137 MMOL/L — SIGNIFICANT CHANGE UP (ref 135–145)
SPECIMEN SOURCE: SIGNIFICANT CHANGE UP
WBC # BLD: 12.25 K/UL — HIGH (ref 3.8–10.5)
WBC # FLD AUTO: 12.25 K/UL — HIGH (ref 3.8–10.5)

## 2025-05-18 PROCEDURE — 99233 SBSQ HOSP IP/OBS HIGH 50: CPT

## 2025-05-18 RX ORDER — SODIUM PHOSPHATE,DIBASIC DIHYD
15 POWDER (GRAM) MISCELLANEOUS ONCE
Refills: 0 | Status: COMPLETED | OUTPATIENT
Start: 2025-05-18 | End: 2025-05-18

## 2025-05-18 RX ORDER — MAGNESIUM SULFATE 500 MG/ML
2 SYRINGE (ML) INJECTION ONCE
Refills: 0 | Status: COMPLETED | OUTPATIENT
Start: 2025-05-18 | End: 2025-05-18

## 2025-05-18 RX ORDER — LOPERAMIDE HCL 1 MG/7.5ML
2 SOLUTION ORAL EVERY 12 HOURS
Refills: 0 | Status: DISCONTINUED | OUTPATIENT
Start: 2025-05-18 | End: 2025-05-21

## 2025-05-18 RX ADMIN — INSULIN GLARGINE-YFGN 10 UNIT(S): 100 INJECTION, SOLUTION SUBCUTANEOUS at 00:33

## 2025-05-18 RX ADMIN — Medication 650 MILLIGRAM(S): at 17:03

## 2025-05-18 RX ADMIN — FOLIC ACID 1 MILLIGRAM(S): 1 TABLET ORAL at 11:55

## 2025-05-18 RX ADMIN — Medication 650 MILLIGRAM(S): at 07:14

## 2025-05-18 RX ADMIN — Medication 25 GRAM(S): at 10:10

## 2025-05-18 RX ADMIN — INSULIN LISPRO 6: 100 INJECTION, SOLUTION INTRAVENOUS; SUBCUTANEOUS at 00:32

## 2025-05-18 RX ADMIN — Medication 500 MILLIGRAM(S): at 11:55

## 2025-05-18 RX ADMIN — METOPROLOL SUCCINATE 12.5 MILLIGRAM(S): 50 TABLET, EXTENDED RELEASE ORAL at 17:04

## 2025-05-18 RX ADMIN — IPRATROPIUM BROMIDE AND ALBUTEROL SULFATE 3 MILLILITER(S): .5; 2.5 SOLUTION RESPIRATORY (INHALATION) at 11:55

## 2025-05-18 RX ADMIN — Medication 62.5 MILLIMOLE(S): at 10:16

## 2025-05-18 RX ADMIN — Medication 4 MILLILITER(S): at 16:58

## 2025-05-18 RX ADMIN — Medication 81 MILLIGRAM(S): at 11:55

## 2025-05-18 RX ADMIN — FINASTERIDE 5 MILLIGRAM(S): 1 TABLET, FILM COATED ORAL at 11:55

## 2025-05-18 RX ADMIN — IPRATROPIUM BROMIDE AND ALBUTEROL SULFATE 3 MILLILITER(S): .5; 2.5 SOLUTION RESPIRATORY (INHALATION) at 17:01

## 2025-05-18 RX ADMIN — ATORVASTATIN CALCIUM 40 MILLIGRAM(S): 80 TABLET, FILM COATED ORAL at 00:35

## 2025-05-18 RX ADMIN — METOPROLOL SUCCINATE 12.5 MILLIGRAM(S): 50 TABLET, EXTENDED RELEASE ORAL at 07:12

## 2025-05-18 RX ADMIN — LEVETIRACETAM 750 MILLIGRAM(S): 10 INJECTION, SOLUTION INTRAVENOUS at 17:02

## 2025-05-18 RX ADMIN — ENOXAPARIN SODIUM 40 MILLIGRAM(S): 100 INJECTION SUBCUTANEOUS at 00:34

## 2025-05-18 RX ADMIN — TIMOLOL MALEATE 1 DROP(S): 6.8 SOLUTION OPHTHALMIC at 10:10

## 2025-05-18 RX ADMIN — IPRATROPIUM BROMIDE AND ALBUTEROL SULFATE 3 MILLILITER(S): .5; 2.5 SOLUTION RESPIRATORY (INHALATION) at 07:11

## 2025-05-18 RX ADMIN — INSULIN LISPRO 4: 100 INJECTION, SOLUTION INTRAVENOUS; SUBCUTANEOUS at 07:13

## 2025-05-18 RX ADMIN — Medication 4 MILLILITER(S): at 07:17

## 2025-05-18 RX ADMIN — Medication 650 MILLIGRAM(S): at 08:00

## 2025-05-18 RX ADMIN — INSULIN LISPRO 4: 100 INJECTION, SOLUTION INTRAVENOUS; SUBCUTANEOUS at 18:15

## 2025-05-18 RX ADMIN — Medication 220 MILLIGRAM(S): at 07:13

## 2025-05-18 RX ADMIN — INSULIN LISPRO 2: 100 INJECTION, SOLUTION INTRAVENOUS; SUBCUTANEOUS at 13:36

## 2025-05-18 RX ADMIN — LEVETIRACETAM 750 MILLIGRAM(S): 10 INJECTION, SOLUTION INTRAVENOUS at 07:12

## 2025-05-18 NOTE — PROGRESS NOTE ADULT - ATTENDING COMMENTS
89y M non-verbal ( understands Cantonese), wheelchair/bed bound, R hand dominant, b/l  hearing impairment ( b/l hearing aids) with PMHx of Low BMI( 18.2-> 20.6)/severe protein calorie malnutrition, Depression, glaucoma, HLD, uncontrolled T2DM( A1C 8.1%), Thalassemia minor/JUSTYNA, BPH/hx indwelling hudson 2/2 chronic urinary retention , HFpEF (LVEF 56%, grade I diastolic dysfunction), aortic root aneurysm (4.6 cm), hx CVA with right sided weakness in 2004 and new R internal capsule CVA with L HP/WC/bed bound and Sz like activity on vEEG (04/24), dysphagia, s/p PEG placement on (5/7/24) , hx freq hospitalization at [facility]  (12/10-12/12/24) for wound care and further nutrition evaluation and management. GI consulted and PEG tube study showed PEG is functioning and no need to change to a new one. WOCN consult appreciated, given Sliver nitrate and aquagel Ag dressing daily to PEG site. Dietitian consult appreciated, TF changed to Glucerna 1.2 @ 83ml/hr from 5pm to 11am x 18hr, total 6 cans per day. Pt recently admitted to ICU for aspiration PNA and had indwelling hudson removed and discharged from [place] ( 2/25/25) and sent to [facility] and subsequently sent home on home O2 per wife, admitted [facility] ( 3/26-4/1)  for severe sepsis w/ lactic acidosis 2/2 recurrent multifocal HAP likely aspiration/gram negative organism and and last admitted [facility] ( 4/22-5/1)  infected sacral decubitus (POA)--Strep bacteremia, sacral ulcer with abscess sp bedside debridement by surgery 4/27- bleeding required electrocautery , given one unit of prbc before debridement - now hb at 7.7 --> given a unit 4/28- hb above 9 - wound vac placed by team 4 surgery on 4/29, discharged on 5/1 to [facility] for 2 weeks of antibiotics with ceftriaxone/flagyl ( 4/27- 5/10 ). Pt now presented to [facility] ( 5/9) for +quantgold test resulting in NH with otherwise improving PNA from recent admission and no worsening systemic symptoms or s/s sepsis, now being admitted for TB r/o with AFB confirmation to return to nursing facility- sputum AFB x 3 negative, remain stable off antibiotics, surgery re-evaluation no further wound debridement, no wound vac - medically stable waiting for insurance logstics for [facility]     seen with wife at bedside  afebrile   resting, open eyes, would stretch out right hand -pull the blanket to cover him.       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   - airway clearance: bj-nebs,  remains on NC2L as baseline for chronic hypoxic resp failure    - NPO/ oral hygiene q shift/aspiration precautions     # Recent strep bacteremia w/ sacral ucler Stage IV   - completed Ceftriaxone and flagyl ( 5/10) - midline removal 5/12-   low grade temp 5/13- ID reconsulted, monitor off antibitics , bc 5/13- ntd, remain afebrile afterwards. mild leucocytosis 12 noted. clinically stable. he is at risk for recurrent aspiration.   - Surgery consult appreciated, no need for debridement of sacral wound, wound vac was placed by surgery during last admission - surgery follow up determine no further debridement, no wound vac   = would care evaluation - to have dressing change frequently.   - added MVI, VIt C 500mg daily and ZnSo4 220mg po daily   - nutrition evauation for supplement.     # Dysphagia  # Hyponatremia -resolved.   - c/w TF//glucerna 5 cans a day and free water via PEG-     # T2DM- with hyperglycemia   - lantus to 10 Units Q hs - continue with FS and SS goal glucose 100-180  - hypophos -to replace.     # tachycardia- record reviewed- pt was on Metoprolol 50 mg bid on 12/12/24- ( was held during last admission due to relative hypotension from sepsis, now with improved BP and tachycardia to restart metoprolol tartrate 12.5 mg via peg BID and to titrate up.   noted tachycardia during dressing change, likely related to pain - given tylenol for pain     # DVT ppx     # FULL CODE- GOC as discussed wife at bedside    # Disposition: medically stable, AFB x 3 negative.   lmiit blood draw.   5/16- confliction info- SW mentioned insurance declined return to [facility], per wife [facility] rep told her insurance will pay, Teodoro told wife he is accepted back to facility - SW started appeal process - waiting for decision.   he need wound dressing change, need tube feeding , elderly wife could not care for him with 13 hours hha.     Pt is followed by home visit physician from [facility]   medically stable, at risk of recurrent aspiration pnueumonia and wound infection.  labs prn.     Dr. Rajesh Villalba covering 5/19-5/25/25

## 2025-05-18 NOTE — PROGRESS NOTE ADULT - SUBJECTIVE AND OBJECTIVE BOX
OVERNIGHT EVENTS:    SUBJECTIVE / INTERVAL HPI: Patient seen and examined at bedside.     VITAL SIGNS:  Vital Signs Last 24 Hrs  T(C): 37.4 (18 May 2025 06:56), Max: 37.4 (18 May 2025 06:56)  T(F): 99.4 (18 May 2025 06:56), Max: 99.4 (18 May 2025 06:56)  HR: 115 (18 May 2025 07:45) (91 - 136)  BP: 135/70 (18 May 2025 06:56) (117/68 - 135/70)  BP(mean): 91 (18 May 2025 06:56) (91 - 91)  RR: 17 (18 May 2025 06:56) (16 - 17)  SpO2: 93% (18 May 2025 06:56) (91% - 98%)    Parameters below as of 18 May 2025 06:56  Patient On (Oxygen Delivery Method): nasal cannula  O2 Flow (L/min): 2    I&O's Summary    17 May 2025 07:01  -  18 May 2025 07:00  --------------------------------------------------------  IN: 0 mL / OUT: 800 mL / NET: -800 mL        PHYSICAL EXAM:  General: WDWN  HEENT: NC/AT; PERRL, anicteric sclera; MMM  Neck: supple  Cardiovascular: +S1/S2; RRR  Respiratory: CTA B/L; no W/R/R  Gastrointestinal: soft, NT/ND; +BSx4  Extremities: WWP; no edema, clubbing or cyanosis  Vascular: 2+ radial, DP/PT pulses B/L  Neurological: AAOx3; no focal deficits    MEDICATIONS:  MEDICATIONS  (STANDING):  acetaminophen   Oral Liquid .. 650 milliGRAM(s) Oral every 12 hours  albuterol/ipratropium for Nebulization 3 milliLiter(s) Nebulizer every 6 hours  ascorbic acid 500 milliGRAM(s) Oral daily  aspirin  chewable 81 milliGRAM(s) Oral daily  atorvastatin 40 milliGRAM(s) Oral at bedtime  dextrose 5%. 1000 milliLiter(s) (100 mL/Hr) IV Continuous <Continuous>  dextrose 5%. 1000 milliLiter(s) (50 mL/Hr) IV Continuous <Continuous>  dextrose 50% Injectable 25 Gram(s) IV Push once  dextrose 50% Injectable 12.5 Gram(s) IV Push once  dextrose 50% Injectable 25 Gram(s) IV Push once  enoxaparin Injectable 40 milliGRAM(s) SubCutaneous every 24 hours  finasteride 5 milliGRAM(s) Oral daily  folic acid 1 milliGRAM(s) Oral daily  glucagon  Injectable 1 milliGRAM(s) IntraMuscular once  insulin glargine Injectable (LANTUS) 10 Unit(s) SubCutaneous at bedtime  insulin lispro (ADMELOG) corrective regimen sliding scale   SubCutaneous every 6 hours  levETIRAcetam 750 milliGRAM(s) Oral every 12 hours  metoprolol tartrate 12.5 milliGRAM(s) Oral every 12 hours  sodium chloride 7% Inhalation 4 milliLiter(s) Inhalation every 8 hours  timolol 0.5% Solution 1 Drop(s) Both EYES two times a day  zinc sulfate 220 milliGRAM(s) Oral every 24 hours    MEDICATIONS  (PRN):  dextrose Oral Gel 15 Gram(s) Oral once PRN Blood Glucose LESS THAN 70 milliGRAM(s)/deciliter      ALLERGIES:  Allergies    No Known Allergies    Intolerances        LABS:                        9.2    12.25 )-----------( 349      ( 18 May 2025 06:29 )             28.7     05-18    137  |  101  |  20  ----------------------------<  206[H]  4.1   |  24  |  0.25[L]    Ca    8.4      18 May 2025 06:29  Phos  2.4     05-18  Mg     1.7     05-18        Urinalysis Basic - ( 18 May 2025 06:29 )    Color: x / Appearance: x / SG: x / pH: x  Gluc: 206 mg/dL / Ketone: x  / Bili: x / Urobili: x   Blood: x / Protein: x / Nitrite: x   Leuk Esterase: x / RBC: x / WBC x   Sq Epi: x / Non Sq Epi: x / Bacteria: x      CAPILLARY BLOOD GLUCOSE      POCT Blood Glucose.: 211 mg/dL (18 May 2025 06:24)      RADIOLOGY & ADDITIONAL TESTS: Reviewed.   OVERNIGHT EVENTS: no overnight events    SUBJECTIVE / INTERVAL HPI: Patient seen and examined at bedside, resting comfortably in bed, and does not appear to be in any acute distress.    VITAL SIGNS:  Vital Signs Last 24 Hrs  T(C): 37.4 (18 May 2025 06:56), Max: 37.4 (18 May 2025 06:56)  T(F): 99.4 (18 May 2025 06:56), Max: 99.4 (18 May 2025 06:56)  HR: 115 (18 May 2025 07:45) (91 - 136)  BP: 135/70 (18 May 2025 06:56) (117/68 - 135/70)  BP(mean): 91 (18 May 2025 06:56) (91 - 91)  RR: 17 (18 May 2025 06:56) (16 - 17)  SpO2: 93% (18 May 2025 06:56) (91% - 98%)    Parameters below as of 18 May 2025 06:56  Patient On (Oxygen Delivery Method): nasal cannula  O2 Flow (L/min): 2    I&O's Summary    17 May 2025 07:01  -  18 May 2025 07:00  --------------------------------------------------------  IN: 0 mL / OUT: 800 mL / NET: -800 mL        PHYSICAL EXAM:  General: in no acute distress  Eyes: EOMI intact bilaterally. Anicteric sclerae, moist conjunctivae  HENT: Moist mucous membranes  Neck: Trachea midline, supple  Lungs: CTA B/L. No wheezes, rales, or rhonchi  Cardiovascular: RRR. No murmurs, rubs, or gallops  Abdomen: PEG site CDI   Extremities: WWP, No clubbing, cyanosis or edema  Neurological: Alert and orientedx0  Skin: Warm and dry. No obvious rash     MEDICATIONS:  MEDICATIONS  (STANDING):  acetaminophen   Oral Liquid .. 650 milliGRAM(s) Oral every 12 hours  albuterol/ipratropium for Nebulization 3 milliLiter(s) Nebulizer every 6 hours  ascorbic acid 500 milliGRAM(s) Oral daily  aspirin  chewable 81 milliGRAM(s) Oral daily  atorvastatin 40 milliGRAM(s) Oral at bedtime  dextrose 5%. 1000 milliLiter(s) (100 mL/Hr) IV Continuous <Continuous>  dextrose 5%. 1000 milliLiter(s) (50 mL/Hr) IV Continuous <Continuous>  dextrose 50% Injectable 25 Gram(s) IV Push once  dextrose 50% Injectable 12.5 Gram(s) IV Push once  dextrose 50% Injectable 25 Gram(s) IV Push once  enoxaparin Injectable 40 milliGRAM(s) SubCutaneous every 24 hours  finasteride 5 milliGRAM(s) Oral daily  folic acid 1 milliGRAM(s) Oral daily  glucagon  Injectable 1 milliGRAM(s) IntraMuscular once  insulin glargine Injectable (LANTUS) 10 Unit(s) SubCutaneous at bedtime  insulin lispro (ADMELOG) corrective regimen sliding scale   SubCutaneous every 6 hours  levETIRAcetam 750 milliGRAM(s) Oral every 12 hours  metoprolol tartrate 12.5 milliGRAM(s) Oral every 12 hours  sodium chloride 7% Inhalation 4 milliLiter(s) Inhalation every 8 hours  timolol 0.5% Solution 1 Drop(s) Both EYES two times a day  zinc sulfate 220 milliGRAM(s) Oral every 24 hours    MEDICATIONS  (PRN):  dextrose Oral Gel 15 Gram(s) Oral once PRN Blood Glucose LESS THAN 70 milliGRAM(s)/deciliter      ALLERGIES:  Allergies    No Known Allergies    Intolerances        LABS:                        9.2    12.25 )-----------( 349      ( 18 May 2025 06:29 )             28.7     05-18    137  |  101  |  20  ----------------------------<  206[H]  4.1   |  24  |  0.25[L]    Ca    8.4      18 May 2025 06:29  Phos  2.4     05-18  Mg     1.7     05-18        Urinalysis Basic - ( 18 May 2025 06:29 )    Color: x / Appearance: x / SG: x / pH: x  Gluc: 206 mg/dL / Ketone: x  / Bili: x / Urobili: x   Blood: x / Protein: x / Nitrite: x   Leuk Esterase: x / RBC: x / WBC x   Sq Epi: x / Non Sq Epi: x / Bacteria: x      CAPILLARY BLOOD GLUCOSE      POCT Blood Glucose.: 211 mg/dL (18 May 2025 06:24)      RADIOLOGY & ADDITIONAL TESTS: Reviewed.

## 2025-05-18 NOTE — PROGRESS NOTE ADULT - TIME BILLING
preparation to see patient, history taking, physical exam, discussion with patient, discussion with other care providers, independent reviewing and interpretation of the data, care coordination.
Positive Quantiferon
preparation to see patient, history taking, physical exam, discussion with patient, discussion with other care providers, independent reviewing and interpretation of the data, care coordination.

## 2025-05-18 NOTE — PROGRESS NOTE ADULT - PROBLEM SELECTOR PLAN 3
Patient with tachycardia in low 100s, was previously on Lopressor 50 mg bid but discontinued when patient was in septic shock and has since not been restarted.     - c/w lopressor 12.5 mg q12h and slowly uptitrate as needed  - Start Tylenol 650 mg q12h atc as patient is not able to verbalize pain

## 2025-05-18 NOTE — PROGRESS NOTE ADULT - PROBLEM SELECTOR PLAN 1
Recent admission 4/22-5/1 for which patient was admitted for severe sepsis and found to have RLL aspiration PNA and bacteroides+ and strep+ species with subsequent negative bcx; discharged to NH with PICC to complete abx (CTX 1 g QD and PO HAJI, end date 5/10/25); now returning d/t +quantiferon test; CXR with improving opacities and no cavitations or c/f TB on previous imaging (CT chest 04/2025), no fever/weight loss or systemic symptoms except for intermittent cough with whitish sputum which pt had on prior admission (recovering from CAP); WBC 9 w/o bandemia; pt originally from [place]; ID called in ED and 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

## 2025-05-19 DIAGNOSIS — Z51.5 ENCOUNTER FOR PALLIATIVE CARE: ICD-10-CM

## 2025-05-19 DIAGNOSIS — R13.10 DYSPHAGIA, UNSPECIFIED: ICD-10-CM

## 2025-05-19 DIAGNOSIS — R53.2 FUNCTIONAL QUADRIPLEGIA: ICD-10-CM

## 2025-05-19 PROCEDURE — 99232 SBSQ HOSP IP/OBS MODERATE 35: CPT | Mod: GC

## 2025-05-19 RX ADMIN — Medication 4 MILLILITER(S): at 10:02

## 2025-05-19 RX ADMIN — Medication 220 MILLIGRAM(S): at 07:24

## 2025-05-19 RX ADMIN — LEVETIRACETAM 750 MILLIGRAM(S): 10 INJECTION, SOLUTION INTRAVENOUS at 18:14

## 2025-05-19 RX ADMIN — ATORVASTATIN CALCIUM 40 MILLIGRAM(S): 80 TABLET, FILM COATED ORAL at 22:12

## 2025-05-19 RX ADMIN — METOPROLOL SUCCINATE 12.5 MILLIGRAM(S): 50 TABLET, EXTENDED RELEASE ORAL at 18:15

## 2025-05-19 RX ADMIN — Medication 81 MILLIGRAM(S): at 11:51

## 2025-05-19 RX ADMIN — INSULIN GLARGINE-YFGN 10 UNIT(S): 100 INJECTION, SOLUTION SUBCUTANEOUS at 00:31

## 2025-05-19 RX ADMIN — TIMOLOL MALEATE 1 DROP(S): 6.8 SOLUTION OPHTHALMIC at 18:32

## 2025-05-19 RX ADMIN — Medication 650 MILLIGRAM(S): at 08:23

## 2025-05-19 RX ADMIN — INSULIN LISPRO 4: 100 INJECTION, SOLUTION INTRAVENOUS; SUBCUTANEOUS at 23:39

## 2025-05-19 RX ADMIN — TIMOLOL MALEATE 1 DROP(S): 6.8 SOLUTION OPHTHALMIC at 07:34

## 2025-05-19 RX ADMIN — ENOXAPARIN SODIUM 40 MILLIGRAM(S): 100 INJECTION SUBCUTANEOUS at 00:30

## 2025-05-19 RX ADMIN — IPRATROPIUM BROMIDE AND ALBUTEROL SULFATE 3 MILLILITER(S): .5; 2.5 SOLUTION RESPIRATORY (INHALATION) at 00:30

## 2025-05-19 RX ADMIN — TIMOLOL MALEATE 1 DROP(S): 6.8 SOLUTION OPHTHALMIC at 00:46

## 2025-05-19 RX ADMIN — INSULIN LISPRO 2: 100 INJECTION, SOLUTION INTRAVENOUS; SUBCUTANEOUS at 12:16

## 2025-05-19 RX ADMIN — Medication 4 MILLILITER(S): at 22:12

## 2025-05-19 RX ADMIN — FINASTERIDE 5 MILLIGRAM(S): 1 TABLET, FILM COATED ORAL at 11:51

## 2025-05-19 RX ADMIN — IPRATROPIUM BROMIDE AND ALBUTEROL SULFATE 3 MILLILITER(S): .5; 2.5 SOLUTION RESPIRATORY (INHALATION) at 22:12

## 2025-05-19 RX ADMIN — FOLIC ACID 1 MILLIGRAM(S): 1 TABLET ORAL at 11:51

## 2025-05-19 RX ADMIN — Medication 650 MILLIGRAM(S): at 18:14

## 2025-05-19 RX ADMIN — INSULIN LISPRO 4: 100 INJECTION, SOLUTION INTRAVENOUS; SUBCUTANEOUS at 07:37

## 2025-05-19 RX ADMIN — IPRATROPIUM BROMIDE AND ALBUTEROL SULFATE 3 MILLILITER(S): .5; 2.5 SOLUTION RESPIRATORY (INHALATION) at 11:51

## 2025-05-19 RX ADMIN — METOPROLOL SUCCINATE 12.5 MILLIGRAM(S): 50 TABLET, EXTENDED RELEASE ORAL at 07:30

## 2025-05-19 RX ADMIN — LEVETIRACETAM 750 MILLIGRAM(S): 10 INJECTION, SOLUTION INTRAVENOUS at 07:24

## 2025-05-19 RX ADMIN — IPRATROPIUM BROMIDE AND ALBUTEROL SULFATE 3 MILLILITER(S): .5; 2.5 SOLUTION RESPIRATORY (INHALATION) at 07:23

## 2025-05-19 RX ADMIN — Medication 4 MILLILITER(S): at 00:31

## 2025-05-19 RX ADMIN — LOPERAMIDE HCL 2 MILLIGRAM(S): 1 SOLUTION ORAL at 01:02

## 2025-05-19 RX ADMIN — ATORVASTATIN CALCIUM 40 MILLIGRAM(S): 80 TABLET, FILM COATED ORAL at 00:31

## 2025-05-19 RX ADMIN — IPRATROPIUM BROMIDE AND ALBUTEROL SULFATE 3 MILLILITER(S): .5; 2.5 SOLUTION RESPIRATORY (INHALATION) at 18:14

## 2025-05-19 RX ADMIN — INSULIN LISPRO 4: 100 INJECTION, SOLUTION INTRAVENOUS; SUBCUTANEOUS at 00:31

## 2025-05-19 RX ADMIN — Medication 500 MILLIGRAM(S): at 11:51

## 2025-05-19 RX ADMIN — Medication 650 MILLIGRAM(S): at 07:23

## 2025-05-19 RX ADMIN — Medication 4 MILLILITER(S): at 18:16

## 2025-05-19 RX ADMIN — INSULIN GLARGINE-YFGN 10 UNIT(S): 100 INJECTION, SOLUTION SUBCUTANEOUS at 22:13

## 2025-05-19 RX ADMIN — INSULIN LISPRO 2: 100 INJECTION, SOLUTION INTRAVENOUS; SUBCUTANEOUS at 18:13

## 2025-05-19 RX ADMIN — ENOXAPARIN SODIUM 40 MILLIGRAM(S): 100 INJECTION SUBCUTANEOUS at 22:12

## 2025-05-19 NOTE — PROGRESS NOTE ADULT - ASSESSMENT
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/bed bound and Sz like activity on vEEG (04/24), dysphagia s/p PEG placement on (5/7/24), p/w +quantgold test resulting in NH negative AFB x3, pending placement

## 2025-05-19 NOTE — PROGRESS NOTE ADULT - PROBLEM SELECTOR PLAN 2
4/22-5/1 admission for severe sepsis revealing BCx 4/22 - +Bacteroides fragilis, +Strep species with subsequent bcx clearance, in addition to RLL PNA suspected to aspiration PNA at that time; CTX 1g (4/29 -5/10) and Flagyl 500mg PO  (4/29 -5/10 ) recommended by ID service at that time; discharged to NH with PICC for completion of abx regimen (no reported missed doses); pt currently afebrile, breathing comfortably saturating 98% on 2L w/o leukocytosis and CXR with improving opacities     -Repeat bcx if reveals evidence of sepsis   -Completed abx course that pt was previously discharged on: CTX 1g IV QD (4/29 -5/10) and Flagyl 500mg PO Q12H (4/29 -5/10); PICC line removed on 5/12 4/22-5/1 admission for severe sepsis revealing BCx 4/22 - +Bacteroides fragilis, +Strep species with subsequent bcx clearance, in addition to RLL PNA suspected to aspiration PNA at that time; CTX 1g (4/29 -5/10) and Flagyl 500mg PO  (4/29 -5/10 ) recommended by ID service at that time; discharged to NH with PICC for completion of abx regimen (no reported missed doses); pt currently afebrile, breathing comfortably saturating 98% on 2L w/o leukocytosis and CXR with improving opacities     -Completed abx course that pt was previously discharged on: CTX 1g IV QD (4/29 -5/10) and Flagyl 500mg PO Q12H (4/29 -5/10); PICC line removed on 5/12    SPEEDY

## 2025-05-19 NOTE — PROGRESS NOTE ADULT - PROBLEM SELECTOR PLAN 1
Recent admission 4/22-5/1 for which patient was admitted for severe sepsis and found to have RLL aspiration PNA and bacteroides+ and strep+ species with subsequent negative bcx; discharged to NH with PICC to complete abx (CTX 1 g QD and PO HAJI, end date 5/10/25); now returning d/t +quantiferon test; CXR with improving opacities and no cavitations or c/f TB on previous imaging (CT chest 04/2025), no fever/weight loss or systemic symptoms except for intermittent cough with whitish sputum which pt had on prior admission (recovering from CAP); WBC 9 w/o bandemia; pt originally from [place]; ID called in ED and 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

## 2025-05-19 NOTE — PROGRESS NOTE ADULT - SUBJECTIVE AND OBJECTIVE BOX
incomplete note    INTERVAL HPI/OVERNIGHT EVENTS: stephanie     SUBJECTIVE: Patient seen and examined at bedside, resting comfortably in bed, and does not appear to be in any acute distress. Patient unable to communicate needs.     Vital Signs Last 24 Hrs  T(C): 36.4 (19 May 2025 06:55), Max: 36.6 (18 May 2025 12:38)  T(F): 97.6 (19 May 2025 06:55), Max: 97.8 (18 May 2025 12:38)  HR: 127 (19 May 2025 07:25) (90 - 127)  BP: 112/74 (19 May 2025 07:25) (105/65 - 119/78)  BP(mean): 92 (19 May 2025 06:55) (80 - 92)  RR: 17 (19 May 2025 06:55) (16 - 18)  SpO2: 96% (19 May 2025 06:55) (94% - 96%)    Parameters below as of 18 May 2025 22:54  Patient On (Oxygen Delivery Method): nasal cannula  O2 Flow (L/min): 2      PHYSICAL EXAM:  General: in no acute distress  Eyes: EOMI intact bilaterally. Anicteric sclerae, moist conjunctivae  HENT: Moist mucous membranes  Neck: Trachea midline, supple  Lungs: CTA B/L. No wheezes, rales, or rhonchi  Cardiovascular: RRR. No murmurs, rubs, or gallops  Abdomen: PEG site CDI   Extremities: WWP, No clubbing, cyanosis or edema  Neurological: Alert and orientedx0  Skin: Warm and dry. No obvious rash       LABS:                        9.2    12.25 )-----------( 349      ( 18 May 2025 06:29 )             28.7     05-18    137  |  101  |  20  ----------------------------<  206[H]  4.1   |  24  |  0.25[L]    Ca    8.4      18 May 2025 06:29  Phos  2.4     05-18  Mg     1.7     05-18

## 2025-05-19 NOTE — PROGRESS NOTE ADULT - ATTENDING COMMENTS
89y M non-verbal ( understands Cantonese), wheelchair/bed bound, R hand dominant, b/l  hearing impairment ( b/l hearing aids) with PMHx of Low BMI( 18.2-> 20.6)/severe protein calorie malnutrition, Depression, glaucoma, HLD, uncontrolled T2DM( A1C 8.1%), Thalassemia minor/JUSTYNA, BPH/hx indwelling hudson 2/2 chronic urinary retention , HFpEF (LVEF 56%, grade I diastolic dysfunction), aortic root aneurysm (4.6 cm), hx CVA with right sided weakness in 2004 and new R internal capsule CVA with L HP/WC/bed bound and Sz like activity on vEEG (04/24), dysphagia, s/p PEG placement on (5/7/24) , hx freq hospitalization at [facility]  (12/10-12/12/24) for wound care and further nutrition evaluation and management. GI consulted and PEG tube study showed PEG is functioning and no need to change to a new one. WOCN consult appreciated, given Sliver nitrate and aquagel Ag dressing daily to PEG site. Dietitian consult appreciated, TF changed to Glucerna 1.2 @ 83ml/hr from 5pm to 11am x 18hr, total 6 cans per day. Pt recently admitted to ICU for aspiration PNA and had indwelling hudson removed and discharged from [facility] ( 2/25/25) and sent to [facility] and subsequently sent home on home O2 per wife, admitted [facility] ( 3/26-4/1)  for severe sepsis w/ lactic acidosis 2/2 recurrent multifocal HAP likely aspiration/gram negative organism and and last admitted [facility] ( 4/22-5/1)  infected sacral decubitus (POA)--Strep bacteremia, sacral ulcer with abscess sp bedside debridement by surgery 4/27- bleeding required electrocautery , given one unit of prbc before debridement - now hb at 7.7 --> given a unit 4/28- hb above 9 - wound vac placed by team 4 surgery on 4/29, discharged on 5/1 to [facility] for 2 weeks of antibiotics with ceftriaxone/flagyl ( 4/27- 5/10 ). Pt now presented to [facility] ( 5/9) for +quantgold test resulting in NH with otherwise improving PNA from recent admission and no worsening systemic symptoms or s/s sepsis, now being admitted for TB r/o with AFB confirmation to return to nursing facility- sputum AFB x 3 negative, remain stable off antibiotics, surgery re-evaluation no further wound debridement, no wound vac - medically stable waiting for insurance logistics for [facility]     seen with wife at bedside   afebrile on NC2L  w. 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   - airway clearance: duo-nebs,  remains on NC2L as baseline for chronic hypoxic resp failure    - NPO/ oral hygiene q shift/aspiration precautions     # Recent strep bacteremia w/ sacral ulcer Stage IV   - completed Ceftriaxone and flagyl ( 5/10) - midline removal 5/12-   low grade temp 5/13- ID reconsulted, monitor off antibiotics , bc 5/13- ntd, remain afebrile afterwards. mild leucocytosis 12 noted. clinically stable. he is at risk for recurrent aspiration.   - Surgery consult appreciated, no need for debridement of sacral wound, wound vac was placed by surgery during last admission - surgery follow up determine no further debridement, no wound vac   = would care evaluation - to have dressing change frequently.   - added MVI, VIt C 500mg daily and ZnSo4 220mg po daily   - nutrition evaluation for supplement.     # Dysphagia  # Hyponatremia -resolved.   - c/w TF/glucerna 5 cans a day and free water via PEG-     # T2DM- with hyperglycemia   - lantus to 10 Units Q hs - continue with FS and SS goal glucose 100-180  - hypophos -to replace.     # tachycardia- record reviewed- pt was on Metoprolol 50 mg bid on 12/12/24- ( was held during last admission due to relative hypotension from sepsis, now with improved BP and tachycardia to restart metoprolol tartrate 12.5 mg via peg BID and to titrate up.   noted tachycardia during dressing change, likely related to pain - given tylenol 650mg q12hr standing for pain     # DVT ppx     # FULL CODE- GOC as discussed wife at bedside    # Disposition: medically stable, AFB x 3 negative.   limit blood draw.   5/16- confliction info- SW mentioned insurance declined return to [facility], per wife [employer] rep told her insurance will pay, Seda told wife he is accepted back to facility - SW started appeal process - waiting for decision.   he need wound dressing change, need tube feeding , elderly wife could not care for him with 13 hours hha.     Pt is followed by home visit physician from [facility]   medically stable, at risk of recurrent aspiration pneumonia and wound infection.  labs prn.    Time-based billing (NON-critical care).     51 minutes spent on total encounter. The necessity of the time spent during the encounter on this date of service was due to:     preparation to see patient, history taking, physical exam, discussion with patient, discussion with other care providers, independent reviewing and interpretation of the data, care coordination.

## 2025-05-20 LAB
BASOPHILS # BLD AUTO: 0.07 K/UL — SIGNIFICANT CHANGE UP (ref 0–0.2)
BASOPHILS NFR BLD AUTO: 0.6 % — SIGNIFICANT CHANGE UP (ref 0–2)
EOSINOPHIL # BLD AUTO: 0.43 K/UL — SIGNIFICANT CHANGE UP (ref 0–0.5)
EOSINOPHIL NFR BLD AUTO: 3.5 % — SIGNIFICANT CHANGE UP (ref 0–6)
HCT VFR BLD CALC: 27.4 % — LOW (ref 39–50)
HGB BLD-MCNC: 8.6 G/DL — LOW (ref 13–17)
IMM GRANULOCYTES NFR BLD AUTO: 0.6 % — SIGNIFICANT CHANGE UP (ref 0–0.9)
LYMPHOCYTES # BLD AUTO: 1.66 K/UL — SIGNIFICANT CHANGE UP (ref 1–3.3)
LYMPHOCYTES # BLD AUTO: 13.6 % — SIGNIFICANT CHANGE UP (ref 13–44)
MCHC RBC-ENTMCNC: 24.6 PG — LOW (ref 27–34)
MCHC RBC-ENTMCNC: 31.4 G/DL — LOW (ref 32–36)
MCV RBC AUTO: 78.3 FL — LOW (ref 80–100)
MONOCYTES # BLD AUTO: 1.19 K/UL — HIGH (ref 0–0.9)
MONOCYTES NFR BLD AUTO: 9.8 % — SIGNIFICANT CHANGE UP (ref 2–14)
NEUTROPHILS # BLD AUTO: 8.76 K/UL — HIGH (ref 1.8–7.4)
NEUTROPHILS NFR BLD AUTO: 71.9 % — SIGNIFICANT CHANGE UP (ref 43–77)
NRBC BLD AUTO-RTO: 0 /100 WBCS — SIGNIFICANT CHANGE UP (ref 0–0)
PLATELET # BLD AUTO: 348 K/UL — SIGNIFICANT CHANGE UP (ref 150–400)
RBC # BLD: 3.5 M/UL — LOW (ref 4.2–5.8)
RBC # FLD: 22.5 % — HIGH (ref 10.3–14.5)
WBC # BLD: 12.18 K/UL — HIGH (ref 3.8–10.5)
WBC # FLD AUTO: 12.18 K/UL — HIGH (ref 3.8–10.5)

## 2025-05-20 PROCEDURE — 99232 SBSQ HOSP IP/OBS MODERATE 35: CPT | Mod: GC

## 2025-05-20 RX ADMIN — INSULIN GLARGINE-YFGN 10 UNIT(S): 100 INJECTION, SOLUTION SUBCUTANEOUS at 21:24

## 2025-05-20 RX ADMIN — FOLIC ACID 1 MILLIGRAM(S): 1 TABLET ORAL at 11:11

## 2025-05-20 RX ADMIN — LEVETIRACETAM 750 MILLIGRAM(S): 10 INJECTION, SOLUTION INTRAVENOUS at 06:20

## 2025-05-20 RX ADMIN — IPRATROPIUM BROMIDE AND ALBUTEROL SULFATE 3 MILLILITER(S): .5; 2.5 SOLUTION RESPIRATORY (INHALATION) at 06:19

## 2025-05-20 RX ADMIN — INSULIN LISPRO 2: 100 INJECTION, SOLUTION INTRAVENOUS; SUBCUTANEOUS at 13:03

## 2025-05-20 RX ADMIN — Medication 650 MILLIGRAM(S): at 18:28

## 2025-05-20 RX ADMIN — FINASTERIDE 5 MILLIGRAM(S): 1 TABLET, FILM COATED ORAL at 11:10

## 2025-05-20 RX ADMIN — ENOXAPARIN SODIUM 40 MILLIGRAM(S): 100 INJECTION SUBCUTANEOUS at 21:24

## 2025-05-20 RX ADMIN — Medication 650 MILLIGRAM(S): at 18:03

## 2025-05-20 RX ADMIN — Medication 81 MILLIGRAM(S): at 11:11

## 2025-05-20 RX ADMIN — Medication 220 MILLIGRAM(S): at 06:20

## 2025-05-20 RX ADMIN — LEVETIRACETAM 750 MILLIGRAM(S): 10 INJECTION, SOLUTION INTRAVENOUS at 18:03

## 2025-05-20 RX ADMIN — LOPERAMIDE HCL 2 MILLIGRAM(S): 1 SOLUTION ORAL at 13:08

## 2025-05-20 RX ADMIN — Medication 650 MILLIGRAM(S): at 06:20

## 2025-05-20 RX ADMIN — TIMOLOL MALEATE 1 DROP(S): 6.8 SOLUTION OPHTHALMIC at 18:03

## 2025-05-20 RX ADMIN — Medication 500 MILLIGRAM(S): at 11:11

## 2025-05-20 RX ADMIN — IPRATROPIUM BROMIDE AND ALBUTEROL SULFATE 3 MILLILITER(S): .5; 2.5 SOLUTION RESPIRATORY (INHALATION) at 11:11

## 2025-05-20 RX ADMIN — ATORVASTATIN CALCIUM 40 MILLIGRAM(S): 80 TABLET, FILM COATED ORAL at 21:24

## 2025-05-20 RX ADMIN — IPRATROPIUM BROMIDE AND ALBUTEROL SULFATE 3 MILLILITER(S): .5; 2.5 SOLUTION RESPIRATORY (INHALATION) at 18:03

## 2025-05-20 RX ADMIN — TIMOLOL MALEATE 1 DROP(S): 6.8 SOLUTION OPHTHALMIC at 06:40

## 2025-05-20 RX ADMIN — METOPROLOL SUCCINATE 12.5 MILLIGRAM(S): 50 TABLET, EXTENDED RELEASE ORAL at 06:20

## 2025-05-20 NOTE — PROGRESS NOTE ADULT - ATTENDING COMMENTS
89y M non-verbal ( understands Cantonese), wheelchair/bed bound, R hand dominant, b/l  hearing impairment ( b/l hearing aids) with PMHx of Low BMI( 18.2-> 20.6)/severe protein calorie malnutrition, Depression, glaucoma, HLD, uncontrolled T2DM( A1C 8.1%), Thalassemia minor/JUSTYNA, BPH/hx indwelling hudson 2/2 chronic urinary retention , HFpEF (LVEF 56%, grade I diastolic dysfunction), aortic root aneurysm (4.6 cm), hx CVA with right sided weakness in 2004 and new R internal capsule CVA with L HP/WC/bed bound and Sz like activity on vEEG (04/24), dysphagia, s/p PEG placement on (5/7/24) , hx freq hospitalization at [facility]  (12/10-12/12/24) for wound care and further nutrition evaluation and management. GI consulted and PEG tube study showed PEG is functioning and no need to change to a new one. WOCN consult appreciated, given Sliver nitrate and aquagel Ag dressing daily to PEG site. Dietitian consult appreciated, TF changed to Glucerna 1.2 @ 83ml/hr from 5pm to 11am x 18hr, total 6 cans per day. Pt recently admitted to ICU for aspiration PNA and had indwelling hudson removed and discharged from [facility] ( 2/25/25) and sent to [facility] and subsequently sent home on home O2 per wife, admitted [facility] ( 3/26-4/1)  for severe sepsis w/ lactic acidosis 2/2 recurrent multifocal HAP likely aspiration/gram negative organism and and last admitted [facility] ( 4/22-5/1)  infected sacral decubitus (POA)--Strep bacteremia, sacral ulcer with abscess sp bedside debridement by surgery 4/27- bleeding required electrocautery , given one unit of prbc before debridement - now hb at 7.7 --> given a unit 4/28- hb above 9 - wound vac placed by team 4 surgery on 4/29, discharged on 5/1 to [facility] for 2 weeks of antibiotics with ceftriaxone/flagyl ( 4/27- 5/10 ). Pt now presented to [facility] ( 5/9) for +quantgold test resulting in NH with otherwise improving PNA from recent admission and no worsening systemic symptoms or s/s sepsis, now being admitted for TB r/o with AFB confirmation to return to nursing facility- sputum AFB x 3 negative, remain stable off antibiotics, surgery re-evaluation no further wound debridement, no wound vac - medically stable waiting for insurance logistics for [facility]     seen with wife at bedside   afebrile on NC2L  w. 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   - airway clearance: duo-nebs,  remains on NC2L as baseline for chronic hypoxic resp failure    - NPO/ oral hygiene q shift/aspiration precautions     # Recent strep bacteremia w/ sacral ulcer Stage IV   - completed Ceftriaxone and flagyl ( 5/10) - midline removal 5/12-   low grade temp 5/13- ID reconsulted, monitor off antibiotics , bc 5/13- ntd, remain afebrile afterwards. mild leucocytosis 12 noted. clinically stable. he is at risk for recurrent aspiration.   - Surgery consult appreciated, no need for debridement of sacral wound, wound vac was placed by surgery during last admission - surgery follow up determine no further debridement, no wound vac   = would care evaluation - to have dressing change frequently.   - added MVI, VIt C 500mg daily and ZnSo4 220mg po daily   - nutrition evaluation for supplement.     # Dysphagia  # Hyponatremia -resolved.   - c/w TF/glucerna 5 cans a day and free water via PEG-     # T2DM- with hyperglycemia   - lantus to 10 Units Q hs - continue with FS and SS goal glucose 100-180  - hypophos -to replace.     # tachycardia- record reviewed- pt was on Metoprolol 50 mg bid on 12/12/24- ( was held during last admission due to relative hypotension from sepsis, now with improved BP and tachycardia to restart metoprolol tartrate 12.5 mg via peg BID and to titrate up.   noted tachycardia during dressing change, likely related to pain - given tylenol 650mg q12hr standing for pain     # DVT ppx     # FULL CODE- GOC as discussed wife at bedside, pt remains full code     # Disposition: medically stable, AFB x 3 negative.   limit blood draw.   5/16- confliction info- SW mentioned insurance declined return to [facility], per wife [facility] rep told her insurance will pay, Seda told wife he is accepted back to facility - SW started appeal process - waiting for decision.   he need wound dressing change, need tube feeding , elderly wife could not care for him with 13 hours hha.     Pt is followed by home visit physician from [facility]   medically stable, at risk of recurrent aspiration pneumonia and wound infection.  labs prn.    Time-based billing (NON-critical care).     51 minutes spent on total encounter. The necessity of the time spent during the encounter on this date of service was due to:     preparation to see patient, history taking, physical exam, discussion with patient, discussion with other care providers, independent reviewing and interpretation of the data, care coordination. Awaiting insurance authorization as d/w  and SW this am

## 2025-05-20 NOTE — PROGRESS NOTE ADULT - PROBLEM SELECTOR PLAN 1
Recent admission 4/22-5/1 for which patient was admitted for severe sepsis and found to have RLL aspiration PNA and bacteroides+ and strep+ species with subsequent negative bcx; discharged to NH with PICC to complete abx (CTX 1 g QD and PO HAJI, end date 5/10/25); now returning d/t +quantiferon test; CXR with improving opacities and no cavitations or c/f TB on previous imaging (CT chest 04/2025), no fever/weight loss or systemic symptoms except for intermittent cough with whitish sputum which pt had on prior admission (recovering from CAP); WBC 9 w/o bandemia; pt originally from [employer] [employer]; ID called in ED and 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

## 2025-05-20 NOTE — PROGRESS NOTE ADULT - SUBJECTIVE AND OBJECTIVE BOX
INTERVAL HPI/OVERNIGHT EVENTS: stephanie    SUBJECTIVE: Patient seen and examined at bedside, resting comfortably in bed, and does not appear to be in any acute distress.     Vital Signs Last 24 Hrs  T(C): 36.7 (20 May 2025 09:47), Max: 36.7 (20 May 2025 09:47)  T(F): 98 (20 May 2025 09:47), Max: 98 (20 May 2025 09:47)  HR: 90 (20 May 2025 09:47) (90 - 107)  BP: 111/66 (20 May 2025 09:47) (102/62 - 117/67)  BP(mean): --  RR: 18 (20 May 2025 09:47) (17 - 18)  SpO2: 98% (20 May 2025 09:47) (96% - 98%)    Parameters below as of 20 May 2025 09:47  Patient On (Oxygen Delivery Method): nasal cannula  O2 Flow (L/min): 2    PHYSICAL EXAM:  General: in no acute distress  Eyes: EOMI intact bilaterally. Anicteric sclerae, moist conjunctivae  HENT: Moist mucous membranes  Neck: Trachea midline, supple  Lungs: CTA B/L. No wheezes, rales, or rhonchi  Cardiovascular: RRR. No murmurs, rubs, or gallops  Abdomen: PEG site CDI   Extremities: WWP, No clubbing, cyanosis or edema  Neurological: Alert and orientedx0  Skin: Warm and dry. No obvious rash   LABS:                        8.6    12.18 )-----------( 348      ( 20 May 2025 07:52 )             27.4

## 2025-05-20 NOTE — CHART NOTE - NSCHARTNOTEFT_GEN_A_CORE
Admitting Diagnosis:   Patient is a 89y old  Male who presents with a chief complaint of +Quantiferon Test (20 May 2025 07:21)    PAST MEDICAL & SURGICAL HISTORY:  Diabetes  HLD (hyperlipidemia)  BPH (benign prostatic hyperplasia)  JUSTYNA (iron deficiency anemia)  Stroke  No significant past surgical history    Current Nutrition Order:  NPO with Tube Feed via PEG: Glucerna 1.5 at 60mL/hr x18hr (11:00-05:00) + Redd x1/day to provide 1080mL total volume, 1620kcal, 89g protein, and 820mL free water.   >>FWF 240mL q8hr  >>Banatrol x3/day     PO Intake: Good (%) [   ]  Fair (50-75%) [   ] Poor (<25%) [   ] - N/A NPO    GI Issues:   Wife denies pt with nausea/vomiting  States pt with x2-3 loose BMs daily - imodium ordered  Ongoing abdominal distension documented    Pain:  No pain reported or nonverbal indicators noted    Skin Integrity:  No edema documented  Sacral stage IV pressure injury noted  Leonel score 9    CAPILLARY BLOOD GLUCOSE  POCT Blood Glucose.: 156 mg/dL (20 May 2025 12:25)  POCT Blood Glucose.: 180 mg/dL (20 May 2025 08:41)  POCT Blood Glucose.: 146 mg/dL (20 May 2025 06:21)  POCT Blood Glucose.: 248 mg/dL (19 May 2025 23:37)  POCT Blood Glucose.: 230 mg/dL (19 May 2025 21:47)  POCT Blood Glucose.: 199 mg/dL (19 May 2025 17:17)    Medications:  MEDICATIONS  (STANDING):  acetaminophen   Oral Liquid .. 650 milliGRAM(s) Oral every 12 hours  albuterol/ipratropium for Nebulization 3 milliLiter(s) Nebulizer every 6 hours  ascorbic acid 500 milliGRAM(s) Oral daily  aspirin  chewable 81 milliGRAM(s) Oral daily  atorvastatin 40 milliGRAM(s) Oral at bedtime  dextrose 5%. 1000 milliLiter(s) (50 mL/Hr) IV Continuous <Continuous>  dextrose 5%. 1000 milliLiter(s) (100 mL/Hr) IV Continuous <Continuous>  dextrose 50% Injectable 25 Gram(s) IV Push once  dextrose 50% Injectable 12.5 Gram(s) IV Push once  dextrose 50% Injectable 25 Gram(s) IV Push once  enoxaparin Injectable 40 milliGRAM(s) SubCutaneous every 24 hours  finasteride 5 milliGRAM(s) Oral daily  folic acid 1 milliGRAM(s) Oral daily  glucagon  Injectable 1 milliGRAM(s) IntraMuscular once  insulin glargine Injectable (LANTUS) 10 Unit(s) SubCutaneous at bedtime  insulin lispro (ADMELOG) corrective regimen sliding scale   SubCutaneous every 6 hours  levETIRAcetam 750 milliGRAM(s) Oral every 12 hours  metoprolol tartrate 12.5 milliGRAM(s) Oral every 12 hours  sodium chloride 7% Inhalation 4 milliLiter(s) Inhalation every 8 hours  timolol 0.5% Solution 1 Drop(s) Both EYES two times a day    MEDICATIONS  (PRN):  dextrose Oral Gel 15 Gram(s) Oral once PRN Blood Glucose LESS THAN 70 milliGRAM(s)/deciliter  loperamide 2 milliGRAM(s) Oral every 12 hours PRN Diarrhea    Anthropometrics:  Height: 5'4"  No dosing wt available.  IBW: 130 pounds / 59.1 kilograms               Weight Change:   No new weights obtained since admission. Recommend nursing to trend weekly weights. RD to continue monitoring weights as able.     Estimated energy needs:   Calories: 27-32kcal/k-1884kcal/d  Protein: 1.3-1.6g/k-94g/d  Defer fluids to team.   Pt's ideal body weight used for all calculations related to dosing weight not available. Needs adjusted for advanced age, infection/illness and pressure injury.    Subjective:   89M non-verbal (understands Cantonese), wheelchair/bed bound, R hand dominant, bilateral hearing impairment (bilateral hearing aids) with PMHx of depression, glaucoma, HLD, uncontrolled T2DM( A1C 8.1%), thalassemia minor/JUSTYNA, BPH/hx indwelling hudson secondary to chronic urinary retention, HFpEF (LVEF 56%, grade I diastolic dysfunction), aortic root aneurysm (4.6 cm), hx CVA with right sided weakness in  and new R internal capsule CVA with L HP/WC/bed bound and seizure like activity on vEEG (), and dysphagia status post PEG placement on (24) who presents with +quantgold test resulting in NH with otherwise improving PNA from recent admission and no worsening systemic symptoms or signs and symptoms sepsis, now being admitted for TB r/o with AFB confirmation to return to nursing facility.    Pt seen on 7WO for follow-up. Labs and medication orders reviewed. Ordered for 10U lantus, vitamin C, folic acid. Pt resting in bed, lethargic and unable to speak consistent with nursing documentation. +NC. NPO with tube feed via PEG, RD observed feed off as per order. x24hr feed provision per pump data: 891mL, indicates hold x3 additional hours. Interview deferred to wife at bedside, RD utilized [employer]  (ID #[id number]). Wife reports pt with ongoing tube feed tolerance. Reports brief episode of nausea x2d ago without vomiting. States pt with loose BMs - imodium and Banatrol added . Reports concern over white color of pt's fingernails - RD communicated to MD. See nutrition recommendations. RD to remain available.     Previous Nutrition Diagnosis: updated 5/15  Increased nutrient needs related to increased physiological demand as evidenced by pressure injury, HF.     Active [ x ]  Resolved [   ]    Goal:  Pt to consistently meet >75% nutrient needs via most appropriate route for nutrition.     Recommendations:  1. Recommend NPO with Tube Feed via PEG: Glucerna 1.5 at 60mL/hr x18hr (11AM-5AM) + Redd x2/day to provide 1080mL total volume, 1620kcal (27kcal/kg IBW 59.1kg), 89g protein (1.5g/kg IBW 59.1kg), and 820mL free water.   >>Defer free water flushes to team.  >>Monitor GI tolerance and maintain aspiration precautions. RD to remain available to adjust EN regimen prn.   2. Recommend d/c additional micronutrient supplementation as Glucerna formula providing >100% RDI/DRI and Redd providing specific nutrients for wound healing.   3. Monitor weight trends, labs, skin integrity, & hydration status.  4. Pain and bowel regimens per team.  >>Continue Banatrol prn to promote fecal bulk.   5. RD remains available for dietary education/intervention prn.     Education:  Educated on purpose of PEG feed modular components. RD answered all questions, pt's wife aware RD remains available for additional questions/concerns.

## 2025-05-20 NOTE — PROGRESS NOTE ADULT - PROBLEM SELECTOR PLAN 2
4/22-5/1 admission for severe sepsis revealing BCx 4/22 - +Bacteroides fragilis, +Strep species with subsequent bcx clearance, in addition to RLL PNA suspected to aspiration PNA at that time; CTX 1g (4/29 -5/10) and Flagyl 500mg PO  (4/29 -5/10 ) recommended by ID service at that time; discharged to NH with PICC for completion of abx regimen (no reported missed doses); pt currently afebrile, breathing comfortably saturating 98% on 2L w/o leukocytosis and CXR with improving opacities     -Completed abx course that pt was previously discharged on: CTX 1g IV QD (4/29 -5/10) and Flagyl 500mg PO Q12H (4/29 -5/10); PICC line removed on 5/12    SPEEDY

## 2025-05-21 ENCOUNTER — TRANSCRIPTION ENCOUNTER (OUTPATIENT)
Age: 89
End: 2025-05-21

## 2025-05-21 VITALS
OXYGEN SATURATION: 98 % | TEMPERATURE: 97 F | DIASTOLIC BLOOD PRESSURE: 60 MMHG | SYSTOLIC BLOOD PRESSURE: 103 MMHG | RESPIRATION RATE: 18 BRPM | HEART RATE: 89 BPM

## 2025-05-21 PROCEDURE — 87206 SMEAR FLUORESCENT/ACID STAI: CPT

## 2025-05-21 PROCEDURE — 36415 COLL VENOUS BLD VENIPUNCTURE: CPT

## 2025-05-21 PROCEDURE — 82962 GLUCOSE BLOOD TEST: CPT

## 2025-05-21 PROCEDURE — 87040 BLOOD CULTURE FOR BACTERIA: CPT

## 2025-05-21 PROCEDURE — 97163 PT EVAL HIGH COMPLEX 45 MIN: CPT

## 2025-05-21 PROCEDURE — 85027 COMPLETE CBC AUTOMATED: CPT

## 2025-05-21 PROCEDURE — 86901 BLOOD TYPING SEROLOGIC RH(D): CPT

## 2025-05-21 PROCEDURE — 87556 M.TUBERCULO DNA AMP PROBE: CPT

## 2025-05-21 PROCEDURE — 83735 ASSAY OF MAGNESIUM: CPT

## 2025-05-21 PROCEDURE — 87015 SPECIMEN INFECT AGNT CONCNTJ: CPT

## 2025-05-21 PROCEDURE — 87116 MYCOBACTERIA CULTURE: CPT

## 2025-05-21 PROCEDURE — 87186 SC STD MICRODIL/AGAR DIL: CPT

## 2025-05-21 PROCEDURE — 84100 ASSAY OF PHOSPHORUS: CPT

## 2025-05-21 PROCEDURE — 86900 BLOOD TYPING SEROLOGIC ABO: CPT

## 2025-05-21 PROCEDURE — 97530 THERAPEUTIC ACTIVITIES: CPT

## 2025-05-21 PROCEDURE — 94640 AIRWAY INHALATION TREATMENT: CPT

## 2025-05-21 PROCEDURE — 99239 HOSP IP/OBS DSCHRG MGMT >30: CPT | Mod: GC

## 2025-05-21 PROCEDURE — 99285 EMERGENCY DEPT VISIT HI MDM: CPT

## 2025-05-21 PROCEDURE — 80048 BASIC METABOLIC PNL TOTAL CA: CPT

## 2025-05-21 PROCEDURE — 71045 X-RAY EXAM CHEST 1 VIEW: CPT

## 2025-05-21 PROCEDURE — 87077 CULTURE AEROBIC IDENTIFY: CPT

## 2025-05-21 PROCEDURE — 87205 SMEAR GRAM STAIN: CPT

## 2025-05-21 PROCEDURE — 87184 SC STD DISK METHOD PER PLATE: CPT

## 2025-05-21 PROCEDURE — 85025 COMPLETE CBC W/AUTO DIFF WBC: CPT

## 2025-05-21 PROCEDURE — 97110 THERAPEUTIC EXERCISES: CPT

## 2025-05-21 PROCEDURE — 81001 URINALYSIS AUTO W/SCOPE: CPT

## 2025-05-21 PROCEDURE — 87070 CULTURE OTHR SPECIMN AEROBIC: CPT

## 2025-05-21 PROCEDURE — 80053 COMPREHEN METABOLIC PANEL: CPT

## 2025-05-21 PROCEDURE — 86850 RBC ANTIBODY SCREEN: CPT

## 2025-05-21 RX ORDER — CEFTRIAXONE 500 MG/1
2 INJECTION, POWDER, FOR SOLUTION INTRAMUSCULAR; INTRAVENOUS
Qty: 0 | Refills: 0 | DISCHARGE

## 2025-05-21 RX ORDER — LOPERAMIDE HCL 1 MG/7.5ML
1 SOLUTION ORAL
Qty: 0 | Refills: 0 | DISCHARGE
Start: 2025-05-21

## 2025-05-21 RX ORDER — ENOXAPARIN SODIUM 100 MG/ML
40 INJECTION SUBCUTANEOUS
Qty: 0 | Refills: 0 | DISCHARGE
Start: 2025-05-21

## 2025-05-21 RX ORDER — METOPROLOL SUCCINATE 50 MG/1
0.5 TABLET, EXTENDED RELEASE ORAL
Qty: 30 | Refills: 0
Start: 2025-05-21 | End: 2025-06-19

## 2025-05-21 RX ADMIN — TIMOLOL MALEATE 1 DROP(S): 6.8 SOLUTION OPHTHALMIC at 06:26

## 2025-05-21 RX ADMIN — IPRATROPIUM BROMIDE AND ALBUTEROL SULFATE 3 MILLILITER(S): .5; 2.5 SOLUTION RESPIRATORY (INHALATION) at 06:41

## 2025-05-21 RX ADMIN — FINASTERIDE 5 MILLIGRAM(S): 1 TABLET, FILM COATED ORAL at 11:07

## 2025-05-21 RX ADMIN — IPRATROPIUM BROMIDE AND ALBUTEROL SULFATE 3 MILLILITER(S): .5; 2.5 SOLUTION RESPIRATORY (INHALATION) at 01:05

## 2025-05-21 RX ADMIN — IPRATROPIUM BROMIDE AND ALBUTEROL SULFATE 3 MILLILITER(S): .5; 2.5 SOLUTION RESPIRATORY (INHALATION) at 11:06

## 2025-05-21 RX ADMIN — LEVETIRACETAM 750 MILLIGRAM(S): 10 INJECTION, SOLUTION INTRAVENOUS at 06:47

## 2025-05-21 RX ADMIN — Medication 81 MILLIGRAM(S): at 11:06

## 2025-05-21 RX ADMIN — Medication 650 MILLIGRAM(S): at 06:39

## 2025-05-21 RX ADMIN — Medication 500 MILLIGRAM(S): at 11:07

## 2025-05-21 RX ADMIN — FOLIC ACID 1 MILLIGRAM(S): 1 TABLET ORAL at 11:07

## 2025-05-21 RX ADMIN — METOPROLOL SUCCINATE 12.5 MILLIGRAM(S): 50 TABLET, EXTENDED RELEASE ORAL at 06:35

## 2025-05-21 NOTE — PROGRESS NOTE ADULT - PROBLEM SELECTOR PROBLEM 2
History of bacteremia

## 2025-05-21 NOTE — DISCHARGE NOTE NURSING/CASE MANAGEMENT/SOCIAL WORK - PATIENT PORTAL LINK FT
You can access the FollowMyHealth Patient Portal offered by [facility] by registering at the following website: [email or web address]. By joining [facility]’s FollowMyHealth portal, you will also be able to view your health information using other applications (apps) compatible with our system.

## 2025-05-21 NOTE — PROGRESS NOTE ADULT - PROVIDER SPECIALTY LIST ADULT
Internal Medicine
Surgery
Internal Medicine
Hospitalist
Hospitalist
Infectious Disease
Infectious Disease
Surgery
Internal Medicine

## 2025-05-21 NOTE — PROGRESS NOTE ADULT - SUBJECTIVE AND OBJECTIVE BOX
INTERVAL HPI/OVERNIGHT EVENTS: stephanie     SUBJECTIVE: Patient seen and examined at bedside, resting comfortably in bed, and does not appear to be in any acute distress. Patient unable to communicate needs.     Vital Signs Last 24 Hrs  T(C): 37.1 (21 May 2025 05:41), Max: 37.1 (21 May 2025 05:41)  T(F): 98.8 (21 May 2025 05:41), Max: 98.8 (21 May 2025 05:41)  HR: 108 (21 May 2025 05:41) (90 - 108)  BP: 115/65 (21 May 2025 05:41) (102/60 - 115/65)  BP(mean): 82 (21 May 2025 05:41) (82 - 82)  RR: 18 (21 May 2025 05:41) (18 - 20)  SpO2: 97% (21 May 2025 05:41) (95% - 97%)    Parameters below as of 21 May 2025 05:41  Patient On (Oxygen Delivery Method): nasal cannula  O2 Flow (L/min): 2      PHYSICAL EXAM:  General: in no acute distress  Eyes: EOMI intact bilaterally. Anicteric sclerae, moist conjunctivae  HENT: Moist mucous membranes  Neck: Trachea midline, supple  Lungs: CTA B/L. No wheezes, rales, or rhonchi  Cardiovascular: RRR. No murmurs, rubs, or gallops  Abdomen: PEG site CDI   Extremities: WWP, No clubbing, cyanosis or edema  Neurological: Alert and orientedx0  Skin: Warm and dry. No obvious rash

## 2025-05-21 NOTE — DISCHARGE NOTE NURSING/CASE MANAGEMENT/SOCIAL WORK - NSDCPEFALRISK_GEN_ALL_CORE
For information on Fall & Injury Prevention, visit: [email or web address] OR  [email or web address] OR  https://www.cdc.gov/steadi/patient.html

## 2025-05-21 NOTE — PROGRESS NOTE ADULT - PROBLEM SELECTOR PROBLEM 1
Positive QuantiFERON-TB Gold test

## 2025-05-21 NOTE — PROGRESS NOTE ADULT - PROBLEM SELECTOR PLAN 9
F: none   E: replete PRN  N: NPO with tube feeds  DVT ppx: lovenox, on asa 81mg   Dispo: [facility]

## 2025-05-21 NOTE — PROGRESS NOTE ADULT - REASON FOR ADMISSION
+Quantiferon Test

## 2025-05-21 NOTE — DISCHARGE NOTE NURSING/CASE MANAGEMENT/SOCIAL WORK - FINANCIAL ASSISTANCE
[facility] provides services at a reduced cost to those who are determined to be eligible through [facility]’s financial assistance program. Information regarding [facility]’s financial assistance program can be found by going to [email or web address] or by calling [phone].

## 2025-05-21 NOTE — PROGRESS NOTE ADULT - ATTENDING COMMENTS
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 GI consulted and PEG tube study showed PEG is functioning and no need to change to a new one. WOCN consult appreciated, given Sliver nitrate and aquagel Ag dressing daily to PEG site. Dietitian consult appreciated, TF changed to Glucerna 1.2 @ 83ml/hr from 5pm to 11am x 18hr, total 6 cans per day. Pt recently admitted to ICU for aspiration PNA and had indwelling hudson removed and discharged from [facility] ( 2/25/25) and sent to [facility] and subsequently sent home on home O2 per wife, admitted [facility] ( 3/26-4/1)  for severe sepsis w/ lactic acidosis 2/2 recurrent multifocal HAP likely aspiration/gram negative organism and and last admitted [facility] ( 4/22-5/1)  infected sacral decubitus (POA)--Strep bacteremia, sacral ulcer with abscess sp bedside debridement by surgery 4/27- bleeding required electrocautery , given one unit of prbc before debridement - now hb at 7.7 --> given a unit 4/28- hb above 9 - wound vac placed by team 4 surgery on 4/29, discharged on 5/1 to [facility] for 2 weeks of antibiotics with ceftriaxone/flagyl ( 4/27- 5/10 ). Pt now presented to [facility] ( 5/9) for +quantgold test resulting in NH with otherwise improving PNA from recent admission and no worsening systemic symptoms or s/s sepsis, now being admitted for TB r/o with AFB confirmation to return to nursing facility- sputum AFB x 3 negative, remain stable off antibiotics, surgery re-evaluation no further wound debridement, no wound vac - medically stable waiting for insurance logistics for BRENDA     seen with wife at bedside   afebrile on NC2L  w. 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   - airway clearance: duo-nebs,  remains on NC2L as baseline for chronic hypoxic resp failure    - NPO/ oral hygiene q shift/aspiration precautions     # Recent strep bacteremia w/ sacral ulcer Stage IV   - completed Ceftriaxone and flagyl ( 5/10) - midline removal 5/12-   low grade temp 5/13- ID reconsulted, monitor off antibiotics , bc 5/13- ntd, remain afebrile afterwards. mild leucocytosis 12 noted. clinically stable. he is at risk for recurrent aspiration.   - Surgery consult appreciated, no need for debridement of sacral wound, wound vac was placed by surgery during last admission - surgery follow up determine no further debridement, no wound vac   = would care evaluation - to have dressing change frequently.   - added MVI, VIt C 500mg daily and ZnSo4 220mg po daily   - nutrition evaluation for supplement.     # Dysphagia  # Hyponatremia -resolved.   - c/w TF/glucerna 5 cans a day and free water via PEG-     # T2DM- with hyperglycemia   - lantus to 10 Units Q hs - continue with FS and SS goal glucose 100-180  - hypophos -to replace.     # tachycardia- record reviewed- pt was on Metoprolol 50 mg bid on 12/12/24- ( was held during last admission due to relative hypotension from sepsis, now with improved BP and tachycardia to restart metoprolol tartrate 12.5 mg via peg BID and to titrate up.   noted tachycardia during dressing change, likely related to pain - given tylenol 650mg q12hr standing for pain     # DVT ppx     # FULL CODE- GOC as discussed wife at bedside, pt remains full code     # Disposition: medically stable, AFB x 3 negative, [facility]    Pt is followed by home visit physician from [facility]   medically stable, at risk of recurrent aspiration pneumonia and wound infection.  labs prn.    Time-based billing (NON-critical care).     (> 30mins discharge) 51 minutes spent on total encounter. The necessity of the time spent during the encounter on this date of service was due to:     preparation to see patient, history taking, physical exam, discussion with patient, discussion with other care providers, independent reviewing and interpretation of the data, care coordination. Awaiting insurance authorization as d/w  and SW this am .

## 2025-05-21 NOTE — PROGRESS NOTE ADULT - PROBLEM SELECTOR PLAN 1
Recent admission 4/22-5/1 for which patient was admitted for severe sepsis and found to have RLL aspiration PNA and bacteroides+ and strep+ species with subsequent negative bcx; discharged to NH with PICC to complete abx (CTX 1 g QD and PO HAJI, end date 5/10/25); now returning d/t +quantiferon test; CXR with improving opacities and no cavitations or c/f TB on previous imaging (CT chest 04/2025), no fever/weight loss or systemic symptoms except for intermittent cough with whitish sputum which pt had on prior admission (recovering from CAP); WBC 9 w/o bandemia; pt originally from [place]; ID called in ED and 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 Recent admission 4/22-5/1 for which patient was admitted for severe sepsis and found to have RLL aspiration PNA and bacteroides+ and strep+ species with subsequent negative bcx; discharged to NH with PICC to complete abx (CTX 1 g QD and PO HAJI, end date 5/10/25); now returning d/t +quantiferon test; CXR with improving opacities and no cavitations or c/f TB on previous imaging (CT chest 04/2025), no fever/weight loss or systemic symptoms except for intermittent cough with whitish sputum which pt had on prior admission (recovering from CAP); WBC 9 w/o bandemia; pt originally from [place]; ID called in ED and 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, likely dc today

## 2025-05-27 DIAGNOSIS — E11.65 TYPE 2 DIABETES MELLITUS WITH HYPERGLYCEMIA: ICD-10-CM

## 2025-05-27 DIAGNOSIS — E87.1 HYPO-OSMOLALITY AND HYPONATREMIA: ICD-10-CM

## 2025-05-27 DIAGNOSIS — E11.8 TYPE 2 DIABETES MELLITUS WITH UNSPECIFIED COMPLICATIONS: ICD-10-CM

## 2025-05-27 DIAGNOSIS — R00.0 TACHYCARDIA, UNSPECIFIED: ICD-10-CM

## 2025-05-27 DIAGNOSIS — I69.951 HEMIPLEGIA AND HEMIPARESIS FOLLOWING UNSPECIFIED CEREBROVASCULAR DISEASE AFFECTING RIGHT DOMINANT SIDE: ICD-10-CM

## 2025-05-27 DIAGNOSIS — D56.3 THALASSEMIA MINOR: ICD-10-CM

## 2025-05-27 DIAGNOSIS — L89.154 PRESSURE ULCER OF SACRAL REGION, STAGE 4: ICD-10-CM

## 2025-05-27 DIAGNOSIS — R33.8 OTHER RETENTION OF URINE: ICD-10-CM

## 2025-05-27 DIAGNOSIS — H40.9 UNSPECIFIED GLAUCOMA: ICD-10-CM

## 2025-05-27 DIAGNOSIS — Z79.2 LONG TERM (CURRENT) USE OF ANTIBIOTICS: ICD-10-CM

## 2025-05-27 DIAGNOSIS — I50.32 CHRONIC DIASTOLIC (CONGESTIVE) HEART FAILURE: ICD-10-CM

## 2025-05-27 DIAGNOSIS — F32.A DEPRESSION, UNSPECIFIED: ICD-10-CM

## 2025-05-27 DIAGNOSIS — Z79.4 LONG TERM (CURRENT) USE OF INSULIN: ICD-10-CM

## 2025-05-27 DIAGNOSIS — N40.1 BENIGN PROSTATIC HYPERPLASIA WITH LOWER URINARY TRACT SYMPTOMS: ICD-10-CM

## 2025-05-27 DIAGNOSIS — J69.0 PNEUMONITIS DUE TO INHALATION OF FOOD AND VOMIT: ICD-10-CM

## 2025-05-27 DIAGNOSIS — R56.9 UNSPECIFIED CONVULSIONS: ICD-10-CM

## 2025-05-27 DIAGNOSIS — E78.5 HYPERLIPIDEMIA, UNSPECIFIED: ICD-10-CM

## 2025-05-27 DIAGNOSIS — E83.39 OTHER DISORDERS OF PHOSPHORUS METABOLISM: ICD-10-CM

## 2025-05-27 DIAGNOSIS — J96.11 CHRONIC RESPIRATORY FAILURE WITH HYPOXIA: ICD-10-CM

## 2025-05-27 DIAGNOSIS — H91.93 UNSPECIFIED HEARING LOSS, BILATERAL: ICD-10-CM

## 2025-05-27 DIAGNOSIS — Z93.1 GASTROSTOMY STATUS: ICD-10-CM

## 2025-05-27 DIAGNOSIS — D50.9 IRON DEFICIENCY ANEMIA, UNSPECIFIED: ICD-10-CM

## 2025-05-27 DIAGNOSIS — R76.11 NONSPECIFIC REACTION TO TUBERCULIN SKIN TEST WITHOUT ACTIVE TUBERCULOSIS: ICD-10-CM

## 2025-05-27 DIAGNOSIS — J18.9 PNEUMONIA, UNSPECIFIED ORGANISM: ICD-10-CM

## 2025-05-27 DIAGNOSIS — Z99.3 DEPENDENCE ON WHEELCHAIR: ICD-10-CM

## 2025-05-27 DIAGNOSIS — R19.7 DIARRHEA, UNSPECIFIED: ICD-10-CM

## 2025-05-27 DIAGNOSIS — R76.12 NONSPECIFIC REACTION TO CELL MEDIATED IMMUNITY MEASUREMENT OF GAMMA INTERFERON ANTIGEN RESPONSE WITHOUT ACTIVE TUBERCULOSIS: ICD-10-CM

## 2025-05-27 DIAGNOSIS — R13.10 DYSPHAGIA, UNSPECIFIED: ICD-10-CM

## 2025-05-27 DIAGNOSIS — Z79.82 LONG TERM (CURRENT) USE OF ASPIRIN: ICD-10-CM

## 2025-05-27 DIAGNOSIS — I71.9 AORTIC ANEURYSM OF UNSPECIFIED SITE, WITHOUT RUPTURE: ICD-10-CM

## 2025-05-27 DIAGNOSIS — E43 UNSPECIFIED SEVERE PROTEIN-CALORIE MALNUTRITION: ICD-10-CM

## 2025-05-29 LAB — NIGHT BLUE STAIN TISS: ABNORMAL

## 2025-06-05 ENCOUNTER — APPOINTMENT (OUTPATIENT)
Dept: INFECTIOUS DISEASE | Facility: CLINIC | Age: 89
End: 2025-06-05
Payer: MEDICARE

## 2025-06-05 PROCEDURE — 99214 OFFICE O/P EST MOD 30 MIN: CPT

## 2025-06-10 ENCOUNTER — NON-APPOINTMENT (OUTPATIENT)
Age: 89
End: 2025-06-10

## 2025-06-12 ENCOUNTER — APPOINTMENT (OUTPATIENT)
Dept: GASTROENTEROLOGY | Facility: CLINIC | Age: 89
End: 2025-06-12